# Patient Record
Sex: MALE | Race: WHITE | NOT HISPANIC OR LATINO | URBAN - METROPOLITAN AREA
[De-identification: names, ages, dates, MRNs, and addresses within clinical notes are randomized per-mention and may not be internally consistent; named-entity substitution may affect disease eponyms.]

---

## 2014-04-08 RX ORDER — AMLODIPINE BESYLATE 2.5 MG/1
1 TABLET ORAL
Qty: 0 | Refills: 0 | DISCHARGE
Start: 2014-04-08

## 2015-06-06 RX ORDER — CLOPIDOGREL BISULFATE 75 MG/1
1 TABLET, FILM COATED ORAL
Qty: 0 | Refills: 0 | COMMUNITY
Start: 2015-06-06

## 2015-06-06 RX ORDER — ASPIRIN/CALCIUM CARB/MAGNESIUM 324 MG
1 TABLET ORAL
Qty: 0 | Refills: 0 | DISCHARGE
Start: 2015-06-06

## 2017-01-20 ENCOUNTER — OUTPATIENT (OUTPATIENT)
Dept: OUTPATIENT SERVICES | Facility: HOSPITAL | Age: 52
LOS: 1 days | End: 2017-01-20
Payer: COMMERCIAL

## 2017-01-20 ENCOUNTER — APPOINTMENT (OUTPATIENT)
Dept: MRI IMAGING | Facility: IMAGING CENTER | Age: 52
End: 2017-01-20

## 2017-01-20 DIAGNOSIS — Z98.89 OTHER SPECIFIED POSTPROCEDURAL STATES: Chronic | ICD-10-CM

## 2017-01-20 DIAGNOSIS — Z00.8 ENCOUNTER FOR OTHER GENERAL EXAMINATION: ICD-10-CM

## 2017-01-20 DIAGNOSIS — Z87.898 PERSONAL HISTORY OF OTHER SPECIFIED CONDITIONS: Chronic | ICD-10-CM

## 2017-01-20 PROCEDURE — 72197 MRI PELVIS W/O & W/DYE: CPT

## 2017-01-20 PROCEDURE — A9585: CPT

## 2017-01-20 PROCEDURE — 74183 MRI ABD W/O CNTR FLWD CNTR: CPT

## 2017-01-24 ENCOUNTER — APPOINTMENT (OUTPATIENT)
Dept: RHEUMATOLOGY | Facility: CLINIC | Age: 52
End: 2017-01-24

## 2017-02-07 ENCOUNTER — APPOINTMENT (OUTPATIENT)
Dept: RHEUMATOLOGY | Facility: CLINIC | Age: 52
End: 2017-02-07

## 2017-02-08 ENCOUNTER — RX RENEWAL (OUTPATIENT)
Age: 52
End: 2017-02-08

## 2017-02-08 ENCOUNTER — TRANSCRIPTION ENCOUNTER (OUTPATIENT)
Age: 52
End: 2017-02-08

## 2017-02-08 ENCOUNTER — LABORATORY RESULT (OUTPATIENT)
Age: 52
End: 2017-02-08

## 2017-02-08 ENCOUNTER — APPOINTMENT (OUTPATIENT)
Dept: DERMATOLOGY | Facility: CLINIC | Age: 52
End: 2017-02-08

## 2017-02-08 VITALS — SYSTOLIC BLOOD PRESSURE: 120 MMHG | DIASTOLIC BLOOD PRESSURE: 70 MMHG

## 2017-02-08 DIAGNOSIS — A63.0 ANOGENITAL (VENEREAL) WARTS: ICD-10-CM

## 2017-02-10 ENCOUNTER — TRANSCRIPTION ENCOUNTER (OUTPATIENT)
Age: 52
End: 2017-02-10

## 2017-02-20 ENCOUNTER — INPATIENT (INPATIENT)
Facility: HOSPITAL | Age: 52
LOS: 3 days | Discharge: HOME | DRG: 386 | End: 2017-02-24
Attending: INTERNAL MEDICINE | Admitting: INTERNAL MEDICINE
Payer: COMMERCIAL

## 2017-02-20 VITALS
WEIGHT: 167.99 LBS | RESPIRATION RATE: 18 BRPM | DIASTOLIC BLOOD PRESSURE: 97 MMHG | HEART RATE: 80 BPM | TEMPERATURE: 99 F | OXYGEN SATURATION: 100 % | SYSTOLIC BLOOD PRESSURE: 145 MMHG | HEIGHT: 66 IN

## 2017-02-20 DIAGNOSIS — Z87.898 PERSONAL HISTORY OF OTHER SPECIFIED CONDITIONS: Chronic | ICD-10-CM

## 2017-02-20 DIAGNOSIS — Z98.89 OTHER SPECIFIED POSTPROCEDURAL STATES: Chronic | ICD-10-CM

## 2017-02-20 DIAGNOSIS — K56.69 OTHER INTESTINAL OBSTRUCTION: ICD-10-CM

## 2017-02-20 LAB
ALBUMIN SERPL ELPH-MCNC: 4.7 G/DL — SIGNIFICANT CHANGE UP (ref 3.3–5)
ALP SERPL-CCNC: 65 U/L — SIGNIFICANT CHANGE UP (ref 40–120)
ALT FLD-CCNC: 19 U/L RC — SIGNIFICANT CHANGE UP (ref 10–45)
ANION GAP SERPL CALC-SCNC: 19 MMOL/L — HIGH (ref 5–17)
APTT BLD: 35.2 SEC — SIGNIFICANT CHANGE UP (ref 27.5–37.4)
AST SERPL-CCNC: 15 U/L — SIGNIFICANT CHANGE UP (ref 10–40)
BASOPHILS # BLD AUTO: 0 K/UL — SIGNIFICANT CHANGE UP (ref 0–0.2)
BASOPHILS NFR BLD AUTO: 0.2 % — SIGNIFICANT CHANGE UP (ref 0–2)
BILIRUB SERPL-MCNC: 0.3 MG/DL — SIGNIFICANT CHANGE UP (ref 0.2–1.2)
BLD GP AB SCN SERPL QL: NEGATIVE — SIGNIFICANT CHANGE UP
BUN SERPL-MCNC: 12 MG/DL — SIGNIFICANT CHANGE UP (ref 7–23)
CALCIUM SERPL-MCNC: 10.2 MG/DL — SIGNIFICANT CHANGE UP (ref 8.4–10.5)
CHLORIDE SERPL-SCNC: 99 MMOL/L — SIGNIFICANT CHANGE UP (ref 96–108)
CO2 SERPL-SCNC: 24 MMOL/L — SIGNIFICANT CHANGE UP (ref 22–31)
CREAT SERPL-MCNC: 0.72 MG/DL — SIGNIFICANT CHANGE UP (ref 0.5–1.3)
EOSINOPHIL # BLD AUTO: 0 K/UL — SIGNIFICANT CHANGE UP (ref 0–0.5)
EOSINOPHIL NFR BLD AUTO: 0.4 % — SIGNIFICANT CHANGE UP (ref 0–6)
GAS PNL BLDV: SIGNIFICANT CHANGE UP
GLUCOSE SERPL-MCNC: 110 MG/DL — HIGH (ref 70–99)
HCT VFR BLD CALC: 39.1 % — SIGNIFICANT CHANGE UP (ref 39–50)
HGB BLD-MCNC: 13.4 G/DL — SIGNIFICANT CHANGE UP (ref 13–17)
INR BLD: 1.13 RATIO — SIGNIFICANT CHANGE UP (ref 0.88–1.16)
LIDOCAIN IGE QN: 31 U/L — SIGNIFICANT CHANGE UP (ref 7–60)
LYMPHOCYTES # BLD AUTO: 0.7 K/UL — LOW (ref 1–3.3)
LYMPHOCYTES # BLD AUTO: 6.8 % — LOW (ref 13–44)
MCHC RBC-ENTMCNC: 27.9 PG — SIGNIFICANT CHANGE UP (ref 27–34)
MCHC RBC-ENTMCNC: 34.3 GM/DL — SIGNIFICANT CHANGE UP (ref 32–36)
MCV RBC AUTO: 81.5 FL — SIGNIFICANT CHANGE UP (ref 80–100)
MONOCYTES # BLD AUTO: 0.4 K/UL — SIGNIFICANT CHANGE UP (ref 0–0.9)
MONOCYTES NFR BLD AUTO: 4.1 % — SIGNIFICANT CHANGE UP (ref 2–14)
NEUTROPHILS # BLD AUTO: 8.9 K/UL — HIGH (ref 1.8–7.4)
NEUTROPHILS NFR BLD AUTO: 88.5 % — HIGH (ref 43–77)
PLATELET # BLD AUTO: 174 K/UL — SIGNIFICANT CHANGE UP (ref 150–400)
POTASSIUM SERPL-MCNC: 3.7 MMOL/L — SIGNIFICANT CHANGE UP (ref 3.5–5.3)
POTASSIUM SERPL-SCNC: 3.7 MMOL/L — SIGNIFICANT CHANGE UP (ref 3.5–5.3)
PROT SERPL-MCNC: 7.6 G/DL — SIGNIFICANT CHANGE UP (ref 6–8.3)
PROTHROM AB SERPL-ACNC: 12.3 SEC — SIGNIFICANT CHANGE UP (ref 10–13.1)
RBC # BLD: 4.8 M/UL — SIGNIFICANT CHANGE UP (ref 4.2–5.8)
RBC # FLD: 12.5 % — SIGNIFICANT CHANGE UP (ref 10.3–14.5)
RH IG SCN BLD-IMP: POSITIVE — SIGNIFICANT CHANGE UP
SODIUM SERPL-SCNC: 142 MMOL/L — SIGNIFICANT CHANGE UP (ref 135–145)
WBC # BLD: 10 K/UL — SIGNIFICANT CHANGE UP (ref 3.8–10.5)
WBC # FLD AUTO: 10 K/UL — SIGNIFICANT CHANGE UP (ref 3.8–10.5)

## 2017-02-20 PROCEDURE — 74177 CT ABD & PELVIS W/CONTRAST: CPT | Mod: 26

## 2017-02-20 PROCEDURE — 99285 EMERGENCY DEPT VISIT HI MDM: CPT

## 2017-02-20 PROCEDURE — 99223 1ST HOSP IP/OBS HIGH 75: CPT

## 2017-02-20 RX ORDER — MORPHINE SULFATE 50 MG/1
4 CAPSULE, EXTENDED RELEASE ORAL ONCE
Qty: 0 | Refills: 0 | Status: DISCONTINUED | OUTPATIENT
Start: 2017-02-20 | End: 2017-02-20

## 2017-02-20 RX ORDER — METRONIDAZOLE 500 MG
500 TABLET ORAL ONCE
Qty: 0 | Refills: 0 | Status: COMPLETED | OUTPATIENT
Start: 2017-02-20 | End: 2017-02-20

## 2017-02-20 RX ORDER — CIPROFLOXACIN LACTATE 400MG/40ML
400 VIAL (ML) INTRAVENOUS ONCE
Qty: 0 | Refills: 0 | Status: COMPLETED | OUTPATIENT
Start: 2017-02-20 | End: 2017-02-20

## 2017-02-20 RX ORDER — ACETAMINOPHEN 500 MG
1000 TABLET ORAL ONCE
Qty: 0 | Refills: 0 | Status: COMPLETED | OUTPATIENT
Start: 2017-02-20 | End: 2017-02-20

## 2017-02-20 RX ORDER — ONDANSETRON 8 MG/1
4 TABLET, FILM COATED ORAL ONCE
Qty: 0 | Refills: 0 | Status: COMPLETED | OUTPATIENT
Start: 2017-02-20 | End: 2017-02-20

## 2017-02-20 RX ORDER — SODIUM CHLORIDE 9 MG/ML
1000 INJECTION, SOLUTION INTRAVENOUS
Qty: 0 | Refills: 0 | Status: DISCONTINUED | OUTPATIENT
Start: 2017-02-20 | End: 2017-02-23

## 2017-02-20 RX ORDER — CIPROFLOXACIN LACTATE 400MG/40ML
200 VIAL (ML) INTRAVENOUS EVERY 12 HOURS
Qty: 0 | Refills: 0 | Status: DISCONTINUED | OUTPATIENT
Start: 2017-02-20 | End: 2017-02-24

## 2017-02-20 RX ORDER — HYDROMORPHONE HYDROCHLORIDE 2 MG/ML
0.5 INJECTION INTRAMUSCULAR; INTRAVENOUS; SUBCUTANEOUS EVERY 4 HOURS
Qty: 0 | Refills: 0 | Status: DISCONTINUED | OUTPATIENT
Start: 2017-02-20 | End: 2017-02-22

## 2017-02-20 RX ORDER — METRONIDAZOLE 500 MG
500 TABLET ORAL EVERY 8 HOURS
Qty: 0 | Refills: 0 | Status: DISCONTINUED | OUTPATIENT
Start: 2017-02-20 | End: 2017-02-24

## 2017-02-20 RX ORDER — SODIUM CHLORIDE 9 MG/ML
1000 INJECTION INTRAMUSCULAR; INTRAVENOUS; SUBCUTANEOUS ONCE
Qty: 0 | Refills: 0 | Status: COMPLETED | OUTPATIENT
Start: 2017-02-20 | End: 2017-02-20

## 2017-02-20 RX ADMIN — MORPHINE SULFATE 4 MILLIGRAM(S): 50 CAPSULE, EXTENDED RELEASE ORAL at 21:30

## 2017-02-20 RX ADMIN — MORPHINE SULFATE 4 MILLIGRAM(S): 50 CAPSULE, EXTENDED RELEASE ORAL at 22:44

## 2017-02-20 RX ADMIN — SODIUM CHLORIDE 2000 MILLILITER(S): 9 INJECTION INTRAMUSCULAR; INTRAVENOUS; SUBCUTANEOUS at 19:25

## 2017-02-20 RX ADMIN — Medication 100 MILLIGRAM(S): at 22:30

## 2017-02-20 RX ADMIN — Medication 400 MILLIGRAM(S): at 18:45

## 2017-02-20 RX ADMIN — SODIUM CHLORIDE 1000 MILLILITER(S): 9 INJECTION INTRAMUSCULAR; INTRAVENOUS; SUBCUTANEOUS at 18:38

## 2017-02-20 RX ADMIN — ONDANSETRON 4 MILLIGRAM(S): 8 TABLET, FILM COATED ORAL at 18:37

## 2017-02-20 RX ADMIN — Medication 1000 MILLIGRAM(S): at 20:38

## 2017-02-20 RX ADMIN — SODIUM CHLORIDE 125 MILLILITER(S): 9 INJECTION, SOLUTION INTRAVENOUS at 22:31

## 2017-02-20 RX ADMIN — ONDANSETRON 4 MILLIGRAM(S): 8 TABLET, FILM COATED ORAL at 20:59

## 2017-02-20 RX ADMIN — MORPHINE SULFATE 4 MILLIGRAM(S): 50 CAPSULE, EXTENDED RELEASE ORAL at 23:11

## 2017-02-20 RX ADMIN — MORPHINE SULFATE 4 MILLIGRAM(S): 50 CAPSULE, EXTENDED RELEASE ORAL at 20:38

## 2017-02-20 RX ADMIN — MORPHINE SULFATE 4 MILLIGRAM(S): 50 CAPSULE, EXTENDED RELEASE ORAL at 18:37

## 2017-02-20 RX ADMIN — MORPHINE SULFATE 4 MILLIGRAM(S): 50 CAPSULE, EXTENDED RELEASE ORAL at 20:59

## 2017-02-20 RX ADMIN — Medication 200 MILLIGRAM(S): at 20:59

## 2017-02-20 NOTE — ED ADULT NURSE NOTE - PMH
Asthma    Bilateral knee pain    Bronchitis, chronic  last episode 6 months ago  CAD (coronary artery disease)    CAD (coronary artery disease)  RCA DE stent in place   2006  Crohn disease    DM (diabetes mellitus)  aic 5.8 diet controlled  GERD (gastroesophageal reflux disease)    GIB (gastrointestinal bleeding)    H/O: HTN (hypertension)    HLD (hyperlipidemia)    HTN (hypertension)    Hypercholesterolemia    Inguinal mass    Joint pain    Lower back pain    MI (myocardial infarction)    MARYAM (obstructive sleep apnea)    Pancreatitis  while on 6MP for Crohn's now off of it  Shoulder pain, bilateral    Sleep apnea

## 2017-02-20 NOTE — H&P ADULT. - PROBLEM SELECTOR PLAN 1
Seen on CT abd/pelvis. Likely complication of Crohn's disease. GI consult appreciated. Will hold steroids, start cipro and flagyl, IVF and pain control. Initially no vomiting today but abd pain continued to progress in ER refractory to multiple doses of IV morphine. Pt self induced vomiting to relieve pain. Pain better controlled with IV dilaudid and NGT placed.  -Cont. IV cipro and flagyl  -F/u Xray to confirm NGT placement, set to intermittent suction if confirmed.  -Will consult surgery to evaluate pt and give further recommendations, thank you Edil  -F/u GI

## 2017-02-20 NOTE — ED PROVIDER NOTE - CARE PLAN
Principal Discharge DX:	Partial small bowel obstruction  Secondary Diagnosis:	Crohn's disease of small intestine with intestinal obstruction

## 2017-02-20 NOTE — ED ADULT NURSE REASSESSMENT NOTE - NS ED NURSE REASSESS COMMENT FT1
Report received from Jose Miguel PARKER. Pt AAOx4, NAD, resting comfortably in bed with wife at bedside. Pt reports improvement in mid-abdominal pain to 5/10. Pt reports intermittent nausea, no vomiting in ED, no diarrhea in ED. Pt denies headache, dizziness, chest pain, SOB, urinary symptoms, fevers, chills at this time. Pt awaiting admit, safety maintained, pt aware he must remain NPO, safety maintained.

## 2017-02-20 NOTE — H&P ADULT. - PROBLEM SELECTOR PLAN 6
-can restart losartan 100mg AM, Toprol 25mg AM, amlodipine 10mg am via NGT after placement confirmed.

## 2017-02-20 NOTE — H&P ADULT. - ASSESSMENT
51M w/ hx of Crohn's disease s/p multiple regimens on entyvio, CAD s/p REBEKAH to RCA, mLAD/pLAD 2015, DM2, GERD, asthma, HTn, HLD, MARYAM, GERD p/w severe abd pain

## 2017-02-20 NOTE — ED PROVIDER NOTE - ATTENDING CONTRIBUTION TO CARE
Attending MD Garcia: I, Jovanni Garcia, personally have seen and examined this patient.  I have discussed all aspects of care with the resident physician. Resident note reviewed and agree on plan of care and except where noted.  See HPI, PE, and MDM for details.

## 2017-02-20 NOTE — H&P ADULT. - HISTORY OF PRESENT ILLNESS
51M w/ hx of Crohn's disease s/p multiple regimens on entyvio, CAD s/p REBEKAH to RCA, mLAD/pLAD 2015, DM2, GERD, asthma, HTn, HLD, MARYAM, GERD p/w severe abd pain. Pt has been having periumbilical, constant, crampy abdominal pain since this AM which is sometimes alleviated by certain positions. Had a small BM this AM and possibly this afternoon. Usually has diarrhea many times a day. Still having occasional flatus. Of note, had SBO in 11/2016 w/ NGT and steroids. Pt endorses nausea but denies any episodes of vomiting, sob, alexandra chest pain. Has subjective fever not greatly different from usual.     In ER: Seen by GI, given multiple rounds of morphine 4mg IV, cipro, flagyl, IVF, seen by pt's GI.

## 2017-02-20 NOTE — ED PROVIDER NOTE - OBJECTIVE STATEMENT
50yo p/w abdominal pain since this AM. Pain is periumbilical, constant, aggravated by palpation, alleviated by certain positions, cramping. Last BM this AM, small this afternoon. Pt. reports occasional flatus. Pt. had SBO in Nov. 2016, s/p NG tube and steriods. Pt. on Entivio s/p 2 loading doses, 3rd scheduled for 3/9. GI: Dr. Caicedo. Pt. reports weakness, nausea. Denies fevers, chest pain, shortness of breath, dysuria/hematuria. Pt. reports occasional urinary frequency.

## 2017-02-20 NOTE — ED PROVIDER NOTE - PSH
H/O colonoscopy    H/O lipoma  removed from back  H/O percutaneous transluminal coronary angioplasty  with DE RCA 2006  History of herniorrhaphy  left inguinal herniorrhaphy  S/P angioplasty with stent  RCA 9/2006  S/P cardiac catheterization  2010 - [facility]  S/P hernia surgery

## 2017-02-20 NOTE — H&P ADULT. - FAMILY HISTORY
<<-----Click on this checkbox to enter Family History Family history of premature CAD, Father CABG at age 54     Father  Still living? Unknown  Family history of Crohn's disease, Age at diagnosis: Age Unknown

## 2017-02-20 NOTE — H&P ADULT. - PSH
H/O colonoscopy    H/O lipoma  removed from back  H/O percutaneous transluminal coronary angioplasty  with DE RCA 2006  History of herniorrhaphy  left inguinal herniorrhaphy  S/P angioplasty with stent  RCA 9/2006  S/P cardiac catheterization  2010 - [facility]  S/P hernia surgery

## 2017-02-20 NOTE — ED PROVIDER NOTE - PHYSICAL EXAMINATION
ATTENDING MD Radha: GEN: mildly ill appearing, uncomfortable, nontoxic; HEENT: NCAT, anicteric sclerae, MM Dry, neck veins flat; CV: RRR; LUNGS: CTA; ABD: soft, diffusely tender w/out reboudn or guarding; : no CVAT; MSK: atraumatic extremities; NEURO: moving all extremities; PSYCH: approrpaite affect    RESIDENT EXAM BELOW:

## 2017-02-20 NOTE — ED ADULT NURSE REASSESSMENT NOTE - GENERAL PATIENT STATE
smiling/interactive/comfortable appearance/cooperative/improvement verbalized/family/SO at bedside/resting/sleeping

## 2017-02-20 NOTE — ED ADULT NURSE NOTE - OBJECTIVE STATEMENT
Pt is known case of crones' disease C/O  abdominal pain  X 1 day with nausea & feeling miserable . Pt denies diarrhea last BM today has low grade fever june CP SOB  Pt is nauseous & in severe pain

## 2017-02-20 NOTE — H&P ADULT. - PROBLEM SELECTOR PLAN 2
Pt with difficult to treat Crohn's disease. Does not have symptoms of flare currently. As per GI, will hold steroids. Next dose of Entyvia not until March. Repatha bi weekly taken this am before coming to hospital.  -See above

## 2017-02-21 DIAGNOSIS — K56.69 OTHER INTESTINAL OBSTRUCTION: ICD-10-CM

## 2017-02-21 DIAGNOSIS — K50.012 CROHN'S DISEASE OF SMALL INTESTINE WITH INTESTINAL OBSTRUCTION: ICD-10-CM

## 2017-02-21 DIAGNOSIS — J45.909 UNSPECIFIED ASTHMA, UNCOMPLICATED: ICD-10-CM

## 2017-02-21 DIAGNOSIS — K21.9 GASTRO-ESOPHAGEAL REFLUX DISEASE WITHOUT ESOPHAGITIS: ICD-10-CM

## 2017-02-21 DIAGNOSIS — Z29.9 ENCOUNTER FOR PROPHYLACTIC MEASURES, UNSPECIFIED: ICD-10-CM

## 2017-02-21 DIAGNOSIS — Z86.79 PERSONAL HISTORY OF OTHER DISEASES OF THE CIRCULATORY SYSTEM: ICD-10-CM

## 2017-02-21 DIAGNOSIS — I25.10 ATHEROSCLEROTIC HEART DISEASE OF NATIVE CORONARY ARTERY WITHOUT ANGINA PECTORIS: ICD-10-CM

## 2017-02-21 LAB
ALBUMIN SERPL ELPH-MCNC: 3.8 G/DL — SIGNIFICANT CHANGE UP (ref 3.3–5)
ALP SERPL-CCNC: 57 U/L — SIGNIFICANT CHANGE UP (ref 40–120)
ALT FLD-CCNC: 12 U/L — SIGNIFICANT CHANGE UP (ref 10–45)
ANION GAP SERPL CALC-SCNC: 12 MMOL/L — SIGNIFICANT CHANGE UP (ref 5–17)
AST SERPL-CCNC: 13 U/L — SIGNIFICANT CHANGE UP (ref 10–40)
BASOPHILS # BLD AUTO: 0 K/UL — SIGNIFICANT CHANGE UP (ref 0–0.2)
BASOPHILS NFR BLD AUTO: 0 % — SIGNIFICANT CHANGE UP (ref 0–2)
BILIRUB DIRECT SERPL-MCNC: 0 MG/DL — SIGNIFICANT CHANGE UP (ref 0–0.2)
BILIRUB INDIRECT FLD-MCNC: 0.2 MG/DL — SIGNIFICANT CHANGE UP (ref 0.2–1)
BILIRUB SERPL-MCNC: 0.2 MG/DL — SIGNIFICANT CHANGE UP (ref 0.2–1.2)
BUN SERPL-MCNC: 8 MG/DL — SIGNIFICANT CHANGE UP (ref 7–23)
CALCIUM SERPL-MCNC: 9.2 MG/DL — SIGNIFICANT CHANGE UP (ref 8.4–10.5)
CHLORIDE SERPL-SCNC: 103 MMOL/L — SIGNIFICANT CHANGE UP (ref 96–108)
CO2 SERPL-SCNC: 26 MMOL/L — SIGNIFICANT CHANGE UP (ref 22–31)
CREAT SERPL-MCNC: 0.76 MG/DL — SIGNIFICANT CHANGE UP (ref 0.5–1.3)
EOSINOPHIL # BLD AUTO: 0 K/UL — SIGNIFICANT CHANGE UP (ref 0–0.5)
EOSINOPHIL NFR BLD AUTO: 0 % — SIGNIFICANT CHANGE UP (ref 0–6)
GLUCOSE SERPL-MCNC: 145 MG/DL — HIGH (ref 70–99)
HBA1C BLD-MCNC: 5.1 % — SIGNIFICANT CHANGE UP (ref 4–5.6)
HCT VFR BLD CALC: 37.9 % — LOW (ref 39–50)
HGB BLD-MCNC: 12.8 G/DL — LOW (ref 13–17)
IMM GRANULOCYTES NFR BLD AUTO: 0.2 % — SIGNIFICANT CHANGE UP (ref 0–1.5)
LACTATE SERPL-SCNC: 0.8 MMOL/L — SIGNIFICANT CHANGE UP (ref 0.7–2)
LYMPHOCYTES # BLD AUTO: 0.68 K/UL — LOW (ref 1–3.3)
LYMPHOCYTES # BLD AUTO: 7.7 % — LOW (ref 13–44)
MAGNESIUM SERPL-MCNC: 1.9 MG/DL — SIGNIFICANT CHANGE UP (ref 1.6–2.6)
MCHC RBC-ENTMCNC: 27.6 PG — SIGNIFICANT CHANGE UP (ref 27–34)
MCHC RBC-ENTMCNC: 33.8 GM/DL — SIGNIFICANT CHANGE UP (ref 32–36)
MCV RBC AUTO: 81.7 FL — SIGNIFICANT CHANGE UP (ref 80–100)
MONOCYTES # BLD AUTO: 0.47 K/UL — SIGNIFICANT CHANGE UP (ref 0–0.9)
MONOCYTES NFR BLD AUTO: 5.3 % — SIGNIFICANT CHANGE UP (ref 2–14)
NEUTROPHILS # BLD AUTO: 7.7 K/UL — HIGH (ref 1.8–7.4)
NEUTROPHILS NFR BLD AUTO: 86.8 % — HIGH (ref 43–77)
PLATELET # BLD AUTO: 154 K/UL — SIGNIFICANT CHANGE UP (ref 150–400)
POTASSIUM SERPL-MCNC: 4.8 MMOL/L — SIGNIFICANT CHANGE UP (ref 3.5–5.3)
POTASSIUM SERPL-SCNC: 4.8 MMOL/L — SIGNIFICANT CHANGE UP (ref 3.5–5.3)
PROT SERPL-MCNC: 6.8 G/DL — SIGNIFICANT CHANGE UP (ref 6–8.3)
RBC # BLD: 4.64 M/UL — SIGNIFICANT CHANGE UP (ref 4.2–5.8)
RBC # FLD: 13.7 % — SIGNIFICANT CHANGE UP (ref 10.3–14.5)
SODIUM SERPL-SCNC: 141 MMOL/L — SIGNIFICANT CHANGE UP (ref 135–145)
WBC # BLD: 8.87 K/UL — SIGNIFICANT CHANGE UP (ref 3.8–10.5)
WBC # FLD AUTO: 8.87 K/UL — SIGNIFICANT CHANGE UP (ref 3.8–10.5)

## 2017-02-21 PROCEDURE — 71010: CPT | Mod: 26

## 2017-02-21 PROCEDURE — 99253 IP/OBS CNSLTJ NEW/EST LOW 45: CPT

## 2017-02-21 PROCEDURE — 99233 SBSQ HOSP IP/OBS HIGH 50: CPT

## 2017-02-21 RX ORDER — CLOPIDOGREL BISULFATE 75 MG/1
75 TABLET, FILM COATED ORAL DAILY
Qty: 0 | Refills: 0 | Status: DISCONTINUED | OUTPATIENT
Start: 2017-02-21 | End: 2017-02-24

## 2017-02-21 RX ORDER — PANTOPRAZOLE SODIUM 20 MG/1
40 TABLET, DELAYED RELEASE ORAL DAILY
Qty: 0 | Refills: 0 | Status: DISCONTINUED | OUTPATIENT
Start: 2017-02-21 | End: 2017-02-24

## 2017-02-21 RX ORDER — FLUTICASONE PROPIONATE AND SALMETEROL 50; 250 UG/1; UG/1
1 POWDER ORAL; RESPIRATORY (INHALATION)
Qty: 0 | Refills: 0 | Status: DISCONTINUED | OUTPATIENT
Start: 2017-02-21 | End: 2017-02-24

## 2017-02-21 RX ORDER — ACETAMINOPHEN 500 MG
1000 TABLET ORAL ONCE
Qty: 0 | Refills: 0 | Status: COMPLETED | OUTPATIENT
Start: 2017-02-21 | End: 2017-02-22

## 2017-02-21 RX ORDER — VEDOLIZUMAB 108 MG/.68ML
0 INJECTION, SOLUTION SUBCUTANEOUS
Qty: 0 | Refills: 0 | COMMUNITY

## 2017-02-21 RX ORDER — LORATADINE 10 MG/1
10 TABLET ORAL AT BEDTIME
Qty: 0 | Refills: 0 | Status: DISCONTINUED | OUTPATIENT
Start: 2017-02-21 | End: 2017-02-24

## 2017-02-21 RX ORDER — ONDANSETRON 8 MG/1
4 TABLET, FILM COATED ORAL EVERY 6 HOURS
Qty: 0 | Refills: 0 | Status: DISCONTINUED | OUTPATIENT
Start: 2017-02-21 | End: 2017-02-24

## 2017-02-21 RX ORDER — TIOTROPIUM BROMIDE 18 UG/1
1 CAPSULE ORAL; RESPIRATORY (INHALATION) AT BEDTIME
Qty: 0 | Refills: 0 | Status: DISCONTINUED | OUTPATIENT
Start: 2017-02-21 | End: 2017-02-24

## 2017-02-21 RX ORDER — SODIUM CHLORIDE 0.65 %
1 AEROSOL, SPRAY (ML) NASAL
Qty: 0 | Refills: 0 | Status: DISCONTINUED | OUTPATIENT
Start: 2017-02-21 | End: 2017-02-24

## 2017-02-21 RX ORDER — ACETAMINOPHEN 500 MG
1000 TABLET ORAL ONCE
Qty: 0 | Refills: 0 | Status: COMPLETED | OUTPATIENT
Start: 2017-02-21 | End: 2017-02-21

## 2017-02-21 RX ORDER — TETRACAINE/BENZOCAINE/BUTAMBEN 2%-14%-2%
1 OINTMENT (GRAM) TOPICAL
Qty: 0 | Refills: 0 | Status: DISCONTINUED | OUTPATIENT
Start: 2017-02-21 | End: 2017-02-24

## 2017-02-21 RX ORDER — HYDROMORPHONE HYDROCHLORIDE 2 MG/ML
1 INJECTION INTRAMUSCULAR; INTRAVENOUS; SUBCUTANEOUS EVERY 4 HOURS
Qty: 0 | Refills: 0 | Status: DISCONTINUED | OUTPATIENT
Start: 2017-02-21 | End: 2017-02-24

## 2017-02-21 RX ORDER — ASPIRIN/CALCIUM CARB/MAGNESIUM 324 MG
81 TABLET ORAL DAILY
Qty: 0 | Refills: 0 | Status: DISCONTINUED | OUTPATIENT
Start: 2017-02-21 | End: 2017-02-21

## 2017-02-21 RX ORDER — MONTELUKAST 4 MG/1
10 TABLET, CHEWABLE ORAL AT BEDTIME
Qty: 0 | Refills: 0 | Status: DISCONTINUED | OUTPATIENT
Start: 2017-02-21 | End: 2017-02-23

## 2017-02-21 RX ORDER — HEPARIN SODIUM 5000 [USP'U]/ML
5000 INJECTION INTRAVENOUS; SUBCUTANEOUS EVERY 12 HOURS
Qty: 0 | Refills: 0 | Status: DISCONTINUED | OUTPATIENT
Start: 2017-02-21 | End: 2017-02-24

## 2017-02-21 RX ORDER — ASPIRIN/CALCIUM CARB/MAGNESIUM 324 MG
81 TABLET ORAL DAILY
Qty: 0 | Refills: 0 | Status: DISCONTINUED | OUTPATIENT
Start: 2017-02-21 | End: 2017-02-23

## 2017-02-21 RX ADMIN — PANTOPRAZOLE SODIUM 40 MILLIGRAM(S): 20 TABLET, DELAYED RELEASE ORAL at 21:20

## 2017-02-21 RX ADMIN — HEPARIN SODIUM 5000 UNIT(S): 5000 INJECTION INTRAVENOUS; SUBCUTANEOUS at 05:20

## 2017-02-21 RX ADMIN — Medication 1000 MILLIGRAM(S): at 11:13

## 2017-02-21 RX ADMIN — HYDROMORPHONE HYDROCHLORIDE 0.5 MILLIGRAM(S): 2 INJECTION INTRAMUSCULAR; INTRAVENOUS; SUBCUTANEOUS at 01:17

## 2017-02-21 RX ADMIN — Medication 100 MILLIGRAM(S): at 17:19

## 2017-02-21 RX ADMIN — Medication 100 MILLIGRAM(S): at 21:21

## 2017-02-21 RX ADMIN — TIOTROPIUM BROMIDE 1 CAPSULE(S): 18 CAPSULE ORAL; RESPIRATORY (INHALATION) at 21:22

## 2017-02-21 RX ADMIN — FLUTICASONE PROPIONATE AND SALMETEROL 1 DOSE(S): 50; 250 POWDER ORAL; RESPIRATORY (INHALATION) at 17:20

## 2017-02-21 RX ADMIN — Medication 1000 MILLIGRAM(S): at 17:25

## 2017-02-21 RX ADMIN — FLUTICASONE PROPIONATE AND SALMETEROL 1 DOSE(S): 50; 250 POWDER ORAL; RESPIRATORY (INHALATION) at 05:20

## 2017-02-21 RX ADMIN — HYDROMORPHONE HYDROCHLORIDE 0.5 MILLIGRAM(S): 2 INJECTION INTRAMUSCULAR; INTRAVENOUS; SUBCUTANEOUS at 06:50

## 2017-02-21 RX ADMIN — Medication 81 MILLIGRAM(S): at 11:08

## 2017-02-21 RX ADMIN — MONTELUKAST 10 MILLIGRAM(S): 4 TABLET, CHEWABLE ORAL at 22:10

## 2017-02-21 RX ADMIN — Medication 400 MILLIGRAM(S): at 11:06

## 2017-02-21 RX ADMIN — HYDROMORPHONE HYDROCHLORIDE 0.5 MILLIGRAM(S): 2 INJECTION INTRAMUSCULAR; INTRAVENOUS; SUBCUTANEOUS at 06:20

## 2017-02-21 RX ADMIN — Medication 100 MILLIGRAM(S): at 05:20

## 2017-02-21 RX ADMIN — CLOPIDOGREL BISULFATE 75 MILLIGRAM(S): 75 TABLET, FILM COATED ORAL at 11:08

## 2017-02-21 RX ADMIN — Medication 100 MILLIGRAM(S): at 13:27

## 2017-02-21 RX ADMIN — Medication 1 SPRAY(S): at 21:21

## 2017-02-21 RX ADMIN — HEPARIN SODIUM 5000 UNIT(S): 5000 INJECTION INTRAVENOUS; SUBCUTANEOUS at 17:19

## 2017-02-21 RX ADMIN — HYDROMORPHONE HYDROCHLORIDE 0.5 MILLIGRAM(S): 2 INJECTION INTRAMUSCULAR; INTRAVENOUS; SUBCUTANEOUS at 00:47

## 2017-02-21 RX ADMIN — Medication 400 MILLIGRAM(S): at 17:19

## 2017-02-22 LAB
ANION GAP SERPL CALC-SCNC: 14 MMOL/L — SIGNIFICANT CHANGE UP (ref 5–17)
BUN SERPL-MCNC: 5 MG/DL — LOW (ref 7–23)
CALCIUM SERPL-MCNC: 8.7 MG/DL — SIGNIFICANT CHANGE UP (ref 8.4–10.5)
CHLORIDE SERPL-SCNC: 104 MMOL/L — SIGNIFICANT CHANGE UP (ref 96–108)
CO2 SERPL-SCNC: 24 MMOL/L — SIGNIFICANT CHANGE UP (ref 22–31)
CREAT SERPL-MCNC: 0.58 MG/DL — SIGNIFICANT CHANGE UP (ref 0.5–1.3)
GLUCOSE SERPL-MCNC: 116 MG/DL — HIGH (ref 70–99)
HCT VFR BLD CALC: 35.4 % — LOW (ref 39–50)
HGB BLD-MCNC: 11.8 G/DL — LOW (ref 13–17)
MCHC RBC-ENTMCNC: 27.3 PG — SIGNIFICANT CHANGE UP (ref 27–34)
MCHC RBC-ENTMCNC: 33.3 GM/DL — SIGNIFICANT CHANGE UP (ref 32–36)
MCV RBC AUTO: 81.9 FL — SIGNIFICANT CHANGE UP (ref 80–100)
PLATELET # BLD AUTO: 124 K/UL — LOW (ref 150–400)
POTASSIUM SERPL-MCNC: 3.8 MMOL/L — SIGNIFICANT CHANGE UP (ref 3.5–5.3)
POTASSIUM SERPL-SCNC: 3.8 MMOL/L — SIGNIFICANT CHANGE UP (ref 3.5–5.3)
RBC # BLD: 4.32 M/UL — SIGNIFICANT CHANGE UP (ref 4.2–5.8)
RBC # FLD: 13.8 % — SIGNIFICANT CHANGE UP (ref 10.3–14.5)
SODIUM SERPL-SCNC: 142 MMOL/L — SIGNIFICANT CHANGE UP (ref 135–145)
WBC # BLD: 4.74 K/UL — SIGNIFICANT CHANGE UP (ref 3.8–10.5)
WBC # FLD AUTO: 4.74 K/UL — SIGNIFICANT CHANGE UP (ref 3.8–10.5)

## 2017-02-22 PROCEDURE — 99233 SBSQ HOSP IP/OBS HIGH 50: CPT

## 2017-02-22 RX ORDER — ACETAMINOPHEN 500 MG
1000 TABLET ORAL ONCE
Qty: 0 | Refills: 0 | Status: COMPLETED | OUTPATIENT
Start: 2017-02-22 | End: 2017-02-22

## 2017-02-22 RX ADMIN — TIOTROPIUM BROMIDE 1 CAPSULE(S): 18 CAPSULE ORAL; RESPIRATORY (INHALATION) at 21:49

## 2017-02-22 RX ADMIN — Medication 100 MILLIGRAM(S): at 17:46

## 2017-02-22 RX ADMIN — Medication 81 MILLIGRAM(S): at 13:25

## 2017-02-22 RX ADMIN — FLUTICASONE PROPIONATE AND SALMETEROL 1 DOSE(S): 50; 250 POWDER ORAL; RESPIRATORY (INHALATION) at 17:47

## 2017-02-22 RX ADMIN — Medication 400 MILLIGRAM(S): at 15:44

## 2017-02-22 RX ADMIN — Medication 100 MILLIGRAM(S): at 13:25

## 2017-02-22 RX ADMIN — Medication 100 MILLIGRAM(S): at 05:09

## 2017-02-22 RX ADMIN — Medication 1 DROP(S): at 13:26

## 2017-02-22 RX ADMIN — HYDROMORPHONE HYDROCHLORIDE 0.5 MILLIGRAM(S): 2 INJECTION INTRAMUSCULAR; INTRAVENOUS; SUBCUTANEOUS at 13:02

## 2017-02-22 RX ADMIN — Medication 1000 MILLIGRAM(S): at 01:17

## 2017-02-22 RX ADMIN — CLOPIDOGREL BISULFATE 75 MILLIGRAM(S): 75 TABLET, FILM COATED ORAL at 13:25

## 2017-02-22 RX ADMIN — PANTOPRAZOLE SODIUM 40 MILLIGRAM(S): 20 TABLET, DELAYED RELEASE ORAL at 13:26

## 2017-02-22 RX ADMIN — Medication 100 MILLIGRAM(S): at 21:45

## 2017-02-22 RX ADMIN — Medication 1 DROP(S): at 17:46

## 2017-02-22 RX ADMIN — Medication 1000 MILLIGRAM(S): at 23:10

## 2017-02-22 RX ADMIN — HYDROMORPHONE HYDROCHLORIDE 0.5 MILLIGRAM(S): 2 INJECTION INTRAMUSCULAR; INTRAVENOUS; SUBCUTANEOUS at 06:48

## 2017-02-22 RX ADMIN — HYDROMORPHONE HYDROCHLORIDE 0.5 MILLIGRAM(S): 2 INJECTION INTRAMUSCULAR; INTRAVENOUS; SUBCUTANEOUS at 07:03

## 2017-02-22 RX ADMIN — FLUTICASONE PROPIONATE AND SALMETEROL 1 DOSE(S): 50; 250 POWDER ORAL; RESPIRATORY (INHALATION) at 05:07

## 2017-02-22 RX ADMIN — Medication 1000 MILLIGRAM(S): at 09:13

## 2017-02-22 RX ADMIN — Medication 1 SPRAY(S): at 05:10

## 2017-02-22 RX ADMIN — Medication 400 MILLIGRAM(S): at 22:32

## 2017-02-22 RX ADMIN — Medication 1000 MILLIGRAM(S): at 15:59

## 2017-02-22 RX ADMIN — Medication 400 MILLIGRAM(S): at 00:47

## 2017-02-22 RX ADMIN — Medication 400 MILLIGRAM(S): at 08:58

## 2017-02-22 RX ADMIN — HYDROMORPHONE HYDROCHLORIDE 0.5 MILLIGRAM(S): 2 INJECTION INTRAMUSCULAR; INTRAVENOUS; SUBCUTANEOUS at 12:47

## 2017-02-22 RX ADMIN — HEPARIN SODIUM 5000 UNIT(S): 5000 INJECTION INTRAVENOUS; SUBCUTANEOUS at 05:10

## 2017-02-22 RX ADMIN — MONTELUKAST 10 MILLIGRAM(S): 4 TABLET, CHEWABLE ORAL at 21:57

## 2017-02-22 RX ADMIN — HEPARIN SODIUM 5000 UNIT(S): 5000 INJECTION INTRAVENOUS; SUBCUTANEOUS at 17:46

## 2017-02-22 RX ADMIN — Medication 100 MILLIGRAM(S): at 06:04

## 2017-02-23 LAB
ANION GAP SERPL CALC-SCNC: 10 MMOL/L — SIGNIFICANT CHANGE UP (ref 5–17)
BASOPHILS # BLD AUTO: 0 K/UL — SIGNIFICANT CHANGE UP (ref 0–0.2)
BASOPHILS NFR BLD AUTO: 0 % — SIGNIFICANT CHANGE UP (ref 0–2)
BUN SERPL-MCNC: 4 MG/DL — LOW (ref 7–23)
CALCIUM SERPL-MCNC: 9.8 MG/DL — SIGNIFICANT CHANGE UP (ref 8.4–10.5)
CHLORIDE SERPL-SCNC: 105 MMOL/L — SIGNIFICANT CHANGE UP (ref 96–108)
CO2 SERPL-SCNC: 29 MMOL/L — SIGNIFICANT CHANGE UP (ref 22–31)
CREAT SERPL-MCNC: 0.84 MG/DL — SIGNIFICANT CHANGE UP (ref 0.5–1.3)
EOSINOPHIL # BLD AUTO: 0.07 K/UL — SIGNIFICANT CHANGE UP (ref 0–0.5)
EOSINOPHIL NFR BLD AUTO: 1 % — SIGNIFICANT CHANGE UP (ref 0–6)
GLUCOSE SERPL-MCNC: 111 MG/DL — HIGH (ref 70–99)
HCT VFR BLD CALC: 38.1 % — LOW (ref 39–50)
HGB BLD-MCNC: 12.7 G/DL — LOW (ref 13–17)
IMM GRANULOCYTES NFR BLD AUTO: 0.1 % — SIGNIFICANT CHANGE UP (ref 0–1.5)
LYMPHOCYTES # BLD AUTO: 0.77 K/UL — LOW (ref 1–3.3)
LYMPHOCYTES # BLD AUTO: 10.9 % — LOW (ref 13–44)
MCHC RBC-ENTMCNC: 27.1 PG — SIGNIFICANT CHANGE UP (ref 27–34)
MCHC RBC-ENTMCNC: 33.3 GM/DL — SIGNIFICANT CHANGE UP (ref 32–36)
MCV RBC AUTO: 81.4 FL — SIGNIFICANT CHANGE UP (ref 80–100)
MONOCYTES # BLD AUTO: 0.54 K/UL — SIGNIFICANT CHANGE UP (ref 0–0.9)
MONOCYTES NFR BLD AUTO: 7.6 % — SIGNIFICANT CHANGE UP (ref 2–14)
NEUTROPHILS # BLD AUTO: 5.68 K/UL — SIGNIFICANT CHANGE UP (ref 1.8–7.4)
NEUTROPHILS NFR BLD AUTO: 80.4 % — HIGH (ref 43–77)
PLATELET # BLD AUTO: 148 K/UL — LOW (ref 150–400)
POTASSIUM SERPL-MCNC: 4.9 MMOL/L — SIGNIFICANT CHANGE UP (ref 3.5–5.3)
POTASSIUM SERPL-SCNC: 4.9 MMOL/L — SIGNIFICANT CHANGE UP (ref 3.5–5.3)
RBC # BLD: 4.68 M/UL — SIGNIFICANT CHANGE UP (ref 4.2–5.8)
RBC # FLD: 13.5 % — SIGNIFICANT CHANGE UP (ref 10.3–14.5)
SODIUM SERPL-SCNC: 144 MMOL/L — SIGNIFICANT CHANGE UP (ref 135–145)
WBC # BLD: 7.07 K/UL — SIGNIFICANT CHANGE UP (ref 3.8–10.5)
WBC # FLD AUTO: 7.07 K/UL — SIGNIFICANT CHANGE UP (ref 3.8–10.5)

## 2017-02-23 PROCEDURE — 71010: CPT | Mod: 26

## 2017-02-23 PROCEDURE — 99233 SBSQ HOSP IP/OBS HIGH 50: CPT

## 2017-02-23 RX ORDER — MONTELUKAST 4 MG/1
10 TABLET, CHEWABLE ORAL AT BEDTIME
Qty: 0 | Refills: 0 | Status: DISCONTINUED | OUTPATIENT
Start: 2017-02-23 | End: 2017-02-24

## 2017-02-23 RX ORDER — ACETAMINOPHEN 500 MG
1000 TABLET ORAL ONCE
Qty: 0 | Refills: 0 | Status: COMPLETED | OUTPATIENT
Start: 2017-02-23 | End: 2017-02-23

## 2017-02-23 RX ORDER — HYDROMORPHONE HYDROCHLORIDE 2 MG/ML
0.5 INJECTION INTRAMUSCULAR; INTRAVENOUS; SUBCUTANEOUS ONCE
Qty: 0 | Refills: 0 | Status: DISCONTINUED | OUTPATIENT
Start: 2017-02-23 | End: 2017-02-23

## 2017-02-23 RX ORDER — ASPIRIN/CALCIUM CARB/MAGNESIUM 324 MG
81 TABLET ORAL DAILY
Qty: 0 | Refills: 0 | Status: DISCONTINUED | OUTPATIENT
Start: 2017-02-23 | End: 2017-02-24

## 2017-02-23 RX ADMIN — Medication 400 MILLIGRAM(S): at 05:53

## 2017-02-23 RX ADMIN — Medication 100 MILLIGRAM(S): at 22:00

## 2017-02-23 RX ADMIN — FLUTICASONE PROPIONATE AND SALMETEROL 1 DOSE(S): 50; 250 POWDER ORAL; RESPIRATORY (INHALATION) at 06:42

## 2017-02-23 RX ADMIN — Medication 1000 MILLIGRAM(S): at 06:28

## 2017-02-23 RX ADMIN — HYDROMORPHONE HYDROCHLORIDE 0.5 MILLIGRAM(S): 2 INJECTION INTRAMUSCULAR; INTRAVENOUS; SUBCUTANEOUS at 16:54

## 2017-02-23 RX ADMIN — CLOPIDOGREL BISULFATE 75 MILLIGRAM(S): 75 TABLET, FILM COATED ORAL at 11:33

## 2017-02-23 RX ADMIN — Medication 100 MILLIGRAM(S): at 13:07

## 2017-02-23 RX ADMIN — Medication 400 MILLIGRAM(S): at 13:07

## 2017-02-23 RX ADMIN — Medication 81 MILLIGRAM(S): at 11:33

## 2017-02-23 RX ADMIN — MONTELUKAST 10 MILLIGRAM(S): 4 TABLET, CHEWABLE ORAL at 22:01

## 2017-02-23 RX ADMIN — Medication 1 DROP(S): at 11:33

## 2017-02-23 RX ADMIN — HYDROMORPHONE HYDROCHLORIDE 0.5 MILLIGRAM(S): 2 INJECTION INTRAMUSCULAR; INTRAVENOUS; SUBCUTANEOUS at 16:52

## 2017-02-23 RX ADMIN — Medication 1000 MILLIGRAM(S): at 13:09

## 2017-02-23 RX ADMIN — Medication 400 MILLIGRAM(S): at 21:56

## 2017-02-23 RX ADMIN — HEPARIN SODIUM 5000 UNIT(S): 5000 INJECTION INTRAVENOUS; SUBCUTANEOUS at 17:08

## 2017-02-23 RX ADMIN — Medication 1 DROP(S): at 00:59

## 2017-02-23 RX ADMIN — Medication 100 MILLIGRAM(S): at 05:52

## 2017-02-23 RX ADMIN — FLUTICASONE PROPIONATE AND SALMETEROL 1 DOSE(S): 50; 250 POWDER ORAL; RESPIRATORY (INHALATION) at 17:08

## 2017-02-23 RX ADMIN — HEPARIN SODIUM 5000 UNIT(S): 5000 INJECTION INTRAVENOUS; SUBCUTANEOUS at 05:52

## 2017-02-23 RX ADMIN — PANTOPRAZOLE SODIUM 40 MILLIGRAM(S): 20 TABLET, DELAYED RELEASE ORAL at 11:33

## 2017-02-23 RX ADMIN — Medication 100 MILLIGRAM(S): at 06:41

## 2017-02-23 RX ADMIN — TIOTROPIUM BROMIDE 1 CAPSULE(S): 18 CAPSULE ORAL; RESPIRATORY (INHALATION) at 21:59

## 2017-02-23 RX ADMIN — Medication 1 DROP(S): at 05:53

## 2017-02-23 RX ADMIN — Medication 1000 MILLIGRAM(S): at 22:26

## 2017-02-23 RX ADMIN — Medication 100 MILLIGRAM(S): at 17:08

## 2017-02-23 RX ADMIN — Medication 1 DROP(S): at 17:07

## 2017-02-23 NOTE — DIETITIAN INITIAL EVALUATION ADULT. - ORAL INTAKE PTA
3 meals/day. Pt declines providing full diet recall, reports discomfort from NGT. Pt noted on Fish Oil, Vitamin B12, Co-Q10, Glucosamine, and Vitamin C supplements PTA./good

## 2017-02-23 NOTE — DIETITIAN INITIAL EVALUATION ADULT. - ENERGY NEEDS
Height: 66 inches, Weight: 168 pounds, BMI: 27.1 kg/m2 IBW: 142 pounds (+/-10%), %IBW: 118%. No edema, no pressure ulcers.

## 2017-02-23 NOTE — DIETITIAN INITIAL EVALUATION ADULT. - OTHER INFO
Pt seen for extended NPO (day #4). Noted with hx of Crohn's disease, admitted with SBO. NGT to suction (2/2339=808om thus far, 2/2221=0143es, 2/2138=017cd). Denies nausea. + Flatus. BM x1 2/23. Pt states NKFA. Denies hx of difficulty chewing/swallowing.

## 2017-02-23 NOTE — DIETITIAN INITIAL EVALUATION ADULT. - ADHERENCE
Pt reports following low residue and ketogenic diet PTA. Reports following Ketogenic for elevated triglycerides and HbA1c. Noted with 2/21 HbA1c of 5.1% Pt reports following low residue and ketogenic diet PTA. Reports following Ketogenic for elevated triglycerides and HbA1c. States hx of DM2, reports self-monitoring fingersticks 2-3x/day with results "usually normal". Noted with 2/21 HbA1c of 5.1%

## 2017-02-23 NOTE — DIETITIAN INITIAL EVALUATION ADULT. - NS AS NUTRI INTERV MEALS SNACK
Medical team to advance diet when medically feasible via tolerated route. Consider advancing to clear liquid, followed by low fiber diet as tolerated. If NPO exceeds 7 days, recommend team consider alternate means of nutrition. RD to remain available for further nutritional interventions as indicated/requested by medical team/pt. Medical team to advance diet when medically feasible via tolerated route. Consider advancing to clear liquid, followed by low fiber + consistent carbohydrate diet as tolerated. If NPO exceeds 7 days, recommend team consider alternate means of nutrition. RD to remain available for further nutritional interventions as indicated/requested by medical team/pt.

## 2017-02-24 ENCOUNTER — TRANSCRIPTION ENCOUNTER (OUTPATIENT)
Age: 52
End: 2017-02-24

## 2017-02-24 VITALS
TEMPERATURE: 99 F | OXYGEN SATURATION: 97 % | HEART RATE: 64 BPM | DIASTOLIC BLOOD PRESSURE: 78 MMHG | SYSTOLIC BLOOD PRESSURE: 125 MMHG | RESPIRATION RATE: 18 BRPM

## 2017-02-24 LAB
ALBUMIN SERPL ELPH-MCNC: 3.7 G/DL — SIGNIFICANT CHANGE UP (ref 3.3–5)
ALP SERPL-CCNC: 53 U/L — SIGNIFICANT CHANGE UP (ref 40–120)
ALT FLD-CCNC: 13 U/L — SIGNIFICANT CHANGE UP (ref 10–45)
ANION GAP SERPL CALC-SCNC: 13 MMOL/L — SIGNIFICANT CHANGE UP (ref 5–17)
AST SERPL-CCNC: 14 U/L — SIGNIFICANT CHANGE UP (ref 10–40)
BASOPHILS # BLD AUTO: 0 K/UL — SIGNIFICANT CHANGE UP (ref 0–0.2)
BASOPHILS NFR BLD AUTO: 0 % — SIGNIFICANT CHANGE UP (ref 0–2)
BILIRUB SERPL-MCNC: 0.3 MG/DL — SIGNIFICANT CHANGE UP (ref 0.2–1.2)
BUN SERPL-MCNC: 3 MG/DL — LOW (ref 7–23)
CALCIUM SERPL-MCNC: 9.3 MG/DL — SIGNIFICANT CHANGE UP (ref 8.4–10.5)
CHLORIDE SERPL-SCNC: 103 MMOL/L — SIGNIFICANT CHANGE UP (ref 96–108)
CO2 SERPL-SCNC: 26 MMOL/L — SIGNIFICANT CHANGE UP (ref 22–31)
CREAT SERPL-MCNC: 0.66 MG/DL — SIGNIFICANT CHANGE UP (ref 0.5–1.3)
EOSINOPHIL # BLD AUTO: 0.05 K/UL — SIGNIFICANT CHANGE UP (ref 0–0.5)
EOSINOPHIL NFR BLD AUTO: 0.9 % — SIGNIFICANT CHANGE UP (ref 0–6)
GLUCOSE SERPL-MCNC: 98 MG/DL — SIGNIFICANT CHANGE UP (ref 70–99)
HCT VFR BLD CALC: 33.8 % — LOW (ref 39–50)
HGB BLD-MCNC: 11.6 G/DL — LOW (ref 13–17)
IMM GRANULOCYTES NFR BLD AUTO: 0.2 % — SIGNIFICANT CHANGE UP (ref 0–1.5)
LYMPHOCYTES # BLD AUTO: 0.77 K/UL — LOW (ref 1–3.3)
LYMPHOCYTES # BLD AUTO: 13.2 % — SIGNIFICANT CHANGE UP (ref 13–44)
MAGNESIUM SERPL-MCNC: 1.9 MG/DL — SIGNIFICANT CHANGE UP (ref 1.6–2.6)
MCHC RBC-ENTMCNC: 27.9 PG — SIGNIFICANT CHANGE UP (ref 27–34)
MCHC RBC-ENTMCNC: 34.3 GM/DL — SIGNIFICANT CHANGE UP (ref 32–36)
MCV RBC AUTO: 81.3 FL — SIGNIFICANT CHANGE UP (ref 80–100)
MONOCYTES # BLD AUTO: 0.53 K/UL — SIGNIFICANT CHANGE UP (ref 0–0.9)
MONOCYTES NFR BLD AUTO: 9.1 % — SIGNIFICANT CHANGE UP (ref 2–14)
NEUTROPHILS # BLD AUTO: 4.49 K/UL — SIGNIFICANT CHANGE UP (ref 1.8–7.4)
NEUTROPHILS NFR BLD AUTO: 76.6 % — SIGNIFICANT CHANGE UP (ref 43–77)
PHOSPHATE SERPL-MCNC: 4.3 MG/DL — SIGNIFICANT CHANGE UP (ref 2.5–4.5)
PLATELET # BLD AUTO: 131 K/UL — LOW (ref 150–400)
POTASSIUM SERPL-MCNC: 3.7 MMOL/L — SIGNIFICANT CHANGE UP (ref 3.5–5.3)
POTASSIUM SERPL-SCNC: 3.7 MMOL/L — SIGNIFICANT CHANGE UP (ref 3.5–5.3)
PROT SERPL-MCNC: 6.5 G/DL — SIGNIFICANT CHANGE UP (ref 6–8.3)
RBC # BLD: 4.16 M/UL — LOW (ref 4.2–5.8)
RBC # FLD: 13.2 % — SIGNIFICANT CHANGE UP (ref 10.3–14.5)
SODIUM SERPL-SCNC: 142 MMOL/L — SIGNIFICANT CHANGE UP (ref 135–145)
WBC # BLD: 5.85 K/UL — SIGNIFICANT CHANGE UP (ref 3.8–10.5)
WBC # FLD AUTO: 5.85 K/UL — SIGNIFICANT CHANGE UP (ref 3.8–10.5)

## 2017-02-24 PROCEDURE — 82803 BLOOD GASES ANY COMBINATION: CPT

## 2017-02-24 PROCEDURE — 85014 HEMATOCRIT: CPT

## 2017-02-24 PROCEDURE — 83605 ASSAY OF LACTIC ACID: CPT

## 2017-02-24 PROCEDURE — 96376 TX/PRO/DX INJ SAME DRUG ADON: CPT

## 2017-02-24 PROCEDURE — 96374 THER/PROPH/DIAG INJ IV PUSH: CPT | Mod: XU

## 2017-02-24 PROCEDURE — 84132 ASSAY OF SERUM POTASSIUM: CPT

## 2017-02-24 PROCEDURE — 80053 COMPREHEN METABOLIC PANEL: CPT

## 2017-02-24 PROCEDURE — 83735 ASSAY OF MAGNESIUM: CPT

## 2017-02-24 PROCEDURE — 85027 COMPLETE CBC AUTOMATED: CPT

## 2017-02-24 PROCEDURE — 82330 ASSAY OF CALCIUM: CPT

## 2017-02-24 PROCEDURE — 82435 ASSAY OF BLOOD CHLORIDE: CPT

## 2017-02-24 PROCEDURE — 84100 ASSAY OF PHOSPHORUS: CPT

## 2017-02-24 PROCEDURE — 82947 ASSAY GLUCOSE BLOOD QUANT: CPT

## 2017-02-24 PROCEDURE — 84295 ASSAY OF SERUM SODIUM: CPT

## 2017-02-24 PROCEDURE — 99285 EMERGENCY DEPT VISIT HI MDM: CPT | Mod: 25

## 2017-02-24 PROCEDURE — 96375 TX/PRO/DX INJ NEW DRUG ADDON: CPT | Mod: XU

## 2017-02-24 PROCEDURE — 83690 ASSAY OF LIPASE: CPT

## 2017-02-24 PROCEDURE — 86901 BLOOD TYPING SEROLOGIC RH(D): CPT

## 2017-02-24 PROCEDURE — 86850 RBC ANTIBODY SCREEN: CPT

## 2017-02-24 PROCEDURE — 74177 CT ABD & PELVIS W/CONTRAST: CPT

## 2017-02-24 PROCEDURE — 80048 BASIC METABOLIC PNL TOTAL CA: CPT

## 2017-02-24 PROCEDURE — 94640 AIRWAY INHALATION TREATMENT: CPT

## 2017-02-24 PROCEDURE — 80076 HEPATIC FUNCTION PANEL: CPT

## 2017-02-24 PROCEDURE — 85610 PROTHROMBIN TIME: CPT

## 2017-02-24 PROCEDURE — 71045 X-RAY EXAM CHEST 1 VIEW: CPT

## 2017-02-24 PROCEDURE — 83036 HEMOGLOBIN GLYCOSYLATED A1C: CPT

## 2017-02-24 PROCEDURE — 86900 BLOOD TYPING SEROLOGIC ABO: CPT

## 2017-02-24 PROCEDURE — 85730 THROMBOPLASTIN TIME PARTIAL: CPT

## 2017-02-24 PROCEDURE — 99233 SBSQ HOSP IP/OBS HIGH 50: CPT

## 2017-02-24 RX ORDER — METRONIDAZOLE 500 MG
1 TABLET ORAL
Qty: 18 | Refills: 0
Start: 2017-02-24 | End: 2017-03-02

## 2017-02-24 RX ORDER — METRONIDAZOLE 500 MG
500 TABLET ORAL EVERY 8 HOURS
Qty: 0 | Refills: 0 | Status: DISCONTINUED | OUTPATIENT
Start: 2017-02-24 | End: 2017-02-24

## 2017-02-24 RX ORDER — VEDOLIZUMAB 108 MG/.68ML
300 INJECTION, SOLUTION SUBCUTANEOUS
Qty: 0 | Refills: 0 | COMMUNITY

## 2017-02-24 RX ORDER — LORATADINE 10 MG/1
1 TABLET ORAL
Qty: 0 | Refills: 0 | DISCHARGE
Start: 2017-02-24

## 2017-02-24 RX ORDER — CIPROFLOXACIN LACTATE 400MG/40ML
1 VIAL (ML) INTRAVENOUS
Qty: 12 | Refills: 0
Start: 2017-02-24 | End: 2017-03-02

## 2017-02-24 RX ORDER — CIPROFLOXACIN LACTATE 400MG/40ML
500 VIAL (ML) INTRAVENOUS EVERY 12 HOURS
Qty: 0 | Refills: 0 | Status: DISCONTINUED | OUTPATIENT
Start: 2017-02-24 | End: 2017-02-24

## 2017-02-24 RX ADMIN — FLUTICASONE PROPIONATE AND SALMETEROL 1 DOSE(S): 50; 250 POWDER ORAL; RESPIRATORY (INHALATION) at 17:16

## 2017-02-24 RX ADMIN — PANTOPRAZOLE SODIUM 40 MILLIGRAM(S): 20 TABLET, DELAYED RELEASE ORAL at 13:17

## 2017-02-24 RX ADMIN — FLUTICASONE PROPIONATE AND SALMETEROL 1 DOSE(S): 50; 250 POWDER ORAL; RESPIRATORY (INHALATION) at 05:19

## 2017-02-24 RX ADMIN — Medication 100 MILLIGRAM(S): at 05:18

## 2017-02-24 RX ADMIN — Medication 100 MILLIGRAM(S): at 06:26

## 2017-02-24 RX ADMIN — CLOPIDOGREL BISULFATE 75 MILLIGRAM(S): 75 TABLET, FILM COATED ORAL at 13:17

## 2017-02-24 RX ADMIN — HEPARIN SODIUM 5000 UNIT(S): 5000 INJECTION INTRAVENOUS; SUBCUTANEOUS at 05:19

## 2017-02-24 RX ADMIN — Medication 1 DROP(S): at 01:23

## 2017-02-24 RX ADMIN — Medication 81 MILLIGRAM(S): at 13:17

## 2017-02-24 RX ADMIN — Medication 500 MILLIGRAM(S): at 13:17

## 2017-02-24 RX ADMIN — Medication 1 DROP(S): at 05:18

## 2017-02-24 RX ADMIN — Medication 500 MILLIGRAM(S): at 17:16

## 2017-02-24 NOTE — DISCHARGE NOTE ADULT - CARE PROVIDERS DIRECT ADDRESSES
,DirectAddress_Unknown,[email or web address],DirectAddress_Unknown

## 2017-02-24 NOTE — DISCHARGE NOTE ADULT - HOSPITAL COURSE
by attending small bowel obstruction, crohns disease, sepsis, did well w NGT, bowel rest, pain managment. IVF. DC home after tolerating a solid diet.

## 2017-02-24 NOTE — DISCHARGE NOTE ADULT - MEDICATION SUMMARY - MEDICATIONS TO TAKE
I will START or STAY ON the medications listed below when I get home from the hospital:    ClearNail Pro+  -- Apply on skin to affected area once a day (in the evening)  -- patient received sample from MD  -- Indication: For toe nail fungus    metroNIDAZOLE 500 mg oral tablet  -- 1 tab(s) by mouth every 8 hours  -- Indication: For Crohn's disease of small intestine with intestinal obstruction    aspirin 81 mg oral delayed release tablet  -- 1 tab(s) by mouth once a day (in the morning)  -- Indication: For Coronary artery disease involving native coronary artery of native heart without angina pectoris    losartan 100 mg oral tablet  -- 1 tab(s) by mouth once a day (in the morning)  -- Indication: For H/O: HTN (hypertension)    levocetirizine 5 mg oral tablet  -- 1 tab(s) by mouth once a day (in the evening)  -- Indication: For rhinitis    loratadine 10 mg oral tablet  -- 1 tab(s) by mouth once a day (at bedtime), As needed, pt request  -- Indication: For rhinitis    clopidogrel 75 mg oral tablet  -- 1 tab(s) by mouth once a day (in the morning)  -- Indication: For Coronary artery disease involving native coronary artery of native heart without angina pectoris    Toprol-XL 25 mg oral tablet, extended release  -- 1 tab(s) by mouth once a day  -- Indication: For H/O: HTN (hypertension)    Spiriva 18 mcg inhalation capsule  -- 2 cap(s) inhaled once a day (in the evening)  -- Indication: For asthma    Advair Diskus 250 mcg-50 mcg inhalation powder  -- 1 puff(s) inhaled 2 times a day  -- Indication: For asthma    amLODIPine 10 mg oral tablet  -- 1 tab(s) by mouth once a day (in the morning)  -- Indication: For H/O: HTN (hypertension)    Jublia  -- Apply on skin to affected area   -- Indication: For toe nail fungus    tacrolimus 0.1% topical ointment  -- Apply on skin to affected area once a day (at bedtime)  -- Indication: For dermatitis    Singulair 10 mg oral tablet  -- 1 tab(s) by mouth once a day (in the evening)  -- Indication: For asthma    Co Q-10 100 mg oral capsule  -- 2 cap(s) by mouth once a day  -- Indication: For supplement    Fish Oil 1000 mg oral capsule  -- 2 cap(s) by mouth 2 times a day - in a five year study  -- Indication: For Coronary artery disease involving native coronary artery of native heart without angina pectoris    glucosamine  -- 1 tab(s) by mouth once a day (in the morning)  -- Indication: For supplement    ocular lubricant ophthalmic solution  -- 1 drop(s) to each affected eye 3 times a day, As needed, Dry Eyes  -- Indication: For dry eyes    NexIUM 40 mg oral delayed release capsule  -- 1 cap(s) by mouth 2 times a day  -- Indication: For dyspepsia    ciprofloxacin 500 mg oral tablet  -- 1 tab(s) by mouth every 12 hours  -- Indication: For Crohn's disease of small intestine with intestinal obstruction    Lipoflavonoid oral capsule  -- 1 cap(s) by mouth 2 times a day  -- Indication: For supplement    Vitamin B-12  -- 1 tab(s) by mouth once a day (in the morning)  -- Indication: For supplement    Vitamin C  -- 1 tab(s) by mouth once a day (in the morning)  -- Indication: For supplement

## 2017-02-24 NOTE — DISCHARGE NOTE ADULT - PATIENT PORTAL LINK FT
“You can access the Follow[employer] Patient Portal, offered by [facility], by registering with the following website: [email or web address]”

## 2017-02-24 NOTE — DISCHARGE NOTE ADULT - CARE PLAN
Principal Discharge DX:	Partial small bowel obstruction  Goal:	resolved  Instructions for follow-up, activity and diet:	Call gastroenterology today to schedule follow up within 72 hours of discharge  return to emergency for increased abdominal pain, intractable vomiting, fever, chest pain, difficulty breathing  Cipro and flagyl as ordered  Secondary Diagnosis:	Crohn's disease of small intestine with intestinal obstruction  Goal:	plan as above  Instructions for follow-up, activity and diet:	plan as above   follow up resumed use of ENTYVIO WITH GASTROENTEROLOGY  Secondary Diagnosis:	CAD (coronary artery disease)  Goal:	No chest pain  Instructions for follow-up, activity and diet:	Resum care with Dr Fernández  Continue aspirin and plavix  Secondary Diagnosis:	Essential hypertension  Goal:	/80  Instructions for follow-up, activity and diet:	continue current medications  Secondary Diagnosis:	Uncomplicated asthma, unspecified asthma severity  Goal:	no difficulty breathing  Instructions for follow-up, activity and diet:	continue current medications

## 2017-02-24 NOTE — DISCHARGE NOTE ADULT - SECONDARY DIAGNOSIS.
Crohn's disease of small intestine with intestinal obstruction CAD (coronary artery disease) Essential hypertension Uncomplicated asthma, unspecified asthma severity

## 2017-02-24 NOTE — DISCHARGE NOTE ADULT - PLAN OF CARE
resolved Call gastroenterology today to schedule follow up within 72 hours of discharge  return to emergency for increased abdominal pain, intractable vomiting, fever, chest pain, difficulty breathing  Cipro and flagyl as ordered plan as above plan as above   follow up resumed use of ENTYVIO WITH GASTROENTEROLOGY No chest pain Resum care with Dr Fernández  Continue aspirin and plavix /80 continue current medications no difficulty breathing

## 2017-02-24 NOTE — DISCHARGE NOTE ADULT - INSTRUCTIONS
low salt, low cholesterol, regula consistency diet low salt, low cholesterol, regular consistency diet

## 2017-03-09 ENCOUNTER — APPOINTMENT (OUTPATIENT)
Dept: RHEUMATOLOGY | Facility: CLINIC | Age: 52
End: 2017-03-09

## 2017-03-09 ENCOUNTER — APPOINTMENT (OUTPATIENT)
Dept: PULMONOLOGY | Facility: CLINIC | Age: 52
End: 2017-03-09

## 2017-03-09 VITALS
RESPIRATION RATE: 17 BRPM | DIASTOLIC BLOOD PRESSURE: 70 MMHG | OXYGEN SATURATION: 100 % | WEIGHT: 163 LBS | HEART RATE: 91 BPM | BODY MASS INDEX: 26.2 KG/M2 | SYSTOLIC BLOOD PRESSURE: 112 MMHG | HEIGHT: 66 IN

## 2017-03-12 ENCOUNTER — TRANSCRIPTION ENCOUNTER (OUTPATIENT)
Age: 52
End: 2017-03-12

## 2017-03-13 ENCOUNTER — TRANSCRIPTION ENCOUNTER (OUTPATIENT)
Age: 52
End: 2017-03-13

## 2017-04-01 ENCOUNTER — RX RENEWAL (OUTPATIENT)
Age: 52
End: 2017-04-01

## 2017-04-21 ENCOUNTER — TRANSCRIPTION ENCOUNTER (OUTPATIENT)
Age: 52
End: 2017-04-21

## 2017-04-21 ENCOUNTER — RX RENEWAL (OUTPATIENT)
Age: 52
End: 2017-04-21

## 2017-05-03 ENCOUNTER — APPOINTMENT (OUTPATIENT)
Dept: RHEUMATOLOGY | Facility: CLINIC | Age: 52
End: 2017-05-03

## 2017-05-05 ENCOUNTER — APPOINTMENT (OUTPATIENT)
Dept: RHEUMATOLOGY | Facility: CLINIC | Age: 52
End: 2017-05-05

## 2017-05-14 ENCOUNTER — FORM ENCOUNTER (OUTPATIENT)
Age: 52
End: 2017-05-14

## 2017-05-15 ENCOUNTER — OUTPATIENT (OUTPATIENT)
Dept: OUTPATIENT SERVICES | Facility: HOSPITAL | Age: 52
LOS: 1 days | End: 2017-05-15
Payer: COMMERCIAL

## 2017-05-15 ENCOUNTER — APPOINTMENT (OUTPATIENT)
Dept: SURGERY | Facility: CLINIC | Age: 52
End: 2017-05-15

## 2017-05-15 ENCOUNTER — APPOINTMENT (OUTPATIENT)
Dept: CT IMAGING | Facility: IMAGING CENTER | Age: 52
End: 2017-05-15

## 2017-05-15 VITALS
DIASTOLIC BLOOD PRESSURE: 74 MMHG | BODY MASS INDEX: 26.52 KG/M2 | RESPIRATION RATE: 18 BRPM | OXYGEN SATURATION: 99 % | SYSTOLIC BLOOD PRESSURE: 118 MMHG | HEIGHT: 66 IN | HEART RATE: 90 BPM | WEIGHT: 165 LBS | TEMPERATURE: 98.6 F

## 2017-05-15 DIAGNOSIS — R10.30 LOWER ABDOMINAL PAIN, UNSPECIFIED: ICD-10-CM

## 2017-05-15 DIAGNOSIS — Z98.89 OTHER SPECIFIED POSTPROCEDURAL STATES: Chronic | ICD-10-CM

## 2017-05-15 DIAGNOSIS — Z87.898 PERSONAL HISTORY OF OTHER SPECIFIED CONDITIONS: Chronic | ICD-10-CM

## 2017-05-15 PROCEDURE — 72192 CT PELVIS W/O DYE: CPT

## 2017-05-15 PROCEDURE — 72192 CT PELVIS W/O DYE: CPT | Mod: 26

## 2017-05-21 ENCOUNTER — RX RENEWAL (OUTPATIENT)
Age: 52
End: 2017-05-21

## 2017-05-25 ENCOUNTER — OUTPATIENT (OUTPATIENT)
Dept: OUTPATIENT SERVICES | Facility: HOSPITAL | Age: 52
LOS: 1 days | End: 2017-05-25
Payer: COMMERCIAL

## 2017-05-25 VITALS
WEIGHT: 173.06 LBS | DIASTOLIC BLOOD PRESSURE: 74 MMHG | RESPIRATION RATE: 16 BRPM | OXYGEN SATURATION: 98 % | HEIGHT: 65.5 IN | SYSTOLIC BLOOD PRESSURE: 113 MMHG | HEART RATE: 74 BPM | TEMPERATURE: 99 F

## 2017-05-25 DIAGNOSIS — Z98.89 OTHER SPECIFIED POSTPROCEDURAL STATES: Chronic | ICD-10-CM

## 2017-05-25 DIAGNOSIS — Z86.79 PERSONAL HISTORY OF OTHER DISEASES OF THE CIRCULATORY SYSTEM: ICD-10-CM

## 2017-05-25 DIAGNOSIS — G47.33 OBSTRUCTIVE SLEEP APNEA (ADULT) (PEDIATRIC): ICD-10-CM

## 2017-05-25 DIAGNOSIS — I25.10 ATHEROSCLEROTIC HEART DISEASE OF NATIVE CORONARY ARTERY WITHOUT ANGINA PECTORIS: ICD-10-CM

## 2017-05-25 DIAGNOSIS — Z01.818 ENCOUNTER FOR OTHER PREPROCEDURAL EXAMINATION: ICD-10-CM

## 2017-05-25 DIAGNOSIS — K40.90 UNILATERAL INGUINAL HERNIA, WITHOUT OBSTRUCTION OR GANGRENE, NOT SPECIFIED AS RECURRENT: ICD-10-CM

## 2017-05-25 DIAGNOSIS — K40.91 UNILATERAL INGUINAL HERNIA, WITHOUT OBSTRUCTION OR GANGRENE, RECURRENT: ICD-10-CM

## 2017-05-25 DIAGNOSIS — Z87.898 PERSONAL HISTORY OF OTHER SPECIFIED CONDITIONS: Chronic | ICD-10-CM

## 2017-05-25 PROCEDURE — 36415 COLL VENOUS BLD VENIPUNCTURE: CPT

## 2017-05-25 PROCEDURE — G0463: CPT

## 2017-05-25 RX ORDER — CELECOXIB 200 MG/1
200 CAPSULE ORAL ONCE
Qty: 0 | Refills: 0 | Status: COMPLETED | OUTPATIENT
Start: 2017-05-30 | End: 2017-05-30

## 2017-05-25 RX ORDER — LIDOCAINE HCL 20 MG/ML
0.2 VIAL (ML) INJECTION ONCE
Qty: 0 | Refills: 0 | Status: DISCONTINUED | OUTPATIENT
Start: 2017-05-30 | End: 2017-06-14

## 2017-05-25 RX ORDER — ACETAMINOPHEN 500 MG
975 TABLET ORAL ONCE
Qty: 0 | Refills: 0 | Status: COMPLETED | OUTPATIENT
Start: 2017-05-30 | End: 2017-05-30

## 2017-05-25 RX ORDER — SODIUM CHLORIDE 9 MG/ML
3 INJECTION INTRAMUSCULAR; INTRAVENOUS; SUBCUTANEOUS EVERY 8 HOURS
Qty: 0 | Refills: 0 | Status: DISCONTINUED | OUTPATIENT
Start: 2017-05-30 | End: 2017-06-14

## 2017-05-25 RX ORDER — CEFAZOLIN SODIUM 1 G
2000 VIAL (EA) INJECTION ONCE
Qty: 0 | Refills: 0 | Status: DISCONTINUED | OUTPATIENT
Start: 2017-05-30 | End: 2017-06-14

## 2017-05-25 NOTE — H&P PST ADULT - PMH
Asthma    Bilateral knee pain    Bronchitis, chronic  last episode 6 months ago  CAD (coronary artery disease)    CAD (coronary artery disease)  RCA DE stent in place   2006  Crohn disease    DM (diabetes mellitus)  aic 5.8 diet controlled  GERD (gastroesophageal reflux disease)    GIB (gastrointestinal bleeding)    H/O: HTN (hypertension)    HLD (hyperlipidemia)    HTN (hypertension)    Hypercholesterolemia    Inguinal mass    Joint pain    Lower back pain    MI (myocardial infarction)    MARYAM (obstructive sleep apnea)    Pancreatitis  while on 6MP for Crohn's now off of it  Shoulder pain, bilateral    Sleep apnea Asthma    Bilateral knee pain    Bronchitis, chronic    CAD (coronary artery disease)  2 stents placed  CAD (coronary artery disease)  RCA DE stent in place   2006  Crohn disease    DM (diabetes mellitus)  diet controlled  GERD (gastroesophageal reflux disease)    GIB (gastrointestinal bleeding)    H/O: HTN (hypertension)    HLD (hyperlipidemia)    Inguinal mass    Joint pain    Lower back pain    MI (myocardial infarction)    MARYAM (obstructive sleep apnea)    Pancreatitis  while on 6MP for Crohn's now off of it  Shoulder pain, bilateral    Unilateral recurrent inguinal hernia without obstruction or gangrene  Left

## 2017-05-25 NOTE — H&P PST ADULT - PSH
H/O colonoscopy    H/O percutaneous transluminal coronary angioplasty  with DE RCA 2006  History of herniorrhaphy  left inguinal herniorrhaphy H/O colonoscopy    H/O lipoma  removed from back  H/O percutaneous transluminal coronary angioplasty  with DE RCA 2006, 2 stents placed 2015  History of herniorrhaphy  left inguinal herniorrhaphy  S/P cardiac catheterization  2010 - [facility]  S/P hernia surgery

## 2017-05-25 NOTE — H&P PST ADULT - HISTORY OF PRESENT ILLNESS
50 y/o m with PMH sig CAD, HTN, HLD, sleep apnea(not using C-pap),asthma, Crohn's, and GERD presents with recurrent left inguinal hernia for groin hernia repair on 5/30/17. denies current chest pain, SOB.

## 2017-05-30 ENCOUNTER — OUTPATIENT (OUTPATIENT)
Dept: OUTPATIENT SERVICES | Facility: HOSPITAL | Age: 52
LOS: 1 days | End: 2017-05-30
Payer: COMMERCIAL

## 2017-05-30 ENCOUNTER — TRANSCRIPTION ENCOUNTER (OUTPATIENT)
Age: 52
End: 2017-05-30

## 2017-05-30 ENCOUNTER — APPOINTMENT (OUTPATIENT)
Dept: SURGERY | Facility: HOSPITAL | Age: 52
End: 2017-05-30

## 2017-05-30 VITALS
TEMPERATURE: 98 F | WEIGHT: 173.06 LBS | HEART RATE: 66 BPM | HEIGHT: 65.5 IN | DIASTOLIC BLOOD PRESSURE: 75 MMHG | RESPIRATION RATE: 18 BRPM | SYSTOLIC BLOOD PRESSURE: 118 MMHG | OXYGEN SATURATION: 100 %

## 2017-05-30 VITALS
SYSTOLIC BLOOD PRESSURE: 119 MMHG | DIASTOLIC BLOOD PRESSURE: 74 MMHG | OXYGEN SATURATION: 99 % | HEART RATE: 66 BPM | RESPIRATION RATE: 16 BRPM | TEMPERATURE: 37 F

## 2017-05-30 DIAGNOSIS — Z98.89 OTHER SPECIFIED POSTPROCEDURAL STATES: Chronic | ICD-10-CM

## 2017-05-30 DIAGNOSIS — Z87.898 PERSONAL HISTORY OF OTHER SPECIFIED CONDITIONS: Chronic | ICD-10-CM

## 2017-05-30 DIAGNOSIS — K40.90 UNILATERAL INGUINAL HERNIA, WITHOUT OBSTRUCTION OR GANGRENE, NOT SPECIFIED AS RECURRENT: ICD-10-CM

## 2017-05-30 PROCEDURE — C1781: CPT

## 2017-05-30 PROCEDURE — 49520 REREPAIR ING HERNIA REDUCE: CPT | Mod: LT

## 2017-05-30 RX ORDER — SODIUM CHLORIDE 9 MG/ML
1000 INJECTION, SOLUTION INTRAVENOUS
Qty: 0 | Refills: 0 | Status: DISCONTINUED | OUTPATIENT
Start: 2017-05-30 | End: 2017-06-14

## 2017-05-30 RX ORDER — OXYCODONE HYDROCHLORIDE 5 MG/1
1 TABLET ORAL
Qty: 0 | Refills: 0 | DISCHARGE
Start: 2017-05-30

## 2017-05-30 RX ORDER — CELECOXIB 200 MG/1
1 CAPSULE ORAL
Qty: 0 | Refills: 0 | DISCHARGE
Start: 2017-05-30

## 2017-05-30 RX ORDER — OXYCODONE HYDROCHLORIDE 5 MG/1
10 TABLET ORAL ONCE
Qty: 0 | Refills: 0 | Status: DISCONTINUED | OUTPATIENT
Start: 2017-05-30 | End: 2017-05-30

## 2017-05-30 RX ORDER — ONDANSETRON 8 MG/1
4 TABLET, FILM COATED ORAL ONCE
Qty: 0 | Refills: 0 | Status: DISCONTINUED | OUTPATIENT
Start: 2017-05-30 | End: 2017-06-14

## 2017-05-30 RX ORDER — CELECOXIB 200 MG/1
200 CAPSULE ORAL ONCE
Qty: 0 | Refills: 0 | Status: DISCONTINUED | OUTPATIENT
Start: 2017-05-30 | End: 2017-06-14

## 2017-05-30 RX ADMIN — CELECOXIB 200 MILLIGRAM(S): 200 CAPSULE ORAL at 11:35

## 2017-05-30 RX ADMIN — Medication 975 MILLIGRAM(S): at 11:34

## 2017-05-30 NOTE — ASU PATIENT PROFILE, ADULT - PSH
H/O colonoscopy    H/O lipoma  removed from back  H/O percutaneous transluminal coronary angioplasty  with DE RCA 2006, 2 stents placed 2015  History of herniorrhaphy  left inguinal herniorrhaphy  S/P cardiac catheterization  2010 - [facility]  S/P hernia surgery

## 2017-05-30 NOTE — ASU DISCHARGE PLAN (ADULT/PEDIATRIC). - NOTIFY
Fever greater than 101/Swelling that continues/Persistent Nausea and Vomiting/Bleeding that does not stop/Unable to Urinate/Inability to Tolerate Liquids or Foods

## 2017-05-30 NOTE — ASU DISCHARGE PLAN (ADULT/PEDIATRIC). - SPECIAL INSTRUCTIONS
We recommend that you stagger the pain medications to get best coverage of your post-operative pain. Please take the Advil at 6:00pm and the extra strength Tylenol at 9:00pm, continuing to alternate between them every three hours. Please take oxycodone as needed for pain. RESUME YOUR PLAVIX 5 DAYS AFTER SURGERY. We recommend that you stagger the pain medications to get best coverage of your post-operative pain. Please take the Advil at 6:00pm and the extra strength Tylenol at 9:00pm, continuing to alternate between them every three hours. Please take oxycodone as needed for pain.

## 2017-05-30 NOTE — ASU DISCHARGE PLAN (ADULT/PEDIATRIC). - MEDICATION SUMMARY - MEDICATIONS TO TAKE
I will START or STAY ON the medications listed below when I get home from the hospital:    ClearNail Pro+  -- Apply on skin to affected area once a day (in the evening)  -- patient received sample from MD  -- Indication: For home med    celecoxib 200 mg oral capsule  -- 1 cap(s) by mouth once  -- Indication: For home med    oxyCODONE 10 mg oral tablet  -- 1 tab(s) by mouth once, As needed, Severe Pain (7 - 10)  -- Indication: For pain    aspirin 81 mg oral delayed release tablet  -- 1 tab(s) by mouth once a day (in the morning)  -- Indication: For home med    losartan 100 mg oral tablet  -- 1 tab(s) by mouth once a day (in the morning)  -- Indication: For home med    losartan 100 mg oral tablet  -- 1 tab(s) by mouth once a day  -- Indication: For home med    Xyzal 5 mg oral tablet  -- 1 tab(s) by mouth once a day (in the evening)  -- Indication: For home med    Repatha  --  subcutaneous  every 2 weeks  -- Indication: For home med    Toprol-XL 25 mg oral tablet, extended release  -- 1 tab(s) by mouth once a day  -- Indication: For home med    Spiriva 18 mcg inhalation capsule  -- 2 cap(s) inhaled once a day (in the evening)  -- Indication: For home med    Advair Diskus 250 mcg-50 mcg inhalation powder  -- 1 puff(s) inhaled 2 times a day  -- Indication: For home med    Norvasc 5 mg oral tablet  -- 1 tab(s) by mouth once a day  -- Indication: For home med    tacrolimus 0.1% topical ointment  -- Apply on skin to affected area once a day (at bedtime)  -- Indication: For home med    Jublia  -- Apply on skin to affected area   -- Indication: For home med    Entyvio 300 mg intravenous injection  --  intravenous every 8 weeks  -- Indication: For home med    Singulair 10 mg oral tablet  -- 1 tab(s) by mouth once a day (in the evening)  -- Indication: For home med    Co Q-10 100 mg oral capsule  -- 2 cap(s) by mouth once a day  -- Indication: For home med    glucosamine  -- 1 tab(s) by mouth once a day (in the morning)  -- Indication: For home med    ocular lubricant ophthalmic solution  -- 1 drop(s) to each affected eye 3 times a day, As needed, Dry Eyes  -- Indication: For home med

## 2017-05-30 NOTE — ASU PATIENT PROFILE, ADULT - PMH
Asthma    Bilateral knee pain    Bronchitis, chronic    CAD (coronary artery disease)  2 stents placed  CAD (coronary artery disease)  RCA DE stent in place   2006  Crohn disease    DM (diabetes mellitus)  diet controlled  GERD (gastroesophageal reflux disease)    GIB (gastrointestinal bleeding)    H/O: HTN (hypertension)    HLD (hyperlipidemia)    Inguinal mass    Joint pain    Lower back pain    MI (myocardial infarction)    MARYAM (obstructive sleep apnea)    Pancreatitis  while on 6MP for Crohn's now off of it  Shoulder pain, bilateral    Unilateral recurrent inguinal hernia without obstruction or gangrene  Left

## 2017-05-30 NOTE — ASU DISCHARGE PLAN (ADULT/PEDIATRIC). - MEDICATION SUMMARY - MEDICATIONS TO STOP TAKING
I will STOP taking the medications listed below when I get home from the hospital:    clopidogrel 75 mg oral tablet  -- 1 tab(s) by mouth once a day (in the morning)

## 2017-06-12 ENCOUNTER — APPOINTMENT (OUTPATIENT)
Dept: SURGERY | Facility: CLINIC | Age: 52
End: 2017-06-12

## 2017-06-12 VITALS
BODY MASS INDEX: 26.52 KG/M2 | WEIGHT: 165 LBS | OXYGEN SATURATION: 99 % | HEART RATE: 80 BPM | SYSTOLIC BLOOD PRESSURE: 121 MMHG | DIASTOLIC BLOOD PRESSURE: 79 MMHG | HEIGHT: 66 IN

## 2017-06-26 ENCOUNTER — APPOINTMENT (OUTPATIENT)
Dept: RHEUMATOLOGY | Facility: CLINIC | Age: 52
End: 2017-06-26

## 2017-06-26 ENCOUNTER — APPOINTMENT (OUTPATIENT)
Dept: PULMONOLOGY | Facility: CLINIC | Age: 52
End: 2017-06-26

## 2017-06-26 VITALS
WEIGHT: 168 LBS | SYSTOLIC BLOOD PRESSURE: 120 MMHG | OXYGEN SATURATION: 99 % | BODY MASS INDEX: 27 KG/M2 | HEIGHT: 66 IN | DIASTOLIC BLOOD PRESSURE: 70 MMHG | HEART RATE: 79 BPM | RESPIRATION RATE: 17 BRPM

## 2017-07-14 ENCOUNTER — APPOINTMENT (OUTPATIENT)
Dept: SURGERY | Facility: CLINIC | Age: 52
End: 2017-07-14

## 2017-07-14 VITALS
SYSTOLIC BLOOD PRESSURE: 135 MMHG | OXYGEN SATURATION: 100 % | HEART RATE: 69 BPM | TEMPERATURE: 98.1 F | DIASTOLIC BLOOD PRESSURE: 75 MMHG | RESPIRATION RATE: 15 BRPM

## 2017-07-14 DIAGNOSIS — K46.9 UNSPECIFIED ABDOMINAL HERNIA W/OUT OBSTRUCTION OR GANGRENE: ICD-10-CM

## 2017-07-14 RX ORDER — OXYCODONE 5 MG/1
5 TABLET ORAL
Qty: 20 | Refills: 0 | Status: DISCONTINUED | COMMUNITY
Start: 2017-05-30 | End: 2017-07-14

## 2017-08-24 ENCOUNTER — APPOINTMENT (OUTPATIENT)
Dept: RHEUMATOLOGY | Facility: CLINIC | Age: 52
End: 2017-08-24
Payer: COMMERCIAL

## 2017-08-24 PROCEDURE — 96413 CHEMO IV INFUSION 1 HR: CPT

## 2017-09-15 ENCOUNTER — APPOINTMENT (OUTPATIENT)
Dept: SURGERY | Facility: CLINIC | Age: 52
End: 2017-09-15
Payer: COMMERCIAL

## 2017-09-15 PROCEDURE — 99211 OFF/OP EST MAY X REQ PHY/QHP: CPT

## 2017-09-24 ENCOUNTER — RX RENEWAL (OUTPATIENT)
Age: 52
End: 2017-09-24

## 2017-09-26 ENCOUNTER — TRANSCRIPTION ENCOUNTER (OUTPATIENT)
Age: 52
End: 2017-09-26

## 2017-10-05 ENCOUNTER — OUTPATIENT (OUTPATIENT)
Dept: OUTPATIENT SERVICES | Facility: HOSPITAL | Age: 52
LOS: 1 days | End: 2017-10-05
Payer: COMMERCIAL

## 2017-10-05 VITALS
DIASTOLIC BLOOD PRESSURE: 83 MMHG | SYSTOLIC BLOOD PRESSURE: 130 MMHG | HEIGHT: 66 IN | RESPIRATION RATE: 16 BRPM | OXYGEN SATURATION: 97 % | HEART RATE: 62 BPM | TEMPERATURE: 98 F | WEIGHT: 173.06 LBS

## 2017-10-05 DIAGNOSIS — Z98.89 OTHER SPECIFIED POSTPROCEDURAL STATES: Chronic | ICD-10-CM

## 2017-10-05 DIAGNOSIS — Z87.898 PERSONAL HISTORY OF OTHER SPECIFIED CONDITIONS: Chronic | ICD-10-CM

## 2017-10-05 DIAGNOSIS — R07.9 CHEST PAIN, UNSPECIFIED: ICD-10-CM

## 2017-10-05 LAB
ALBUMIN SERPL ELPH-MCNC: 4.6 G/DL — SIGNIFICANT CHANGE UP (ref 3.3–5)
ALP SERPL-CCNC: 54 U/L — SIGNIFICANT CHANGE UP (ref 40–120)
ALT FLD-CCNC: 12 U/L RC — SIGNIFICANT CHANGE UP (ref 10–45)
ANION GAP SERPL CALC-SCNC: 12 MMOL/L — SIGNIFICANT CHANGE UP (ref 5–17)
AST SERPL-CCNC: 14 U/L — SIGNIFICANT CHANGE UP (ref 10–40)
BILIRUB SERPL-MCNC: 0.3 MG/DL — SIGNIFICANT CHANGE UP (ref 0.2–1.2)
BUN SERPL-MCNC: 14 MG/DL — SIGNIFICANT CHANGE UP (ref 7–23)
CALCIUM SERPL-MCNC: 9.7 MG/DL — SIGNIFICANT CHANGE UP (ref 8.4–10.5)
CHLORIDE SERPL-SCNC: 105 MMOL/L — SIGNIFICANT CHANGE UP (ref 96–108)
CO2 SERPL-SCNC: 27 MMOL/L — SIGNIFICANT CHANGE UP (ref 22–31)
CREAT SERPL-MCNC: 0.75 MG/DL — SIGNIFICANT CHANGE UP (ref 0.5–1.3)
GLUCOSE SERPL-MCNC: 94 MG/DL — SIGNIFICANT CHANGE UP (ref 70–99)
HCT VFR BLD CALC: 38.1 % — LOW (ref 39–50)
HGB BLD-MCNC: 12.8 G/DL — LOW (ref 13–17)
MCHC RBC-ENTMCNC: 27.7 PG — SIGNIFICANT CHANGE UP (ref 27–34)
MCHC RBC-ENTMCNC: 33.6 GM/DL — SIGNIFICANT CHANGE UP (ref 32–36)
MCV RBC AUTO: 82.4 FL — SIGNIFICANT CHANGE UP (ref 80–100)
PLATELET # BLD AUTO: 145 K/UL — LOW (ref 150–400)
POTASSIUM SERPL-MCNC: 4.4 MMOL/L — SIGNIFICANT CHANGE UP (ref 3.5–5.3)
POTASSIUM SERPL-SCNC: 4.4 MMOL/L — SIGNIFICANT CHANGE UP (ref 3.5–5.3)
PROT SERPL-MCNC: 7.2 G/DL — SIGNIFICANT CHANGE UP (ref 6–8.3)
RBC # BLD: 4.63 M/UL — SIGNIFICANT CHANGE UP (ref 4.2–5.8)
RBC # FLD: 17.7 % — HIGH (ref 10.3–14.5)
SODIUM SERPL-SCNC: 144 MMOL/L — SIGNIFICANT CHANGE UP (ref 135–145)
WBC # BLD: 5.9 K/UL — SIGNIFICANT CHANGE UP (ref 3.8–10.5)
WBC # FLD AUTO: 5.9 K/UL — SIGNIFICANT CHANGE UP (ref 3.8–10.5)

## 2017-10-05 PROCEDURE — C1887: CPT

## 2017-10-05 PROCEDURE — 99152 MOD SED SAME PHYS/QHP 5/>YRS: CPT

## 2017-10-05 PROCEDURE — 93458 L HRT ARTERY/VENTRICLE ANGIO: CPT | Mod: 26,GC

## 2017-10-05 PROCEDURE — C1894: CPT

## 2017-10-05 PROCEDURE — C1769: CPT

## 2017-10-05 PROCEDURE — 93005 ELECTROCARDIOGRAM TRACING: CPT

## 2017-10-05 PROCEDURE — 93571 IV DOP VEL&/PRESS C FLO 1ST: CPT

## 2017-10-05 PROCEDURE — 93010 ELECTROCARDIOGRAM REPORT: CPT

## 2017-10-05 PROCEDURE — C1760: CPT

## 2017-10-05 PROCEDURE — 93458 L HRT ARTERY/VENTRICLE ANGIO: CPT

## 2017-10-05 PROCEDURE — 99153 MOD SED SAME PHYS/QHP EA: CPT

## 2017-10-05 PROCEDURE — 80053 COMPREHEN METABOLIC PANEL: CPT

## 2017-10-05 PROCEDURE — 85027 COMPLETE CBC AUTOMATED: CPT

## 2017-10-05 PROCEDURE — 99152 MOD SED SAME PHYS/QHP 5/>YRS: CPT | Mod: GC

## 2017-10-05 PROCEDURE — 93571 IV DOP VEL&/PRESS C FLO 1ST: CPT | Mod: 26,LD,GC

## 2017-10-05 RX ORDER — METOPROLOL TARTRATE 50 MG
1 TABLET ORAL
Qty: 0 | Refills: 0 | COMMUNITY

## 2017-10-05 NOTE — H&P CARDIOLOGY - PMH
Asthma    Bilateral knee pain    Bronchitis, chronic    CAD (coronary artery disease)  RCA DE stent in place   2006  CAD (coronary artery disease)  2 stents placed in LAD in 2015, RCA stent x 2 in 2006  Crohn disease    DM (diabetes mellitus)  diet controlled  GERD (gastroesophageal reflux disease)    GIB (gastrointestinal bleeding)    H/O: HTN (hypertension)    HLD (hyperlipidemia)    Inguinal mass    Joint pain    Lower back pain    MI (myocardial infarction)    MARYAM (obstructive sleep apnea)    Pancreatitis  while on 6MP for Crohn's now off of it  Shoulder pain, bilateral    Unilateral recurrent inguinal hernia without obstruction or gangrene  Left

## 2017-10-05 NOTE — H&P CARDIOLOGY - FAMILY HISTORY
Family history of premature CAD, Father CABG at age 54     Father  Still living? Unknown  Family history of Crohn's disease, Age at diagnosis: Age Unknown

## 2017-10-05 NOTE — H&P CARDIOLOGY - PSH
H/O colonoscopy    H/O lipoma  removed from back  H/O percutaneous transluminal coronary angioplasty  with DE RCA 2006, 2 stents placed 2015  History of herniorrhaphy  left inguinal herniorrhaphy  S/P cardiac catheterization  2010 - [facility] ( diagnostic cath)  S/P hernia surgery

## 2017-10-05 NOTE — H&P CARDIOLOGY - HISTORY OF PRESENT ILLNESS
50 y/o male with PMHx of  CAD s/p REBEKAH to RCA in 2006,  mLAD /pLAD 2015, DM2,(A1c: 4.9) GERD, asthma, Crohn's disease HTN, HLD, GERD presents for coronary angiogram after abnormal stress test. Patient states every few years he does stress test because of the h/o CAD and  stress test revealed abnormality ( result not available). Seen & evaluated by cardiologist, Dr. Fernández and recommends for cardiac cath

## 2017-10-09 ENCOUNTER — RX RENEWAL (OUTPATIENT)
Age: 52
End: 2017-10-09

## 2017-10-18 ENCOUNTER — APPOINTMENT (OUTPATIENT)
Dept: RHEUMATOLOGY | Facility: CLINIC | Age: 52
End: 2017-10-18
Payer: COMMERCIAL

## 2017-10-18 PROCEDURE — 96413 CHEMO IV INFUSION 1 HR: CPT

## 2017-10-20 ENCOUNTER — TRANSCRIPTION ENCOUNTER (OUTPATIENT)
Age: 52
End: 2017-10-20

## 2017-10-26 ENCOUNTER — APPOINTMENT (OUTPATIENT)
Dept: PULMONOLOGY | Facility: CLINIC | Age: 52
End: 2017-10-26
Payer: COMMERCIAL

## 2017-10-26 VITALS
BODY MASS INDEX: 28.45 KG/M2 | DIASTOLIC BLOOD PRESSURE: 80 MMHG | RESPIRATION RATE: 14 BRPM | OXYGEN SATURATION: 100 % | HEIGHT: 66 IN | WEIGHT: 177 LBS | HEART RATE: 69 BPM | SYSTOLIC BLOOD PRESSURE: 120 MMHG

## 2017-10-26 DIAGNOSIS — I25.10 ATHEROSCLEROTIC HEART DISEASE OF NATIVE CORONARY ARTERY W/OUT ANGINA PECTORIS: ICD-10-CM

## 2017-10-26 PROCEDURE — 95012 NITRIC OXIDE EXP GAS DETER: CPT

## 2017-10-26 PROCEDURE — 99214 OFFICE O/P EST MOD 30 MIN: CPT | Mod: 25

## 2017-10-26 PROCEDURE — 94010 BREATHING CAPACITY TEST: CPT

## 2017-10-26 NOTE — DISCHARGE NOTE ADULT - CARE PROVIDER_API CALL
Randell Fernández), Cardiovascular Disease; Internal Medicine; Nuclear Cardiology  [address]  [place], [place] [id number]  Phone: [phone]  Fax: [phone]    Darrin Caicedo), Gastroenterology; Internal Medicine  [address]  [place] [address]  Phone: [phone]  Fax: [phone]
refused

## 2017-11-03 ENCOUNTER — EMERGENCY (EMERGENCY)
Facility: HOSPITAL | Age: 52
LOS: 1 days | Discharge: HOME | End: 2017-11-03
Attending: EMERGENCY MEDICINE | Admitting: EMERGENCY MEDICINE
Payer: COMMERCIAL

## 2017-11-03 VITALS
HEART RATE: 78 BPM | TEMPERATURE: 98 F | RESPIRATION RATE: 16 BRPM | SYSTOLIC BLOOD PRESSURE: 165 MMHG | DIASTOLIC BLOOD PRESSURE: 92 MMHG | OXYGEN SATURATION: 100 %

## 2017-11-03 VITALS
HEART RATE: 60 BPM | DIASTOLIC BLOOD PRESSURE: 74 MMHG | RESPIRATION RATE: 18 BRPM | OXYGEN SATURATION: 98 % | SYSTOLIC BLOOD PRESSURE: 121 MMHG

## 2017-11-03 DIAGNOSIS — Z98.89 OTHER SPECIFIED POSTPROCEDURAL STATES: Chronic | ICD-10-CM

## 2017-11-03 DIAGNOSIS — Z87.898 PERSONAL HISTORY OF OTHER SPECIFIED CONDITIONS: Chronic | ICD-10-CM

## 2017-11-03 PROCEDURE — 70450 CT HEAD/BRAIN W/O DYE: CPT | Mod: 26

## 2017-11-03 PROCEDURE — 96374 THER/PROPH/DIAG INJ IV PUSH: CPT

## 2017-11-03 PROCEDURE — 99284 EMERGENCY DEPT VISIT MOD MDM: CPT | Mod: 25

## 2017-11-03 PROCEDURE — 70450 CT HEAD/BRAIN W/O DYE: CPT

## 2017-11-03 PROCEDURE — 96375 TX/PRO/DX INJ NEW DRUG ADDON: CPT

## 2017-11-03 RX ORDER — METOCLOPRAMIDE HCL 10 MG
10 TABLET ORAL ONCE
Qty: 0 | Refills: 0 | Status: COMPLETED | OUTPATIENT
Start: 2017-11-03 | End: 2017-11-03

## 2017-11-03 RX ORDER — ACETAMINOPHEN 500 MG
1000 TABLET ORAL ONCE
Qty: 0 | Refills: 0 | Status: COMPLETED | OUTPATIENT
Start: 2017-11-03 | End: 2017-11-03

## 2017-11-03 RX ORDER — SODIUM CHLORIDE 9 MG/ML
1000 INJECTION INTRAMUSCULAR; INTRAVENOUS; SUBCUTANEOUS ONCE
Qty: 0 | Refills: 0 | Status: COMPLETED | OUTPATIENT
Start: 2017-11-03 | End: 2017-11-03

## 2017-11-03 RX ADMIN — Medication 400 MILLIGRAM(S): at 06:59

## 2017-11-03 RX ADMIN — SODIUM CHLORIDE 1000 MILLILITER(S): 9 INJECTION INTRAMUSCULAR; INTRAVENOUS; SUBCUTANEOUS at 05:30

## 2017-11-03 RX ADMIN — Medication 10 MILLIGRAM(S): at 05:30

## 2017-11-03 NOTE — ED ADULT NURSE NOTE - PSH
H/O colonoscopy    H/O lipoma  removed from back  H/O percutaneous transluminal coronary angioplasty  with DE RCA 2006, 2 stents placed 2015  History of herniorrhaphy  left inguinal herniorrhaphy  S/P cardiac catheterization  2010 - [facility] ( diagnostic cath)  S/P hernia surgery

## 2017-11-03 NOTE — ED ADULT NURSE NOTE - CHPI ED SYMPTOMS NEG
no change in level of consciousness/no weakness/no blurred vision/no numbness/no loss of consciousness/no confusion/no nausea/no dizziness/no vomiting

## 2017-11-03 NOTE — ED PROVIDER NOTE - OBJECTIVE STATEMENT
52M h/o CAD, DM2, GERD, asthma, Crohn's disease, HTN, HLD, GERD presents after waking up two nights ago with a severe posterior left sided occipital headache, which was worse than any of his other headaches, which he gets often. He took tylenol PM which helped him sleep, but headache recurred. He admits to feeling worse laying down than sitting up and has some photophobia, but no nuchal rigidity, fevers, chills, N/V.

## 2017-11-03 NOTE — ED PROVIDER NOTE - PROGRESS NOTE DETAILS
MICHELE Oates MD: Pt with significant improvement in sx after IVF and medications. Advised to take tylenol prn pain and to f/u with PMD. Return precautions given to pt. Knows to f/u with PMD in 1-2 days and to return to the ED sooner for any worsening/concerning sx.

## 2017-11-03 NOTE — ED PROVIDER NOTE - MEDICAL DECISION MAKING DETAILS
52M p/w headache. Will get CT head to r/o bleed, though unlikely. YURY Murray, [facility] for pain control. Will reassess. 52M p/w headache. Will get CT head to r/o mass, though unlikely. Regrj, NS, [facility] for pain control. Will reassess. Unlikely SAH, as not sudden onset, no thunderclap, pt had pain over 72hrs. 52M p/w headache. Will get CT head to r/o mass, though unlikely. Reglan, NS, [facility] for pain control. Will reassess. Unlikely SAH, as not sudden onset, no thunderclap, pt had pain over 72hrs.    MICHELE Oates MD: 51 y/o male with PMH HTN, CAD s/p stents, DM2 p/w 2 days of HA. Pt has a h/o headaches in the past, but states that this HA has lasted the longest. Has not taken OTC medications. +Nausea, no photophobia, neck pain/stiffness, F/C. Headache came on gradually when awakening a few days ago, and slowly worsened, no thunderclap. Denies focal numbness/weakness, n/v, CP, SOB. visual changes, gait disturbance or dizziness. Denies trauma.  DDx: tension HA, migraine HA. ICH possible, however, lower likelihood given no thunderclap HA and no focal neuro deficits. Doubt meningitis as no meningeal signs or fevers.  Plan: IVF, reglan, ofirmev, re-assess. Will perform CTH per pt request

## 2017-11-03 NOTE — ED ADULT NURSE NOTE - OBJECTIVE STATEMENT
pt c/o "HA that started yesterday but has gotten worse tonight. the pain is worse whenever I lie down at night. I took tylenol pm before coming at approx. 0330 but that only reduced the pain a little. This is the worst HA of my life and it feels like its throbbing." pt has +photosensitivity. pt denies any n/v/d, lightheadedness or dizziness.

## 2017-11-03 NOTE — ED PROVIDER NOTE - PSH
H/O colonoscopy    H/O lipoma  removed from back  H/O percutaneous transluminal coronary angioplasty  with DE RCA 2006, 2 stents placed 2015  History of herniorrhaphy  left inguinal herniorrhaphy  S/P cardiac catheterization  2010 - [facility] ( diagnostic cath)  S/P hernia surgery

## 2017-11-03 NOTE — ED PROVIDER NOTE - PLAN OF CARE
Call your primary care doctor and neurologist today or tomorrow to schedule follow up appointment for within the next 3-5 days.  Continue taking your medications as prescribed.  Return to this Emergency Department if you experience worsening condition or for any other emergency.

## 2017-11-03 NOTE — ED PROVIDER NOTE - CARE PLAN
Principal Discharge DX:	Headache  Instructions for follow-up, activity and diet:	Call your primary care doctor and neurologist today or tomorrow to schedule follow up appointment for within the next 3-5 days.  Continue taking your medications as prescribed.  Return to this Emergency Department if you experience worsening condition or for any other emergency.

## 2017-11-04 ENCOUNTER — EMERGENCY (EMERGENCY)
Facility: HOSPITAL | Age: 52
LOS: 1 days | Discharge: HOME | End: 2017-11-04
Attending: EMERGENCY MEDICINE | Admitting: EMERGENCY MEDICINE
Payer: COMMERCIAL

## 2017-11-04 VITALS
DIASTOLIC BLOOD PRESSURE: 85 MMHG | SYSTOLIC BLOOD PRESSURE: 142 MMHG | TEMPERATURE: 98 F | RESPIRATION RATE: 18 BRPM | OXYGEN SATURATION: 98 % | HEART RATE: 59 BPM

## 2017-11-04 VITALS
TEMPERATURE: 99 F | HEART RATE: 71 BPM | OXYGEN SATURATION: 100 % | DIASTOLIC BLOOD PRESSURE: 97 MMHG | WEIGHT: 171.96 LBS | SYSTOLIC BLOOD PRESSURE: 156 MMHG | RESPIRATION RATE: 16 BRPM

## 2017-11-04 DIAGNOSIS — Z98.89 OTHER SPECIFIED POSTPROCEDURAL STATES: Chronic | ICD-10-CM

## 2017-11-04 DIAGNOSIS — Z87.898 PERSONAL HISTORY OF OTHER SPECIFIED CONDITIONS: Chronic | ICD-10-CM

## 2017-11-04 LAB
ALBUMIN SERPL ELPH-MCNC: 4.6 G/DL — SIGNIFICANT CHANGE UP (ref 3.3–5)
ALP SERPL-CCNC: 57 U/L — SIGNIFICANT CHANGE UP (ref 40–120)
ALT FLD-CCNC: 13 U/L RC — SIGNIFICANT CHANGE UP (ref 10–45)
ANION GAP SERPL CALC-SCNC: 14 MMOL/L — SIGNIFICANT CHANGE UP (ref 5–17)
AST SERPL-CCNC: 10 U/L — SIGNIFICANT CHANGE UP (ref 10–40)
BASE EXCESS BLDV CALC-SCNC: 0.4 MMOL/L — SIGNIFICANT CHANGE UP (ref -2–2)
BASOPHILS # BLD AUTO: 0 K/UL — SIGNIFICANT CHANGE UP (ref 0–0.2)
BASOPHILS NFR BLD AUTO: 0.2 % — SIGNIFICANT CHANGE UP (ref 0–2)
BILIRUB SERPL-MCNC: 0.2 MG/DL — SIGNIFICANT CHANGE UP (ref 0.2–1.2)
BUN SERPL-MCNC: 13 MG/DL — SIGNIFICANT CHANGE UP (ref 7–23)
CA-I SERPL-SCNC: 1.27 MMOL/L — SIGNIFICANT CHANGE UP (ref 1.12–1.3)
CALCIUM SERPL-MCNC: 9.9 MG/DL — SIGNIFICANT CHANGE UP (ref 8.4–10.5)
CHLORIDE BLDV-SCNC: 104 MMOL/L — SIGNIFICANT CHANGE UP (ref 96–108)
CHLORIDE SERPL-SCNC: 105 MMOL/L — SIGNIFICANT CHANGE UP (ref 96–108)
CO2 BLDV-SCNC: 28 MMOL/L — SIGNIFICANT CHANGE UP (ref 22–30)
CO2 SERPL-SCNC: 24 MMOL/L — SIGNIFICANT CHANGE UP (ref 22–31)
CREAT SERPL-MCNC: 0.72 MG/DL — SIGNIFICANT CHANGE UP (ref 0.5–1.3)
EOSINOPHIL # BLD AUTO: 0.1 K/UL — SIGNIFICANT CHANGE UP (ref 0–0.5)
EOSINOPHIL NFR BLD AUTO: 2.1 % — SIGNIFICANT CHANGE UP (ref 0–6)
GAS PNL BLDV: 139 MMOL/L — SIGNIFICANT CHANGE UP (ref 136–145)
GAS PNL BLDV: SIGNIFICANT CHANGE UP
GAS PNL BLDV: SIGNIFICANT CHANGE UP
GLUCOSE BLDV-MCNC: 105 MG/DL — HIGH (ref 70–99)
GLUCOSE SERPL-MCNC: 106 MG/DL — HIGH (ref 70–99)
HCO3 BLDV-SCNC: 26 MMOL/L — SIGNIFICANT CHANGE UP (ref 21–29)
HCT VFR BLD CALC: 38.7 % — LOW (ref 39–50)
HCT VFR BLDA CALC: 42 % — SIGNIFICANT CHANGE UP (ref 39–50)
HGB BLD CALC-MCNC: 13.6 G/DL — SIGNIFICANT CHANGE UP (ref 13–17)
HGB BLD-MCNC: 13.4 G/DL — SIGNIFICANT CHANGE UP (ref 13–17)
HOROWITZ INDEX BLDV+IHG-RTO: SIGNIFICANT CHANGE UP
LACTATE BLDV-MCNC: 1.6 MMOL/L — SIGNIFICANT CHANGE UP (ref 0.7–2)
LYMPHOCYTES # BLD AUTO: 1.4 K/UL — SIGNIFICANT CHANGE UP (ref 1–3.3)
LYMPHOCYTES # BLD AUTO: 23.2 % — SIGNIFICANT CHANGE UP (ref 13–44)
MCHC RBC-ENTMCNC: 28.8 PG — SIGNIFICANT CHANGE UP (ref 27–34)
MCHC RBC-ENTMCNC: 34.7 GM/DL — SIGNIFICANT CHANGE UP (ref 32–36)
MCV RBC AUTO: 82.9 FL — SIGNIFICANT CHANGE UP (ref 80–100)
MONOCYTES # BLD AUTO: 0.5 K/UL — SIGNIFICANT CHANGE UP (ref 0–0.9)
MONOCYTES NFR BLD AUTO: 7.9 % — SIGNIFICANT CHANGE UP (ref 2–14)
NEUTROPHILS # BLD AUTO: 3.9 K/UL — SIGNIFICANT CHANGE UP (ref 1.8–7.4)
NEUTROPHILS NFR BLD AUTO: 66.7 % — SIGNIFICANT CHANGE UP (ref 43–77)
OTHER CELLS CSF MANUAL: 9 ML/DL — LOW (ref 18–22)
PCO2 BLDV: 49 MMHG — SIGNIFICANT CHANGE UP (ref 35–50)
PH BLDV: 7.35 — SIGNIFICANT CHANGE UP (ref 7.35–7.45)
PLATELET # BLD AUTO: 146 K/UL — LOW (ref 150–400)
PO2 BLDV: 31 MMHG — SIGNIFICANT CHANGE UP (ref 25–45)
POTASSIUM BLDV-SCNC: 3.6 MMOL/L — SIGNIFICANT CHANGE UP (ref 3.5–5)
POTASSIUM SERPL-MCNC: 3.9 MMOL/L — SIGNIFICANT CHANGE UP (ref 3.5–5.3)
POTASSIUM SERPL-SCNC: 3.9 MMOL/L — SIGNIFICANT CHANGE UP (ref 3.5–5.3)
PROT SERPL-MCNC: 7.2 G/DL — SIGNIFICANT CHANGE UP (ref 6–8.3)
RBC # BLD: 4.66 M/UL — SIGNIFICANT CHANGE UP (ref 4.2–5.8)
RBC # FLD: 15.7 % — HIGH (ref 10.3–14.5)
SAO2 % BLDV: 50 % — LOW (ref 67–88)
SODIUM SERPL-SCNC: 143 MMOL/L — SIGNIFICANT CHANGE UP (ref 135–145)
WBC # BLD: 5.9 K/UL — SIGNIFICANT CHANGE UP (ref 3.8–10.5)
WBC # FLD AUTO: 5.9 K/UL — SIGNIFICANT CHANGE UP (ref 3.8–10.5)

## 2017-11-04 PROCEDURE — 70553 MRI BRAIN STEM W/O & W/DYE: CPT | Mod: 26

## 2017-11-04 PROCEDURE — 86618 LYME DISEASE ANTIBODY: CPT

## 2017-11-04 PROCEDURE — 85027 COMPLETE CBC AUTOMATED: CPT

## 2017-11-04 PROCEDURE — 82803 BLOOD GASES ANY COMBINATION: CPT

## 2017-11-04 PROCEDURE — 85014 HEMATOCRIT: CPT

## 2017-11-04 PROCEDURE — 99285 EMERGENCY DEPT VISIT HI MDM: CPT | Mod: 25

## 2017-11-04 PROCEDURE — 70552 MRI BRAIN STEM W/DYE: CPT

## 2017-11-04 PROCEDURE — 96375 TX/PRO/DX INJ NEW DRUG ADDON: CPT | Mod: XU

## 2017-11-04 PROCEDURE — 82947 ASSAY GLUCOSE BLOOD QUANT: CPT

## 2017-11-04 PROCEDURE — 70544 MR ANGIOGRAPHY HEAD W/O DYE: CPT

## 2017-11-04 PROCEDURE — 82435 ASSAY OF BLOOD CHLORIDE: CPT

## 2017-11-04 PROCEDURE — 99284 EMERGENCY DEPT VISIT MOD MDM: CPT | Mod: 25

## 2017-11-04 PROCEDURE — 82330 ASSAY OF CALCIUM: CPT

## 2017-11-04 PROCEDURE — 96376 TX/PRO/DX INJ SAME DRUG ADON: CPT | Mod: XU

## 2017-11-04 PROCEDURE — 84132 ASSAY OF SERUM POTASSIUM: CPT

## 2017-11-04 PROCEDURE — 84295 ASSAY OF SERUM SODIUM: CPT

## 2017-11-04 PROCEDURE — 70544 MR ANGIOGRAPHY HEAD W/O DYE: CPT | Mod: 26,59

## 2017-11-04 PROCEDURE — 96374 THER/PROPH/DIAG INJ IV PUSH: CPT | Mod: XU

## 2017-11-04 PROCEDURE — 80053 COMPREHEN METABOLIC PANEL: CPT

## 2017-11-04 PROCEDURE — 70551 MRI BRAIN STEM W/O DYE: CPT

## 2017-11-04 PROCEDURE — 83605 ASSAY OF LACTIC ACID: CPT

## 2017-11-04 RX ORDER — METOCLOPRAMIDE HCL 10 MG
10 TABLET ORAL ONCE
Qty: 0 | Refills: 0 | Status: COMPLETED | OUTPATIENT
Start: 2017-11-04 | End: 2017-11-04

## 2017-11-04 RX ORDER — TRAMADOL HYDROCHLORIDE 50 MG/1
1 TABLET ORAL
Qty: 14 | Refills: 0
Start: 2017-11-04 | End: 2017-11-11

## 2017-11-04 RX ORDER — DIAZEPAM 5 MG
1 TABLET ORAL
Qty: 14 | Refills: 0
Start: 2017-11-04 | End: 2017-11-11

## 2017-11-04 RX ORDER — SODIUM CHLORIDE 9 MG/ML
1000 INJECTION INTRAMUSCULAR; INTRAVENOUS; SUBCUTANEOUS ONCE
Qty: 0 | Refills: 0 | Status: COMPLETED | OUTPATIENT
Start: 2017-11-04 | End: 2017-11-04

## 2017-11-04 RX ORDER — HYDROMORPHONE HYDROCHLORIDE 2 MG/ML
1 INJECTION INTRAMUSCULAR; INTRAVENOUS; SUBCUTANEOUS ONCE
Qty: 0 | Refills: 0 | Status: DISCONTINUED | OUTPATIENT
Start: 2017-11-04 | End: 2017-11-04

## 2017-11-04 RX ORDER — DEXAMETHASONE 0.5 MG/5ML
10 ELIXIR ORAL ONCE
Qty: 0 | Refills: 0 | Status: COMPLETED | OUTPATIENT
Start: 2017-11-04 | End: 2017-11-04

## 2017-11-04 RX ORDER — ACETAMINOPHEN 500 MG
1000 TABLET ORAL ONCE
Qty: 0 | Refills: 0 | Status: COMPLETED | OUTPATIENT
Start: 2017-11-04 | End: 2017-11-04

## 2017-11-04 RX ORDER — DIAZEPAM 5 MG
5 TABLET ORAL ONCE
Qty: 0 | Refills: 0 | Status: DISCONTINUED | OUTPATIENT
Start: 2017-11-04 | End: 2017-11-04

## 2017-11-04 RX ORDER — DIPHENHYDRAMINE HCL 50 MG
25 CAPSULE ORAL ONCE
Qty: 0 | Refills: 0 | Status: COMPLETED | OUTPATIENT
Start: 2017-11-04 | End: 2017-11-04

## 2017-11-04 RX ORDER — METOCLOPRAMIDE HCL 10 MG
1 TABLET ORAL
Qty: 40 | Refills: 0 | OUTPATIENT
Start: 2017-11-04 | End: 2017-11-14

## 2017-11-04 RX ORDER — MAGNESIUM SULFATE 500 MG/ML
2 VIAL (ML) INJECTION ONCE
Qty: 0 | Refills: 0 | Status: COMPLETED | OUTPATIENT
Start: 2017-11-04 | End: 2017-11-04

## 2017-11-04 RX ORDER — VALPROIC ACID (AS SODIUM SALT) 250 MG/5ML
1000 SOLUTION, ORAL ORAL ONCE
Qty: 0 | Refills: 0 | Status: COMPLETED | OUTPATIENT
Start: 2017-11-04 | End: 2017-11-04

## 2017-11-04 RX ADMIN — Medication 1 TABLET(S): at 21:10

## 2017-11-04 RX ADMIN — Medication 25 MILLIGRAM(S): at 15:33

## 2017-11-04 RX ADMIN — SODIUM CHLORIDE 1000 MILLILITER(S): 9 INJECTION INTRAMUSCULAR; INTRAVENOUS; SUBCUTANEOUS at 19:08

## 2017-11-04 RX ADMIN — HYDROMORPHONE HYDROCHLORIDE 1 MILLIGRAM(S): 2 INJECTION INTRAMUSCULAR; INTRAVENOUS; SUBCUTANEOUS at 15:16

## 2017-11-04 RX ADMIN — Medication 1 TABLET(S): at 13:55

## 2017-11-04 RX ADMIN — Medication 10 MILLIGRAM(S): at 15:34

## 2017-11-04 RX ADMIN — Medication 10 MILLIGRAM(S): at 05:41

## 2017-11-04 RX ADMIN — SODIUM CHLORIDE 1000 MILLILITER(S): 9 INJECTION INTRAMUSCULAR; INTRAVENOUS; SUBCUTANEOUS at 05:05

## 2017-11-04 RX ADMIN — Medication 400 MILLIGRAM(S): at 10:43

## 2017-11-04 RX ADMIN — Medication 1000 MILLIGRAM(S): at 12:20

## 2017-11-04 RX ADMIN — Medication 50 GRAM(S): at 05:41

## 2017-11-04 RX ADMIN — Medication 1 TABLET(S): at 15:18

## 2017-11-04 RX ADMIN — Medication 5 MILLIGRAM(S): at 12:03

## 2017-11-04 RX ADMIN — Medication 25 MILLIGRAM(S): at 19:00

## 2017-11-04 RX ADMIN — Medication 10 MILLIGRAM(S): at 05:05

## 2017-11-04 RX ADMIN — HYDROMORPHONE HYDROCHLORIDE 1 MILLIGRAM(S): 2 INJECTION INTRAMUSCULAR; INTRAVENOUS; SUBCUTANEOUS at 18:29

## 2017-11-04 NOTE — ED PROVIDER NOTE - PLAN OF CARE
1) Please follow-up with Dr. Moreno within the next 3 days, his service can also be reached if still having issues.  Please call today or tomorrow for an appointment.  If you cannot follow-up with your doctor(s), please return to the ED for any urgent issues.  2) If you have any worsening of symptoms or any other concerns please return to the ED immediately.  3) Please continue taking your home medications as directed. Follow the regimen by your doctor: Tramadol 50mg as needed twice a day, Reglan 10mg as needed 4 times a day, Medrol Dosepak, Valium 5mg rescue dose (up to 3 times daily).  4) You may have been given a copy of your labs and/or imaging.  Please go over these with your primary care doctor. Your MRI of the brain was stable from 2009.

## 2017-11-04 NOTE — ED ADULT NURSE REASSESSMENT NOTE - NS ED NURSE REASSESS COMMENT FT1
Pt VSS. Pt states relief from IV tylenol. Pain level 5/10. Pt and wife waiting for transport to go to MRI. Safety and comfort measures maintained.

## 2017-11-04 NOTE — ED PROVIDER NOTE - PSH
H/O colonoscopy    H/O lipoma  removed from back  H/O percutaneous transluminal coronary angioplasty  with DE RCA 2006, 2 stents placed 2015  History of herniorrhaphy  left inguinal herniorrhaphy  S/P cardiac catheterization  2010 - [facility] ( diagnostic cath)  S/P hernia surgery

## 2017-11-04 NOTE — ED PROVIDER NOTE - PROGRESS NOTE DETAILS
Resident, Bethanyer: Spoke with wife and patient and both agree that they would rather hold off on LP until PMD is spoken to and further workup and medications are given. Dr. Brunson ([phone]) on call for Dr. Moreno, will trial depakote 1000mg and reassess for headache and home regimen.  - Carlito Selby MD Patient feeling improved, will discharge patient with regimen provided by Dr. Brunson:  Tramadol 50mg as needed twice a day, Reglan 10mg as needed 4 times a day, Medrol Dosepak, Valium 5mg rescue dose (up to 3 times daily).  - Carlito Selby MD Attending Note (Donna): signed out pending mri and pain relief.  patient's pain improved.  case coordinated with patient's neurologist.  jailene Resident, Carlo: Spoke with wife and patient and both agree that they would rather hold off on LP until PMD is spoken to and further workup and medications are given.  Dr. Cooney: Patient was evaluated by me, found in no acute distress, calm, speaking in complete sentences. Patient is afebrile and physical exam reveals no meningeal signs. No gross neurologic deficits appreciated. Discussion regarding lumbar puncture for evaluation of headaches was done with patient and wife, with patient deciding not to undergo lumbar puncture at this moment, and would rather speak with his neurologist prior to deciding to undergo lumbar puncture. Patient is requesting to undergo imaging studies for evaluation of headache etiology. Pain medication will be ordered at ED. I agree with resident assessment and plan. Dr. Kristian Cooney Resident, Carlo: Spoke with wife and patient and both agree that they would rather hold off on LP until PMD is spoken to and further workup and medications are given.  Dr. Cooney: Patient was evaluated by me, found in no acute distress, calm, speaking in complete sentences. Patient is afebrile and physical exam reveals no meningeal signs. No gross neurologic deficits appreciated. Discussion regarding lumbar puncture for evaluation of headaches was done with patient and wife, with patient deciding not to undergo lumbar puncture at this moment, and would rather speak with his neurologist prior to deciding to undergo lumbar puncture. Patient is requesting to undergo imaging studies for evaluation of headache etiology. Pain medication will be ordered at ED. I agree with resident assessment and plan. Dr. Kristian Cooney  1600: Darrin Peoples MD: patient's pain under better control, pending ct head and reassessment of analgesia effect, Dr. Thompson to  follow up on imaging and analgesia, neurology consult prn.  Will follow up on labs, analgesia, any clinical imaging, reassess and disposition as clinically indicated.  Details of patient and plan conveyed to receiving physician and conveyed back for understanding.  There were no questions at this time about the patients disposition and plan. Patient's care to be taken over by receiving physician at this time, all decisions regarding the progression of care will be made at their discretion.

## 2017-11-04 NOTE — ED PROVIDER NOTE - SHIFT CHANGE DETAILS
Darrin Peoples MD: patient's pain under better control, pending MRI results of head and reassessment of analgesia effect. CN 2-12 intact, normal coordination, normal finger to nose, normal heel to shin, negative Romberg, no pronator drift, no gait instability, 5/5 strength in upper and lower extremities, normal sensory throughout, alert and oriented to person, place, time, and situation. mild distress secondary to pain.  1600:Dr. Tohmpson to  follow up on imaging and analgesia, neurology consult prn.  Will follow up on labs, analgesia, any clinical imaging, reassess and disposition as clinically indicated.  Details of patient and plan conveyed to receiving physician and conveyed back for understanding.  There were no questions at this time about the patients disposition and plan. Patient's care to be taken over by receiving physician at this time, all decisions regarding the progression of care will be made at their discretion.

## 2017-11-04 NOTE — ED ADULT NURSE NOTE - OBJECTIVE STATEMENT
53 y/o M presents to the ED c/o HA.  PT was seen in the ED yesterday and was sent home with Tylenol 3 and to follow up with neuro.  Pt took Tylenol 3 at 12 AM with no relief.  Pt is concerned and is wondering about an emergent MRI.  Pt states the HA is bounding and he feels as if his head is going to explode.  Patient is A&Ox4. Face is symmetrical. PERRL 3mmB. Speech is clear. Patient is moving all extremities with 5/5 strength and walks with steady gait. No chest pain, shortness of breath, diaphoresis, palpitations, left arm pain, excessive fatigue, or nausea/vomiting. VSS 53 y/o M presents to the ED c/o HA.  PT was seen in the ED yesterday and was sent home with Tylenol 3 and to follow up with neuro.  Pt took Tylenol 3 at 12 AM with no relief.  Pt states he spoke with neuro and they suggested an MRI/MRA.  Pt states the HA is bounding and he feels as if his head is going to explode.  Pt endorses photophobia.  Patient is A&Ox4. Face is symmetrical. PERRL 3mmB. Speech is clear. Patient is moving all extremities with 5/5 strength and walks with steady gait. No chest pain, shortness of breath, diaphoresis, palpitations, left arm pain, excessive fatigue, or nausea/vomiting, f/c.  VSS

## 2017-11-04 NOTE — ED ADULT NURSE REASSESSMENT NOTE - NS ED NURSE REASSESS COMMENT FT1
VSS. Pt sleeping when I first came into the room. Wife at the bedside. Safety and comfort measures maintained. Will continue to monitor.

## 2017-11-04 NOTE — ED ADULT NURSE REASSESSMENT NOTE - NS ED NURSE REASSESS COMMENT FT1
Pt back from MRI. Wife at bedside. Pt appears in no acute distress. Safety and comfort measures maintained. Will continue to monitor.

## 2017-11-04 NOTE — ED ADULT NURSE NOTE - PSH
H/O colonoscopy    H/O lipoma  removed from back  H/O percutaneous transluminal coronary angioplasty  with DE RCA 2006, 2 stents placed 2015  History of herniorrhaphy  left inguinal herniorrhaphy  S/P cardiac catheterization  2010 - [facility] ( diagnostic cath)  S/P hernia surgery

## 2017-11-04 NOTE — ED ADULT NURSE REASSESSMENT NOTE - NS ED NURSE REASSESS COMMENT FT1
Pt back from MRI. VSS. Wife at bedside. Safety and comfort measures maintained. Will continue to monitor.

## 2017-11-04 NOTE — ED PROVIDER NOTE - MEDICAL DECISION MAKING DETAILS
52M p/w recurrent HA. Pain control, Reglan, Mg, NS, Decadron. Possible LP to r/o infectious cause. 52M p/w recurrent HA. Pain control, Reglan, Mg, NS, Decadron. Possible LP to r/o infectious cause.  Dr. Cooney: Male patient with complaints of recurrent headaches, being evaluated as outpatient by neurologist Dr. Moreno, who arrived at ED due to headaches. Patient found in no acute distress, calm, speaking in complete sentences. Patient is afebrile and physical exam reveals no meningeal signs. No gross neurologic deficits appreciated. Discussion regarding lumbar puncture for evaluation of headaches was done with patient and wife, with patient deciding not to undergo lumbar puncture at this moment, and would rather speak with his neurologist prior to deciding to undergo lumbar puncture. Patient is requesting to undergo imaging studies for evaluation of headache etiology. Pain medication will be ordered at ED.

## 2017-11-05 LAB
B BURGDOR C6 AB SER-ACNC: NEGATIVE — SIGNIFICANT CHANGE UP
B BURGDOR IGG+IGM SER-ACNC: 0.06 INDEX — SIGNIFICANT CHANGE UP (ref 0.01–0.89)

## 2017-11-08 ENCOUNTER — TRANSCRIPTION ENCOUNTER (OUTPATIENT)
Age: 52
End: 2017-11-08

## 2017-11-09 ENCOUNTER — MEDICATION RENEWAL (OUTPATIENT)
Age: 52
End: 2017-11-09

## 2017-11-17 DIAGNOSIS — G47.30 SLEEP APNEA, UNSPECIFIED: ICD-10-CM

## 2017-11-22 ENCOUNTER — MEDICATION RENEWAL (OUTPATIENT)
Age: 52
End: 2017-11-22

## 2017-12-01 ENCOUNTER — RESULT REVIEW (OUTPATIENT)
Age: 52
End: 2017-12-01

## 2017-12-11 ENCOUNTER — APPOINTMENT (OUTPATIENT)
Dept: SURGERY | Facility: CLINIC | Age: 52
End: 2017-12-11
Payer: COMMERCIAL

## 2017-12-11 VITALS
DIASTOLIC BLOOD PRESSURE: 82 MMHG | RESPIRATION RATE: 16 BRPM | OXYGEN SATURATION: 99 % | SYSTOLIC BLOOD PRESSURE: 131 MMHG | HEART RATE: 80 BPM | TEMPERATURE: 98.2 F

## 2017-12-11 PROCEDURE — 99213 OFFICE O/P EST LOW 20 MIN: CPT

## 2017-12-11 RX ORDER — EVOLOCUMAB 140 MG/ML
140 INJECTION, SOLUTION SUBCUTANEOUS
Refills: 0 | Status: ACTIVE | COMMUNITY

## 2017-12-11 RX ORDER — AMLODIPINE BESYLATE 5 MG/1
TABLET ORAL
Refills: 0 | Status: ACTIVE | COMMUNITY

## 2017-12-15 ENCOUNTER — APPOINTMENT (OUTPATIENT)
Dept: DERMATOLOGY | Facility: CLINIC | Age: 52
End: 2017-12-15
Payer: COMMERCIAL

## 2017-12-15 ENCOUNTER — LABORATORY RESULT (OUTPATIENT)
Age: 52
End: 2017-12-15

## 2017-12-15 VITALS — DIASTOLIC BLOOD PRESSURE: 70 MMHG | SYSTOLIC BLOOD PRESSURE: 130 MMHG

## 2017-12-15 DIAGNOSIS — L98.9 DISORDER OF THE SKIN AND SUBCUTANEOUS TISSUE, UNSPECIFIED: ICD-10-CM

## 2017-12-15 DIAGNOSIS — D22.9 MELANOCYTIC NEVI, UNSPECIFIED: ICD-10-CM

## 2017-12-15 PROCEDURE — 99213 OFFICE O/P EST LOW 20 MIN: CPT | Mod: 25

## 2017-12-15 PROCEDURE — 11100 BX SKIN SUBCUTANEOUS&/MUCOUS MEMBRANE 1 LESION: CPT

## 2017-12-19 ENCOUNTER — APPOINTMENT (OUTPATIENT)
Dept: SLEEP CENTER | Facility: CLINIC | Age: 52
End: 2017-12-19
Payer: COMMERCIAL

## 2017-12-19 ENCOUNTER — OUTPATIENT (OUTPATIENT)
Dept: OUTPATIENT SERVICES | Facility: HOSPITAL | Age: 52
LOS: 1 days | End: 2017-12-19
Payer: COMMERCIAL

## 2017-12-19 DIAGNOSIS — Z98.89 OTHER SPECIFIED POSTPROCEDURAL STATES: Chronic | ICD-10-CM

## 2017-12-19 DIAGNOSIS — Z87.898 PERSONAL HISTORY OF OTHER SPECIFIED CONDITIONS: Chronic | ICD-10-CM

## 2017-12-19 PROCEDURE — G0400: CPT | Mod: 26

## 2017-12-19 PROCEDURE — G0400: CPT

## 2017-12-20 ENCOUNTER — RESULT REVIEW (OUTPATIENT)
Age: 52
End: 2017-12-20

## 2017-12-26 ENCOUNTER — RESULT REVIEW (OUTPATIENT)
Age: 52
End: 2017-12-26

## 2017-12-27 DIAGNOSIS — G47.33 OBSTRUCTIVE SLEEP APNEA (ADULT) (PEDIATRIC): ICD-10-CM

## 2018-01-02 ENCOUNTER — CHART COPY (OUTPATIENT)
Age: 53
End: 2018-01-02

## 2018-01-15 ENCOUNTER — TRANSCRIPTION ENCOUNTER (OUTPATIENT)
Age: 53
End: 2018-01-15

## 2018-01-18 ENCOUNTER — APPOINTMENT (OUTPATIENT)
Dept: PULMONOLOGY | Facility: CLINIC | Age: 53
End: 2018-01-18
Payer: MEDICAID

## 2018-01-18 ENCOUNTER — EMERGENCY (EMERGENCY)
Facility: HOSPITAL | Age: 53
LOS: 1 days | Discharge: HOME | End: 2018-01-18
Attending: EMERGENCY MEDICINE
Payer: MEDICAID

## 2018-01-18 VITALS
SYSTOLIC BLOOD PRESSURE: 149 MMHG | HEART RATE: 69 BPM | DIASTOLIC BLOOD PRESSURE: 83 MMHG | RESPIRATION RATE: 17 BRPM | OXYGEN SATURATION: 100 % | TEMPERATURE: 99 F

## 2018-01-18 VITALS
OXYGEN SATURATION: 98 % | HEART RATE: 74 BPM | BODY MASS INDEX: 28.45 KG/M2 | WEIGHT: 177 LBS | HEIGHT: 66 IN | DIASTOLIC BLOOD PRESSURE: 80 MMHG | SYSTOLIC BLOOD PRESSURE: 126 MMHG

## 2018-01-18 DIAGNOSIS — Z87.898 PERSONAL HISTORY OF OTHER SPECIFIED CONDITIONS: Chronic | ICD-10-CM

## 2018-01-18 DIAGNOSIS — Z78.9 OTHER SPECIFIED HEALTH STATUS: ICD-10-CM

## 2018-01-18 DIAGNOSIS — Z98.89 OTHER SPECIFIED POSTPROCEDURAL STATES: Chronic | ICD-10-CM

## 2018-01-18 DIAGNOSIS — Z71.89 OTHER SPECIFIED COUNSELING: ICD-10-CM

## 2018-01-18 PROCEDURE — 99284 EMERGENCY DEPT VISIT MOD MDM: CPT

## 2018-01-18 PROCEDURE — 99214 OFFICE O/P EST MOD 30 MIN: CPT

## 2018-01-18 RX ORDER — DIPHENHYDRAMINE HCL 50 MG
50 CAPSULE ORAL ONCE
Qty: 0 | Refills: 0 | Status: COMPLETED | OUTPATIENT
Start: 2018-01-18 | End: 2018-01-18

## 2018-01-18 RX ORDER — METOCLOPRAMIDE HCL 10 MG
10 TABLET ORAL ONCE
Qty: 0 | Refills: 0 | Status: COMPLETED | OUTPATIENT
Start: 2018-01-18 | End: 2018-01-18

## 2018-01-18 RX ORDER — ACETAMINOPHEN 500 MG
1000 TABLET ORAL ONCE
Qty: 0 | Refills: 0 | Status: COMPLETED | OUTPATIENT
Start: 2018-01-18 | End: 2018-01-18

## 2018-01-18 RX ORDER — ONDANSETRON 8 MG/1
4 TABLET, FILM COATED ORAL ONCE
Qty: 0 | Refills: 0 | Status: COMPLETED | OUTPATIENT
Start: 2018-01-18 | End: 2018-01-18

## 2018-01-18 RX ORDER — SODIUM CHLORIDE 9 MG/ML
1000 INJECTION INTRAMUSCULAR; INTRAVENOUS; SUBCUTANEOUS ONCE
Qty: 0 | Refills: 0 | Status: COMPLETED | OUTPATIENT
Start: 2018-01-18 | End: 2018-01-18

## 2018-01-18 RX ADMIN — Medication 50 MILLIGRAM(S): at 22:42

## 2018-01-18 RX ADMIN — Medication 10 MILLIGRAM(S): at 22:42

## 2018-01-18 RX ADMIN — ONDANSETRON 4 MILLIGRAM(S): 8 TABLET, FILM COATED ORAL at 22:43

## 2018-01-18 RX ADMIN — SODIUM CHLORIDE 1000 MILLILITER(S): 9 INJECTION INTRAMUSCULAR; INTRAVENOUS; SUBCUTANEOUS at 22:42

## 2018-01-18 RX ADMIN — Medication 400 MILLIGRAM(S): at 22:42

## 2018-01-18 NOTE — ED PROVIDER NOTE - MEDICAL DECISION MAKING DETAILS
Geraldo: Patient with headache, history of migraines similar to prior headaches. took dilaudid po and valium at home. no vomiting, no blurry vision, no neuro deficits. will give migraine cocktail, reassess.

## 2018-01-18 NOTE — ED PROVIDER NOTE - OBJECTIVE STATEMENT
53yo male history of Crohns, CAD s/p stents, p/w headache. Pt. was here 11/3 and 4, f/u with neuro, MRI, CT scan was normal. Headache started at 230pm, gradual in onset, started at 3/10, now 10/10, aggravated by laying down, light, noise, stress, no alleviating factors, left head, radiates around head, throbbing, intermittent, took valium 5mg, then 2 hours later took 2mg dilaudid. Denies fevers, cp, sob, vomiting, changes in vision/hearing, numbness/tingling, falls, head injuries. Neuro: Chloe. Pt. on antivio for crohns.

## 2018-01-18 NOTE — ED ADULT NURSE NOTE - OBJECTIVE STATEMENT
53 y/o M, PMH Crohns, CAD s/p stents, hx cluster headaches presents to ED c/o headache. Pt was in ED on 11/3 and 11/4 for headaches, followed up with neuro, MRI, CT scan was normal. Pt reports pain started gradually at 1430, first at a 3/10, now 10/10, worse laying down and with light and noise, stress.  Pt described pain as throbbing, intermittent, took valium 5mg, then 2 hours later took 2mg dilaudid. Pt denies fevers, chest pain, SOB, vomiting, changes in vision/hearing, numbness/tingling, falls, head injuries. Wife at bedside, safety maintained.

## 2018-01-18 NOTE — ED PROVIDER NOTE - PROGRESS NOTE DETAILS
Geraldo: Patient reevaluated, sleeping. reports mild improvement in headache. states that dilaudid works for migraines in past. will give one dose of dilaudid and reassess. Patient was endorsed to me by Dr. Chow     at  1    to follow up results of  reassess       and dispo pending these results and re evaluation. Upon my re evaluation of the patient I found that patient resting comfortably feels much better -will d/c with follow up. Ricardo Don M.D., Attending Physician

## 2018-01-18 NOTE — ED PROVIDER NOTE - CARE PLAN
Principal Discharge DX:	Acute nonintractable headache, unspecified headache type Principal Discharge DX:	Acute nonintractable headache, unspecified headache type  Assessment and plan of treatment:	1. return for worsening symptoms or anything concerning to you  2. take all home meds as prescribed  3. follow up with your pmd call to make an appointment

## 2018-01-19 ENCOUNTER — RX RENEWAL (OUTPATIENT)
Age: 53
End: 2018-01-19

## 2018-01-19 VITALS
DIASTOLIC BLOOD PRESSURE: 85 MMHG | RESPIRATION RATE: 16 BRPM | OXYGEN SATURATION: 100 % | HEART RATE: 59 BPM | SYSTOLIC BLOOD PRESSURE: 127 MMHG | TEMPERATURE: 98 F

## 2018-01-19 PROCEDURE — 96375 TX/PRO/DX INJ NEW DRUG ADDON: CPT

## 2018-01-19 PROCEDURE — 96374 THER/PROPH/DIAG INJ IV PUSH: CPT

## 2018-01-19 PROCEDURE — 99284 EMERGENCY DEPT VISIT MOD MDM: CPT | Mod: 25

## 2018-01-19 RX ORDER — HYDROMORPHONE HYDROCHLORIDE 2 MG/ML
1 INJECTION INTRAMUSCULAR; INTRAVENOUS; SUBCUTANEOUS ONCE
Qty: 0 | Refills: 0 | Status: DISCONTINUED | OUTPATIENT
Start: 2018-01-19 | End: 2018-01-19

## 2018-01-19 RX ADMIN — HYDROMORPHONE HYDROCHLORIDE 1 MILLIGRAM(S): 2 INJECTION INTRAMUSCULAR; INTRAVENOUS; SUBCUTANEOUS at 01:15

## 2018-01-19 RX ADMIN — Medication 1000 MILLIGRAM(S): at 01:15

## 2018-01-19 NOTE — ED ADULT NURSE REASSESSMENT NOTE - NS ED NURSE REASSESS COMMENT FT1
Pt has been asleep in room with eyes closed for approx 2 hours. Pt arouses easily to voice, pt is AAOx4, NAD, resp nonlabored, resting comfortably in bed. Pt reports improvement in headache to 5/10. Pt denies dizziness, visual changes, chest pain, palpitations, SOB, abd pain, n/v/d, urinary symptoms, fevers, chills, weakness at this time. Pt awaiting reassessment and dispo. Lights darkened, comfort measures provided, pillow provided, call bell within reach. Safety maintained.

## 2018-01-22 ENCOUNTER — OUTPATIENT (OUTPATIENT)
Dept: OUTPATIENT SERVICES | Facility: HOSPITAL | Age: 53
LOS: 1 days | End: 2018-01-22
Payer: MEDICAID

## 2018-01-22 ENCOUNTER — APPOINTMENT (OUTPATIENT)
Dept: RADIOLOGY | Facility: IMAGING CENTER | Age: 53
End: 2018-01-22

## 2018-01-22 DIAGNOSIS — Z87.898 PERSONAL HISTORY OF OTHER SPECIFIED CONDITIONS: Chronic | ICD-10-CM

## 2018-01-22 DIAGNOSIS — Z98.89 OTHER SPECIFIED POSTPROCEDURAL STATES: Chronic | ICD-10-CM

## 2018-01-22 DIAGNOSIS — Z00.8 ENCOUNTER FOR OTHER GENERAL EXAMINATION: ICD-10-CM

## 2018-01-22 PROCEDURE — 73030 X-RAY EXAM OF SHOULDER: CPT | Mod: 26,RT

## 2018-01-22 PROCEDURE — 73030 X-RAY EXAM OF SHOULDER: CPT

## 2018-01-31 ENCOUNTER — APPOINTMENT (OUTPATIENT)
Dept: RHEUMATOLOGY | Facility: CLINIC | Age: 53
End: 2018-01-31
Payer: MEDICAID

## 2018-01-31 PROCEDURE — 96413 CHEMO IV INFUSION 1 HR: CPT

## 2018-02-20 ENCOUNTER — INPATIENT (INPATIENT)
Facility: HOSPITAL | Age: 53
LOS: 2 days | Discharge: HOME | DRG: 386 | End: 2018-02-23
Admitting: SURGERY
Payer: MEDICARE

## 2018-02-20 VITALS
RESPIRATION RATE: 16 BRPM | OXYGEN SATURATION: 99 % | HEART RATE: 78 BPM | SYSTOLIC BLOOD PRESSURE: 151 MMHG | WEIGHT: 175.05 LBS | TEMPERATURE: 100 F | DIASTOLIC BLOOD PRESSURE: 91 MMHG | HEIGHT: 66 IN

## 2018-02-20 DIAGNOSIS — Z98.89 OTHER SPECIFIED POSTPROCEDURAL STATES: Chronic | ICD-10-CM

## 2018-02-20 DIAGNOSIS — Z87.898 PERSONAL HISTORY OF OTHER SPECIFIED CONDITIONS: Chronic | ICD-10-CM

## 2018-02-20 LAB
ALBUMIN SERPL ELPH-MCNC: 4.6 G/DL — SIGNIFICANT CHANGE UP (ref 3.3–5)
ALP SERPL-CCNC: 54 U/L — SIGNIFICANT CHANGE UP (ref 40–120)
ALT FLD-CCNC: 16 U/L RC — SIGNIFICANT CHANGE UP (ref 10–45)
ANION GAP SERPL CALC-SCNC: 10 MMOL/L — SIGNIFICANT CHANGE UP (ref 5–17)
APTT BLD: 33.2 SEC — SIGNIFICANT CHANGE UP (ref 27.5–37.4)
AST SERPL-CCNC: 11 U/L — SIGNIFICANT CHANGE UP (ref 10–40)
BASOPHILS # BLD AUTO: 0.1 K/UL — SIGNIFICANT CHANGE UP (ref 0–0.2)
BASOPHILS NFR BLD AUTO: 1 % — SIGNIFICANT CHANGE UP (ref 0–2)
BILIRUB SERPL-MCNC: 0.5 MG/DL — SIGNIFICANT CHANGE UP (ref 0.2–1.2)
BUN SERPL-MCNC: 9 MG/DL — SIGNIFICANT CHANGE UP (ref 7–23)
CALCIUM SERPL-MCNC: 9.7 MG/DL — SIGNIFICANT CHANGE UP (ref 8.4–10.5)
CHLORIDE SERPL-SCNC: 102 MMOL/L — SIGNIFICANT CHANGE UP (ref 96–108)
CO2 SERPL-SCNC: 28 MMOL/L — SIGNIFICANT CHANGE UP (ref 22–31)
CREAT SERPL-MCNC: 0.87 MG/DL — SIGNIFICANT CHANGE UP (ref 0.5–1.3)
EOSINOPHIL # BLD AUTO: 0.1 K/UL — SIGNIFICANT CHANGE UP (ref 0–0.5)
EOSINOPHIL NFR BLD AUTO: 0.6 % — SIGNIFICANT CHANGE UP (ref 0–6)
GAS PNL BLDV: SIGNIFICANT CHANGE UP
GLUCOSE SERPL-MCNC: 112 MG/DL — HIGH (ref 70–99)
HCT VFR BLD CALC: 39.2 % — SIGNIFICANT CHANGE UP (ref 39–50)
HGB BLD-MCNC: 13.3 G/DL — SIGNIFICANT CHANGE UP (ref 13–17)
INR BLD: 1.11 RATIO — SIGNIFICANT CHANGE UP (ref 0.88–1.16)
LIDOCAIN IGE QN: 20 U/L — SIGNIFICANT CHANGE UP (ref 7–60)
LYMPHOCYTES # BLD AUTO: 1 K/UL — SIGNIFICANT CHANGE UP (ref 1–3.3)
LYMPHOCYTES # BLD AUTO: 11 % — LOW (ref 13–44)
MCHC RBC-ENTMCNC: 28.8 PG — SIGNIFICANT CHANGE UP (ref 27–34)
MCHC RBC-ENTMCNC: 34 GM/DL — SIGNIFICANT CHANGE UP (ref 32–36)
MCV RBC AUTO: 84.9 FL — SIGNIFICANT CHANGE UP (ref 80–100)
MONOCYTES # BLD AUTO: 0.5 K/UL — SIGNIFICANT CHANGE UP (ref 0–0.9)
MONOCYTES NFR BLD AUTO: 5.2 % — SIGNIFICANT CHANGE UP (ref 2–14)
NEUTROPHILS # BLD AUTO: 7.2 K/UL — SIGNIFICANT CHANGE UP (ref 1.8–7.4)
NEUTROPHILS NFR BLD AUTO: 82.1 % — HIGH (ref 43–77)
PLATELET # BLD AUTO: 189 K/UL — SIGNIFICANT CHANGE UP (ref 150–400)
POTASSIUM SERPL-MCNC: 4.1 MMOL/L — SIGNIFICANT CHANGE UP (ref 3.5–5.3)
POTASSIUM SERPL-SCNC: 4.1 MMOL/L — SIGNIFICANT CHANGE UP (ref 3.5–5.3)
PROT SERPL-MCNC: 7.6 G/DL — SIGNIFICANT CHANGE UP (ref 6–8.3)
PROTHROM AB SERPL-ACNC: 12.1 SEC — SIGNIFICANT CHANGE UP (ref 9.8–12.7)
RBC # BLD: 4.62 M/UL — SIGNIFICANT CHANGE UP (ref 4.2–5.8)
RBC # FLD: 12.7 % — SIGNIFICANT CHANGE UP (ref 10.3–14.5)
SODIUM SERPL-SCNC: 140 MMOL/L — SIGNIFICANT CHANGE UP (ref 135–145)
WBC # BLD: 8.7 K/UL — SIGNIFICANT CHANGE UP (ref 3.8–10.5)
WBC # FLD AUTO: 8.7 K/UL — SIGNIFICANT CHANGE UP (ref 3.8–10.5)

## 2018-02-20 PROCEDURE — 99285 EMERGENCY DEPT VISIT HI MDM: CPT

## 2018-02-20 PROCEDURE — 74177 CT ABD & PELVIS W/CONTRAST: CPT | Mod: 26

## 2018-02-20 RX ORDER — CIPROFLOXACIN LACTATE 400MG/40ML
400 VIAL (ML) INTRAVENOUS EVERY 12 HOURS
Qty: 0 | Refills: 0 | Status: DISCONTINUED | OUTPATIENT
Start: 2018-02-20 | End: 2018-02-23

## 2018-02-20 RX ORDER — MORPHINE SULFATE 50 MG/1
2 CAPSULE, EXTENDED RELEASE ORAL ONCE
Qty: 0 | Refills: 0 | Status: DISCONTINUED | OUTPATIENT
Start: 2018-02-20 | End: 2018-02-20

## 2018-02-20 RX ORDER — MORPHINE SULFATE 50 MG/1
4 CAPSULE, EXTENDED RELEASE ORAL ONCE
Qty: 0 | Refills: 0 | Status: DISCONTINUED | OUTPATIENT
Start: 2018-02-20 | End: 2018-02-20

## 2018-02-20 RX ORDER — SODIUM CHLORIDE 9 MG/ML
1000 INJECTION INTRAMUSCULAR; INTRAVENOUS; SUBCUTANEOUS ONCE
Qty: 0 | Refills: 0 | Status: COMPLETED | OUTPATIENT
Start: 2018-02-20 | End: 2018-02-20

## 2018-02-20 RX ORDER — ONDANSETRON 8 MG/1
4 TABLET, FILM COATED ORAL ONCE
Qty: 0 | Refills: 0 | Status: COMPLETED | OUTPATIENT
Start: 2018-02-20 | End: 2018-02-20

## 2018-02-20 RX ORDER — ACETAMINOPHEN 500 MG
1000 TABLET ORAL ONCE
Qty: 0 | Refills: 0 | Status: COMPLETED | OUTPATIENT
Start: 2018-02-20 | End: 2018-02-20

## 2018-02-20 RX ORDER — HYDROMORPHONE HYDROCHLORIDE 2 MG/ML
1 INJECTION INTRAMUSCULAR; INTRAVENOUS; SUBCUTANEOUS ONCE
Qty: 0 | Refills: 0 | Status: DISCONTINUED | OUTPATIENT
Start: 2018-02-20 | End: 2018-02-20

## 2018-02-20 RX ORDER — METRONIDAZOLE 500 MG
500 TABLET ORAL EVERY 8 HOURS
Qty: 0 | Refills: 0 | Status: DISCONTINUED | OUTPATIENT
Start: 2018-02-20 | End: 2018-02-23

## 2018-02-20 RX ADMIN — Medication 200 MILLIGRAM(S): at 22:24

## 2018-02-20 RX ADMIN — MORPHINE SULFATE 4 MILLIGRAM(S): 50 CAPSULE, EXTENDED RELEASE ORAL at 22:00

## 2018-02-20 RX ADMIN — HYDROMORPHONE HYDROCHLORIDE 1 MILLIGRAM(S): 2 INJECTION INTRAMUSCULAR; INTRAVENOUS; SUBCUTANEOUS at 22:22

## 2018-02-20 RX ADMIN — Medication 400 MILLIGRAM(S): at 17:22

## 2018-02-20 RX ADMIN — Medication 1000 MILLIGRAM(S): at 23:47

## 2018-02-20 RX ADMIN — SODIUM CHLORIDE 1000 MILLILITER(S): 9 INJECTION INTRAMUSCULAR; INTRAVENOUS; SUBCUTANEOUS at 17:22

## 2018-02-20 RX ADMIN — ONDANSETRON 4 MILLIGRAM(S): 8 TABLET, FILM COATED ORAL at 17:20

## 2018-02-20 RX ADMIN — MORPHINE SULFATE 4 MILLIGRAM(S): 50 CAPSULE, EXTENDED RELEASE ORAL at 20:21

## 2018-02-20 RX ADMIN — Medication 100 MILLIGRAM(S): at 21:35

## 2018-02-20 NOTE — ED PROVIDER NOTE - MEDICAL DECISION MAKING DETAILS
51 y/o M pmhx crohn's disease, pancreatitis 2/2 6MP, presenting with epigastric abdominal pain radiating to R and L upper quadrants and back 'feels like pancreatitis that occurred 3 years ago' associated with nausea, vomiting, diarrhea. PE remarkable for tenderness to abdomen. Will obtain labs, lipase, ct a/p, pending GI to see, pain control, fluids, reassess.

## 2018-02-20 NOTE — ED PROVIDER NOTE - PHYSICAL EXAMINATION
GEN: Well Appearing, Nontoxic, NAD  HEENT: Symm Facies, PERRL, EOMI, MMM, posterior pharynx clear  CV: No JVD/Bruits or stridor;  RRR w/o m/g/r  RESP: CTAB w/o w/r/r  ABD: Soft, nt. +tender to palpation epigastrium and R and L upper quadrants. +bs, no guarding/rebound.  No CVAT  EXT: No lower extremity edema or calf tenderness. WWP, palpable pulses. FROMx4  SKIN: No erythema, lesions or rash  Neuro: Grossly intact, AOX3 with normal speech, CN II-XII intact; Sensation intact, motor 5/5 throughout; gait normal

## 2018-02-20 NOTE — ED ADULT TRIAGE NOTE - CHIEF COMPLAINT QUOTE
mid abd pain with distention since last night with nausea and diarrhea  right shoulder pain   hx of crohn's and pancreatitis   inguinal hernia surgery 6 months ago

## 2018-02-20 NOTE — ED PROVIDER NOTE - NS ED ROS FT
Constitutional: No fever or chills  Eyes: No visual changes, eye pain or redness  HEENT: No throat pain, ear pain, nasal pain. No nose bleeding.  CV: No chest pain or lower extremity edema  Resp: No SOB no cough  GI: +abdominal pain, nausea, vomiting, diarrhea. No constipation.   : No dysuria, hematuria.   MSK: No musculoskeletal pain  Skin: No rash  Neuro: No headache. No numbness or tingling. No weakness.

## 2018-02-20 NOTE — ED ADULT NURSE NOTE - PSH
H/O colonoscopy    H/O lipoma  removed from back  H/O percutaneous transluminal coronary angioplasty  with DE RCA 2006, 2 stents placed 2015  History of herniorrhaphy  left inguinal herniorrhaphy  S/P cardiac catheterization  2010 - [facility] ( diagnostic cath)  S/P hernia surgery

## 2018-02-20 NOTE — ED PROVIDER NOTE - PROGRESS NOTE DETAILS
pre-lópez read per radiology states partial SBO secondary to ileitis secondary to crohn's disease. will call surgery for consult. -April Klein PA-C pre-lópez read per radiology states partial SBO secondary to ileitis secondary to crohn's disease. will call surgery for consult. call placed to Dr. Caicedo. -April Klein PA-C discussed with Dr. Caicedo. -April Klein PA-C Patient has remained stable throughout ED course.  CT AP obtained as per plan from endorsing MD; patient found to have partial SBO; case d/w patient's GI physician and surgery consult called; will plan for admission (surgery service vs medicine service). Kristopher Called surgery, spoke with consult resident, still to see patient.  Awaiting surgery recommendations regarding partial bowel obstruction. -Mandeep Paged surgery and spoke with consult resident; they are in OR and will be down to see patient as soon as possible. -Mandeep Spoke with surgery consult resident, to see patient.  Pt remains hemodynamically stable, has received cipro/flagyl.  Awaiting surgery consult/recommendations, then to be admitted. -Mandeep Surgery resident evaluating patient. -Mandeep

## 2018-02-20 NOTE — CONSULT NOTE ADULT - SUBJECTIVE AND OBJECTIVE BOX
SUBJECTIVE:  52yMale well-known to me for management of Crohn's ileitis is admitted today with abdominal pain, found to have an SBO on his CT. He has had two prior SBOs due to his Crohn's disease in 11/2016 and 2/2017. Both were treated successfully with bowel rest, NGT, and IV antibiotics. He has done well since then on Entyvio; his last dose, after a 6 week delay due to an insurance issue, was on January 31. However, last night he awakened with diffuse abdominal pain. It was mild at first, but it progressed through the day, prompting him to come to the ED. He has nausea and regurgitation but no vomiting. His last BM was at 12 noon. He last passed flatus this AM. He denies any recent dietary indiscretion prior to this episode.     ______________________________________________________________________  PMH/PSH:  PAST MEDICAL & SURGICAL HISTORY:  Unilateral recurrent inguinal hernia without obstruction or gangrene: Left  MI (myocardial infarction)  GIB (gastrointestinal bleeding)  MARYAM (obstructive sleep apnea)--uses APAP at home  Asthma  Joint pain  CAD (coronary artery disease): 2 stents placed in LAD in 2015, RCA stent x 2 in 2006  DM (diabetes mellitus): diet controlled  Pancreatitis: while on 6MP for Crohn&#x27;s now off of it  Crohn disease  HLD (hyperlipidemia)  Inguinal mass  Bilateral knee pain  Shoulder pain, bilateral  Lower back pain  Bronchitis, chronic  GERD (gastroesophageal reflux disease)  H/O: HTN (hypertension)  CAD (coronary artery disease): RCA DE stent in place   2006  S/P hernia surgery  H/O lipoma: removed from back  S/P cardiac catheterization: 2010 - [facility] ( diagnostic cath)  H/O colonoscopy  H/O percutaneous transluminal coronary angioplasty: with DE RCA 2006, 2 stents placed 2015  History of herniorrhaphy: left inguinal herniorrhaphy  History of 6MP-induced acute pancreatitis 4/2014  Cluster HAs requiring multiple hospital visits, now on treatment including prn Reglan  History of fatty liver (though not noted on today's CT; the patient has lost weight intentionally on a ketogenic diet)  Iron deficiency anemia  Psoriasis  ______________________________________________________________________  MEDS:  OUTPATIENT MEDS:   Reglan prn, Dilaudid prn, vitamin K2, Entyvio 300 mg IV every 8 weeks, Plavix, Nexium 40 mg BID, Repatha, Advair Diskus, Crestor, Spiriva, Cozaar, vitamin C, glucosamine, Xyzal, coQ10, ASA, Norvasc, Toprol XL, vitamin D3, Singulair,   MEDICATIONS  (STANDING):  ciprofloxacin   IVPB 400 milliGRAM(s) IV Intermittent every 12 hours  metroNIDAZOLE  IVPB 500 milliGRAM(s) IV Intermittent every 8 hours    MEDICATIONS  (PRN):    ______________________________________________________________________  ALL:   Allergies    No Known Allergies    Intolerances: 6MP (pancreatitis), Concerta (multiple AEs), niacin (hot flashes)      ______________________________________________________________________  SH: , 2 children,  (out on disability), no smoking or EtOH  ______________________________________________________________________  FH:  FAMILY HISTORY:  Family history of premature CAD: Father CABG at age 54  Family history of Crohn's disease (Mother)    ______________________________________________________________________  ROS:  REVIEW OF SYSTEMS      General: Fatigue	    Musculoskeletal:	 Joint pains including right shoulder and arm    Neurological: Headaches, impaired concentration, memory deficits    ______________________________________________________________________  PHYSICAL EXAM:  T(C): 37.6 (02-20-18 @ 14:09), Max: 37.6 (02-20-18 @ 14:09)  HR: 78 (02-20-18 @ 14:09)  BP: 151/91 (02-20-18 @ 14:09)  RR: 16 (02-20-18 @ 14:09)  SpO2: 99% (02-20-18 @ 14:09)  Wt(kg): --  PHYSICAL EXAM:      Constitutional: Comfortable-appearing on gurney in [facility], wife at his side    HEENT: Anicteric    Neck: Normal    Respiratory: Chest clear to A&P    Cardiovascular: RRR, no M/G/R    Gastrointestinal: Abdomen non-distended, (+) bowel sounds, soft, mildly tender especially in upper mid-abdomen, to a lesser degree in the lower abdomen, no guarding/rebound    Extremities: No C/C/E    Neurological: Grossly non-focal    ______________________________________________________________________  LABS:                        13.3   8.7   )-----------( 189      ( 20 Feb 2018 17:18 )             39.2     02-20    140  |  102  |  9   ----------------------------<  112<H>  4.1   |  28  |  0.87    Ca    9.7      20 Feb 2018 17:18    TPro  7.6  /  Alb  4.6  /  TBili  0.5  /  DBili  x   /  AST  11  /  ALT  16  /  AlkPhos  54  02-20    LIVER FUNCTIONS - ( 20 Feb 2018 17:18 )  Alb: 4.6 g/dL / Pro: 7.6 g/dL / ALK PHOS: 54 U/L / ALT: 16 U/L RC / AST: 11 U/L / GGT: x           PT/INR - ( 20 Feb 2018 17:18 )   PT: 12.1 sec;   INR: 1.11 ratio         PTT - ( 20 Feb 2018 17:18 )  PTT:33.2 sec  ______________________________________________________________________  IMAGING:  CT A/P (images reviewed in PACS):  PROCEDURE:   CT of the Abdomen and Pelvis was performed with intravenous contrast.   Arterial and Portal Venous phases were acquired.  Intravenous contrast: 90 ml Omnipaque 350. 10 ml discarded.  Oral contrast: Positive oral contrast was administered.  Sagittal and coronal reformats were performed.    COMPARISON: CT abdomen pelvis 2/20/2017. CT pelvis 5/15/2017.    FINDINGS:    LOWER CHEST: Unchanged 3 mm polygonal nodule in the right lower lobe is   likely an intrapulmonary lymph node. There is coronary artery   calcification.    LIVER: Within normal limits.  BILE DUCTS: Normal caliber.  GALLBLADDER: Within normal limits.  SPLEEN: Within normal limits.  PANCREAS: Within normal limits.  ADRENALS: Within normal limits.  KIDNEYS/URETERS: Within normal limits.    BLADDER: Within normal limits.  REPRODUCTIVE ORGANS: The prostate is mildly enlarged.    BOWEL: There are alternating segments of normal and thickened distal   ileal loops, similar in location to prior CT abdomen /  pelvis on 2/20/2017. Multiple dilated loops of small bowel proximal to   this segment are noted without focal transition point. The appendix is   normal.  PERITONEUM: No ascites.  VESSELS: Atherosclerotic disease.  RETROPERITONEUM: No lymphadenopathy.    ABDOMINAL WALL: Within normal limits.  BONES: Within normal limits.    IMPRESSION: Segmental ileitis resulting in a partial small bowel   obstruction, consistent with Crohn's disease, similar in location to   prior study on 2/20/2017.                RA VILLA M.D., RADIOLOGY RESIDENT  This document has been electronically signed.  KRISTI MOSQUEDA M.D., ATTENDING RADIOLOGIST  This document has been electronically signed. Feb 20 2018  7:58PM          ______________________________________________________________________  ASSESSMENT: Small bowel obstruction due to an exacerbation of multisegmental Crohn's disease of the distal/terminal ileum, despite 1 year of Entyvio. Similar presentations in 11/2016 and 2/2017 responded to conservative management. However, given the recurrent nature of the obstructions, the patient should be considered for elective surgical resection and/or strictureplasty after this episode resolves.     PLAN:  NPO, IV hydration and analgesia  IV Cipro and Flagyl  Should likely have an NGT (tried to avoid it the last time, but needed one placed it the middle of the night due to an acute increase in abdominal distention and pain)  Surgical consultation    Discussed with patient and wife        Darrin Caicedo M.D.  O) [phone]  (C) [phone]

## 2018-02-20 NOTE — ED PROVIDER NOTE - PSH
H/O colonoscopy    H/O lipoma  removed from back  H/O percutaneous transluminal coronary angioplasty  with DE RCA 2006, 2 stents placed 2015  History of herniorrhaphy  left inguinal herniorrhaphy  S/P cardiac catheterization  2010 - [facility] ( diagnostic cath)  S/P hernia surgery

## 2018-02-20 NOTE — ED ADULT NURSE NOTE - OBJECTIVE STATEMENT
Pt presents for eval od abd pain across his midline which started last night, accompanied by nausea, no vomiting.  Has had bms today.  Abd soft., oral mucosa moist.

## 2018-02-20 NOTE — ED PROVIDER NOTE - SHIFT CHANGE DETAILS
I have received sign out on this patient, briefly: 51 yo M with h/o crohn's disease, presenting with epigastric abd pain similar to prior episodes of pancreatitis.  TTP in epigastrium; otherwise benign abdominal exam per report.  Labs obtained, notable for normal lipase level.  CT AP pending; will continue to monitor.  -Mandeep

## 2018-02-20 NOTE — ED PROVIDER NOTE - ATTENDING CONTRIBUTION TO CARE
Attending Note (Donna): patient with chron's and h/o pancreatitis, complaining of abd pain.  epigastric tenderness.  concern for pancreatitis.

## 2018-02-20 NOTE — ED PROVIDER NOTE - OBJECTIVE STATEMENT
51 y/o M pmhx crohn's disease, pancreatitis, htn, hld, p/w abdominal pain. Pt states that his pain is above the belly-button near epigastrium, radiating to R and L upper quadrants. He states pain is constant and 'feels like it did when he had pancreatitis' but he is no longer on the medication that caused it for him in the past (6MP), now on Entyvio. He is experiencing nausea and diarrhea, but diarrhea is usual for him secondary to crohn's disease. He denies any fevers or chills. Denies any drinking, smoking. Reports he spoke to his GI doctor prior to coming in and told him to report to ED will see him here. States he has also been experiencing some vague URI symptoms. Took tylenol today with no relief from abdominal pain.   GI: Marifer 51 y/o M pmhx crohn's disease, pancreatitis, htn, hld, p/w abdominal pain. Pt states that his pain is above the belly-button near epigastrium, radiating to R and L upper quadrants. He states pain is constant and 'feels like it did when he had pancreatitis' but he is no longer on the medication that caused it for him in the past (6MP), now on Entyvio. States pain radiates to his back occasionally. He is experiencing nausea and diarrhea, but diarrhea is usual for him secondary to crohn's disease. He denies any fevers or chills. Denies any drinking, smoking. Reports he spoke to his GI doctor prior to coming in and told him to report to ED will see him here. States he has also been experiencing some vague URI symptoms. Took tylenol today with no relief from abdominal pain, last dose around 12 noon.   GI: Marifer

## 2018-02-21 DIAGNOSIS — K50.012 CROHN'S DISEASE OF SMALL INTESTINE WITH INTESTINAL OBSTRUCTION: ICD-10-CM

## 2018-02-21 DIAGNOSIS — K56.600 PARTIAL INTESTINAL OBSTRUCTION, UNSPECIFIED AS TO CAUSE: ICD-10-CM

## 2018-02-21 DIAGNOSIS — K21.9 GASTRO-ESOPHAGEAL REFLUX DISEASE WITHOUT ESOPHAGITIS: ICD-10-CM

## 2018-02-21 DIAGNOSIS — K56.690 OTHER PARTIAL INTESTINAL OBSTRUCTION: ICD-10-CM

## 2018-02-21 DIAGNOSIS — G47.33 OBSTRUCTIVE SLEEP APNEA (ADULT) (PEDIATRIC): ICD-10-CM

## 2018-02-21 DIAGNOSIS — J45.50 SEVERE PERSISTENT ASTHMA, UNCOMPLICATED: ICD-10-CM

## 2018-02-21 DIAGNOSIS — Z29.9 ENCOUNTER FOR PROPHYLACTIC MEASURES, UNSPECIFIED: ICD-10-CM

## 2018-02-21 LAB
ANION GAP SERPL CALC-SCNC: 11 MMOL/L — SIGNIFICANT CHANGE UP (ref 5–17)
BUN SERPL-MCNC: 7 MG/DL — SIGNIFICANT CHANGE UP (ref 7–23)
CALCIUM SERPL-MCNC: 8.7 MG/DL — SIGNIFICANT CHANGE UP (ref 8.4–10.5)
CHLORIDE SERPL-SCNC: 106 MMOL/L — SIGNIFICANT CHANGE UP (ref 96–108)
CO2 SERPL-SCNC: 25 MMOL/L — SIGNIFICANT CHANGE UP (ref 22–31)
CREAT SERPL-MCNC: 0.87 MG/DL — SIGNIFICANT CHANGE UP (ref 0.5–1.3)
GLUCOSE SERPL-MCNC: 95 MG/DL — SIGNIFICANT CHANGE UP (ref 70–99)
HCT VFR BLD CALC: 34.2 % — LOW (ref 39–50)
HGB BLD-MCNC: 11.7 G/DL — LOW (ref 13–17)
MCHC RBC-ENTMCNC: 29.2 PG — SIGNIFICANT CHANGE UP (ref 27–34)
MCHC RBC-ENTMCNC: 34.2 GM/DL — SIGNIFICANT CHANGE UP (ref 32–36)
MCV RBC AUTO: 85.3 FL — SIGNIFICANT CHANGE UP (ref 80–100)
PLATELET # BLD AUTO: 138 K/UL — LOW (ref 150–400)
POTASSIUM SERPL-MCNC: 3.8 MMOL/L — SIGNIFICANT CHANGE UP (ref 3.5–5.3)
POTASSIUM SERPL-SCNC: 3.8 MMOL/L — SIGNIFICANT CHANGE UP (ref 3.5–5.3)
RBC # BLD: 4.01 M/UL — LOW (ref 4.2–5.8)
RBC # FLD: 12.6 % — SIGNIFICANT CHANGE UP (ref 10.3–14.5)
SODIUM SERPL-SCNC: 142 MMOL/L — SIGNIFICANT CHANGE UP (ref 135–145)
WBC # BLD: 6.9 K/UL — SIGNIFICANT CHANGE UP (ref 3.8–10.5)
WBC # FLD AUTO: 6.9 K/UL — SIGNIFICANT CHANGE UP (ref 3.8–10.5)

## 2018-02-21 PROCEDURE — 99255 IP/OBS CONSLTJ NEW/EST HI 80: CPT | Mod: GC

## 2018-02-21 RX ORDER — ENOXAPARIN SODIUM 100 MG/ML
40 INJECTION SUBCUTANEOUS DAILY
Qty: 0 | Refills: 0 | Status: DISCONTINUED | OUTPATIENT
Start: 2018-02-21 | End: 2018-02-23

## 2018-02-21 RX ORDER — BUDESONIDE AND FORMOTEROL FUMARATE DIHYDRATE 160; 4.5 UG/1; UG/1
2 AEROSOL RESPIRATORY (INHALATION)
Qty: 0 | Refills: 0 | Status: DISCONTINUED | OUTPATIENT
Start: 2018-02-21 | End: 2018-02-23

## 2018-02-21 RX ORDER — AMLODIPINE BESYLATE 2.5 MG/1
5 TABLET ORAL DAILY
Qty: 0 | Refills: 0 | Status: DISCONTINUED | OUTPATIENT
Start: 2018-02-21 | End: 2018-02-23

## 2018-02-21 RX ORDER — CLOPIDOGREL BISULFATE 75 MG/1
75 TABLET, FILM COATED ORAL DAILY
Qty: 0 | Refills: 0 | Status: DISCONTINUED | OUTPATIENT
Start: 2018-02-21 | End: 2018-02-23

## 2018-02-21 RX ORDER — PANTOPRAZOLE SODIUM 20 MG/1
40 TABLET, DELAYED RELEASE ORAL
Qty: 0 | Refills: 0 | Status: DISCONTINUED | OUTPATIENT
Start: 2018-02-21 | End: 2018-02-23

## 2018-02-21 RX ORDER — METOPROLOL TARTRATE 50 MG
12.5 TABLET ORAL DAILY
Qty: 0 | Refills: 0 | Status: DISCONTINUED | OUTPATIENT
Start: 2018-02-21 | End: 2018-02-23

## 2018-02-21 RX ORDER — SODIUM CHLORIDE 9 MG/ML
1000 INJECTION, SOLUTION INTRAVENOUS
Qty: 0 | Refills: 0 | Status: DISCONTINUED | OUTPATIENT
Start: 2018-02-21 | End: 2018-02-22

## 2018-02-21 RX ORDER — MONTELUKAST 4 MG/1
10 TABLET, CHEWABLE ORAL AT BEDTIME
Qty: 0 | Refills: 0 | Status: DISCONTINUED | OUTPATIENT
Start: 2018-02-21 | End: 2018-02-23

## 2018-02-21 RX ORDER — HYDROMORPHONE HYDROCHLORIDE 2 MG/ML
0.5 INJECTION INTRAMUSCULAR; INTRAVENOUS; SUBCUTANEOUS ONCE
Qty: 0 | Refills: 0 | Status: DISCONTINUED | OUTPATIENT
Start: 2018-02-21 | End: 2018-02-21

## 2018-02-21 RX ORDER — LOSARTAN POTASSIUM 100 MG/1
100 TABLET, FILM COATED ORAL DAILY
Qty: 0 | Refills: 0 | Status: DISCONTINUED | OUTPATIENT
Start: 2018-02-21 | End: 2018-02-23

## 2018-02-21 RX ORDER — ASPIRIN/CALCIUM CARB/MAGNESIUM 324 MG
81 TABLET ORAL DAILY
Qty: 0 | Refills: 0 | Status: DISCONTINUED | OUTPATIENT
Start: 2018-02-21 | End: 2018-02-23

## 2018-02-21 RX ORDER — TIOTROPIUM BROMIDE 18 UG/1
1 CAPSULE ORAL; RESPIRATORY (INHALATION) AT BEDTIME
Qty: 0 | Refills: 0 | Status: DISCONTINUED | OUTPATIENT
Start: 2018-02-21 | End: 2018-02-23

## 2018-02-21 RX ORDER — ACETAMINOPHEN 500 MG
1000 TABLET ORAL ONCE
Qty: 0 | Refills: 0 | Status: COMPLETED | OUTPATIENT
Start: 2018-02-21 | End: 2018-02-21

## 2018-02-21 RX ORDER — POTASSIUM CHLORIDE 20 MEQ
10 PACKET (EA) ORAL
Qty: 0 | Refills: 0 | Status: COMPLETED | OUTPATIENT
Start: 2018-02-21 | End: 2018-02-21

## 2018-02-21 RX ADMIN — HYDROMORPHONE HYDROCHLORIDE 0.5 MILLIGRAM(S): 2 INJECTION INTRAMUSCULAR; INTRAVENOUS; SUBCUTANEOUS at 23:19

## 2018-02-21 RX ADMIN — AMLODIPINE BESYLATE 5 MILLIGRAM(S): 2.5 TABLET ORAL at 06:00

## 2018-02-21 RX ADMIN — PANTOPRAZOLE SODIUM 40 MILLIGRAM(S): 20 TABLET, DELAYED RELEASE ORAL at 06:00

## 2018-02-21 RX ADMIN — Medication 81 MILLIGRAM(S): at 12:11

## 2018-02-21 RX ADMIN — Medication 400 MILLIGRAM(S): at 04:23

## 2018-02-21 RX ADMIN — ENOXAPARIN SODIUM 40 MILLIGRAM(S): 100 INJECTION SUBCUTANEOUS at 12:11

## 2018-02-21 RX ADMIN — Medication 200 MILLIGRAM(S): at 17:27

## 2018-02-21 RX ADMIN — Medication 100 MILLIGRAM(S): at 04:23

## 2018-02-21 RX ADMIN — TIOTROPIUM BROMIDE 1 CAPSULE(S): 18 CAPSULE ORAL; RESPIRATORY (INHALATION) at 22:52

## 2018-02-21 RX ADMIN — CLOPIDOGREL BISULFATE 75 MILLIGRAM(S): 75 TABLET, FILM COATED ORAL at 12:11

## 2018-02-21 RX ADMIN — MONTELUKAST 10 MILLIGRAM(S): 4 TABLET, CHEWABLE ORAL at 22:00

## 2018-02-21 RX ADMIN — Medication 12.5 MILLIGRAM(S): at 06:00

## 2018-02-21 RX ADMIN — Medication 1000 MILLIGRAM(S): at 04:58

## 2018-02-21 RX ADMIN — HYDROMORPHONE HYDROCHLORIDE 1 MILLIGRAM(S): 2 INJECTION INTRAMUSCULAR; INTRAVENOUS; SUBCUTANEOUS at 00:27

## 2018-02-21 RX ADMIN — BUDESONIDE AND FORMOTEROL FUMARATE DIHYDRATE 2 PUFF(S): 160; 4.5 AEROSOL RESPIRATORY (INHALATION) at 06:01

## 2018-02-21 RX ADMIN — LOSARTAN POTASSIUM 100 MILLIGRAM(S): 100 TABLET, FILM COATED ORAL at 06:00

## 2018-02-21 RX ADMIN — Medication 100 MILLIEQUIVALENT(S): at 22:00

## 2018-02-21 RX ADMIN — Medication 100 MILLIGRAM(S): at 22:01

## 2018-02-21 RX ADMIN — Medication 100 MILLIGRAM(S): at 14:54

## 2018-02-21 RX ADMIN — HYDROMORPHONE HYDROCHLORIDE 0.5 MILLIGRAM(S): 2 INJECTION INTRAMUSCULAR; INTRAVENOUS; SUBCUTANEOUS at 22:49

## 2018-02-21 RX ADMIN — Medication 100 MILLIEQUIVALENT(S): at 20:01

## 2018-02-21 RX ADMIN — BUDESONIDE AND FORMOTEROL FUMARATE DIHYDRATE 2 PUFF(S): 160; 4.5 AEROSOL RESPIRATORY (INHALATION) at 20:00

## 2018-02-21 RX ADMIN — Medication 200 MILLIGRAM(S): at 12:11

## 2018-02-21 NOTE — H&P ADULT - ASSESSMENT
52 M with Crohn's presents with abdominal pain. Found to mild abdominal tenderness with a WBC of 9. CT shows alternating segments of normal and thickened distal ileal loops, with dilated loops of small bowel proximal to this segment, suggestive of a partial SBO. Will admit to [facility] ([facility]). Patient would like to avoid having a NGT placed. Patient is not currently nauseous. Instructed to patient that if he developed nausea or vomiting, or worsening abdominal pain, patient would need NGT. Patient agreed.  -Will reassess patient if he's having abdominal pain  -NPO  -IVF  -VTE prophylaxis  -Continue antibiotics  -D/w fellow  SALVATORE Albarado  [phone]

## 2018-02-21 NOTE — H&P ADULT - NSHPPHYSICALEXAM_GEN_ALL_CORE
Tmax: 37.6 (02-20-18 @ 14:09)  HR: 76  BP: 123/68  RR: 16  SpO2: 95%    Gen: NAD  Lungs: Non-labored breathing  Heart: S1, S2  Abdomen: Soft, ND, mild periumbilical tenderness  Extremities: No deformities

## 2018-02-21 NOTE — H&P ADULT - PSH
H/O colonoscopy    H/O lipoma  removed from back  H/O percutaneous transluminal coronary angioplasty  with DE RCA 2006, 2 stents placed 2015  History of herniorrhaphy  left inguinal herniorrhaphy  S/P cardiac catheterization  2010 - [facility] ( diagnostic cath)  S/P hernia surgery

## 2018-02-21 NOTE — PROGRESS NOTE ADULT - SUBJECTIVE AND OBJECTIVE BOX
GENERAL SURGERY DAILY PROGRESS NOTE:     Subjective: Patient seen and examined. Patient stable with no overnight events. Patient denies CP/ SOB/ Palpatations. Patient denies N/V. Reports No flatus and No BM.     MEDICATIONS  (STANDING):  amLODIPine   Tablet 5 milliGRAM(s) Oral daily  aspirin enteric coated 81 milliGRAM(s) Oral daily  buDESOnide 160 MICROgram(s)/formoterol 4.5 MICROgram(s) Inhaler 2 Puff(s) Inhalation two times a day  ciprofloxacin   IVPB 400 milliGRAM(s) IV Intermittent every 12 hours  clopidogrel Tablet 75 milliGRAM(s) Oral daily  enoxaparin Injectable 40 milliGRAM(s) SubCutaneous daily  lactated ringers. 1000 milliLiter(s) (125 mL/Hr) IV Continuous <Continuous>  losartan 100 milliGRAM(s) Oral daily  metoprolol succinate ER 12.5 milliGRAM(s) Oral daily  metroNIDAZOLE  IVPB 500 milliGRAM(s) IV Intermittent every 8 hours  montelukast 10 milliGRAM(s) Oral at bedtime  pantoprazole    Tablet 40 milliGRAM(s) Oral before breakfast  tiotropium 18 MICROgram(s) Capsule 1 Capsule(s) Inhalation at bedtime    Vital Signs Last 24 Hrs  T(C): 36.9 (21 Feb 2018 05:14), Max: 37.6 (20 Feb 2018 14:09)  T(F): 98.5 (21 Feb 2018 05:14), Max: 99.6 (20 Feb 2018 14:09)  HR: 70 (21 Feb 2018 05:14) (70 - 78)  BP: 116/73 (21 Feb 2018 05:14) (116/73 - 151/91)  BP(mean): --  RR: 18 (21 Feb 2018 05:14) (16 - 18)  SpO2: 97% (21 Feb 2018 05:14) (95% - 99%)    I&O's Detail  20 Feb 2018 07:01  -  21 Feb 2018 07:00  --------------------------------------------------------  IN:    IV PiggyBack: 300 mL    lactated ringers.: 625 mL    Sodium Chloride 0.9% IV Bolus: 1000 mL  Total IN: 1925 mL    OUT:    Voided: 500 mL  Total OUT: 500 mL  Total NET: 1425 mL  Daily Height in cm: 167.64 (20 Feb 2018 14:09)    Daily     LABS:                        11.7   6.9   )-----------( 138      ( 21 Feb 2018 06:49 )             34.2     02-20    140  |  102  |  9   ----------------------------<  112<H>  4.1   |  28  |  0.87    Ca    9.7      20 Feb 2018 17:18    TPro  7.6  /  Alb  4.6  /  TBili  0.5  /  DBili  x   /  AST  11  /  ALT  16  /  AlkPhos  54  02-20  PT/INR - ( 20 Feb 2018 17:18 )   PT: 12.1 sec;   INR: 1.11 ratio   PTT - ( 20 Feb 2018 17:18 )  PTT:33.2 sec    Physical Exam:  Gen: NAD, AAOx3  Abd: soft, mild improved tenderness, mild distention

## 2018-02-21 NOTE — H&P ADULT - NSHPLABSRESULTS_GEN_ALL_CORE
02-20-18    WBC: 8.7  Hgb: 13.3  Hct: 39.2  Plt: 189    Na: 140  K: 4.1  Cl: 102  HCO3: 28  BUN: 9  Cr: 0.87  Glu: 112    Ca: 9.7    Protein: 7.6  Albumin: 4.6  Total bilirubin: 0.5  Alk phos: 54  AST: 11  ALT: 16  Lipase: 20    INR: 1.11  PTT: 33.2    CT abdomen/pelvis: There are alternating segments of normal and thickened distal ileal loops. Multiple dilated loops of small bowel proximal to this segment are noted without focal transition point. The appendix is normal.

## 2018-02-21 NOTE — CONSULT NOTE ADULT - SUBJECTIVE AND OBJECTIVE BOX
[facility] DIVISION OF PULMONARY, CRITICAL CARE AND SLEEP MEDICINE - PULMONARY CONSULTATION NOTE  Evaluation on behalf of Dr. Leonel Duckworth    NAME: PRITI KEMP  MEDICAL RECORD NUMBER: [id number]    CHIEF COMPLAINT: Patient is a 52y old  Male who presents with a chief complaint of Abdominal pain (21 Feb 2018 01:01)    HISTORY OF PRESENT ILLNESS: 52M Crohns on Vedolizumab with prior history of SBO requiring NGT decompression now presenting with abdominal pain, bloating, and discomfort. In the ED he was found to have an SBO and was admitted to the surgical service. Discussion was had regarding NGT decompression but given that the pain was moderate and his nausea was improving the patient wanted to wait and observe his symptoms without NGT decompression. He is currently laying flat in bed and is comfortable but has not yet passed gas. His pain was epigastric and radiating to the flanks and back. He was tender to palpation. He has chronic diarrhea due to his Crohns. He has a prior history of pancreatitis that was attributed to being on 6MP in the past, which he is no longer on.    He also has a history of mild sleep apnea and was recently started on an APAP machine. He has a history of severe persistent asthma but this is currently controlled on Advair, Spiriva, and Singulair. He does not have any respiratory symptoms at present. He denies coughing, sputum production, hemoptysis or chest pain.       ====================BACKGROUND INFORMATION============================    FAMILY HISTORY: FAMILY HISTORY:  Family history of premature CAD: Father CABG at age 54  Family history of Crohn's disease      PAST MEDICAL AND SURGICAL HISTORY: PAST MEDICAL & SURGICAL HISTORY:  Unilateral recurrent inguinal hernia without obstruction or gangrene: Left  MI (myocardial infarction)  GIB (gastrointestinal bleeding)  MARYAM (obstructive sleep apnea)  Asthma  Joint pain  CAD (coronary artery disease): 2 stents placed in LAD in 2015, RCA stent x 2 in 2006  DM (diabetes mellitus): diet controlled  Pancreatitis: while on 6MP for Crohn&#x27;s now off of it  Crohn disease  HLD (hyperlipidemia)  Inguinal mass  Bilateral knee pain  Shoulder pain, bilateral  Lower back pain  Bronchitis, chronic  GERD (gastroesophageal reflux disease)  H/O: HTN (hypertension)  S/P hernia surgery  H/O lipoma: removed from back  S/P cardiac catheterization: 2010 - [facility] ( diagnostic cath)  H/O colonoscopy  H/O percutaneous transluminal coronary angioplasty: with DE RCA 2006, 2 stents placed 2015  History of herniorrhaphy: left inguinal herniorrhaphy      ALLERGIES:Allergies: No Known Allergies    HOME MEDICATIONS: reviewed    OUTPATIENT PULMONARY DOCTOR: Leonel Duckworth MD    SOCIAL HISTORY:  TOBACCO USE: nonsmoker  ALCOHOL: social   DRUGS: denied  MARITAL STATUS:   EMPLOYMENT: full time    EXPOSURES: none  RECENT TRAVELS: none  CODE STATUS: full code    ====================REVIEW OF SYSTEMS=====================================  CONSTITUTIONAL: Currently feeling ok  CARDIOVASCULAR: Denies chest pain or palpitations  PULMONARY: No SOB, cough, sputum production, or hemoptysis  GASTROINTESTINAL: Nausea improved, abdominal pain decreased, no flatus  [x] ALL OTHER REVIEW OF SYSTEMS ARE NEGATIVE   [] UNABLE TO OBTAIN REVIEW OF SYSTEMS DUE TO ______________      ====================PHYSICAL EXAM=========================================    VITALS: ICU Vital Signs Last 24 Hrs  T(C): 36.8 (21 Feb 2018 09:44), Max: 37.6 (20 Feb 2018 14:09)  T(F): 98.2 (21 Feb 2018 09:44), Max: 99.6 (20 Feb 2018 14:09)  HR: 65 (21 Feb 2018 09:44) (65 - 78)  BP: 117/74 (21 Feb 2018 09:44) (116/73 - 151/91)  BP(mean): --  ABP: --  ABP(mean): --  RR: 18 (21 Feb 2018 09:44) (16 - 18)  SpO2: 96% (21 Feb 2018 09:44) (95% - 99%)      INTAKE and OUTPUT: I&O's Summary    20 Feb 2018 07:01  -  21 Feb 2018 07:00  --------------------------------------------------------  IN: 1925 mL / OUT: 500 mL / NET: 1425 mL    WEIGHT: Daily Height in cm: 167.64 (20 Feb 2018 14:09)    Daily     GLUCOSE: CAPILLARY BLOOD GLUCOSE    VENTILATOR SETTINGS: N/A    GENERAL: AAOx3, NAD, comfortable  HEENT: normocephalic, atraumatic, MMM, nares clear, trachea midline  NECK: supple, no JVD  LYMPH NODES: no palpable supraclavicular or cervical LAD  CARDIOVASCULAR: RRR, S1S2  PULMONARY: CTA bilaterally, no wheeze or rhonchi  ABDOMEN: soft, mild distension, mild epigastric tenderness, no guarding, no rebound  SKIN: warm and dry  EXTREMITIES: no clubbing or cyanosis  NEUROLOGIC: moves all extremities, nonfocal exam  PSYCHIATRIC: calm and in good spirits    ====================MEDICATIONS===========================================  MEDICATIONS  (STANDING):  amLODIPine   Tablet 5 milliGRAM(s) Oral daily  aspirin enteric coated 81 milliGRAM(s) Oral daily  buDESOnide 160 MICROgram(s)/formoterol 4.5 MICROgram(s) Inhaler 2 Puff(s) Inhalation two times a day  ciprofloxacin   IVPB 400 milliGRAM(s) IV Intermittent every 12 hours  clopidogrel Tablet 75 milliGRAM(s) Oral daily  enoxaparin Injectable 40 milliGRAM(s) SubCutaneous daily  lactated ringers. 1000 milliLiter(s) (125 mL/Hr) IV Continuous <Continuous>  losartan 100 milliGRAM(s) Oral daily  metoprolol succinate ER 12.5 milliGRAM(s) Oral daily  metroNIDAZOLE  IVPB 500 milliGRAM(s) IV Intermittent every 8 hours  montelukast 10 milliGRAM(s) Oral at bedtime  pantoprazole    Tablet 40 milliGRAM(s) Oral before breakfast  potassium chloride  10 mEq/100 mL IVPB 10 milliEquivalent(s) IV Intermittent every 1 hour  tiotropium 18 MICROgram(s) Capsule 1 Capsule(s) Inhalation at bedtime    ====================LABORATORY RESULTS====================================  CBC Full  -  ( 21 Feb 2018 06:49 )  WBC Count : 6.9 K/uL  Hemoglobin : 11.7 g/dL  Hematocrit : 34.2 %  Platelet Count - Automated : 138 K/uL  Mean Cell Volume : 85.3 fl  Mean Cell Hemoglobin : 29.2 pg  Mean Cell Hemoglobin Concentration : 34.2 gm/dL  Auto Neutrophil # : x  Auto Lymphocyte # : x  Auto Monocyte # : x  Auto Eosinophil # : x  Auto Basophil # : x  Auto Neutrophil % : x  Auto Lymphocyte % : x  Auto Monocyte % : x  Auto Eosinophil % : x  Auto Basophil % : x                                    02-21    142  |  106  |  7   ----------------------------<  95  3.8   |  25  |  0.87    Ca    8.7      21 Feb 2018 06:49    TPro  7.6  /  Alb  4.6  /  TBili  0.5  /  DBili  x   /  AST  11  /  ALT  16  /  AlkPhos  54  02-20        PT/INR - ( 20 Feb 2018 17:18 )   PT: 12.1 sec;   INR: 1.11 ratio         PTT - ( 20 Feb 2018 17:18 )  PTT:33.2 sec    Creatinine Trend: 0.87<--, 0.87<--      ====================RADIOLOGY and ECHOCARDIOGRAPHY=======================    CXR:     CT CHEST:

## 2018-02-21 NOTE — CONSULT NOTE ADULT - PROBLEM SELECTOR RECOMMENDATION 9
-continue management as per surgery  -patient has deferred NGT decompression for now  -still has not had flatus yet

## 2018-02-21 NOTE — CONSULT NOTE ADULT - PROBLEM SELECTOR RECOMMENDATION 3
-would have patients family bring in his APAP machine so that he can use it at night for long term management of his MARYAM

## 2018-02-21 NOTE — CONSULT NOTE ADULT - PROBLEM SELECTOR RECOMMENDATION 4
-continue Symbicort in hospital - therapeutic interchange for Advair  -continue Spiriva and Singulair  -maintain O2 sats > 90%  -would have patient use incentive spirometer to prevent atelectasis

## 2018-02-21 NOTE — H&P ADULT - HISTORY OF PRESENT ILLNESS
52 M presents with abdominal pain. Patient has Crohn's disease and is currently on vedolizumab (Entyvio). Patient has developed pancreatitis from previous medication. Patient has previous Crohn's flare resulting in partial SBO. Those episodes resolved with NGT placement and antibiotics. Last episode was in 2016. His current symptoms are the same as previous episodes of partial SBO. 1 day ago, patient developed abdominal bloating and discomfort. Had some nausea but no vomiting. Has not passed flatus or had a bowel movement all day.    Of note, patient underwent a repair of recurrent L inguinal hernia in 5/2017 with Dr. Vasquez.

## 2018-02-21 NOTE — H&P ADULT - PMH
Asthma    Bilateral knee pain    Bronchitis, chronic    CAD (coronary artery disease)  2 stents placed in LAD in 2015, RCA stent x 2 in 2006  Crohn disease    DM (diabetes mellitus)  diet controlled  GERD (gastroesophageal reflux disease)    GIB (gastrointestinal bleeding)    H/O: HTN (hypertension)    HLD (hyperlipidemia)    Inguinal mass    Joint pain    Lower back pain    MI (myocardial infarction)    MARYAM (obstructive sleep apnea)    Pancreatitis  while on 6MP for Crohn's now off of it  Shoulder pain, bilateral    Unilateral recurrent inguinal hernia without obstruction or gangrene  Left

## 2018-02-21 NOTE — PROGRESS NOTE ADULT - SUBJECTIVE AND OBJECTIVE BOX
GI attendin:20 a.m.    Much improved without pain this am nor focal discomfort but no flatus as yet    P.E.5:14 a.m.  VS: 116/73  98.5  70 97% room air    NO focal findings on abd exam soft non tender but no flatus as yet but still eager for early discharge..pt disabused of this idea until tolerating diet and passing flatus  without pain.    On Cipro  and metronidazole without ill effect    11.7 Hb  6.9 WBC  138 plts    Na 142 K 3.8 Cl 108  HCO3 25 BUN 7 Cr 0.81  Glucose  95    Imp: Improved  SBO on conservative therapy    Continue present program    Jermaine Shearer MD

## 2018-02-21 NOTE — CHART NOTE - NSCHARTNOTEFT_GEN_A_CORE
Pt reports pain. Pt reports passing a small amount of flatus but denies BM. Denies N/V. Exam: very soft, ND, TTP in epigastrum. Ok to give 1x dilaudid 0.5mg IV push for break-through pain. Pt reports not much relief with prior doses of ofirmev and morphine.

## 2018-02-21 NOTE — CONSULT NOTE ADULT - SUBJECTIVE AND OBJECTIVE BOX
Patient is a 52y old  Male who presents with a chief complaint of Abdominal pain (21 Feb 2018 01:01)    52 year old man pt of Dr. Fernández with history of Crohn's ileitis, sleep apnea, CAD s/p stenting to LAD 2015, and RCA 2006, diabetes not on meds, drug induced pancreatitis, presenting with nausea and epigastric abdominal pain.  CT showed segmental ileitis and partial small bowel obstruction.  He denies all cardiopulmonary symptoms.    PAST MEDICAL & SURGICAL HISTORY:  Unilateral recurrent inguinal hernia without obstruction or gangrene: Left  MI (myocardial infarction)  GIB (gastrointestinal bleeding)  MARYAM (obstructive sleep apnea)  Asthma  Joint pain  CAD (coronary artery disease): 2 stents placed in LAD in 2015, RCA stent x 2 in 2006  DM (diabetes mellitus): diet controlled  Pancreatitis: while on 6MP for Crohn&#x27;s now off of it  Crohn disease  HLD (hyperlipidemia)  Inguinal mass  Bilateral knee pain  Shoulder pain, bilateral  Lower back pain  Bronchitis, chronic  GERD (gastroesophageal reflux disease)  H/O: HTN (hypertension)  S/P hernia surgery  H/O lipoma: removed from back  S/P cardiac catheterization: 2010 - [facility] ( diagnostic cath)  H/O colonoscopy  H/O percutaneous transluminal coronary angioplasty: with DE RCA 2006, 2 stents placed 2015  History of herniorrhaphy: left inguinal herniorrhaphy    Allergies    No Known Allergies    MEDICATIONS  (STANDING):  amLODIPine   Tablet 5 milliGRAM(s) Oral daily  aspirin enteric coated 81 milliGRAM(s) Oral daily  buDESOnide 160 MICROgram(s)/formoterol 4.5 MICROgram(s) Inhaler 2 Puff(s) Inhalation two times a day  ciprofloxacin   IVPB 400 milliGRAM(s) IV Intermittent every 12 hours  clopidogrel Tablet 75 milliGRAM(s) Oral daily  enoxaparin Injectable 40 milliGRAM(s) SubCutaneous daily  lactated ringers. 1000 milliLiter(s) (125 mL/Hr) IV Continuous <Continuous>  losartan 100 milliGRAM(s) Oral daily  metoprolol succinate ER 12.5 milliGRAM(s) Oral daily  metroNIDAZOLE  IVPB 500 milliGRAM(s) IV Intermittent every 8 hours  montelukast 10 milliGRAM(s) Oral at bedtime  pantoprazole    Tablet 40 milliGRAM(s) Oral before breakfast  potassium chloride  10 mEq/100 mL IVPB 10 milliEquivalent(s) IV Intermittent every 1 hour  tiotropium 18 MICROgram(s) Capsule 1 Capsule(s) Inhalation at bedtime    MEDICATIONS  (PRN):    FAMILY HISTORY:  Family history of premature CAD: Father CABG at age 54  Family history of Crohn's disease      ROS:  Positive:    General: Denies weight loss, fevers, rash, decreased hearing  Cardiac: Denies chest pain, SOB, MORENO, orthopnea, PND, claudication, edema, snoring, daytime somnolence, palpitations, syncope  Resp: Denies SOB, MORENO, cough, sputum, wheezing, hemoptysis  GI: See HPI  : Denies dysuria, nocturia, hematuria  Neuro: Denies tinnitus, headache, visual changes, weakness, dizziness or vertigo  Musculoskeletal: Denies neck pain back pain joint pain.  Skin: Denies rash, itching, dryness.  Endocrine: Denies polydipsia, polyuria  Psychiatric: Denies depression, anxiety  All other review of systems is negative unless indicated above.    PHYSICAL EXAM:  Vital Signs Last 24 Hrs  T(C): 36.8 (21 Feb 2018 09:44), Max: 37.6 (20 Feb 2018 14:09)  T(F): 98.2 (21 Feb 2018 09:44), Max: 99.6 (20 Feb 2018 14:09)  HR: 65 (21 Feb 2018 09:44) (65 - 78)  BP: 117/74 (21 Feb 2018 09:44) (116/73 - 151/91)  BP(mean): --  RR: 18 (21 Feb 2018 09:44) (16 - 18)  SpO2: 96% (21 Feb 2018 09:44) (95% - 99%)  I&O's Summary    20 Feb 2018 07:01  -  21 Feb 2018 07:00  --------------------------------------------------------  IN: 1925 mL / OUT: 500 mL / NET: 1425 mL    21 Feb 2018 07:01  -  21 Feb 2018 10:48  --------------------------------------------------------  IN: 0 mL / OUT: 0 mL / NET: 0 mL        General Appearance: 	 Alert, cooperative, no distress  HEENT: normocephalic, atraumatic, PERRLA, EOMI, conjunctiva normal, sclera anicteric,   Neck: no JVD,  carotid 2+  bilaterally without bruits, thyroid normal to inspection and palpation, no adenopathy, trachea midline  Lungs:  clear to auscultation and percussion bilaterally  Cor:  pmi 5th ICS MCL, regular rate and rhythm, S1 normal intensity, S2 normal intensity, no gallops, mumrus or rubs  Abdomen:	 soft, non-tender; bowel sounds normal; no masses,  no organomegaly  Extremities: without cyanosis, clubbing or edema  Vasc: 2-+ PT and DP pulses; no varicosities  Neurologic: A&O x 3 (time, place, person). Symmetric strength; limited exam  Musculoskeletal: no kyphosis, scoliosis; normal gait, normal tone  Skin: no rashes; limited exam    EKG:  Telemetry:    LABS:                        11.7   6.9   )-----------( 138      ( 21 Feb 2018 06:49 )             34.2     02-21    142  |  106  |  7   ----------------------------<  95  3.8   |  25  |  0.87    Ca    8.7      21 Feb 2018 06:49    TPro  7.6  /  Alb  4.6  /  TBili  0.5  /  DBili  x   /  AST  11  /  ALT  16  /  AlkPhos  54  02-20    CAPILLARY BLOOD GLUCOSE        PT/INR - ( 20 Feb 2018 17:18 )   PT: 12.1 sec;   INR: 1.11 ratio         PTT - ( 20 Feb 2018 17:18 )  PTT:33.2 sec      Impression/Plan:  Recurrent partial SBO, in setting of Crohn's disease  Known CAD, stable    Plan:  NPO, IVF, IV antibiotics as per GI and surgery  Pain control  Surgical evaluation given recurrent nature  Continue same cardiac meds as able    Jonathan Sanders MD  [facility] Patient is a 52y old  Male who presents with a chief complaint of Abdominal pain (21 Feb 2018 01:01)    52 year old man pt of Dr. Fernández with history of Crohn's ileitis, sleep apnea, CAD s/p stenting to LAD 2015, and RCA 2006, diabetes not on meds, drug induced pancreatitis, presenting with nausea and epigastric abdominal pain.  CT showed segmental ileitis and partial small bowel obstruction.  He denies all cardiopulmonary symptoms.  He denies fevers, vomiting.  Last BM and flatus yesterday.    PAST MEDICAL & SURGICAL HISTORY:  Unilateral recurrent inguinal hernia without obstruction or gangrene: Left  MI (myocardial infarction)  GIB (gastrointestinal bleeding)  MARYAM (obstructive sleep apnea)  Asthma  Joint pain  CAD (coronary artery disease): 2 stents placed in LAD in 2015, RCA stent x 2 in 2006  DM (diabetes mellitus): diet controlled  Pancreatitis: while on 6MP for Crohn&#x27;s now off of it  Crohn disease  HLD (hyperlipidemia)  Inguinal mass  Bilateral knee pain  Shoulder pain, bilateral  Lower back pain  Bronchitis, chronic  GERD (gastroesophageal reflux disease)  H/O: HTN (hypertension)  S/P hernia surgery  H/O lipoma: removed from back  S/P cardiac catheterization: 2010 - [facility] ( diagnostic cath)  H/O colonoscopy  H/O percutaneous transluminal coronary angioplasty: with DE RCA 2006, 2 stents placed 2015  History of herniorrhaphy: left inguinal herniorrhaphy    Allergies    No Known Allergies    MEDICATIONS  (STANDING):  amLODIPine   Tablet 5 milliGRAM(s) Oral daily  aspirin enteric coated 81 milliGRAM(s) Oral daily  buDESOnide 160 MICROgram(s)/formoterol 4.5 MICROgram(s) Inhaler 2 Puff(s) Inhalation two times a day  ciprofloxacin   IVPB 400 milliGRAM(s) IV Intermittent every 12 hours  clopidogrel Tablet 75 milliGRAM(s) Oral daily  enoxaparin Injectable 40 milliGRAM(s) SubCutaneous daily  lactated ringers. 1000 milliLiter(s) (125 mL/Hr) IV Continuous <Continuous>  losartan 100 milliGRAM(s) Oral daily  metoprolol succinate ER 12.5 milliGRAM(s) Oral daily  metroNIDAZOLE  IVPB 500 milliGRAM(s) IV Intermittent every 8 hours  montelukast 10 milliGRAM(s) Oral at bedtime  pantoprazole    Tablet 40 milliGRAM(s) Oral before breakfast  potassium chloride  10 mEq/100 mL IVPB 10 milliEquivalent(s) IV Intermittent every 1 hour  tiotropium 18 MICROgram(s) Capsule 1 Capsule(s) Inhalation at bedtime    MEDICATIONS  (PRN):    FAMILY HISTORY:  Family history of premature CAD: Father CABG at age 54  Family history of Crohn's disease      ROS:  Positive:    General: Denies weight loss, fevers, rash, decreased hearing  Cardiac: Denies chest pain, SOB, MORENO, orthopnea, PND, claudication, edema, snoring, daytime somnolence, palpitations, syncope  Resp: Denies SOB, MORENO, cough, sputum, wheezing, hemoptysis  GI: See HPI  : Denies dysuria, nocturia, hematuria  Neuro: Denies tinnitus, headache, visual changes, weakness, dizziness or vertigo  Musculoskeletal: Denies neck pain back pain joint pain.  Skin: Denies rash, itching, dryness.  Endocrine: Denies polydipsia, polyuria  Psychiatric: Denies depression, anxiety  All other review of systems is negative unless indicated above.    PHYSICAL EXAM:  Vital Signs Last 24 Hrs  T(C): 36.8 (21 Feb 2018 09:44), Max: 37.6 (20 Feb 2018 14:09)  T(F): 98.2 (21 Feb 2018 09:44), Max: 99.6 (20 Feb 2018 14:09)  HR: 65 (21 Feb 2018 09:44) (65 - 78)  BP: 117/74 (21 Feb 2018 09:44) (116/73 - 151/91)  BP(mean): --  RR: 18 (21 Feb 2018 09:44) (16 - 18)  SpO2: 96% (21 Feb 2018 09:44) (95% - 99%)  I&O's Summary    20 Feb 2018 07:01  -  21 Feb 2018 07:00  --------------------------------------------------------  IN: 1925 mL / OUT: 500 mL / NET: 1425 mL    21 Feb 2018 07:01  -  21 Feb 2018 10:48  --------------------------------------------------------  IN: 0 mL / OUT: 0 mL / NET: 0 mL      General Appearance: 	 Alert, cooperative, no distress  HEENT: normocephalic, atraumatic, PERRLA, EOMI, conjunctiva normal, sclera anicteric,   Neck: no JVD  Lungs:  clear to auscultation and percussion bilaterally  Cor:  pmi 5th ICS MCL, regular rate and rhythm, S1 normal intensity, S2 normal intensity  Abdomen:	 soft, non-tender; bowel sounds present but decreased  Extremities: without cyanosis, clubbing or edema      EKG:  Telemetry:    LABS:                        11.7   6.9   )-----------( 138      ( 21 Feb 2018 06:49 )             34.2     02-21    142  |  106  |  7   ----------------------------<  95  3.8   |  25  |  0.87    Ca    8.7      21 Feb 2018 06:49    TPro  7.6  /  Alb  4.6  /  TBili  0.5  /  DBili  x   /  AST  11  /  ALT  16  /  AlkPhos  54  02-20    CAPILLARY BLOOD GLUCOSE        PT/INR - ( 20 Feb 2018 17:18 )   PT: 12.1 sec;   INR: 1.11 ratio         PTT - ( 20 Feb 2018 17:18 )  PTT:33.2 sec      Impression/Plan:  Recurrent partial SBO, in setting of Crohn's disease  Known CAD, stable    Plan:  NPO, IVF, IV antibiotics as per GI and surgery  Pain control  Surgical evaluation given recurrent nature  Continue same cardiac meds as able    Jonathan Sanders MD  [facility]

## 2018-02-21 NOTE — PROGRESS NOTE ADULT - ASSESSMENT
Assessment:    52y Male who presents with Partial intestinal obstruction    Plan:  -continue with ASA, hold Plavix   -Continue NPO, until noted GI function   -Await Gi Fxn     Radha Richardson   #[id number] 02-21-18 @ 07:04

## 2018-02-21 NOTE — H&P ADULT - ATTENDING COMMENTS
I have seen and evaluated patient and agree with resident assessment and plan.  I have reviewed chart and CT scan films and discussed with patient GI Dr. Caicedo.  Patient with strictures and appears to be failing medical therapy.  Will treat conservatively this admit and consider elective laparoscopic surgery with strictureplasties and resection if needed.  Low risk of emergency surgery discussed with patient. All questions answered.  Patient feeling better this am but no GI function at this time.  Will continue with NPO and IV

## 2018-02-22 DIAGNOSIS — I25.10 ATHEROSCLEROTIC HEART DISEASE OF NATIVE CORONARY ARTERY WITHOUT ANGINA PECTORIS: ICD-10-CM

## 2018-02-22 DIAGNOSIS — R09.82 POSTNASAL DRIP: ICD-10-CM

## 2018-02-22 LAB
ANION GAP SERPL CALC-SCNC: 12 MMOL/L — SIGNIFICANT CHANGE UP (ref 5–17)
BUN SERPL-MCNC: 6 MG/DL — LOW (ref 7–23)
CALCIUM SERPL-MCNC: 8.8 MG/DL — SIGNIFICANT CHANGE UP (ref 8.4–10.5)
CHLORIDE SERPL-SCNC: 102 MMOL/L — SIGNIFICANT CHANGE UP (ref 96–108)
CO2 SERPL-SCNC: 27 MMOL/L — SIGNIFICANT CHANGE UP (ref 22–31)
CREAT SERPL-MCNC: 0.7 MG/DL — SIGNIFICANT CHANGE UP (ref 0.5–1.3)
GLUCOSE SERPL-MCNC: 109 MG/DL — HIGH (ref 70–99)
HCT VFR BLD CALC: 34.4 % — LOW (ref 39–50)
HGB BLD-MCNC: 11.3 G/DL — LOW (ref 13–17)
MAGNESIUM SERPL-MCNC: 1.9 MG/DL — SIGNIFICANT CHANGE UP (ref 1.6–2.6)
MCHC RBC-ENTMCNC: 28.3 PG — SIGNIFICANT CHANGE UP (ref 27–34)
MCHC RBC-ENTMCNC: 32.8 GM/DL — SIGNIFICANT CHANGE UP (ref 32–36)
MCV RBC AUTO: 86.2 FL — SIGNIFICANT CHANGE UP (ref 80–100)
PHOSPHATE SERPL-MCNC: 3.4 MG/DL — SIGNIFICANT CHANGE UP (ref 2.5–4.5)
PLATELET # BLD AUTO: 132 K/UL — LOW (ref 150–400)
POTASSIUM SERPL-MCNC: 3.5 MMOL/L — SIGNIFICANT CHANGE UP (ref 3.5–5.3)
POTASSIUM SERPL-SCNC: 3.5 MMOL/L — SIGNIFICANT CHANGE UP (ref 3.5–5.3)
RBC # BLD: 3.99 M/UL — LOW (ref 4.2–5.8)
RBC # FLD: 13.4 % — SIGNIFICANT CHANGE UP (ref 10.3–14.5)
SODIUM SERPL-SCNC: 141 MMOL/L — SIGNIFICANT CHANGE UP (ref 135–145)
WBC # BLD: 4.3 K/UL — SIGNIFICANT CHANGE UP (ref 3.8–10.5)
WBC # FLD AUTO: 4.3 K/UL — SIGNIFICANT CHANGE UP (ref 3.8–10.5)

## 2018-02-22 PROCEDURE — 99232 SBSQ HOSP IP/OBS MODERATE 35: CPT

## 2018-02-22 PROCEDURE — 74019 RADEX ABDOMEN 2 VIEWS: CPT | Mod: 26

## 2018-02-22 RX ORDER — POTASSIUM CHLORIDE 20 MEQ
10 PACKET (EA) ORAL
Qty: 0 | Refills: 0 | Status: COMPLETED | OUTPATIENT
Start: 2018-02-22 | End: 2018-02-22

## 2018-02-22 RX ORDER — HYDROMORPHONE HYDROCHLORIDE 2 MG/ML
0.5 INJECTION INTRAMUSCULAR; INTRAVENOUS; SUBCUTANEOUS ONCE
Qty: 0 | Refills: 0 | Status: DISCONTINUED | OUTPATIENT
Start: 2018-02-22 | End: 2018-02-22

## 2018-02-22 RX ORDER — FLUTICASONE PROPIONATE 50 MCG
1 SPRAY, SUSPENSION NASAL
Qty: 0 | Refills: 0 | Status: DISCONTINUED | OUTPATIENT
Start: 2018-02-22 | End: 2018-02-23

## 2018-02-22 RX ORDER — ACETAMINOPHEN 500 MG
1000 TABLET ORAL ONCE
Qty: 0 | Refills: 0 | Status: COMPLETED | OUTPATIENT
Start: 2018-02-22 | End: 2018-02-22

## 2018-02-22 RX ORDER — MAGNESIUM SULFATE 500 MG/ML
2 VIAL (ML) INJECTION ONCE
Qty: 0 | Refills: 0 | Status: COMPLETED | OUTPATIENT
Start: 2018-02-22 | End: 2018-02-22

## 2018-02-22 RX ORDER — DEXTROSE MONOHYDRATE, SODIUM CHLORIDE, AND POTASSIUM CHLORIDE 50; .745; 4.5 G/1000ML; G/1000ML; G/1000ML
1000 INJECTION, SOLUTION INTRAVENOUS
Qty: 0 | Refills: 0 | Status: DISCONTINUED | OUTPATIENT
Start: 2018-02-22 | End: 2018-02-23

## 2018-02-22 RX ORDER — LORATADINE 10 MG/1
10 TABLET ORAL DAILY
Qty: 0 | Refills: 0 | Status: DISCONTINUED | OUTPATIENT
Start: 2018-02-22 | End: 2018-02-23

## 2018-02-22 RX ADMIN — Medication 100 MILLIGRAM(S): at 13:46

## 2018-02-22 RX ADMIN — LOSARTAN POTASSIUM 100 MILLIGRAM(S): 100 TABLET, FILM COATED ORAL at 06:21

## 2018-02-22 RX ADMIN — Medication 200 MILLIGRAM(S): at 18:43

## 2018-02-22 RX ADMIN — ENOXAPARIN SODIUM 40 MILLIGRAM(S): 100 INJECTION SUBCUTANEOUS at 12:55

## 2018-02-22 RX ADMIN — HYDROMORPHONE HYDROCHLORIDE 0.5 MILLIGRAM(S): 2 INJECTION INTRAMUSCULAR; INTRAVENOUS; SUBCUTANEOUS at 14:02

## 2018-02-22 RX ADMIN — BUDESONIDE AND FORMOTEROL FUMARATE DIHYDRATE 2 PUFF(S): 160; 4.5 AEROSOL RESPIRATORY (INHALATION) at 17:10

## 2018-02-22 RX ADMIN — LORATADINE 10 MILLIGRAM(S): 10 TABLET ORAL at 13:02

## 2018-02-22 RX ADMIN — Medication 100 MILLIGRAM(S): at 21:29

## 2018-02-22 RX ADMIN — Medication 81 MILLIGRAM(S): at 12:54

## 2018-02-22 RX ADMIN — DEXTROSE MONOHYDRATE, SODIUM CHLORIDE, AND POTASSIUM CHLORIDE 50 MILLILITER(S): 50; .745; 4.5 INJECTION, SOLUTION INTRAVENOUS at 08:00

## 2018-02-22 RX ADMIN — Medication 12.5 MILLIGRAM(S): at 06:22

## 2018-02-22 RX ADMIN — MONTELUKAST 10 MILLIGRAM(S): 4 TABLET, CHEWABLE ORAL at 21:28

## 2018-02-22 RX ADMIN — BUDESONIDE AND FORMOTEROL FUMARATE DIHYDRATE 2 PUFF(S): 160; 4.5 AEROSOL RESPIRATORY (INHALATION) at 06:22

## 2018-02-22 RX ADMIN — PANTOPRAZOLE SODIUM 40 MILLIGRAM(S): 20 TABLET, DELAYED RELEASE ORAL at 06:21

## 2018-02-22 RX ADMIN — Medication 100 MILLIGRAM(S): at 06:20

## 2018-02-22 RX ADMIN — SODIUM CHLORIDE 125 MILLILITER(S): 9 INJECTION, SOLUTION INTRAVENOUS at 06:24

## 2018-02-22 RX ADMIN — AMLODIPINE BESYLATE 5 MILLIGRAM(S): 2.5 TABLET ORAL at 06:21

## 2018-02-22 RX ADMIN — Medication 400 MILLIGRAM(S): at 17:08

## 2018-02-22 RX ADMIN — Medication 200 MILLIGRAM(S): at 05:20

## 2018-02-22 RX ADMIN — Medication 1 SPRAY(S): at 13:00

## 2018-02-22 RX ADMIN — Medication 100 MILLIEQUIVALENT(S): at 22:25

## 2018-02-22 RX ADMIN — TIOTROPIUM BROMIDE 1 CAPSULE(S): 18 CAPSULE ORAL; RESPIRATORY (INHALATION) at 21:28

## 2018-02-22 RX ADMIN — Medication 50 GRAM(S): at 12:53

## 2018-02-22 RX ADMIN — CLOPIDOGREL BISULFATE 75 MILLIGRAM(S): 75 TABLET, FILM COATED ORAL at 12:55

## 2018-02-22 RX ADMIN — Medication 100 MILLIEQUIVALENT(S): at 16:41

## 2018-02-22 NOTE — PROGRESS NOTE ADULT - ASSESSMENT
52 year old male with history of CAD and IBD admitted with IBD exacerbation and SBO. Improving.  CV stable.

## 2018-02-22 NOTE — PROGRESS NOTE ADULT - ATTENDING COMMENTS
Dr. Duckworth to follow as above--pulm stable  GI situation still in flux  Continue all pulm rx as above    Ambulate    Leonel Duckworth MD-Pulmonary   [phone]

## 2018-02-22 NOTE — PROGRESS NOTE ADULT - SUBJECTIVE AND OBJECTIVE BOX
CHIEF COMPLAINT:    Interval Events:    REVIEW OF SYSTEMS:  Constitutional: No fevers or chills. No weight loss. No fatigue or generalized malaise.  Eyes: No itching or discharge from the eyes  ENT: No ear pain. No ear discharge. No nasal congestion. No post nasal drip. No epistaxis. No throat pain. No sore throat. No difficulty swallowing.   CV: No chest pain. No palpitations. No lightheadedness or dizziness.   Resp: No dyspnea at rest. No dyspnea on exertion. No orthopnea. No wheezing. No cough. No stridor. No sputum production. No chest pain with respiration.  GI: No nausea. No vomiting. No diarrhea.  MSK: No joint pain or pain in any extremities  Integumentary: No skin lesions. No pedal edema.  Neurological: No gross motor weakness. No sensory changes.  [ ] All other systems negative  [ ] Unable to assess ROS because ________    OBJECTIVE:  ICU Vital Signs Last 24 Hrs  T(C): 36.8 (22 Feb 2018 04:45), Max: 37.1 (22 Feb 2018 00:42)  T(F): 98.3 (22 Feb 2018 04:45), Max: 98.7 (22 Feb 2018 00:42)  HR: 66 (22 Feb 2018 04:45) (62 - 76)  BP: 124/71 (22 Feb 2018 04:45) (115/73 - 135/78)  BP(mean): --  ABP: --  ABP(mean): --  RR: 18 (22 Feb 2018 04:45) (18 - 18)  SpO2: 97% (22 Feb 2018 04:45) (96% - 100%)        02-20 @ 07:01 - 02-21 @ 07:00  --------------------------------------------------------  IN: 1925 mL / OUT: 500 mL / NET: 1425 mL    02-21 @ 07:01  - 02-22 @ 05:34  --------------------------------------------------------  IN: 1750 mL / OUT: 2300 mL / NET: -550 mL      CAPILLARY BLOOD GLUCOSE          PHYSICAL EXAM:  General: Awake, alert, oriented X 3.   HEENT: Atraumatic, normocephalic.                 Mallampatti Grade                 No nasal congestion.                No tonsillar or pharyngeal exudates.  Lymph Nodes: No palpable lymphadenopathy  Neck: No JVD. No carotid bruit.   Respiratory: Normal chest expansion                         Normal percussion                         Normal and equal air entry                         No wheeze, rhonchi or rales.  Cardiovascular: S1 S2 normal. No murmurs, rubs or gallops.   Abdomen: Soft, non-tender, non-distended. No organomegaly.  Extremities: Warm to touch. Peripheral pulse palpable. No pedal edema.   Skin: No rashes or skin lesions  Neurological: Motor and sensory examination equal and normal in all four extremities.  Psychiatry: Appropriate mood and affect.    HOSPITAL MEDICATIONS:  MEDICATIONS  (STANDING):  amLODIPine   Tablet 5 milliGRAM(s) Oral daily  aspirin enteric coated 81 milliGRAM(s) Oral daily  buDESOnide 160 MICROgram(s)/formoterol 4.5 MICROgram(s) Inhaler 2 Puff(s) Inhalation two times a day  ciprofloxacin   IVPB 400 milliGRAM(s) IV Intermittent every 12 hours  clopidogrel Tablet 75 milliGRAM(s) Oral daily  enoxaparin Injectable 40 milliGRAM(s) SubCutaneous daily  lactated ringers. 1000 milliLiter(s) (125 mL/Hr) IV Continuous <Continuous>  losartan 100 milliGRAM(s) Oral daily  metoprolol succinate ER 12.5 milliGRAM(s) Oral daily  metroNIDAZOLE  IVPB 500 milliGRAM(s) IV Intermittent every 8 hours  montelukast 10 milliGRAM(s) Oral at bedtime  pantoprazole    Tablet 40 milliGRAM(s) Oral before breakfast  tiotropium 18 MICROgram(s) Capsule 1 Capsule(s) Inhalation at bedtime    MEDICATIONS  (PRN):      LABS:                        11.7   6.9   )-----------( 138      ( 21 Feb 2018 06:49 )             34.2     02-21    142  |  106  |  7   ----------------------------<  95  3.8   |  25  |  0.87    Ca    8.7      21 Feb 2018 06:49    TPro  7.6  /  Alb  4.6  /  TBili  0.5  /  DBili  x   /  AST  11  /  ALT  16  /  AlkPhos  54  02-20    PT/INR - ( 20 Feb 2018 17:18 )   PT: 12.1 sec;   INR: 1.11 ratio         PTT - ( 20 Feb 2018 17:18 )  PTT:33.2 sec      Venous Blood Gas:  02-20 @ 17:18  7.40/44/33/27/59  VBG Lactate: 1.1      MICROBIOLOGY:     RADIOLOGY:  [ ] Reviewed and interpreted by me    Point of Care Ultrasound Findings:    PFT:    EKG: CHIEF COMPLAINT: some gas--no bm and there is some pain; no sob or cough    Interval Events: bloods pending--low K (supplemented)    REVIEW OF SYSTEMS:  Constitutional: No fevers or chills. No weight loss. No fatigue or generalized malaise.  Eyes: No itching or discharge from the eyes  ENT: No ear pain. No ear discharge. No nasal congestion. No post nasal drip. No epistaxis. No throat pain. No sore throat. No difficulty swallowing.   CV: No chest pain. No palpitations. No lightheadedness or dizziness.   Resp: No dyspnea at rest. No dyspnea on exertion. No orthopnea. No wheezing. No cough. No stridor. No sputum production. No chest pain with respiration.  GI: No nausea. No vomiting. No diarrhea.  MSK: No joint pain or pain in any extremities  Integumentary: No skin lesions. No pedal edema.  Neurological: No gross motor weakness. No sensory changes.  [+ ] All other systems negative  [ ] Unable to assess ROS because ________    OBJECTIVE:  ICU Vital Signs Last 24 Hrs  T(C): 36.8 (22 Feb 2018 04:45), Max: 37.1 (22 Feb 2018 00:42)  T(F): 98.3 (22 Feb 2018 04:45), Max: 98.7 (22 Feb 2018 00:42)  HR: 66 (22 Feb 2018 04:45) (62 - 76)  BP: 124/71 (22 Feb 2018 04:45) (115/73 - 135/78)  BP(mean): --  ABP: --  ABP(mean): --  RR: 18 (22 Feb 2018 04:45) (18 - 18)  SpO2: 97% (22 Feb 2018 04:45) (96% - 100%)        02-20 @ 07:01  -  02-21 @ 07:00  --------------------------------------------------------  IN: 1925 mL / OUT: 500 mL / NET: 1425 mL    02-21 @ 07:01  -  02-22 @ 05:34  --------------------------------------------------------  IN: 1750 mL / OUT: 2300 mL / NET: -550 mL      CAPILLARY BLOOD GLUCOSE          PHYSICAL EXAM: NAD in bed  General: Awake, alert, oriented X 3.   HEENT: Atraumatic, normocephalic.                 Mallampatti Grade 2                No nasal congestion.                No tonsillar or pharyngeal exudates.  Lymph Nodes: No palpable lymphadenopathy  Neck: No JVD. No carotid bruit.   Respiratory: Normal chest expansion                         Normal percussion                         Normal and equal air entry                         No wheeze, rhonchi or rales.  Cardiovascular: S1 S2 normal. No murmurs, rubs or gallops.   Abdomen: Soft, non-tender, non-distended. No organomegaly.--No Bowel sounds  Extremities: Warm to touch. Peripheral pulse palpable. No pedal edema.   Skin: No rashes or skin lesions  Neurological: Motor and sensory examination equal and normal in all four extremities.  Psychiatry: Appropriate mood and affect.    HOSPITAL MEDICATIONS:  MEDICATIONS  (STANDING):  amLODIPine   Tablet 5 milliGRAM(s) Oral daily  aspirin enteric coated 81 milliGRAM(s) Oral daily  buDESOnide 160 MICROgram(s)/formoterol 4.5 MICROgram(s) Inhaler 2 Puff(s) Inhalation two times a day  ciprofloxacin   IVPB 400 milliGRAM(s) IV Intermittent every 12 hours  clopidogrel Tablet 75 milliGRAM(s) Oral daily  enoxaparin Injectable 40 milliGRAM(s) SubCutaneous daily  lactated ringers. 1000 milliLiter(s) (125 mL/Hr) IV Continuous <Continuous>  losartan 100 milliGRAM(s) Oral daily  metoprolol succinate ER 12.5 milliGRAM(s) Oral daily  metroNIDAZOLE  IVPB 500 milliGRAM(s) IV Intermittent every 8 hours  montelukast 10 milliGRAM(s) Oral at bedtime  pantoprazole    Tablet 40 milliGRAM(s) Oral before breakfast  tiotropium 18 MICROgram(s) Capsule 1 Capsule(s) Inhalation at bedtime    MEDICATIONS  (PRN):      LABS:                        11.7   6.9   )-----------( 138      ( 21 Feb 2018 06:49 )             34.2     02-21    142  |  106  |  7   ----------------------------<  95  3.8   |  25  |  0.87    Ca    8.7      21 Feb 2018 06:49    TPro  7.6  /  Alb  4.6  /  TBili  0.5  /  DBili  x   /  AST  11  /  ALT  16  /  AlkPhos  54  02-20    PT/INR - ( 20 Feb 2018 17:18 )   PT: 12.1 sec;   INR: 1.11 ratio         PTT - ( 20 Feb 2018 17:18 )  PTT:33.2 sec      Venous Blood Gas:  02-20 @ 17:18  7.40/44/33/27/59  VBG Lactate: 1.1      MICROBIOLOGY:     RADIOLOGY:  [ ] Reviewed and interpreted by me    Point of Care Ultrasound Findings:    PFT:    EKG:

## 2018-02-22 NOTE — PROGRESS NOTE ADULT - ATTENDING COMMENTS
I have seen and evaluated patient and discussed with team. Patient passed flatus but still has cramps. Abdomen soft and nontender. Will start with clear liquids and see if tolerated. I have discussed case with Dr. Caicedo and surgery should be considered in near future because of failure of medical management.

## 2018-02-22 NOTE — PROGRESS NOTE ADULT - SUBJECTIVE AND OBJECTIVE BOX
SUBJECTIVE:  Abdominal pain somewhat better than on admission, but not yet back to his usual baseline low level pain.  Passed flatus but no BMs yet.  _____________________________________________________  OBJECTIVE:    T(C): 36.8 (02-22-18 @ 04:45), Max: 37.1 (02-22-18 @ 00:42)  HR: 66 (02-22-18 @ 04:45)  BP: 124/71 (02-22-18 @ 04:45)  RR: 18 (02-22-18 @ 04:45)  SpO2: 97% (02-22-18 @ 04:45)  Wt(kg): --    General = Comfortable-appearing, no acute distress  Abdomen = Non-distended, normal active bowel sounds, very soft, slightly tender ceci in supraumbilical region, no palpable mass or organomegaly, no bruit  _____________________________________________________  LABS:                        11.7   6.9   )-----------( 138      ( 21 Feb 2018 06:49 )             34.2     02-21    142  |  106  |  7   ----------------------------<  95  3.8   |  25  |  0.87    Ca    8.7      21 Feb 2018 06:49    TPro  7.6  /  Alb  4.6  /  TBili  0.5  /  DBili  x   /  AST  11  /  ALT  16  /  AlkPhos  54  02-20    LIVER FUNCTIONS - ( 20 Feb 2018 17:18 )  Alb: 4.6 g/dL / Pro: 7.6 g/dL / ALK PHOS: 54 U/L / ALT: 16 U/L RC / AST: 11 U/L / GGT: x             _____________________________________________________  ACTIVE MEDS:  MEDICATIONS  (STANDING):  amLODIPine   Tablet 5 milliGRAM(s) Oral daily  aspirin enteric coated 81 milliGRAM(s) Oral daily  buDESOnide 160 MICROgram(s)/formoterol 4.5 MICROgram(s) Inhaler 2 Puff(s) Inhalation two times a day  ciprofloxacin   IVPB 400 milliGRAM(s) IV Intermittent every 12 hours  clopidogrel Tablet 75 milliGRAM(s) Oral daily  dextrose 5% + sodium chloride 0.45% with potassium chloride 20 mEq/L 1000 milliLiter(s) (50 mL/Hr) IV Continuous <Continuous>  enoxaparin Injectable 40 milliGRAM(s) SubCutaneous daily  fluticasone propionate 50 MICROgram(s)/spray Nasal Spray 1 Spray(s) Both Nostrils two times a day  loratadine 10 milliGRAM(s) Oral daily  losartan 100 milliGRAM(s) Oral daily  metoprolol succinate ER 12.5 milliGRAM(s) Oral daily  metroNIDAZOLE  IVPB 500 milliGRAM(s) IV Intermittent every 8 hours  montelukast 10 milliGRAM(s) Oral at bedtime  pantoprazole    Tablet 40 milliGRAM(s) Oral before breakfast  tiotropium 18 MICROgram(s) Capsule 1 Capsule(s) Inhalation at bedtime    MEDICATIONS  (PRN):    _____________________________________________________  ASSESSMENT:  52yMale with pSBO due to multi-strictured distal Crohn's ileitis, clinically improving.    PLAN:  Continue IVF, Cipro/Flagyl  Advance diet as per CRS    Darrin Caicedo M.D.  (O) [phone]  (C) [phone]

## 2018-02-22 NOTE — PROGRESS NOTE ADULT - ASSESSMENT
52M Crohns with a history of SBO and pancreatitis, MARYAM on APAP, controlled severe persistent asthma presenting with abdominal pain and bloating and found to have recurrent SBO 52M Crohns with a history of SBO and pancreatitis, MARYAM on APAP, controlled severe persistent asthma presenting with abdominal pain and bloating and found to have recurrent SBO    NO significant changes o/n--"pass gas"

## 2018-02-22 NOTE — PROGRESS NOTE ADULT - SUBJECTIVE AND OBJECTIVE BOX
Patient is a 52y old  Male who presents with a chief complaint of Abdominal pain (2018 01:01)    He feels improved.  He denies c/o chest pain, SOB or palpitations. No BM but reduced abdominal pain.     Allergies    No Known Allergies    Intolerances      MEDICATIONS  (STANDING):  amLODIPine   Tablet 5 milliGRAM(s) Oral daily  aspirin enteric coated 81 milliGRAM(s) Oral daily  buDESOnide 160 MICROgram(s)/formoterol 4.5 MICROgram(s) Inhaler 2 Puff(s) Inhalation two times a day  ciprofloxacin   IVPB 400 milliGRAM(s) IV Intermittent every 12 hours  clopidogrel Tablet 75 milliGRAM(s) Oral daily  dextrose 5% + sodium chloride 0.45% with potassium chloride 20 mEq/L 1000 milliLiter(s) (50 mL/Hr) IV Continuous <Continuous>  enoxaparin Injectable 40 milliGRAM(s) SubCutaneous daily  fluticasone propionate 50 MICROgram(s)/spray Nasal Spray 1 Spray(s) Both Nostrils two times a day  loratadine 10 milliGRAM(s) Oral daily  losartan 100 milliGRAM(s) Oral daily  metoprolol succinate ER 12.5 milliGRAM(s) Oral daily  metroNIDAZOLE  IVPB 500 milliGRAM(s) IV Intermittent every 8 hours  montelukast 10 milliGRAM(s) Oral at bedtime  pantoprazole    Tablet 40 milliGRAM(s) Oral before breakfast  tiotropium 18 MICROgram(s) Capsule 1 Capsule(s) Inhalation at bedtime    MEDICATIONS  (PRN):      PHYSICAL EXAM:  Vital Signs Last 24 Hrs  T(C): 36.8 (2018 04:45), Max: 37.1 (2018 00:42)  T(F): 98.3 (2018 04:45), Max: 98.7 (2018 00:42)  HR: 66 (2018 04:45) (62 - 76)  BP: 124/71 (2018 04:45) (115/73 - 135/78)  BP(mean): --  RR: 18 (2018 04:45) (18 - 18)  SpO2: 97% (2018 04:45) (96% - 100%)  Daily     Daily Weight in k.1 (2018 09:44)  I&O's Summary    2018 07:01  -  2018 07:00  --------------------------------------------------------  IN: 1750 mL / OUT: 2300 mL / NET: -550 mL        General Appearance: 	 Alert, cooperative, no distress  HEENT: normocephalic, atraumatic,   Neck: no JVD,  carotid 2+  bilaterally without bruits  Lungs:  clear to auscultation and percussion bilaterally  Cor:  pmi 5th ICS MCL, regular rate and rhythm, S1 normal intensity, S2 normal intensity, no gallops, murmurs or rubs  Abdomen: soft, non-tender; bowel sounds normal; no masses,  no organomegaly  Extremities: without cyanosis, clubbing or edema  Vasc: 2-+ PT and DP pulses; no varicosities  Neurologic: A&O x 3 (time, place, person). Symmetric strength; limited exam  Skin: no rashes; limited exam      Labs:  CBC Full  -  ( 2018 06:49 )  WBC Count : 6.9 K/uL  Hemoglobin : 11.7 g/dL  Hematocrit : 34.2 %  Platelet Count - Automated : 138 K/uL  Mean Cell Volume : 85.3 fl  Mean Cell Hemoglobin : 29.2 pg  Mean Cell Hemoglobin Concentration : 34.2 gm/dL  Auto Neutrophil # : x  Auto Lymphocyte # : x  Auto Monocyte # : x  Auto Eosinophil # : x  Auto Basophil # : x  Auto Neutrophil % : x  Auto Lymphocyte % : x  Auto Monocyte % : x  Auto Eosinophil % : x  Auto Basophil % : x        142  |  106  |  7   ----------------------------<  95  3.8   |  25  |  0.87    Ca    8.7      2018 06:49    TPro  7.6  /  Alb  4.6  /  TBili  0.5  /  DBili  x   /  AST  11  /  ALT  16  /  AlkPhos  54  02-20        PT/INR - ( 2018 17:18 )   PT: 12.1 sec;   INR: 1.11 ratio         PTT - ( 2018 17:18 )  PTT:33.2 sec                      Randell Fernández MD [facility]  [phone]

## 2018-02-22 NOTE — PROGRESS NOTE ADULT - ASSESSMENT
52y Male who presents with Partial intestinal obstruction.    - plan to follow 52y Male who presents with Partial intestinal obstruction, now passing flatus but still no BM    - Adv to clear liquid diet  - Monitor for bowel function  - Obtain Abdominal X-ray flat and upright given worsening epigastric pain  - c/w IV Abx    Green [phone]

## 2018-02-22 NOTE — PROGRESS NOTE ADULT - SUBJECTIVE AND OBJECTIVE BOX
GENERAL SURGERY DAILY PROGRESS NOTE:     Subjective:  NAOE. Pt had some epigastric abd pain overnight with stable abd exam. Pt reports passing some flatus overnight, however denies BM.         Objective:    PE:          Vital Signs Last 24 Hrs  T(C): 37.1 (2018 00:42), Max: 37.1 (2018 00:42)  T(F): 98.7 (2018 00:42), Max: 98.7 (2018 00:42)  HR: 69 (2018 00:42) (62 - 76)  BP: 135/78 (2018 00:42) (115/73 - 135/78)  BP(mean): --  RR: 18 (2018 00:42) (18 - 18)  SpO2: 97% (2018 00:42) (96% - 100%)    I&O's Detail    2018 07:01  -  2018 07:00  --------------------------------------------------------  IN:    IV PiggyBack: 300 mL    lactated ringers.: 625 mL    Sodium Chloride 0.9% IV Bolus: 1000 mL  Total IN: 1925 mL    OUT:    Voided: 500 mL  Total OUT: 500 mL    Total NET: 1425 mL      2018 07:01  -  2018 01:05  --------------------------------------------------------  IN:    IV PiggyBack: 500 mL    lactated ringers.: 1250 mL  Total IN: 1750 mL    OUT:    Voided: 2000 mL  Total OUT: 2000 mL    Total NET: -250 mL          Daily     Daily Weight in k.1 (2018 09:44)    MEDICATIONS  (STANDING):  amLODIPine   Tablet 5 milliGRAM(s) Oral daily  aspirin enteric coated 81 milliGRAM(s) Oral daily  buDESOnide 160 MICROgram(s)/formoterol 4.5 MICROgram(s) Inhaler 2 Puff(s) Inhalation two times a day  ciprofloxacin   IVPB 400 milliGRAM(s) IV Intermittent every 12 hours  clopidogrel Tablet 75 milliGRAM(s) Oral daily  enoxaparin Injectable 40 milliGRAM(s) SubCutaneous daily  lactated ringers. 1000 milliLiter(s) (125 mL/Hr) IV Continuous <Continuous>  losartan 100 milliGRAM(s) Oral daily  metoprolol succinate ER 12.5 milliGRAM(s) Oral daily  metroNIDAZOLE  IVPB 500 milliGRAM(s) IV Intermittent every 8 hours  montelukast 10 milliGRAM(s) Oral at bedtime  pantoprazole    Tablet 40 milliGRAM(s) Oral before breakfast  tiotropium 18 MICROgram(s) Capsule 1 Capsule(s) Inhalation at bedtime    MEDICATIONS  (PRN):      LABS:                        11.7   6.9   )-----------( 138      ( 2018 06:49 )             34.2     02-21    142  |  106  |  7   ----------------------------<  95  3.8   |  25  |  0.87    Ca    8.7      2018 06:49    TPro  7.6  /  Alb  4.6  /  TBili  0.5  /  DBili  x   /  AST  11  /  ALT  16  /  AlkPhos  54  02-20    PT/INR - ( 2018 17:18 )   PT: 12.1 sec;   INR: 1.11 ratio         PTT - ( 2018 17:18 )  PTT:33.2 sec      RADIOLOGY & ADDITIONAL STUDIES: GENERAL SURGERY DAILY PROGRESS NOTE:     Subjective:  NAOE. Pt had some epigastric abd pain overnight with stable abd exam. Pt reports passing some flatus overnight, however denies BM. This morning continues to endorse worsening epgastric pain. Still passing flatus, no BM. Voiding and ambulating without issue. Denied n/v, fever, chills, CP or SOB.        Objective:    PE:  Gen: lying bed, NAD  Resp: Breathing comfortably on RA  GI: soft, distended, TTP of epigastrium otherwise nontender. No rebound or guarding.         Vital Signs Last 24 Hrs  T(C): 37.1 (2018 00:42), Max: 37.1 (2018 00:42)  T(F): 98.7 (2018 00:42), Max: 98.7 (2018 00:42)  HR: 69 (2018 00:42) (62 - 76)  BP: 135/78 (2018 00:42) (115/73 - 135/78)  BP(mean): --  RR: 18 (2018 00:42) (18 - 18)  SpO2: 97% (2018 00:42) (96% - 100%)    I&O's Detail    2018 07:01  -  2018 07:00  --------------------------------------------------------  IN:    IV PiggyBack: 300 mL    lactated ringers.: 625 mL    Sodium Chloride 0.9% IV Bolus: 1000 mL  Total IN: 1925 mL    OUT:    Voided: 500 mL  Total OUT: 500 mL    Total NET: 1425 mL      2018 07:01  -  2018 01:05  --------------------------------------------------------  IN:    IV PiggyBack: 500 mL    lactated ringers.: 1250 mL  Total IN: 1750 mL    OUT:    Voided: 2000 mL  Total OUT: 2000 mL    Total NET: -250 mL          Daily     Daily Weight in k.1 (2018 09:44)    MEDICATIONS  (STANDING):  amLODIPine   Tablet 5 milliGRAM(s) Oral daily  aspirin enteric coated 81 milliGRAM(s) Oral daily  buDESOnide 160 MICROgram(s)/formoterol 4.5 MICROgram(s) Inhaler 2 Puff(s) Inhalation two times a day  ciprofloxacin   IVPB 400 milliGRAM(s) IV Intermittent every 12 hours  clopidogrel Tablet 75 milliGRAM(s) Oral daily  enoxaparin Injectable 40 milliGRAM(s) SubCutaneous daily  lactated ringers. 1000 milliLiter(s) (125 mL/Hr) IV Continuous <Continuous>  losartan 100 milliGRAM(s) Oral daily  metoprolol succinate ER 12.5 milliGRAM(s) Oral daily  metroNIDAZOLE  IVPB 500 milliGRAM(s) IV Intermittent every 8 hours  montelukast 10 milliGRAM(s) Oral at bedtime  pantoprazole    Tablet 40 milliGRAM(s) Oral before breakfast  tiotropium 18 MICROgram(s) Capsule 1 Capsule(s) Inhalation at bedtime    MEDICATIONS  (PRN):      LABS:                        11.7   6.9   )-----------( 138      ( 2018 06:49 )             34.2     02-21    142  |  106  |  7   ----------------------------<  95  3.8   |  25  |  0.87    Ca    8.7      2018 06:49    TPro  7.6  /  Alb  4.6  /  TBili  0.5  /  DBili  x   /  AST  11  /  ALT  16  /  AlkPhos  54  02-20    PT/INR - ( 2018 17:18 )   PT: 12.1 sec;   INR: 1.11 ratio         PTT - ( 2018 17:18 )  PTT:33.2 sec      RADIOLOGY & ADDITIONAL STUDIES:

## 2018-02-23 ENCOUNTER — TRANSCRIPTION ENCOUNTER (OUTPATIENT)
Age: 53
End: 2018-02-23

## 2018-02-23 VITALS
DIASTOLIC BLOOD PRESSURE: 79 MMHG | HEART RATE: 78 BPM | RESPIRATION RATE: 18 BRPM | OXYGEN SATURATION: 96 % | SYSTOLIC BLOOD PRESSURE: 121 MMHG | TEMPERATURE: 98 F

## 2018-02-23 LAB
ANION GAP SERPL CALC-SCNC: 15 MMOL/L — SIGNIFICANT CHANGE UP (ref 5–17)
BUN SERPL-MCNC: 6 MG/DL — LOW (ref 7–23)
CALCIUM SERPL-MCNC: 9.5 MG/DL — SIGNIFICANT CHANGE UP (ref 8.4–10.5)
CHLORIDE SERPL-SCNC: 105 MMOL/L — SIGNIFICANT CHANGE UP (ref 96–108)
CO2 SERPL-SCNC: 23 MMOL/L — SIGNIFICANT CHANGE UP (ref 22–31)
CREAT SERPL-MCNC: 0.89 MG/DL — SIGNIFICANT CHANGE UP (ref 0.5–1.3)
GLUCOSE SERPL-MCNC: 90 MG/DL — SIGNIFICANT CHANGE UP (ref 70–99)
HBA1C BLD-MCNC: 4.9 % — SIGNIFICANT CHANGE UP (ref 4–5.6)
HCT VFR BLD CALC: 32.7 % — LOW (ref 39–50)
HGB BLD-MCNC: 11.2 G/DL — LOW (ref 13–17)
MAGNESIUM SERPL-MCNC: 2.2 MG/DL — SIGNIFICANT CHANGE UP (ref 1.6–2.6)
MCHC RBC-ENTMCNC: 28 PG — SIGNIFICANT CHANGE UP (ref 27–34)
MCHC RBC-ENTMCNC: 34.3 GM/DL — SIGNIFICANT CHANGE UP (ref 32–36)
MCV RBC AUTO: 81.8 FL — SIGNIFICANT CHANGE UP (ref 80–100)
PHOSPHATE SERPL-MCNC: 3.7 MG/DL — SIGNIFICANT CHANGE UP (ref 2.5–4.5)
PLATELET # BLD AUTO: 143 K/UL — LOW (ref 150–400)
POTASSIUM SERPL-MCNC: 4.2 MMOL/L — SIGNIFICANT CHANGE UP (ref 3.5–5.3)
POTASSIUM SERPL-SCNC: 4.2 MMOL/L — SIGNIFICANT CHANGE UP (ref 3.5–5.3)
RBC # BLD: 4 M/UL — LOW (ref 4.2–5.8)
RBC # FLD: 13.5 % — SIGNIFICANT CHANGE UP (ref 10.3–14.5)
SODIUM SERPL-SCNC: 143 MMOL/L — SIGNIFICANT CHANGE UP (ref 135–145)
WBC # BLD: 4.16 K/UL — SIGNIFICANT CHANGE UP (ref 3.8–10.5)
WBC # FLD AUTO: 4.16 K/UL — SIGNIFICANT CHANGE UP (ref 3.8–10.5)

## 2018-02-23 PROCEDURE — 96375 TX/PRO/DX INJ NEW DRUG ADDON: CPT

## 2018-02-23 PROCEDURE — 99285 EMERGENCY DEPT VISIT HI MDM: CPT | Mod: 25

## 2018-02-23 PROCEDURE — 74177 CT ABD & PELVIS W/CONTRAST: CPT

## 2018-02-23 PROCEDURE — 85730 THROMBOPLASTIN TIME PARTIAL: CPT

## 2018-02-23 PROCEDURE — 84100 ASSAY OF PHOSPHORUS: CPT

## 2018-02-23 PROCEDURE — 94640 AIRWAY INHALATION TREATMENT: CPT

## 2018-02-23 PROCEDURE — 82803 BLOOD GASES ANY COMBINATION: CPT

## 2018-02-23 PROCEDURE — 83735 ASSAY OF MAGNESIUM: CPT

## 2018-02-23 PROCEDURE — 99232 SBSQ HOSP IP/OBS MODERATE 35: CPT

## 2018-02-23 PROCEDURE — 96374 THER/PROPH/DIAG INJ IV PUSH: CPT | Mod: XU

## 2018-02-23 PROCEDURE — 84132 ASSAY OF SERUM POTASSIUM: CPT

## 2018-02-23 PROCEDURE — 85027 COMPLETE CBC AUTOMATED: CPT

## 2018-02-23 PROCEDURE — 82947 ASSAY GLUCOSE BLOOD QUANT: CPT

## 2018-02-23 PROCEDURE — 83036 HEMOGLOBIN GLYCOSYLATED A1C: CPT

## 2018-02-23 PROCEDURE — 83605 ASSAY OF LACTIC ACID: CPT

## 2018-02-23 PROCEDURE — 83690 ASSAY OF LIPASE: CPT

## 2018-02-23 PROCEDURE — 74019 RADEX ABDOMEN 2 VIEWS: CPT

## 2018-02-23 PROCEDURE — 80053 COMPREHEN METABOLIC PANEL: CPT

## 2018-02-23 PROCEDURE — 85610 PROTHROMBIN TIME: CPT

## 2018-02-23 PROCEDURE — 82330 ASSAY OF CALCIUM: CPT

## 2018-02-23 PROCEDURE — 84295 ASSAY OF SERUM SODIUM: CPT

## 2018-02-23 PROCEDURE — 85014 HEMATOCRIT: CPT

## 2018-02-23 PROCEDURE — 80048 BASIC METABOLIC PNL TOTAL CA: CPT

## 2018-02-23 PROCEDURE — 82435 ASSAY OF BLOOD CHLORIDE: CPT

## 2018-02-23 RX ORDER — MOXIFLOXACIN HYDROCHLORIDE TABLETS, 400 MG 400 MG/1
1 TABLET, FILM COATED ORAL
Qty: 14 | Refills: 0
Start: 2018-02-23 | End: 2018-03-01

## 2018-02-23 RX ORDER — METRONIDAZOLE 500 MG
1 TABLET ORAL
Qty: 14 | Refills: 0
Start: 2018-02-23 | End: 2018-03-01

## 2018-02-23 RX ADMIN — PANTOPRAZOLE SODIUM 40 MILLIGRAM(S): 20 TABLET, DELAYED RELEASE ORAL at 07:50

## 2018-02-23 RX ADMIN — BUDESONIDE AND FORMOTEROL FUMARATE DIHYDRATE 2 PUFF(S): 160; 4.5 AEROSOL RESPIRATORY (INHALATION) at 05:37

## 2018-02-23 RX ADMIN — Medication 100 MILLIEQUIVALENT(S): at 00:08

## 2018-02-23 RX ADMIN — Medication 100 MILLIGRAM(S): at 05:47

## 2018-02-23 RX ADMIN — LORATADINE 10 MILLIGRAM(S): 10 TABLET ORAL at 13:28

## 2018-02-23 RX ADMIN — Medication 81 MILLIGRAM(S): at 13:28

## 2018-02-23 RX ADMIN — ENOXAPARIN SODIUM 40 MILLIGRAM(S): 100 INJECTION SUBCUTANEOUS at 13:28

## 2018-02-23 RX ADMIN — LOSARTAN POTASSIUM 100 MILLIGRAM(S): 100 TABLET, FILM COATED ORAL at 05:38

## 2018-02-23 RX ADMIN — CLOPIDOGREL BISULFATE 75 MILLIGRAM(S): 75 TABLET, FILM COATED ORAL at 13:28

## 2018-02-23 RX ADMIN — Medication 200 MILLIGRAM(S): at 05:37

## 2018-02-23 RX ADMIN — Medication 1 SPRAY(S): at 05:38

## 2018-02-23 RX ADMIN — Medication 100 MILLIGRAM(S): at 13:28

## 2018-02-23 RX ADMIN — AMLODIPINE BESYLATE 5 MILLIGRAM(S): 2.5 TABLET ORAL at 05:38

## 2018-02-23 NOTE — DISCHARGE NOTE ADULT - PLAN OF CARE
recovery from hospitalization Activity: No restrictions  Diet: Low fiber  NOTIFY YOUR SURGEON IF: You have any bleeding that does not stop,  any fever (over 100.4 F) or chills, persistent nausea/vomiting, persistent diarrhea, or if your pain is not controlled with pain medications.  Follow-up with your primary care physician within 1 week of discharge regarding your recent hospitalization . Follow-up with Dr. Michel regarding your hospitalization and possible operative intervention for your Crohn's Disease. Follow-up with Dr. Caicedo, your gastroenterologist regarding your recent hospitalization and managing your Crohn's disease.

## 2018-02-23 NOTE — DISCHARGE NOTE ADULT - CARE PROVIDERS DIRECT ADDRESSES
[email or web address]

## 2018-02-23 NOTE — DISCHARGE NOTE ADULT - CARE PLAN
Principal Discharge DX:	Partial bowel obstruction  Goal:	recovery from hospitalization  Assessment and plan of treatment:	Activity: No restrictions  Diet: Low fiber  NOTIFY YOUR SURGEON IF: You have any bleeding that does not stop,  any fever (over 100.4 F) or chills, persistent nausea/vomiting, persistent diarrhea, or if your pain is not controlled with pain medications.  Follow-up with your primary care physician within 1 week of discharge regarding your recent hospitalization . Follow-up with Dr. Michel regarding your hospitalization and possible operative intervention for your Crohn's Disease.  Secondary Diagnosis:	Crohn's disease of small intestine with intestinal obstruction  Assessment and plan of treatment:	Follow-up with Dr. Caicedo, your gastroenterologist regarding your recent hospitalization and managing your Crohn's disease.

## 2018-02-23 NOTE — DISCHARGE NOTE ADULT - HOSPITAL COURSE
52 M presents with abdominal pain. Patient has Crohn's disease and is currently on vedolizumab (Entyvio). Patient has developed pancreatitis from previous medication. Patient has previous Crohn's flare resulting in partial SBO. Those episodes resolved with NGT placement and antibiotics. Last episode was in 2016. His current symptoms are the same as previous episodes of partial SBO. 1 day ago, patient developed abdominal bloating and discomfort. Had some nausea but no vomiting. Has not passed flatus or had a bowel movement all day.    Patient was admitted to the surgery service for medical management of the patient's SBO. IV Abx were initiated. Serial abdominal exams were performed and pain was controlled with IV pain medication. Diet was advanced gradually as patient endorsed resolution of abdominal pain and started passing flatus. Today,  the patient is hemodynamically stable, is tolerating PO diet, is voiding urine and passing stool,  ambulating, and comfortable with adequate pain control. The patient was instructed to follow up with Dr. Michel within 1-2 weeks after discharge from the hospital. The patient felt comfortable with discharge. The patient was discharged to home with 1 week course of Cipro and Flagyl BID. The patient had no other issues.

## 2018-02-23 NOTE — PROGRESS NOTE ADULT - SUBJECTIVE AND OBJECTIVE BOX
GENERAL SURGERY DAILY PROGRESS NOTE:     Subjective:  Patient seen this AM. JENNIFER.       Objective:    PE:  Gen: lying bed, NAD  Resp:  GI:       Vital Signs Last 24 Hrs  T(C): 36.9 (23 Feb 2018 00:50), Max: 37 (22 Feb 2018 21:32)  T(F): 98.4 (23 Feb 2018 00:50), Max: 98.6 (22 Feb 2018 21:32)  HR: 60 (23 Feb 2018 00:50) (60 - 70)  BP: 128/81 (23 Feb 2018 00:50) (109/68 - 128/81)  BP(mean): --  RR: 18 (23 Feb 2018 00:50) (18 - 18)  SpO2: 98% (23 Feb 2018 00:50) (97% - 99%)    I&O's Detail    21 Feb 2018 07:01  -  22 Feb 2018 07:00  --------------------------------------------------------  IN:    IV PiggyBack: 900 mL    lactated ringers.: 1850 mL  Total IN: 2750 mL    OUT:    Voided: 2300 mL  Total OUT: 2300 mL    Total NET: 450 mL      22 Feb 2018 07:01  -  23 Feb 2018 02:18  --------------------------------------------------------  IN:    IV PiggyBack: 400 mL    Oral Fluid: 1600 mL  Total IN: 2000 mL    OUT:    Voided: 3900 mL  Total OUT: 3900 mL    Total NET: -1900 mL          Daily     Daily     MEDICATIONS  (STANDING):  amLODIPine   Tablet 5 milliGRAM(s) Oral daily  aspirin enteric coated 81 milliGRAM(s) Oral daily  buDESOnide 160 MICROgram(s)/formoterol 4.5 MICROgram(s) Inhaler 2 Puff(s) Inhalation two times a day  ciprofloxacin   IVPB 400 milliGRAM(s) IV Intermittent every 12 hours  clopidogrel Tablet 75 milliGRAM(s) Oral daily  dextrose 5% + sodium chloride 0.45% with potassium chloride 20 mEq/L 1000 milliLiter(s) (50 mL/Hr) IV Continuous <Continuous>  enoxaparin Injectable 40 milliGRAM(s) SubCutaneous daily  fluticasone propionate 50 MICROgram(s)/spray Nasal Spray 1 Spray(s) Both Nostrils two times a day  loratadine 10 milliGRAM(s) Oral daily  losartan 100 milliGRAM(s) Oral daily  metoprolol succinate ER 12.5 milliGRAM(s) Oral daily  metroNIDAZOLE  IVPB 500 milliGRAM(s) IV Intermittent every 8 hours  montelukast 10 milliGRAM(s) Oral at bedtime  pantoprazole    Tablet 40 milliGRAM(s) Oral before breakfast  tiotropium 18 MICROgram(s) Capsule 1 Capsule(s) Inhalation at bedtime    MEDICATIONS  (PRN):      LABS:                        11.3   4.30  )-----------( 132      ( 22 Feb 2018 09:00 )             34.4     02-22    141  |  102  |  6<L>  ----------------------------<  109<H>  3.5   |  27  |  0.70    Ca    8.8      22 Feb 2018 09:38  Phos  3.4     02-22  Mg     1.9     02-22            RADIOLOGY & ADDITIONAL STUDIES: GENERAL SURGERY DAILY PROGRESS NOTE:     Subjective:  Patient seen this AM. No events overnight. Continues to pass flatus and bowel movements. Abdominal pain stable, not worsened or improved after taking clears yesterday.       Objective:    PE:  Gen: lying bed, NAD  Resp: clear bilaterally  GI: Soft, mild distension, minimal tenderness periumbilically, no rebound      Vital Signs Last 24 Hrs  T(C): 36.9 (23 Feb 2018 00:50), Max: 37 (22 Feb 2018 21:32)  T(F): 98.4 (23 Feb 2018 00:50), Max: 98.6 (22 Feb 2018 21:32)  HR: 60 (23 Feb 2018 00:50) (60 - 70)  BP: 128/81 (23 Feb 2018 00:50) (109/68 - 128/81)  BP(mean): --  RR: 18 (23 Feb 2018 00:50) (18 - 18)  SpO2: 98% (23 Feb 2018 00:50) (97% - 99%)    I&O's Detail    21 Feb 2018 07:01  -  22 Feb 2018 07:00  --------------------------------------------------------  IN:    IV PiggyBack: 900 mL    lactated ringers.: 1850 mL  Total IN: 2750 mL    OUT:    Voided: 2300 mL  Total OUT: 2300 mL    Total NET: 450 mL      22 Feb 2018 07:01  -  23 Feb 2018 02:18  --------------------------------------------------------  IN:    IV PiggyBack: 400 mL    Oral Fluid: 1600 mL  Total IN: 2000 mL    OUT:    Voided: 3900 mL  Total OUT: 3900 mL    Total NET: -1900 mL    MEDICATIONS  (STANDING):  amLODIPine   Tablet 5 milliGRAM(s) Oral daily  aspirin enteric coated 81 milliGRAM(s) Oral daily  buDESOnide 160 MICROgram(s)/formoterol 4.5 MICROgram(s) Inhaler 2 Puff(s) Inhalation two times a day  ciprofloxacin   IVPB 400 milliGRAM(s) IV Intermittent every 12 hours  clopidogrel Tablet 75 milliGRAM(s) Oral daily  dextrose 5% + sodium chloride 0.45% with potassium chloride 20 mEq/L 1000 milliLiter(s) (50 mL/Hr) IV Continuous <Continuous>  enoxaparin Injectable 40 milliGRAM(s) SubCutaneous daily  fluticasone propionate 50 MICROgram(s)/spray Nasal Spray 1 Spray(s) Both Nostrils two times a day  loratadine 10 milliGRAM(s) Oral daily  losartan 100 milliGRAM(s) Oral daily  metoprolol succinate ER 12.5 milliGRAM(s) Oral daily  metroNIDAZOLE  IVPB 500 milliGRAM(s) IV Intermittent every 8 hours  montelukast 10 milliGRAM(s) Oral at bedtime  pantoprazole    Tablet 40 milliGRAM(s) Oral before breakfast  tiotropium 18 MICROgram(s) Capsule 1 Capsule(s) Inhalation at bedtime    MEDICATIONS  (PRN):      LABS:                        11.3   4.30  )-----------( 132      ( 22 Feb 2018 09:00 )             34.4     02-22    141  |  102  |  6<L>  ----------------------------<  109<H>  3.5   |  27  |  0.70    Ca    8.8      22 Feb 2018 09:38  Phos  3.4     02-22  Mg     1.9     02-22            RADIOLOGY & ADDITIONAL STUDIES:    < from: Xray Abdomen 2 Views (02.22.18 @ 09:02) >  IMPRESSION:  Nonobstructive bowel gas pattern.    < end of copied text > GENERAL SURGERY DAILY PROGRESS NOTE:     Subjective:  Patient seen this AM. No events overnight. Continues to pass flatus but no bowel movements. Abdominal pain slightly improved today. Tolerated clears well yesterday without nausea/vomiting, or increased pain.       Objective:    PE:  Gen: lying bed, NAD  Resp: clear bilaterally  GI: Soft, mild distension, minimal tenderness periumbilically, no rebound      Vital Signs Last 24 Hrs  T(C): 36.9 (23 Feb 2018 00:50), Max: 37 (22 Feb 2018 21:32)  T(F): 98.4 (23 Feb 2018 00:50), Max: 98.6 (22 Feb 2018 21:32)  HR: 60 (23 Feb 2018 00:50) (60 - 70)  BP: 128/81 (23 Feb 2018 00:50) (109/68 - 128/81)  BP(mean): --  RR: 18 (23 Feb 2018 00:50) (18 - 18)  SpO2: 98% (23 Feb 2018 00:50) (97% - 99%)    I&O's Detail    21 Feb 2018 07:01  -  22 Feb 2018 07:00  --------------------------------------------------------  IN:    IV PiggyBack: 900 mL    lactated ringers.: 1850 mL  Total IN: 2750 mL    OUT:    Voided: 2300 mL  Total OUT: 2300 mL    Total NET: 450 mL      22 Feb 2018 07:01  -  23 Feb 2018 02:18  --------------------------------------------------------  IN:    IV PiggyBack: 400 mL    Oral Fluid: 1600 mL  Total IN: 2000 mL    OUT:    Voided: 3900 mL  Total OUT: 3900 mL    Total NET: -1900 mL    MEDICATIONS  (STANDING):  amLODIPine   Tablet 5 milliGRAM(s) Oral daily  aspirin enteric coated 81 milliGRAM(s) Oral daily  buDESOnide 160 MICROgram(s)/formoterol 4.5 MICROgram(s) Inhaler 2 Puff(s) Inhalation two times a day  ciprofloxacin   IVPB 400 milliGRAM(s) IV Intermittent every 12 hours  clopidogrel Tablet 75 milliGRAM(s) Oral daily  dextrose 5% + sodium chloride 0.45% with potassium chloride 20 mEq/L 1000 milliLiter(s) (50 mL/Hr) IV Continuous <Continuous>  enoxaparin Injectable 40 milliGRAM(s) SubCutaneous daily  fluticasone propionate 50 MICROgram(s)/spray Nasal Spray 1 Spray(s) Both Nostrils two times a day  loratadine 10 milliGRAM(s) Oral daily  losartan 100 milliGRAM(s) Oral daily  metoprolol succinate ER 12.5 milliGRAM(s) Oral daily  metroNIDAZOLE  IVPB 500 milliGRAM(s) IV Intermittent every 8 hours  montelukast 10 milliGRAM(s) Oral at bedtime  pantoprazole    Tablet 40 milliGRAM(s) Oral before breakfast  tiotropium 18 MICROgram(s) Capsule 1 Capsule(s) Inhalation at bedtime    MEDICATIONS  (PRN):      LABS:                        11.3   4.30  )-----------( 132      ( 22 Feb 2018 09:00 )             34.4     02-22    141  |  102  |  6<L>  ----------------------------<  109<H>  3.5   |  27  |  0.70    Ca    8.8      22 Feb 2018 09:38  Phos  3.4     02-22  Mg     1.9     02-22            RADIOLOGY & ADDITIONAL STUDIES:    < from: Xray Abdomen 2 Views (02.22.18 @ 09:02) >  IMPRESSION:  Nonobstructive bowel gas pattern.    < end of copied text >

## 2018-02-23 NOTE — PROGRESS NOTE ADULT - PROBLEM SELECTOR PLAN 5
GI prophylaxis:  PPI pre breakfast  aspiration precautions-all meals are to OOB as able
GI prophylaxis:  PPI pre breakfast  aspiration precautions-all meals are to OOB as able

## 2018-02-23 NOTE — DISCHARGE NOTE ADULT - MEDICATION SUMMARY - MEDICATIONS TO TAKE
I will START or STAY ON the medications listed below when I get home from the hospital:    Flagyl 500 mg oral tablet  -- 1 tab(s) by mouth 2 times a day MDD:2 tabs  -- Do not drink alcoholic beverages when taking this medication.  Finish all this medication unless otherwise directed by prescriber.  May discolor urine or feces.    -- Indication: For Crohn's disease of small intestine with intestinal obstruction    aspirin 81 mg oral delayed release tablet  -- 1 tab(s) by mouth once a day (in the morning)  -- Indication: For home meds    losartan 100 mg oral tablet  -- 1 tab(s) by mouth once a day (in the morning)  -- Indication: For home meds    Reglan 10 mg oral tablet  -- 1 tab(s) by mouth 4 times a day (before meals and at bedtime), As Needed -for headache   -- It is very important that you take or use this exactly as directed.  Do not skip doses or discontinue unless directed by your doctor.  May cause drowsiness.  Alcohol may intensify this effect.  Use care when operating dangerous machinery.  Take medication on an empty stomach 1 hour before or 2 to 3 hours after a meal unless otherwise directed by your doctor.    -- Indication: For home meds    levocetirizine 5 mg oral tablet  -- 1 tab(s) by mouth once a day (in the evening)  -- Indication: For home meds    Xyzal 5 mg oral tablet  -- 1 tab(s) by mouth once a day (in the evening)  -- Indication: For home meds    Repatha  --  subcutaneous  every 2 weeks  -- Indication: For home meds    Plavix 75 mg oral tablet  -- 1 tab(s) by mouth once a day  -- Indication: For home meds    Toprol-XL 25 mg oral tablet, extended release  -- 0.5 tab(s) by mouth once a day  -- Indication: For home meds    Spiriva 18 mcg inhalation capsule  -- 2 cap(s) inhaled once a day (in the evening)  -- Indication: For home meds    Advair Diskus 250 mcg-50 mcg inhalation powder  -- 1 puff(s) inhaled 2 times a day  -- Indication: For home meds    Norvasc 5 mg oral tablet  -- 1 tab(s) by mouth once a day  -- Indication: For home meds    Entyvio 300 mg intravenous injection  --  intravenous every 8 weeks  -- Indication: For home meds    Singulair 10 mg oral tablet  -- 1 tab(s) by mouth once a day (in the evening)  -- Indication: For home meds    Co Q-10 100 mg oral capsule  -- 2 cap(s) by mouth once a day  -- Indication: For home meds    ocular lubricant ophthalmic solution  -- 1 drop(s) to each affected eye 3 times a day, As needed, Dry Eyes  -- Indication: For home meds    NexIUM 40 mg oral delayed release capsule  -- 1 cap(s) by mouth once a day  -- Indication: For home meds    Cipro 500 mg oral tablet  -- 1 tab(s) by mouth 2 times a day MDD:2 tabs  -- Avoid prolonged or excessive exposure to direct and/or artificial sunlight while taking this medication.  Check with your doctor before becoming pregnant.  Do not take dairy products, antacids, or iron preparations within one hour of this medication.  Finish all this medication unless otherwise directed by prescriber.  Medication should be taken with plenty of water.    -- Indication: For Crohn's disease of small intestine with intestinal obstruction

## 2018-02-23 NOTE — PROGRESS NOTE ADULT - PROBLEM SELECTOR PLAN 6
DVT prophlaxis:  subcutaneous lovenox or heparin as per primary team  sequential teds stockings  early ambulation
DVT prophlaxis:  subcutaneous lovenox or heparin as per primary team  sequential teds stockings  early ambulation

## 2018-02-23 NOTE — PROGRESS NOTE ADULT - PROBLEM SELECTOR PLAN 1
CV stable. Continue current CV meds. Would need to continue aspirin if possible when undergoing elective surgery.
CV stable. Continue out patient CV meds.
as per Marifer et al--iv hydration
as per Marifer et al--iv hydration

## 2018-02-23 NOTE — PROGRESS NOTE ADULT - PROVIDER SPECIALTY LIST ADULT
Cardiology
Gastroenterology
Pulmonology
Pulmonology
Surgery
Cardiology

## 2018-02-23 NOTE — PROGRESS NOTE ADULT - ASSESSMENT
52y Male who presents with Partial intestinal obstruction, now passing flatus but still no BM    - Plan to follow 52y Male who presents with Partial intestinal obstruction 2/2 Crohn's flare, resolving.     Preliminary plan:  - Possible advancement of diet to low fiber diet  - Likely continue cipro and flagyl today  - No operative intervention likely today.   - Will discuss plan with attending and update accordingly.     Marita Hess   [id number] 52y Male who presents with Partial intestinal obstruction 2/2 Crohn's flare, resolving now tolerating diet and passing flatus    Preliminary plan:  - advancement of diet to low fiber diet  - Likely continue cipro and flagyl , change to PO  - No operative intervention likely today.   - Likely D/C this afternoon if continues to tolerate diet and have good pain control     Marita Hess   [id number] 52y Male who presents with Partial intestinal obstruction 2/2 Crohn's flare, resolving now tolerating diet and passing flatus     plan:  - advancement of diet to low fiber diet  - Likely continue cipro and flagyl , change to PO  - No operative intervention likely today.   - Likely D/C this afternoon if continues to tolerate diet and have good pain control     [place]  [id number]

## 2018-02-23 NOTE — PROGRESS NOTE ADULT - ASSESSMENT
52M Crohns with a history of SBO and pancreatitis, MARYAM on APAP, controlled severe persistent asthma presenting with abdominal pain and bloating and found to have recurrent SBO    NO significant changes o/n--"pass gas"

## 2018-02-23 NOTE — PROGRESS NOTE ADULT - SUBJECTIVE AND OBJECTIVE BOX
SUBJECTIVE:  Feels better. Only minimal abdominal pain. Flatus but no BM. Tolerated clears yesterday.  _____________________________________________________  OBJECTIVE:    T(C): 36.8 (02-23-18 @ 05:08), Max: 37 (02-22-18 @ 21:32)  HR: 59 (02-23-18 @ 05:08)  BP: 135/79 (02-23-18 @ 05:08)  RR: 18 (02-23-18 @ 05:08)  SpO2: 98% (02-23-18 @ 05:08)  Wt(kg): --    General = Comfortable-appearing, no acute distress  Abdomen = Non-distended, normal active bowel sounds, soft, minimal tenderness in supraumbilical>subumbilical regions, no palpable mass or organomegaly, no bruit  _____________________________________________________  LABS:                        11.3   4.30  )-----------( 132      ( 22 Feb 2018 09:00 )             34.4     02-22    141  |  102  |  6<L>  ----------------------------<  109<H>  3.5   |  27  |  0.70    Ca    8.8      22 Feb 2018 09:38  Phos  3.4     02-22  Mg     1.9     02-22    AXR images reviewed: Non-obstructive pattern, contrast in ascending and transverse colon      _____________________________________________________  ACTIVE MEDS:  MEDICATIONS  (STANDING):  amLODIPine   Tablet 5 milliGRAM(s) Oral daily  aspirin enteric coated 81 milliGRAM(s) Oral daily  buDESOnide 160 MICROgram(s)/formoterol 4.5 MICROgram(s) Inhaler 2 Puff(s) Inhalation two times a day  ciprofloxacin   IVPB 400 milliGRAM(s) IV Intermittent every 12 hours  clopidogrel Tablet 75 milliGRAM(s) Oral daily  enoxaparin Injectable 40 milliGRAM(s) SubCutaneous daily  fluticasone propionate 50 MICROgram(s)/spray Nasal Spray 1 Spray(s) Both Nostrils two times a day  loratadine 10 milliGRAM(s) Oral daily  losartan 100 milliGRAM(s) Oral daily  metoprolol succinate ER 12.5 milliGRAM(s) Oral daily  metroNIDAZOLE  IVPB 500 milliGRAM(s) IV Intermittent every 8 hours  montelukast 10 milliGRAM(s) Oral at bedtime  pantoprazole    Tablet 40 milliGRAM(s) Oral before breakfast  tiotropium 18 MICROgram(s) Capsule 1 Capsule(s) Inhalation at bedtime    MEDICATIONS  (PRN):    _____________________________________________________  ASSESSMENT:  52yMale with resolving partial SBO due to Crohn's ileitis.    PLAN:  Agree with advancing to LR diet, and, if tolerated, consideration of discharge home this afternoon  Continue Cipro/Flagyl for 1 week as outpatient, continue Entyvio infusions for now  RTO for GI follow-up, and follow-up with CRS for consideration of elective surgery    Darrin Caicedo M.D.  (O) [phone]  (C) [phone]

## 2018-02-23 NOTE — PROGRESS NOTE ADULT - PROBLEM SELECTOR PLAN 4
symbicort 160  bid  spiriva 1 puff q day  singulair 10 qhs  pulmonary toilet-incentive spirometry, Chest Pt-acapella/chest vest-up to 5 times per day.    maintain oxygen levels above 90%-supplemental oxygen via nasal canula-humidified    All nebulized therapy is to be administered via hand held nebulizer-(patient must gargle and spit with water after use)
symbicort 160  bid  spiriva 1 puff q day  singulair 10 qhs  pulmonary toilet-incentive spirometry, Chest Pt-acapella/chest vest-up to 5 times per day.    maintain oxygen levels above 90%-supplemental oxygen via nasal canula-humidified    All nebulized therapy is to be administered via hand held nebulizer-(patient must gargle and spit with water after use)

## 2018-02-23 NOTE — PROGRESS NOTE ADULT - PROBLEM SELECTOR PROBLEM 1
CAD (coronary artery disease)
CAD (coronary artery disease)
Other partial intestinal obstruction
Other partial intestinal obstruction

## 2018-02-23 NOTE — PROGRESS NOTE ADULT - PROBLEM SELECTOR PROBLEM 2
Crohn's disease of small intestine with intestinal obstruction
Other partial intestinal obstruction

## 2018-02-23 NOTE — DISCHARGE NOTE ADULT - ADDITIONAL INSTRUCTIONS
Follow-up with your primary care physician within 1 week of discharge regarding your recent hospitalization . Follow-up with Dr. Michel regarding your hospitalization and possible operative intervention for your Crohn's Disease. Follow-up with Dr. Caicedo, your gastroenterologist,  regarding your recent hospitalization and managing your Crohn's disease.

## 2018-02-23 NOTE — DISCHARGE NOTE ADULT - PATIENT PORTAL LINK FT
You can access the [facility] Patient Portal, offered by [facility], by registering with the following website: [email or web address]

## 2018-02-23 NOTE — PROGRESS NOTE ADULT - SUBJECTIVE AND OBJECTIVE BOX
Patient is a 52y old  Male who presents with a chief complaint of Abdominal pain (21 Feb 2018 01:01)    Feels improved.  He denies c/o chest pain, SOB or palpitations. No abdominal pain.     Allergies    No Known Allergies    Intolerances      MEDICATIONS  (STANDING):  amLODIPine   Tablet 5 milliGRAM(s) Oral daily  aspirin enteric coated 81 milliGRAM(s) Oral daily  buDESOnide 160 MICROgram(s)/formoterol 4.5 MICROgram(s) Inhaler 2 Puff(s) Inhalation two times a day  ciprofloxacin   IVPB 400 milliGRAM(s) IV Intermittent every 12 hours  clopidogrel Tablet 75 milliGRAM(s) Oral daily  enoxaparin Injectable 40 milliGRAM(s) SubCutaneous daily  fluticasone propionate 50 MICROgram(s)/spray Nasal Spray 1 Spray(s) Both Nostrils two times a day  loratadine 10 milliGRAM(s) Oral daily  losartan 100 milliGRAM(s) Oral daily  metoprolol succinate ER 12.5 milliGRAM(s) Oral daily  metroNIDAZOLE  IVPB 500 milliGRAM(s) IV Intermittent every 8 hours  montelukast 10 milliGRAM(s) Oral at bedtime  pantoprazole    Tablet 40 milliGRAM(s) Oral before breakfast  tiotropium 18 MICROgram(s) Capsule 1 Capsule(s) Inhalation at bedtime    MEDICATIONS  (PRN):      PHYSICAL EXAM:  Vital Signs Last 24 Hrs  T(C): 36.8 (23 Feb 2018 05:08), Max: 37 (22 Feb 2018 21:32)  T(F): 98.3 (23 Feb 2018 05:08), Max: 98.6 (22 Feb 2018 21:32)  HR: 59 (23 Feb 2018 05:08) (59 - 70)  BP: 135/79 (23 Feb 2018 05:08) (109/68 - 135/79)  BP(mean): --  RR: 18 (23 Feb 2018 05:08) (18 - 18)  SpO2: 98% (23 Feb 2018 05:08) (97% - 99%)  Daily     Daily   I&O's Summary    22 Feb 2018 07:01  -  23 Feb 2018 07:00  --------------------------------------------------------  IN: 2000 mL / OUT: 3900 mL / NET: -1900 mL    General Appearance: 	 Alert, cooperative, no distress  HEENT: normocephalic, atraumatic,   Neck: no JVD,  carotid 2+  bilaterally without bruits  Lungs:  clear to auscultation and percussion bilaterally  Cor:  pmi 5th ICS MCL, regular rate and rhythm, S1 normal intensity, S2 normal intensity, no gallops, murmurs or rubs  Abdomen: soft, non-tender; bowel sounds normal; no masses,  no organomegaly  Extremities: without cyanosis, clubbing or edema  Vasc: 2-+ PT and DP pulses; no varicosities  Neurologic: A&O x 3 (time, place, person). Symmetric strength; limited exam  Skin: no rashes; limited exam      Labs:  CBC Full  -  ( 22 Feb 2018 09:00 )  WBC Count : 4.30 K/uL  Hemoglobin : 11.3 g/dL  Hematocrit : 34.4 %  Platelet Count - Automated : 132 K/uL  Mean Cell Volume : 86.2 fl  Mean Cell Hemoglobin : 28.3 pg  Mean Cell Hemoglobin Concentration : 32.8 gm/dL  Auto Neutrophil # : x  Auto Lymphocyte # : x  Auto Monocyte # : x  Auto Eosinophil # : x  Auto Basophil # : x  Auto Neutrophil % : x  Auto Lymphocyte % : x  Auto Monocyte % : x  Auto Eosinophil % : x  Auto Basophil % : x    02-22    141  |  102  |  6<L>  ----------------------------<  109<H>  3.5   |  27  |  0.70    Ca    8.8      22 Feb 2018 09:38  Phos  3.4     02-22  Mg     1.9     02-22                      Randell Fernández MD [facility]  [phone]

## 2018-02-23 NOTE — PROGRESS NOTE ADULT - ATTENDING COMMENTS
I have seen and evaluated patient and discussed with team and GI.  Agree with assessment and plan.  Home with 7 days cipro and flagyl.  Follow up my office next week

## 2018-02-23 NOTE — DISCHARGE NOTE ADULT - CARE PROVIDER_API CALL
Lalit Michel), ColonRectal Surgery; Surgery  [address]  [address]  [place] [address]  Phone: [phone]  Fax: [phone]    Darrin Caicedo), Gastroenterology; Internal Medicine  [address]  [address]  [place] [address]  Phone: [phone]  Fax: [phone]

## 2018-02-23 NOTE — PROGRESS NOTE ADULT - ATTENDING COMMENTS
as above--pulm stable  GI situation still in flux  Continue all pulm rx as above    Ambulate    Leonel Duckworth MD-Pulmonary   [phone] as above--pulm stable; contemplating surgery during this visit-his major concern is plavix. (cards to weigh in)  GI situation still in flux  Continue all pulm rx as above    Ambulate    Leonel Duckworth MD-Pulmonary   [phone]

## 2018-02-23 NOTE — PROGRESS NOTE ADULT - SUBJECTIVE AND OBJECTIVE BOX
CHIEF COMPLAINT:    Interval Events:    REVIEW OF SYSTEMS:  Constitutional: No fevers or chills. No weight loss. No fatigue or generalized malaise.  Eyes: No itching or discharge from the eyes  ENT: No ear pain. No ear discharge. No nasal congestion. No post nasal drip. No epistaxis. No throat pain. No sore throat. No difficulty swallowing.   CV: No chest pain. No palpitations. No lightheadedness or dizziness.   Resp: No dyspnea at rest. No dyspnea on exertion. No orthopnea. No wheezing. No cough. No stridor. No sputum production. No chest pain with respiration.  GI: No nausea. No vomiting. No diarrhea.  MSK: No joint pain or pain in any extremities  Integumentary: No skin lesions. No pedal edema.  Neurological: No gross motor weakness. No sensory changes.  [ ] All other systems negative  [ ] Unable to assess ROS because ________    OBJECTIVE:  ICU Vital Signs Last 24 Hrs  T(C): 36.9 (23 Feb 2018 00:50), Max: 37 (22 Feb 2018 21:32)  T(F): 98.4 (23 Feb 2018 00:50), Max: 98.6 (22 Feb 2018 21:32)  HR: 60 (23 Feb 2018 00:50) (60 - 70)  BP: 128/81 (23 Feb 2018 00:50) (109/68 - 128/81)  BP(mean): --  ABP: --  ABP(mean): --  RR: 18 (23 Feb 2018 00:50) (18 - 18)  SpO2: 98% (23 Feb 2018 00:50) (97% - 99%)        02-21 @ 07:01 - 02-22 @ 07:00  --------------------------------------------------------  IN: 2750 mL / OUT: 2300 mL / NET: 450 mL    02-22 @ 07:01 - 02-23 @ 04:49  --------------------------------------------------------  IN: 2000 mL / OUT: 3900 mL / NET: -1900 mL      CAPILLARY BLOOD GLUCOSE          PHYSICAL EXAM:  General: Awake, alert, oriented X 3.   HEENT: Atraumatic, normocephalic.                 Mallampatti Grade                 No nasal congestion.                No tonsillar or pharyngeal exudates.  Lymph Nodes: No palpable lymphadenopathy  Neck: No JVD. No carotid bruit.   Respiratory: Normal chest expansion                         Normal percussion                         Normal and equal air entry                         No wheeze, rhonchi or rales.  Cardiovascular: S1 S2 normal. No murmurs, rubs or gallops.   Abdomen: Soft, non-tender, non-distended. No organomegaly.  Extremities: Warm to touch. Peripheral pulse palpable. No pedal edema.   Skin: No rashes or skin lesions  Neurological: Motor and sensory examination equal and normal in all four extremities.  Psychiatry: Appropriate mood and affect.    HOSPITAL MEDICATIONS:  MEDICATIONS  (STANDING):  amLODIPine   Tablet 5 milliGRAM(s) Oral daily  aspirin enteric coated 81 milliGRAM(s) Oral daily  buDESOnide 160 MICROgram(s)/formoterol 4.5 MICROgram(s) Inhaler 2 Puff(s) Inhalation two times a day  ciprofloxacin   IVPB 400 milliGRAM(s) IV Intermittent every 12 hours  clopidogrel Tablet 75 milliGRAM(s) Oral daily  enoxaparin Injectable 40 milliGRAM(s) SubCutaneous daily  fluticasone propionate 50 MICROgram(s)/spray Nasal Spray 1 Spray(s) Both Nostrils two times a day  loratadine 10 milliGRAM(s) Oral daily  losartan 100 milliGRAM(s) Oral daily  metoprolol succinate ER 12.5 milliGRAM(s) Oral daily  metroNIDAZOLE  IVPB 500 milliGRAM(s) IV Intermittent every 8 hours  montelukast 10 milliGRAM(s) Oral at bedtime  pantoprazole    Tablet 40 milliGRAM(s) Oral before breakfast  tiotropium 18 MICROgram(s) Capsule 1 Capsule(s) Inhalation at bedtime    MEDICATIONS  (PRN):      LABS:                        11.3   4.30  )-----------( 132      ( 22 Feb 2018 09:00 )             34.4     02-22    141  |  102  |  6<L>  ----------------------------<  109<H>  3.5   |  27  |  0.70    Ca    8.8      22 Feb 2018 09:38  Phos  3.4     02-22  Mg     1.9     02-22                MICROBIOLOGY:     RADIOLOGY:  [ ] Reviewed and interpreted by me    Point of Care Ultrasound Findings:    PFT:    EKG: CHIEF COMPLAINT: passed gas but no bm--ambulating, walking--no sob    Interval Events: ambulating, no ngt    REVIEW OF SYSTEMS:  Constitutional: No fevers or chills. No weight loss. No fatigue or generalized malaise.  Eyes: No itching or discharge from the eyes  ENT: No ear pain. No ear discharge. No nasal congestion. No post nasal drip. No epistaxis. No throat pain. No sore throat. No difficulty swallowing.   CV: No chest pain. No palpitations. No lightheadedness or dizziness.   Resp: No dyspnea at rest. No dyspnea on exertion. No orthopnea. No wheezing. No cough. No stridor. No sputum production. No chest pain with respiration.  GI: No nausea. No vomiting. No diarrhea.  MSK: No joint pain or pain in any extremities  Integumentary: No skin lesions. No pedal edema.  Neurological: No gross motor weakness. No sensory changes.  [+ ] All other systems negative  [ ] Unable to assess ROS because ________    OBJECTIVE:  ICU Vital Signs Last 24 Hrs  T(C): 36.9 (23 Feb 2018 00:50), Max: 37 (22 Feb 2018 21:32)  T(F): 98.4 (23 Feb 2018 00:50), Max: 98.6 (22 Feb 2018 21:32)  HR: 60 (23 Feb 2018 00:50) (60 - 70)  BP: 128/81 (23 Feb 2018 00:50) (109/68 - 128/81)  BP(mean): --  ABP: --  ABP(mean): --  RR: 18 (23 Feb 2018 00:50) (18 - 18)  SpO2: 98% (23 Feb 2018 00:50) (97% - 99%)        02-21 @ 07:01  -  02-22 @ 07:00  --------------------------------------------------------  IN: 2750 mL / OUT: 2300 mL / NET: 450 mL    02-22 @ 07:01  -  02-23 @ 04:49  --------------------------------------------------------  IN: 2000 mL / OUT: 3900 mL / NET: -1900 mL      CAPILLARY BLOOD GLUCOSE          PHYSICAL EXAM: NAD in bed  General: Awake, alert, oriented X 3.   HEENT: Atraumatic, normocephalic.                 Mallampatti Grade 3                No nasal congestion.                No tonsillar or pharyngeal exudates.  Lymph Nodes: No palpable lymphadenopathy  Neck: No JVD. No carotid bruit.   Respiratory: Normal chest expansion                         Normal percussion                         Normal and equal air entry                         No wheeze, rhonchi or rales.  Cardiovascular: S1 S2 normal. No murmurs, rubs or gallops.   Abdomen: Soft, non-tender, non-distended. No organomegaly. + BS  Extremities: Warm to touch. Peripheral pulse palpable. No pedal edema.   Skin: No rashes or skin lesions  Neurological: Motor and sensory examination equal and normal in all four extremities.  Psychiatry: Appropriate mood and affect.    HOSPITAL MEDICATIONS:  MEDICATIONS  (STANDING):  amLODIPine   Tablet 5 milliGRAM(s) Oral daily  aspirin enteric coated 81 milliGRAM(s) Oral daily  buDESOnide 160 MICROgram(s)/formoterol 4.5 MICROgram(s) Inhaler 2 Puff(s) Inhalation two times a day  ciprofloxacin   IVPB 400 milliGRAM(s) IV Intermittent every 12 hours  clopidogrel Tablet 75 milliGRAM(s) Oral daily  enoxaparin Injectable 40 milliGRAM(s) SubCutaneous daily  fluticasone propionate 50 MICROgram(s)/spray Nasal Spray 1 Spray(s) Both Nostrils two times a day  loratadine 10 milliGRAM(s) Oral daily  losartan 100 milliGRAM(s) Oral daily  metoprolol succinate ER 12.5 milliGRAM(s) Oral daily  metroNIDAZOLE  IVPB 500 milliGRAM(s) IV Intermittent every 8 hours  montelukast 10 milliGRAM(s) Oral at bedtime  pantoprazole    Tablet 40 milliGRAM(s) Oral before breakfast  tiotropium 18 MICROgram(s) Capsule 1 Capsule(s) Inhalation at bedtime    MEDICATIONS  (PRN):      LABS:                        11.3   4.30  )-----------( 132      ( 22 Feb 2018 09:00 )             34.4     02-22    141  |  102  |  6<L>  ----------------------------<  109<H>  3.5   |  27  |  0.70    Ca    8.8      22 Feb 2018 09:38  Phos  3.4     02-22  Mg     1.9     02-22                MICROBIOLOGY:     RADIOLOGY:  [ ] Reviewed and interpreted by me    Point of Care Ultrasound Findings:    PFT:    EKG:

## 2018-02-27 ENCOUNTER — LABORATORY RESULT (OUTPATIENT)
Age: 53
End: 2018-02-27

## 2018-02-27 ENCOUNTER — APPOINTMENT (OUTPATIENT)
Dept: PULMONOLOGY | Facility: CLINIC | Age: 53
End: 2018-02-27
Payer: MEDICAID

## 2018-02-27 VITALS
HEART RATE: 76 BPM | HEIGHT: 66 IN | BODY MASS INDEX: 28.93 KG/M2 | WEIGHT: 180 LBS | RESPIRATION RATE: 15 BRPM | DIASTOLIC BLOOD PRESSURE: 70 MMHG | OXYGEN SATURATION: 98 % | SYSTOLIC BLOOD PRESSURE: 110 MMHG

## 2018-02-27 PROCEDURE — 99215 OFFICE O/P EST HI 40 MIN: CPT | Mod: 25

## 2018-02-27 PROCEDURE — 94010 BREATHING CAPACITY TEST: CPT

## 2018-02-28 ENCOUNTER — APPOINTMENT (OUTPATIENT)
Dept: SURGERY | Facility: CLINIC | Age: 53
End: 2018-02-28
Payer: MEDICAID

## 2018-02-28 VITALS
TEMPERATURE: 98.9 F | DIASTOLIC BLOOD PRESSURE: 74 MMHG | SYSTOLIC BLOOD PRESSURE: 115 MMHG | BODY MASS INDEX: 28.45 KG/M2 | HEIGHT: 66 IN | HEART RATE: 69 BPM | RESPIRATION RATE: 15 BRPM | WEIGHT: 177 LBS | OXYGEN SATURATION: 99 %

## 2018-02-28 DIAGNOSIS — I25.2 OLD MYOCARDIAL INFARCTION: ICD-10-CM

## 2018-02-28 DIAGNOSIS — K56.609 UNSPECIFIED INTESTINAL OBSTRUCTION, UNSPECIFIED AS TO PARTIAL VERSUS COMPLETE OBSTRUCTION: ICD-10-CM

## 2018-02-28 DIAGNOSIS — R10.32 LEFT LOWER QUADRANT PAIN: ICD-10-CM

## 2018-02-28 LAB
CCP AB SER IA-ACNC: <8 UNITS
ENA JO1 AB SER IA-ACNC: <0.2 AL
ENA RNP AB SER IA-ACNC: <0.2 AL
ENA RNP AB SER IA-ACNC: <0.2 AL
ENA SCL70 IGG SER IA-ACNC: <0.2 AL
ENA SM AB SER IA-ACNC: <0.2 AL
ENA SS-A AB SER IA-ACNC: <0.2 AL
ENA SS-B AB SER IA-ACNC: <0.2 AL
ERYTHROCYTE [SEDIMENTATION RATE] IN BLOOD BY WESTERGREN METHOD: 6 MM/HR
MPO AB + PR3 PNL SER: NORMAL
RF+CCP IGG SER-IMP: NEGATIVE
RHEUMATOID FACT SER QL: <7 IU/ML

## 2018-02-28 PROCEDURE — 99215 OFFICE O/P EST HI 40 MIN: CPT

## 2018-02-28 RX ORDER — TIOTROPIUM BROMIDE 18 UG/1
18 CAPSULE ORAL; RESPIRATORY (INHALATION)
Refills: 0 | Status: ACTIVE | COMMUNITY

## 2018-02-28 RX ORDER — CEPHALEXIN 500 MG/1
500 TABLET ORAL
Qty: 10 | Refills: 0 | Status: DISCONTINUED | COMMUNITY
Start: 2017-12-15 | End: 2018-02-28

## 2018-02-28 RX ORDER — ESOMEPRAZOLE MAGNESIUM 40 MG/1
40 CAPSULE, DELAYED RELEASE ORAL
Qty: 90 | Refills: 1 | Status: DISCONTINUED | COMMUNITY
Start: 2018-01-18 | End: 2018-02-28

## 2018-03-01 LAB
ANA SER IF-ACNC: NEGATIVE
ANCA AB SER-IMP: NEGATIVE
C-ANCA SER-ACNC: NEGATIVE
P-ANCA TITR SER IF: NEGATIVE

## 2018-03-02 LAB — HP PNL SER: NORMAL

## 2018-03-08 ENCOUNTER — MOBILE ON CALL (OUTPATIENT)
Age: 53
End: 2018-03-08

## 2018-03-08 ENCOUNTER — OUTPATIENT (OUTPATIENT)
Dept: OUTPATIENT SERVICES | Facility: HOSPITAL | Age: 53
LOS: 1 days | End: 2018-03-08
Payer: MEDICAID

## 2018-03-08 VITALS
OXYGEN SATURATION: 98 % | RESPIRATION RATE: 16 BRPM | TEMPERATURE: 98 F | DIASTOLIC BLOOD PRESSURE: 80 MMHG | HEIGHT: 66 IN | SYSTOLIC BLOOD PRESSURE: 138 MMHG | WEIGHT: 184.09 LBS | HEART RATE: 81 BPM

## 2018-03-08 DIAGNOSIS — Z98.89 OTHER SPECIFIED POSTPROCEDURAL STATES: Chronic | ICD-10-CM

## 2018-03-08 DIAGNOSIS — Z86.79 PERSONAL HISTORY OF OTHER DISEASES OF THE CIRCULATORY SYSTEM: ICD-10-CM

## 2018-03-08 DIAGNOSIS — K50.90 CROHN'S DISEASE, UNSPECIFIED, WITHOUT COMPLICATIONS: ICD-10-CM

## 2018-03-08 DIAGNOSIS — Z01.818 ENCOUNTER FOR OTHER PREPROCEDURAL EXAMINATION: ICD-10-CM

## 2018-03-08 DIAGNOSIS — G47.33 OBSTRUCTIVE SLEEP APNEA (ADULT) (PEDIATRIC): ICD-10-CM

## 2018-03-08 DIAGNOSIS — K21.9 GASTRO-ESOPHAGEAL REFLUX DISEASE WITHOUT ESOPHAGITIS: ICD-10-CM

## 2018-03-08 DIAGNOSIS — I25.10 ATHEROSCLEROTIC HEART DISEASE OF NATIVE CORONARY ARTERY WITHOUT ANGINA PECTORIS: ICD-10-CM

## 2018-03-08 DIAGNOSIS — Z87.898 PERSONAL HISTORY OF OTHER SPECIFIED CONDITIONS: Chronic | ICD-10-CM

## 2018-03-08 DIAGNOSIS — J45.909 UNSPECIFIED ASTHMA, UNCOMPLICATED: ICD-10-CM

## 2018-03-08 LAB
BLD GP AB SCN SERPL QL: NEGATIVE — SIGNIFICANT CHANGE UP
RH IG SCN BLD-IMP: POSITIVE — SIGNIFICANT CHANGE UP

## 2018-03-08 PROCEDURE — 86850 RBC ANTIBODY SCREEN: CPT

## 2018-03-08 PROCEDURE — 86900 BLOOD TYPING SEROLOGIC ABO: CPT

## 2018-03-08 PROCEDURE — 86901 BLOOD TYPING SEROLOGIC RH(D): CPT

## 2018-03-08 PROCEDURE — G0463: CPT

## 2018-03-08 RX ORDER — CEFOTETAN DISODIUM 1 G
2 VIAL (EA) INJECTION ONCE
Qty: 0 | Refills: 0 | Status: DISCONTINUED | OUTPATIENT
Start: 2018-03-12 | End: 2018-03-12

## 2018-03-08 RX ORDER — GABAPENTIN 400 MG/1
600 CAPSULE ORAL ONCE
Qty: 0 | Refills: 0 | Status: COMPLETED | OUTPATIENT
Start: 2018-03-12 | End: 2018-03-12

## 2018-03-08 RX ORDER — LIDOCAINE HCL 20 MG/ML
0.2 VIAL (ML) INJECTION ONCE
Qty: 0 | Refills: 0 | Status: DISCONTINUED | OUTPATIENT
Start: 2018-03-12 | End: 2018-03-12

## 2018-03-08 RX ORDER — SODIUM CHLORIDE 9 MG/ML
3 INJECTION INTRAMUSCULAR; INTRAVENOUS; SUBCUTANEOUS EVERY 8 HOURS
Qty: 0 | Refills: 0 | Status: DISCONTINUED | OUTPATIENT
Start: 2018-03-12 | End: 2018-03-12

## 2018-03-08 NOTE — H&P PST ADULT - PROBLEM SELECTOR PLAN 1
ERP  Laparoscopic strictureplasty, poss open, poss resection  FS DOS, HGA1c done 2/22/18-in [place]  antibacterial soap and instructions given

## 2018-03-08 NOTE — H&P PST ADULT - PMH
Asthma    Bilateral knee pain    Bronchitis, chronic    CAD (coronary artery disease)  2 stents placed in LAD in 2015, RCA stent x 2 in 2006  Crohn disease    DM (diabetes mellitus)  diet controlled  GERD (gastroesophageal reflux disease)    GIB (gastrointestinal bleeding)    H/O: HTN (hypertension)    HLD (hyperlipidemia)    Inguinal mass    Joint pain    Lower back pain    MI (myocardial infarction)    MARYAM (obstructive sleep apnea)    Pancreatitis  while on 6MP for Crohn's now off of it  Shoulder pain, bilateral    Unilateral recurrent inguinal hernia without obstruction or gangrene  Left Asthma  controlled-never intubated or hospitalized  Bilateral knee pain    Bowel obstruction    CAD (coronary artery disease)  2 stents placed in LAD in 2015, RCA stent x 2 in 2006  Cluster headaches    Crohn disease    GERD (gastroesophageal reflux disease)    GIB (gastrointestinal bleeding)    H/O: HTN (hypertension)    HLD (hyperlipidemia)    Inguinal mass    Joint pain    Lower back pain    MI (myocardial infarction)    MARYAM (obstructive sleep apnea)    Pancreatitis  while on 6MP for Crohn's now off of it  Shoulder pain, bilateral    Unilateral recurrent inguinal hernia without obstruction or gangrene  Left

## 2018-03-08 NOTE — H&P PST ADULT - PSH
H/O colonoscopy    H/O lipoma  removed from back  H/O percutaneous transluminal coronary angioplasty  with DE RCA 2006, 2 stents placed 2015  History of herniorrhaphy  left inguinal herniorrhaphy  S/P cardiac catheterization  2010 - [facility] ( diagnostic cath)  S/P hernia surgery H/O colonoscopy    H/O lipoma  removed from back  H/O percutaneous transluminal coronary angioplasty  with DE RCA 2006, 2 stents placed 2015  History of herniorrhaphy  left inguinal herniorrhaphy  S/P cardiac catheterization  2017-resulted in [place]-no stents placed  S/P hernia surgery

## 2018-03-08 NOTE — H&P PST ADULT - HISTORY OF PRESENT ILLNESS
52 M presents with abdominal pain. Patient has Crohn's disease and is currently on vedolizumab (Entyvio). Patient has developed pancreatitis from previous medication. Patient has previous Crohn's flare resulting in partial SBO. Those episodes resolved with NGT placement and antibiotics. Last episode was in 2016. His current symptoms are the same as previous episodes of partial SBO. 1 day ago, patient developed abdominal bloating and discomfort. Had some nausea but no vomiting. Has not passed flatus or had a bowel movement all day.    Of note, patient underwent a repair of recurrent L inguinal hernia in 5/2017 with Dr. Vasquez. 52 M presents with  Crohn's disease and is currently on Entyvio and Repatha  Patient has previous Crohn's flare resulting in partial SBO. Those episodes resolved with NGT placement and antibiotics. Last episode was in 2/2018. He is currently asymptomatic except for loose stools continuing. Denies blood in stool, fevers, nausea or vomiting. Patient presents pre ERP, laparoscopic strictureplasty, possible open, poss resection  on 3/12/18. 52 M presents with  Crohn's disease and is currently on Entyvio and Repatha  Patient has previous Crohn's flare resulting in partial SBO. Those episodes resolved with NGT placement and antibiotics. Last episode was in 2/2018. He is currently asymptomatic except for loose stools continuing. Denies blood in stool, fevers, nausea or vomiting. Patient presents pre ERP, laparoscopic strictureplasty, possible open, poss resection  on 3/12/18.  Repeated CTs with strictures, failure maximal medical therapy

## 2018-03-12 ENCOUNTER — RESULT REVIEW (OUTPATIENT)
Age: 53
End: 2018-03-12

## 2018-03-12 ENCOUNTER — INPATIENT (INPATIENT)
Facility: HOSPITAL | Age: 53
LOS: 3 days | Discharge: HOME | DRG: 330 | End: 2018-03-16
Payer: MEDICARE

## 2018-03-12 ENCOUNTER — APPOINTMENT (OUTPATIENT)
Dept: SURGERY | Facility: HOSPITAL | Age: 53
End: 2018-03-12
Payer: MEDICAID

## 2018-03-12 ENCOUNTER — TRANSCRIPTION ENCOUNTER (OUTPATIENT)
Age: 53
End: 2018-03-12

## 2018-03-12 VITALS
WEIGHT: 184.09 LBS | HEIGHT: 66 IN | SYSTOLIC BLOOD PRESSURE: 121 MMHG | TEMPERATURE: 98 F | OXYGEN SATURATION: 100 % | HEART RATE: 75 BPM | RESPIRATION RATE: 18 BRPM | DIASTOLIC BLOOD PRESSURE: 78 MMHG

## 2018-03-12 DIAGNOSIS — Z98.89 OTHER SPECIFIED POSTPROCEDURAL STATES: Chronic | ICD-10-CM

## 2018-03-12 DIAGNOSIS — Z87.898 PERSONAL HISTORY OF OTHER SPECIFIED CONDITIONS: Chronic | ICD-10-CM

## 2018-03-12 DIAGNOSIS — K50.90 CROHN'S DISEASE, UNSPECIFIED, WITHOUT COMPLICATIONS: ICD-10-CM

## 2018-03-12 LAB — GLUCOSE BLDC GLUCOMTR-MCNC: 92 MG/DL — SIGNIFICANT CHANGE UP (ref 70–99)

## 2018-03-12 PROCEDURE — 88304 TISSUE EXAM BY PATHOLOGIST: CPT | Mod: 26

## 2018-03-12 PROCEDURE — 44615 INTESTINAL STRICTUROPLASTY: CPT | Mod: 52,59

## 2018-03-12 PROCEDURE — 44955 APPENDECTOMY ADD-ON: CPT

## 2018-03-12 PROCEDURE — 88302 TISSUE EXAM BY PATHOLOGIST: CPT | Mod: 26

## 2018-03-12 PROCEDURE — 49585: CPT | Mod: 52

## 2018-03-12 PROCEDURE — 44202 LAP ENTERECTOMY: CPT

## 2018-03-12 PROCEDURE — 88307 TISSUE EXAM BY PATHOLOGIST: CPT | Mod: 26

## 2018-03-12 RX ORDER — ACETAMINOPHEN 500 MG
1000 TABLET ORAL ONCE
Qty: 0 | Refills: 0 | Status: COMPLETED | OUTPATIENT
Start: 2018-03-13 | End: 2018-03-13

## 2018-03-12 RX ORDER — KETOROLAC TROMETHAMINE 30 MG/ML
30 SYRINGE (ML) INJECTION EVERY 6 HOURS
Qty: 0 | Refills: 0 | Status: DISCONTINUED | OUTPATIENT
Start: 2018-03-12 | End: 2018-03-13

## 2018-03-12 RX ORDER — NALBUPHINE HYDROCHLORIDE 10 MG/ML
2.5 INJECTION, SOLUTION INTRAMUSCULAR; INTRAVENOUS; SUBCUTANEOUS EVERY 6 HOURS
Qty: 0 | Refills: 0 | Status: DISCONTINUED | OUTPATIENT
Start: 2018-03-12 | End: 2018-03-12

## 2018-03-12 RX ORDER — SODIUM CHLORIDE 9 MG/ML
1000 INJECTION, SOLUTION INTRAVENOUS
Qty: 0 | Refills: 0 | Status: DISCONTINUED | OUTPATIENT
Start: 2018-03-12 | End: 2018-03-13

## 2018-03-12 RX ORDER — HEPARIN SODIUM 5000 [USP'U]/ML
5000 INJECTION INTRAVENOUS; SUBCUTANEOUS ONCE
Qty: 0 | Refills: 0 | Status: COMPLETED | OUTPATIENT
Start: 2018-03-12 | End: 2018-03-12

## 2018-03-12 RX ORDER — ACETAMINOPHEN 500 MG
1000 TABLET ORAL ONCE
Qty: 0 | Refills: 0 | Status: COMPLETED | OUTPATIENT
Start: 2018-03-12 | End: 2018-03-12

## 2018-03-12 RX ORDER — METOPROLOL TARTRATE 50 MG
12.5 TABLET ORAL DAILY
Qty: 0 | Refills: 0 | Status: DISCONTINUED | OUTPATIENT
Start: 2018-03-12 | End: 2018-03-16

## 2018-03-12 RX ORDER — MORPHINE SULFATE 50 MG/1
0.2 CAPSULE, EXTENDED RELEASE ORAL ONCE
Qty: 0 | Refills: 0 | Status: DISCONTINUED | OUTPATIENT
Start: 2018-03-12 | End: 2018-03-13

## 2018-03-12 RX ORDER — MORPHINE SULFATE 50 MG/1
2 CAPSULE, EXTENDED RELEASE ORAL ONCE
Qty: 0 | Refills: 0 | Status: DISCONTINUED | OUTPATIENT
Start: 2018-03-12 | End: 2018-03-12

## 2018-03-12 RX ORDER — HEPARIN SODIUM 5000 [USP'U]/ML
5000 INJECTION INTRAVENOUS; SUBCUTANEOUS EVERY 8 HOURS
Qty: 0 | Refills: 0 | Status: DISCONTINUED | OUTPATIENT
Start: 2018-03-12 | End: 2018-03-16

## 2018-03-12 RX ORDER — ASPIRIN/CALCIUM CARB/MAGNESIUM 324 MG
81 TABLET ORAL DAILY
Qty: 0 | Refills: 0 | Status: DISCONTINUED | OUTPATIENT
Start: 2018-03-12 | End: 2018-03-16

## 2018-03-12 RX ORDER — KETOROLAC TROMETHAMINE 30 MG/ML
30 SYRINGE (ML) INJECTION ONCE
Qty: 0 | Refills: 0 | Status: DISCONTINUED | OUTPATIENT
Start: 2018-03-12 | End: 2018-03-12

## 2018-03-12 RX ORDER — MONTELUKAST 4 MG/1
10 TABLET, CHEWABLE ORAL AT BEDTIME
Qty: 0 | Refills: 0 | Status: DISCONTINUED | OUTPATIENT
Start: 2018-03-12 | End: 2018-03-16

## 2018-03-12 RX ORDER — OXYCODONE HYDROCHLORIDE 5 MG/1
5 TABLET ORAL EVERY 6 HOURS
Qty: 0 | Refills: 0 | Status: DISCONTINUED | OUTPATIENT
Start: 2018-03-12 | End: 2018-03-16

## 2018-03-12 RX ORDER — BUDESONIDE AND FORMOTEROL FUMARATE DIHYDRATE 160; 4.5 UG/1; UG/1
2 AEROSOL RESPIRATORY (INHALATION)
Qty: 0 | Refills: 0 | Status: DISCONTINUED | OUTPATIENT
Start: 2018-03-12 | End: 2018-03-16

## 2018-03-12 RX ORDER — METOPROLOL TARTRATE 50 MG
5 TABLET ORAL EVERY 6 HOURS
Qty: 0 | Refills: 0 | Status: DISCONTINUED | OUTPATIENT
Start: 2018-03-12 | End: 2018-03-12

## 2018-03-12 RX ORDER — CELECOXIB 200 MG/1
400 CAPSULE ORAL ONCE
Qty: 0 | Refills: 0 | Status: COMPLETED | OUTPATIENT
Start: 2018-03-12 | End: 2018-03-12

## 2018-03-12 RX ORDER — LORATADINE 10 MG/1
10 TABLET ORAL AT BEDTIME
Qty: 0 | Refills: 0 | Status: DISCONTINUED | OUTPATIENT
Start: 2018-03-12 | End: 2018-03-16

## 2018-03-12 RX ORDER — NALOXONE HYDROCHLORIDE 4 MG/.1ML
0.1 SPRAY NASAL
Qty: 0 | Refills: 0 | Status: DISCONTINUED | OUTPATIENT
Start: 2018-03-12 | End: 2018-03-13

## 2018-03-12 RX ADMIN — OXYCODONE HYDROCHLORIDE 5 MILLIGRAM(S): 5 TABLET ORAL at 21:57

## 2018-03-12 RX ADMIN — OXYCODONE HYDROCHLORIDE 5 MILLIGRAM(S): 5 TABLET ORAL at 13:02

## 2018-03-12 RX ADMIN — OXYCODONE HYDROCHLORIDE 5 MILLIGRAM(S): 5 TABLET ORAL at 12:37

## 2018-03-12 RX ADMIN — HEPARIN SODIUM 5000 UNIT(S): 5000 INJECTION INTRAVENOUS; SUBCUTANEOUS at 21:29

## 2018-03-12 RX ADMIN — MORPHINE SULFATE 2 MILLIGRAM(S): 50 CAPSULE, EXTENDED RELEASE ORAL at 11:52

## 2018-03-12 RX ADMIN — Medication 30 MILLIGRAM(S): at 18:50

## 2018-03-12 RX ADMIN — GABAPENTIN 600 MILLIGRAM(S): 400 CAPSULE ORAL at 06:39

## 2018-03-12 RX ADMIN — MORPHINE SULFATE 2 MILLIGRAM(S): 50 CAPSULE, EXTENDED RELEASE ORAL at 12:05

## 2018-03-12 RX ADMIN — Medication 1000 MILLIGRAM(S): at 20:15

## 2018-03-12 RX ADMIN — Medication 400 MILLIGRAM(S): at 13:01

## 2018-03-12 RX ADMIN — MONTELUKAST 10 MILLIGRAM(S): 4 TABLET, CHEWABLE ORAL at 21:29

## 2018-03-12 RX ADMIN — HEPARIN SODIUM 5000 UNIT(S): 5000 INJECTION INTRAVENOUS; SUBCUTANEOUS at 06:39

## 2018-03-12 RX ADMIN — BUDESONIDE AND FORMOTEROL FUMARATE DIHYDRATE 2 PUFF(S): 160; 4.5 AEROSOL RESPIRATORY (INHALATION) at 20:15

## 2018-03-12 RX ADMIN — Medication 1000 MILLIGRAM(S): at 13:15

## 2018-03-12 RX ADMIN — OXYCODONE HYDROCHLORIDE 5 MILLIGRAM(S): 5 TABLET ORAL at 22:30

## 2018-03-12 RX ADMIN — HEPARIN SODIUM 5000 UNIT(S): 5000 INJECTION INTRAVENOUS; SUBCUTANEOUS at 13:01

## 2018-03-12 RX ADMIN — Medication 400 MILLIGRAM(S): at 19:43

## 2018-03-12 RX ADMIN — Medication 30 MILLIGRAM(S): at 18:17

## 2018-03-12 RX ADMIN — LORATADINE 10 MILLIGRAM(S): 10 TABLET ORAL at 21:29

## 2018-03-12 RX ADMIN — CELECOXIB 400 MILLIGRAM(S): 200 CAPSULE ORAL at 06:39

## 2018-03-12 RX ADMIN — Medication 81 MILLIGRAM(S): at 17:33

## 2018-03-12 RX ADMIN — Medication 5 MILLIGRAM(S): at 17:35

## 2018-03-12 NOTE — BRIEF OPERATIVE NOTE - OPERATION/FINDINGS
ERP Diagnostic laparoscopy, stricturoplasty x 2, small bowel resection (15cm segment containing 4 strictures)

## 2018-03-12 NOTE — BRIEF OPERATIVE NOTE - PROCEDURE
<<-----Click on this checkbox to enter Procedure Stricturoplasty, small intestine, laparoscopic  03/12/2018    Active  GIOVANNY

## 2018-03-12 NOTE — PATIENT PROFILE ADULT. - PSH
H/O colonoscopy    H/O lipoma  removed from back  H/O percutaneous transluminal coronary angioplasty  with DE RCA 2006, 2 stents placed 2015  History of herniorrhaphy  left inguinal herniorrhaphy  S/P cardiac catheterization  2017-resulted in [place]-no stents placed  S/P hernia surgery

## 2018-03-12 NOTE — PATIENT PROFILE ADULT. - PMH
Asthma  controlled-never intubated or hospitalized  Bilateral knee pain    Bowel obstruction    CAD (coronary artery disease)  2 stents placed in LAD in 2015, RCA stent x 2 in 2006  Cluster headaches    Crohn disease    GERD (gastroesophageal reflux disease)    GIB (gastrointestinal bleeding)    H/O: HTN (hypertension)    HLD (hyperlipidemia)    Inguinal mass    Joint pain    Lower back pain    MI (myocardial infarction)    MARYAM (obstructive sleep apnea)    Pancreatitis  while on 6MP for Crohn's now off of it  Shoulder pain, bilateral    Unilateral recurrent inguinal hernia without obstruction or gangrene  Left

## 2018-03-12 NOTE — PROGRESS NOTE ADULT - ASSESSMENT
51yo M POD 0 s/p ERP lap assisted stricturoplasty x 2 and SBR (15cm) x 1    -CLD  -IVF  -OOB/IS  -DVT ppx  -Pain control with tylenol, toradol, oxycodone  -Hold plavix for now  - AM labs  -Restart Crohn's meds upon discharge  -KENNY [place] in AM  -24H cont. pulse ox for spinal

## 2018-03-12 NOTE — PROGRESS NOTE ADULT - SUBJECTIVE AND OBJECTIVE BOX
Post-Op Note    Procedure: ERP lap assisted stricturoplasty x 2 and SBR (15cm) x 1    SUBJECTIVE: Patient comfortable in bed on 2M. Family bedside. Patient admits to mild dizziness and incisional pain. Denies CP, SOB, HA, palpitations, and N/V.     Vital Signs Last 24 Hrs  T(C): 36.9 (12 Mar 2018 14:14), Max: 36.9 (12 Mar 2018 14:14)  T(F): 98.4 (12 Mar 2018 14:14), Max: 98.4 (12 Mar 2018 14:14)  HR: 88 (12 Mar 2018 14:14) (70 - 93)  BP: 118/75 (12 Mar 2018 14:14) (92/57 - 121/78)  BP(mean): 87 (12 Mar 2018 13:15) (70 - 87)  RR: 18 (12 Mar 2018 14:14) (17 - 18)  SpO2: 94% (12 Mar 2018 14:14) (94% - 100%)    I&O's Detail    12 Mar 2018 07:01  -  12 Mar 2018 15:10  --------------------------------------------------------  IN:    lactated ringers.: 80 mL    Oral Fluid: 120 mL  Total IN: 200 mL    OUT:    Indwelling Catheter - Urethral: 250 mL  Total OUT: 250 mL    Total NET: -50 mL          Physical Exam:  General Appearance: Appears well, NAD  Abdomen: Soft, ND, appropriate incisional tenderness, dressings clean and dry and intact  Extremities: Grossly symmetric, SCD's in place           Mariza Donohue PA-C  p#[phone]

## 2018-03-13 LAB
ANION GAP SERPL CALC-SCNC: 13 MMOL/L — SIGNIFICANT CHANGE UP (ref 5–17)
BASOPHILS # BLD AUTO: 0 K/UL — SIGNIFICANT CHANGE UP (ref 0–0.2)
BASOPHILS NFR BLD AUTO: 0 % — SIGNIFICANT CHANGE UP (ref 0–2)
BUN SERPL-MCNC: 9 MG/DL — SIGNIFICANT CHANGE UP (ref 7–23)
CALCIUM SERPL-MCNC: 9.2 MG/DL — SIGNIFICANT CHANGE UP (ref 8.4–10.5)
CHLORIDE SERPL-SCNC: 103 MMOL/L — SIGNIFICANT CHANGE UP (ref 96–108)
CO2 SERPL-SCNC: 25 MMOL/L — SIGNIFICANT CHANGE UP (ref 22–31)
CREAT SERPL-MCNC: 0.78 MG/DL — SIGNIFICANT CHANGE UP (ref 0.5–1.3)
EOSINOPHIL # BLD AUTO: 0 K/UL — SIGNIFICANT CHANGE UP (ref 0–0.5)
EOSINOPHIL NFR BLD AUTO: 0 % — SIGNIFICANT CHANGE UP (ref 0–6)
GLUCOSE SERPL-MCNC: 120 MG/DL — HIGH (ref 70–99)
HCT VFR BLD CALC: 34 % — LOW (ref 39–50)
HGB BLD-MCNC: 10.8 G/DL — LOW (ref 13–17)
IMM GRANULOCYTES NFR BLD AUTO: 0.3 % — SIGNIFICANT CHANGE UP (ref 0–1.5)
LYMPHOCYTES # BLD AUTO: 0.92 K/UL — LOW (ref 1–3.3)
LYMPHOCYTES # BLD AUTO: 12 % — LOW (ref 13–44)
MCHC RBC-ENTMCNC: 27.3 PG — SIGNIFICANT CHANGE UP (ref 27–34)
MCHC RBC-ENTMCNC: 31.8 GM/DL — LOW (ref 32–36)
MCV RBC AUTO: 85.9 FL — SIGNIFICANT CHANGE UP (ref 80–100)
MONOCYTES # BLD AUTO: 0.62 K/UL — SIGNIFICANT CHANGE UP (ref 0–0.9)
MONOCYTES NFR BLD AUTO: 8.1 % — SIGNIFICANT CHANGE UP (ref 2–14)
NEUTROPHILS # BLD AUTO: 6.08 K/UL — SIGNIFICANT CHANGE UP (ref 1.8–7.4)
NEUTROPHILS NFR BLD AUTO: 79.6 % — HIGH (ref 43–77)
PLATELET # BLD AUTO: 149 K/UL — LOW (ref 150–400)
POTASSIUM SERPL-MCNC: 4.3 MMOL/L — SIGNIFICANT CHANGE UP (ref 3.5–5.3)
POTASSIUM SERPL-SCNC: 4.3 MMOL/L — SIGNIFICANT CHANGE UP (ref 3.5–5.3)
RBC # BLD: 3.96 M/UL — LOW (ref 4.2–5.8)
RBC # FLD: 13.8 % — SIGNIFICANT CHANGE UP (ref 10.3–14.5)
SODIUM SERPL-SCNC: 141 MMOL/L — SIGNIFICANT CHANGE UP (ref 135–145)
WBC # BLD: 7.64 K/UL — SIGNIFICANT CHANGE UP (ref 3.8–10.5)
WBC # FLD AUTO: 7.64 K/UL — SIGNIFICANT CHANGE UP (ref 3.8–10.5)

## 2018-03-13 PROCEDURE — 99232 SBSQ HOSP IP/OBS MODERATE 35: CPT

## 2018-03-13 RX ORDER — ACETAMINOPHEN 500 MG
650 TABLET ORAL EVERY 6 HOURS
Qty: 0 | Refills: 0 | Status: DISCONTINUED | OUTPATIENT
Start: 2018-03-13 | End: 2018-03-13

## 2018-03-13 RX ORDER — KETOROLAC TROMETHAMINE 30 MG/ML
30 SYRINGE (ML) INJECTION EVERY 6 HOURS
Qty: 0 | Refills: 0 | Status: DISCONTINUED | OUTPATIENT
Start: 2018-03-13 | End: 2018-03-14

## 2018-03-13 RX ORDER — MAGNESIUM OXIDE 400 MG ORAL TABLET 241.3 MG
1000 TABLET ORAL
Qty: 0 | Refills: 0 | Status: DISCONTINUED | OUTPATIENT
Start: 2018-03-13 | End: 2018-03-14

## 2018-03-13 RX ORDER — ACETAMINOPHEN 500 MG
1000 TABLET ORAL EVERY 6 HOURS
Qty: 0 | Refills: 0 | Status: DISCONTINUED | OUTPATIENT
Start: 2018-03-13 | End: 2018-03-16

## 2018-03-13 RX ADMIN — OXYCODONE HYDROCHLORIDE 5 MILLIGRAM(S): 5 TABLET ORAL at 08:18

## 2018-03-13 RX ADMIN — Medication 30 MILLIGRAM(S): at 01:00

## 2018-03-13 RX ADMIN — Medication 1000 MILLIGRAM(S): at 10:39

## 2018-03-13 RX ADMIN — LORATADINE 10 MILLIGRAM(S): 10 TABLET ORAL at 21:09

## 2018-03-13 RX ADMIN — MONTELUKAST 10 MILLIGRAM(S): 4 TABLET, CHEWABLE ORAL at 21:10

## 2018-03-13 RX ADMIN — Medication 400 MILLIGRAM(S): at 10:09

## 2018-03-13 RX ADMIN — Medication 30 MILLIGRAM(S): at 22:00

## 2018-03-13 RX ADMIN — Medication 81 MILLIGRAM(S): at 12:01

## 2018-03-13 RX ADMIN — Medication 400 MILLIGRAM(S): at 02:57

## 2018-03-13 RX ADMIN — HEPARIN SODIUM 5000 UNIT(S): 5000 INJECTION INTRAVENOUS; SUBCUTANEOUS at 21:09

## 2018-03-13 RX ADMIN — Medication 30 MILLIGRAM(S): at 21:10

## 2018-03-13 RX ADMIN — Medication 12.5 MILLIGRAM(S): at 05:50

## 2018-03-13 RX ADMIN — Medication 30 MILLIGRAM(S): at 06:19

## 2018-03-13 RX ADMIN — Medication 30 MILLIGRAM(S): at 05:45

## 2018-03-13 RX ADMIN — Medication 650 MILLIGRAM(S): at 18:27

## 2018-03-13 RX ADMIN — OXYCODONE HYDROCHLORIDE 5 MILLIGRAM(S): 5 TABLET ORAL at 22:12

## 2018-03-13 RX ADMIN — Medication 650 MILLIGRAM(S): at 17:57

## 2018-03-13 RX ADMIN — Medication 30 MILLIGRAM(S): at 12:02

## 2018-03-13 RX ADMIN — Medication 30 MILLIGRAM(S): at 12:32

## 2018-03-13 RX ADMIN — Medication 1000 MILLIGRAM(S): at 03:30

## 2018-03-13 RX ADMIN — OXYCODONE HYDROCHLORIDE 5 MILLIGRAM(S): 5 TABLET ORAL at 22:45

## 2018-03-13 RX ADMIN — OXYCODONE HYDROCHLORIDE 5 MILLIGRAM(S): 5 TABLET ORAL at 07:48

## 2018-03-13 RX ADMIN — HEPARIN SODIUM 5000 UNIT(S): 5000 INJECTION INTRAVENOUS; SUBCUTANEOUS at 15:01

## 2018-03-13 RX ADMIN — BUDESONIDE AND FORMOTEROL FUMARATE DIHYDRATE 2 PUFF(S): 160; 4.5 AEROSOL RESPIRATORY (INHALATION) at 21:10

## 2018-03-13 RX ADMIN — Medication 1 DROP(S): at 05:45

## 2018-03-13 RX ADMIN — OXYCODONE HYDROCHLORIDE 5 MILLIGRAM(S): 5 TABLET ORAL at 15:01

## 2018-03-13 RX ADMIN — MAGNESIUM OXIDE 400 MG ORAL TABLET 1000 MILLIGRAM(S): 241.3 TABLET ORAL at 17:38

## 2018-03-13 RX ADMIN — Medication 30 MILLIGRAM(S): at 00:28

## 2018-03-13 RX ADMIN — HEPARIN SODIUM 5000 UNIT(S): 5000 INJECTION INTRAVENOUS; SUBCUTANEOUS at 05:45

## 2018-03-13 RX ADMIN — BUDESONIDE AND FORMOTEROL FUMARATE DIHYDRATE 2 PUFF(S): 160; 4.5 AEROSOL RESPIRATORY (INHALATION) at 07:49

## 2018-03-13 RX ADMIN — OXYCODONE HYDROCHLORIDE 5 MILLIGRAM(S): 5 TABLET ORAL at 15:31

## 2018-03-13 NOTE — PROGRESS NOTE ADULT - ATTENDING COMMENTS
I have seen and evaluated patient and agree with resident assessment and plan.  Some distension and tympany, no nausea, sips until resolution, then advance as tolerated per ERP

## 2018-03-13 NOTE — PROGRESS NOTE ADULT - SUBJECTIVE AND OBJECTIVE BOX
CHIEF COMPLAINT: good spirits--no sob--bloated at tis point--no cough or wheeze    Interval Events:s/p uneventful surgery    REVIEW OF SYSTEMS:  Constitutional: No fevers or chills. No weight loss. No fatigue or generalized malaise.  Eyes: No itching or discharge from the eyes  ENT: No ear pain. No ear discharge. No nasal congestion. No post nasal drip. No epistaxis. No throat pain. No sore throat. No difficulty swallowing.   CV: No chest pain. No palpitations. No lightheadedness or dizziness.   Resp: No dyspnea at rest. No dyspnea on exertion. No orthopnea. No wheezing. No cough. No stridor. No sputum production. No chest pain with respiration.  GI: No nausea. No vomiting. No diarrhea.  + abdom. discomfort  MSK: No joint pain or pain in any extremities  Integumentary: No skin lesions. No pedal edema.  Neurological: No gross motor weakness. No sensory changes.  [ ] All other systems negative  [ ] Unable to assess ROS because ________    OBJECTIVE:  ICU Vital Signs Last 24 Hrs  T(C): 36.3 (13 Mar 2018 05:40), Max: 37.4 (12 Mar 2018 17:36)  T(F): 97.3 (13 Mar 2018 05:40), Max: 99.3 (12 Mar 2018 17:36)  HR: 69 (13 Mar 2018 05:40) (69 - 100)  BP: 122/74 (13 Mar 2018 05:40) (92/57 - 122/74)  BP(mean): 87 (12 Mar 2018 13:15) (70 - 87)  ABP: --  ABP(mean): --  RR: 18 (13 Mar 2018 05:40) (17 - 18)  SpO2: 97% (13 Mar 2018 05:40) (94% - 97%)        03-12 @ 07:01  -  03-13 @ 07:00  --------------------------------------------------------  IN: 810 mL / OUT: 2350 mL / NET: -1540 mL      CAPILLARY BLOOD GLUCOSE      POCT Blood Glucose.: 92 mg/dL (12 Mar 2018 06:22)      PHYSICAL EXAM: NAD in bed  General: Awake, alert, oriented X 3.   HEENT: Atraumatic, normocephalic.                 Mallampatti Grade 2                No nasal congestion.                No tonsillar or pharyngeal exudates.  Lymph Nodes: No palpable lymphadenopathy  Neck: No JVD. No carotid bruit.   Respiratory: Normal chest expansion                         Normal percussion                         Normal and equal air entry                         No wheeze, rhonchi or rales.  Cardiovascular: S1 S2 normal. No murmurs, rubs or gallops.   Abdomen: Soft, ++-tender, non-distended. No organomegaly.  Extremities: Warm to touch. Peripheral pulse palpable. No pedal edema.   Skin: No rashes or skin lesions  Neurological: Motor and sensory examination equal and normal in all four extremities.  Psychiatry: Appropriate mood and affect.    HOSPITAL MEDICATIONS:  MEDICATIONS  (STANDING):  acetaminophen  IVPB. 1000 milliGRAM(s) IV Intermittent once  aspirin enteric coated 81 milliGRAM(s) Oral daily  buDESOnide 160 MICROgram(s)/formoterol 4.5 MICROgram(s) Inhaler 2 Puff(s) Inhalation two times a day  heparin  Injectable 5000 Unit(s) SubCutaneous every 8 hours  ketorolac   Injectable 30 milliGRAM(s) IV Push every 6 hours  lactated ringers. 1000 milliLiter(s) (40 mL/Hr) IV Continuous <Continuous>  loratadine 10 milliGRAM(s) Oral at bedtime  metoprolol succinate ER 12.5 milliGRAM(s) Oral daily  montelukast 10 milliGRAM(s) Oral at bedtime  morphine PF Spinal 0.2 milliGRAM(s) IntraThecal. once    MEDICATIONS  (PRN):  artificial  tears Solution 1 Drop(s) Both EYES four times a day PRN Dry Eyes  naloxone Injectable 0.1 milliGRAM(s) IV Push every 3 minutes PRN For ANY of the following changes in patient status:  A. RR LESS THAN 10 breaths per minute, B. Oxygen saturation LESS THAN 90%, C. Sedation score of 6  oxyCODONE    IR 5 milliGRAM(s) Oral every 6 hours PRN Moderate Pain      LABS:    03-13    141  |  103  |  9   ----------------------------<  120<H>  4.3   |  25  |  0.78    Ca    9.2      13 Mar 2018 07:15                MICROBIOLOGY:     RADIOLOGY:  [ ] Reviewed and interpreted by me    Point of Care Ultrasound Findings:    PFT:    EKG:

## 2018-03-13 NOTE — PROGRESS NOTE ADULT - ASSESSMENT
52 year old male with history of CAD, HLD and IBD S/P surgery for IBD complications/strictures. Cardiac status is stable. Resume aspirin and beta blocker. Increase activity as tolerates. DVT prophylaxis.

## 2018-03-13 NOTE — PROGRESS NOTE ADULT - SUBJECTIVE AND OBJECTIVE BOX
Patient is a 52y old  Male who presents with a chief complaint of Crohn's disease (12 Mar 2018 06:30)    He underwent surgery yesterday. He denies c/o chest pain, SOB or palpitations. No flatus yet.     Allergies    No Known Allergies    Intolerances      MEDICATIONS  (STANDING):  acetaminophen  IVPB. 1000 milliGRAM(s) IV Intermittent once  aspirin enteric coated 81 milliGRAM(s) Oral daily  buDESOnide 160 MICROgram(s)/formoterol 4.5 MICROgram(s) Inhaler 2 Puff(s) Inhalation two times a day  heparin  Injectable 5000 Unit(s) SubCutaneous every 8 hours  ketorolac   Injectable 30 milliGRAM(s) IV Push every 6 hours  lactated ringers. 1000 milliLiter(s) (40 mL/Hr) IV Continuous <Continuous>  loratadine 10 milliGRAM(s) Oral at bedtime  metoprolol succinate ER 12.5 milliGRAM(s) Oral daily  montelukast 10 milliGRAM(s) Oral at bedtime  morphine PF Spinal 0.2 milliGRAM(s) IntraThecal. once    MEDICATIONS  (PRN):  artificial  tears Solution 1 Drop(s) Both EYES four times a day PRN Dry Eyes  naloxone Injectable 0.1 milliGRAM(s) IV Push every 3 minutes PRN For ANY of the following changes in patient status:  A. RR LESS THAN 10 breaths per minute, B. Oxygen saturation LESS THAN 90%, C. Sedation score of 6  oxyCODONE    IR 5 milliGRAM(s) Oral every 6 hours PRN Moderate Pain      PHYSICAL EXAM:  Vital Signs Last 24 Hrs  T(C): 36.3 (13 Mar 2018 05:40), Max: 37.4 (12 Mar 2018 17:36)  T(F): 97.3 (13 Mar 2018 05:40), Max: 99.3 (12 Mar 2018 17:36)  HR: 69 (13 Mar 2018 05:40) (69 - 100)  BP: 122/74 (13 Mar 2018 05:40) (92/57 - 122/74)  BP(mean): 87 (12 Mar 2018 13:15) (70 - 87)  RR: 18 (13 Mar 2018 05:40) (17 - 18)  SpO2: 97% (13 Mar 2018 05:40) (94% - 97%)  Daily     Daily   I&O's Summary    12 Mar 2018 07:01  -  13 Mar 2018 06:54  --------------------------------------------------------  IN: 810 mL / OUT: 2350 mL / NET: -1540 mL        General Appearance: 	 Alert, cooperative, no distress; supplemental oxygen  HEENT: normocephalic, atraumatic  Neck: no JVD,  carotid 2+  bilaterally without bruits  Lungs:  clear to auscultation and percussion bilaterally  Cor:  pmi 5th ICS MCL, regular rate and rhythm, S1 normal intensity, S2 normal intensity, no gallops, murmurs or rubs  Abdomen: distended and BS decreased  Extremities: without cyanosis, clubbing or edema  Vasc: 2-+ PT and DP pulses; no varicosities  Neurologic: A&O x 3 (time, place, person). Symmetric strength; limited exam  Skin: no rashes; limited exam      Labs; pending              Randell Fernández MD [facility]  [phone]

## 2018-03-13 NOTE — PROGRESS NOTE ADULT - ASSESSMENT
Mr. Mota is a 52 year old patient POD 1 s/p ERP lap assisted stricturoplasty x 2 and SBR (15cm) x 1. he is hemodynamically stable. Labs are unremarkable.     - DVT ppx: SQH, c/w ASA  - Diet: on CLD tolerating well  - Hold plavix for now  - Pain control with tylenol, toradol, oxycodone  - Monitor GI function  - Replete electrolyte as necessary  - Activity: OOb as tolerated  - Restart Crohn's meds upon discharge  - CARLOS Cabrera  - 24H cont. pulse ox for spinal Mr. Mota is a 52 year old patient POD 1 s/p ERP lap assisted stricturoplasty x 2 and SBR (15cm) x 1. he is hemodynamically stable. Labs are unremarkable.     - DVT ppx: SQH, c/w ASA  - Diet: cont clears  - Hold plavix for now  - Pain control with tylenol, toradol, oxycodone  - Monitor GI function  - Replete electrolyte as necessary  - Activity: OOb as tolerated  - Restart Crohn's meds upon discharge  - CARLOS Cabrera

## 2018-03-13 NOTE — PROGRESS NOTE ADULT - SUBJECTIVE AND OBJECTIVE BOX
[facility] PROGRESS NOTE    SUBJECTIVE:   Patient was seen and examined this morning. There was no acute event overnight. He is resting comfortably in bed. Pain is well controlled. He does not report pain, fever, n/v, chest pain, or shortness of breath.     Vital Signs Last 24 Hrs  T(C): 36.8 (13 Mar 2018 01:26), Max: 37.4 (12 Mar 2018 17:36)  T(F): 98.3 (13 Mar 2018 01:26), Max: 99.3 (12 Mar 2018 17:36)  HR: 82 (13 Mar 2018 01:26) (70 - 100)  BP: 113/71 (13 Mar 2018 01:26) (92/57 - 121/78)  BP(mean): 87 (12 Mar 2018 13:15) (70 - 87)  RR: 18 (13 Mar 2018 01:26) (17 - 18)  SpO2: 97% (13 Mar 2018 01:26) (94% - 100%)    Physical Exam:  General Appearance: Appears well, NAD  Abdomen: Soft, ND, appropriate incisional tenderness, dressings clean and dry and intact  Extremities: Grossly symmetric, SCD's in place       I&O's Summary    12 Mar 2018 07:01  -  13 Mar 2018 04:51  --------------------------------------------------------  IN: 400 mL / OUT: 2200 mL / NET: -1800 mL      I&O's Detail    12 Mar 2018 07:01  -  13 Mar 2018 04:51  --------------------------------------------------------  IN:    IV PiggyBack: 200 mL    lactated ringers.: 80 mL    Oral Fluid: 120 mL  Total IN: 400 mL    OUT:    Indwelling Catheter - Urethral: 2200 mL  Total OUT: 2200 mL    Total NET: -1800 mL          MEDICATIONS  (STANDING):  acetaminophen  IVPB. 1000 milliGRAM(s) IV Intermittent once  aspirin enteric coated 81 milliGRAM(s) Oral daily  buDESOnide 160 MICROgram(s)/formoterol 4.5 MICROgram(s) Inhaler 2 Puff(s) Inhalation two times a day  heparin  Injectable 5000 Unit(s) SubCutaneous every 8 hours  ketorolac   Injectable 30 milliGRAM(s) IV Push every 6 hours  lactated ringers. 1000 milliLiter(s) (40 mL/Hr) IV Continuous <Continuous>  loratadine 10 milliGRAM(s) Oral at bedtime  metoprolol succinate ER 12.5 milliGRAM(s) Oral daily  montelukast 10 milliGRAM(s) Oral at bedtime  morphine PF Spinal 0.2 milliGRAM(s) IntraThecal. once    MEDICATIONS  (PRN):  artificial  tears Solution 1 Drop(s) Both EYES four times a day PRN Dry Eyes  naloxone Injectable 0.1 milliGRAM(s) IV Push every 3 minutes PRN For ANY of the following changes in patient status:  A. RR LESS THAN 10 breaths per minute, B. Oxygen saturation LESS THAN 90%, C. Sedation score of 6  oxyCODONE    IR 5 milliGRAM(s) Oral every 6 hours PRN Moderate Pain      LABS:                RADIOLOGY & ADDITIONAL STUDIES:    [facility] #[id number] [facility] PROGRESS NOTE    SUBJECTIVE:   Patient was seen and examined this morning. There was no acute event overnight. He is resting comfortably in bed. Pain is well controlled. He does not report pain, fever, n/v, chest pain, or shortness of breath.     Vital Signs Last 24 Hrs  T(C): 36.8 (13 Mar 2018 01:26), Max: 37.4 (12 Mar 2018 17:36)  T(F): 98.3 (13 Mar 2018 01:26), Max: 99.3 (12 Mar 2018 17:36)  HR: 82 (13 Mar 2018 01:26) (70 - 100)  BP: 113/71 (13 Mar 2018 01:26) (92/57 - 121/78)  BP(mean): 87 (12 Mar 2018 13:15) (70 - 87)  RR: 18 (13 Mar 2018 01:26) (17 - 18)  SpO2: 97% (13 Mar 2018 01:26) (94% - 100%)    Physical Exam:  General Appearance: Appears well, NAD  Abdomen: Soft, ND, appropriate incisional tenderness, dressings clean and dry and intact, some distension and tympany  Extremities: Grossly symmetric, SCD's in place       I&O's Summary    12 Mar 2018 07:01  -  13 Mar 2018 04:51  --------------------------------------------------------  IN: 400 mL / OUT: 2200 mL / NET: -1800 mL      I&O's Detail    12 Mar 2018 07:01  -  13 Mar 2018 04:51  --------------------------------------------------------  IN:    IV PiggyBack: 200 mL    lactated ringers.: 80 mL    Oral Fluid: 120 mL  Total IN: 400 mL    OUT:    Indwelling Catheter - Urethral: 2200 mL  Total OUT: 2200 mL    Total NET: -1800 mL          MEDICATIONS  (STANDING):  acetaminophen  IVPB. 1000 milliGRAM(s) IV Intermittent once  aspirin enteric coated 81 milliGRAM(s) Oral daily  buDESOnide 160 MICROgram(s)/formoterol 4.5 MICROgram(s) Inhaler 2 Puff(s) Inhalation two times a day  heparin  Injectable 5000 Unit(s) SubCutaneous every 8 hours  ketorolac   Injectable 30 milliGRAM(s) IV Push every 6 hours  lactated ringers. 1000 milliLiter(s) (40 mL/Hr) IV Continuous <Continuous>  loratadine 10 milliGRAM(s) Oral at bedtime  metoprolol succinate ER 12.5 milliGRAM(s) Oral daily  montelukast 10 milliGRAM(s) Oral at bedtime  morphine PF Spinal 0.2 milliGRAM(s) IntraThecal. once    MEDICATIONS  (PRN):  artificial  tears Solution 1 Drop(s) Both EYES four times a day PRN Dry Eyes  naloxone Injectable 0.1 milliGRAM(s) IV Push every 3 minutes PRN For ANY of the following changes in patient status:  A. RR LESS THAN 10 breaths per minute, B. Oxygen saturation LESS THAN 90%, C. Sedation score of 6  oxyCODONE    IR 5 milliGRAM(s) Oral every 6 hours PRN Moderate Pain      LABS:                RADIOLOGY & ADDITIONAL STUDIES:    [facility] #[id number] [facility] PROGRESS NOTE    SUBJECTIVE:   Patient was seen and examined this morning. There was no acute event overnight. He is resting comfortably in bed. Pain is well controlled. He does not report pain, fever, n/v, chest pain, or shortness of breath.     Vital Signs Last 24 Hrs  T(C): 36.8 (13 Mar 2018 01:26), Max: 37.4 (12 Mar 2018 17:36)  T(F): 98.3 (13 Mar 2018 01:26), Max: 99.3 (12 Mar 2018 17:36)  HR: 82 (13 Mar 2018 01:26) (70 - 100)  BP: 113/71 (13 Mar 2018 01:26) (92/57 - 121/78)  BP(mean): 87 (12 Mar 2018 13:15) (70 - 87)  RR: 18 (13 Mar 2018 01:26) (17 - 18)  SpO2: 97% (13 Mar 2018 01:26) (94% - 100%)    Physical Exam:  General Appearance: Appears well, NAD  Abdomen: Softly distended, appropriate incisional tenderness, dressings clean and dry and intact.   Extremities: Grossly symmetric, SCD's in place       I&O's Summary    12 Mar 2018 07:01  -  13 Mar 2018 04:51  --------------------------------------------------------  IN: 400 mL / OUT: 2200 mL / NET: -1800 mL      I&O's Detail    12 Mar 2018 07:01  -  13 Mar 2018 04:51  --------------------------------------------------------  IN:    IV PiggyBack: 200 mL    lactated ringers.: 80 mL    Oral Fluid: 120 mL  Total IN: 400 mL    OUT:    Indwelling Catheter - Urethral: 2200 mL  Total OUT: 2200 mL    Total NET: -1800 mL          MEDICATIONS  (STANDING):  acetaminophen  IVPB. 1000 milliGRAM(s) IV Intermittent once  aspirin enteric coated 81 milliGRAM(s) Oral daily  buDESOnide 160 MICROgram(s)/formoterol 4.5 MICROgram(s) Inhaler 2 Puff(s) Inhalation two times a day  heparin  Injectable 5000 Unit(s) SubCutaneous every 8 hours  ketorolac   Injectable 30 milliGRAM(s) IV Push every 6 hours  lactated ringers. 1000 milliLiter(s) (40 mL/Hr) IV Continuous <Continuous>  loratadine 10 milliGRAM(s) Oral at bedtime  metoprolol succinate ER 12.5 milliGRAM(s) Oral daily  montelukast 10 milliGRAM(s) Oral at bedtime  morphine PF Spinal 0.2 milliGRAM(s) IntraThecal. once    MEDICATIONS  (PRN):  artificial  tears Solution 1 Drop(s) Both EYES four times a day PRN Dry Eyes  naloxone Injectable 0.1 milliGRAM(s) IV Push every 3 minutes PRN For ANY of the following changes in patient status:  A. RR LESS THAN 10 breaths per minute, B. Oxygen saturation LESS THAN 90%, C. Sedation score of 6  oxyCODONE    IR 5 milliGRAM(s) Oral every 6 hours PRN Moderate Pain      LABS:                RADIOLOGY & ADDITIONAL STUDIES:    [facility] #[id number]

## 2018-03-13 NOTE — PROGRESS NOTE ADULT - ATTENDING COMMENTS
as above--pulm stable--s/p bowel surgery  Continue all pulm rx as above--will add accapella to incentive spirometry    Ambulate    Leonel Duckworth MD-Pulmonary   [phone]

## 2018-03-13 NOTE — PROGRESS NOTE ADULT - ASSESSMENT
52M Crohns with a history of SBO and pancreatitis, MARYAM on APAP, controlled severe persistent asthma presenting for bowel surgery -s/p recurrent SBO    tolerated procedure well--no post op pulm issues.

## 2018-03-13 NOTE — PROGRESS NOTE ADULT - SUBJECTIVE AND OBJECTIVE BOX
Day 1 of Anesthesia Pain Management Service    SUBJECTIVE:  Pain Scale Score:          [X] Refer to charted pain scores    THERAPY: Received PF spinal morphine as above    OBJECTIVE:    Sedation:        	[X] Alert	[ ] Drowsy	[ ] Arousable      [ ] Asleep       [ ] Unresponsive    Side Effects:	[X] None	[ ] Nausea	[ ] Vomiting         [ ] Pruritus  		[ ] Weakness            [ ] Numbness	          [ ] Other:    ASSESSMENT/ PLAN  [X] Patient transitioned to prn analgesics  [X] Pain management per primary service, pain service to sign off   [X]Documentation and Verification of current medications

## 2018-03-13 NOTE — PROGRESS NOTE ADULT - SUBJECTIVE AND OBJECTIVE BOX
Day 1 of Anesthesia Pain Management Service    SUBJECTIVE: A little distended  Pain Scale Score:          [X] Refer to charted pain scores    THERAPY:    s/p 200mcg PF morphine on 3\12\18      MEDICATIONS  (STANDING):  acetaminophen  IVPB. 1000 milliGRAM(s) IV Intermittent once  aspirin enteric coated 81 milliGRAM(s) Oral daily  buDESOnide 160 MICROgram(s)/formoterol 4.5 MICROgram(s) Inhaler 2 Puff(s) Inhalation two times a day  heparin  Injectable 5000 Unit(s) SubCutaneous every 8 hours  ketorolac   Injectable 30 milliGRAM(s) IV Push every 6 hours  lactated ringers. 1000 milliLiter(s) (40 mL/Hr) IV Continuous <Continuous>  loratadine 10 milliGRAM(s) Oral at bedtime  metoprolol succinate ER 12.5 milliGRAM(s) Oral daily  montelukast 10 milliGRAM(s) Oral at bedtime  morphine PF Spinal 0.2 milliGRAM(s) IntraThecal. once    MEDICATIONS  (PRN):  artificial  tears Solution 1 Drop(s) Both EYES four times a day PRN Dry Eyes  naloxone Injectable 0.1 milliGRAM(s) IV Push every 3 minutes PRN For ANY of the following changes in patient status:  A. RR LESS THAN 10 breaths per minute, B. Oxygen saturation LESS THAN 90%, C. Sedation score of 6  oxyCODONE    IR 5 milliGRAM(s) Oral every 6 hours PRN Moderate Pain      OBJECTIVE:    Sedation:        	[X] Alert	[ ] Drowsy	[ ] Arousable      [ ] Asleep       [ ] Unresponsive    Side Effects:	[X] None	[ ] Nausea	[ ] Vomiting         [ ] Pruritus  		[ ] Weakness            [ ] Numbness	          [ ] Other:    Vital Signs Last 24 Hrs  T(C): 36.3 (13 Mar 2018 05:40), Max: 37.4 (12 Mar 2018 17:36)  T(F): 97.3 (13 Mar 2018 05:40), Max: 99.3 (12 Mar 2018 17:36)  HR: 69 (13 Mar 2018 05:40) (69 - 100)  BP: 122/74 (13 Mar 2018 05:40) (92/57 - 122/74)  BP(mean): 87 (12 Mar 2018 13:15) (70 - 87)  RR: 18 (13 Mar 2018 05:40) (17 - 18)  SpO2: 97% (13 Mar 2018 05:40) (94% - 97%)    ASSESSMENT/ PLAN  [X] Patient transitioned to prn analgesics  [X] Pain management per primary service, pain service to sign off   [X]Documentation and Verification of current medications

## 2018-03-14 ENCOUNTER — TRANSCRIPTION ENCOUNTER (OUTPATIENT)
Age: 53
End: 2018-03-14

## 2018-03-14 LAB
ANION GAP SERPL CALC-SCNC: 11 MMOL/L — SIGNIFICANT CHANGE UP (ref 5–17)
BUN SERPL-MCNC: 17 MG/DL — SIGNIFICANT CHANGE UP (ref 7–23)
CALCIUM SERPL-MCNC: 9.1 MG/DL — SIGNIFICANT CHANGE UP (ref 8.4–10.5)
CHLORIDE SERPL-SCNC: 104 MMOL/L — SIGNIFICANT CHANGE UP (ref 96–108)
CO2 SERPL-SCNC: 28 MMOL/L — SIGNIFICANT CHANGE UP (ref 22–31)
CREAT SERPL-MCNC: 0.79 MG/DL — SIGNIFICANT CHANGE UP (ref 0.5–1.3)
GLUCOSE SERPL-MCNC: 94 MG/DL — SIGNIFICANT CHANGE UP (ref 70–99)
HCT VFR BLD CALC: 31 % — LOW (ref 39–50)
HCT VFR BLD CALC: 33.5 % — LOW (ref 39–50)
HGB BLD-MCNC: 10.4 G/DL — LOW (ref 13–17)
HGB BLD-MCNC: 11.2 G/DL — LOW (ref 13–17)
MAGNESIUM SERPL-MCNC: 2.1 MG/DL — SIGNIFICANT CHANGE UP (ref 1.6–2.6)
MCHC RBC-ENTMCNC: 26.7 PG — LOW (ref 27–34)
MCHC RBC-ENTMCNC: 30.4 PG — SIGNIFICANT CHANGE UP (ref 27–34)
MCHC RBC-ENTMCNC: 31 GM/DL — LOW (ref 32–36)
MCHC RBC-ENTMCNC: 36.1 GM/DL — HIGH (ref 32–36)
MCV RBC AUTO: 84.3 FL — SIGNIFICANT CHANGE UP (ref 80–100)
MCV RBC AUTO: 86.1 FL — SIGNIFICANT CHANGE UP (ref 80–100)
NRBC # BLD: 0 /100 WBCS — SIGNIFICANT CHANGE UP (ref 0–0)
PHOSPHATE SERPL-MCNC: 3.6 MG/DL — SIGNIFICANT CHANGE UP (ref 2.5–4.5)
PLATELET # BLD AUTO: 148 K/UL — LOW (ref 150–400)
PLATELET # BLD AUTO: 155 K/UL — SIGNIFICANT CHANGE UP (ref 150–400)
POTASSIUM SERPL-MCNC: 4 MMOL/L — SIGNIFICANT CHANGE UP (ref 3.5–5.3)
POTASSIUM SERPL-SCNC: 4 MMOL/L — SIGNIFICANT CHANGE UP (ref 3.5–5.3)
RBC # BLD: 3.68 M/UL — LOW (ref 4.2–5.8)
RBC # BLD: 3.89 M/UL — LOW (ref 4.2–5.8)
RBC # FLD: 12.3 % — SIGNIFICANT CHANGE UP (ref 10.3–14.5)
RBC # FLD: 13.8 % — SIGNIFICANT CHANGE UP (ref 10.3–14.5)
SODIUM SERPL-SCNC: 143 MMOL/L — SIGNIFICANT CHANGE UP (ref 135–145)
WBC # BLD: 5.8 K/UL — SIGNIFICANT CHANGE UP (ref 3.8–10.5)
WBC # BLD: 6.22 K/UL — SIGNIFICANT CHANGE UP (ref 3.8–10.5)
WBC # FLD AUTO: 5.8 K/UL — SIGNIFICANT CHANGE UP (ref 3.8–10.5)
WBC # FLD AUTO: 6.22 K/UL — SIGNIFICANT CHANGE UP (ref 3.8–10.5)

## 2018-03-14 PROCEDURE — 99232 SBSQ HOSP IP/OBS MODERATE 35: CPT

## 2018-03-14 RX ORDER — ACETAMINOPHEN 500 MG
1000 TABLET ORAL ONCE
Qty: 0 | Refills: 0 | Status: COMPLETED | OUTPATIENT
Start: 2018-03-14 | End: 2018-03-14

## 2018-03-14 RX ORDER — METOCLOPRAMIDE HCL 10 MG
10 TABLET ORAL ONCE
Qty: 0 | Refills: 0 | Status: DISCONTINUED | OUTPATIENT
Start: 2018-03-14 | End: 2018-03-15

## 2018-03-14 RX ORDER — SIMETHICONE 80 MG/1
80 TABLET, CHEWABLE ORAL
Qty: 0 | Refills: 0 | Status: DISCONTINUED | OUTPATIENT
Start: 2018-03-14 | End: 2018-03-16

## 2018-03-14 RX ORDER — ONDANSETRON 8 MG/1
4 TABLET, FILM COATED ORAL ONCE
Qty: 0 | Refills: 0 | Status: DISCONTINUED | OUTPATIENT
Start: 2018-03-14 | End: 2018-03-15

## 2018-03-14 RX ORDER — HYDROMORPHONE HYDROCHLORIDE 2 MG/ML
2 INJECTION INTRAMUSCULAR; INTRAVENOUS; SUBCUTANEOUS ONCE
Qty: 0 | Refills: 0 | Status: DISCONTINUED | OUTPATIENT
Start: 2018-03-14 | End: 2018-03-15

## 2018-03-14 RX ORDER — IBUPROFEN 200 MG
400 TABLET ORAL EVERY 6 HOURS
Qty: 0 | Refills: 0 | Status: DISCONTINUED | OUTPATIENT
Start: 2018-03-14 | End: 2018-03-16

## 2018-03-14 RX ADMIN — HEPARIN SODIUM 5000 UNIT(S): 5000 INJECTION INTRAVENOUS; SUBCUTANEOUS at 05:30

## 2018-03-14 RX ADMIN — Medication 12.5 MILLIGRAM(S): at 05:30

## 2018-03-14 RX ADMIN — MONTELUKAST 10 MILLIGRAM(S): 4 TABLET, CHEWABLE ORAL at 21:27

## 2018-03-14 RX ADMIN — Medication 400 MILLIGRAM(S): at 00:23

## 2018-03-14 RX ADMIN — HEPARIN SODIUM 5000 UNIT(S): 5000 INJECTION INTRAVENOUS; SUBCUTANEOUS at 21:27

## 2018-03-14 RX ADMIN — Medication 1000 MILLIGRAM(S): at 05:31

## 2018-03-14 RX ADMIN — SIMETHICONE 80 MILLIGRAM(S): 80 TABLET, CHEWABLE ORAL at 20:22

## 2018-03-14 RX ADMIN — Medication 400 MILLIGRAM(S): at 14:48

## 2018-03-14 RX ADMIN — BUDESONIDE AND FORMOTEROL FUMARATE DIHYDRATE 2 PUFF(S): 160; 4.5 AEROSOL RESPIRATORY (INHALATION) at 08:40

## 2018-03-14 RX ADMIN — Medication 1000 MILLIGRAM(S): at 12:15

## 2018-03-14 RX ADMIN — Medication 400 MILLIGRAM(S): at 09:00

## 2018-03-14 RX ADMIN — Medication 400 MILLIGRAM(S): at 15:00

## 2018-03-14 RX ADMIN — OXYCODONE HYDROCHLORIDE 5 MILLIGRAM(S): 5 TABLET ORAL at 23:10

## 2018-03-14 RX ADMIN — Medication 30 MILLIGRAM(S): at 02:42

## 2018-03-14 RX ADMIN — OXYCODONE HYDROCHLORIDE 5 MILLIGRAM(S): 5 TABLET ORAL at 17:00

## 2018-03-14 RX ADMIN — Medication 1000 MILLIGRAM(S): at 06:00

## 2018-03-14 RX ADMIN — Medication 400 MILLIGRAM(S): at 21:27

## 2018-03-14 RX ADMIN — HEPARIN SODIUM 5000 UNIT(S): 5000 INJECTION INTRAVENOUS; SUBCUTANEOUS at 14:50

## 2018-03-14 RX ADMIN — BUDESONIDE AND FORMOTEROL FUMARATE DIHYDRATE 2 PUFF(S): 160; 4.5 AEROSOL RESPIRATORY (INHALATION) at 21:27

## 2018-03-14 RX ADMIN — OXYCODONE HYDROCHLORIDE 5 MILLIGRAM(S): 5 TABLET ORAL at 10:13

## 2018-03-14 RX ADMIN — OXYCODONE HYDROCHLORIDE 5 MILLIGRAM(S): 5 TABLET ORAL at 16:22

## 2018-03-14 RX ADMIN — Medication 1000 MILLIGRAM(S): at 18:39

## 2018-03-14 RX ADMIN — Medication 400 MILLIGRAM(S): at 08:40

## 2018-03-14 RX ADMIN — OXYCODONE HYDROCHLORIDE 5 MILLIGRAM(S): 5 TABLET ORAL at 10:50

## 2018-03-14 RX ADMIN — Medication 30 MILLIGRAM(S): at 02:04

## 2018-03-14 RX ADMIN — MAGNESIUM OXIDE 400 MG ORAL TABLET 1000 MILLIGRAM(S): 241.3 TABLET ORAL at 08:40

## 2018-03-14 RX ADMIN — SIMETHICONE 80 MILLIGRAM(S): 80 TABLET, CHEWABLE ORAL at 12:19

## 2018-03-14 RX ADMIN — OXYCODONE HYDROCHLORIDE 5 MILLIGRAM(S): 5 TABLET ORAL at 22:27

## 2018-03-14 RX ADMIN — Medication 81 MILLIGRAM(S): at 12:15

## 2018-03-14 RX ADMIN — Medication 400 MILLIGRAM(S): at 22:05

## 2018-03-14 RX ADMIN — LORATADINE 10 MILLIGRAM(S): 10 TABLET ORAL at 21:27

## 2018-03-14 RX ADMIN — Medication 1000 MILLIGRAM(S): at 01:00

## 2018-03-14 NOTE — DISCHARGE NOTE ADULT - MEDICATION SUMMARY - MEDICATIONS TO TAKE
I will START or STAY ON the medications listed below when I get home from the hospital:    aspirin 81 mg oral delayed release tablet  -- 1 tab(s) by mouth once a day (in the morning)  -- Indication: For CAD (coronary artery disease)    ibuprofen 400 mg oral tablet  -- 1 tab(s) by mouth every 6 hours  -- Indication: For Pain    acetaminophen 500 mg oral tablet  -- 2 tab(s) by mouth every 6 hours  -- Indication: For Pain    oxyCODONE 5 mg oral tablet  -- 1-2  tab(s) by mouth every 4-6 hours, As Needed MDD:8  -- Caution federal law prohibits the transfer of this drug to any person other  than the person for whom it was prescribed.  It is very important that you take or use this exactly as directed.  Do not skip doses or discontinue unless directed by your doctor.  May cause drowsiness.  Alcohol may intensify this effect.  Use care when operating dangerous machinery.  This prescription cannot be refilled.  Using more of this medication than prescribed may cause serious breathing problems.    -- Indication: For Mod Pain    losartan 100 mg oral tablet  -- 1 tab(s) by mouth once a day (in the morning)  -- Indication: For HTN    Xyzal 5 mg oral tablet  -- 1 tab(s) by mouth once a day (in the evening)  -- Indication: For Allergies    Repatha  --  subcutaneous  every 2 weeks  -- Indication: For HLD    metoprolol succinate 25 mg oral tablet, extended release  -- 0.5 tab(s) by mouth once a day  -- Indication: For HTN    Spiriva 18 mcg inhalation capsule  -- 2 cap(s) inhaled once a day (in the evening)  -- Indication: For Asthma    Advair Diskus 250 mcg-50 mcg inhalation powder  -- 1 puff(s) inhaled 2 times a day  -- Indication: For Asthma    Norvasc 10 mg oral tablet  -- 1 tab(s) by mouth once a day  -- Indication: For HTN    Singulair 10 mg oral tablet  -- 1 tab(s) by mouth once a day (in the evening)  -- Indication: For Asthma    Co Q-10 100 mg oral capsule  -- 2 cap(s) by mouth once a day  -- Indication: For supplement    ocular lubricant ophthalmic solution  -- 1 drop(s) to each affected eye 3 times a day, As needed, Dry Eyes  -- Indication: For eye drops    Vitamin D2  -- 5000 unit(s) by mouth once a day  -- Indication: For supplement    Vitamin C 1000 mg oral tablet  -- 1 tab(s) by mouth once a day  -- Indication: For supplement

## 2018-03-14 NOTE — PROGRESS NOTE ADULT - SUBJECTIVE AND OBJECTIVE BOX
CHIEF COMPLAINT:    Interval Events:    REVIEW OF SYSTEMS:  Constitutional: No fevers or chills. No weight loss. No fatigue or generalized malaise.  Eyes: No itching or discharge from the eyes  ENT: No ear pain. No ear discharge. No nasal congestion. No post nasal drip. No epistaxis. No throat pain. No sore throat. No difficulty swallowing.   CV: No chest pain. No palpitations. No lightheadedness or dizziness.   Resp: No dyspnea at rest. No dyspnea on exertion. No orthopnea. No wheezing. No cough. No stridor. No sputum production. No chest pain with respiration.  GI: No nausea. No vomiting. No diarrhea.  MSK: No joint pain or pain in any extremities  Integumentary: No skin lesions. No pedal edema.  Neurological: No gross motor weakness. No sensory changes.  [ ] All other systems negative  [ ] Unable to assess ROS because ________    OBJECTIVE:  ICU Vital Signs Last 24 Hrs  T(C): 36.9 (14 Mar 2018 01:30), Max: 37.1 (13 Mar 2018 09:15)  T(F): 98.4 (14 Mar 2018 01:30), Max: 98.8 (13 Mar 2018 22:00)  HR: 78 (14 Mar 2018 01:30) (69 - 78)  BP: 120/73 (14 Mar 2018 01:30) (120/73 - 139/84)  BP(mean): --  ABP: --  ABP(mean): --  RR: 18 (14 Mar 2018 01:30) (18 - 18)  SpO2: 94% (14 Mar 2018 01:30) (94% - 97%)        03-12 @ 07:01  -  03-13 @ 07:00  --------------------------------------------------------  IN: 810 mL / OUT: 2350 mL / NET: -1540 mL    03-13 @ 07:01  -  03-14 @ 05:13  --------------------------------------------------------  IN: 1220 mL / OUT: 1100 mL / NET: 120 mL      CAPILLARY BLOOD GLUCOSE      POCT Blood Glucose.: 92 mg/dL (12 Mar 2018 06:22)      PHYSICAL EXAM:  General: Awake, alert, oriented X 3.   HEENT: Atraumatic, normocephalic.                 Mallampatti Grade                 No nasal congestion.                No tonsillar or pharyngeal exudates.  Lymph Nodes: No palpable lymphadenopathy  Neck: No JVD. No carotid bruit.   Respiratory: Normal chest expansion                         Normal percussion                         Normal and equal air entry                         No wheeze, rhonchi or rales.  Cardiovascular: S1 S2 normal. No murmurs, rubs or gallops.   Abdomen: Soft, non-tender, non-distended. No organomegaly.  Extremities: Warm to touch. Peripheral pulse palpable. No pedal edema.   Skin: No rashes or skin lesions  Neurological: Motor and sensory examination equal and normal in all four extremities.  Psychiatry: Appropriate mood and affect.    HOSPITAL MEDICATIONS:  MEDICATIONS  (STANDING):  acetaminophen   Tablet. 1000 milliGRAM(s) Oral every 6 hours  aspirin enteric coated 81 milliGRAM(s) Oral daily  buDESOnide 160 MICROgram(s)/formoterol 4.5 MICROgram(s) Inhaler 2 Puff(s) Inhalation two times a day  heparin  Injectable 5000 Unit(s) SubCutaneous every 8 hours  ketorolac   Injectable 30 milliGRAM(s) IV Push every 6 hours  loratadine 10 milliGRAM(s) Oral at bedtime  magnesium oxide 1000 milliGRAM(s) Oral two times a day with meals  metoprolol succinate ER 12.5 milliGRAM(s) Oral daily  montelukast 10 milliGRAM(s) Oral at bedtime    MEDICATIONS  (PRN):  artificial  tears Solution 1 Drop(s) Both EYES four times a day PRN Dry Eyes  oxyCODONE    IR 5 milliGRAM(s) Oral every 6 hours PRN Moderate Pain      LABS:                        10.8   7.64  )-----------( 149      ( 13 Mar 2018 09:23 )             34.0     03-13    141  |  103  |  9   ----------------------------<  120<H>  4.3   |  25  |  0.78    Ca    9.2      13 Mar 2018 07:15                MICROBIOLOGY:     RADIOLOGY:  [ ] Reviewed and interpreted by me    Point of Care Ultrasound Findings:    PFT:    EKG: CHIEF COMPLAINT: slightly better--still gassy- some pain and some accidents-watery BM--no sob or significant cough    Interval Events: ambulating    REVIEW OF SYSTEMS:  Constitutional: No fevers or chills. No weight loss. No fatigue or generalized malaise.  Eyes: No itching or discharge from the eyes  ENT: No ear pain. No ear discharge. No nasal congestion. No post nasal drip. No epistaxis. No throat pain. No sore throat. No difficulty swallowing.   CV: No chest pain. No palpitations. No lightheadedness or dizziness.   Resp: No dyspnea at rest. No dyspnea on exertion. No orthopnea. No wheezing. No cough. No stridor. No sputum production. No chest pain with respiration.  GI: No nausea. No vomiting. ++ diarrhea.  MSK: No joint pain or pain in any extremities  Integumentary: No skin lesions. No pedal edema.  Neurological: No gross motor weakness. No sensory changes.  [+ ] All other systems negative  [ ] Unable to assess ROS because ________    OBJECTIVE:  ICU Vital Signs Last 24 Hrs  T(C): 36.9 (14 Mar 2018 01:30), Max: 37.1 (13 Mar 2018 09:15)  T(F): 98.4 (14 Mar 2018 01:30), Max: 98.8 (13 Mar 2018 22:00)  HR: 78 (14 Mar 2018 01:30) (69 - 78)  BP: 120/73 (14 Mar 2018 01:30) (120/73 - 139/84)  BP(mean): --  ABP: --  ABP(mean): --  RR: 18 (14 Mar 2018 01:30) (18 - 18)  SpO2: 94% (14 Mar 2018 01:30) (94% - 97%)        03-12 @ 07:01  -  03-13 @ 07:00  --------------------------------------------------------  IN: 810 mL / OUT: 2350 mL / NET: -1540 mL    03-13 @ 07:01  -  03-14 @ 05:13  --------------------------------------------------------  IN: 1220 mL / OUT: 1100 mL / NET: 120 mL      CAPILLARY BLOOD GLUCOSE      POCT Blood Glucose.: 92 mg/dL (12 Mar 2018 06:22)      PHYSICAL EXAM: NAD in bed  General: Awake, alert, oriented X 3.   HEENT: Atraumatic, normocephalic.                 Mallampatti Grade 2                No nasal congestion.                No tonsillar or pharyngeal exudates.  Lymph Nodes: No palpable lymphadenopathy  Neck: No JVD. No carotid bruit.   Respiratory: Normal chest expansion                         Normal percussion                         Normal and equal air entry                         No wheeze, rhonchi or rales.  Cardiovascular: S1 S2 normal. No murmurs, rubs or gallops.   Abdomen: Soft, +-tender, non-distended. No organomegaly.  Extremities: Warm to touch. Peripheral pulse palpable. No pedal edema.   Skin: No rashes or skin lesions  Neurological: Motor and sensory examination equal and normal in all four extremities.  Psychiatry: Appropriate mood and affect.    HOSPITAL MEDICATIONS:  MEDICATIONS  (STANDING):  acetaminophen   Tablet. 1000 milliGRAM(s) Oral every 6 hours  aspirin enteric coated 81 milliGRAM(s) Oral daily  buDESOnide 160 MICROgram(s)/formoterol 4.5 MICROgram(s) Inhaler 2 Puff(s) Inhalation two times a day  heparin  Injectable 5000 Unit(s) SubCutaneous every 8 hours  ketorolac   Injectable 30 milliGRAM(s) IV Push every 6 hours  loratadine 10 milliGRAM(s) Oral at bedtime  magnesium oxide 1000 milliGRAM(s) Oral two times a day with meals  metoprolol succinate ER 12.5 milliGRAM(s) Oral daily  montelukast 10 milliGRAM(s) Oral at bedtime    MEDICATIONS  (PRN):  artificial  tears Solution 1 Drop(s) Both EYES four times a day PRN Dry Eyes  oxyCODONE    IR 5 milliGRAM(s) Oral every 6 hours PRN Moderate Pain      LABS:                        10.8   7.64  )-----------( 149      ( 13 Mar 2018 09:23 )             34.0     03-13    141  |  103  |  9   ----------------------------<  120<H>  4.3   |  25  |  0.78    Ca    9.2      13 Mar 2018 07:15                MICROBIOLOGY:     RADIOLOGY:  [ ] Reviewed and interpreted by me    Point of Care Ultrasound Findings:    PFT:    EKG:

## 2018-03-14 NOTE — DIETITIAN INITIAL EVALUATION ADULT. - ADHERENCE
Pt reports he limits his intake of high fiber foods due to Crohn's disease. Pt notes he should not be consuming Serbian salad (tomatoes and cucumbers) due to fiber exacerbating symptoms.

## 2018-03-14 NOTE — DISCHARGE NOTE ADULT - PLAN OF CARE
s/p ERP lap stricturoplasty and SBR, recovery from surgery, Pain control WOUND CARE: Staples will be removed at follow up office visit.    BATHING: Please do not submerge wound underwater. You may shower and/or sponge bathe.  ACTIVITY: No heavy lifting anything more than 10-15lbs or straining. Otherwise, you may return to your usual level of physical activity. If you are taking narcotic pain medication (such as Percocet), do NOT drive a car, operate machinery or make important decisions.  DIET: Low fiber diet  NOTIFY YOUR SURGEON IF: You have any bleeding that does not stop, any pus draining from your wound, any fever (over 100.4 F) or chills, persistent nausea/vomiting with inability to tolerate food or liquids, persistent diarrhea, or if your pain is not controlled on your discharge pain medications.  FOLLOW-UP:  1. Please call to make a follow-up appointment within one week of discharge with Dr. Michel (See below for office information to call for an appointment) Please continue with home medication. Please follow up with Dr. Fernández, cardiologist, in one week regarding your hospitalization. Please continue with home medication. Please follow up with Dr. Duckworth, your pulmonologist, in one week regarding your hospitalization. As per Dr Fernández you do not need to resume your Plavix. Please continue Aspirin.  Please follow up with Dr. Fernández, in one week regarding your hospitalization.

## 2018-03-14 NOTE — DIETITIAN INITIAL EVALUATION ADULT. - OTHER INFO
Pt seen for nutrition assessment on 2MON. Note pt is S/P strictureplasty on 3/12. Pt reports good appetite with % po intake of meals. Pt ordered for 3 Ensure Enlive per day, not consuming at this time as pt states he is content after meals. Pt c/o abdominal pain- team aware. Pt denies chewing/swallowing difficulty, N+V, constipation. Pt reports diarrhea in house, last episode this morning contained blood- RN aware (3/14). Pt endorses ~11 BMs daily (diarrhea) at home due to Crohn's disease of which pt states is normal for him.

## 2018-03-14 NOTE — PROGRESS NOTE ADULT - SUBJECTIVE AND OBJECTIVE BOX
Patient is a 52y old  Male who presents with a chief complaint of Crohn's disease (12 Mar 2018 06:30)    He c/o abdominal pain overnight needing pain medication.  He denies c/o chest pain, SOB or palpitations. Had loose BM and gas.     Allergies    No Known Allergies    Intolerances      MEDICATIONS  (STANDING):  acetaminophen   Tablet. 1000 milliGRAM(s) Oral every 6 hours  aspirin enteric coated 81 milliGRAM(s) Oral daily  buDESOnide 160 MICROgram(s)/formoterol 4.5 MICROgram(s) Inhaler 2 Puff(s) Inhalation two times a day  heparin  Injectable 5000 Unit(s) SubCutaneous every 8 hours  ketorolac   Injectable 30 milliGRAM(s) IV Push every 6 hours  loratadine 10 milliGRAM(s) Oral at bedtime  magnesium oxide 1000 milliGRAM(s) Oral two times a day with meals  metoprolol succinate ER 12.5 milliGRAM(s) Oral daily  montelukast 10 milliGRAM(s) Oral at bedtime    MEDICATIONS  (PRN):  artificial  tears Solution 1 Drop(s) Both EYES four times a day PRN Dry Eyes  oxyCODONE    IR 5 milliGRAM(s) Oral every 6 hours PRN Moderate Pain      PHYSICAL EXAM:  Vital Signs Last 24 Hrs  T(C): 36.9 (14 Mar 2018 05:27), Max: 37.1 (13 Mar 2018 09:15)  T(F): 98.4 (14 Mar 2018 05:27), Max: 98.8 (13 Mar 2018 22:00)  HR: 71 (14 Mar 2018 05:27) (70 - 78)  BP: 126/81 (14 Mar 2018 05:27) (120/73 - 139/84)  BP(mean): --  RR: 18 (14 Mar 2018 05:27) (18 - 18)  SpO2: 95% (14 Mar 2018 05:27) (94% - 96%)  Daily     Daily   I&O's Summary    12 Mar 2018 07:01  -  13 Mar 2018 07:00  --------------------------------------------------------  IN: 810 mL / OUT: 2350 mL / NET: -1540 mL    13 Mar 2018 07:01  -  14 Mar 2018 06:54  --------------------------------------------------------  IN: 1220 mL / OUT: 1100 mL / NET: 120 mL        General Appearance: 	 Alert, cooperative,   HEENT: normocephalic, atraumatic  Neck: no JVD,  carotid 2+  bilaterally without bruits  Lungs:  clear to auscultation and percussion bilaterally  Cor:  pmi 5th ICS MCL, regular rate and rhythm, S1 normal intensity, S2 normal intensity, no gallops, murmurs or rubs  Abdomen: distended and BS present  Extremities: without cyanosis, clubbing or edema  Vasc: 2-+ PT and DP pulses; no varicosities  Neurologic: A&O x 3 (time, place, person). Symmetric strength; limited exam  Skin: no rashes; limited exam    Labs:  CBC Full  -  ( 13 Mar 2018 09:23 )  WBC Count : 7.64 K/uL  Hemoglobin : 10.8 g/dL  Hematocrit : 34.0 %  Platelet Count - Automated : 149 K/uL  Mean Cell Volume : 85.9 fl  Mean Cell Hemoglobin : 27.3 pg  Mean Cell Hemoglobin Concentration : 31.8 gm/dL  Auto Neutrophil # : 6.08 K/uL  Auto Lymphocyte # : 0.92 K/uL  Auto Monocyte # : 0.62 K/uL  Auto Eosinophil # : 0.00 K/uL  Auto Basophil # : 0.00 K/uL  Auto Neutrophil % : 79.6 %  Auto Lymphocyte % : 12.0 %  Auto Monocyte % : 8.1 %  Auto Eosinophil % : 0.0 %  Auto Basophil % : 0.0 %    03-13    141  |  103  |  9   ----------------------------<  120<H>  4.3   |  25  |  0.78    Ca    9.2      13 Mar 2018 07:15                Radiology/Imaging:                Randell Fernández MD [facility]  [phone]

## 2018-03-14 NOTE — DISCHARGE NOTE ADULT - CARE PLAN
Principal Discharge DX:	Crohn disease  Goal:	s/p ERP lap stricturoplasty and SBR, recovery from surgery, Pain control  Assessment and plan of treatment:	WOUND CARE: Staples will be removed at follow up office visit.    BATHING: Please do not submerge wound underwater. You may shower and/or sponge bathe.  ACTIVITY: No heavy lifting anything more than 10-15lbs or straining. Otherwise, you may return to your usual level of physical activity. If you are taking narcotic pain medication (such as Percocet), do NOT drive a car, operate machinery or make important decisions.  DIET: Low fiber diet  NOTIFY YOUR SURGEON IF: You have any bleeding that does not stop, any pus draining from your wound, any fever (over 100.4 F) or chills, persistent nausea/vomiting with inability to tolerate food or liquids, persistent diarrhea, or if your pain is not controlled on your discharge pain medications.  FOLLOW-UP:  1. Please call to make a follow-up appointment within one week of discharge with Dr. Michel (See below for office information to call for an appointment)  Secondary Diagnosis:	CAD (coronary artery disease)  Assessment and plan of treatment:	Please continue with home medication. Please follow up with Dr. Fernández, cardiologist, in one week regarding your hospitalization.  Secondary Diagnosis:	Asthma  Assessment and plan of treatment:	Please continue with home medication. Please follow up with Dr. Duckworth, your pulmonologist, in one week regarding your hospitalization. Principal Discharge DX:	Crohn disease  Goal:	s/p ERP lap stricturoplasty and SBR, recovery from surgery, Pain control  Assessment and plan of treatment:	WOUND CARE: Staples will be removed at follow up office visit.    BATHING: Please do not submerge wound underwater. You may shower and/or sponge bathe.  ACTIVITY: No heavy lifting anything more than 10-15lbs or straining. Otherwise, you may return to your usual level of physical activity. If you are taking narcotic pain medication (such as Percocet), do NOT drive a car, operate machinery or make important decisions.  DIET: Low fiber diet  NOTIFY YOUR SURGEON IF: You have any bleeding that does not stop, any pus draining from your wound, any fever (over 100.4 F) or chills, persistent nausea/vomiting with inability to tolerate food or liquids, persistent diarrhea, or if your pain is not controlled on your discharge pain medications.  FOLLOW-UP:  1. Please call to make a follow-up appointment within one week of discharge with Dr. Michel (See below for office information to call for an appointment)  Secondary Diagnosis:	CAD (coronary artery disease)  Assessment and plan of treatment:	As per Dr Fernández you do not need to resume your Plavix. Please continue Aspirin.  Please follow up with Dr. Fernández, in one week regarding your hospitalization.  Secondary Diagnosis:	Asthma  Assessment and plan of treatment:	Please continue with home medication. Please follow up with Dr. Duckworth, your pulmonologist, in one week regarding your hospitalization.

## 2018-03-14 NOTE — DISCHARGE NOTE ADULT - CARE PROVIDER_API CALL
Lalit Michel), ColonRectal Surgery; Surgery  [address]  [address]  [place] [address]  Phone: [phone]  Fax: [phone]    Leonel Duckworth), Internal Medicine; Pulmonary Disease  [address]  [address]  [place] [address]  Phone: [phone]  Fax: [phone]    Randell Fernández), Cardiovascular Disease; Internal Medicine; Nuclear Cardiology  [address]  [address]  [place], [place] [id number]  Phone: [phone]  Fax: [phone]

## 2018-03-14 NOTE — DISCHARGE NOTE ADULT - NS AS ACTIVITY OBS
Walking-Outdoors allowed/Driving allowed/Walking-Indoors allowed/Showering allowed/No Heavy lifting/straining/Do not make important decisions/Stairs allowed

## 2018-03-14 NOTE — PROGRESS NOTE ADULT - ASSESSMENT
52M Crohns with a history of SBO and pancreatitis, MARYAM on APAP, controlled severe persistent asthma presenting for bowel surgery -s/p recurrent SBO    tolerated procedure well--no post op pulm issues. 52M Crohns with a history of SBO and pancreatitis, MARYAM on APAP, controlled severe persistent asthma presenting for bowel surgery -s/p recurrent SBO    tolerated procedure well--no post op pulm issues.  GI situation moderating

## 2018-03-14 NOTE — DIETITIAN INITIAL EVALUATION ADULT. - NS AS NUTRI INTERV ED CONTENT
Provided low-fiber nutrition therapy including importance of avoiding fiber rich foods such as fresh fruits/vegetables, whole grains, and added fiber in processed foods. Discussed importance of small frequent meals, chewing foods well and adequate hydration. Pt verbalized understanding and accepeted written handout. RD remains available as needed and per follow-up protocol. Aury Sweet MS, RDN, CDN Pager # [phone]

## 2018-03-14 NOTE — DISCHARGE NOTE ADULT - ADDITIONAL INSTRUCTIONS
1. Please call to make a follow-up appointment within one week of discharge with Dr. Michel ([phone])  2. Please follow up with Dr. Duckworth  your pulmonologist in one week regarding your hospitalization. ([phone])  3. Please follow up with Dr. Fernández, cardiologist, in one week regarding your hospitalization. ([phone])  4. Please follow up with your primary care physician in one week regarding your hospitalization.

## 2018-03-14 NOTE — DISCHARGE NOTE ADULT - HOSPITAL COURSE
Mr. Mota is a 52-year-old patient with PMHx of  Crohn's disease, currently on Entyvio and Repatha, who who presented to [facility] on 3/12 for scheduled  ERP, laparoscopic strictureplasty. per patient, he had previous Crohn's flare resulting in partial SBO. Those episodes resolved with NGT placement and antibiotics. Last episode was in 2/2018. At presentation, he was asymptomatic except for loose stools continuing. He did not report blood in stool, fevers, nausea or vomiting.     On HD1, he had diagnostic laparoscopy, stricturoplasty x 2, and small bowel resection. he tolerated the operation well. he recieved CLD after the surgery and tolerated it well.  Post operative hospital course was unremarkable. His diet was advanced and he received pain medication according to ERP protocol. On POD1, he ambulated and passed flatus and BM. He received LRD. Over night, he felt distention and gas pain which was resolved with IV tylenol. He was also ordered simethicone On POD2, his pain resolved significantly. He ambulates well. he has flatus and BM and tolerates his diet. He is hemodynamically stable and can be discharged home safely. he is instructed to schedule a follow up appointment with Dr. Michel (general surgeon), Dr. Fernández (cardiologist), Dr. Duckworth (pulmonologist), and   in 7-10 days after his discharge. Mr. Mota is a 52-year-old patient with PMHx of  Crohn's disease, currently on Entyvio and Repatha, who who presented to [facility] on 3/12 for scheduled  ERP, laparoscopic strictureplasty. per patient, he had previous Crohn's flare resulting in partial SBO. Those episodes resolved with NGT placement and antibiotics. Last episode was in 2/2018. At presentation, he was asymptomatic except for loose stools continuing. He did not report blood in stool, fevers, nausea or vomiting.     On HD1, he had diagnostic laparoscopy, stricturoplasty x 2, and small bowel resection. he tolerated the operation well. he recieved CLD after the surgery and tolerated it well.  Post operative hospital course was unremarkable. His diet was advanced and he received pain medication according to ERP protocol. On POD1, he ambulated and passed flatus and BM. He received LRD. Over night, he felt distention and gas pain which was resolved with IV tylenol. He was also ordered simethicone On POD2, his pain resolved significantly. He ambulates well. he has flatus and BM and tolerates his diet. He also had bloody BMS with a drop in HCt that was monitored.   He is hemodynamically stable and can be discharged home safely.  He is instructed to schedule a follow up appointment with Dr. Michel (general surgeon), Dr. Fernández (cardiologist), Dr. Duckworth (pulmonologist), and   in 7-10 days after his discharge.

## 2018-03-14 NOTE — PROGRESS NOTE ADULT - ATTENDING COMMENTS
as above--pulm stable--s/p bowel surgery  Continue all pulm rx as above--will add accapella to incentive spirometry    Ambulate    Leonel Duckworth MD-Pulmonary   [phone] as above--pulm stable--s/p bowel surgery  Continue all pulm rx as above-- accapella to incentive spirometry    Ambulating well, GI situation moderating    Leonel Duckworth MD-Pulmonary   [phone]

## 2018-03-14 NOTE — DISCHARGE NOTE ADULT - CARE PROVIDERS DIRECT ADDRESSES
[email or web address]

## 2018-03-14 NOTE — PROGRESS NOTE ADULT - ASSESSMENT
Mr. Mota is a 52 year old patient POD 2 s/p ERP lap assisted stricturoplasty x 2 and SBR (15cm) x 1. he is hemodynamically stable. Labs are unremarkable.     - DVT ppx: SQH, c/w ASA  - Diet: on LRD, tolerating well  - Hold plavix for now  - Pain control with tylenol, toradol, oxycodone (ERP protocol)  - Monitor GI function  - Replete electrolyte as necessary  - Activity: OOb as tolerated  - Restart Crohn's meds upon discharge  - patient is requesting to see a dietician before discharge  - Dispo: home this morning Mr. Mota is a 52 year old patient POD 2 s/p ERP lap assisted stricturoplasty x 2 and SBR (15cm) x 1. he is hemodynamically stable. Labs are unremarkable.     - DVT ppx: SQH, c/w ASA  - will start simethicone PRN for gas pain   - Diet: on LRD, tolerating well  - Hold plavix for now  - Pain control with tylenol, Motrin, oxycodone (ERP protocol)  - Monitor GI function  - Replete electrolyte as necessary  - Activity: OOb as tolerated  - Restart Crohn's meds upon discharge  - patient is requesting to see a dietician before discharge  - Dispo: home this morning, Penrose will be removed before discharge

## 2018-03-14 NOTE — DIETITIAN INITIAL EVALUATION ADULT. - ENERGY NEEDS
ht = 66 inches, wt = 184 pounds, BMI = 29.6kg/m2, IBW = 142 pounds, 130%IBW    Other Pertinent Information: Per chart, this is a 51 Y/O male with Crohn's disease S/P strictureplasty on 3/12/18.

## 2018-03-14 NOTE — DIETITIAN INITIAL EVALUATION ADULT. - ORAL INTAKE PTA
Pt reports good appetite PTA with baseline consumption of 1 meal per day. Pt reports he has been following the ketogenic diet x 1.5 years in order to lower his cholesterol and reduce his HBA1c. Pt reports HbA1c was 5.9% prior to starting ketogenic diet. Most recent HBA1c = 4.9% (2/23/18). Pt reports his total cholesterol has dropped as well. Diet Recall: Breakfast: bullet proof coffee with MCT oil and cinnamon; Meal can consist of eggs, brie cheese, Lebanese salad, fish, steak. NKFA. Pt reports taking CoQ10, vitamin C, vitamin D, and vitamin K2 supplements at home.

## 2018-03-14 NOTE — PROGRESS NOTE ADULT - SUBJECTIVE AND OBJECTIVE BOX
[facility] PROGRESS NOTE    SUBJECTIVE:  Patient was seen and examined this morning. There was no acute event overnight. He is resting comfortably in bed. Pain is well controlled. He does not report pain, fever, n/v, chest pain, or shortness of breath. He is tolerating LRD. patient has flatus and bowel movement.     Vital Signs Last 24 Hrs  T(C): 36.9 (14 Mar 2018 01:30), Max: 37.1 (13 Mar 2018 09:15)  T(F): 98.4 (14 Mar 2018 01:30), Max: 98.8 (13 Mar 2018 22:00)  HR: 78 (14 Mar 2018 01:30) (69 - 78)  BP: 120/73 (14 Mar 2018 01:30) (120/73 - 139/84)  BP(mean): --  RR: 18 (14 Mar 2018 01:30) (18 - 18)  SpO2: 94% (14 Mar 2018 01:30) (94% - 97%)    Physical Exam:  General Appearance: Appears well, NAD  Abdomen: Softly mildly distended, appropriate incisional tenderness, dressings clean and dry and intact.   Extremities: Grossly symmetric, SCD's in place     I&O's Summary    12 Mar 2018 07:01  -  13 Mar 2018 07:00  --------------------------------------------------------  IN: 810 mL / OUT: 2350 mL / NET: -1540 mL    13 Mar 2018 07:01  -  14 Mar 2018 05:07  --------------------------------------------------------  IN: 1220 mL / OUT: 1100 mL / NET: 120 mL      I&O's Detail    12 Mar 2018 07:01  -  13 Mar 2018 07:00  --------------------------------------------------------  IN:    IV PiggyBack: 200 mL    lactated ringers.: 490 mL    Oral Fluid: 120 mL  Total IN: 810 mL    OUT:    Indwelling Catheter - Urethral: 2350 mL  Total OUT: 2350 mL    Total NET: -1540 mL      13 Mar 2018 07:01  -  14 Mar 2018 05:07  --------------------------------------------------------  IN:    IV PiggyBack: 100 mL    lactated ringers.: 400 mL    Oral Fluid: 720 mL  Total IN: 1220 mL    OUT:    Voided: 1100 mL  Total OUT: 1100 mL    Total NET: 120 mL          MEDICATIONS  (STANDING):  acetaminophen   Tablet. 1000 milliGRAM(s) Oral every 6 hours  aspirin enteric coated 81 milliGRAM(s) Oral daily  buDESOnide 160 MICROgram(s)/formoterol 4.5 MICROgram(s) Inhaler 2 Puff(s) Inhalation two times a day  heparin  Injectable 5000 Unit(s) SubCutaneous every 8 hours  ketorolac   Injectable 30 milliGRAM(s) IV Push every 6 hours  loratadine 10 milliGRAM(s) Oral at bedtime  magnesium oxide 1000 milliGRAM(s) Oral two times a day with meals  metoprolol succinate ER 12.5 milliGRAM(s) Oral daily  montelukast 10 milliGRAM(s) Oral at bedtime    MEDICATIONS  (PRN):  artificial  tears Solution 1 Drop(s) Both EYES four times a day PRN Dry Eyes  oxyCODONE    IR 5 milliGRAM(s) Oral every 6 hours PRN Moderate Pain      LABS:                        10.8   7.64  )-----------( 149      ( 13 Mar 2018 09:23 )             34.0     03-13    141  |  103  |  9   ----------------------------<  120<H>  4.3   |  25  |  0.78    Ca    9.2      13 Mar 2018 07:15            RADIOLOGY & ADDITIONAL STUDIES:    [facility] #[id number] [facility] PROGRESS NOTE    SUBJECTIVE:  Patient was seen and examined this morning. There was no acute event overnight. He is resting comfortably in bed. Pain is well controlled. He does not report pain, fever, n/v, chest pain, or shortness of breath. He is tolerating LRD. patient has flatus and watery bowel movement.     Vital Signs Last 24 Hrs  T(C): 36.9 (14 Mar 2018 01:30), Max: 37.1 (13 Mar 2018 09:15)  T(F): 98.4 (14 Mar 2018 01:30), Max: 98.8 (13 Mar 2018 22:00)  HR: 78 (14 Mar 2018 01:30) (69 - 78)  BP: 120/73 (14 Mar 2018 01:30) (120/73 - 139/84)  BP(mean): --  RR: 18 (14 Mar 2018 01:30) (18 - 18)  SpO2: 94% (14 Mar 2018 01:30) (94% - 97%)    Physical Exam:  General Appearance: Appears well, NAD  Abdomen: Softly mildly distended, appropriate incisional tenderness, dressings clean and dry and intact.   Extremities: Grossly symmetric, SCD's in place     I&O's Summary    12 Mar 2018 07:01  -  13 Mar 2018 07:00  --------------------------------------------------------  IN: 810 mL / OUT: 2350 mL / NET: -1540 mL    13 Mar 2018 07:01  -  14 Mar 2018 05:07  --------------------------------------------------------  IN: 1220 mL / OUT: 1100 mL / NET: 120 mL      I&O's Detail    12 Mar 2018 07:01  -  13 Mar 2018 07:00  --------------------------------------------------------  IN:    IV PiggyBack: 200 mL    lactated ringers.: 490 mL    Oral Fluid: 120 mL  Total IN: 810 mL    OUT:    Indwelling Catheter - Urethral: 2350 mL  Total OUT: 2350 mL    Total NET: -1540 mL      13 Mar 2018 07:01  -  14 Mar 2018 05:07  --------------------------------------------------------  IN:    IV PiggyBack: 100 mL    lactated ringers.: 400 mL    Oral Fluid: 720 mL  Total IN: 1220 mL    OUT:    Voided: 1100 mL  Total OUT: 1100 mL    Total NET: 120 mL          MEDICATIONS  (STANDING):  acetaminophen   Tablet. 1000 milliGRAM(s) Oral every 6 hours  aspirin enteric coated 81 milliGRAM(s) Oral daily  buDESOnide 160 MICROgram(s)/formoterol 4.5 MICROgram(s) Inhaler 2 Puff(s) Inhalation two times a day  heparin  Injectable 5000 Unit(s) SubCutaneous every 8 hours  ketorolac   Injectable 30 milliGRAM(s) IV Push every 6 hours  loratadine 10 milliGRAM(s) Oral at bedtime  magnesium oxide 1000 milliGRAM(s) Oral two times a day with meals  metoprolol succinate ER 12.5 milliGRAM(s) Oral daily  montelukast 10 milliGRAM(s) Oral at bedtime    MEDICATIONS  (PRN):  artificial  tears Solution 1 Drop(s) Both EYES four times a day PRN Dry Eyes  oxyCODONE    IR 5 milliGRAM(s) Oral every 6 hours PRN Moderate Pain      LABS:                        10.8   7.64  )-----------( 149      ( 13 Mar 2018 09:23 )             34.0     03-13    141  |  103  |  9   ----------------------------<  120<H>  4.3   |  25  |  0.78    Ca    9.2      13 Mar 2018 07:15            RADIOLOGY & ADDITIONAL STUDIES:    [facility] #[id number]

## 2018-03-14 NOTE — DISCHARGE NOTE ADULT - PATIENT PORTAL LINK FT
You can access the [facility] Patient Portal, offered by [facility], by registering with the following website: [email or web address]

## 2018-03-14 NOTE — PROGRESS NOTE ADULT - ASSESSMENT
52 year old male with history of CAD, HLD and IBD S/P surgery for IBD complications/strictures. Cardiac status is stable. Conitnue aspirin and beta blocker. Increase activity as tolerates. DVT prophylaxis.

## 2018-03-14 NOTE — PROGRESS NOTE ADULT - ATTENDING COMMENTS
Had crampy pain better with bms and flatus.  Abdomen much less distended.  Likely discharge after lunch today.

## 2018-03-14 NOTE — DISCHARGE NOTE ADULT - INSTRUCTIONS
WOUND CARE: Staples will be removed at follow up office visit.    BATHING: Please do not submerge wound underwater. You may shower and/or sponge bathe.  ACTIVITY: No heavy lifting anything more than 10-15lbs or straining. Otherwise, you may return to your usual level of physical activity. If you are taking narcotic pain medication (such as Percocet), do NOT drive a car, operate machinery or make important decisions.  DIET: Low fiber diet  NOTIFY YOUR SURGEON IF: You have any bleeding that does not stop, any pus draining from your wound, any fever (over 100.4 F) or chills, persistent nausea/vomiting with inability to tolerate food or liquids, persistent diarrhea, or if your pain is not controlled on your discharge pain medications.  FOLLOW-UP:  1. Please call to make a follow-up appointment within one week of discharge with Dr. Michel (See below for office information to call for an appointment)   2. Please follow up with Dr. galeana  your pulmonologist in one week regarding your hospitalization.   3. Please follow up with Dr. Fernández, cardiologist, in one week regarding your hospitalization.   4. Please follow up with your primary care physician in one week regarding your hospitalization. WOUND CARE: Staples will be removed at follow up office visit.    BATHING: Please do not submerge wound underwater. You may shower and/or sponge bathe.  ACTIVITY: No heavy lifting anything more than 10-15lbs or straining. Otherwise, you may return to your usual level of physical activity. If you are taking narcotic pain medication (such as Percocet), do NOT drive a car, operate machinery or make important decisions.  DIET: Low fiber diet  NOTIFY YOUR SURGEON IF: You have any bleeding that does not stop, any pus draining from your wound, any fever (over 100.4 F) or chills, persistent nausea/vomiting with inability to tolerate food or liquids, persistent diarrhea, or if your pain is not controlled on your discharge pain medications.  FOLLOW-UP:  1. Please call to make a follow-up appointment within one week of discharge with Dr. Michel (See below for office information to call for an appointment)   2. Please follow up with Dr. Duckworth  your pulmonologist in one week regarding your hospitalization.   3. Please follow up with Dr. Fernández, cardiologist, in one week regarding your hospitalization. (As per Dr Fernández you do not need to resume your Plavix. Please continue Aspirin)  4. Please follow up with your primary care physician in one week regarding your hospitalization.

## 2018-03-15 LAB
BLD GP AB SCN SERPL QL: NEGATIVE — SIGNIFICANT CHANGE UP
HCT VFR BLD CALC: 26.5 % — LOW (ref 39–50)
HCT VFR BLD CALC: 27.3 % — LOW (ref 39–50)
HGB BLD-MCNC: 10 G/DL — LOW (ref 13–17)
HGB BLD-MCNC: 9.6 G/DL — LOW (ref 13–17)
MCHC RBC-ENTMCNC: 30.5 PG — SIGNIFICANT CHANGE UP (ref 27–34)
MCHC RBC-ENTMCNC: 30.5 PG — SIGNIFICANT CHANGE UP (ref 27–34)
MCHC RBC-ENTMCNC: 36.2 GM/DL — HIGH (ref 32–36)
MCHC RBC-ENTMCNC: 36.6 GM/DL — HIGH (ref 32–36)
MCV RBC AUTO: 83.2 FL — SIGNIFICANT CHANGE UP (ref 80–100)
MCV RBC AUTO: 84.3 FL — SIGNIFICANT CHANGE UP (ref 80–100)
PLATELET # BLD AUTO: 139 K/UL — LOW (ref 150–400)
PLATELET # BLD AUTO: 146 K/UL — LOW (ref 150–400)
RBC # BLD: 3.14 M/UL — LOW (ref 4.2–5.8)
RBC # BLD: 3.28 M/UL — LOW (ref 4.2–5.8)
RBC # FLD: 12.4 % — SIGNIFICANT CHANGE UP (ref 10.3–14.5)
RBC # FLD: 12.7 % — SIGNIFICANT CHANGE UP (ref 10.3–14.5)
RH IG SCN BLD-IMP: POSITIVE — SIGNIFICANT CHANGE UP
WBC # BLD: 6 K/UL — SIGNIFICANT CHANGE UP (ref 3.8–10.5)
WBC # BLD: 6.9 K/UL — SIGNIFICANT CHANGE UP (ref 3.8–10.5)
WBC # FLD AUTO: 6 K/UL — SIGNIFICANT CHANGE UP (ref 3.8–10.5)
WBC # FLD AUTO: 6.9 K/UL — SIGNIFICANT CHANGE UP (ref 3.8–10.5)

## 2018-03-15 PROCEDURE — 99232 SBSQ HOSP IP/OBS MODERATE 35: CPT

## 2018-03-15 RX ORDER — HYDROMORPHONE HYDROCHLORIDE 2 MG/ML
0 INJECTION INTRAMUSCULAR; INTRAVENOUS; SUBCUTANEOUS
Qty: 0 | Refills: 0 | COMMUNITY

## 2018-03-15 RX ORDER — TIOTROPIUM BROMIDE 18 UG/1
1 CAPSULE ORAL; RESPIRATORY (INHALATION) DAILY
Qty: 0 | Refills: 0 | Status: DISCONTINUED | OUTPATIENT
Start: 2018-03-15 | End: 2018-03-16

## 2018-03-15 RX ORDER — IBUPROFEN 200 MG
1 TABLET ORAL
Qty: 0 | Refills: 0 | DISCHARGE
Start: 2018-03-15

## 2018-03-15 RX ORDER — ACETAMINOPHEN 500 MG
2 TABLET ORAL
Qty: 0 | Refills: 0 | DISCHARGE
Start: 2018-03-15

## 2018-03-15 RX ORDER — OXYCODONE HYDROCHLORIDE 5 MG/1
1 TABLET ORAL
Qty: 42 | Refills: 0
Start: 2018-03-15 | End: 2018-03-21

## 2018-03-15 RX ORDER — VEDOLIZUMAB 108 MG/.68ML
0 INJECTION, SOLUTION SUBCUTANEOUS
Qty: 0 | Refills: 0 | COMMUNITY

## 2018-03-15 RX ORDER — PANTOPRAZOLE SODIUM 20 MG/1
40 TABLET, DELAYED RELEASE ORAL DAILY
Qty: 0 | Refills: 0 | Status: DISCONTINUED | OUTPATIENT
Start: 2018-03-15 | End: 2018-03-15

## 2018-03-15 RX ORDER — CLOPIDOGREL BISULFATE 75 MG/1
1 TABLET, FILM COATED ORAL
Qty: 0 | Refills: 0 | COMMUNITY

## 2018-03-15 RX ADMIN — Medication 1000 MILLIGRAM(S): at 06:03

## 2018-03-15 RX ADMIN — HEPARIN SODIUM 5000 UNIT(S): 5000 INJECTION INTRAVENOUS; SUBCUTANEOUS at 05:07

## 2018-03-15 RX ADMIN — Medication 1000 MILLIGRAM(S): at 12:11

## 2018-03-15 RX ADMIN — Medication 400 MILLIGRAM(S): at 14:30

## 2018-03-15 RX ADMIN — Medication 81 MILLIGRAM(S): at 11:41

## 2018-03-15 RX ADMIN — Medication 1000 MILLIGRAM(S): at 17:46

## 2018-03-15 RX ADMIN — Medication 400 MILLIGRAM(S): at 21:11

## 2018-03-15 RX ADMIN — Medication 1000 MILLIGRAM(S): at 17:16

## 2018-03-15 RX ADMIN — Medication 400 MILLIGRAM(S): at 03:10

## 2018-03-15 RX ADMIN — Medication 400 MILLIGRAM(S): at 22:00

## 2018-03-15 RX ADMIN — Medication 1000 MILLIGRAM(S): at 23:46

## 2018-03-15 RX ADMIN — BUDESONIDE AND FORMOTEROL FUMARATE DIHYDRATE 2 PUFF(S): 160; 4.5 AEROSOL RESPIRATORY (INHALATION) at 21:11

## 2018-03-15 RX ADMIN — HEPARIN SODIUM 5000 UNIT(S): 5000 INJECTION INTRAVENOUS; SUBCUTANEOUS at 21:11

## 2018-03-15 RX ADMIN — Medication 400 MILLIGRAM(S): at 08:11

## 2018-03-15 RX ADMIN — Medication 1000 MILLIGRAM(S): at 00:42

## 2018-03-15 RX ADMIN — LORATADINE 10 MILLIGRAM(S): 10 TABLET ORAL at 21:11

## 2018-03-15 RX ADMIN — OXYCODONE HYDROCHLORIDE 5 MILLIGRAM(S): 5 TABLET ORAL at 10:26

## 2018-03-15 RX ADMIN — Medication 1000 MILLIGRAM(S): at 00:09

## 2018-03-15 RX ADMIN — TIOTROPIUM BROMIDE 1 CAPSULE(S): 18 CAPSULE ORAL; RESPIRATORY (INHALATION) at 21:11

## 2018-03-15 RX ADMIN — Medication 12.5 MILLIGRAM(S): at 05:07

## 2018-03-15 RX ADMIN — OXYCODONE HYDROCHLORIDE 5 MILLIGRAM(S): 5 TABLET ORAL at 04:26

## 2018-03-15 RX ADMIN — Medication 400 MILLIGRAM(S): at 14:01

## 2018-03-15 RX ADMIN — OXYCODONE HYDROCHLORIDE 5 MILLIGRAM(S): 5 TABLET ORAL at 10:56

## 2018-03-15 RX ADMIN — Medication 400 MILLIGRAM(S): at 02:34

## 2018-03-15 RX ADMIN — Medication 1000 MILLIGRAM(S): at 11:41

## 2018-03-15 RX ADMIN — MONTELUKAST 10 MILLIGRAM(S): 4 TABLET, CHEWABLE ORAL at 21:11

## 2018-03-15 RX ADMIN — Medication 400 MILLIGRAM(S): at 08:41

## 2018-03-15 RX ADMIN — OXYCODONE HYDROCHLORIDE 5 MILLIGRAM(S): 5 TABLET ORAL at 05:00

## 2018-03-15 RX ADMIN — HEPARIN SODIUM 5000 UNIT(S): 5000 INJECTION INTRAVENOUS; SUBCUTANEOUS at 14:01

## 2018-03-15 RX ADMIN — Medication 1000 MILLIGRAM(S): at 05:31

## 2018-03-15 RX ADMIN — BUDESONIDE AND FORMOTEROL FUMARATE DIHYDRATE 2 PUFF(S): 160; 4.5 AEROSOL RESPIRATORY (INHALATION) at 08:11

## 2018-03-15 NOTE — PROGRESS NOTE ADULT - ASSESSMENT
Mr. Mota is a 52 year old patient POD 3 s/p ERP lap assisted stricturoplasty x 2 and SBR (15cm) x 1. he is hemodynamically stable. Labs are unremarkable.     - DVT ppx: SQH, c/w ASA  - will start simethicone PRN for gas pain   - Diet: on LRD, tolerating well  - Appreciate pulm rec's  - Hold plavix for now  - Pain control with tylenol, Motrin, oxycodone (ERP protocol)  - Monitor GI function  - Replete electrolyte as necessary  - Activity: OOB as tolerated  - Restart Crohn's meds upon discharge  - patient is requesting to see a dietician before discharge  - Dispo: home this morning, Penrose will be removed before discharge    Maury Ramesh, PGY-1  [facility] [phone] Mr. Mota is a 52 year old patient POD 3 s/p ERP lap assisted stricturoplasty x 2 and SBR (15cm) x 1. he is hemodynamically stable. Labs are unremarkable.     - DVT ppx: SQH, c/w ASA  - will start simethicone PRN for gas pain   - Diet: on LRD, tolerating well  - Appreciate pulm rec's  - Hold plavix for now  - Pain control with tylenol, Motrin, oxycodone (ERP protocol)  - Monitor GI function  - Replete electrolyte as necessary  - Activity: OOB as tolerated  - Restart Crohn's meds upon discharge as per GI  - patient is requesting to see a dietician before discharge  - Dispo: home this morning, Penrose will be removed before discharge    Maury Ramesh, PGY-1  [place] [facility] [phone]

## 2018-03-15 NOTE — PROGRESS NOTE ADULT - ATTENDING COMMENTS
as above--pulm stable--s/p bowel surgery  Continue all pulm rx as above-- accapella to incentive spirometry    Ambulating well, GI situation moderating    Leonel Duckworth MD-Pulmonary   [phone] as above--pulm stable--s/p bowel surgery  Continue all pulm rx as above-- accapella to incentive spirometry    Ambulating well, GI situation moderating; DC planning over next 24-48 hours    Leonel Duckworth MD-Pulmonary   [phone]

## 2018-03-15 NOTE — PROGRESS NOTE ADULT - SUBJECTIVE AND OBJECTIVE BOX
Patient is a 52y old  Male who presents with a chief complaint of Crohn's disease (14 Mar 2018 10:28)    He denies c/o chest pain, SOB or palptiations. Less abdominal pain.     Allergies    No Known Allergies    Intolerances      MEDICATIONS  (STANDING):  acetaminophen   Tablet. 1000 milliGRAM(s) Oral every 6 hours  aspirin enteric coated 81 milliGRAM(s) Oral daily  buDESOnide 160 MICROgram(s)/formoterol 4.5 MICROgram(s) Inhaler 2 Puff(s) Inhalation two times a day  heparin  Injectable 5000 Unit(s) SubCutaneous every 8 hours  ibuprofen  Tablet 400 milliGRAM(s) Oral every 6 hours  loratadine 10 milliGRAM(s) Oral at bedtime  metoprolol succinate ER 12.5 milliGRAM(s) Oral daily  montelukast 10 milliGRAM(s) Oral at bedtime  pantoprazole  Injectable 40 milliGRAM(s) IV Push daily  tiotropium 18 MICROgram(s) Capsule 1 Capsule(s) Inhalation daily    MEDICATIONS  (PRN):  artificial  tears Solution 1 Drop(s) Both EYES four times a day PRN Dry Eyes  HYDROmorphone  Injectable 2 milliGRAM(s) IV Push once PRN For CLUSTER HEADACHE ONLY. Not for abdominal pain  metoclopramide Injectable 10 milliGRAM(s) IV Push once PRN for cluster headache  ondansetron Injectable 4 milliGRAM(s) IV Push once PRN For cluster headache  oxyCODONE    IR 5 milliGRAM(s) Oral every 6 hours PRN Moderate Pain  simethicone 80 milliGRAM(s) Chew four times a day PRN Gas      PHYSICAL EXAM:  Vital Signs Last 24 Hrs  T(C): 36.4 (15 Mar 2018 05:09), Max: 37 (14 Mar 2018 19:05)  T(F): 97.6 (15 Mar 2018 05:09), Max: 98.6 (14 Mar 2018 19:05)  HR: 65 (15 Mar 2018 05:09) (65 - 82)  BP: 133/85 (15 Mar 2018 05:09) (123/72 - 144/86)  BP(mean): --  RR: 18 (15 Mar 2018 05:09) (18 - 18)  SpO2: 96% (15 Mar 2018 05:09) (95% - 98%)  Daily     Daily Weight in k.4 (14 Mar 2018 11:32)  I&O's Summary    13 Mar 2018 07:01  -  14 Mar 2018 07:00  --------------------------------------------------------  IN: 1220 mL / OUT: 1100 mL / NET: 120 mL    14 Mar 2018 07:01  -  15 Mar 2018 06:41  --------------------------------------------------------  IN: 580 mL / OUT: 800 mL / NET: -220 mL    HEENT: normocephalic, atraumatic  Neck: no JVD,  carotid 2+  bilaterally without bruits  Lungs:  clear to auscultation and percussion bilaterally  Cor:  pmi 5th ICS MCL, regular rate and rhythm, S1 normal intensity, S2 normal intensity, no gallops, murmurs or rubs  Abdomen: soft BS present  Extremities: without cyanosis, clubbing or edema  Vasc: 2-+ PT and DP pulses; no varicosities  Neurologic: A&O x 3 (time, place, person). Symmetric strength; limited exam  Skin: no rashes; limited exam    Labs:  CBC Full  -  ( 14 Mar 2018 14:50 )  WBC Count : 5.8 K/uL  Hemoglobin : 11.2 g/dL  Hematocrit : 31.0 %  Platelet Count - Automated : 155 K/uL  Mean Cell Volume : 84.3 fl  Mean Cell Hemoglobin : 30.4 pg  Mean Cell Hemoglobin Concentration : 36.1 gm/dL  Auto Neutrophil # : x  Auto Lymphocyte # : x  Auto Monocyte # : x  Auto Eosinophil # : x  Auto Basophil # : x  Auto Neutrophil % : x  Auto Lymphocyte % : x  Auto Monocyte % : x  Auto Eosinophil % : x  Auto Basophil % : x        143  |  104  |  17  ----------------------------<  94  4.0   |  28  |  0.79    Ca    9.1      14 Mar 2018 06:55  Phos  3.6       Mg     2.1                                     Randell Fernández MD [facility]  [phone]

## 2018-03-15 NOTE — PROGRESS NOTE ADULT - ASSESSMENT
52 year old male with history of CAD, HLD and IBD S/P surgery for IBD complications/strictures. Cardiac status is stable. Resumed aspirin and beta blocker. Increase activity as tolerates. DVT prophylaxis.

## 2018-03-15 NOTE — CHART NOTE - NSCHARTNOTEFT_GEN_A_CORE
Nutrition Follow Up    Pt seen for verbal nutrition consult for additional questions/education. Pt seen by RD yesterday. Medical chart reviewed, events noted. Reviewed low-fiber nutrition therapy. Addressed pt's questions regarding low fiber diet, reviewed meal options, suggested consuming small frequent meals. Discussed foods to avoid.  Discussed chewing foods well and adequate hydration. Pt also requested information regarding further diet advancement to regular diet- discussed slow/steady reintroduction of fiber pending MD recommendation at that time and keeping food journal. Pt verbalized understanding, compliance is expected.   RD to remain available as requested. Aarti Mobley RD pager #[phone]

## 2018-03-15 NOTE — PROGRESS NOTE ADULT - SUBJECTIVE AND OBJECTIVE BOX
Surgery Progress Note    S: Patient seen and examined. No acute events overnight. Pain well controlled with current regimen. Denies nausea/vomiting. Endorses passing gas and bowel movements.     O:  Vital Signs Last 24 Hrs  T(C): 36.4 (15 Mar 2018 05:09), Max: 37 (14 Mar 2018 19:05)  T(F): 97.6 (15 Mar 2018 05:09), Max: 98.6 (14 Mar 2018 19:05)  HR: 65 (15 Mar 2018 05:09) (65 - 82)  BP: 133/85 (15 Mar 2018 05:09) (123/72 - 144/86)  BP(mean): --  RR: 18 (15 Mar 2018 05:09) (18 - 18)  SpO2: 96% (15 Mar 2018 05:09) (95% - 98%)    I&O's Detail    13 Mar 2018 07:01  -  14 Mar 2018 07:00  --------------------------------------------------------  IN:    IV PiggyBack: 100 mL    lactated ringers.: 400 mL    Oral Fluid: 720 mL  Total IN: 1220 mL    OUT:    Voided: 1100 mL  Total OUT: 1100 mL    Total NET: 120 mL      14 Mar 2018 07:01  -  15 Mar 2018 05:24  --------------------------------------------------------  IN:    Oral Fluid: 580 mL  Total IN: 580 mL    OUT:    Voided: 800 mL  Total OUT: 800 mL    Total NET: -220 mL          MEDICATIONS  (STANDING):  acetaminophen   Tablet. 1000 milliGRAM(s) Oral every 6 hours  aspirin enteric coated 81 milliGRAM(s) Oral daily  buDESOnide 160 MICROgram(s)/formoterol 4.5 MICROgram(s) Inhaler 2 Puff(s) Inhalation two times a day  heparin  Injectable 5000 Unit(s) SubCutaneous every 8 hours  ibuprofen  Tablet 400 milliGRAM(s) Oral every 6 hours  loratadine 10 milliGRAM(s) Oral at bedtime  metoprolol succinate ER 12.5 milliGRAM(s) Oral daily  montelukast 10 milliGRAM(s) Oral at bedtime  pantoprazole  Injectable 40 milliGRAM(s) IV Push daily  tiotropium 18 MICROgram(s) Capsule 1 Capsule(s) Inhalation daily    MEDICATIONS  (PRN):  artificial  tears Solution 1 Drop(s) Both EYES four times a day PRN Dry Eyes  HYDROmorphone  Injectable 2 milliGRAM(s) IV Push once PRN For CLUSTER HEADACHE ONLY. Not for abdominal pain  metoclopramide Injectable 10 milliGRAM(s) IV Push once PRN for cluster headache  ondansetron Injectable 4 milliGRAM(s) IV Push once PRN For cluster headache  oxyCODONE    IR 5 milliGRAM(s) Oral every 6 hours PRN Moderate Pain  simethicone 80 milliGRAM(s) Chew four times a day PRN Gas                            11.2   5.8   )-----------( 155      ( 14 Mar 2018 14:50 )             31.0       03-14    143  |  104  |  17  ----------------------------<  94  4.0   |  28  |  0.79    Ca    9.1      14 Mar 2018 06:55  Phos  3.6     03-14  Mg     2.1     03-14        Physical Exam:  Gen: Laying in bed, NAD, alert and oriented.   Resp: Unlabored breathing  Abd: Softly mildly distended, appropriate incisional tenderness, dressings clean and dry and intact.   Ext: WWP  Skin: No rashes Surgery Progress Note    S: Patient seen and examined. No acute events overnight. Pain well controlled with current regimen. Denies nausea/vomiting. Endorses passing gas and bowel movements.  Last BM around 2am and old dark blood    O:  Vital Signs Last 24 Hrs  T(C): 36.4 (15 Mar 2018 05:09), Max: 37 (14 Mar 2018 19:05)  T(F): 97.6 (15 Mar 2018 05:09), Max: 98.6 (14 Mar 2018 19:05)  HR: 65 (15 Mar 2018 05:09) (65 - 82)  BP: 133/85 (15 Mar 2018 05:09) (123/72 - 144/86)  BP(mean): --  RR: 18 (15 Mar 2018 05:09) (18 - 18)  SpO2: 96% (15 Mar 2018 05:09) (95% - 98%)    I&O's Detail    13 Mar 2018 07:01  -  14 Mar 2018 07:00  --------------------------------------------------------  IN:    IV PiggyBack: 100 mL    lactated ringers.: 400 mL    Oral Fluid: 720 mL  Total IN: 1220 mL    OUT:    Voided: 1100 mL  Total OUT: 1100 mL    Total NET: 120 mL      14 Mar 2018 07:01  -  15 Mar 2018 05:24  --------------------------------------------------------  IN:    Oral Fluid: 580 mL  Total IN: 580 mL    OUT:    Voided: 800 mL  Total OUT: 800 mL    Total NET: -220 mL          MEDICATIONS  (STANDING):  acetaminophen   Tablet. 1000 milliGRAM(s) Oral every 6 hours  aspirin enteric coated 81 milliGRAM(s) Oral daily  buDESOnide 160 MICROgram(s)/formoterol 4.5 MICROgram(s) Inhaler 2 Puff(s) Inhalation two times a day  heparin  Injectable 5000 Unit(s) SubCutaneous every 8 hours  ibuprofen  Tablet 400 milliGRAM(s) Oral every 6 hours  loratadine 10 milliGRAM(s) Oral at bedtime  metoprolol succinate ER 12.5 milliGRAM(s) Oral daily  montelukast 10 milliGRAM(s) Oral at bedtime  pantoprazole  Injectable 40 milliGRAM(s) IV Push daily  tiotropium 18 MICROgram(s) Capsule 1 Capsule(s) Inhalation daily    MEDICATIONS  (PRN):  artificial  tears Solution 1 Drop(s) Both EYES four times a day PRN Dry Eyes  HYDROmorphone  Injectable 2 milliGRAM(s) IV Push once PRN For CLUSTER HEADACHE ONLY. Not for abdominal pain  metoclopramide Injectable 10 milliGRAM(s) IV Push once PRN for cluster headache  ondansetron Injectable 4 milliGRAM(s) IV Push once PRN For cluster headache  oxyCODONE    IR 5 milliGRAM(s) Oral every 6 hours PRN Moderate Pain  simethicone 80 milliGRAM(s) Chew four times a day PRN Gas                            11.2   5.8   )-----------( 155      ( 14 Mar 2018 14:50 )             31.0       03-14    143  |  104  |  17  ----------------------------<  94  4.0   |  28  |  0.79    Ca    9.1      14 Mar 2018 06:55  Phos  3.6     03-14  Mg     2.1     03-14        Physical Exam:  Gen: Laying in bed, NAD, alert and oriented.   Resp: Unlabored breathing  Abd: Softly mildly distended, appropriate incisional tenderness, dressings clean and dry and intact.   Ext: WWP  Skin: No rashes

## 2018-03-15 NOTE — PROGRESS NOTE ADULT - ASSESSMENT
52M Crohns with a history of SBO and pancreatitis, MARYAM on APAP, controlled severe persistent asthma presenting for bowel surgery -s/p recurrent SBO    tolerated procedure well--no post op pulm issues.  GI situation moderating 52M Crohns with a history of SBO and pancreatitis, MARYAM on APAP, controlled severe persistent asthma presenting for bowel surgery -s/p recurrent SBO    tolerated procedure well--no post op pulm issues.  GI situation moderating--slightly improved

## 2018-03-15 NOTE — PROGRESS NOTE ADULT - SUBJECTIVE AND OBJECTIVE BOX
CHIEF COMPLAINT:    Interval Events:    REVIEW OF SYSTEMS:  Constitutional: No fevers or chills. No weight loss. No fatigue or generalized malaise.  Eyes: No itching or discharge from the eyes  ENT: No ear pain. No ear discharge. No nasal congestion. No post nasal drip. No epistaxis. No throat pain. No sore throat. No difficulty swallowing.   CV: No chest pain. No palpitations. No lightheadedness or dizziness.   Resp: No dyspnea at rest. No dyspnea on exertion. No orthopnea. No wheezing. No cough. No stridor. No sputum production. No chest pain with respiration.  GI: No nausea. No vomiting. No diarrhea.  MSK: No joint pain or pain in any extremities  Integumentary: No skin lesions. No pedal edema.  Neurological: No gross motor weakness. No sensory changes.  [ ] All other systems negative  [ ] Unable to assess ROS because ________    OBJECTIVE:  ICU Vital Signs Last 24 Hrs  T(C): 36.4 (15 Mar 2018 05:09), Max: 37 (14 Mar 2018 19:05)  T(F): 97.6 (15 Mar 2018 05:09), Max: 98.6 (14 Mar 2018 19:05)  HR: 65 (15 Mar 2018 05:09) (65 - 82)  BP: 133/85 (15 Mar 2018 05:09) (123/72 - 144/86)  BP(mean): --  ABP: --  ABP(mean): --  RR: 18 (15 Mar 2018 05:09) (18 - 18)  SpO2: 96% (15 Mar 2018 05:09) (95% - 98%)        03-13 @ 07:01 - 03-14 @ 07:00  --------------------------------------------------------  IN: 1220 mL / OUT: 1100 mL / NET: 120 mL    03-14 @ 07:01 - 03-15 @ 05:10  --------------------------------------------------------  IN: 580 mL / OUT: 800 mL / NET: -220 mL      CAPILLARY BLOOD GLUCOSE          PHYSICAL EXAM:  General: Awake, alert, oriented X 3.   HEENT: Atraumatic, normocephalic.                 Mallampatti Grade                 No nasal congestion.                No tonsillar or pharyngeal exudates.  Lymph Nodes: No palpable lymphadenopathy  Neck: No JVD. No carotid bruit.   Respiratory: Normal chest expansion                         Normal percussion                         Normal and equal air entry                         No wheeze, rhonchi or rales.  Cardiovascular: S1 S2 normal. No murmurs, rubs or gallops.   Abdomen: Soft, non-tender, non-distended. No organomegaly.  Extremities: Warm to touch. Peripheral pulse palpable. No pedal edema.   Skin: No rashes or skin lesions  Neurological: Motor and sensory examination equal and normal in all four extremities.  Psychiatry: Appropriate mood and affect.    HOSPITAL MEDICATIONS:  MEDICATIONS  (STANDING):  acetaminophen   Tablet. 1000 milliGRAM(s) Oral every 6 hours  aspirin enteric coated 81 milliGRAM(s) Oral daily  buDESOnide 160 MICROgram(s)/formoterol 4.5 MICROgram(s) Inhaler 2 Puff(s) Inhalation two times a day  heparin  Injectable 5000 Unit(s) SubCutaneous every 8 hours  ibuprofen  Tablet 400 milliGRAM(s) Oral every 6 hours  loratadine 10 milliGRAM(s) Oral at bedtime  metoprolol succinate ER 12.5 milliGRAM(s) Oral daily  montelukast 10 milliGRAM(s) Oral at bedtime    MEDICATIONS  (PRN):  artificial  tears Solution 1 Drop(s) Both EYES four times a day PRN Dry Eyes  HYDROmorphone  Injectable 2 milliGRAM(s) IV Push once PRN For CLUSTER HEADACHE ONLY. Not for abdominal pain  metoclopramide Injectable 10 milliGRAM(s) IV Push once PRN for cluster headache  ondansetron Injectable 4 milliGRAM(s) IV Push once PRN For cluster headache  oxyCODONE    IR 5 milliGRAM(s) Oral every 6 hours PRN Moderate Pain  simethicone 80 milliGRAM(s) Chew four times a day PRN Gas      LABS:                        11.2   5.8   )-----------( 155      ( 14 Mar 2018 14:50 )             31.0     03-14    143  |  104  |  17  ----------------------------<  94  4.0   |  28  |  0.79    Ca    9.1      14 Mar 2018 06:55  Phos  3.6     03-14  Mg     2.1     03-14                MICROBIOLOGY:     RADIOLOGY:  [ ] Reviewed and interpreted by me    Point of Care Ultrasound Findings:    PFT:    EKG: CHIEF COMPLAINT: good sleep in between pain killers-watery dark stools-no gas, no sob     Interval Events: ambulating    REVIEW OF SYSTEMS:  Constitutional: No fevers or chills. No weight loss. No fatigue or generalized malaise.  Eyes: No itching or discharge from the eyes  ENT: No ear pain. No ear discharge. No nasal congestion. No post nasal drip. No epistaxis. No throat pain. No sore throat. No difficulty swallowing.   CV: No chest pain. No palpitations. No lightheadedness or dizziness.   Resp: No dyspnea at rest. No dyspnea on exertion. No orthopnea. No wheezing. No cough. No stridor. No sputum production. No chest pain with respiration.  GI: No nausea. No vomiting. ++ diarrhea.  MSK: No joint pain or pain in any extremities  Integumentary: No skin lesions. No pedal edema.  Neurological: No gross motor weakness. No sensory changes.  [ +] All other systems negative  [ ] Unable to assess ROS because ________    OBJECTIVE:  ICU Vital Signs Last 24 Hrs  T(C): 36.4 (15 Mar 2018 05:09), Max: 37 (14 Mar 2018 19:05)  T(F): 97.6 (15 Mar 2018 05:09), Max: 98.6 (14 Mar 2018 19:05)  HR: 65 (15 Mar 2018 05:09) (65 - 82)  BP: 133/85 (15 Mar 2018 05:09) (123/72 - 144/86)  BP(mean): --  ABP: --  ABP(mean): --  RR: 18 (15 Mar 2018 05:09) (18 - 18)  SpO2: 96% (15 Mar 2018 05:09) (95% - 98%)        03-13 @ 07:01  -  03-14 @ 07:00  --------------------------------------------------------  IN: 1220 mL / OUT: 1100 mL / NET: 120 mL    03-14 @ 07:01  -  03-15 @ 05:10  --------------------------------------------------------  IN: 580 mL / OUT: 800 mL / NET: -220 mL      CAPILLARY BLOOD GLUCOSE          PHYSICAL EXAM: NAD  General: Awake, alert, oriented X 3.   HEENT: Atraumatic, normocephalic.                 Mallampatti Grade 3                No nasal congestion.                No tonsillar or pharyngeal exudates.  Lymph Nodes: No palpable lymphadenopathy  Neck: No JVD. No carotid bruit.   Respiratory: Normal chest expansion                         Normal percussion                         Normal and equal air entry                         No wheeze, rhonchi or rales.  Cardiovascular: S1 S2 normal. No murmurs, rubs or gallops.   Abdomen: Soft, ++-tender, non-distended. No organomegaly.  Extremities: Warm to touch. Peripheral pulse palpable. No pedal edema.   Skin: No rashes or skin lesions  Neurological: Motor and sensory examination equal and normal in all four extremities.  Psychiatry: Appropriate mood and affect.    HOSPITAL MEDICATIONS:  MEDICATIONS  (STANDING):  acetaminophen   Tablet. 1000 milliGRAM(s) Oral every 6 hours  aspirin enteric coated 81 milliGRAM(s) Oral daily  buDESOnide 160 MICROgram(s)/formoterol 4.5 MICROgram(s) Inhaler 2 Puff(s) Inhalation two times a day  heparin  Injectable 5000 Unit(s) SubCutaneous every 8 hours  ibuprofen  Tablet 400 milliGRAM(s) Oral every 6 hours  loratadine 10 milliGRAM(s) Oral at bedtime  metoprolol succinate ER 12.5 milliGRAM(s) Oral daily  montelukast 10 milliGRAM(s) Oral at bedtime    MEDICATIONS  (PRN):  artificial  tears Solution 1 Drop(s) Both EYES four times a day PRN Dry Eyes  HYDROmorphone  Injectable 2 milliGRAM(s) IV Push once PRN For CLUSTER HEADACHE ONLY. Not for abdominal pain  metoclopramide Injectable 10 milliGRAM(s) IV Push once PRN for cluster headache  ondansetron Injectable 4 milliGRAM(s) IV Push once PRN For cluster headache  oxyCODONE    IR 5 milliGRAM(s) Oral every 6 hours PRN Moderate Pain  simethicone 80 milliGRAM(s) Chew four times a day PRN Gas      LABS:                        11.2   5.8   )-----------( 155      ( 14 Mar 2018 14:50 )             31.0     03-14    143  |  104  |  17  ----------------------------<  94  4.0   |  28  |  0.79    Ca    9.1      14 Mar 2018 06:55  Phos  3.6     03-14  Mg     2.1     03-14                MICROBIOLOGY:     RADIOLOGY:  [ ] Reviewed and interpreted by me    Point of Care Ultrasound Findings:    PFT:    EKG:

## 2018-03-16 VITALS
TEMPERATURE: 98 F | OXYGEN SATURATION: 97 % | HEART RATE: 82 BPM | DIASTOLIC BLOOD PRESSURE: 85 MMHG | SYSTOLIC BLOOD PRESSURE: 146 MMHG | RESPIRATION RATE: 18 BRPM

## 2018-03-16 PROCEDURE — 80048 BASIC METABOLIC PNL TOTAL CA: CPT

## 2018-03-16 PROCEDURE — 94640 AIRWAY INHALATION TREATMENT: CPT

## 2018-03-16 PROCEDURE — 88302 TISSUE EXAM BY PATHOLOGIST: CPT

## 2018-03-16 PROCEDURE — C1765: CPT

## 2018-03-16 PROCEDURE — 86901 BLOOD TYPING SEROLOGIC RH(D): CPT

## 2018-03-16 PROCEDURE — 88304 TISSUE EXAM BY PATHOLOGIST: CPT

## 2018-03-16 PROCEDURE — 88307 TISSUE EXAM BY PATHOLOGIST: CPT

## 2018-03-16 PROCEDURE — 85027 COMPLETE CBC AUTOMATED: CPT

## 2018-03-16 PROCEDURE — 84100 ASSAY OF PHOSPHORUS: CPT

## 2018-03-16 PROCEDURE — 86900 BLOOD TYPING SEROLOGIC ABO: CPT

## 2018-03-16 PROCEDURE — 99232 SBSQ HOSP IP/OBS MODERATE 35: CPT

## 2018-03-16 PROCEDURE — 82962 GLUCOSE BLOOD TEST: CPT

## 2018-03-16 PROCEDURE — 86850 RBC ANTIBODY SCREEN: CPT

## 2018-03-16 PROCEDURE — 83735 ASSAY OF MAGNESIUM: CPT

## 2018-03-16 RX ORDER — CIPROFLOXACIN HYDROCHLORIDE 250 MG/1
250 TABLET, FILM COATED ORAL
Qty: 3 | Refills: 0 | Status: DISCONTINUED | COMMUNITY
Start: 2018-03-05 | End: 2018-03-16

## 2018-03-16 RX ORDER — METRONIDAZOLE 250 MG/1
250 TABLET ORAL
Qty: 3 | Refills: 0 | Status: DISCONTINUED | COMMUNITY
Start: 2018-03-05 | End: 2018-03-16

## 2018-03-16 RX ORDER — FLUTICASONE PROPIONATE AND SALMETEROL 50; 250 UG/1; UG/1
250-50 POWDER RESPIRATORY (INHALATION)
Qty: 180 | Refills: 1 | Status: DISCONTINUED | COMMUNITY
Start: 2017-02-08 | End: 2018-03-16

## 2018-03-16 RX ADMIN — Medication 400 MILLIGRAM(S): at 09:07

## 2018-03-16 RX ADMIN — Medication 12.5 MILLIGRAM(S): at 04:43

## 2018-03-16 RX ADMIN — BUDESONIDE AND FORMOTEROL FUMARATE DIHYDRATE 2 PUFF(S): 160; 4.5 AEROSOL RESPIRATORY (INHALATION) at 07:36

## 2018-03-16 RX ADMIN — Medication 400 MILLIGRAM(S): at 05:26

## 2018-03-16 RX ADMIN — Medication 1000 MILLIGRAM(S): at 01:07

## 2018-03-16 RX ADMIN — Medication 1000 MILLIGRAM(S): at 05:26

## 2018-03-16 RX ADMIN — Medication 400 MILLIGRAM(S): at 04:44

## 2018-03-16 RX ADMIN — HEPARIN SODIUM 5000 UNIT(S): 5000 INJECTION INTRAVENOUS; SUBCUTANEOUS at 04:43

## 2018-03-16 RX ADMIN — Medication 1000 MILLIGRAM(S): at 04:44

## 2018-03-16 RX ADMIN — OXYCODONE HYDROCHLORIDE 5 MILLIGRAM(S): 5 TABLET ORAL at 07:35

## 2018-03-16 RX ADMIN — OXYCODONE HYDROCHLORIDE 5 MILLIGRAM(S): 5 TABLET ORAL at 08:05

## 2018-03-16 NOTE — PROGRESS NOTE ADULT - PROBLEM SELECTOR PLAN 1
Resume aspirin and beta blocker. CV stable.
Continue aspirin and beta blocker. CV stable.
Continue aspirin and beta blocker. CV stable.
cpap out pt

## 2018-03-16 NOTE — PROGRESS NOTE ADULT - ASSESSMENT
52M Crohns with a history of SBO and pancreatitis, MARYAM on APAP, controlled severe persistent asthma presenting for bowel surgery -s/p recurrent SBO    tolerated procedure well--no post op pulm issues.  GI situation moderating--slightly improved 52M Crohns with a history of SBO and pancreatitis, MARYAM on APAP, controlled severe persistent asthma presenting for bowel surgery -s/p recurrent SBO    tolerated procedure well--no post op pulm issues.  GI situation moderating--continues to improve

## 2018-03-16 NOTE — PROGRESS NOTE ADULT - PROVIDER SPECIALTY LIST ADULT
Anesthesia
Anesthesia
Cardiology
Cardiology
Pulmonology
Surgery
Cardiology

## 2018-03-16 NOTE — PROGRESS NOTE ADULT - ATTENDING COMMENTS
as above--pulm stable--s/p bowel surgery  Continue all pulm rx as above-- accapella to incentive spirometry    Ambulating well, GI situation moderating; DC planning over next 24-48 hours    Leonel Duckworth MD-Pulmonary   [phone] as above--pulm stable--s/p bowel surgery  Continue all pulm rx as above-- accapella to incentive spirometry    Ambulating well, GI situation moderating; DC planning-likely today    Leonel Duckworth MD-Pulmonary   [phone]

## 2018-03-16 NOTE — PROGRESS NOTE ADULT - PROBLEM SELECTOR PROBLEM 1
CAD (coronary artery disease)
MARYAM (obstructive sleep apnea)

## 2018-03-16 NOTE — PROGRESS NOTE ADULT - SUBJECTIVE AND OBJECTIVE BOX
CHIEF COMPLAINT:    Interval Events:    REVIEW OF SYSTEMS:  Constitutional: No fevers or chills. No weight loss. No fatigue or generalized malaise.  Eyes: No itching or discharge from the eyes  ENT: No ear pain. No ear discharge. No nasal congestion. No post nasal drip. No epistaxis. No throat pain. No sore throat. No difficulty swallowing.   CV: No chest pain. No palpitations. No lightheadedness or dizziness.   Resp: No dyspnea at rest. No dyspnea on exertion. No orthopnea. No wheezing. No cough. No stridor. No sputum production. No chest pain with respiration.  GI: No nausea. No vomiting. No diarrhea.  MSK: No joint pain or pain in any extremities  Integumentary: No skin lesions. No pedal edema.  Neurological: No gross motor weakness. No sensory changes.  [ ] All other systems negative  [ ] Unable to assess ROS because ________    OBJECTIVE:  ICU Vital Signs Last 24 Hrs  T(C): 36.7 (16 Mar 2018 04:42), Max: 37.1 (15 Mar 2018 18:24)  T(F): 98 (16 Mar 2018 04:42), Max: 98.8 (15 Mar 2018 18:24)  HR: 82 (16 Mar 2018 04:42) (70 - 82)  BP: 146/85 (16 Mar 2018 04:42) (137/89 - 148/89)  BP(mean): --  ABP: --  ABP(mean): --  RR: 18 (16 Mar 2018 04:42) (18 - 18)  SpO2: 97% (16 Mar 2018 04:42) (96% - 99%)        03-14 @ 07:01 - 03-15 @ 07:00  --------------------------------------------------------  IN: 580 mL / OUT: 800 mL / NET: -220 mL    03-15 @ 07:01 - 03-16 @ 05:18  --------------------------------------------------------  IN: 960 mL / OUT: 0 mL / NET: 960 mL      CAPILLARY BLOOD GLUCOSE          PHYSICAL EXAM:  General: Awake, alert, oriented X 3.   HEENT: Atraumatic, normocephalic.                 Mallampatti Grade                 No nasal congestion.                No tonsillar or pharyngeal exudates.  Lymph Nodes: No palpable lymphadenopathy  Neck: No JVD. No carotid bruit.   Respiratory: Normal chest expansion                         Normal percussion                         Normal and equal air entry                         No wheeze, rhonchi or rales.  Cardiovascular: S1 S2 normal. No murmurs, rubs or gallops.   Abdomen: Soft, non-tender, non-distended. No organomegaly.  Extremities: Warm to touch. Peripheral pulse palpable. No pedal edema.   Skin: No rashes or skin lesions  Neurological: Motor and sensory examination equal and normal in all four extremities.  Psychiatry: Appropriate mood and affect.    HOSPITAL MEDICATIONS:  MEDICATIONS  (STANDING):  acetaminophen   Tablet. 1000 milliGRAM(s) Oral every 6 hours  aspirin enteric coated 81 milliGRAM(s) Oral daily  buDESOnide 160 MICROgram(s)/formoterol 4.5 MICROgram(s) Inhaler 2 Puff(s) Inhalation two times a day  heparin  Injectable 5000 Unit(s) SubCutaneous every 8 hours  ibuprofen  Tablet 400 milliGRAM(s) Oral every 6 hours  loratadine 10 milliGRAM(s) Oral at bedtime  metoprolol succinate ER 12.5 milliGRAM(s) Oral daily  montelukast 10 milliGRAM(s) Oral at bedtime  tiotropium 18 MICROgram(s) Capsule 1 Capsule(s) Inhalation daily    MEDICATIONS  (PRN):  artificial  tears Solution 1 Drop(s) Both EYES four times a day PRN Dry Eyes  oxyCODONE    IR 5 milliGRAM(s) Oral every 6 hours PRN Moderate Pain  simethicone 80 milliGRAM(s) Chew four times a day PRN Gas      LABS:                        10.0   6.9   )-----------( 146      ( 15 Mar 2018 12:55 )             27.3     03-14    143  |  104  |  17  ----------------------------<  94  4.0   |  28  |  0.79    Ca    9.1      14 Mar 2018 06:55  Phos  3.6     03-14  Mg     2.1     03-14                MICROBIOLOGY:     RADIOLOGY:  [ ] Reviewed and interpreted by me    Point of Care Ultrasound Findings:    PFT:    EKG: CHIEF COMPLAINT: still crampy, interupted sleep-no BM over the day but last was watery, no sob    Interval Events: ambulating well    REVIEW OF SYSTEMS:  Constitutional: No fevers or chills. No weight loss. No fatigue or generalized malaise.  Eyes: No itching or discharge from the eyes  ENT: No ear pain. No ear discharge. No nasal congestion. No post nasal drip. No epistaxis. No throat pain. No sore throat. No difficulty swallowing.   CV: No chest pain. No palpitations. No lightheadedness or dizziness.   Resp: No dyspnea at rest. No dyspnea on exertion. No orthopnea. No wheezing. No cough. No stridor. No sputum production. No chest pain with respiration.  GI: No nausea. No vomiting. ++ diarrhea.  MSK: No joint pain or pain in any extremities  Integumentary: No skin lesions. No pedal edema.  Neurological: No gross motor weakness. No sensory changes.  [+ ] All other systems negative  [ ] Unable to assess ROS because ________    OBJECTIVE:  ICU Vital Signs Last 24 Hrs  T(C): 36.7 (16 Mar 2018 04:42), Max: 37.1 (15 Mar 2018 18:24)  T(F): 98 (16 Mar 2018 04:42), Max: 98.8 (15 Mar 2018 18:24)  HR: 82 (16 Mar 2018 04:42) (70 - 82)  BP: 146/85 (16 Mar 2018 04:42) (137/89 - 148/89)  BP(mean): --  ABP: --  ABP(mean): --  RR: 18 (16 Mar 2018 04:42) (18 - 18)  SpO2: 97% (16 Mar 2018 04:42) (96% - 99%)        03-14 @ 07:01  -  03-15 @ 07:00  --------------------------------------------------------  IN: 580 mL / OUT: 800 mL / NET: -220 mL    03-15 @ 07:01  -  03-16 @ 05:18  --------------------------------------------------------  IN: 960 mL / OUT: 0 mL / NET: 960 mL      CAPILLARY BLOOD GLUCOSE          PHYSICAL EXAM: NAD in bed  General: Awake, alert, oriented X 3.   HEENT: Atraumatic, normocephalic.                 Mallampatti Grade 2                No nasal congestion.                No tonsillar or pharyngeal exudates.  Lymph Nodes: No palpable lymphadenopathy  Neck: No JVD. No carotid bruit.   Respiratory: Normal chest expansion                         Normal percussion                         Normal and equal air entry                         No wheeze, rhonchi or rales.  Cardiovascular: S1 S2 normal. No murmurs, rubs or gallops.   Abdomen: Soft, ++-tender, non-distended. No organomegaly.  Extremities: Warm to touch. Peripheral pulse palpable. No pedal edema.   Skin: No rashes or skin lesions  Neurological: Motor and sensory examination equal and normal in all four extremities.  Psychiatry: Appropriate mood and affect.    HOSPITAL MEDICATIONS:  MEDICATIONS  (STANDING):  acetaminophen   Tablet. 1000 milliGRAM(s) Oral every 6 hours  aspirin enteric coated 81 milliGRAM(s) Oral daily  buDESOnide 160 MICROgram(s)/formoterol 4.5 MICROgram(s) Inhaler 2 Puff(s) Inhalation two times a day  heparin  Injectable 5000 Unit(s) SubCutaneous every 8 hours  ibuprofen  Tablet 400 milliGRAM(s) Oral every 6 hours  loratadine 10 milliGRAM(s) Oral at bedtime  metoprolol succinate ER 12.5 milliGRAM(s) Oral daily  montelukast 10 milliGRAM(s) Oral at bedtime  tiotropium 18 MICROgram(s) Capsule 1 Capsule(s) Inhalation daily    MEDICATIONS  (PRN):  artificial  tears Solution 1 Drop(s) Both EYES four times a day PRN Dry Eyes  oxyCODONE    IR 5 milliGRAM(s) Oral every 6 hours PRN Moderate Pain  simethicone 80 milliGRAM(s) Chew four times a day PRN Gas      LABS:                        10.0   6.9   )-----------( 146      ( 15 Mar 2018 12:55 )             27.3     03-14    143  |  104  |  17  ----------------------------<  94  4.0   |  28  |  0.79    Ca    9.1      14 Mar 2018 06:55  Phos  3.6     03-14  Mg     2.1     03-14                MICROBIOLOGY:     RADIOLOGY:  [ ] Reviewed and interpreted by me    Point of Care Ultrasound Findings:    PFT:    EKG:

## 2018-03-16 NOTE — PROGRESS NOTE ADULT - ASSESSMENT
Mr. Mota is a 52 year old patient POD 4 s/p ERP lap assisted stricturoplasty x 2 and SBR (15cm) x 1. He is hemodynamically stable. Labs are unremarkable.     - DVT ppx: SQH, c/w ASA  - on simethicone PRN for gas pain   - Diet: on LRD, tolerating well  - Appreciate pulm rec's  - Hold plavix for now  - Pain control with tylenol, Motrin, oxycodone (ERP protocol)  - Monitor GI function  - Replete electrolyte as necessary  - Activity: OOB as tolerated  - Cardiology recommendation appreciated. started BB and c/w ASA  - Restart Crohn's meds upon discharge as per GI  - patient is requesting to see a dietician before discharge  - Dispo: home this morning

## 2018-03-16 NOTE — PROGRESS NOTE ADULT - PROBLEM SELECTOR PLAN 2
S/P surgery yesterday. Diet per CRS. Increase ambulation. DVT prophylaxis.
Diet per CRS. Increase activity. DVT prophylaxis. D/C planning.
Improving. D/C planning per CRS. DVT prophylaxis.
symbicort 160  bid  spiriva 1 puff q day  singulair 10 qhs  pulmonary toilet-incentive spirometry, Chest Pt-acapella/chest vest-up to 5 times per day.    maintain oxygen levels above 90%-supplemental oxygen via nasal canula-humidified    All nebulized therapy is to be administered via hand held nebulizer-(patient must gargle and spit with water after use)

## 2018-03-16 NOTE — PROGRESS NOTE ADULT - SUBJECTIVE AND OBJECTIVE BOX
[facility] PROGRESS NOTE    SUBJECTIVE:   Patient was seen and examined this morning. There was no acute event overnight. He is resting comfortably in bed. He had gas pain and bloody BMs yesterday. He does not report pain, fever, n/v, chest pain, or shortness of breath. patient has flatus and bowel movement.     Vital Signs Last 24 Hrs  T(C): 36.7 (16 Mar 2018 04:42), Max: 37.1 (15 Mar 2018 18:24)  T(F): 98 (16 Mar 2018 04:42), Max: 98.8 (15 Mar 2018 18:24)  HR: 82 (16 Mar 2018 04:42) (65 - 82)  BP: 146/85 (16 Mar 2018 04:42) (133/85 - 148/89)  BP(mean): --  RR: 18 (16 Mar 2018 04:42) (18 - 18)  SpO2: 97% (16 Mar 2018 04:42) (96% - 99%)    Physical Exam  General: awake, alert,    Pulm: respirations unlabored, no increased WOB  Abdomen: soft, mildly distended, NT, no rebound, no guarding  Extremities: Grossly symmetric    I&O's Summary    14 Mar 2018 07:01  -  15 Mar 2018 07:00  --------------------------------------------------------  IN: 580 mL / OUT: 800 mL / NET: -220 mL    15 Mar 2018 07:01  -  16 Mar 2018 05:01  --------------------------------------------------------  IN: 960 mL / OUT: 0 mL / NET: 960 mL      I&O's Detail    14 Mar 2018 07:01  -  15 Mar 2018 07:00  --------------------------------------------------------  IN:    Oral Fluid: 580 mL  Total IN: 580 mL    OUT:    Voided: 800 mL  Total OUT: 800 mL    Total NET: -220 mL      15 Mar 2018 07:01  -  16 Mar 2018 05:01  --------------------------------------------------------  IN:    Oral Fluid: 960 mL  Total IN: 960 mL    OUT:  Total OUT: 0 mL    Total NET: 960 mL          MEDICATIONS  (STANDING):  acetaminophen   Tablet. 1000 milliGRAM(s) Oral every 6 hours  aspirin enteric coated 81 milliGRAM(s) Oral daily  buDESOnide 160 MICROgram(s)/formoterol 4.5 MICROgram(s) Inhaler 2 Puff(s) Inhalation two times a day  heparin  Injectable 5000 Unit(s) SubCutaneous every 8 hours  ibuprofen  Tablet 400 milliGRAM(s) Oral every 6 hours  loratadine 10 milliGRAM(s) Oral at bedtime  metoprolol succinate ER 12.5 milliGRAM(s) Oral daily  montelukast 10 milliGRAM(s) Oral at bedtime  tiotropium 18 MICROgram(s) Capsule 1 Capsule(s) Inhalation daily    MEDICATIONS  (PRN):  artificial  tears Solution 1 Drop(s) Both EYES four times a day PRN Dry Eyes  oxyCODONE    IR 5 milliGRAM(s) Oral every 6 hours PRN Moderate Pain  simethicone 80 milliGRAM(s) Chew four times a day PRN Gas      LABS:                        10.0   6.9   )-----------( 146      ( 15 Mar 2018 12:55 )             27.3     03-14    143  |  104  |  17  ----------------------------<  94  4.0   |  28  |  0.79    Ca    9.1      14 Mar 2018 06:55  Phos  3.6     03-14  Mg     2.1     03-14            RADIOLOGY & ADDITIONAL STUDIES:    [facility] #[id number]

## 2018-03-16 NOTE — PROGRESS NOTE ADULT - PROBLEM SELECTOR PROBLEM 2
Crohn disease
Severe persistent asthma without complication

## 2018-03-19 ENCOUNTER — MEDICATION RENEWAL (OUTPATIENT)
Age: 53
End: 2018-03-19

## 2018-03-19 LAB — SURGICAL PATHOLOGY STUDY: SIGNIFICANT CHANGE UP

## 2018-03-19 RX ORDER — ALBUTEROL SULFATE 90 UG/1
108 (90 BASE) AEROSOL, METERED RESPIRATORY (INHALATION) EVERY 6 HOURS
Qty: 3 | Refills: 1 | Status: ACTIVE | COMMUNITY
Start: 2017-09-24 | End: 1900-01-01

## 2018-03-27 ENCOUNTER — APPOINTMENT (OUTPATIENT)
Dept: RHEUMATOLOGY | Facility: CLINIC | Age: 53
End: 2018-03-27

## 2018-03-29 ENCOUNTER — APPOINTMENT (OUTPATIENT)
Dept: SURGERY | Facility: CLINIC | Age: 53
End: 2018-03-29
Payer: MEDICAID

## 2018-03-29 VITALS
RESPIRATION RATE: 15 BRPM | OXYGEN SATURATION: 98 % | TEMPERATURE: 98.8 F | SYSTOLIC BLOOD PRESSURE: 120 MMHG | HEART RATE: 79 BPM | DIASTOLIC BLOOD PRESSURE: 71 MMHG

## 2018-03-29 PROCEDURE — 99024 POSTOP FOLLOW-UP VISIT: CPT

## 2018-04-05 ENCOUNTER — APPOINTMENT (OUTPATIENT)
Dept: RHEUMATOLOGY | Facility: CLINIC | Age: 53
End: 2018-04-05
Payer: MEDICAID

## 2018-04-05 PROCEDURE — 96413 CHEMO IV INFUSION 1 HR: CPT

## 2018-04-09 ENCOUNTER — MEDICATION RENEWAL (OUTPATIENT)
Age: 53
End: 2018-04-09

## 2018-04-19 ENCOUNTER — APPOINTMENT (OUTPATIENT)
Dept: PULMONOLOGY | Facility: CLINIC | Age: 53
End: 2018-04-19
Payer: MEDICAID

## 2018-04-19 VITALS
HEIGHT: 66 IN | DIASTOLIC BLOOD PRESSURE: 70 MMHG | BODY MASS INDEX: 29.09 KG/M2 | SYSTOLIC BLOOD PRESSURE: 130 MMHG | OXYGEN SATURATION: 99 % | HEART RATE: 76 BPM | WEIGHT: 181 LBS | RESPIRATION RATE: 17 BRPM

## 2018-04-19 PROCEDURE — 99214 OFFICE O/P EST MOD 30 MIN: CPT | Mod: 25

## 2018-04-19 PROCEDURE — 94010 BREATHING CAPACITY TEST: CPT

## 2018-04-19 PROCEDURE — 94640 AIRWAY INHALATION TREATMENT: CPT | Mod: 59

## 2018-05-09 ENCOUNTER — APPOINTMENT (OUTPATIENT)
Dept: SURGERY | Facility: CLINIC | Age: 53
End: 2018-05-09
Payer: MEDICAID

## 2018-05-09 VITALS
DIASTOLIC BLOOD PRESSURE: 92 MMHG | SYSTOLIC BLOOD PRESSURE: 133 MMHG | RESPIRATION RATE: 15 BRPM | TEMPERATURE: 98.5 F | OXYGEN SATURATION: 98 % | HEART RATE: 92 BPM

## 2018-05-09 PROCEDURE — 99024 POSTOP FOLLOW-UP VISIT: CPT

## 2018-05-09 RX ORDER — PANTOPRAZOLE 40 MG/1
40 TABLET, DELAYED RELEASE ORAL
Qty: 60 | Refills: 0 | Status: DISCONTINUED | COMMUNITY
Start: 2018-03-07

## 2018-05-09 RX ORDER — METRONIDAZOLE 500 MG/1
500 TABLET ORAL
Qty: 14 | Refills: 0 | Status: DISCONTINUED | COMMUNITY
Start: 2018-02-23

## 2018-05-09 RX ORDER — NITROGLYCERIN 20 MG/G
2 OINTMENT TOPICAL
Qty: 30 | Refills: 3 | Status: ACTIVE | COMMUNITY
Start: 2018-05-09 | End: 1900-01-01

## 2018-05-09 RX ORDER — METOCLOPRAMIDE 10 MG/1
10 TABLET ORAL
Qty: 90 | Refills: 0 | Status: DISCONTINUED | COMMUNITY
Start: 2018-01-19

## 2018-05-09 RX ORDER — METOPROLOL SUCCINATE 25 MG/1
25 TABLET, EXTENDED RELEASE ORAL
Qty: 30 | Refills: 0 | Status: DISCONTINUED | COMMUNITY
Start: 2017-06-03

## 2018-05-23 ENCOUNTER — APPOINTMENT (OUTPATIENT)
Dept: RHEUMATOLOGY | Facility: CLINIC | Age: 53
End: 2018-05-23

## 2018-05-30 ENCOUNTER — APPOINTMENT (OUTPATIENT)
Dept: MRI IMAGING | Facility: IMAGING CENTER | Age: 53
End: 2018-05-30
Payer: MEDICAID

## 2018-05-30 ENCOUNTER — OUTPATIENT (OUTPATIENT)
Dept: OUTPATIENT SERVICES | Facility: HOSPITAL | Age: 53
LOS: 1 days | End: 2018-05-30
Payer: MEDICAID

## 2018-05-30 DIAGNOSIS — Z87.898 PERSONAL HISTORY OF OTHER SPECIFIED CONDITIONS: Chronic | ICD-10-CM

## 2018-05-30 DIAGNOSIS — Z00.8 ENCOUNTER FOR OTHER GENERAL EXAMINATION: ICD-10-CM

## 2018-05-30 DIAGNOSIS — Z98.89 OTHER SPECIFIED POSTPROCEDURAL STATES: Chronic | ICD-10-CM

## 2018-05-30 PROCEDURE — 72141 MRI NECK SPINE W/O DYE: CPT | Mod: 26

## 2018-05-30 PROCEDURE — 72141 MRI NECK SPINE W/O DYE: CPT

## 2018-06-01 ENCOUNTER — APPOINTMENT (OUTPATIENT)
Dept: RHEUMATOLOGY | Facility: CLINIC | Age: 53
End: 2018-06-01

## 2018-06-07 ENCOUNTER — APPOINTMENT (OUTPATIENT)
Dept: RADIOLOGY | Facility: IMAGING CENTER | Age: 53
End: 2018-06-07
Payer: MEDICAID

## 2018-06-07 ENCOUNTER — OUTPATIENT (OUTPATIENT)
Dept: OUTPATIENT SERVICES | Facility: HOSPITAL | Age: 53
LOS: 1 days | End: 2018-06-07
Payer: MEDICAID

## 2018-06-07 DIAGNOSIS — Z00.8 ENCOUNTER FOR OTHER GENERAL EXAMINATION: ICD-10-CM

## 2018-06-07 DIAGNOSIS — Z98.89 OTHER SPECIFIED POSTPROCEDURAL STATES: Chronic | ICD-10-CM

## 2018-06-07 DIAGNOSIS — Z87.898 PERSONAL HISTORY OF OTHER SPECIFIED CONDITIONS: Chronic | ICD-10-CM

## 2018-06-07 PROCEDURE — 73630 X-RAY EXAM OF FOOT: CPT

## 2018-06-07 PROCEDURE — 73630 X-RAY EXAM OF FOOT: CPT | Mod: 26,RT

## 2018-06-12 ENCOUNTER — APPOINTMENT (OUTPATIENT)
Dept: ORTHOPEDIC SURGERY | Facility: CLINIC | Age: 53
End: 2018-06-12
Payer: MEDICAID

## 2018-06-12 DIAGNOSIS — Z78.9 OTHER SPECIFIED HEALTH STATUS: ICD-10-CM

## 2018-06-12 DIAGNOSIS — Z86.69 PERSONAL HISTORY OF OTHER DISEASES OF THE NERVOUS SYSTEM AND SENSE ORGANS: ICD-10-CM

## 2018-06-12 DIAGNOSIS — Z87.09 PERSONAL HISTORY OF OTHER DISEASES OF THE RESPIRATORY SYSTEM: ICD-10-CM

## 2018-06-12 DIAGNOSIS — M84.30XA STRESS FRACTURE, UNSPECIFIED SITE, INITIAL ENCOUNTER FOR FRACTURE: ICD-10-CM

## 2018-06-12 DIAGNOSIS — Z87.39 PERSONAL HISTORY OF OTHER DISEASES OF THE MUSCULOSKELETAL SYSTEM AND CONNECTIVE TISSUE: ICD-10-CM

## 2018-06-12 DIAGNOSIS — Z86.2 PERSONAL HISTORY OF DISEASES OF THE BLOOD AND BLOOD-FORMING ORGANS AND CERTAIN DISORDERS INVOLVING THE IMMUNE MECHANISM: ICD-10-CM

## 2018-06-12 PROCEDURE — 99203 OFFICE O/P NEW LOW 30 MIN: CPT

## 2018-06-12 PROCEDURE — 73630 X-RAY EXAM OF FOOT: CPT | Mod: RT

## 2018-06-19 ENCOUNTER — FORM ENCOUNTER (OUTPATIENT)
Age: 53
End: 2018-06-19

## 2018-06-20 ENCOUNTER — OUTPATIENT (OUTPATIENT)
Dept: OUTPATIENT SERVICES | Facility: HOSPITAL | Age: 53
LOS: 1 days | End: 2018-06-20
Payer: MEDICAID

## 2018-06-20 ENCOUNTER — APPOINTMENT (OUTPATIENT)
Dept: MRI IMAGING | Facility: CLINIC | Age: 53
End: 2018-06-20
Payer: MEDICAID

## 2018-06-20 DIAGNOSIS — Z98.89 OTHER SPECIFIED POSTPROCEDURAL STATES: Chronic | ICD-10-CM

## 2018-06-20 DIAGNOSIS — Z87.898 PERSONAL HISTORY OF OTHER SPECIFIED CONDITIONS: Chronic | ICD-10-CM

## 2018-06-20 DIAGNOSIS — Z00.8 ENCOUNTER FOR OTHER GENERAL EXAMINATION: ICD-10-CM

## 2018-06-20 PROCEDURE — 73718 MRI LOWER EXTREMITY W/O DYE: CPT

## 2018-06-20 PROCEDURE — 73718 MRI LOWER EXTREMITY W/O DYE: CPT | Mod: 26,RT

## 2018-06-27 ENCOUNTER — APPOINTMENT (OUTPATIENT)
Dept: SURGERY | Facility: CLINIC | Age: 53
End: 2018-06-27

## 2018-07-02 ENCOUNTER — APPOINTMENT (OUTPATIENT)
Dept: ORTHOPEDIC SURGERY | Facility: CLINIC | Age: 53
End: 2018-07-02
Payer: MEDICAID

## 2018-07-02 DIAGNOSIS — M79.671 PAIN IN RIGHT FOOT: ICD-10-CM

## 2018-07-02 DIAGNOSIS — M77.50 OTHER ENTHESOPATHY OF UNSPCFD FOOT AND ANKLE: ICD-10-CM

## 2018-07-02 DIAGNOSIS — M21.6X1 OTHER ACQUIRED DEFORMITIES OF RIGHT FOOT: ICD-10-CM

## 2018-07-02 PROCEDURE — 99213 OFFICE O/P EST LOW 20 MIN: CPT

## 2018-07-03 PROBLEM — M21.6X1 GASTROCNEMIUS EQUINUS OF RIGHT LOWER EXTREMITY: Status: ACTIVE | Noted: 2018-06-12

## 2018-07-03 PROBLEM — M79.671 RIGHT FOOT PAIN: Status: ACTIVE | Noted: 2018-06-12

## 2018-07-03 PROBLEM — M77.50 EXTENSOR TENDINITIS OF FOOT: Status: ACTIVE | Noted: 2018-07-02

## 2018-07-12 ENCOUNTER — APPOINTMENT (OUTPATIENT)
Dept: SURGERY | Facility: CLINIC | Age: 53
End: 2018-07-12
Payer: MEDICAID

## 2018-07-12 VITALS
DIASTOLIC BLOOD PRESSURE: 83 MMHG | HEART RATE: 80 BPM | SYSTOLIC BLOOD PRESSURE: 130 MMHG | RESPIRATION RATE: 15 BRPM | OXYGEN SATURATION: 98 % | TEMPERATURE: 98.5 F

## 2018-07-12 PROCEDURE — 46600 DIAGNOSTIC ANOSCOPY SPX: CPT

## 2018-07-12 PROCEDURE — 99213 OFFICE O/P EST LOW 20 MIN: CPT | Mod: 25

## 2018-07-12 RX ORDER — VEDOLIZUMAB 300 MG/5ML
300 INJECTION, POWDER, LYOPHILIZED, FOR SOLUTION INTRAVENOUS
Refills: 0 | Status: DISCONTINUED | COMMUNITY
End: 2018-07-12

## 2018-07-12 RX ORDER — PETROLATUM,WHITE 100 %
JELLY (GRAM) MISCELLANEOUS
Qty: 60 | Refills: 0 | Status: DISCONTINUED | COMMUNITY
Start: 2018-05-09

## 2018-07-12 RX ORDER — AZITHROMYCIN 250 MG/1
250 TABLET, FILM COATED ORAL
Qty: 6 | Refills: 0 | Status: DISCONTINUED | COMMUNITY
Start: 2018-06-19

## 2018-07-12 RX ORDER — METHYLPREDNISOLONE 4 MG/1
4 TABLET ORAL
Qty: 21 | Refills: 0 | Status: DISCONTINUED | COMMUNITY
Start: 2018-05-15

## 2018-07-12 RX ORDER — BENZONATATE 200 MG/1
200 CAPSULE ORAL
Qty: 60 | Refills: 0 | Status: DISCONTINUED | COMMUNITY
Start: 2018-06-19

## 2018-07-13 ENCOUNTER — TRANSCRIPTION ENCOUNTER (OUTPATIENT)
Age: 53
End: 2018-07-13

## 2018-07-17 ENCOUNTER — APPOINTMENT (OUTPATIENT)
Dept: SURGERY | Facility: CLINIC | Age: 53
End: 2018-07-17
Payer: MEDICAID

## 2018-07-17 PROBLEM — G44.009 CLUSTER HEADACHE SYNDROME, UNSPECIFIED, NOT INTRACTABLE: Chronic | Status: ACTIVE | Noted: 2018-03-08

## 2018-07-17 PROBLEM — K56.609 UNSPECIFIED INTESTINAL OBSTRUCTION, UNSPECIFIED AS TO PARTIAL VERSUS COMPLETE OBSTRUCTION: Chronic | Status: ACTIVE | Noted: 2018-03-08

## 2018-07-17 PROBLEM — K40.91 UNILATERAL INGUINAL HERNIA, WITHOUT OBSTRUCTION OR GANGRENE, RECURRENT: Chronic | Status: ACTIVE | Noted: 2017-05-25

## 2018-07-17 PROCEDURE — 46505 CHEMODENERVATION ANAL MUSC: CPT

## 2018-07-17 PROCEDURE — 45990 SURG DX EXAM ANORECTAL: CPT | Mod: 59

## 2018-07-19 ENCOUNTER — NON-APPOINTMENT (OUTPATIENT)
Age: 53
End: 2018-07-19

## 2018-07-19 ENCOUNTER — APPOINTMENT (OUTPATIENT)
Dept: PULMONOLOGY | Facility: CLINIC | Age: 53
End: 2018-07-19
Payer: MEDICAID

## 2018-07-19 VITALS
DIASTOLIC BLOOD PRESSURE: 86 MMHG | WEIGHT: 186 LBS | SYSTOLIC BLOOD PRESSURE: 120 MMHG | BODY MASS INDEX: 29.89 KG/M2 | HEIGHT: 66 IN | RESPIRATION RATE: 17 BRPM | HEART RATE: 88 BPM | OXYGEN SATURATION: 98 %

## 2018-07-19 PROCEDURE — 99214 OFFICE O/P EST MOD 30 MIN: CPT | Mod: 25

## 2018-07-19 PROCEDURE — 94010 BREATHING CAPACITY TEST: CPT

## 2018-08-01 ENCOUNTER — EMERGENCY (EMERGENCY)
Facility: HOSPITAL | Age: 53
LOS: 1 days | Discharge: HOME | End: 2018-08-01
Attending: EMERGENCY MEDICINE
Payer: MEDICAID

## 2018-08-01 VITALS
OXYGEN SATURATION: 100 % | SYSTOLIC BLOOD PRESSURE: 148 MMHG | HEART RATE: 78 BPM | WEIGHT: 190.04 LBS | DIASTOLIC BLOOD PRESSURE: 91 MMHG | RESPIRATION RATE: 20 BRPM | TEMPERATURE: 98 F

## 2018-08-01 VITALS
RESPIRATION RATE: 20 BRPM | OXYGEN SATURATION: 98 % | DIASTOLIC BLOOD PRESSURE: 81 MMHG | HEART RATE: 83 BPM | SYSTOLIC BLOOD PRESSURE: 114 MMHG

## 2018-08-01 DIAGNOSIS — Z98.89 OTHER SPECIFIED POSTPROCEDURAL STATES: Chronic | ICD-10-CM

## 2018-08-01 DIAGNOSIS — Z87.898 PERSONAL HISTORY OF OTHER SPECIFIED CONDITIONS: Chronic | ICD-10-CM

## 2018-08-01 PROCEDURE — 99284 EMERGENCY DEPT VISIT MOD MDM: CPT | Mod: 25

## 2018-08-01 PROCEDURE — 96375 TX/PRO/DX INJ NEW DRUG ADDON: CPT

## 2018-08-01 PROCEDURE — 96374 THER/PROPH/DIAG INJ IV PUSH: CPT

## 2018-08-01 RX ORDER — DIPHENHYDRAMINE HCL 50 MG
50 CAPSULE ORAL ONCE
Qty: 0 | Refills: 0 | Status: COMPLETED | OUTPATIENT
Start: 2018-08-01 | End: 2018-08-01

## 2018-08-01 RX ORDER — ACETAMINOPHEN 500 MG
1000 TABLET ORAL ONCE
Qty: 0 | Refills: 0 | Status: COMPLETED | OUTPATIENT
Start: 2018-08-01 | End: 2018-08-01

## 2018-08-01 RX ORDER — SODIUM CHLORIDE 9 MG/ML
1000 INJECTION INTRAMUSCULAR; INTRAVENOUS; SUBCUTANEOUS ONCE
Qty: 0 | Refills: 0 | Status: COMPLETED | OUTPATIENT
Start: 2018-08-01 | End: 2018-08-01

## 2018-08-01 RX ORDER — METOCLOPRAMIDE HCL 10 MG
10 TABLET ORAL ONCE
Qty: 0 | Refills: 0 | Status: COMPLETED | OUTPATIENT
Start: 2018-08-01 | End: 2018-08-01

## 2018-08-01 RX ORDER — KETOROLAC TROMETHAMINE 30 MG/ML
15 SYRINGE (ML) INJECTION ONCE
Qty: 0 | Refills: 0 | Status: DISCONTINUED | OUTPATIENT
Start: 2018-08-01 | End: 2018-08-01

## 2018-08-01 RX ADMIN — Medication 400 MILLIGRAM(S): at 03:17

## 2018-08-01 RX ADMIN — Medication 50 MILLIGRAM(S): at 05:13

## 2018-08-01 RX ADMIN — Medication 1000 MILLIGRAM(S): at 05:17

## 2018-08-01 RX ADMIN — Medication 10 MILLIGRAM(S): at 03:17

## 2018-08-01 RX ADMIN — Medication 15 MILLIGRAM(S): at 05:13

## 2018-08-01 RX ADMIN — SODIUM CHLORIDE 1000 MILLILITER(S): 9 INJECTION INTRAMUSCULAR; INTRAVENOUS; SUBCUTANEOUS at 03:17

## 2018-08-01 NOTE — ED PROVIDER NOTE - MEDICAL DECISION MAKING DETAILS
53 m with h/o cluster headache, p/w headache that feels similar to prior cluster headache. O2 trial. Reglan, tylenol IV fluid and reassess

## 2018-08-01 NOTE — ED PROVIDER NOTE - PSH
H/O colonoscopy    H/O lipoma  removed from back  H/O percutaneous transluminal coronary angioplasty  with DE RCA 2006, 2 stents placed 2015  History of herniorrhaphy  left inguinal herniorrhaphy  S/P cardiac catheterization  2017-resulted in [place]-no stents placed  S/P hernia surgery

## 2018-08-01 NOTE — ED PROVIDER NOTE - ATTENDING CONTRIBUTION TO CARE
slow onset frontal x 1 day w no neck symptoms no fever.  neuro intact, clear speech.  no ct / lp indicated. Symptomatic treatment. advised to see neuro

## 2018-08-01 NOTE — ED ADULT NURSE NOTE - NSIMPLEMENTINTERV_GEN_ALL_ED
Implemented All Universal Safety Interventions:  [place] to call system. Call bell, personal items and telephone within reach. Instruct patient to call for assistance. Room bathroom lighting operational. Non-slip footwear when patient is off stretcher. Physically safe environment: no spills, clutter or unnecessary equipment. Stretcher in lowest position, wheels locked, appropriate side rails in place.

## 2018-08-01 NOTE — ED PROVIDER NOTE - OBJECTIVE STATEMENT
53 M h/o CAD, cluster headaches, crohns disease, p/w L sided headache. Started earlier today, sharp, feels like prior cluster headache. No nausea/vomiting. Took tylenol and oxycodone at home with little relief. No vision change, numbness or weakness. Had similar headache 5-6 months ago. Followed by neuro for headaches.

## 2018-08-01 NOTE — ED ADULT NURSE NOTE - PSH
H/O colonoscopy    H/O lipoma  removed from back  H/O percutaneous transluminal coronary angioplasty  with DE RCA 2006, 2 stents placed 2015  History of herniorrhaphy  left inguinal herniorrhaphy  S/P cardiac catheterization  2017-resulted in [place]-no stents placed  S/P hernia surgery

## 2018-08-01 NOTE — ED ADULT NURSE NOTE - CHPI ED NUR SYMPTOMS NEG
no change in level of consciousness/no numbness/no vomiting/no loss of consciousness/no fever/no blurred vision/no confusion/no nausea/no dizziness

## 2018-08-01 NOTE — ED ADULT NURSE NOTE - OBJECTIVE STATEMENT
54 y/o male PMH cluster headaches presents to ED c/o headache rated 9/10 since yesterday evening. Pt reports 1 week of mild headache that worsened yesterday. He is s/p colon resection x few weeks ago with botox injections to rectal fissures last week. Says symptoms are similar to past cluster headaches. Took oxycontin, reglan and tylenol at home with no relied. +photophbia. Denies dizziness, chest pain, SOB, visual changes. Gross motor and neuro intact. 20G IV placed in R forearm. Safety and comfort provided.

## 2018-08-30 ENCOUNTER — APPOINTMENT (OUTPATIENT)
Dept: SURGERY | Facility: CLINIC | Age: 53
End: 2018-08-30
Payer: MEDICARE

## 2018-09-06 ENCOUNTER — INPATIENT (INPATIENT)
Facility: HOSPITAL | Age: 53
LOS: 5 days | Discharge: HOME | DRG: 386 | End: 2018-09-12
Payer: MEDICARE

## 2018-09-06 VITALS
DIASTOLIC BLOOD PRESSURE: 110 MMHG | WEIGHT: 190.04 LBS | HEART RATE: 92 BPM | TEMPERATURE: 99 F | SYSTOLIC BLOOD PRESSURE: 158 MMHG | RESPIRATION RATE: 18 BRPM | HEIGHT: 66 IN | OXYGEN SATURATION: 100 %

## 2018-09-06 DIAGNOSIS — Z98.89 OTHER SPECIFIED POSTPROCEDURAL STATES: Chronic | ICD-10-CM

## 2018-09-06 DIAGNOSIS — R10.9 UNSPECIFIED ABDOMINAL PAIN: ICD-10-CM

## 2018-09-06 DIAGNOSIS — Z87.898 PERSONAL HISTORY OF OTHER SPECIFIED CONDITIONS: Chronic | ICD-10-CM

## 2018-09-06 LAB
ALBUMIN SERPL ELPH-MCNC: 4.7 G/DL — SIGNIFICANT CHANGE UP (ref 3.3–5)
ALP SERPL-CCNC: 72 U/L — SIGNIFICANT CHANGE UP (ref 40–120)
ALT FLD-CCNC: 35 U/L — SIGNIFICANT CHANGE UP (ref 10–45)
ANION GAP SERPL CALC-SCNC: 12 MMOL/L — SIGNIFICANT CHANGE UP (ref 5–17)
ANION GAP SERPL CALC-SCNC: 17 MMOL/L — SIGNIFICANT CHANGE UP (ref 5–17)
APPEARANCE UR: CLEAR — SIGNIFICANT CHANGE UP
AST SERPL-CCNC: 22 U/L — SIGNIFICANT CHANGE UP (ref 10–40)
BACTERIA # UR AUTO: NEGATIVE — SIGNIFICANT CHANGE UP
BASE EXCESS BLDV CALC-SCNC: 1.8 MMOL/L — SIGNIFICANT CHANGE UP (ref -2–2)
BASOPHILS # BLD AUTO: 0 K/UL — SIGNIFICANT CHANGE UP (ref 0–0.2)
BASOPHILS NFR BLD AUTO: 0.1 % — SIGNIFICANT CHANGE UP (ref 0–2)
BILIRUB SERPL-MCNC: 0.3 MG/DL — SIGNIFICANT CHANGE UP (ref 0.2–1.2)
BILIRUB UR-MCNC: NEGATIVE — SIGNIFICANT CHANGE UP
BUN SERPL-MCNC: 10 MG/DL — SIGNIFICANT CHANGE UP (ref 7–23)
BUN SERPL-MCNC: 14 MG/DL — SIGNIFICANT CHANGE UP (ref 7–23)
CA-I SERPL-SCNC: 1.19 MMOL/L — SIGNIFICANT CHANGE UP (ref 1.12–1.3)
CALCIUM SERPL-MCNC: 9.6 MG/DL — SIGNIFICANT CHANGE UP (ref 8.4–10.5)
CALCIUM SERPL-MCNC: 9.9 MG/DL — SIGNIFICANT CHANGE UP (ref 8.4–10.5)
CHLORIDE BLDV-SCNC: 107 MMOL/L — SIGNIFICANT CHANGE UP (ref 96–108)
CHLORIDE SERPL-SCNC: 100 MMOL/L — SIGNIFICANT CHANGE UP (ref 96–108)
CHLORIDE SERPL-SCNC: 103 MMOL/L — SIGNIFICANT CHANGE UP (ref 96–108)
CO2 BLDV-SCNC: 28 MMOL/L — SIGNIFICANT CHANGE UP (ref 22–30)
CO2 SERPL-SCNC: 23 MMOL/L — SIGNIFICANT CHANGE UP (ref 22–31)
CO2 SERPL-SCNC: 25 MMOL/L — SIGNIFICANT CHANGE UP (ref 22–31)
COLOR SPEC: COLORLESS — SIGNIFICANT CHANGE UP
CREAT SERPL-MCNC: 0.83 MG/DL — SIGNIFICANT CHANGE UP (ref 0.5–1.3)
CREAT SERPL-MCNC: 0.84 MG/DL — SIGNIFICANT CHANGE UP (ref 0.5–1.3)
DIFF PNL FLD: NEGATIVE — SIGNIFICANT CHANGE UP
EOSINOPHIL # BLD AUTO: 0 K/UL — SIGNIFICANT CHANGE UP (ref 0–0.5)
EOSINOPHIL NFR BLD AUTO: 0.5 % — SIGNIFICANT CHANGE UP (ref 0–6)
EPI CELLS # UR: 0 /HPF — SIGNIFICANT CHANGE UP
GAS PNL BLDV: 137 MMOL/L — SIGNIFICANT CHANGE UP (ref 136–145)
GAS PNL BLDV: SIGNIFICANT CHANGE UP
GLUCOSE BLDV-MCNC: 136 MG/DL — HIGH (ref 70–99)
GLUCOSE SERPL-MCNC: 127 MG/DL — HIGH (ref 70–99)
GLUCOSE SERPL-MCNC: 154 MG/DL — HIGH (ref 70–99)
GLUCOSE UR QL: NEGATIVE — SIGNIFICANT CHANGE UP
HCO3 BLDV-SCNC: 27 MMOL/L — SIGNIFICANT CHANGE UP (ref 21–29)
HCT VFR BLD CALC: 38.4 % — LOW (ref 39–50)
HCT VFR BLD CALC: 41.2 % — SIGNIFICANT CHANGE UP (ref 39–50)
HCT VFR BLDA CALC: 41 % — SIGNIFICANT CHANGE UP (ref 39–50)
HGB BLD CALC-MCNC: 13.3 G/DL — SIGNIFICANT CHANGE UP (ref 13–17)
HGB BLD-MCNC: 12.7 G/DL — LOW (ref 13–17)
HGB BLD-MCNC: 14.2 G/DL — SIGNIFICANT CHANGE UP (ref 13–17)
HOROWITZ INDEX BLDV+IHG-RTO: SIGNIFICANT CHANGE UP
HYALINE CASTS # UR AUTO: 0 /LPF — SIGNIFICANT CHANGE UP
KETONES UR-MCNC: NEGATIVE — SIGNIFICANT CHANGE UP
LACTATE BLDV-MCNC: 2.7 MMOL/L — HIGH (ref 0.7–2)
LEUKOCYTE ESTERASE UR-ACNC: NEGATIVE — SIGNIFICANT CHANGE UP
LIDOCAIN IGE QN: 49 U/L — SIGNIFICANT CHANGE UP (ref 7–60)
LYMPHOCYTES # BLD AUTO: 1 K/UL — SIGNIFICANT CHANGE UP (ref 1–3.3)
LYMPHOCYTES # BLD AUTO: 10.9 % — LOW (ref 13–44)
MAGNESIUM SERPL-MCNC: 1.7 MG/DL — SIGNIFICANT CHANGE UP (ref 1.6–2.6)
MCHC RBC-ENTMCNC: 27 PG — SIGNIFICANT CHANGE UP (ref 27–34)
MCHC RBC-ENTMCNC: 27.6 PG — SIGNIFICANT CHANGE UP (ref 27–34)
MCHC RBC-ENTMCNC: 33.1 GM/DL — SIGNIFICANT CHANGE UP (ref 32–36)
MCHC RBC-ENTMCNC: 34.4 GM/DL — SIGNIFICANT CHANGE UP (ref 32–36)
MCV RBC AUTO: 80.3 FL — SIGNIFICANT CHANGE UP (ref 80–100)
MCV RBC AUTO: 81.5 FL — SIGNIFICANT CHANGE UP (ref 80–100)
MONOCYTES # BLD AUTO: 0.5 K/UL — SIGNIFICANT CHANGE UP (ref 0–0.9)
MONOCYTES NFR BLD AUTO: 5.6 % — SIGNIFICANT CHANGE UP (ref 2–14)
NEUTROPHILS # BLD AUTO: 7.5 K/UL — HIGH (ref 1.8–7.4)
NEUTROPHILS NFR BLD AUTO: 83 % — HIGH (ref 43–77)
NITRITE UR-MCNC: NEGATIVE — SIGNIFICANT CHANGE UP
PCO2 BLDV: 46 MMHG — SIGNIFICANT CHANGE UP (ref 35–50)
PH BLDV: 7.38 — SIGNIFICANT CHANGE UP (ref 7.35–7.45)
PH UR: 6.5 — SIGNIFICANT CHANGE UP (ref 5–8)
PHOSPHATE SERPL-MCNC: 2.5 MG/DL — SIGNIFICANT CHANGE UP (ref 2.5–4.5)
PLATELET # BLD AUTO: 169 K/UL — SIGNIFICANT CHANGE UP (ref 150–400)
PLATELET # BLD AUTO: 174 K/UL — SIGNIFICANT CHANGE UP (ref 150–400)
PO2 BLDV: 35 MMHG — SIGNIFICANT CHANGE UP (ref 25–45)
POTASSIUM BLDV-SCNC: 4.3 MMOL/L — SIGNIFICANT CHANGE UP (ref 3.5–5.3)
POTASSIUM SERPL-MCNC: 4 MMOL/L — SIGNIFICANT CHANGE UP (ref 3.5–5.3)
POTASSIUM SERPL-MCNC: 4.3 MMOL/L — SIGNIFICANT CHANGE UP (ref 3.5–5.3)
POTASSIUM SERPL-SCNC: 4 MMOL/L — SIGNIFICANT CHANGE UP (ref 3.5–5.3)
POTASSIUM SERPL-SCNC: 4.3 MMOL/L — SIGNIFICANT CHANGE UP (ref 3.5–5.3)
PROT SERPL-MCNC: 7.7 G/DL — SIGNIFICANT CHANGE UP (ref 6–8.3)
PROT UR-MCNC: NEGATIVE — SIGNIFICANT CHANGE UP
RBC # BLD: 4.71 M/UL — SIGNIFICANT CHANGE UP (ref 4.2–5.8)
RBC # BLD: 5.13 M/UL — SIGNIFICANT CHANGE UP (ref 4.2–5.8)
RBC # FLD: 14.1 % — SIGNIFICANT CHANGE UP (ref 10.3–14.5)
RBC # FLD: 14.6 % — HIGH (ref 10.3–14.5)
RBC CASTS # UR COMP ASSIST: 1 /HPF — SIGNIFICANT CHANGE UP (ref 0–2)
SAO2 % BLDV: 62 % — LOW (ref 67–88)
SODIUM SERPL-SCNC: 137 MMOL/L — SIGNIFICANT CHANGE UP (ref 135–145)
SODIUM SERPL-SCNC: 143 MMOL/L — SIGNIFICANT CHANGE UP (ref 135–145)
SP GR SPEC: 1.01 — SIGNIFICANT CHANGE UP (ref 1.01–1.02)
UROBILINOGEN FLD QL: NEGATIVE — SIGNIFICANT CHANGE UP
WBC # BLD: 9.1 K/UL — SIGNIFICANT CHANGE UP (ref 3.8–10.5)
WBC # BLD: 9.21 K/UL — SIGNIFICANT CHANGE UP (ref 3.8–10.5)
WBC # FLD AUTO: 9.1 K/UL — SIGNIFICANT CHANGE UP (ref 3.8–10.5)
WBC # FLD AUTO: 9.21 K/UL — SIGNIFICANT CHANGE UP (ref 3.8–10.5)
WBC UR QL: 0 /HPF — SIGNIFICANT CHANGE UP (ref 0–5)

## 2018-09-06 PROCEDURE — 71045 X-RAY EXAM CHEST 1 VIEW: CPT | Mod: 26

## 2018-09-06 PROCEDURE — 74177 CT ABD & PELVIS W/CONTRAST: CPT | Mod: 26

## 2018-09-06 PROCEDURE — 43752 NASAL/OROGASTRIC W/TUBE PLMT: CPT

## 2018-09-06 PROCEDURE — 99285 EMERGENCY DEPT VISIT HI MDM: CPT | Mod: 25

## 2018-09-06 PROCEDURE — 99223 1ST HOSP IP/OBS HIGH 75: CPT

## 2018-09-06 RX ORDER — ONDANSETRON 8 MG/1
4 TABLET, FILM COATED ORAL ONCE
Qty: 0 | Refills: 0 | Status: COMPLETED | OUTPATIENT
Start: 2018-09-06 | End: 2018-09-06

## 2018-09-06 RX ORDER — TIOTROPIUM BROMIDE 18 UG/1
1 CAPSULE ORAL; RESPIRATORY (INHALATION) DAILY
Qty: 0 | Refills: 0 | Status: DISCONTINUED | OUTPATIENT
Start: 2018-09-06 | End: 2018-09-12

## 2018-09-06 RX ORDER — CIPROFLOXACIN LACTATE 400MG/40ML
VIAL (ML) INTRAVENOUS
Qty: 0 | Refills: 0 | Status: DISCONTINUED | OUTPATIENT
Start: 2018-09-06 | End: 2018-09-12

## 2018-09-06 RX ORDER — SODIUM CHLORIDE 9 MG/ML
1000 INJECTION INTRAMUSCULAR; INTRAVENOUS; SUBCUTANEOUS ONCE
Qty: 0 | Refills: 0 | Status: COMPLETED | OUTPATIENT
Start: 2018-09-06 | End: 2018-09-06

## 2018-09-06 RX ORDER — BUDESONIDE AND FORMOTEROL FUMARATE DIHYDRATE 160; 4.5 UG/1; UG/1
2 AEROSOL RESPIRATORY (INHALATION)
Qty: 0 | Refills: 0 | Status: DISCONTINUED | OUTPATIENT
Start: 2018-09-06 | End: 2018-09-12

## 2018-09-06 RX ORDER — SODIUM CHLORIDE 9 MG/ML
1000 INJECTION, SOLUTION INTRAVENOUS ONCE
Qty: 0 | Refills: 0 | Status: COMPLETED | OUTPATIENT
Start: 2018-09-06 | End: 2018-09-06

## 2018-09-06 RX ORDER — CIPROFLOXACIN LACTATE 400MG/40ML
400 VIAL (ML) INTRAVENOUS EVERY 12 HOURS
Qty: 0 | Refills: 0 | Status: DISCONTINUED | OUTPATIENT
Start: 2018-09-06 | End: 2018-09-12

## 2018-09-06 RX ORDER — MAGNESIUM SULFATE 500 MG/ML
2 VIAL (ML) INJECTION ONCE
Qty: 0 | Refills: 0 | Status: COMPLETED | OUTPATIENT
Start: 2018-09-06 | End: 2018-09-06

## 2018-09-06 RX ORDER — METRONIDAZOLE 500 MG
TABLET ORAL
Qty: 0 | Refills: 0 | Status: DISCONTINUED | OUTPATIENT
Start: 2018-09-06 | End: 2018-09-12

## 2018-09-06 RX ORDER — SODIUM CHLORIDE 9 MG/ML
1000 INJECTION, SOLUTION INTRAVENOUS
Qty: 0 | Refills: 0 | Status: DISCONTINUED | OUTPATIENT
Start: 2018-09-06 | End: 2018-09-08

## 2018-09-06 RX ORDER — CIPROFLOXACIN LACTATE 400MG/40ML
400 VIAL (ML) INTRAVENOUS ONCE
Qty: 0 | Refills: 0 | Status: COMPLETED | OUTPATIENT
Start: 2018-09-06 | End: 2018-09-06

## 2018-09-06 RX ORDER — METOPROLOL TARTRATE 50 MG
5 TABLET ORAL EVERY 6 HOURS
Qty: 0 | Refills: 0 | Status: DISCONTINUED | OUTPATIENT
Start: 2018-09-06 | End: 2018-09-12

## 2018-09-06 RX ORDER — TIOTROPIUM BROMIDE 18 UG/1
2 CAPSULE ORAL; RESPIRATORY (INHALATION)
Qty: 0 | Refills: 0 | COMMUNITY

## 2018-09-06 RX ORDER — PANTOPRAZOLE SODIUM 20 MG/1
40 TABLET, DELAYED RELEASE ORAL DAILY
Qty: 0 | Refills: 0 | Status: DISCONTINUED | OUTPATIENT
Start: 2018-09-06 | End: 2018-09-12

## 2018-09-06 RX ORDER — HYDROMORPHONE HYDROCHLORIDE 2 MG/ML
0.5 INJECTION INTRAMUSCULAR; INTRAVENOUS; SUBCUTANEOUS ONCE
Qty: 0 | Refills: 0 | Status: DISCONTINUED | OUTPATIENT
Start: 2018-09-06 | End: 2018-09-06

## 2018-09-06 RX ORDER — METOPROLOL TARTRATE 50 MG
5 TABLET ORAL EVERY 6 HOURS
Qty: 0 | Refills: 0 | Status: DISCONTINUED | OUTPATIENT
Start: 2018-09-06 | End: 2018-09-06

## 2018-09-06 RX ORDER — BENZOCAINE AND MENTHOL 5; 1 G/100ML; G/100ML
1 LIQUID ORAL EVERY 4 HOURS
Qty: 0 | Refills: 0 | Status: DISCONTINUED | OUTPATIENT
Start: 2018-09-06 | End: 2018-09-12

## 2018-09-06 RX ORDER — HEPARIN SODIUM 5000 [USP'U]/ML
5000 INJECTION INTRAVENOUS; SUBCUTANEOUS EVERY 8 HOURS
Qty: 0 | Refills: 0 | Status: DISCONTINUED | OUTPATIENT
Start: 2018-09-06 | End: 2018-09-12

## 2018-09-06 RX ORDER — METRONIDAZOLE 500 MG
500 TABLET ORAL ONCE
Qty: 0 | Refills: 0 | Status: COMPLETED | OUTPATIENT
Start: 2018-09-06 | End: 2018-09-06

## 2018-09-06 RX ORDER — ACETAMINOPHEN 500 MG
1000 TABLET ORAL ONCE
Qty: 0 | Refills: 0 | Status: COMPLETED | OUTPATIENT
Start: 2018-09-06 | End: 2018-09-06

## 2018-09-06 RX ORDER — IPRATROPIUM/ALBUTEROL SULFATE 18-103MCG
3 AEROSOL WITH ADAPTER (GRAM) INHALATION EVERY 6 HOURS
Qty: 0 | Refills: 0 | Status: DISCONTINUED | OUTPATIENT
Start: 2018-09-06 | End: 2018-09-12

## 2018-09-06 RX ORDER — HYDROMORPHONE HYDROCHLORIDE 2 MG/ML
1 INJECTION INTRAMUSCULAR; INTRAVENOUS; SUBCUTANEOUS ONCE
Qty: 0 | Refills: 0 | Status: DISCONTINUED | OUTPATIENT
Start: 2018-09-06 | End: 2018-09-06

## 2018-09-06 RX ORDER — METRONIDAZOLE 500 MG
500 TABLET ORAL EVERY 8 HOURS
Qty: 0 | Refills: 0 | Status: DISCONTINUED | OUTPATIENT
Start: 2018-09-06 | End: 2018-09-12

## 2018-09-06 RX ADMIN — HYDROMORPHONE HYDROCHLORIDE 0.5 MILLIGRAM(S): 2 INJECTION INTRAMUSCULAR; INTRAVENOUS; SUBCUTANEOUS at 19:16

## 2018-09-06 RX ADMIN — Medication 400 MILLIGRAM(S): at 20:40

## 2018-09-06 RX ADMIN — PANTOPRAZOLE SODIUM 40 MILLIGRAM(S): 20 TABLET, DELAYED RELEASE ORAL at 11:48

## 2018-09-06 RX ADMIN — SODIUM CHLORIDE 1000 MILLILITER(S): 9 INJECTION INTRAMUSCULAR; INTRAVENOUS; SUBCUTANEOUS at 07:28

## 2018-09-06 RX ADMIN — Medication 400 MILLIGRAM(S): at 07:46

## 2018-09-06 RX ADMIN — ONDANSETRON 4 MILLIGRAM(S): 8 TABLET, FILM COATED ORAL at 06:47

## 2018-09-06 RX ADMIN — Medication 1000 MILLIGRAM(S): at 08:16

## 2018-09-06 RX ADMIN — Medication 100 MILLIGRAM(S): at 17:27

## 2018-09-06 RX ADMIN — Medication 400 MILLIGRAM(S): at 09:30

## 2018-09-06 RX ADMIN — HYDROMORPHONE HYDROCHLORIDE 1 MILLIGRAM(S): 2 INJECTION INTRAMUSCULAR; INTRAVENOUS; SUBCUTANEOUS at 06:14

## 2018-09-06 RX ADMIN — HEPARIN SODIUM 5000 UNIT(S): 5000 INJECTION INTRAVENOUS; SUBCUTANEOUS at 17:31

## 2018-09-06 RX ADMIN — Medication 110 MILLIGRAM(S): at 14:30

## 2018-09-06 RX ADMIN — Medication 50 GRAM(S): at 11:48

## 2018-09-06 RX ADMIN — BUDESONIDE AND FORMOTEROL FUMARATE DIHYDRATE 2 PUFF(S): 160; 4.5 AEROSOL RESPIRATORY (INHALATION) at 22:43

## 2018-09-06 RX ADMIN — Medication 3 MILLILITER(S): at 17:31

## 2018-09-06 RX ADMIN — SODIUM CHLORIDE 1000 MILLILITER(S): 9 INJECTION, SOLUTION INTRAVENOUS at 07:28

## 2018-09-06 RX ADMIN — SODIUM CHLORIDE 2000 MILLILITER(S): 9 INJECTION INTRAMUSCULAR; INTRAVENOUS; SUBCUTANEOUS at 06:15

## 2018-09-06 RX ADMIN — Medication 200 MILLIGRAM(S): at 18:27

## 2018-09-06 RX ADMIN — ONDANSETRON 4 MILLIGRAM(S): 8 TABLET, FILM COATED ORAL at 20:40

## 2018-09-06 RX ADMIN — Medication 200 MILLIGRAM(S): at 08:10

## 2018-09-06 RX ADMIN — HYDROMORPHONE HYDROCHLORIDE 0.5 MILLIGRAM(S): 2 INJECTION INTRAMUSCULAR; INTRAVENOUS; SUBCUTANEOUS at 19:46

## 2018-09-06 RX ADMIN — Medication 1000 MILLIGRAM(S): at 08:08

## 2018-09-06 RX ADMIN — SODIUM CHLORIDE 125 MILLILITER(S): 9 INJECTION, SOLUTION INTRAVENOUS at 09:25

## 2018-09-06 RX ADMIN — Medication 400 MILLIGRAM(S): at 13:17

## 2018-09-06 RX ADMIN — HYDROMORPHONE HYDROCHLORIDE 1 MILLIGRAM(S): 2 INJECTION INTRAMUSCULAR; INTRAVENOUS; SUBCUTANEOUS at 07:08

## 2018-09-06 RX ADMIN — Medication 100 MILLIGRAM(S): at 09:52

## 2018-09-06 RX ADMIN — SODIUM CHLORIDE 1000 MILLILITER(S): 9 INJECTION, SOLUTION INTRAVENOUS at 09:25

## 2018-09-06 RX ADMIN — BENZOCAINE AND MENTHOL 1 LOZENGE: 5; 1 LIQUID ORAL at 18:26

## 2018-09-06 RX ADMIN — Medication 1000 MILLIGRAM(S): at 21:10

## 2018-09-06 NOTE — H&P ADULT - PSH
H/O colonoscopy    H/O lipoma  removed from back  H/O percutaneous transluminal coronary angioplasty  with DE RCA 2006, 2 stents placed 2015  History of herniorrhaphy  left inguinal herniorrhaphy  S/P cardiac catheterization  2017-resulted in [place]-no stents placed  S/P hernia surgery

## 2018-09-06 NOTE — H&P ADULT - ATTENDING COMMENTS
I've seen and evaluated patient, I have reviewed CT scan and chart. I've discussed case with gastroenterology. I agree with resident assessment and plan.

## 2018-09-06 NOTE — ED PROVIDER NOTE - PHYSICAL EXAMINATION
Attending Isela Duffy: Gen: NAD, heent: atrauamtic, eomi, perrla, mmm, op pink, uvula midline, neck; nttp, no nuchal rigidity, chest: nttp, no crepitus, cv: rrr, no murmurs, lungs: ctab, abd: soft, diffusely tender to palpation, no peritoneal signs, +BS, no guarding, ext: wwp, neg homans, skin: no rash, neuro: awake and alert, following commands, speech clear, sensation and strength intact, no focal deficits : no testicular ttp

## 2018-09-06 NOTE — ED PROVIDER NOTE - MEDICAL DECISION MAKING DETAILS
52 yo M c PMH of CAD, Crohns (on humera, c/b anal fissures, s/p sticturoplasty, bowel resection, appendectomy, hernia repair), pancreatitis, p/w a few hour hx of non-radiating sharp epigastric and BUQ abdominal pain with associated nausea. On exam, VS grossly wnl, pt in moderate distress, BUQ and epigastric abdominal ttp. labs and ct a/p pending, ivf and dilaudid for tx, will reassess 54 yo M c PMH of CAD, Crohns (on humera, c/b anal fissures, s/p sticturoplasty, bowel resection, appendectomy, hernia repair), pancreatitis, p/w a few hour hx of non-radiating sharp epigastric and BUQ abdominal pain with associated nausea. On exam, VS grossly wnl, pt in moderate distress, BUQ and epigastric abdominal ttp. labs and ct a/p pending, ivf and dilaudid for tx, will reassess  Attending Isela Duffy: 54 y/o male with h/o chrons disease, cad with previous abdominal surgeries presenting with abdominal pain. pt is diffusely tender to palpation. concern for possible chrons complication, abscess, vs colitis, h/o sbo in the past. will obtain labs, ct abd/pel and re-eval

## 2018-09-06 NOTE — ED ADULT NURSE REASSESSMENT NOTE - NS ED NURSE REASSESS COMMENT FT1
pt received from verito reid.  franklin contacted inpatient team regarding magnesium order and pain meds.  pt c/o pain, awaiting orders.  mag infusing.

## 2018-09-06 NOTE — H&P ADULT - ASSESSMENT
54yo M with PMH of CAD, MI s/p PCI, COPD, asthma, MARYAM, Crohn's s/p stricturoplasty x 2 and SBR 3/2018 now a/w SBO due to Crohn's exacerbation with stricture.     -admit to [facility]: Dr. Michel  -NPO  -IVF  -cont Cipro/flagyl  -NGT to \[facility] [place] [facility]\""  -serial abd exams  -monitor GI fxn  -cont COPD/asthma home meds  -BP control: cont lopressor IV while NPO  -f/u GI: Dr. Caicedo notified and aware   -OOB and ambulate  -DVT PPX: cont SCD's and SQH    *[phone]

## 2018-09-06 NOTE — ED ADULT NURSE NOTE - NSIMPLEMENTINTERV_GEN_ALL_ED
Implemented All Fall with Harm Risk Interventions:  [place] to call system. Call bell, personal items and telephone within reach. Instruct patient to call for assistance. Room bathroom lighting operational. Non-slip footwear when patient is off stretcher. Physically safe environment: no spills, clutter or unnecessary equipment. Stretcher in lowest position, wheels locked, appropriate side rails in place. Provide visual cue, wrist band, yellow gown, etc. Monitor gait and stability. Monitor for mental status changes and reorient to person, place, and time. Review medications for side effects contributing to fall risk. Reinforce activity limits and safety measures with patient and family. Provide visual clues: red socks.

## 2018-09-06 NOTE — ED ADULT NURSE REASSESSMENT NOTE - NS ED NURSE REASSESS COMMENT FT1
Pt had NGT placed in right nares by LYNETTE nolen. Minimal amount of emesis noted. Pt reported that he feels so much better. Awaiting bed assignment.

## 2018-09-06 NOTE — ED PROVIDER NOTE - CROS ED CONS ALL NEG
SSED/CM consult received and appreciated. EMR reviewed history significant for DM, CAD, COPD, pneumonia, and urinary incontinence. Patient presents to the ED w/ dizziness. Case discussed w/ . Met w/patient and family at bedside (daughter/POA Rosie Balbuena and [employer], DNL) - introduced to role of CM. Patient states would like family to remain at bedside. Montse Givens verbalizes living alone. CM noted in MR patient drives. Daughter clarified patient was a  until [id number]. Montse Givens states last drive his vehicle this week.  requested to talk privately w/ CM while patient receiving PT evaluation. Met w/ daughter expressed concerns about patient needing rehab to [facility] out BS and BP. \" Ms. Lalitha Kwon states being in contact w/ PCP and recommendation. Brandon Pierre verbalizes patient has dementia and does not bath, eat, or take medications regularly. Montse Givens has daughter and 2 sons - Leandro Pierre lives in the house (works full time) and Jones live in neighbor. Brandon Pierre is 35 minutes from home - states schedule does not permit for children to provide additional care. Stuart Oliver provides M-F aide ([phone]). Daughter states patient also suffers form depression - spouse passed 3 years ago. This writer acknowledged patient would need to qualify for SNF care. CM asked about time she felt patient has needed alternative LOC - states 6 months. No community coordination initiated. Lorelei states patient has medicaid  however does not have CM to assist w/ process. CM request she make contact w/ medicaid worker for additional assistance. Informed Brandon Pierre CM would revisit . Case discussed w/ MD and PT. CM  Met back w/ patient to ask about plan when leaves hospital. Montse Givens verbalized plan to return home. This writer asked who assist him w/ decision making - acknowledged Brandon Pierre. Medical record has Brandon Pierre as emergency contact however POA document not scanned in system.  Patient verbalizes he's not sure about document. Met back w/ daughter - provided SNF list and informed CM could send referral however patient would need skilled need for authorization. Kraig Medina verbalizes patient is clearer and unable to make a decision about SNF preference. Daughter will take list home. Kraig Medina verbalized \" he will return home but if his BS get to [phone] I will need to bring him back. \"     [facility] from [facility] in the past.    [address] [place] [address]  Is insurance provider. Creston Hammans is PCP. Care Management Interventions  PCP Verified by CM: Yes  Last Visit to PCP: 07/13/17  Mode of Transport at Discharge:  Other (see comment) (car)  Hospital Transport Time of Discharge: [address] (CM Consult): Discharge Planning  MyChart Signup: No  Discharge Durable Medical Equipment: No  Physical Therapy Consult: Yes  Occupational Therapy Consult: No  Speech Therapy Consult: No  Current Support Network: Own Home  Confirm Follow Up Transport:  (TBD)  Plan discussed with Pt/Family/Caregiver: Yes  Discharge Location  Discharge Placement:  (TBD) negative...

## 2018-09-06 NOTE — ED PROVIDER NOTE - FAMILY HISTORY
Family history of premature CAD, Father CABG at age 54     Mother  Still living? Unknown  Family history of Crohn's disease, Age at diagnosis: Age Unknown

## 2018-09-06 NOTE — CONSULT NOTE ADULT - SUBJECTIVE AND OBJECTIVE BOX
Patient seen. Full note to follow. Agree with present management for SBO (NGT, IVF, IV Cipro and Flagyl, hold off steroids unless fails initial management).  Dr. Darrin Caicedo  [phone]                  SUBJECTIVE:  53yMale    ______________________________________________________________________  PMH/PSH:  PAST MEDICAL & SURGICAL HISTORY:  Bowel obstruction  Cluster headaches  Unilateral recurrent inguinal hernia without obstruction or gangrene: Left  MI (myocardial infarction)  GIB (gastrointestinal bleeding)  MARYAM (obstructive sleep apnea)  Asthma: controlled-never intubated or hospitalized  Joint pain  CAD (coronary artery disease): 2 stents placed in LAD in 2015, RCA stent x 2 in 2006  Pancreatitis: while on 6MP for Crohn&#x27;s now off of it  Crohn disease  HLD (hyperlipidemia)  Inguinal mass  Bilateral knee pain  Shoulder pain, bilateral  Lower back pain  GERD (gastroesophageal reflux disease)  H/O: HTN (hypertension)  S/P hernia surgery  H/O lipoma: removed from back  S/P cardiac catheterization: 2017-resulted in [place]-no stents placed  H/O colonoscopy  H/O percutaneous transluminal coronary angioplasty: with DE RCA 2006, 2 stents placed 2015  History of herniorrhaphy: left inguinal herniorrhaphy    ______________________________________________________________________  MEDS:  MEDICATIONS  (STANDING):  buDESOnide 160 MICROgram(s)/formoterol 4.5 MICROgram(s) Inhaler 2 Puff(s) Inhalation two times a day  ciprofloxacin   IVPB 400 milliGRAM(s) IV Intermittent every 12 hours  ciprofloxacin   IVPB      heparin  Injectable 5000 Unit(s) SubCutaneous every 8 hours  lactated ringers. 1000 milliLiter(s) (125 mL/Hr) IV Continuous <Continuous>  metoprolol tartrate IVPB 5 milliGRAM(s) IV Intermittent every 6 hours  metroNIDAZOLE  IVPB 500 milliGRAM(s) IV Intermittent every 8 hours  metroNIDAZOLE  IVPB      metroNIDAZOLE  IVPB 500 milliGRAM(s) IV Intermittent once  tiotropium 18 MICROgram(s) Capsule 1 Capsule(s) Inhalation daily    MEDICATIONS  (PRN):  artificial  tears Solution 1 Drop(s) Both EYES three times a day PRN Dry Eyes    ______________________________________________________________________  ALL:   Allergies    No Known Allergies    Intolerances      ______________________________________________________________________  SH:  ______________________________________________________________________  FH:  FAMILY HISTORY:  Family history of premature CAD: Father CABG at age 54  Family history of Crohn's disease    ______________________________________________________________________  ROS:  REVIEW OF SYSTEMS      General:	    Skin/Breast:  	  Ophthalmologic:  	  ENMT:	    Respiratory and Thorax:  	  Cardiovascular:	    Gastrointestinal:	    Genitourinary:	    Musculoskeletal:	    Neurological:	    Psychiatric:	    Hematology/Lymphatics:	    Endocrine:	    Allergic/Immunologic:	  ______________________________________________________________________  PHYSICAL EXAM:  T(C): 37.2 (09-06-18 @ 07:31), Max: 37.2 (09-06-18 @ 07:31)  HR: 80 (09-06-18 @ 07:31)  BP: 137/83 (09-06-18 @ 07:31)  RR: 18 (09-06-18 @ 07:31)  SpO2: 100% (09-06-18 @ 07:31)  Wt(kg): --  PHYSICAL EXAM:      Constitutional:    HEENT:    Neck:    Respiratory:    Cardiovascular:    Gastrointestinal:    Rectal:    Extremities:    Neurological:    ______________________________________________________________________  LABS:                        14.2   9.1   )-----------( 174      ( 06 Sep 2018 05:36 )             41.2     09-06    143  |  103  |  14  ----------------------------<  127<H>  4.3   |  23  |  0.84    Ca    9.9      06 Sep 2018 05:36    TPro  7.7  /  Alb  4.7  /  TBili  0.3  /  DBili  x   /  AST  22  /  ALT  35  /  AlkPhos  72  09-06    LIVER FUNCTIONS - ( 06 Sep 2018 05:36 )  Alb: 4.7 g/dL / Pro: 7.7 g/dL / ALK PHOS: 72 U/L / ALT: 35 U/L / AST: 22 U/L / GGT: x             ______________________________________________________________________  IMAGING:    ______________________________________________________________________  ASSESSMENT:    PLAN:          Darrin Caicedo M.D.  (O) [phone]  (C) [phone] Patient seen. Full note to follow. Agree with present management for SBO (NGT, IVF, IV Cipro and Flagyl, hold off steroids unless fails initial management).  Dr. Darrin Caicedo  [phone]      SUBJECTIVE:  53yMale with Crohn's ileitis, admitted with an SBO. After having had three SBOs between 11/2016 and 2/2018, despite receiving Entyvio for over a year. Therefore, he underwent laparoscopic surgery in March (resection of a short segment of ileum containing four strictures and two strictureplasties; two other areas of non-obstructive scarring were not resected; 500 cm of healthy small bowel were noted). He had been doing reasonably well, albeit with chronic low level right-sided abdominal pain and numerous loose stools per day, on Humira since May for post-operative prophylaxis until yesterday evening. After having a burger for dinner, he felt a little bloated in the middle of the night, then passed small amounts of diarrhea three times. By 3:30 am he became distended so he drove himself to the the ED where a CT revealed an SBO.     ______________________________________________________________________  PMH/PSH:  PAST MEDICAL & SURGICAL HISTORY:  Bowel obstructions  Cluster headaches  Unilateral recurrent inguinal hernia without obstruction or gangrene: Left  MI (myocardial infarction)  GIB (gastrointestinal bleeding)  MARYAM (obstructive sleep apnea)  Asthma: controlled-never intubated or hospitalized  Joint pain  CAD (coronary artery disease): 2 stents placed in LAD in 2015, RCA stent x 2 in 2006  Pancreatitis: while on 6MP for Crohn&#x27;s now off of it  Crohn disease  HLD (hyperlipidemia)  Inguinal mass  Bilateral knee pain  Shoulder pain, bilateral  Lower back pain  GERD (gastroesophageal reflux disease)  H/O: HTN (hypertension)  S/P hernia surgery  H/O lipoma: removed from back  S/P cardiac catheterization: 2017-resulted in [place]-no stents placed  H/O colonoscopy  H/O percutaneous transluminal coronary angioplasty: with DE RCA 2006, 2 stents placed 2015  History of herniorrhaphy: left inguinal herniorrhaphy  Anal fissure, treated with Botox  ______________________________________________________________________  MEDS:  MEDICATIONS  (STANDING):  buDESOnide 160 MICROgram(s)/formoterol 4.5 MICROgram(s) Inhaler 2 Puff(s) Inhalation two times a day  ciprofloxacin   IVPB 400 milliGRAM(s) IV Intermittent every 12 hours  ciprofloxacin   IVPB      heparin  Injectable 5000 Unit(s) SubCutaneous every 8 hours  lactated ringers. 1000 milliLiter(s) (125 mL/Hr) IV Continuous <Continuous>  metoprolol tartrate IVPB 5 milliGRAM(s) IV Intermittent every 6 hours  metroNIDAZOLE  IVPB 500 milliGRAM(s) IV Intermittent every 8 hours  metroNIDAZOLE  IVPB      metroNIDAZOLE  IVPB 500 milliGRAM(s) IV Intermittent once  tiotropium 18 MICROgram(s) Capsule 1 Capsule(s) Inhalation daily    MEDICATIONS  (PRN):  artificial  tears Solution 1 Drop(s) Both EYES three times a day PRN Dry Eyes    ______________________________________________________________________  ALL:   Allergies    No Known Allergies    Intolerances      ______________________________________________________________________  SH: , 2 children,  (on disability), no smoking or alcohol  ______________________________________________________________________  FH:  FAMILY HISTORY:  Family history of premature CAD: Father CABG at age 54  Family history of Crohn's disease    ______________________________________________________________________  ROS:  REVIEW OF SYSTEMS      General: Fatigue.	    Musculoskeletal:	 Diffuse joint pains    Neurological: Decreased concentration and short term memory	  	  ______________________________________________________________________  PHYSICAL EXAM:  T(C): 37.2 (09-06-18 @ 07:31), Max: 37.2 (09-06-18 @ 07:31)  HR: 80 (09-06-18 @ 07:31)  BP: 137/83 (09-06-18 @ 07:31)  RR: 18 (09-06-18 @ 07:31)  SpO2: 100% (09-06-18 @ 07:31)  Wt(kg): --  PHYSICAL EXAM:      Constitutional: Non-toxic appearing with NGT in place    HEENT: Anicteric    Neck: Supple, no BOSSMAN    Respiratory: Clear to A&P    Cardiovascular: RRR, no m/g/r    Gastrointestinal: Protuberant, hypoactive, tender across upper abdomen, no HSM or mass    Extremities: No c/c/e    Neurological: Grossly nonfocal    ______________________________________________________________________  LABS:                        14.2   9.1   )-----------( 174      ( 06 Sep 2018 05:36 )             41.2     09-06    143  |  103  |  14  ----------------------------<  127<H>  4.3   |  23  |  0.84    Ca    9.9      06 Sep 2018 05:36    TPro  7.7  /  Alb  4.7  /  TBili  0.3  /  DBili  x   /  AST  22  /  ALT  35  /  AlkPhos  72  09-06    LIVER FUNCTIONS - ( 06 Sep 2018 05:36 )  Alb: 4.7 g/dL / Pro: 7.7 g/dL / ALK PHOS: 72 U/L / ALT: 35 U/L / AST: 22 U/L / GGT: x             ______________________________________________________________________  IMAGING:  EXAM:  CT ABDOMEN AND PELVIS OC IC                            PROCEDURE DATE:  09/06/2018            INTERPRETATION:  CLINICAL INFORMATION: History of Crohn's disease with   prior bowel resection and recent stricturoplasty, presenting with   bilateral upper quadrant tenderness to palpation.    PROCEDURE:   CT of the Abdomen and Pelvis was performed with intravenous contrast.   Intravenous contrast: 90 ml Omnipaque 350. 10 ml discarded.  Oral contrast: positive contrast was administered.  Sagittal and coronal reformats were performed.    COMPARISON: CT abdomen pelvis 2/20/2018.    FINDINGS:    LOWER CHEST: Coronary artery calcifications.    LIVER: Within normal limits.  BILE DUCTS: Normal caliber.  GALLBLADDER: Within normal limits.  SPLEEN:Within normal limits.  PANCREAS: Within normal limits.  ADRENALS: Within normal limits.  KIDNEYS/URETERS: Within normal limits.    BLADDER: Within normal limits.  REPRODUCTIVE ORGANS: The prostate is within normal limits.    BOWEL: There are several dilated loops of distal jejunum/proximal ileum   with transition point in the right lower quadrant at the site of a small   bowel-small bowel anastomosis. The small bowel just distal to this point   is decompressed and demonstrates a short segment (about 12 cm) of wall   thickening and mucosal hyperemia, consistent with active inflammatory   Crohn's disease resulting in stricture. There is mild trace interloop   mesenteric edema. Status post appendectomy.  PERITONEUM: No ascites. Scattered nonenlarged right lower quadrant   mesenteric lymph nodes are likely reactive.  VESSELS: The aorta is not dilated. There is mild vascular calcification..  RETROPERITONEUM: No lymphadenopathy.    ABDOMINAL WALL: Within normal limits.  BONES: Within normal limits.    IMPRESSION: Redemonstrated is a chronic partial bowel obstruction related   to short segment of active inflammatory Crohn's disease with stricture   distal to the right lower quadrant small bowel-small bowel anastomosis.   Mild mesenteric edema. No free air or focal collection. No additional   sites of active inflammation identified. Appearance is similar compared   to the previous CT of February 20, 2018.    RA VILLA M.D., RADIOLOGY RESIDENT  This document has been electronically signed.  DARIAN LINO M.D, ATTENDING RADIOLOGIST  This document has been electronically signed. Sep  6 2018  6:58AM  ______________________________________________________________________  ASSESSMENT: SBO with radiographic evidence of ileal Crohn's just short of 6 months after his resection and strictureplasties. Recurrent disease (or progression of the disease not resected during surgery) after such a short interval is concerning. If the Humira trough level is found to be in the therapeutic range, then a change to an alternative biologic agent, e.g. Stelara, might be indicated.    PLAN:  Continue NPO, IVF, NGT  Continue IV Cipro/Flagyl  If the SBO persists beyond 48 hours, consider adding IV steroids (he required this for at least one, but not all, of his prior SBOs)  Will check adalimumab level/antibody just prior to his next Humira dose (scheduled for 9/12)  Should be on PPI (takes esomeprazole 40 mg BID as outpatient)  DVT prophylaxis        Darrin Caicedo M.D.  (O) [phone]  (C) [phone]

## 2018-09-06 NOTE — CONSULT NOTE ADULT - SUBJECTIVE AND OBJECTIVE BOX
CHIEF COMPLAINT:    HPI:    PAST MEDICAL & SURGICAL HISTORY:  Bowel obstruction  Cluster headaches  Unilateral recurrent inguinal hernia without obstruction or gangrene: Left  MI (myocardial infarction)  GIB (gastrointestinal bleeding)  MARYAM (obstructive sleep apnea)  Asthma: controlled-never intubated or hospitalized  Joint pain  CAD (coronary artery disease): 2 stents placed in LAD in 2015, RCA stent x 2 in 2006  Pancreatitis: while on 6MP for Crohn&#x27;s now off of it  Crohn disease  HLD (hyperlipidemia)  Inguinal mass  Bilateral knee pain  Shoulder pain, bilateral  Lower back pain  GERD (gastroesophageal reflux disease)  H/O: HTN (hypertension)  S/P hernia surgery  H/O lipoma: removed from back  S/P cardiac catheterization: 2017-resulted in [place]-no stents placed  H/O colonoscopy  H/O percutaneous transluminal coronary angioplasty: with DE RCA 2006, 2 stents placed 2015  History of herniorrhaphy: left inguinal herniorrhaphy      FAMILY HISTORY:  Family history of premature CAD (Father): Father CABG at age 54  Family history of Crohn's disease (Father)      SOCIAL HISTORY:  Smoking: [ ] Never Smoked [ ] Former Smoker (__ packs x ___ years) [ ] Current Smoker  (__ packs x ___ years)  Substance Use: [ ] Never Used [ ] Used ____  EtOH Use:  Marital Status: [ ] Single [ ]  [ ]  [ ]   Sexual History:   Occupation:  Recent Travel:  Country of Birth:  Advance Directives:    Allergies    No Known Allergies    Intolerances        HOME MEDICATIONS:    REVIEW OF SYSTEMS:  Constitutional: [ ] negative [ ] fevers [ ] chills [ ] weight loss [ ] weight gain  HEENT: [ ] negative [ ] dry eyes [ ] eye irritation [ ] postnasal drip [ ] nasal congestion  CV: [ ] negative  [ ] chest pain [ ] orthopnea [ ] palpitations [ ] murmur  Resp: [ ] negative [ ] cough [ ] shortness of breath [ ] dyspnea [ ] wheezing [ ] sputum [ ] hemoptysis  GI: [ ] negative [ ] nausea [ ] vomiting [ ] diarrhea [ ] constipation [ ] abd pain [ ] dysphagia   : [ ] negative [ ] dysuria [ ] nocturia [ ] hematuria [ ] increased urinary frequency  Musculoskeletal: [ ] negative [ ] back pain [ ] myalgias [ ] arthralgias [ ] fracture  Skin: [ ] negative [ ] rash [ ] itch  Neurological: [ ] negative [ ] headache [ ] dizziness [ ] syncope [ ] weakness [ ] numbness  Psychiatric: [ ] negative [ ] anxiety [ ] depression  Endocrine: [ ] negative [ ] diabetes [ ] thyroid problem  Hematologic/Lymphatic: [ ] negative [ ] anemia [ ] bleeding problem  Allergic/Immunologic: [ ] negative [ ] itchy eyes [ ] nasal discharge [ ] hives [ ] angioedema  [ ] All other systems negative  [ ] Unable to assess ROS because ________    OBJECTIVE:  ICU Vital Signs Last 24 Hrs  T(C): 37.2 (06 Sep 2018 10:28), Max: 37.2 (06 Sep 2018 07:31)  T(F): 98.9 (06 Sep 2018 10:28), Max: 99 (06 Sep 2018 07:31)  HR: 88 (06 Sep 2018 10:28) (80 - 92)  BP: 133/87 (06 Sep 2018 10:28) (133/87 - 158/110)  BP(mean): --  ABP: --  ABP(mean): --  RR: 18 (06 Sep 2018 10:28) (18 - 19)  SpO2: 96% (06 Sep 2018 10:28) (96% - 100%)        CAPILLARY BLOOD GLUCOSE          PHYSICAL EXAM:  General:   HEENT:   Lymph Nodes:  Neck:   Respiratory:   Cardiovascular:   Abdomen:   Extremities:   Skin:   Neurological:  Psychiatry:    HOSPITAL MEDICATIONS:  heparin  Injectable 5000 Unit(s) SubCutaneous every 8 hours    ciprofloxacin   IVPB 400 milliGRAM(s) IV Intermittent every 12 hours  ciprofloxacin   IVPB      metroNIDAZOLE  IVPB 500 milliGRAM(s) IV Intermittent every 8 hours  metroNIDAZOLE  IVPB        metoprolol tartrate IVPB 5 milliGRAM(s) IV Intermittent every 6 hours      buDESOnide 160 MICROgram(s)/formoterol 4.5 MICROgram(s) Inhaler 2 Puff(s) Inhalation two times a day  tiotropium 18 MICROgram(s) Capsule 1 Capsule(s) Inhalation daily      pantoprazole  Injectable 40 milliGRAM(s) IV Push daily        lactated ringers. 1000 milliLiter(s) IV Continuous <Continuous>      artificial  tears Solution 1 Drop(s) Both EYES three times a day PRN        LABS:                        14.2   9.1   )-----------( 174      ( 06 Sep 2018 05:36 )             41.2     Hgb Trend: 14.2<--  09-06    137  |  100  |  10  ----------------------------<  154<H>  4.0   |  25  |  0.83    Ca    9.6      06 Sep 2018 10:17  Phos  2.5     09-06  Mg     1.7     09-06    TPro  7.7  /  Alb  4.7  /  TBili  0.3  /  DBili  x   /  AST  22  /  ALT  35  /  AlkPhos  72  09-06    Creatinine Trend: 0.83<--, 0.84<--        Venous Blood Gas:  09-06 @ 09:32  7.38/46/35/27/62  VBG Lactate: 2.7  Venous Blood Gas:  09-06 @ 05:36  7.38/48/24/28/37  VBG Lactate: 3.3      MICROBIOLOGY:     RADIOLOGY:  [ ] Reviewed and interpreted by me    PULMONARY FUNCTION TESTS:    EKG: CHIEF COMPLAINT: Abdominal pain    HPI: 53 M with a PMH of HTN, HLD, CAD s/p MI and PCI, asthma, MARYAM on nocturnal CPAP, GERD, Crohn's disease s/p stricturoplasty x 2 and small bowel resection in 3/2018, now p/w acute onset of abdominal pain. Pt reports that he was in his usual state of health until yesterday when he noted intermittent generalized abdominal discomfort with each meal. He also noted 1 bout iof a small amount of non bloody diarrhea yesterday but denies any vomiting. He reports being woken up by acute, 6-7/10, sharp midabdominal pain in the middle of the night. The pain was non radiating and accompanied by nausea.      PAST MEDICAL & SURGICAL HISTORY:  Bowel obstruction  Cluster headaches  Unilateral recurrent inguinal hernia without obstruction or gangrene: Left  MI (myocardial infarction)  GIB (gastrointestinal bleeding)  MARYAM (obstructive sleep apnea)  Asthma: controlled-never intubated or hospitalized  Joint pain  CAD (coronary artery disease): 2 stents placed in LAD in 2015, RCA stent x 2 in 2006  Pancreatitis: while on 6MP for Crohn&#x27;s now off of it  Crohn disease  HLD (hyperlipidemia)  Inguinal mass  Bilateral knee pain  Shoulder pain, bilateral  Lower back pain  GERD (gastroesophageal reflux disease)  H/O: HTN (hypertension)  S/P hernia surgery  H/O lipoma: removed from back  S/P cardiac catheterization: 2017-resulted in [place]-no stents placed  H/O colonoscopy  H/O percutaneous transluminal coronary angioplasty: with DE RCA 2006, 2 stents placed 2015  History of herniorrhaphy: left inguinal herniorrhaphy      FAMILY HISTORY:  Family history of premature CAD (Father): Father CABG at age 54  Family history of Crohn's disease (Father)      SOCIAL HISTORY:  Smoking: [ ] Never Smoked [ ] Former Smoker (__ packs x ___ years) [ ] Current Smoker  (__ packs x ___ years)  Substance Use: [ ] Never Used [ ] Used ____  EtOH Use:  Marital Status: [ ] Single [ ]  [ ]  [ ]   Sexual History:   Occupation:  Recent Travel:  Country of Birth:  Advance Directives:    Allergies    No Known Allergies    Intolerances        HOME MEDICATIONS:    REVIEW OF SYSTEMS:  Constitutional: [ ] negative [ ] fevers [ ] chills [ ] weight loss [ ] weight gain  HEENT: [ ] negative [ ] dry eyes [ ] eye irritation [ ] postnasal drip [ ] nasal congestion  CV: [ ] negative  [ ] chest pain [ ] orthopnea [ ] palpitations [ ] murmur  Resp: [ ] negative [ ] cough [ ] shortness of breath [ ] dyspnea [ ] wheezing [ ] sputum [ ] hemoptysis  GI: [ ] negative [ ] nausea [ ] vomiting [ ] diarrhea [ ] constipation [ ] abd pain [ ] dysphagia   : [ ] negative [ ] dysuria [ ] nocturia [ ] hematuria [ ] increased urinary frequency  Musculoskeletal: [ ] negative [ ] back pain [ ] myalgias [ ] arthralgias [ ] fracture  Skin: [ ] negative [ ] rash [ ] itch  Neurological: [ ] negative [ ] headache [ ] dizziness [ ] syncope [ ] weakness [ ] numbness  Psychiatric: [ ] negative [ ] anxiety [ ] depression  Endocrine: [ ] negative [ ] diabetes [ ] thyroid problem  Hematologic/Lymphatic: [ ] negative [ ] anemia [ ] bleeding problem  Allergic/Immunologic: [ ] negative [ ] itchy eyes [ ] nasal discharge [ ] hives [ ] angioedema  [ ] All other systems negative  [ ] Unable to assess ROS because ________    OBJECTIVE:  ICU Vital Signs Last 24 Hrs  T(C): 37.2 (06 Sep 2018 10:28), Max: 37.2 (06 Sep 2018 07:31)  T(F): 98.9 (06 Sep 2018 10:28), Max: 99 (06 Sep 2018 07:31)  HR: 88 (06 Sep 2018 10:28) (80 - 92)  BP: 133/87 (06 Sep 2018 10:28) (133/87 - 158/110)  BP(mean): --  ABP: --  ABP(mean): --  RR: 18 (06 Sep 2018 10:28) (18 - 19)  SpO2: 96% (06 Sep 2018 10:28) (96% - 100%)        CAPILLARY BLOOD GLUCOSE          PHYSICAL EXAM:  General:   HEENT:   Lymph Nodes:  Neck:   Respiratory:   Cardiovascular:   Abdomen:   Extremities:   Skin:   Neurological:  Psychiatry:    HOSPITAL MEDICATIONS:  heparin  Injectable 5000 Unit(s) SubCutaneous every 8 hours    ciprofloxacin   IVPB 400 milliGRAM(s) IV Intermittent every 12 hours  ciprofloxacin   IVPB      metroNIDAZOLE  IVPB 500 milliGRAM(s) IV Intermittent every 8 hours  metroNIDAZOLE  IVPB        metoprolol tartrate IVPB 5 milliGRAM(s) IV Intermittent every 6 hours      buDESOnide 160 MICROgram(s)/formoterol 4.5 MICROgram(s) Inhaler 2 Puff(s) Inhalation two times a day  tiotropium 18 MICROgram(s) Capsule 1 Capsule(s) Inhalation daily      pantoprazole  Injectable 40 milliGRAM(s) IV Push daily        lactated ringers. 1000 milliLiter(s) IV Continuous <Continuous>      artificial  tears Solution 1 Drop(s) Both EYES three times a day PRN        LABS:                        14.2   9.1   )-----------( 174      ( 06 Sep 2018 05:36 )             41.2     Hgb Trend: 14.2<--  09-06    137  |  100  |  10  ----------------------------<  154<H>  4.0   |  25  |  0.83    Ca    9.6      06 Sep 2018 10:17  Phos  2.5     09-06  Mg     1.7     09-06    TPro  7.7  /  Alb  4.7  /  TBili  0.3  /  DBili  x   /  AST  22  /  ALT  35  /  AlkPhos  72  09-06    Creatinine Trend: 0.83<--, 0.84<--        Venous Blood Gas:  09-06 @ 09:32  7.38/46/35/27/62  VBG Lactate: 2.7  Venous Blood Gas:  09-06 @ 05:36  7.38/48/24/28/37  VBG Lactate: 3.3      MICROBIOLOGY:     RADIOLOGY:  [ ] Reviewed and interpreted by me    PULMONARY FUNCTION TESTS:    EKG: CHIEF COMPLAINT: Abdominal pain    HPI: 53 M with a PMH of HTN, HLD, CAD s/p MI and PCI, asthma, MARYAM on nocturnal CPAP, GERD, Crohn's disease s/p stricturoplasty x 2 and small bowel resection in 3/2018, now p/w acute onset of abdominal pain. Pt reports that he was in his usual state of health until yesterday when he noted intermittent generalized abdominal discomfort with each meal. He also noted 1 bout iof a small amount of non bloody diarrhea yesterday but denies any vomiting. He reports being woken up by acute, 6-7/10, sharp midabdominal pain in the middle of the night. The pain was non radiating and accompanied by nausea. He has not been able to pass gas since yesterday evening. He denies fever, chills, headache, vomiting, SOB, wheezing, dysuria or hematuria. CT scan in the Ed showed a partial chronic bowel obstruction with a transition point in the RLQ. He received NGT decompression Cipro/ Flagyl IV and IV hydration.     PAST MEDICAL & SURGICAL HISTORY:  Bowel obstruction  Cluster headaches  Unilateral recurrent inguinal hernia without obstruction or gangrene: Left  MI (myocardial infarction)  GIB (gastrointestinal bleeding)  MARYAM (obstructive sleep apnea)  Asthma: controlled-never intubated or hospitalized  Joint pain  CAD (coronary artery disease): 2 stents placed in LAD in 2015, RCA stent x 2 in 2006  Pancreatitis: while on 6MP for Crohn&#x27;s now off of it  Crohn disease  HLD (hyperlipidemia)  Inguinal mass  Bilateral knee pain  Shoulder pain, bilateral  Lower back pain  GERD (gastroesophageal reflux disease)  H/O: HTN (hypertension)  S/P hernia surgery  H/O lipoma: removed from back  S/P cardiac catheterization: 2017-resulted in [place]-no stents placed  H/O colonoscopy  H/O percutaneous transluminal coronary angioplasty: with DE RCA 2006, 2 stents placed 2015  History of herniorrhaphy: left inguinal herniorrhaphy      FAMILY HISTORY:  Family history of premature CAD (Father): Father CABG at age 54  Family history of Crohn's disease (Father)      SOCIAL HISTORY:  Smoking: [ x] Never Smoked [ ] Former Smoker (__ packs x ___ years) [ ] Current Smoker  (__ packs x ___ years)  Substance Use: [x ] Never Used [ ] Used ____  EtOH Use: Social  Marital Status: [ ] Single [x ]  [ ]  [ ]   Sexual History: NC  Recent Travel: None  Advance Directives: Full code     Allergies    No Known Allergies    Intolerances        HOME MEDICATIONS:  Reviewed, pulm meds: Symbicort BID, Spiriva daily, Singular daily     REVIEW OF SYSTEMS:  Constitutional: [ ] negative [ -] fevers [ -] chills [ ] weight loss [ ] weight gain  HEENT: [ ] negative [- ] dry eyes [ -] eye irritation [ ] postnasal drip [ ] nasal congestion  CV: [ ] negative  [- ] chest pain [- ] orthopnea [- ] palpitations [- ] murmur  Resp: [ ] negative [ ] cough [ ] shortness of breath [- ] dyspnea [ -] wheezing [ -] sputum [- ] hemoptysis  GI: [ ] negative [+ ] nausea [- ] vomiting [+ ] diarrhea [ ] constipation [+ ] abd pain [ -] dysphagia   : [ ] negative [ -] dysuria [ -] nocturia [ ] hematuria [ ] increased urinary frequency  Musculoskeletal: [ -] negative [ ] back pain [ ] myalgias [ ] arthralgias [ ] fracture  Skin: [ ] negative [ -] rash [ ] itch  Neurological: [ ] negative [ -] headache [ -] dizziness [ -] syncope [ ] weakness [ ] numbness  Psychiatric: [ ] negative [ -] anxiety [ ] depression  Endocrine: [- ] negative [ ] diabetes [ ] thyroid problem  Hematologic/Lymphatic: [ ] negative [ ] anemia [- ] bleeding problem  Allergic/Immunologic: [ ] negative [ ] itchy eyes [- ] nasal discharge [ ] hives [ ] angioedema  [x ] All other systems negative  [ ] Unable to assess ROS because ________    OBJECTIVE:  ICU Vital Signs Last 24 Hrs  T(C): 37.2 (06 Sep 2018 10:28), Max: 37.2 (06 Sep 2018 07:31)  T(F): 98.9 (06 Sep 2018 10:28), Max: 99 (06 Sep 2018 07:31)  HR: 88 (06 Sep 2018 10:28) (80 - 92)  BP: 133/87 (06 Sep 2018 10:28) (133/87 - 158/110)  BP(mean): --  ABP: --  ABP(mean): --  RR: 18 (06 Sep 2018 10:28) (18 - 19)  SpO2: 96% (06 Sep 2018 10:28) (96% - 100%)        CAPILLARY BLOOD GLUCOSE          PHYSICAL EXAM:  General: NAD  HEENT: PERRLA, +NGT  Lymph Nodes: No palpable cervical LNs  Neck: Supple  Respiratory: Decreased BS b/l bases, no wheezing or crackles   Cardiovascular: RRR, S1+S2+0  Abdomen: Distended, soft, mild generalized tenderness, BS absent  Extremities: No edema, pulses + b/l LEs  Skin: No rash   Neurological: AAOx3, grossly non focal   Psychiatry: Normal mood     HOSPITAL MEDICATIONS:  heparin  Injectable 5000 Unit(s) SubCutaneous every 8 hours    ciprofloxacin   IVPB 400 milliGRAM(s) IV Intermittent every 12 hours  ciprofloxacin   IVPB      metroNIDAZOLE  IVPB 500 milliGRAM(s) IV Intermittent every 8 hours  metroNIDAZOLE  IVPB        metoprolol tartrate IVPB 5 milliGRAM(s) IV Intermittent every 6 hours      buDESOnide 160 MICROgram(s)/formoterol 4.5 MICROgram(s) Inhaler 2 Puff(s) Inhalation two times a day  tiotropium 18 MICROgram(s) Capsule 1 Capsule(s) Inhalation daily      pantoprazole  Injectable 40 milliGRAM(s) IV Push daily        lactated ringers. 1000 milliLiter(s) IV Continuous <Continuous>      artificial  tears Solution 1 Drop(s) Both EYES three times a day PRN        LABS:                        14.2   9.1   )-----------( 174      ( 06 Sep 2018 05:36 )             41.2     Hgb Trend: 14.2<--  09-06    137  |  100  |  10  ----------------------------<  154<H>  4.0   |  25  |  0.83    Ca    9.6      06 Sep 2018 10:17  Phos  2.5     09-06  Mg     1.7     09-06    TPro  7.7  /  Alb  4.7  /  TBili  0.3  /  DBili  x   /  AST  22  /  ALT  35  /  AlkPhos  72  09-06    Creatinine Trend: 0.83<--, 0.84<--        Venous Blood Gas:  09-06 @ 09:32  7.38/46/35/27/62  VBG Lactate: 2.7  Venous Blood Gas:  09-06 @ 05:36  7.38/48/24/28/37  VBG Lactate: 3.3      MICROBIOLOGY:   None    RADIOLOGY:  < from: Xray Chest 1 View- PORTABLE-Urgent (09.06.18 @ 10:07) >  Enteric tube in place with its tip in the distal stomach.    Poor inspiratory result. The lungs are clear. There are no pleural   effusions or pneumothorax.    Thecardiomediastinal silhouette is within normal limits.    The visualized osseous structures are within normal limits.    < from: CT Abdomen and Pelvis w/ Oral Cont and w/ IV Cont (09.06.18 @ 06:31) >  LOWER CHEST: Coronary artery calcifications.    LIVER: Within normal limits.  BILE DUCTS: Normal caliber.  GALLBLADDER: Within normal limits.  SPLEEN:Within normal limits.  PANCREAS: Within normal limits.  ADRENALS: Within normal limits.  KIDNEYS/URETERS: Within normal limits.    BLADDER: Within normal limits.  REPRODUCTIVE ORGANS: The prostate is within normal limits.    BOWEL: There are several dilated loops of distal jejunum/proximal ileum   with transition point in the right lower quadrant at the site of a small   bowel-small bowel anastomosis. The small bowel just distal to this point   is decompressed and demonstrates a short segment (about 12 cm) of wall   thickening and mucosal hyperemia, consistent with active inflammatory   Crohn's disease resulting in stricture. There is mild trace interloop   mesenteric edema. Status post appendectomy.  PERITONEUM: No ascites. Scattered nonenlarged right lower quadrant   mesenteric lymph nodes are likely reactive.  VESSELS: The aorta is not dilated. There is mild vascular calcification..  RETROPERITONEUM: No lymphadenopathy.    ABDOMINAL WALL: Within normal limits.  BONES: Within normal limits.      [ ] Reviewed and interpreted by me    PULMONARY FUNCTION TESTS:    EKG:

## 2018-09-06 NOTE — ED ADULT NURSE NOTE - OBJECTIVE STATEMENT
53 y.o M w. PMH of chrons disease, CAD, s/p stents, pancreatitis came to the ER w. c/o abdominal pain x 2 days, states he has chronic diarrhea, pain in RUQ radiating to the left, states he feels like its his "pancreatitis pain and not chron's pain, states last BM was today, small non-bloody. Pt denies any CP, SOB, fever, h/a, dizziness, weakness. Pt is axox4, lungs CTA, +bs abdomen soft non-distended, +central pulses, ambulating w. steady gait, safety and comfort maintained, no acute distress noted at this time.

## 2018-09-06 NOTE — ED PROVIDER NOTE - ABDOMINAL TENDER
epigastric/right upper quadrant/left upper quadrant/+guarding, no rebound, no McBurney point ttp, +McBurney point ttp

## 2018-09-06 NOTE — H&P ADULT - NSHPLABSRESULTS_GEN_ALL_CORE
Basic Metabolic Panel (09.06.18 @ 10:17)    Sodium, Serum: 137 mmol/L    Potassium, Serum: 4.0 mmol/L    Chloride, Serum: 100 mmol/L    Carbon Dioxide, Serum: 25 mmol/L    Anion Gap, Serum: 12 mmol/L    Blood Urea Nitrogen, Serum: 10 mg/dL    Creatinine, Serum: 0.83 mg/dL    Glucose, Serum: 154 mg/dL    Calcium, Total Serum: 9.6 mg/dL    Complete Blood Count + Automated Diff (09.06.18 @ 05:36)    WBC Count: 9.1 K/uL    RBC Count: 5.13 M/uL    Hemoglobin: 14.2 g/dL    Hematocrit: 41.2 %    Mean Cell Volume: 80.3 fl    Mean Cell Hemoglobin: 27.6 pg    Mean Cell Hemoglobin Conc: 34.4 gm/dL    Red Cell Distrib Width: 14.1 %    Platelet Count - Automated: 174 K/uL    Auto Neutrophil #: 7.5 K/uL    Auto Lymphocyte #: 1.0 K/uL    Auto Monocyte #: 0.5 K/uL    Auto Eosinophil #: 0.0 K/uL    Auto Basophil #: 0.0 K/uL    Auto Neutrophil %: 83.0: Differential percentages must be correlated with absolute numbers for  clinical significance. %    Auto Lymphocyte %: 10.9 %    Auto Monocyte %: 5.6 %    Auto Eosinophil %: 0.5 %    Auto Basophil %: 0.1 %    Blood Gas Venous - Lactate: 2.7 mmoL/L (09.06.18 @ 09:32)      < from: CT Abdomen and Pelvis w/ Oral Cont and w/ IV Cont (09.06.18 @ 06:31) >    IMPRESSION: Redemonstrated is a chronic partial bowel obstruction related   to short segment of active inflammatory Crohn's disease with stricture   distal to the right lower quadrant small bowel-small bowel anastomosis.   Mild mesenteric edema. No free air or focal collection. No additional   sites of active inflammation identified. Appearance is similar compared   to the previous CT of February 20, 2018.      < end of copied text >

## 2018-09-06 NOTE — CONSULT NOTE ADULT - ASSESSMENT
53 M with HTN, HLD, CAD s/p MI and PCI, asthma, MARYAM on nocturnal CPAP, GERD, Crohn's disease s/p stricturoplasty x 2 and small bowel resection in 3/2018, now p/w acute onset of abdominal pain, found to have a SBO with a transition point in the RLQ, current being conservatively managed with NGT decompression and bowel rest.     1. Asthma:  - Currently stable pulmonary status, saturating well on RA.   - No evidence of an asthma exacerbation  - Suggest Duonebs Q6H prn  - Can continue Symbicort BID and Spiriva daily  - Ok to hold Singulair as pt is NPO  - Keep O2 sats above 90 %  - Incentive spirometry once able to tolerate     2. MARYAM:  - Pt is on nocturnal APAP with a min pressure of 4 and max pressure of 9 at home   - Ok to hold off on nocturnal CPAP in the setting of an acute bowel obstruction   - Once acute issues have resolved and there is no longer a contraindication to NIV, we can resume nocturnal CPAP at 9 cm H20    3. Small bowel obstruction/ Crohn's disease/ SB stricture   - Currently NPO and receiving NGT decompression per surgical team   - On IV Cipro/ flagyl   - Cont IV hydration   - Serial abdominal exams   - GI evalv    4. Gen:  - DVT Px: Hep SQ  - Dr. Duckworth will follow pt during hospitalization 53 M with HTN, HLD, CAD s/p MI and PCI, asthma, MARYAM on nocturnal CPAP, GERD, Crohn's disease s/p stricturoplasty x 2 and small bowel resection in 3/2018, now p/w acute onset of abdominal pain, found to have a SBO with a transition point in the RLQ, current being conservatively managed with NGT decompression and bowel rest.     1. Asthma:  - Currently stable pulmonary status, saturating well on RA.   - No evidence of an asthma exacerbation  - Suggest Duonebs Q6H prn  - Can continue Symbicort BID and Spiriva daily  - Ok to hold Singulair as pt is NPO  - Keep O2 sats above 90 %  - Incentive spirometry once able to tolerate     2. MARYAM:  - Pt is on nocturnal APAP with a min pressure of 4 and max pressure of 9 at home   - Ok to hold off on nocturnal CPAP in the setting of an acute bowel obstruction   - Once acute issues have resolved and there is no longer a contraindication to NIV, we can resume nocturnal CPAP at 9 cm H20    3. Small bowel obstruction/ Crohn's disease with exacerbation/ SB stricture   - Currently NPO and receiving NGT decompression per surgical team   - On IV Cipro/ flagyl   - Cont IV hydration   - Serial abdominal exams   - GI evalv    4. Gen:  - DVT Px: Hep SQ  - Dr. Duckworth will follow pt during hospitalization

## 2018-09-06 NOTE — ED PROVIDER NOTE - OBJECTIVE STATEMENT
52 yo M c PMH of CAD (s/p MI s/p 4 stents, recent cath showed continued blockage, wasn't stented), Crohns (on humera, c/b anal fissures, s/p sticturoplasty, bowel resection, appendectomy, hernia repair), pancreatitis (induced by 6-MP) p/w a few hour hx of non-radiating sharp epigastric and BUQ abdominal pain 6/10 in severity, improved when laying on his L side, associated with nausea. last BM today, small amount of non-bloody diarrhea. pt did not take pain meds at home, states this feels like prior pancreatitis episodes, does not feel like typical Crohns flare.    ROS positive: abdominal pain, nausea  ROS negative: fever, congestion, coryza, pharyngitis, CP, SOB, vomiting, hematachezia, dysuria, hematuria, leg edema 52 yo M c PMH of CAD (s/p MI s/p 4 stents, recent cath showed continued blockage, wasn't stented), Crohns (on humera, c/b anal fissures, s/p sticturoplasty, bowel resection, appendectomy, hernia repair), pancreatitis (induced by 6-MP) p/w a few hour hx of non-radiating sharp epigastric and BUQ abdominal pain 6/10 in severity, improved when laying on his L side, associated with nausea. last BM today, small amount of non-bloody diarrhea. pt did not take pain meds at home, states this feels like prior pancreatitis episodes, does not feel like typical Crohns flare.    ROS positive: abdominal pain, nausea  ROS negative: fever, congestion, coryza, pharyngitis, CP, SOB, vomiting, hematachezia, dysuria, hematuria, leg edema    PMD: Randell Fernández  GI: Darrin Caicedo

## 2018-09-06 NOTE — H&P ADULT - FAMILY HISTORY
Father  Still living? Unknown  Family history of Crohn's disease, Age at diagnosis: Age Unknown  Family history of premature CAD, Age at diagnosis: Age Unknown

## 2018-09-06 NOTE — ED PROVIDER NOTE - PSH
H/O colonoscopy    H/O lipoma  removed from back  H/O percutaneous transluminal coronary angioplasty  with DE RCA 2006, 2 stents placed 2015  History of herniorrhaphy  left inguinal herniorrhaphy  S/P cardiac catheterization  2017-resulted in [place]-no stents placed  S/P hernia surgery

## 2018-09-06 NOTE — ED ADULT NURSE REASSESSMENT NOTE - NS ED NURSE REASSESS COMMENT FT1
metoprolol infusion received from pharmacy damaged (leaking around reconstitution attachment), replacement requested

## 2018-09-06 NOTE — ED PROVIDER NOTE - PROGRESS NOTE DETAILS
Attending Isela Duffy: ct shows chronic SBO. pt with pain and ct showing mesenteric edema. surgery consulted

## 2018-09-06 NOTE — H&P ADULT - NSHPPHYSICALEXAM_GEN_ALL_CORE
GEN: NAD, A+Ox3  HEENT: EOMI, PERRLA  RESP: non labored  CV: RRR  ABD: distended, diffusely TTP, no rebound, guarding or peritoneal signs noted.  EXT: b/l calf soft, NT. No edema b/l LE  SKIN: warm, dry  NEURO: no focal deficits

## 2018-09-06 NOTE — ED ADULT NURSE REASSESSMENT NOTE - NS ED NURSE REASSESS COMMENT FT1
Received male alert and oriented x3 complaining of pain to the epigastric region of the abd. Pt reports pain level of 5/10 on pain scale and sharp in quality. Pt given Ofirmev as ordered; vitals signs stable. Awaiting disposition.

## 2018-09-07 DIAGNOSIS — K21.9 GASTRO-ESOPHAGEAL REFLUX DISEASE WITHOUT ESOPHAGITIS: ICD-10-CM

## 2018-09-07 DIAGNOSIS — Z29.9 ENCOUNTER FOR PROPHYLACTIC MEASURES, UNSPECIFIED: ICD-10-CM

## 2018-09-07 DIAGNOSIS — J45.909 UNSPECIFIED ASTHMA, UNCOMPLICATED: ICD-10-CM

## 2018-09-07 DIAGNOSIS — I25.10 ATHEROSCLEROTIC HEART DISEASE OF NATIVE CORONARY ARTERY WITHOUT ANGINA PECTORIS: ICD-10-CM

## 2018-09-07 DIAGNOSIS — G47.33 OBSTRUCTIVE SLEEP APNEA (ADULT) (PEDIATRIC): ICD-10-CM

## 2018-09-07 DIAGNOSIS — J30.9 ALLERGIC RHINITIS, UNSPECIFIED: ICD-10-CM

## 2018-09-07 DIAGNOSIS — R10.9 UNSPECIFIED ABDOMINAL PAIN: ICD-10-CM

## 2018-09-07 LAB
ANION GAP SERPL CALC-SCNC: 13 MMOL/L — SIGNIFICANT CHANGE UP (ref 5–17)
BUN SERPL-MCNC: 10 MG/DL — SIGNIFICANT CHANGE UP (ref 7–23)
CALCIUM SERPL-MCNC: 8.7 MG/DL — SIGNIFICANT CHANGE UP (ref 8.4–10.5)
CHLORIDE SERPL-SCNC: 101 MMOL/L — SIGNIFICANT CHANGE UP (ref 96–108)
CO2 SERPL-SCNC: 26 MMOL/L — SIGNIFICANT CHANGE UP (ref 22–31)
CREAT SERPL-MCNC: 0.86 MG/DL — SIGNIFICANT CHANGE UP (ref 0.5–1.3)
GLUCOSE SERPL-MCNC: 136 MG/DL — HIGH (ref 70–99)
HCT VFR BLD CALC: 36 % — LOW (ref 39–50)
HGB BLD-MCNC: 12.1 G/DL — LOW (ref 13–17)
MAGNESIUM SERPL-MCNC: 2 MG/DL — SIGNIFICANT CHANGE UP (ref 1.6–2.6)
MCHC RBC-ENTMCNC: 27.6 PG — SIGNIFICANT CHANGE UP (ref 27–34)
MCHC RBC-ENTMCNC: 33.6 GM/DL — SIGNIFICANT CHANGE UP (ref 32–36)
MCV RBC AUTO: 82.2 FL — SIGNIFICANT CHANGE UP (ref 80–100)
PHOSPHATE SERPL-MCNC: 3.2 MG/DL — SIGNIFICANT CHANGE UP (ref 2.5–4.5)
PLATELET # BLD AUTO: 137 K/UL — LOW (ref 150–400)
POTASSIUM SERPL-MCNC: 3.8 MMOL/L — SIGNIFICANT CHANGE UP (ref 3.5–5.3)
POTASSIUM SERPL-SCNC: 3.8 MMOL/L — SIGNIFICANT CHANGE UP (ref 3.5–5.3)
RBC # BLD: 4.38 M/UL — SIGNIFICANT CHANGE UP (ref 4.2–5.8)
RBC # FLD: 14.5 % — SIGNIFICANT CHANGE UP (ref 10.3–14.5)
SODIUM SERPL-SCNC: 140 MMOL/L — SIGNIFICANT CHANGE UP (ref 135–145)
WBC # BLD: 7.35 K/UL — SIGNIFICANT CHANGE UP (ref 3.8–10.5)
WBC # FLD AUTO: 7.35 K/UL — SIGNIFICANT CHANGE UP (ref 3.8–10.5)

## 2018-09-07 PROCEDURE — 99232 SBSQ HOSP IP/OBS MODERATE 35: CPT

## 2018-09-07 RX ORDER — ACETAMINOPHEN 500 MG
1000 TABLET ORAL ONCE
Qty: 0 | Refills: 0 | Status: COMPLETED | OUTPATIENT
Start: 2018-09-07 | End: 2018-09-07

## 2018-09-07 RX ORDER — HYDROMORPHONE HYDROCHLORIDE 2 MG/ML
0.5 INJECTION INTRAMUSCULAR; INTRAVENOUS; SUBCUTANEOUS ONCE
Qty: 0 | Refills: 0 | Status: DISCONTINUED | OUTPATIENT
Start: 2018-09-07 | End: 2018-09-07

## 2018-09-07 RX ORDER — ACETAMINOPHEN 500 MG
1000 TABLET ORAL ONCE
Qty: 0 | Refills: 0 | Status: COMPLETED | OUTPATIENT
Start: 2018-09-08 | End: 2018-09-08

## 2018-09-07 RX ORDER — ONDANSETRON 8 MG/1
4 TABLET, FILM COATED ORAL ONCE
Qty: 0 | Refills: 0 | Status: COMPLETED | OUTPATIENT
Start: 2018-09-07 | End: 2018-09-07

## 2018-09-07 RX ORDER — INFLUENZA VIRUS VACCINE 15; 15; 15; 15 UG/.5ML; UG/.5ML; UG/.5ML; UG/.5ML
0.5 SUSPENSION INTRAMUSCULAR ONCE
Qty: 0 | Refills: 0 | Status: DISCONTINUED | OUTPATIENT
Start: 2018-09-07 | End: 2018-09-12

## 2018-09-07 RX ORDER — MAGNESIUM SULFATE 500 MG/ML
2 VIAL (ML) INJECTION ONCE
Qty: 0 | Refills: 0 | Status: COMPLETED | OUTPATIENT
Start: 2018-09-07 | End: 2018-09-07

## 2018-09-07 RX ADMIN — Medication 5 MILLIGRAM(S): at 00:52

## 2018-09-07 RX ADMIN — Medication 50 GRAM(S): at 11:07

## 2018-09-07 RX ADMIN — HYDROMORPHONE HYDROCHLORIDE 0.5 MILLIGRAM(S): 2 INJECTION INTRAMUSCULAR; INTRAVENOUS; SUBCUTANEOUS at 06:50

## 2018-09-07 RX ADMIN — Medication 100 MILLIGRAM(S): at 00:51

## 2018-09-07 RX ADMIN — Medication 200 MILLIGRAM(S): at 05:37

## 2018-09-07 RX ADMIN — TIOTROPIUM BROMIDE 1 CAPSULE(S): 18 CAPSULE ORAL; RESPIRATORY (INHALATION) at 17:38

## 2018-09-07 RX ADMIN — HEPARIN SODIUM 5000 UNIT(S): 5000 INJECTION INTRAVENOUS; SUBCUTANEOUS at 17:38

## 2018-09-07 RX ADMIN — Medication 3 MILLILITER(S): at 00:51

## 2018-09-07 RX ADMIN — Medication 20 MILLIGRAM(S): at 15:09

## 2018-09-07 RX ADMIN — Medication 5 MILLIGRAM(S): at 05:37

## 2018-09-07 RX ADMIN — Medication 200 MILLIGRAM(S): at 20:09

## 2018-09-07 RX ADMIN — BUDESONIDE AND FORMOTEROL FUMARATE DIHYDRATE 2 PUFF(S): 160; 4.5 AEROSOL RESPIRATORY (INHALATION) at 18:52

## 2018-09-07 RX ADMIN — Medication 400 MILLIGRAM(S): at 14:22

## 2018-09-07 RX ADMIN — Medication 1000 MILLIGRAM(S): at 20:40

## 2018-09-07 RX ADMIN — HYDROMORPHONE HYDROCHLORIDE 0.5 MILLIGRAM(S): 2 INJECTION INTRAMUSCULAR; INTRAVENOUS; SUBCUTANEOUS at 09:39

## 2018-09-07 RX ADMIN — PANTOPRAZOLE SODIUM 40 MILLIGRAM(S): 20 TABLET, DELAYED RELEASE ORAL at 14:19

## 2018-09-07 RX ADMIN — Medication 3 MILLILITER(S): at 05:37

## 2018-09-07 RX ADMIN — BUDESONIDE AND FORMOTEROL FUMARATE DIHYDRATE 2 PUFF(S): 160; 4.5 AEROSOL RESPIRATORY (INHALATION) at 14:17

## 2018-09-07 RX ADMIN — Medication 3 MILLILITER(S): at 17:39

## 2018-09-07 RX ADMIN — Medication 5 MILLIGRAM(S): at 17:38

## 2018-09-07 RX ADMIN — ONDANSETRON 4 MILLIGRAM(S): 8 TABLET, FILM COATED ORAL at 02:41

## 2018-09-07 RX ADMIN — Medication 20 MILLIGRAM(S): at 22:22

## 2018-09-07 RX ADMIN — HEPARIN SODIUM 5000 UNIT(S): 5000 INJECTION INTRAVENOUS; SUBCUTANEOUS at 08:55

## 2018-09-07 RX ADMIN — HYDROMORPHONE HYDROCHLORIDE 0.5 MILLIGRAM(S): 2 INJECTION INTRAMUSCULAR; INTRAVENOUS; SUBCUTANEOUS at 00:51

## 2018-09-07 RX ADMIN — Medication 62.5 MILLIMOLE(S): at 14:41

## 2018-09-07 RX ADMIN — Medication 5 MILLIGRAM(S): at 14:19

## 2018-09-07 RX ADMIN — Medication 3 MILLILITER(S): at 14:18

## 2018-09-07 RX ADMIN — Medication 100 MILLIGRAM(S): at 08:54

## 2018-09-07 RX ADMIN — SODIUM CHLORIDE 125 MILLILITER(S): 9 INJECTION, SOLUTION INTRAVENOUS at 09:09

## 2018-09-07 RX ADMIN — Medication 1000 MILLIGRAM(S): at 14:52

## 2018-09-07 RX ADMIN — HYDROMORPHONE HYDROCHLORIDE 0.5 MILLIGRAM(S): 2 INJECTION INTRAMUSCULAR; INTRAVENOUS; SUBCUTANEOUS at 01:20

## 2018-09-07 RX ADMIN — Medication 400 MILLIGRAM(S): at 20:10

## 2018-09-07 RX ADMIN — HYDROMORPHONE HYDROCHLORIDE 0.5 MILLIGRAM(S): 2 INJECTION INTRAMUSCULAR; INTRAVENOUS; SUBCUTANEOUS at 06:16

## 2018-09-07 RX ADMIN — Medication 100 MILLIGRAM(S): at 17:37

## 2018-09-07 RX ADMIN — HYDROMORPHONE HYDROCHLORIDE 0.5 MILLIGRAM(S): 2 INJECTION INTRAMUSCULAR; INTRAVENOUS; SUBCUTANEOUS at 09:09

## 2018-09-07 RX ADMIN — HEPARIN SODIUM 5000 UNIT(S): 5000 INJECTION INTRAVENOUS; SUBCUTANEOUS at 00:51

## 2018-09-07 NOTE — PROGRESS NOTE ADULT - ATTENDING COMMENTS
I have seen and evaluated patient and discussed with GI and team.  Continue IV and NGT.  Meds as per GI

## 2018-09-07 NOTE — PROGRESS NOTE ADULT - ASSESSMENT
52yo M with PMH of CAD, MI s/p PCI, COPD, asthma, MARYAM, Crohn's s/p stricturoplasty x 2 and SBR 3/2018 now a/w SBO due to Crohn's exacerbation with stricture.     -NPO  -IVF  -cont Cipro/flagyl  -NGT to LCS  -serial abd exams  -monitor GI fxn  -cont COPD/asthma home meds  -BP control: cont lopressor IV while NPO  -f/u GI: Dr. Caicedo notified and aware   -OOB and ambulate  -DVT PPX: cont SCD's and SQH      Green [id number] 54yo M with PMH of CAD, MI s/p PCI, COPD, asthma, MARYAM, Crohn's s/p stricturoplasty x 2 and SBR 3/2018 now a/w SBO due to acute Crohn's exacerbation with inflammatory narrowing.     -NPO  -IVF  -cont Cipro/flagyl  -NGT to LCS  -serial abd exams  -monitor GI fxn  -cont COPD/asthma home meds  -BP control: cont lopressor IV while NPO  -f/u GI: Dr. Caicedo notified and aware   -OOB and ambulate  -DVT PPX: cont SCD's and SQH      Green [id number]

## 2018-09-07 NOTE — PROGRESS NOTE ADULT - SUBJECTIVE AND OBJECTIVE BOX
SUBJECTIVE:  Patient was ambulating down the hallways.  NGT bothering him.  Still has not moved his bowels.  Still distended and uncomfortable.  _____________________________________________________  OBJECTIVE:    T(C): 36.7 (09-07-18 @ 05:08), Max: 38.1 (09-06-18 @ 20:15)  HR: 81 (09-07-18 @ 05:08)  BP: 150/91 (09-07-18 @ 05:08)  RR: 18 (09-07-18 @ 05:08)  SpO2: 94% (09-07-18 @ 05:08)  Wt(kg): --    General = Looks uncomfortable, speaking minimally because of the NGT.  Abdomen = Appears distended.  Examination limited because the patient was ambulating and in the clemente.  _____________________________________________________  LABS:                        12.7   9.21  )-----------( 169      ( 06 Sep 2018 14:01 )             38.4     09-06    137  |  100  |  10  ----------------------------<  154<H>  4.0   |  25  |  0.83    Ca    9.6      06 Sep 2018 10:17  Phos  2.5     09-06  Mg     1.7     09-06    TPro  7.7  /  Alb  4.7  /  TBili  0.3  /  DBili  x   /  AST  22  /  ALT  35  /  AlkPhos  72  09-06    LIVER FUNCTIONS - ( 06 Sep 2018 05:36 )  Alb: 4.7 g/dL / Pro: 7.7 g/dL / ALK PHOS: 72 U/L / ALT: 35 U/L / AST: 22 U/L / GGT: x             _____________________________________________________  ACTIVE MEDS:  MEDICATIONS  (STANDING):  ALBUTerol/ipratropium for Nebulization 3 milliLiter(s) Nebulizer every 6 hours  buDESOnide 160 MICROgram(s)/formoterol 4.5 MICROgram(s) Inhaler 2 Puff(s) Inhalation two times a day  ciprofloxacin   IVPB 400 milliGRAM(s) IV Intermittent every 12 hours  ciprofloxacin   IVPB      heparin  Injectable 5000 Unit(s) SubCutaneous every 8 hours  lactated ringers. 1000 milliLiter(s) (125 mL/Hr) IV Continuous <Continuous>  metoprolol tartrate Injectable 5 milliGRAM(s) IV Push every 6 hours  metroNIDAZOLE  IVPB 500 milliGRAM(s) IV Intermittent every 8 hours  metroNIDAZOLE  IVPB      pantoprazole  Injectable 40 milliGRAM(s) IV Push daily  tiotropium 18 MICROgram(s) Capsule 1 Capsule(s) Inhalation daily    MEDICATIONS  (PRN):  artificial  tears Solution 1 Drop(s) Both EYES three times a day PRN Dry Eyes  benzocaine 15 mG/menthol 3.6 mG Lozenge 1 Lozenge Oral every 4 hours PRN Sore Throat    _____________________________________________________  ASSESSMENT:  53yMale with Crohn's ileitis, admitted with SBO and active inflamed bowel on imaging.      PLAN:  - As discussed with Dr. Caicedo and with the patient, recommend adding solumedrol 20 mg IV q 8 hours today --I will write this order.  - Continue IV cipro and flagyl.  - NGT management.    Rama Mao M.D.  (o) [phone]

## 2018-09-07 NOTE — PROGRESS NOTE ADULT - ATTENDING COMMENTS
as above--  Asthma /allergies--stable at present--continue BD regimen as above  OSAS-rx as out pt  CAD-Pradeep et al  GI as per Marifer/Marilu--correcting electrolytes    Leonel Duckworth MD-Pulmonary   [phone]

## 2018-09-07 NOTE — PATIENT PROFILE ADULT. - PSH
H/O colonoscopy    H/O lipoma  removed from back  H/O percutaneous transluminal coronary angioplasty  with DE RCA 2006, 2 stents placed 2015  History of herniorrhaphy  left inguinal herniorrhaphy  S/P cardiac catheterization  2017-resulted in [place]-no stents placed  S/P hernia surgery

## 2018-09-07 NOTE — CONSULT NOTE ADULT - ASSESSMENT
53y Male with history of CAD and IBD admitted wtih abdominal pain and vomiting. CT c/w partial SBO. CV stable.    Rec:  IV lopressor  Would continue aspirin use with history of CAD and PCI.  Rx per surgery.

## 2018-09-07 NOTE — PROGRESS NOTE ADULT - SUBJECTIVE AND OBJECTIVE BOX
SURGERY DAILY PROGRESS NOTE:       SUBJECTIVE/ROS: Patient examined at bedside. No acute events overnight. Patient has hx of cluster headaches, seems like last night might have had an episode vs NGT irritation headache. Improved with Dilaudid. Serial abd exam unchanged: soft, distended, tympanic, diffusely mildly tender, no peritonitic signs. NGT w/ multiple adjustments and flushes w/ minimal output.          MEDICATIONS  (STANDING):  ALBUTerol/ipratropium for Nebulization 3 milliLiter(s) Nebulizer every 6 hours  buDESOnide 160 MICROgram(s)/formoterol 4.5 MICROgram(s) Inhaler 2 Puff(s) Inhalation two times a day  ciprofloxacin   IVPB 400 milliGRAM(s) IV Intermittent every 12 hours  ciprofloxacin   IVPB      heparin  Injectable 5000 Unit(s) SubCutaneous every 8 hours  lactated ringers. 1000 milliLiter(s) (125 mL/Hr) IV Continuous <Continuous>  metoprolol tartrate Injectable 5 milliGRAM(s) IV Push every 6 hours  metroNIDAZOLE  IVPB 500 milliGRAM(s) IV Intermittent every 8 hours  metroNIDAZOLE  IVPB      pantoprazole  Injectable 40 milliGRAM(s) IV Push daily  tiotropium 18 MICROgram(s) Capsule 1 Capsule(s) Inhalation daily    MEDICATIONS  (PRN):  artificial  tears Solution 1 Drop(s) Both EYES three times a day PRN Dry Eyes  benzocaine 15 mG/menthol 3.6 mG Lozenge 1 Lozenge Oral every 4 hours PRN Sore Throat      OBJECTIVE:    Vital Signs Last 24 Hrs  T(C): 37.2 (07 Sep 2018 00:29), Max: 38.1 (06 Sep 2018 20:15)  T(F): 98.9 (07 Sep 2018 00:29), Max: 100.5 (06 Sep 2018 20:15)  HR: 85 (07 Sep 2018 00:29) (80 - 104)  BP: 146/88 (07 Sep 2018 00:29) (133/87 - 158/110)  BP(mean): --  RR: 18 (07 Sep 2018 00:29) (16 - 19)  SpO2: 94% (07 Sep 2018 00:29) (94% - 100%)        I&O's Detail    06 Sep 2018 07:01  -  07 Sep 2018 02:16  --------------------------------------------------------  IN:  Total IN: 0 mL    OUT:    Voided: 625 mL  Total OUT: 625 mL    Total NET: -625 mL          Daily Height in cm: 167.64 (06 Sep 2018 04:32)    Daily     LABS:                        12.7   9.21  )-----------( 169      ( 06 Sep 2018 14:01 )             38.4         137  |  100  |  10  ----------------------------<  154<H>  4.0   |  25  |  0.83    Ca    9.6      06 Sep 2018 10:17  Phos  2.5       Mg     1.7         TPro  7.7  /  Alb  4.7  /  TBili  0.3  /  DBili  x   /  AST  22  /  ALT  35  /  AlkPhos  72        Urinalysis Basic - ( 06 Sep 2018 18:40 )    Color: Colorless / Appearance: Clear / S.012 / pH: x  Gluc: x / Ketone: Negative  / Bili: Negative / Urobili: Negative   Blood: x / Protein: Negative / Nitrite: Negative   Leuk Esterase: Negative / RBC: 1 /HPF / WBC 0 /HPF   Sq Epi: x / Non Sq Epi: 0 /HPF / Bacteria: Negative                PHYSICAL EXAM:  GEN: NAD  Pulm: unlabored breathing on RA  CV: radial pulse 2+, cap refil <2sec  Abd: soft, distended, mildly diffusely tender, no peritonitic signs

## 2018-09-07 NOTE — PROGRESS NOTE ADULT - SUBJECTIVE AND OBJECTIVE BOX
CHIEF COMPLAINT: f/up for asthma, osas, allergy--    Interval Events:    REVIEW OF SYSTEMS:  Constitutional: No fevers or chills. No weight loss. No fatigue or generalized malaise.  Eyes: No itching or discharge from the eyes  ENT: No ear pain. No ear discharge. No nasal congestion. No post nasal drip. No epistaxis. No throat pain. No sore throat. No difficulty swallowing.   CV: No chest pain. No palpitations. No lightheadedness or dizziness.   Resp: No dyspnea at rest. No dyspnea on exertion. No orthopnea. No wheezing. No cough. No stridor. No sputum production. No chest pain with respiration.  GI: No nausea. No vomiting. No diarrhea.  MSK: No joint pain or pain in any extremities  Integumentary: No skin lesions. No pedal edema.  Neurological: No gross motor weakness. No sensory changes.  [ ] All other systems negative  [ ] Unable to assess ROS because ________    OBJECTIVE:  ICU Vital Signs Last 24 Hrs  T(C): 36.7 (07 Sep 2018 05:08), Max: 38.1 (06 Sep 2018 20:15)  T(F): 98.1 (07 Sep 2018 05:08), Max: 100.5 (06 Sep 2018 20:15)  HR: 81 (07 Sep 2018 05:08) (80 - 104)  BP: 150/91 (07 Sep 2018 05:08) (133/87 - 150/91)  BP(mean): --  ABP: --  ABP(mean): --  RR: 18 (07 Sep 2018 05:08) (16 - 19)  SpO2: 94% (07 Sep 2018 05:08) (94% - 100%)        09- @ 07:01  -   @ 06:15  --------------------------------------------------------  IN: 1625 mL / OUT: 1625 mL / NET: 0 mL      CAPILLARY BLOOD GLUCOSE          PHYSICAL EXAM:  General: Awake, alert, oriented X 3.   HEENT: Atraumatic, normocephalic.                 Mallampatti Grade                 No nasal congestion.                No tonsillar or pharyngeal exudates.  Lymph Nodes: No palpable lymphadenopathy  Neck: No JVD. No carotid bruit.   Respiratory: Normal chest expansion                         Normal percussion                         Normal and equal air entry                         No wheeze, rhonchi or rales.  Cardiovascular: S1 S2 normal. No murmurs, rubs or gallops.   Abdomen: Soft, non-tender, non-distended. No organomegaly.  Extremities: Warm to touch. Peripheral pulse palpable. No pedal edema.   Skin: No rashes or skin lesions  Neurological: Motor and sensory examination equal and normal in all four extremities.  Psychiatry: Appropriate mood and affect.    HOSPITAL MEDICATIONS:  MEDICATIONS  (STANDING):  ALBUTerol/ipratropium for Nebulization 3 milliLiter(s) Nebulizer every 6 hours  buDESOnide 160 MICROgram(s)/formoterol 4.5 MICROgram(s) Inhaler 2 Puff(s) Inhalation two times a day  ciprofloxacin   IVPB 400 milliGRAM(s) IV Intermittent every 12 hours  ciprofloxacin   IVPB      heparin  Injectable 5000 Unit(s) SubCutaneous every 8 hours  HYDROmorphone  Injectable 0.5 milliGRAM(s) IV Push once  lactated ringers. 1000 milliLiter(s) (125 mL/Hr) IV Continuous <Continuous>  metoprolol tartrate Injectable 5 milliGRAM(s) IV Push every 6 hours  metroNIDAZOLE  IVPB 500 milliGRAM(s) IV Intermittent every 8 hours  metroNIDAZOLE  IVPB      pantoprazole  Injectable 40 milliGRAM(s) IV Push daily  tiotropium 18 MICROgram(s) Capsule 1 Capsule(s) Inhalation daily    MEDICATIONS  (PRN):  artificial  tears Solution 1 Drop(s) Both EYES three times a day PRN Dry Eyes  benzocaine 15 mG/menthol 3.6 mG Lozenge 1 Lozenge Oral every 4 hours PRN Sore Throat      LABS:                        12.7   9.21  )-----------( 169      ( 06 Sep 2018 14:01 )             38.4     09-06    137  |  100  |  10  ----------------------------<  154<H>  4.0   |  25  |  0.83    Ca    9.6      06 Sep 2018 10:17  Phos  2.5     09-06  Mg     1.7     09-06    TPro  7.7  /  Alb  4.7  /  TBili  0.3  /  DBili  x   /  AST  22  /  ALT  35  /  AlkPhos  72        Urinalysis Basic - ( 06 Sep 2018 18:40 )    Color: Colorless / Appearance: Clear / S.012 / pH: x  Gluc: x / Ketone: Negative  / Bili: Negative / Urobili: Negative   Blood: x / Protein: Negative / Nitrite: Negative   Leuk Esterase: Negative / RBC: 1 /HPF / WBC 0 /HPF   Sq Epi: x / Non Sq Epi: 0 /HPF / Bacteria: Negative        Venous Blood Gas:   @ 09:32  7.38/46/35/27/62  VBG Lactate: 2.7  Venous Blood Gas:   @ 05:36  7.38/48/24/28/37  VBG Lactate: 3.3      MICROBIOLOGY:     RADIOLOGY:  [ ] Reviewed and interpreted by me    Point of Care Ultrasound Findings:    PFT:    EKG: CHIEF COMPLAINT: f/up for asthma, osas, allergy--pain upon talking, sinus HA and pain swallowing, gi distension,poor sleep, dry eyes    Interval Events: NG tube    REVIEW OF SYSTEMS:  Constitutional: No fevers or chills. No weight loss. + fatigue or generalized malaise.  Eyes: No itching or discharge from the eyes  ENT: No ear pain. No ear discharge. No nasal congestion. No post nasal drip. No epistaxis. + throat pain. No sore throat. N+difficulty swallowing.   CV: No chest pain. No palpitations. No lightheadedness or dizziness.   Resp: No dyspnea at rest. No dyspnea on exertion. No orthopnea. No wheezing. No cough. No stridor. No sputum production. No chest pain with respiration.  GI: + nausea. No vomiting. No diarrhea.  MSK: No joint pain or pain in any extremities  Integumentary: No skin lesions. No pedal edema.  Neurological: No gross motor weakness. No sensory changes.  [ +] All other systems negative  [ ] Unable to assess ROS because ________    OBJECTIVE:  ICU Vital Signs Last 24 Hrs  T(C): 36.7 (07 Sep 2018 05:08), Max: 38.1 (06 Sep 2018 20:15)  T(F): 98.1 (07 Sep 2018 05:08), Max: 100.5 (06 Sep 2018 20:15)  HR: 81 (07 Sep 2018 05:08) (80 - 104)  BP: 150/91 (07 Sep 2018 05:08) (133/87 - 150/91)  BP(mean): --  ABP: --  ABP(mean): --  RR: 18 (07 Sep 2018 05:08) (16 - 19)  SpO2: 94% (07 Sep 2018 05:08) (94% - 100%)         @ 07:01  -  -07 @ 06:15  --------------------------------------------------------  IN: 1625 mL / OUT: 1625 mL / NET: 0 mL      CAPILLARY BLOOD GLUCOSE          PHYSICAL EXAM: NAD NGT in place  General: Awake, alert, oriented X 3.   HEENT: Atraumatic, normocephalic.                 Mallampatti Grade                 No nasal congestion.                No tonsillar or pharyngeal exudates.  Lymph Nodes: No palpable lymphadenopathy  Neck: No JVD. No carotid bruit.   Respiratory: Normal chest expansion                         Normal percussion                         Normal and equal air entry                         No wheeze, rhonchi or rales.  Cardiovascular: S1 S2 normal. No murmurs, rubs or gallops.   Abdomen: Soft, +-tender, +-distended. No organomegaly. reduce BS  Extremities: Warm to touch. Peripheral pulse palpable. No pedal edema.   Skin: No rashes or skin lesions  Neurological: Motor and sensory examination equal and normal in all four extremities.  Psychiatry: Appropriate mood and affect.    HOSPITAL MEDICATIONS:  MEDICATIONS  (STANDING):  ALBUTerol/ipratropium for Nebulization 3 milliLiter(s) Nebulizer every 6 hours  buDESOnide 160 MICROgram(s)/formoterol 4.5 MICROgram(s) Inhaler 2 Puff(s) Inhalation two times a day  ciprofloxacin   IVPB 400 milliGRAM(s) IV Intermittent every 12 hours  ciprofloxacin   IVPB      heparin  Injectable 5000 Unit(s) SubCutaneous every 8 hours  HYDROmorphone  Injectable 0.5 milliGRAM(s) IV Push once  lactated ringers. 1000 milliLiter(s) (125 mL/Hr) IV Continuous <Continuous>  metoprolol tartrate Injectable 5 milliGRAM(s) IV Push every 6 hours  metroNIDAZOLE  IVPB 500 milliGRAM(s) IV Intermittent every 8 hours  metroNIDAZOLE  IVPB      pantoprazole  Injectable 40 milliGRAM(s) IV Push daily  tiotropium 18 MICROgram(s) Capsule 1 Capsule(s) Inhalation daily    MEDICATIONS  (PRN):  artificial  tears Solution 1 Drop(s) Both EYES three times a day PRN Dry Eyes  benzocaine 15 mG/menthol 3.6 mG Lozenge 1 Lozenge Oral every 4 hours PRN Sore Throat      LABS:                        12.7   9.21  )-----------( 169      ( 06 Sep 2018 14:01 )             38.4     09-06    137  |  100  |  10  ----------------------------<  154<H>  4.0   |  25  |  0.83    Ca    9.6      06 Sep 2018 10:17  Phos  2.5     09-06  Mg     1.7         TPro  7.7  /  Alb  4.7  /  TBili  0.3  /  DBili  x   /  AST  22  /  ALT  35  /  AlkPhos  72        Urinalysis Basic - ( 06 Sep 2018 18:40 )    Color: Colorless / Appearance: Clear / S.012 / pH: x  Gluc: x / Ketone: Negative  / Bili: Negative / Urobili: Negative   Blood: x / Protein: Negative / Nitrite: Negative   Leuk Esterase: Negative / RBC: 1 /HPF / WBC 0 /HPF   Sq Epi: x / Non Sq Epi: 0 /HPF / Bacteria: Negative        Venous Blood Gas:   @ 09:32  7.38/46/35/27/62  VBG Lactate: 2.7  Venous Blood Gas:   @ 05:36  7.38/48/24/28/37  VBG Lactate: 3.3      MICROBIOLOGY:     RADIOLOGY:  [ ] Reviewed and interpreted by me    Point of Care Ultrasound Findings:    PFT:    EKG:

## 2018-09-07 NOTE — CONSULT NOTE ADULT - SUBJECTIVE AND OBJECTIVE BOX
Patient is a 53y old  Male who presents with a chief complaint of Abd Pain x 1 day (07 Sep 2018 06:15)      HPI:  Patient is a 53y Male with history of CAD and IBD admitted wtih abdominal pain and vomiting. Denies CP or SOB>     PAST MEDICAL & SURGICAL HISTORY:  Bowel obstruction  Cluster headaches  Unilateral recurrent inguinal hernia without obstruction or gangrene: Left  MI (myocardial infarction)  GIB (gastrointestinal bleeding)  MARYAM (obstructive sleep apnea)  Asthma: controlled-never intubated or hospitalized  Joint pain  CAD (coronary artery disease): 2 stents placed in LAD in 2015, RCA stent x 2 in 2006  Pancreatitis: while on 6MP for Crohn&#x27;s now off of it  Crohn disease  HLD (hyperlipidemia)  Inguinal mass  Bilateral knee pain  Shoulder pain, bilateral  Lower back pain  GERD (gastroesophageal reflux disease)  H/O: HTN (hypertension)  S/P hernia surgery  H/O lipoma: removed from back  S/P cardiac catheterization: 2017-resulted in [place]-no stents placed  H/O colonoscopy  H/O percutaneous transluminal coronary angioplasty: with DE RCA 2006, 2 stents placed 2015  History of herniorrhaphy: left inguinal herniorrhaphy    Allergies    No Known Allergies    Intolerances      MEDICATIONS  (STANDING):  ALBUTerol/ipratropium for Nebulization 3 milliLiter(s) Nebulizer every 6 hours  buDESOnide 160 MICROgram(s)/formoterol 4.5 MICROgram(s) Inhaler 2 Puff(s) Inhalation two times a day  ciprofloxacin   IVPB 400 milliGRAM(s) IV Intermittent every 12 hours  ciprofloxacin   IVPB      heparin  Injectable 5000 Unit(s) SubCutaneous every 8 hours  lactated ringers. 1000 milliLiter(s) (125 mL/Hr) IV Continuous <Continuous>  metoprolol tartrate Injectable 5 milliGRAM(s) IV Push every 6 hours  metroNIDAZOLE  IVPB 500 milliGRAM(s) IV Intermittent every 8 hours  metroNIDAZOLE  IVPB      pantoprazole  Injectable 40 milliGRAM(s) IV Push daily  tiotropium 18 MICROgram(s) Capsule 1 Capsule(s) Inhalation daily    MEDICATIONS  (PRN):  artificial  tears Solution 1 Drop(s) Both EYES three times a day PRN Dry Eyes  benzocaine 15 mG/menthol 3.6 mG Lozenge 1 Lozenge Oral every 4 hours PRN Sore Throat    FAMILY HISTORY:  Family history of premature CAD (Father): Father CABG at age 54  Family history of Crohn's disease (Father)      ROS:  General: Denies weight loss, fevers, rash, decreased hearing  Cardiac: Denies chest pain, SOB, MORENO, orthopnea, PND, claudication, edema, snoring, daytime somnolence, palpitations, syncope  Resp: Denies SOB, MORENO, cough, sputum, wheezing, hemoptysis  GI: Denies change in bowel habits, diarrhea, weight loss, melena, tarry stools,  jaundice,  dysphagia  : Denies dysuria, nocturia, hematuria  Neuro: Denies tinnitus, headache, visual changes, weakness, dizziness or vertigo  Musculoskeletal: Denies neck pain back pain joint pain.  Skin: Denies rash, itching, dryness.  Endocrine: Denies polydipsia, polyuria  Psychiatric: Denies depression, anxiety  All other review of systems is negative unless indicated above.    PHYSICAL EXAM:  Vital Signs Last 24 Hrs  T(C): 36.7 (07 Sep 2018 05:08), Max: 38.1 (06 Sep 2018 20:15)  T(F): 98.1 (07 Sep 2018 05:08), Max: 100.5 (06 Sep 2018 20:15)  HR: 81 (07 Sep 2018 05:08) (80 - 104)  BP: 150/91 (07 Sep 2018 05:08) (133/87 - 150/91)  BP(mean): --  RR: 18 (07 Sep 2018 05:08) (16 - 18)  SpO2: 94% (07 Sep 2018 05:08) (94% - 100%)  I&O's Summary    06 Sep 2018 07:01  -  07 Sep 2018 06:57  --------------------------------------------------------  IN: 1625 mL / OUT: 1625 mL / NET: 0 mL        General Appearance: 	 Alert, cooperative, NGT  HEENT: normocephalic, atraumatic,,   Neck: no JVD,  carotid 2+  bilaterally without bruits  Lungs:  clear to auscultation and percussion bilaterally  Cor:  pmi 5th ICS MCL, regular rate and rhythm, S1 normal intensity, S2 normal intensity, no gallops, murmurs or rubs  Abdomen: BS decreased; guarding, no masses,  no organomegaly  Extremities: without cyanosis, clubbing or edema  Vasc: 2-+ PT and DP pulses; no varicosities  Neurologic: A&O x 3 (time, place, person). Symmetric strength; limited exam  Musculoskeletal: no kyphosis, scoliosis; normal gait, normal tone  Skin: no rashes; limited exam        LABS:                        12.7   9.21  )-----------( 169      ( 06 Sep 2018 14:01 )             38.4     09-06    137  |  100  |  10  ----------------------------<  154<H>  4.0   |  25  |  0.83    Ca    9.6      06 Sep 2018 10:17  Phos  2.5     09-06  Mg     1.7     09-06    TPro  7.7  /  Alb  4.7  /  TBili  0.3  /  DBili  x   /  AST  22  /  ALT  35  /  AlkPhos  72  09-06      Ketone - Urine: Negative (09-06 @ 18:40)    CAPILLARY BLOOD GLUCOSE            Radiology/Imaging:      Randell Fernández MD [facility]  [phone]

## 2018-09-07 NOTE — PROVIDER CONTACT NOTE (OTHER) - ACTION/TREATMENT ORDERED:
Md made aware. IV tylenol ordered. Will reassess temp and HR after interventions. Will continue to monitor.

## 2018-09-08 LAB
ANION GAP SERPL CALC-SCNC: 14 MMOL/L — SIGNIFICANT CHANGE UP (ref 5–17)
BUN SERPL-MCNC: 10 MG/DL — SIGNIFICANT CHANGE UP (ref 7–23)
CALCIUM SERPL-MCNC: 10 MG/DL — SIGNIFICANT CHANGE UP (ref 8.4–10.5)
CHLORIDE SERPL-SCNC: 103 MMOL/L — SIGNIFICANT CHANGE UP (ref 96–108)
CO2 SERPL-SCNC: 27 MMOL/L — SIGNIFICANT CHANGE UP (ref 22–31)
CREAT SERPL-MCNC: 0.85 MG/DL — SIGNIFICANT CHANGE UP (ref 0.5–1.3)
GLUCOSE SERPL-MCNC: 131 MG/DL — HIGH (ref 70–99)
HCT VFR BLD CALC: 38.3 % — LOW (ref 39–50)
HGB BLD-MCNC: 13.1 G/DL — SIGNIFICANT CHANGE UP (ref 13–17)
MAGNESIUM SERPL-MCNC: 2.3 MG/DL — SIGNIFICANT CHANGE UP (ref 1.6–2.6)
MCHC RBC-ENTMCNC: 28.1 PG — SIGNIFICANT CHANGE UP (ref 27–34)
MCHC RBC-ENTMCNC: 34.2 GM/DL — SIGNIFICANT CHANGE UP (ref 32–36)
MCV RBC AUTO: 82 FL — SIGNIFICANT CHANGE UP (ref 80–100)
PHOSPHATE SERPL-MCNC: 3.9 MG/DL — SIGNIFICANT CHANGE UP (ref 2.5–4.5)
PLATELET # BLD AUTO: 153 K/UL — SIGNIFICANT CHANGE UP (ref 150–400)
POTASSIUM SERPL-MCNC: 4.6 MMOL/L — SIGNIFICANT CHANGE UP (ref 3.5–5.3)
POTASSIUM SERPL-SCNC: 4.6 MMOL/L — SIGNIFICANT CHANGE UP (ref 3.5–5.3)
RBC # BLD: 4.67 M/UL — SIGNIFICANT CHANGE UP (ref 4.2–5.8)
RBC # FLD: 14.3 % — SIGNIFICANT CHANGE UP (ref 10.3–14.5)
SODIUM SERPL-SCNC: 144 MMOL/L — SIGNIFICANT CHANGE UP (ref 135–145)
WBC # BLD: 6.9 K/UL — SIGNIFICANT CHANGE UP (ref 3.8–10.5)
WBC # FLD AUTO: 6.9 K/UL — SIGNIFICANT CHANGE UP (ref 3.8–10.5)

## 2018-09-08 PROCEDURE — 99233 SBSQ HOSP IP/OBS HIGH 50: CPT

## 2018-09-08 RX ORDER — ASPIRIN/CALCIUM CARB/MAGNESIUM 324 MG
81 TABLET ORAL DAILY
Qty: 0 | Refills: 0 | Status: DISCONTINUED | OUTPATIENT
Start: 2018-09-08 | End: 2018-09-12

## 2018-09-08 RX ORDER — HYDROMORPHONE HYDROCHLORIDE 2 MG/ML
0.5 INJECTION INTRAMUSCULAR; INTRAVENOUS; SUBCUTANEOUS ONCE
Qty: 0 | Refills: 0 | Status: DISCONTINUED | OUTPATIENT
Start: 2018-09-08 | End: 2018-09-08

## 2018-09-08 RX ORDER — ACETAMINOPHEN 500 MG
1000 TABLET ORAL ONCE
Qty: 0 | Refills: 0 | Status: COMPLETED | OUTPATIENT
Start: 2018-09-08 | End: 2018-09-09

## 2018-09-08 RX ORDER — DEXTROSE MONOHYDRATE, SODIUM CHLORIDE, AND POTASSIUM CHLORIDE 50; .745; 4.5 G/1000ML; G/1000ML; G/1000ML
1000 INJECTION, SOLUTION INTRAVENOUS
Qty: 0 | Refills: 0 | Status: DISCONTINUED | OUTPATIENT
Start: 2018-09-08 | End: 2018-09-11

## 2018-09-08 RX ORDER — DIPHENHYDRAMINE HCL 50 MG
25 CAPSULE ORAL ONCE
Qty: 0 | Refills: 0 | Status: COMPLETED | OUTPATIENT
Start: 2018-09-08 | End: 2018-09-08

## 2018-09-08 RX ADMIN — BUDESONIDE AND FORMOTEROL FUMARATE DIHYDRATE 2 PUFF(S): 160; 4.5 AEROSOL RESPIRATORY (INHALATION) at 18:20

## 2018-09-08 RX ADMIN — Medication 100 MILLIGRAM(S): at 18:20

## 2018-09-08 RX ADMIN — HEPARIN SODIUM 5000 UNIT(S): 5000 INJECTION INTRAVENOUS; SUBCUTANEOUS at 09:20

## 2018-09-08 RX ADMIN — Medication 200 MILLIGRAM(S): at 05:11

## 2018-09-08 RX ADMIN — Medication 100 MILLIGRAM(S): at 09:05

## 2018-09-08 RX ADMIN — Medication 400 MILLIGRAM(S): at 01:28

## 2018-09-08 RX ADMIN — HEPARIN SODIUM 5000 UNIT(S): 5000 INJECTION INTRAVENOUS; SUBCUTANEOUS at 01:28

## 2018-09-08 RX ADMIN — Medication 3 MILLILITER(S): at 01:27

## 2018-09-08 RX ADMIN — Medication 20 MILLIGRAM(S): at 15:49

## 2018-09-08 RX ADMIN — Medication 3 MILLILITER(S): at 18:21

## 2018-09-08 RX ADMIN — Medication 20 MILLIGRAM(S): at 20:54

## 2018-09-08 RX ADMIN — Medication 1000 MILLIGRAM(S): at 01:58

## 2018-09-08 RX ADMIN — Medication 5 MILLIGRAM(S): at 05:12

## 2018-09-08 RX ADMIN — Medication 81 MILLIGRAM(S): at 15:48

## 2018-09-08 RX ADMIN — Medication 200 MILLIGRAM(S): at 18:52

## 2018-09-08 RX ADMIN — PANTOPRAZOLE SODIUM 40 MILLIGRAM(S): 20 TABLET, DELAYED RELEASE ORAL at 12:15

## 2018-09-08 RX ADMIN — Medication 25 MILLIGRAM(S): at 20:53

## 2018-09-08 RX ADMIN — SODIUM CHLORIDE 125 MILLILITER(S): 9 INJECTION, SOLUTION INTRAVENOUS at 09:06

## 2018-09-08 RX ADMIN — HEPARIN SODIUM 5000 UNIT(S): 5000 INJECTION INTRAVENOUS; SUBCUTANEOUS at 18:23

## 2018-09-08 RX ADMIN — Medication 5 MILLIGRAM(S): at 01:28

## 2018-09-08 RX ADMIN — BUDESONIDE AND FORMOTEROL FUMARATE DIHYDRATE 2 PUFF(S): 160; 4.5 AEROSOL RESPIRATORY (INHALATION) at 05:11

## 2018-09-08 RX ADMIN — Medication 3 MILLILITER(S): at 12:16

## 2018-09-08 RX ADMIN — Medication 5 MILLIGRAM(S): at 12:15

## 2018-09-08 RX ADMIN — Medication 5 MILLIGRAM(S): at 18:24

## 2018-09-08 RX ADMIN — SODIUM CHLORIDE 125 MILLILITER(S): 9 INJECTION, SOLUTION INTRAVENOUS at 05:12

## 2018-09-08 RX ADMIN — TIOTROPIUM BROMIDE 1 CAPSULE(S): 18 CAPSULE ORAL; RESPIRATORY (INHALATION) at 12:15

## 2018-09-08 RX ADMIN — Medication 100 MILLIGRAM(S): at 01:32

## 2018-09-08 RX ADMIN — Medication 3 MILLILITER(S): at 05:11

## 2018-09-08 RX ADMIN — Medication 20 MILLIGRAM(S): at 05:12

## 2018-09-08 NOTE — PROGRESS NOTE ADULT - ASSESSMENT
54yo M with PMH of CAD, MI s/p PCI, COPD, asthma, MARYAM, Crohn's s/p stricturoplasty x 2 and SBR 3/2018 now a/w SBO due to acute Crohn's exacerbation with inflammatory narrowing.     -NPO/MIVF  -cont Cipro/flagyl  -NGT to LCS  -serial abd exams  -monitor GI fxn  -cont COPD/asthma home meds  -BP control: cont lopressor IV while NPO  -f/u GI: Dr. Caicedo notified and aware   -OOB and ambulate  -DVT PPX: cont SCD's and SQH      Green [id number]

## 2018-09-08 NOTE — PROGRESS NOTE ADULT - ASSESSMENT
53y Male with history of CAD and IBD admitted with abdominal pain and vomiting. CT c/w partial SBO. CV stable.    Rec:  IV lopressor  Resume aspirin use with history of CAD and PCI.  Rx per surgery and GI.

## 2018-09-08 NOTE — PROGRESS NOTE ADULT - SUBJECTIVE AND OBJECTIVE BOX
CHIEF COMPLAINT:    Interval Events:  Remains on RA  Taking deep breaths is hard because of distended abdomen     REVIEW OF SYSTEMS:  Constitutional: [ x] negative [ ] fevers [ ] chills [ ] weight loss [ ] weight gain  HEENT: [x ] negative [ ] dry eyes [ ] eye irritation [ ] postnasal drip [ ] nasal congestion  CV: [x ] negative  [ ] chest pain [ ] orthopnea [ ] palpitations [ ] murmur  Resp: [ ] negative [ ] cough [x ] shortness of breath [ ] dyspnea [ ] wheezing [ ] sputum [ ] hemoptysis  GI: [ ] negative [x ] nausea [ ] vomiting [ ] diarrhea [ ] constipation [ x] abd pain [ ] dysphagia   : [ x] negative [ ] dysuria [ ] nocturia [ ] hematuria [ ] increased urinary frequency  Musculoskeletal: [x ] negative [ ] back pain [ ] myalgias [ ] arthralgias [ ] fracture  Skin: [ ] negative [ ] rash [ ] itch  Neurological: [ ] negative [ ] headache [ ] dizziness [ ] syncope [ ] weakness [ ] numbness  Psychiatric: [ ] negative [ ] anxiety [ ] depression  Endocrine: [ ] negative [ ] diabetes [ ] thyroid problem  Hematologic/Lymphatic: [ ] negative [ ] anemia [ ] bleeding problem  Allergic/Immunologic: [ ] negative [ ] itchy eyes [ ] nasal discharge [ ] hives [ ] angioedema  [x ] All other systems negative  [ ] Unable to assess ROS because ________    OBJECTIVE:  ICU Vital Signs Last 24 Hrs  T(C): 37.2 (08 Sep 2018 09:27), Max: 37.5 (07 Sep 2018 13:10)  T(F): 99 (08 Sep 2018 09:27), Max: 99.5 (07 Sep 2018 13:10)  HR: 72 (08 Sep 2018 09:27) (72 - 82)  BP: 133/81 (08 Sep 2018 09:27) (130/78 - 146/91)  BP(mean): --  ABP: --  ABP(mean): --  RR: 18 (08 Sep 2018 09:27) (18 - 18)  SpO2: 96% (08 Sep 2018 09:27) (94% - 98%)         @ 07:01  -   @ 07:00  --------------------------------------------------------  IN: 3775 mL / OUT: 2750 mL / NET: 1025 mL     @ 07:01   @ 10:59  --------------------------------------------------------  IN: 0 mL / OUT: 0 mL / NET: 0 mL      CAPILLARY BLOOD GLUCOSE          PHYSICAL EXAM:  General: A and O  HEENT:   Lymph Nodes: no LAD  Neck:  no LAD  Respiratory: CTAB, diminished in bases   Cardiovascular: RRR  Abdomen: soft, distended  Extremities: edema b  Skin: normal   Neurological:  Psychiatry: normal affect    [facility] EDICATIONS:  Standing Meds:  ALBUTerol/ipratropium for Nebulization 3 milliLiter(s) Nebulizer every 6 hours  aspirin  chewable 81 milliGRAM(s) Oral daily  buDESOnide 160 MICROgram(s)/formoterol 4.5 MICROgram(s) Inhaler 2 Puff(s) Inhalation two times a day  ciprofloxacin   IVPB 400 milliGRAM(s) IV Intermittent every 12 hours  ciprofloxacin   IVPB      dextrose 5% + sodium chloride 0.45% with potassium chloride 20 mEq/L 1000 milliLiter(s) IV Continuous <Continuous>  heparin  Injectable 5000 Unit(s) SubCutaneous every 8 hours  HYDROmorphone  Injectable 0.5 milliGRAM(s) IV Push once  influenza   Vaccine 0.5 milliLiter(s) IntraMuscular once  methylPREDNISolone sodium succinate Injectable 20 milliGRAM(s) IV Push every 8 hours  metoprolol tartrate Injectable 5 milliGRAM(s) IV Push every 6 hours  metroNIDAZOLE  IVPB 500 milliGRAM(s) IV Intermittent every 8 hours  metroNIDAZOLE  IVPB      pantoprazole  Injectable 40 milliGRAM(s) IV Push daily  tiotropium 18 MICROgram(s) Capsule 1 Capsule(s) Inhalation daily      PRN Meds:  artificial  tears Solution 1 Drop(s) Both EYES three times a day PRN  benzocaine 15 mG/menthol 3.6 mG Lozenge 1 Lozenge Oral every 4 hours PRN      LABS:                        13.1   6.90  )-----------( 153      ( 08 Sep 2018 10:00 )             38.3     Hgb Trend: 13.1<--, 12.1<--, 12.7<--, 14.2<--  09-08    144  |  103  |  10  ----------------------------<  131<H>  4.6   |  27  |  0.85    Ca    10.0      08 Sep 2018 08:06  Phos  3.9     09-08  Mg     2.3     09      Creatinine Trend: 0.85<--, 0.86<--, 0.83<--, 0.84<--    Urinalysis Basic - ( 06 Sep 2018 18:40 )    Color: Colorless / Appearance: Clear / S.012 / pH: x  Gluc: x / Ketone: Negative  / Bili: Negative / Urobili: Negative   Blood: x / Protein: Negative / Nitrite: Negative   Leuk Esterase: Negative / RBC: 1 /HPF / WBC 0 /HPF   Sq Epi: x / Non Sq Epi: 0 /HPF / Bacteria: Negative            MICROBIOLOGY:     RADIOLOGY:  [ ] Reviewed and interpreted by me    PULMONARY FUNCTION TESTS:    EKG:

## 2018-09-08 NOTE — PROGRESS NOTE ADULT - SUBJECTIVE AND OBJECTIVE BOX
Patient is a 53y old  Male who presents with a chief complaint of Abd Pain x 1 day (08 Sep 2018 08:28)    He denies c/o chest pain, SOB or palpitations. NGT in place. No flatus.     Allergies    No Known Allergies    Intolerances      MEDICATIONS  (STANDING):  ALBUTerol/ipratropium for Nebulization 3 milliLiter(s) Nebulizer every 6 hours  buDESOnide 160 MICROgram(s)/formoterol 4.5 MICROgram(s) Inhaler 2 Puff(s) Inhalation two times a day  ciprofloxacin   IVPB 400 milliGRAM(s) IV Intermittent every 12 hours  ciprofloxacin   IVPB      heparin  Injectable 5000 Unit(s) SubCutaneous every 8 hours  HYDROmorphone  Injectable 0.5 milliGRAM(s) IV Push once  influenza   Vaccine 0.5 milliLiter(s) IntraMuscular once  lactated ringers. 1000 milliLiter(s) (125 mL/Hr) IV Continuous <Continuous>  methylPREDNISolone sodium succinate Injectable 20 milliGRAM(s) IV Push every 8 hours  metoprolol tartrate Injectable 5 milliGRAM(s) IV Push every 6 hours  metroNIDAZOLE  IVPB 500 milliGRAM(s) IV Intermittent every 8 hours  metroNIDAZOLE  IVPB      pantoprazole  Injectable 40 milliGRAM(s) IV Push daily  tiotropium 18 MICROgram(s) Capsule 1 Capsule(s) Inhalation daily    MEDICATIONS  (PRN):  artificial  tears Solution 1 Drop(s) Both EYES three times a day PRN Dry Eyes  benzocaine 15 mG/menthol 3.6 mG Lozenge 1 Lozenge Oral every 4 hours PRN Sore Throat      PHYSICAL EXAM:  Vital Signs Last 24 Hrs  T(C): 37.1 (08 Sep 2018 05:21), Max: 37.5 (07 Sep 2018 13:10)  T(F): 98.8 (08 Sep 2018 05:21), Max: 99.5 (07 Sep 2018 13:10)  HR: 78 (08 Sep 2018 05:21) (77 - 82)  BP: 146/91 (08 Sep 2018 05:21) (130/78 - 146/91)  BP(mean): --  RR: 18 (08 Sep 2018 05:21) (18 - 18)  SpO2: 98% (08 Sep 2018 05:21) (94% - 98%)  Daily     Daily   I&O's Summary    07 Sep 2018 07:01  -  08 Sep 2018 07:00  --------------------------------------------------------  IN: 3775 mL / OUT: 2750 mL / NET: 1025 mL    General Appearance: 	 Alert, cooperative, NGT  HEENT: normocephalic, atraumatic,,   Neck: no JVD,  carotid 2+  bilaterally without bruits  Lungs:  clear to auscultation and percussion bilaterally  Cor:  pmi 5th ICS MCL, regular rate and rhythm, S1 normal intensity, S2 normal intensity, no gallops, murmurs or rubs  Abdomen: BS present, no masses,  no organomegaly  Extremities: without cyanosis, clubbing or edema  Vasc: 2-+ PT and DP pulses; no varicosities  Neurologic: A&O x 3 (time, place, person). Symmetric strength; limited exam  Musculoskeletal: no kyphosis, scoliosis; normal gait, normal tone  Skin: no rashes; limited exam      Labs:  CBC Full  -  ( 07 Sep 2018 09:05 )  WBC Count : 7.35 K/uL  Hemoglobin : 12.1 g/dL  Hematocrit : 36.0 %  Platelet Count - Automated : 137 K/uL  Mean Cell Volume : 82.2 fl  Mean Cell Hemoglobin : 27.6 pg  Mean Cell Hemoglobin Concentration : 33.6 gm/dL  Auto Neutrophil # : x  Auto Lymphocyte # : x  Auto Monocyte # : x  Auto Eosinophil # : x  Auto Basophil # : x  Auto Neutrophil % : x  Auto Lymphocyte % : x  Auto Monocyte % : x  Auto Eosinophil % : x  Auto Basophil % : x        144  |  103  |  10  ----------------------------<  131<H>  4.6   |  27  |  0.85    Ca    10.0      08 Sep 2018 08:06  Phos  3.9     -  Mg     2.3     -08            Urinalysis Basic - ( 06 Sep 2018 18:40 )    Color: Colorless / Appearance: Clear / S.012 / pH: x  Gluc: x / Ketone: Negative  / Bili: Negative / Urobili: Negative   Blood: x / Protein: Negative / Nitrite: Negative   Leuk Esterase: Negative / RBC: 1 /HPF / WBC 0 /HPF   Sq Epi: x / Non Sq Epi: 0 /HPF / Bacteria: Negative        Radiology/Imaging:                Randell Fernández MD [facility]  [phone]

## 2018-09-08 NOTE — PROGRESS NOTE ADULT - SUBJECTIVE AND OBJECTIVE BOX
SURGERY DAILY PROGRESS NOTE:       SUBJECTIVE/ROS: Patient examined at bedside. No acute events overnight.  -  cluster HA controlled on IV tylenol alone  -  passing flatus now      MEDICATIONS  (STANDING):  ALBUTerol/ipratropium for Nebulization 3 milliLiter(s) Nebulizer every 6 hours  buDESOnide 160 MICROgram(s)/formoterol 4.5 MICROgram(s) Inhaler 2 Puff(s) Inhalation two times a day  ciprofloxacin   IVPB 400 milliGRAM(s) IV Intermittent every 12 hours  ciprofloxacin   IVPB      heparin  Injectable 5000 Unit(s) SubCutaneous every 8 hours  lactated ringers. 1000 milliLiter(s) (125 mL/Hr) IV Continuous <Continuous>  metoprolol tartrate Injectable 5 milliGRAM(s) IV Push every 6 hours  metroNIDAZOLE  IVPB 500 milliGRAM(s) IV Intermittent every 8 hours  metroNIDAZOLE  IVPB      pantoprazole  Injectable 40 milliGRAM(s) IV Push daily  tiotropium 18 MICROgram(s) Capsule 1 Capsule(s) Inhalation daily    MEDICATIONS  (PRN):  artificial  tears Solution 1 Drop(s) Both EYES three times a day PRN Dry Eyes  benzocaine 15 mG/menthol 3.6 mG Lozenge 1 Lozenge Oral every 4 hours PRN Sore Throat      OBJECTIVE:    Vital Signs Last 24 Hrs  T(C): 37.2 (07 Sep 2018 00:29), Max: 38.1 (06 Sep 2018 20:15)  T(F): 98.9 (07 Sep 2018 00:29), Max: 100.5 (06 Sep 2018 20:15)  HR: 85 (07 Sep 2018 00:29) (80 - 104)  BP: 146/88 (07 Sep 2018 00:29) (133/87 - 158/110)  BP(mean): --  RR: 18 (07 Sep 2018 00:29) (16 - 19)  SpO2: 94% (07 Sep 2018 00:29) (94% - 100%)        I&O's Detail    06 Sep 2018 07:01  -  07 Sep 2018 02:16  --------------------------------------------------------  IN:  Total IN: 0 mL    OUT:    Voided: 625 mL  Total OUT: 625 mL    Total NET: -625 mL          Daily Height in cm: 167.64 (06 Sep 2018 04:32)    Daily     LABS:                        12.7   9.21  )-----------( 169      ( 06 Sep 2018 14:01 )             38.4         137  |  100  |  10  ----------------------------<  154<H>  4.0   |  25  |  0.83    Ca    9.6      06 Sep 2018 10:17  Phos  2.5       Mg     1.7         TPro  7.7  /  Alb  4.7  /  TBili  0.3  /  DBili  x   /  AST  22  /  ALT  35  /  AlkPhos  72        Urinalysis Basic - ( 06 Sep 2018 18:40 )    Color: Colorless / Appearance: Clear / S.012 / pH: x  Gluc: x / Ketone: Negative  / Bili: Negative / Urobili: Negative   Blood: x / Protein: Negative / Nitrite: Negative   Leuk Esterase: Negative / RBC: 1 /HPF / WBC 0 /HPF   Sq Epi: x / Non Sq Epi: 0 /HPF / Bacteria: Negative      PHYSICAL EXAM:  GEN: NAD  Pulm: unlabored breathing on RA  CV: radial pulse 2+, cap refil <2sec  Abd: soft, distended, mildly diffusely tender, no peritonitic signs

## 2018-09-08 NOTE — PROGRESS NOTE ADULT - ASSESSMENT
53 M with HTN, HLD, CAD s/p MI and PCI, asthma, MARYAM on nocturnal CPAP, GERD, Crohn's disease s/p stricturoplasty x 2 and small bowel resection in 3/2018, now p/w acute onset of abdominal pain, found to have a SBO with a transition point in the RLQ, current being conservatively managed with NGT decompression and bowel rest.     1. Asthma:  - Currently stable pulmonary status, saturating well on RA.   - No evidence of an asthma exacerbation  - Suggest Duonebs Q6H prn  - continue Symbicort BID and Spiriva daily  - Ok to hold Singulair as pt is NPO  - Keep O2 sats above 90 %  - Incentive spirometry once able to tolerate     2. MARYAM:  - Pt is on nocturnal APAP with a min pressure of 4 and max pressure of 9 at home   - Ok to hold off on nocturnal CPAP in the setting of an acute bowel obstruction   - Once acute issues have resolved and there is no longer a contraindication to NIV, we can resume nocturnal CPAP at 9 cm H20    3. Small bowel obstruction/ Crohn's disease with exacerbation/ SB stricture   - Currently NPO and receiving NGT decompression per surgical team   - On IV Cipro/ flagyl   - Cont IV hydration   - Serial abdominal exams   - GI evaln    4. Gen:  - DVT Px: Hep SQ

## 2018-09-08 NOTE — PROGRESS NOTE ADULT - ATTENDING COMMENTS
I have seen and examined the patient.  I agree with the surgical resident's note.  I have read Dr. Manzanares's note.  I agree with the treatment plan at the present time.  Lionel Shearer contact information [phone] I have seen and examined the patient.  I agree with the surgical resident's note.  I have read Dr. Mcqueen's note.  I agree with the treatment plan at the present time.  Lionel Shearer contact information [phone]

## 2018-09-08 NOTE — PROGRESS NOTE ADULT - SUBJECTIVE AND OBJECTIVE BOX
SUBJECTIVE:  Passed small amount of flatus twice.  Reports headache (mild and severe - alternatively relieved by Tylenol or Dilaudid).    _____________________________________________________  OBJECTIVE:    T(C): 37.1 (09-08-18 @ 05:21), Max: 37.5 (09-07-18 @ 08:57)  HR: 78 (09-08-18 @ 05:21)  BP: 146/91 (09-08-18 @ 05:21)  RR: 18 (09-08-18 @ 05:21)  SpO2: 98% (09-08-18 @ 05:21)  Wt(kg): --    09-07 @ 07:01  -  09-08 @ 07:00  --------------------------------------------------------  IN:    lactated ringers.: 2625 mL    Solution: 350 mL    Solution: 400 mL    Solution: 400 mL  Total IN: 3775 mL    OUT:    Nasoenteral Tube: 1100 mL    Voided: 1650 mL  Total OUT: 2750 mL    Total NET: 1025 mL        PHYSICAL EXAM:       Constitutional: uncomfortable due to NGT    Gastrointestinal: soft mildly dist scant BS no r/g    ____________________________________________________  LABS:                        12.1   7.35  )-----------( 137      ( 07 Sep 2018 09:05 )             36.0     09-07    140  |  101  |  10  ----------------------------<  136<H>  3.8   |  26  |  0.86    Ca    8.7      07 Sep 2018 07:25  Phos  3.2     09-07  Mg     2.0     09-07        _____________________________________________________  ACTIVE MEDS:  MEDICATIONS  (STANDING):  ALBUTerol/ipratropium for Nebulization 3 milliLiter(s) Nebulizer every 6 hours  buDESOnide 160 MICROgram(s)/formoterol 4.5 MICROgram(s) Inhaler 2 Puff(s) Inhalation two times a day  ciprofloxacin   IVPB 400 milliGRAM(s) IV Intermittent every 12 hours  ciprofloxacin   IVPB      heparin  Injectable 5000 Unit(s) SubCutaneous every 8 hours  HYDROmorphone  Injectable 0.5 milliGRAM(s) IV Push once  influenza   Vaccine 0.5 milliLiter(s) IntraMuscular once  lactated ringers. 1000 milliLiter(s) (125 mL/Hr) IV Continuous <Continuous>  methylPREDNISolone sodium succinate Injectable 20 milliGRAM(s) IV Push every 8 hours  metoprolol tartrate Injectable 5 milliGRAM(s) IV Push every 6 hours  metroNIDAZOLE  IVPB 500 milliGRAM(s) IV Intermittent every 8 hours  metroNIDAZOLE  IVPB      pantoprazole  Injectable 40 milliGRAM(s) IV Push daily  tiotropium 18 MICROgram(s) Capsule 1 Capsule(s) Inhalation daily    MEDICATIONS  (PRN):  artificial  tears Solution 1 Drop(s) Both EYES three times a day PRN Dry Eyes  benzocaine 15 mG/menthol 3.6 mG Lozenge 1 Lozenge Oral every 4 hours PRN Sore Throat    _____________________________________________________  ASSESSMENT:  53yMale with rapidly recurrent post-op Crohn's ileitis despite being on Humira therapy, now with SBO    PLAN:  1.  Continue SoluMedrol 20 mg IV q8h and Cipro/Flagyl  2.  Still with high output from NGT, continue to monitor - if improved GI function, consider clamping trial  3.  OOB, DVT ppx  4.  Next Humira due Wednesday - will order trough level/Ab to be drawn Tuesday    Altaf Mcqueen M.D.  [facility]  (O) [phone]

## 2018-09-09 LAB
ANION GAP SERPL CALC-SCNC: 13 MMOL/L — SIGNIFICANT CHANGE UP (ref 5–17)
BUN SERPL-MCNC: 11 MG/DL — SIGNIFICANT CHANGE UP (ref 7–23)
CALCIUM SERPL-MCNC: 9.6 MG/DL — SIGNIFICANT CHANGE UP (ref 8.4–10.5)
CHLORIDE SERPL-SCNC: 102 MMOL/L — SIGNIFICANT CHANGE UP (ref 96–108)
CO2 SERPL-SCNC: 25 MMOL/L — SIGNIFICANT CHANGE UP (ref 22–31)
CREAT SERPL-MCNC: 0.85 MG/DL — SIGNIFICANT CHANGE UP (ref 0.5–1.3)
GLUCOSE SERPL-MCNC: 128 MG/DL — HIGH (ref 70–99)
HCT VFR BLD CALC: 37.5 % — LOW (ref 39–50)
HGB BLD-MCNC: 12.6 G/DL — LOW (ref 13–17)
MAGNESIUM SERPL-MCNC: 2.2 MG/DL — SIGNIFICANT CHANGE UP (ref 1.6–2.6)
MCHC RBC-ENTMCNC: 27.8 PG — SIGNIFICANT CHANGE UP (ref 27–34)
MCHC RBC-ENTMCNC: 33.6 GM/DL — SIGNIFICANT CHANGE UP (ref 32–36)
MCV RBC AUTO: 82.6 FL — SIGNIFICANT CHANGE UP (ref 80–100)
PHOSPHATE SERPL-MCNC: 4 MG/DL — SIGNIFICANT CHANGE UP (ref 2.5–4.5)
PLATELET # BLD AUTO: 173 K/UL — SIGNIFICANT CHANGE UP (ref 150–400)
POTASSIUM SERPL-MCNC: 4.7 MMOL/L — SIGNIFICANT CHANGE UP (ref 3.5–5.3)
POTASSIUM SERPL-SCNC: 4.7 MMOL/L — SIGNIFICANT CHANGE UP (ref 3.5–5.3)
RBC # BLD: 4.54 M/UL — SIGNIFICANT CHANGE UP (ref 4.2–5.8)
RBC # FLD: 13.6 % — SIGNIFICANT CHANGE UP (ref 10.3–14.5)
SODIUM SERPL-SCNC: 140 MMOL/L — SIGNIFICANT CHANGE UP (ref 135–145)
WBC # BLD: 7.17 K/UL — SIGNIFICANT CHANGE UP (ref 3.8–10.5)
WBC # FLD AUTO: 7.17 K/UL — SIGNIFICANT CHANGE UP (ref 3.8–10.5)

## 2018-09-09 RX ORDER — DIPHENHYDRAMINE HCL 50 MG
12.5 CAPSULE ORAL ONCE
Qty: 0 | Refills: 0 | Status: COMPLETED | OUTPATIENT
Start: 2018-09-09 | End: 2018-09-09

## 2018-09-09 RX ORDER — ACETAMINOPHEN 500 MG
1000 TABLET ORAL ONCE
Qty: 0 | Refills: 0 | Status: COMPLETED | OUTPATIENT
Start: 2018-09-09 | End: 2018-09-11

## 2018-09-09 RX ADMIN — Medication 81 MILLIGRAM(S): at 11:26

## 2018-09-09 RX ADMIN — Medication 5 MILLIGRAM(S): at 11:26

## 2018-09-09 RX ADMIN — HEPARIN SODIUM 5000 UNIT(S): 5000 INJECTION INTRAVENOUS; SUBCUTANEOUS at 08:22

## 2018-09-09 RX ADMIN — Medication 100 MILLIGRAM(S): at 00:15

## 2018-09-09 RX ADMIN — Medication 1000 MILLIGRAM(S): at 15:00

## 2018-09-09 RX ADMIN — DEXTROSE MONOHYDRATE, SODIUM CHLORIDE, AND POTASSIUM CHLORIDE 125 MILLILITER(S): 50; .745; 4.5 INJECTION, SOLUTION INTRAVENOUS at 08:27

## 2018-09-09 RX ADMIN — Medication 100 MILLIGRAM(S): at 16:10

## 2018-09-09 RX ADMIN — Medication 20 MILLIGRAM(S): at 13:31

## 2018-09-09 RX ADMIN — Medication 200 MILLIGRAM(S): at 05:29

## 2018-09-09 RX ADMIN — Medication 200 MILLIGRAM(S): at 17:22

## 2018-09-09 RX ADMIN — Medication 12.5 MILLIGRAM(S): at 19:50

## 2018-09-09 RX ADMIN — Medication 100 MILLIGRAM(S): at 08:22

## 2018-09-09 RX ADMIN — Medication 5 MILLIGRAM(S): at 00:15

## 2018-09-09 RX ADMIN — Medication 5 MILLIGRAM(S): at 17:22

## 2018-09-09 RX ADMIN — BUDESONIDE AND FORMOTEROL FUMARATE DIHYDRATE 2 PUFF(S): 160; 4.5 AEROSOL RESPIRATORY (INHALATION) at 05:30

## 2018-09-09 RX ADMIN — BENZOCAINE AND MENTHOL 1 LOZENGE: 5; 1 LIQUID ORAL at 10:53

## 2018-09-09 RX ADMIN — TIOTROPIUM BROMIDE 1 CAPSULE(S): 18 CAPSULE ORAL; RESPIRATORY (INHALATION) at 11:46

## 2018-09-09 RX ADMIN — Medication 400 MILLIGRAM(S): at 14:33

## 2018-09-09 RX ADMIN — Medication 20 MILLIGRAM(S): at 05:30

## 2018-09-09 RX ADMIN — PANTOPRAZOLE SODIUM 40 MILLIGRAM(S): 20 TABLET, DELAYED RELEASE ORAL at 11:25

## 2018-09-09 RX ADMIN — Medication 3 MILLILITER(S): at 17:22

## 2018-09-09 RX ADMIN — HEPARIN SODIUM 5000 UNIT(S): 5000 INJECTION INTRAVENOUS; SUBCUTANEOUS at 00:18

## 2018-09-09 RX ADMIN — Medication 20 MILLIGRAM(S): at 21:50

## 2018-09-09 RX ADMIN — Medication 3 MILLILITER(S): at 11:26

## 2018-09-09 RX ADMIN — BUDESONIDE AND FORMOTEROL FUMARATE DIHYDRATE 2 PUFF(S): 160; 4.5 AEROSOL RESPIRATORY (INHALATION) at 17:22

## 2018-09-09 RX ADMIN — HEPARIN SODIUM 5000 UNIT(S): 5000 INJECTION INTRAVENOUS; SUBCUTANEOUS at 16:12

## 2018-09-09 NOTE — PROGRESS NOTE ADULT - ASSESSMENT
54yo M with PMH of CAD, MI s/p PCI, COPD, asthma, MARYAM, Crohn's s/p stricturoplasty x 2 and SBR 3/2018 now a/w SBO due to acute Crohn's exacerbation with inflammatory narrowing.     -NPO/MIVF  -cont IV Cipro/flagyl  -solumedrol 20 q8h per GI recs  -NGT to LCWS  -serial abd exams  -cont COPD/asthma home meds per PULM  -OOB and ambulate    [facility]   [id number]

## 2018-09-09 NOTE — PROGRESS NOTE ADULT - ASSESSMENT
53y Male with history of CAD and IBD admitted with abdominal pain and vomiting. CT c/w partial SBO. CV stable. Awaiting resolution of SBO.     Rec:  IV lopressor until taking po  Aspirin with history of CAD and PCI.  NGT and Rx per surgery and GI.

## 2018-09-09 NOTE — PROGRESS NOTE ADULT - SUBJECTIVE AND OBJECTIVE BOX
SURGERY DAILY PROGRESS NOTE:       SUBJECTIVE/ROS: Patient examined at bedside. No acute events overnight.  -  continued HA overnight  -  passing flatus now      OBJECTIVE:    Vital Signs Last 24 Hrs  T(C): 36.9 (09 Sep 2018 05:27), Max: 37.5 (08 Sep 2018 21:09)  T(F): 98.4 (09 Sep 2018 05:27), Max: 99.5 (08 Sep 2018 21:09)  HR: 57 (09 Sep 2018 05:27) (57 - 73)  BP: 137/88 (09 Sep 2018 05:27) (131/79 - 145/86)  BP(mean): --  RR: 18 (09 Sep 2018 05:27) (18 - 18)  SpO2: 98% (09 Sep 2018 05:27) (94% - 98%)    EXAM:  GEN: NAD  Pulm: unlabored breathing on RA  CV: radial pulse 2+, cap refil <2sec  Abd: soft, distended, mildly diffusely tender, no peritonitic signs      I&O's Detail    07 Sep 2018 07:01  -  08 Sep 2018 07:00  --------------------------------------------------------  IN:    lactated ringers.: 2625 mL    Solution: 400 mL    Solution: 400 mL    Solution: 350 mL  Total IN: 3775 mL    OUT:    Nasoenteral Tube: 1100 mL    Voided: 1650 mL  Total OUT: 2750 mL    Total NET: 1025 mL      08 Sep 2018 07:01  -  09 Sep 2018 06:38  --------------------------------------------------------  IN:    dextrose 5% + sodium chloride 0.45% with potassium chloride 20 mEq/L: 1500 mL    Solution: 200 mL    Solution: 200 mL  Total IN: 1900 mL    OUT:    Nasoenteral Tube: 750 mL    Voided: 2100 mL  Total OUT: 2850 mL    Total NET: -950 mL          LABS:                        13.1   6.90  )-----------( 153      ( 08 Sep 2018 10:00 )             38.3     09-08    144  |  103  |  10  ----------------------------<  131<H>  4.6   |  27  |  0.85    Ca    10.0      08 Sep 2018 08:06  Phos  3.9     09-08  Mg     2.3     09-08

## 2018-09-09 NOTE — PROGRESS NOTE ADULT - SUBJECTIVE AND OBJECTIVE BOX
SUBJECTIVE:  Minimal flatus.  Abd pain much better.    _____________________________________________________  OBJECTIVE:    T(C): 36.9 (09-09-18 @ 05:27), Max: 37.5 (09-08-18 @ 21:09)  HR: 57 (09-09-18 @ 05:27)  BP: 137/88 (09-09-18 @ 05:27)  RR: 18 (09-09-18 @ 05:27)  SpO2: 98% (09-09-18 @ 05:27)  Wt(kg): --    09-08 @ 07:01  -  09-09 @ 07:00  --------------------------------------------------------  IN:    dextrose 5% + sodium chloride 0.45% with potassium chloride 20 mEq/L: 1500 mL    Solution: 200 mL    Solution: 200 mL  Total IN: 1900 mL    OUT:    Nasoenteral Tube: 750 mL    Voided: 2100 mL  Total OUT: 2850 mL    Total NET: -950 mL        PHYSICAL EXAM:      Constitutional: NAD    ENMT: NGT right nare    Gastrointestinal: soft ND +BS mild RLQ tenderness    _____________________________________________________  LABS:                        13.1   6.90  )-----------( 153      ( 08 Sep 2018 10:00 )             38.3     09-09    140  |  102  |  11  ----------------------------<  128<H>  4.7   |  25  |  0.85    Ca    9.6      09 Sep 2018 06:56  Phos  4.0     09-09  Mg     2.2     09-09        _____________________________________________________  ACTIVE MEDS:  MEDICATIONS  (STANDING):  acetaminophen  IVPB .. 1000 milliGRAM(s) IV Intermittent once  ALBUTerol/ipratropium for Nebulization 3 milliLiter(s) Nebulizer every 6 hours  aspirin  chewable 81 milliGRAM(s) Oral daily  buDESOnide 160 MICROgram(s)/formoterol 4.5 MICROgram(s) Inhaler 2 Puff(s) Inhalation two times a day  ciprofloxacin   IVPB 400 milliGRAM(s) IV Intermittent every 12 hours  ciprofloxacin   IVPB      dextrose 5% + sodium chloride 0.45% with potassium chloride 20 mEq/L 1000 milliLiter(s) (125 mL/Hr) IV Continuous <Continuous>  heparin  Injectable 5000 Unit(s) SubCutaneous every 8 hours  influenza   Vaccine 0.5 milliLiter(s) IntraMuscular once  methylPREDNISolone sodium succinate Injectable 20 milliGRAM(s) IV Push every 8 hours  metoprolol tartrate Injectable 5 milliGRAM(s) IV Push every 6 hours  metroNIDAZOLE  IVPB 500 milliGRAM(s) IV Intermittent every 8 hours  metroNIDAZOLE  IVPB      pantoprazole  Injectable 40 milliGRAM(s) IV Push daily  tiotropium 18 MICROgram(s) Capsule 1 Capsule(s) Inhalation daily    MEDICATIONS  (PRN):  artificial tears (preservative free) Ophthalmic Solution 1 Drop(s) Both EYES three times a day PRN Dry Eyes  benzocaine 15 mG/menthol 3.6 mG Lozenge 1 Lozenge Oral every 4 hours PRN Sore Throat    _____________________________________________________  ASSESSMENT:  53yMale with SBO due to active inflammatory Crohn's at/proximal to ileo-colic anastomosis, clinically improved a bit with decreased NGT output    PLAN:  1.  Agree with surgical plan for NGT clamping trial as endorsed by patient  2.  Continue IV SoluMedrol 20 mg q8h and Cipro/Flagyl IV  3.  If successfully able to remove NGT and start clear liquid diet would consider changing to oral steroids/antibiotics around Tuesday  4.  Ordered Humira trough level for Tuesday AM  5.  Will follow    Altfa Mcqueen M.D.  [facility]  (O) [phone]

## 2018-09-09 NOTE — PROGRESS NOTE ADULT - ATTENDING COMMENTS
I saw and examined the patient, and reviewed  the history and data with the patient and staff  Agree with note which was also reviewed and edited where appropriate.  D/W patient, RN, residents and Fellow    Await full return of BF.  Plan as outlined.

## 2018-09-09 NOTE — PROGRESS NOTE ADULT - SUBJECTIVE AND OBJECTIVE BOX
Patient is a 53y old  Male who presents with a chief complaint of Abd Pain x 1 day (09 Sep 2018 06:37)    He denies c/o chest pain, SOB or palptiations. No flatus.     Allergies    No Known Allergies    Intolerances      MEDICATIONS  (STANDING):  acetaminophen  IVPB .. 1000 milliGRAM(s) IV Intermittent once  ALBUTerol/ipratropium for Nebulization 3 milliLiter(s) Nebulizer every 6 hours  aspirin  chewable 81 milliGRAM(s) Oral daily  buDESOnide 160 MICROgram(s)/formoterol 4.5 MICROgram(s) Inhaler 2 Puff(s) Inhalation two times a day  ciprofloxacin   IVPB 400 milliGRAM(s) IV Intermittent every 12 hours  ciprofloxacin   IVPB      dextrose 5% + sodium chloride 0.45% with potassium chloride 20 mEq/L 1000 milliLiter(s) (125 mL/Hr) IV Continuous <Continuous>  heparin  Injectable 5000 Unit(s) SubCutaneous every 8 hours  influenza   Vaccine 0.5 milliLiter(s) IntraMuscular once  methylPREDNISolone sodium succinate Injectable 20 milliGRAM(s) IV Push every 8 hours  metoprolol tartrate Injectable 5 milliGRAM(s) IV Push every 6 hours  metroNIDAZOLE  IVPB 500 milliGRAM(s) IV Intermittent every 8 hours  metroNIDAZOLE  IVPB      pantoprazole  Injectable 40 milliGRAM(s) IV Push daily  tiotropium 18 MICROgram(s) Capsule 1 Capsule(s) Inhalation daily    MEDICATIONS  (PRN):  artificial tears (preservative free) Ophthalmic Solution 1 Drop(s) Both EYES three times a day PRN Dry Eyes  benzocaine 15 mG/menthol 3.6 mG Lozenge 1 Lozenge Oral every 4 hours PRN Sore Throat      PHYSICAL EXAM:  Vital Signs Last 24 Hrs  T(C): 36.9 (09 Sep 2018 05:27), Max: 37.5 (08 Sep 2018 21:09)  T(F): 98.4 (09 Sep 2018 05:27), Max: 99.5 (08 Sep 2018 21:09)  HR: 57 (09 Sep 2018 05:27) (57 - 73)  BP: 137/88 (09 Sep 2018 05:27) (131/79 - 145/86)  BP(mean): --  RR: 18 (09 Sep 2018 05:27) (18 - 18)  SpO2: 98% (09 Sep 2018 05:27) (94% - 98%)  Daily     Daily   I&O's Summary    08 Sep 2018 07:01  -  09 Sep 2018 07:00  --------------------------------------------------------  IN: 1900 mL / OUT: 2850 mL / NET: -950 mL  General Appearance: 	 Alert, cooperative, NGT  HEENT: normocephalic, atraumatic,,   Neck: no JVD,  carotid 2+  bilaterally without bruits  Lungs:  clear to auscultation and percussion bilaterally  Cor:  pmi 5th ICS MCL, regular rate and rhythm, S1 normal intensity, S2 normal intensity, no gallops, murmurs or rubs  Abdomen: BS present, no masses,  no organomegaly  Extremities: without cyanosis, clubbing or edema      Labs:  CBC Full  -  ( 08 Sep 2018 10:00 )  WBC Count : 6.90 K/uL  Hemoglobin : 13.1 g/dL  Hematocrit : 38.3 %  Platelet Count - Automated : 153 K/uL  Mean Cell Volume : 82.0 fl  Mean Cell Hemoglobin : 28.1 pg  Mean Cell Hemoglobin Concentration : 34.2 gm/dL  Auto Neutrophil # : x  Auto Lymphocyte # : x  Auto Monocyte # : x  Auto Eosinophil # : x  Auto Basophil # : x  Auto Neutrophil % : x  Auto Lymphocyte % : x  Auto Monocyte % : x  Auto Eosinophil % : x  Auto Basophil % : x    09-08    144  |  103  |  10  ----------------------------<  131<H>  4.6   |  27  |  0.85    Ca    10.0      08 Sep 2018 08:06  Phos  3.9     09-08  Mg     2.3     09-08                Radiology/Imaging:                Randell Fernández MD [facility]  [phone]

## 2018-09-10 LAB
ANION GAP SERPL CALC-SCNC: 15 MMOL/L — SIGNIFICANT CHANGE UP (ref 5–17)
BUN SERPL-MCNC: 13 MG/DL — SIGNIFICANT CHANGE UP (ref 7–23)
CALCIUM SERPL-MCNC: 9.5 MG/DL — SIGNIFICANT CHANGE UP (ref 8.4–10.5)
CHLORIDE SERPL-SCNC: 103 MMOL/L — SIGNIFICANT CHANGE UP (ref 96–108)
CO2 SERPL-SCNC: 24 MMOL/L — SIGNIFICANT CHANGE UP (ref 22–31)
CREAT SERPL-MCNC: 0.87 MG/DL — SIGNIFICANT CHANGE UP (ref 0.5–1.3)
GLUCOSE SERPL-MCNC: 97 MG/DL — SIGNIFICANT CHANGE UP (ref 70–99)
HCT VFR BLD CALC: 41.2 % — SIGNIFICANT CHANGE UP (ref 39–50)
HGB BLD-MCNC: 13.7 G/DL — SIGNIFICANT CHANGE UP (ref 13–17)
MAGNESIUM SERPL-MCNC: 2.2 MG/DL — SIGNIFICANT CHANGE UP (ref 1.6–2.6)
MCHC RBC-ENTMCNC: 27.5 PG — SIGNIFICANT CHANGE UP (ref 27–34)
MCHC RBC-ENTMCNC: 33.3 GM/DL — SIGNIFICANT CHANGE UP (ref 32–36)
MCV RBC AUTO: 82.6 FL — SIGNIFICANT CHANGE UP (ref 80–100)
PHOSPHATE SERPL-MCNC: 4.2 MG/DL — SIGNIFICANT CHANGE UP (ref 2.5–4.5)
PLATELET # BLD AUTO: 199 K/UL — SIGNIFICANT CHANGE UP (ref 150–400)
POTASSIUM SERPL-MCNC: 4.2 MMOL/L — SIGNIFICANT CHANGE UP (ref 3.5–5.3)
POTASSIUM SERPL-SCNC: 4.2 MMOL/L — SIGNIFICANT CHANGE UP (ref 3.5–5.3)
RBC # BLD: 4.99 M/UL — SIGNIFICANT CHANGE UP (ref 4.2–5.8)
RBC # FLD: 14.2 % — SIGNIFICANT CHANGE UP (ref 10.3–14.5)
SODIUM SERPL-SCNC: 142 MMOL/L — SIGNIFICANT CHANGE UP (ref 135–145)
WBC # BLD: 7.77 K/UL — SIGNIFICANT CHANGE UP (ref 3.8–10.5)
WBC # FLD AUTO: 7.77 K/UL — SIGNIFICANT CHANGE UP (ref 3.8–10.5)

## 2018-09-10 PROCEDURE — 99233 SBSQ HOSP IP/OBS HIGH 50: CPT | Mod: GC

## 2018-09-10 PROCEDURE — 71045 X-RAY EXAM CHEST 1 VIEW: CPT | Mod: 26

## 2018-09-10 RX ORDER — HYDROMORPHONE HYDROCHLORIDE 2 MG/ML
0.5 INJECTION INTRAMUSCULAR; INTRAVENOUS; SUBCUTANEOUS ONCE
Qty: 0 | Refills: 0 | Status: DISCONTINUED | OUTPATIENT
Start: 2018-09-10 | End: 2018-09-10

## 2018-09-10 RX ADMIN — Medication 20 MILLIGRAM(S): at 06:04

## 2018-09-10 RX ADMIN — HEPARIN SODIUM 5000 UNIT(S): 5000 INJECTION INTRAVENOUS; SUBCUTANEOUS at 00:22

## 2018-09-10 RX ADMIN — Medication 200 MILLIGRAM(S): at 17:12

## 2018-09-10 RX ADMIN — Medication 3 MILLILITER(S): at 11:58

## 2018-09-10 RX ADMIN — Medication 5 MILLIGRAM(S): at 06:04

## 2018-09-10 RX ADMIN — Medication 5 MILLIGRAM(S): at 11:58

## 2018-09-10 RX ADMIN — Medication 100 MILLIGRAM(S): at 00:23

## 2018-09-10 RX ADMIN — Medication 200 MILLIGRAM(S): at 06:04

## 2018-09-10 RX ADMIN — Medication 20 MILLIGRAM(S): at 13:36

## 2018-09-10 RX ADMIN — Medication 100 MILLIGRAM(S): at 16:07

## 2018-09-10 RX ADMIN — HYDROMORPHONE HYDROCHLORIDE 0.5 MILLIGRAM(S): 2 INJECTION INTRAMUSCULAR; INTRAVENOUS; SUBCUTANEOUS at 18:00

## 2018-09-10 RX ADMIN — Medication 5 MILLIGRAM(S): at 23:53

## 2018-09-10 RX ADMIN — Medication 1 DROP(S): at 00:23

## 2018-09-10 RX ADMIN — HEPARIN SODIUM 5000 UNIT(S): 5000 INJECTION INTRAVENOUS; SUBCUTANEOUS at 16:07

## 2018-09-10 RX ADMIN — BUDESONIDE AND FORMOTEROL FUMARATE DIHYDRATE 2 PUFF(S): 160; 4.5 AEROSOL RESPIRATORY (INHALATION) at 17:12

## 2018-09-10 RX ADMIN — Medication 81 MILLIGRAM(S): at 11:58

## 2018-09-10 RX ADMIN — TIOTROPIUM BROMIDE 1 CAPSULE(S): 18 CAPSULE ORAL; RESPIRATORY (INHALATION) at 11:58

## 2018-09-10 RX ADMIN — Medication 3 MILLILITER(S): at 17:12

## 2018-09-10 RX ADMIN — Medication 5 MILLIGRAM(S): at 17:33

## 2018-09-10 RX ADMIN — HEPARIN SODIUM 5000 UNIT(S): 5000 INJECTION INTRAVENOUS; SUBCUTANEOUS at 08:39

## 2018-09-10 RX ADMIN — HYDROMORPHONE HYDROCHLORIDE 0.5 MILLIGRAM(S): 2 INJECTION INTRAMUSCULAR; INTRAVENOUS; SUBCUTANEOUS at 17:48

## 2018-09-10 RX ADMIN — PANTOPRAZOLE SODIUM 40 MILLIGRAM(S): 20 TABLET, DELAYED RELEASE ORAL at 11:58

## 2018-09-10 RX ADMIN — BUDESONIDE AND FORMOTEROL FUMARATE DIHYDRATE 2 PUFF(S): 160; 4.5 AEROSOL RESPIRATORY (INHALATION) at 06:03

## 2018-09-10 RX ADMIN — Medication 100 MILLIGRAM(S): at 08:39

## 2018-09-10 NOTE — PROGRESS NOTE ADULT - ASSESSMENT
53 M with HTN, HLD, CAD s/p MI and PCI, asthma, MARYAM on nocturnal CPAP, GERD, Crohn's disease s/p stricturoplasty x 2 and small bowel resection in 3/2018, now p/w acute onset of abdominal pain, found to have a SBO with a transition point in the RLQ, current being conservatively managed with NGT decompression and bowel rest.     1. Asthma:  - Currently stable pulmonary status, saturating well on RA.   - No evidence of an asthma exacerbation  - Suggest Duonebs Q6H prn  - continue Symbicort BID and Spiriva daily  - Ok to hold Singulair as pt is NPO  - Keep O2 sats above 90 %  - Incentive spirometry once able to tolerate     2. MARYAM:  - Failed oral appliance  - Pt is on nocturnal APAP with a min pressure of 4 and max pressure of 9 at home   - Ok to hold off on nocturnal CPAP in the setting of an acute bowel obstruction   - Once acute issues have resolved and there is no longer a contraindication to NIV, we can resume nocturnal CPAP at 9 cm H20    3. Small bowel obstruction/ Crohn's disease with exacerbation/ SB stricture   - Currently NPO and receiving NGT decompression per surgical team   - On IV Cipro/ flagyl   - Cont IV hydration   - Serial abdominal exams   - GI eval    4. Gen:  - DVT Px: Hep SQ

## 2018-09-10 NOTE — PROGRESS NOTE ADULT - ATTENDING COMMENTS
I have seen and evaluated patient and agree with resident assessment and plan.  BM and flatus.  If NGT output low, DC and start clears

## 2018-09-10 NOTE — PROGRESS NOTE ADULT - SUBJECTIVE AND OBJECTIVE BOX
SURGERY DAILY PROGRESS NOTE:       SUBJECTIVE/ROS: Patient examined at bedside. No acute events overnight. NGT w/ minimal output. No N/V. -BM/+Flatus         MEDICATIONS  (STANDING):  acetaminophen  IVPB .. 1000 milliGRAM(s) IV Intermittent once  ALBUTerol/ipratropium for Nebulization 3 milliLiter(s) Nebulizer every 6 hours  aspirin  chewable 81 milliGRAM(s) Oral daily  buDESOnide 160 MICROgram(s)/formoterol 4.5 MICROgram(s) Inhaler 2 Puff(s) Inhalation two times a day  ciprofloxacin   IVPB 400 milliGRAM(s) IV Intermittent every 12 hours  ciprofloxacin   IVPB      dextrose 5% + sodium chloride 0.45% with potassium chloride 20 mEq/L 1000 milliLiter(s) (125 mL/Hr) IV Continuous <Continuous>  heparin  Injectable 5000 Unit(s) SubCutaneous every 8 hours  influenza   Vaccine 0.5 milliLiter(s) IntraMuscular once  methylPREDNISolone sodium succinate Injectable 20 milliGRAM(s) IV Push every 8 hours  metoprolol tartrate Injectable 5 milliGRAM(s) IV Push every 6 hours  metroNIDAZOLE  IVPB 500 milliGRAM(s) IV Intermittent every 8 hours  metroNIDAZOLE  IVPB      pantoprazole  Injectable 40 milliGRAM(s) IV Push daily  tiotropium 18 MICROgram(s) Capsule 1 Capsule(s) Inhalation daily    MEDICATIONS  (PRN):  artificial tears (preservative free) Ophthalmic Solution 1 Drop(s) Both EYES three times a day PRN Dry Eyes  benzocaine 15 mG/menthol 3.6 mG Lozenge 1 Lozenge Oral every 4 hours PRN Sore Throat      OBJECTIVE:    Vital Signs Last 24 Hrs  T(C): 36.9 (10 Sep 2018 00:18), Max: 37.1 (09 Sep 2018 09:39)  T(F): 98.5 (10 Sep 2018 00:18), Max: 98.8 (09 Sep 2018 09:39)  HR: 58 (10 Sep 2018 00:33) (57 - 71)  BP: 148/89 (10 Sep 2018 00:18) (125/79 - 148/89)  BP(mean): --  RR: 18 (10 Sep 2018 00:18) (18 - 18)  SpO2: 95% (10 Sep 2018 00:18) (95% - 98%)        I&O's Detail    08 Sep 2018 07:01  -  09 Sep 2018 07:00  --------------------------------------------------------  IN:    dextrose 5% + sodium chloride 0.45% with potassium chloride 20 mEq/L: 1500 mL    Solution: 200 mL    Solution: 200 mL  Total IN: 1900 mL    OUT:    Nasoenteral Tube: 750 mL    Voided: 2100 mL  Total OUT: 2850 mL    Total NET: -950 mL      09 Sep 2018 07:01  -  10 Sep 2018 05:16  --------------------------------------------------------  IN:    dextrose 5% + sodium chloride 0.45% with potassium chloride 20 mEq/L: 1500 mL    Solution: 100 mL    Solution: 200 mL    Solution: 200 mL  Total IN: 2000 mL    OUT:    Nasoenteral Tube: 775 mL    Voided: 2425 mL  Total OUT: 3200 mL    Total NET: -1200 mL          Daily     Daily     LABS:                        12.6   7.17  )-----------( 173      ( 09 Sep 2018 08:41 )             37.5     09-09    140  |  102  |  11  ----------------------------<  128<H>  4.7   |  25  |  0.85    Ca    9.6      09 Sep 2018 06:56  Phos  4.0     09-09  Mg     2.2     09-09                    PHYSICAL EXAM:  GEN: NAD  Pulm: unlabored breathing on RA  CV: radial pulse 2+, cap refil <2sec  Abd: soft, nontender, nondistended

## 2018-09-10 NOTE — PROGRESS NOTE ADULT - ASSESSMENT
53y Male with history of CAD/stent  and IBD admitted with abdominal pain and vomiting. CT c/w partial SBO due to acute Crohn's exacerbation      1. CAD s/p PCI   cv stable, no chest pain or sob   IV lopressor until taking po  continue with Aspirin     2. SBO due to acute Crohn's exacerbation  NPO, IV abx  NGT and Rx per surgery and GI.    dvt ppx

## 2018-09-10 NOTE — PROGRESS NOTE ADULT - SUBJECTIVE AND OBJECTIVE BOX
CARDIOLOGY FOLLOW UP - Dr. Alvarado    CC no chest pain or sob       PHYSICAL EXAM:  T(C): 36.8 (09-10-18 @ 14:45), Max: 37.1 (09-10-18 @ 05:37)  HR: 60 (09-10-18 @ 14:45) (58 - 81)  BP: 133/83 (09-10-18 @ 14:45) (122/70 - 148/89)  RR: 18 (09-10-18 @ 14:45) (18 - 18)  SpO2: 96% (09-10-18 @ 14:45) (94% - 98%)  Wt(kg): --  I&O's Summary    09 Sep 2018 07:01  -  10 Sep 2018 07:00  --------------------------------------------------------  IN: 3450 mL / OUT: 3300 mL / NET: 150 mL    10 Sep 2018 07:01  -  10 Sep 2018 15:29  --------------------------------------------------------  IN: 100 mL / OUT: 1000 mL / NET: -900 mL        Appearance: Normal	  Cardiovascular: Normal S1 S2,RRR, No JVD, No murmurs  Respiratory: Lungs clear to auscultation	  Gastrointestinal:  Soft, Non-tender, + BS	+ NG tube   Extremities: Normal range of motion, No clubbing, cyanosis or edema        MEDICATIONS  (STANDING):  acetaminophen  IVPB .. 1000 milliGRAM(s) IV Intermittent once  ALBUTerol/ipratropium for Nebulization 3 milliLiter(s) Nebulizer every 6 hours  aspirin  chewable 81 milliGRAM(s) Oral daily  buDESOnide 160 MICROgram(s)/formoterol 4.5 MICROgram(s) Inhaler 2 Puff(s) Inhalation two times a day  ciprofloxacin   IVPB 400 milliGRAM(s) IV Intermittent every 12 hours  ciprofloxacin   IVPB      dextrose 5% + sodium chloride 0.45% with potassium chloride 20 mEq/L 1000 milliLiter(s) (125 mL/Hr) IV Continuous <Continuous>  heparin  Injectable 5000 Unit(s) SubCutaneous every 8 hours  influenza   Vaccine 0.5 milliLiter(s) IntraMuscular once  methylPREDNISolone sodium succinate Injectable 20 milliGRAM(s) IV Push every 8 hours  metoprolol tartrate Injectable 5 milliGRAM(s) IV Push every 6 hours  metroNIDAZOLE  IVPB 500 milliGRAM(s) IV Intermittent every 8 hours  metroNIDAZOLE  IVPB      pantoprazole  Injectable 40 milliGRAM(s) IV Push daily  tiotropium 18 MICROgram(s) Capsule 1 Capsule(s) Inhalation daily      TELEMETRY: 	    ECG:  	  RADIOLOGY:   DIAGNOSTIC TESTING:  [ ] Echocardiogram:  [ ]  Catheterization:  [ ] Stress Test:    OTHER: 	    LABS:	 	                                13.7   7.77  )-----------( 199      ( 10 Sep 2018 12:27 )             41.2     09-10    142  |  103  |  13  ----------------------------<  97  4.2   |  24  |  0.87    Ca    9.5      10 Sep 2018 07:26  Phos  4.2     09-10  Mg     2.2     09-10 CARDIOLOGY FOLLOW UP - Dr. Alvarado (for dr. crisostomo)    CC no chest pain or sob       PHYSICAL EXAM:  T(C): 36.8 (09-10-18 @ 14:45), Max: 37.1 (09-10-18 @ 05:37)  HR: 60 (09-10-18 @ 14:45) (58 - 81)  BP: 133/83 (09-10-18 @ 14:45) (122/70 - 148/89)  RR: 18 (09-10-18 @ 14:45) (18 - 18)  SpO2: 96% (09-10-18 @ 14:45) (94% - 98%)  Wt(kg): --  I&O's Summary    09 Sep 2018 07:01  -  10 Sep 2018 07:00  --------------------------------------------------------  IN: 3450 mL / OUT: 3300 mL / NET: 150 mL    10 Sep 2018 07:01  -  10 Sep 2018 15:29  --------------------------------------------------------  IN: 100 mL / OUT: 1000 mL / NET: -900 mL        Appearance: Normal	  Cardiovascular: Normal S1 S2,RRR, No JVD, No murmurs  Respiratory: Lungs clear to auscultation	  Gastrointestinal:  Soft, Non-tender, + BS	+ NG tube   Extremities: Normal range of motion, No clubbing, cyanosis or edema        MEDICATIONS  (STANDING):  acetaminophen  IVPB .. 1000 milliGRAM(s) IV Intermittent once  ALBUTerol/ipratropium for Nebulization 3 milliLiter(s) Nebulizer every 6 hours  aspirin  chewable 81 milliGRAM(s) Oral daily  buDESOnide 160 MICROgram(s)/formoterol 4.5 MICROgram(s) Inhaler 2 Puff(s) Inhalation two times a day  ciprofloxacin   IVPB 400 milliGRAM(s) IV Intermittent every 12 hours  ciprofloxacin   IVPB      dextrose 5% + sodium chloride 0.45% with potassium chloride 20 mEq/L 1000 milliLiter(s) (125 mL/Hr) IV Continuous <Continuous>  heparin  Injectable 5000 Unit(s) SubCutaneous every 8 hours  influenza   Vaccine 0.5 milliLiter(s) IntraMuscular once  methylPREDNISolone sodium succinate Injectable 20 milliGRAM(s) IV Push every 8 hours  metoprolol tartrate Injectable 5 milliGRAM(s) IV Push every 6 hours  metroNIDAZOLE  IVPB 500 milliGRAM(s) IV Intermittent every 8 hours  metroNIDAZOLE  IVPB      pantoprazole  Injectable 40 milliGRAM(s) IV Push daily  tiotropium 18 MICROgram(s) Capsule 1 Capsule(s) Inhalation daily      TELEMETRY: 	    ECG:  	  RADIOLOGY:   DIAGNOSTIC TESTING:  [ ] Echocardiogram:  [ ]  Catheterization:  [ ] Stress Test:    OTHER: 	    LABS:	 	                                13.7   7.77  )-----------( 199      ( 10 Sep 2018 12:27 )             41.2     09-10    142  |  103  |  13  ----------------------------<  97  4.2   |  24  |  0.87    Ca    9.5      10 Sep 2018 07:26  Phos  4.2     09-10  Mg     2.2     09-10

## 2018-09-10 NOTE — PROGRESS NOTE ADULT - SUBJECTIVE AND OBJECTIVE BOX
SUBJECTIVE:    _____________________________________________________  OBJECTIVE:    T(C): 37 (09-10-18 @ 09:38), Max: 37.1 (09-10-18 @ 05:37)  HR: 72 (09-10-18 @ 12:05)  BP: 130/72 (09-10-18 @ 12:05)  RR: 18 (09-10-18 @ 11:50)  SpO2: 97% (09-10-18 @ 11:50)  Wt(kg): --    09-09 @ 07:01  -  09-10 @ 07:00  --------------------------------------------------------  IN:    dextrose 5% + sodium chloride 0.45% with potassium chloride 20 mEq/L: 2750 mL    Solution: 200 mL    Solution: 100 mL    Solution: 400 mL  Total IN: 3450 mL    OUT:    Nasoenteral Tube: 875 mL    Voided: 2425 mL  Total OUT: 3300 mL    Total NET: 150 mL      09-10 @ 07:01  -  09-10 @ 13:32  --------------------------------------------------------  IN:    Solution: 100 mL  Total IN: 100 mL    OUT:    Nasoenteral Tube: 100 mL    Voided: 350 mL  Total OUT: 450 mL    Total NET: -350 mL        PHYSICAL EXAM:      Constitutional:    Eyes:    ENMT:    Neck:    Breasts:    Back:    Respiratory:    Cardiovascular:    Gastrointestinal:    Genitourinary:    Rectal:    Extremities:    Vascular:    Neurological:    Skin:    Lymph Nodes:    Musculoskeletal:    Psychiatric:      _____________________________________________________  LABS:                        12.6   7.17  )-----------( 173      ( 09 Sep 2018 08:41 )             37.5     09-10    142  |  103  |  13  ----------------------------<  97  4.2   |  24  |  0.87    Ca    9.5      10 Sep 2018 07:26  Phos  4.2     09-10  Mg     2.2     09-10        _____________________________________________________  ACTIVE MEDS:  MEDICATIONS  (STANDING):  acetaminophen  IVPB .. 1000 milliGRAM(s) IV Intermittent once  ALBUTerol/ipratropium for Nebulization 3 milliLiter(s) Nebulizer every 6 hours  aspirin  chewable 81 milliGRAM(s) Oral daily  buDESOnide 160 MICROgram(s)/formoterol 4.5 MICROgram(s) Inhaler 2 Puff(s) Inhalation two times a day  ciprofloxacin   IVPB 400 milliGRAM(s) IV Intermittent every 12 hours  ciprofloxacin   IVPB      dextrose 5% + sodium chloride 0.45% with potassium chloride 20 mEq/L 1000 milliLiter(s) (125 mL/Hr) IV Continuous <Continuous>  heparin  Injectable 5000 Unit(s) SubCutaneous every 8 hours  influenza   Vaccine 0.5 milliLiter(s) IntraMuscular once  methylPREDNISolone sodium succinate Injectable 20 milliGRAM(s) IV Push every 8 hours  metoprolol tartrate Injectable 5 milliGRAM(s) IV Push every 6 hours  metroNIDAZOLE  IVPB 500 milliGRAM(s) IV Intermittent every 8 hours  metroNIDAZOLE  IVPB      pantoprazole  Injectable 40 milliGRAM(s) IV Push daily  tiotropium 18 MICROgram(s) Capsule 1 Capsule(s) Inhalation daily    MEDICATIONS  (PRN):  artificial tears (preservative free) Ophthalmic Solution 1 Drop(s) Both EYES three times a day PRN Dry Eyes  benzocaine 15 mG/menthol 3.6 mG Lozenge 1 Lozenge Oral every 4 hours PRN Sore Throat    _____________________________________________________  ASSESSMENT:  53yMale    PLAN:      [facility]  O) [phone] SUBJECTIVE:  NGT removed, had two small bowel movements.  No nausea or vomiting.  Abdominal pain controlled.  _____________________________________________________  OBJECTIVE:    T(C): 37 (09-10-18 @ 09:38), Max: 37.1 (09-10-18 @ 05:37)  HR: 72 (09-10-18 @ 12:05)  BP: 130/72 (09-10-18 @ 12:05)  RR: 18 (09-10-18 @ 11:50)  SpO2: 97% (09-10-18 @ 11:50)  Wt(kg): --    09-09 @ 07:01  -  09-10 @ 07:00  --------------------------------------------------------  IN:    dextrose 5% + sodium chloride 0.45% with potassium chloride 20 mEq/L: 2750 mL    Solution: 200 mL    Solution: 100 mL    Solution: 400 mL  Total IN: 3450 mL    OUT:    Nasoenteral Tube: 875 mL    Voided: 2425 mL  Total OUT: 3300 mL    Total NET: 150 mL      09-10 @ 07:01  -  09-10 @ 13:32  --------------------------------------------------------  IN:    Solution: 100 mL  Total IN: 100 mL    OUT:    Nasoenteral Tube: 100 mL    Voided: 350 mL  Total OUT: 450 mL    Total NET: -350 mL        PHYSICAL EXAM:      Constitutional: NAD    Gastrointestinal: soft NTND +BS no r/g/hsm  _____________________________________________________  LABS:                        12.6   7.17  )-----------( 173      ( 09 Sep 2018 08:41 )             37.5     09-10    142  |  103  |  13  ----------------------------<  97  4.2   |  24  |  0.87    Ca    9.5      10 Sep 2018 07:26  Phos  4.2     09-10  Mg     2.2     09-10        _____________________________________________________  ACTIVE MEDS:  MEDICATIONS  (STANDING):  acetaminophen  IVPB .. 1000 milliGRAM(s) IV Intermittent once  ALBUTerol/ipratropium for Nebulization 3 milliLiter(s) Nebulizer every 6 hours  aspirin  chewable 81 milliGRAM(s) Oral daily  buDESOnide 160 MICROgram(s)/formoterol 4.5 MICROgram(s) Inhaler 2 Puff(s) Inhalation two times a day  ciprofloxacin   IVPB 400 milliGRAM(s) IV Intermittent every 12 hours  ciprofloxacin   IVPB      dextrose 5% + sodium chloride 0.45% with potassium chloride 20 mEq/L 1000 milliLiter(s) (125 mL/Hr) IV Continuous <Continuous>  heparin  Injectable 5000 Unit(s) SubCutaneous every 8 hours  influenza   Vaccine 0.5 milliLiter(s) IntraMuscular once  methylPREDNISolone sodium succinate Injectable 20 milliGRAM(s) IV Push every 8 hours  metoprolol tartrate Injectable 5 milliGRAM(s) IV Push every 6 hours  metroNIDAZOLE  IVPB 500 milliGRAM(s) IV Intermittent every 8 hours  metroNIDAZOLE  IVPB      pantoprazole  Injectable 40 milliGRAM(s) IV Push daily  tiotropium 18 MICROgram(s) Capsule 1 Capsule(s) Inhalation daily    MEDICATIONS  (PRN):  artificial tears (preservative free) Ophthalmic Solution 1 Drop(s) Both EYES three times a day PRN Dry Eyes  benzocaine 15 mG/menthol 3.6 mG Lozenge 1 Lozenge Oral every 4 hours PRN Sore Throat    _____________________________________________________  ASSESSMENT:  53yMale with SBO due to active Crohn's at ileocolic anastomosis, now clinically improved    PLAN:  1.  Will reduce SoluMedrol to 20 mg in AM/10 mg in afternoon/20 mg in the evening  2.  Continue IV Cipro/Flagyl  3.  Will change to oral steroids once ready for discharge    Altaf Mcqueen M.D.  [facility]  (O) [phone]

## 2018-09-10 NOTE — PROGRESS NOTE ADULT - ASSESSMENT
52yo M with PMH of CAD, MI s/p PCI, COPD, asthma, MARYAM, Crohn's s/p stricturoplasty x 2 and SBR 3/2018 now a/w SBO due to acute Crohn's exacerbation with inflammatory narrowing.     -NPO/MIVF  -cont IV Cipro/flagyl  -solumedrol 20 q8h per GI recs  - Clamp NGT--> d/c if low output   -serial abd exams  -cont COPD/asthma home meds per PULM  -OOB and ambulate    [facility]   [id number]

## 2018-09-10 NOTE — PROGRESS NOTE ADULT - SUBJECTIVE AND OBJECTIVE BOX
[facility] DIVISION OF PULMONARY, CRITICAL CARE and SLEEP MEDICINE  PULMONARY PROGRESS NOTE  FOR ANY QUESTIONS PLEASE CALL [phone] MONDAY - FRIDAY 8a-5p or [phone] on NIGHTS/WEEKENDS/HOLIDAYS    PATIENT INFORMATION:  NAME: PRITI KEMP:  MRN: [id number]    CHIEF COMPLAINT: Patient is a 53y old  Male who presents with a chief complaint of Abd Pain x 1 day (10 Sep 2018 05:15)      [x] INITIAL CONSULT, H&P, FAMILY HISTORY and PAST MEDICAL AND SURGICAL HISTORY REVIEWED    OVERNIGHT EVENTS or CHANGES TO HPI: Walking around the unit. Cough with deep breath  Abdominal pain improved - states he had a small BM after the Surgery team rounded this AM  NGT still in place     ========================REVIEW OF SYSTEMS========================  CONSTITUTIONAL: Feeling cris   CARDIOVASCULAR: Denies chest pain or palpitations   PULMONARY: Cough dry, no hemoptysis, no MORENO  [x] REMAINING REVIEW OF SYSTEMS NEGATIVE  [] UNABLE TO OBTAIN REVIEW OF SYSTEMS DUE TO _______________    ========================MEDICATIONS=============================  MEDICATIONS  (STANDING):  acetaminophen  IVPB .. 1000 milliGRAM(s) IV Intermittent once  ALBUTerol/ipratropium for Nebulization 3 milliLiter(s) Nebulizer every 6 hours  aspirin  chewable 81 milliGRAM(s) Oral daily  buDESOnide 160 MICROgram(s)/formoterol 4.5 MICROgram(s) Inhaler 2 Puff(s) Inhalation two times a day  ciprofloxacin   IVPB 400 milliGRAM(s) IV Intermittent every 12 hours  ciprofloxacin   IVPB      dextrose 5% + sodium chloride 0.45% with potassium chloride 20 mEq/L 1000 milliLiter(s) (125 mL/Hr) IV Continuous <Continuous>  heparin  Injectable 5000 Unit(s) SubCutaneous every 8 hours  influenza   Vaccine 0.5 milliLiter(s) IntraMuscular once  methylPREDNISolone sodium succinate Injectable 20 milliGRAM(s) IV Push every 8 hours  metoprolol tartrate Injectable 5 milliGRAM(s) IV Push every 6 hours  metroNIDAZOLE  IVPB 500 milliGRAM(s) IV Intermittent every 8 hours  metroNIDAZOLE  IVPB      pantoprazole  Injectable 40 milliGRAM(s) IV Push daily  tiotropium 18 MICROgram(s) Capsule 1 Capsule(s) Inhalation daily      MEDICATIONS  (PRN):  artificial tears (preservative free) Ophthalmic Solution 1 Drop(s) Both EYES three times a day PRN Dry Eyes  benzocaine 15 mG/menthol 3.6 mG Lozenge 1 Lozenge Oral every 4 hours PRN Sore Throat      ========================PHYSICAL EXAM============================    VITALS: ICU Vital Signs Last 24 Hrs  T(C): 37 (10 Sep 2018 09:38), Max: 37.1 (09 Sep 2018 11:22)  T(F): 98.6 (10 Sep 2018 09:38), Max: 98.8 (09 Sep 2018 11:22)  HR: 75 (10 Sep 2018 09:38) (58 - 81)  BP: 137/81 (10 Sep 2018 09:38) (125/79 - 148/89)  RR: 18 (10 Sep 2018 09:38) (18 - 18)  SpO2: 98% (10 Sep 2018 09:38) (94% - 98%)      INTAKE and OUTPUT: I&O's Summary    09 Sep 2018 07:01  -  10 Sep 2018 07:00  --------------------------------------------------------  IN: 3450 mL / OUT: 3300 mL / NET: 150 mL    10 Sep 2018 07:01  -  10 Sep 2018 11:04  --------------------------------------------------------  IN: 100 mL / OUT: 450 mL / NET: -350 mL    VENTILATOR SETTINGS: N/A    GENERAL: AAOx3, comfortable and walking   EYES: anicteric, EOMI  EAR/NOSE/MOUTH/THROAT: NCAT, MMM, nares clear, trachea midline  NECK: supple, no JVD   LYMPH NODES: no palpable supraclavicular LAD   CARDIOVASCULAR: RRR, S1S2  RESPIRATORY: CTA bilaterally, no wheeze, cough with deep inspiration  ABDOMEN: soft, mild tenderness, hypoactive BS  EXTREMITIES: no clubbing or cyanosis   SKIN: warm and dry    MUSCULOSKELETAL: strength intact   NEUROLOGIC: nonfocal exam  PSYCHIATRIC: calm     ========================LABORATORY RESULTS AND IMAGING=============                        12.6   7.17  )-----------( 173      ( 09 Sep 2018 08:41 )             37.5                                                    09-10    142  |  103  |  13  ----------------------------<  97  4.2   |  24  |  0.87    Ca    9.5      10 Sep 2018 07:26  Phos  4.2     09-10  Mg     2.2     09-10    Creatinine Trend: 0.87<--, 0.85<--, 0.85<--, 0.86<--, 0.83<--, 0.84<--    CT CHEST:     [] RADIOLOGY REVIEWED AND INTERPRETED BY ME      THANK YOU FOR ALLOWING US TO PARTICIPATE IN THE CARE OF THIS PATIENT

## 2018-09-11 LAB
ANION GAP SERPL CALC-SCNC: 11 MMOL/L — SIGNIFICANT CHANGE UP (ref 5–17)
ANION GAP SERPL CALC-SCNC: 15 MMOL/L — SIGNIFICANT CHANGE UP (ref 5–17)
APTT BLD: 32.1 SEC — SIGNIFICANT CHANGE UP (ref 27.5–37.4)
BLD GP AB SCN SERPL QL: NEGATIVE — SIGNIFICANT CHANGE UP
BUN SERPL-MCNC: 15 MG/DL — SIGNIFICANT CHANGE UP (ref 7–23)
BUN SERPL-MCNC: 15 MG/DL — SIGNIFICANT CHANGE UP (ref 7–23)
CALCIUM SERPL-MCNC: 9.1 MG/DL — SIGNIFICANT CHANGE UP (ref 8.4–10.5)
CALCIUM SERPL-MCNC: 9.2 MG/DL — SIGNIFICANT CHANGE UP (ref 8.4–10.5)
CHLORIDE SERPL-SCNC: 103 MMOL/L — SIGNIFICANT CHANGE UP (ref 96–108)
CHLORIDE SERPL-SCNC: 105 MMOL/L — SIGNIFICANT CHANGE UP (ref 96–108)
CO2 SERPL-SCNC: 23 MMOL/L — SIGNIFICANT CHANGE UP (ref 22–31)
CO2 SERPL-SCNC: 24 MMOL/L — SIGNIFICANT CHANGE UP (ref 22–31)
CREAT SERPL-MCNC: 0.93 MG/DL — SIGNIFICANT CHANGE UP (ref 0.5–1.3)
CREAT SERPL-MCNC: 1.04 MG/DL — SIGNIFICANT CHANGE UP (ref 0.5–1.3)
GLUCOSE SERPL-MCNC: 112 MG/DL — HIGH (ref 70–99)
GLUCOSE SERPL-MCNC: 97 MG/DL — SIGNIFICANT CHANGE UP (ref 70–99)
HCT VFR BLD CALC: 37.7 % — LOW (ref 39–50)
HCT VFR BLD CALC: 39 % — SIGNIFICANT CHANGE UP (ref 39–50)
HGB BLD-MCNC: 12.2 G/DL — LOW (ref 13–17)
HGB BLD-MCNC: 13.2 G/DL — SIGNIFICANT CHANGE UP (ref 13–17)
INR BLD: 1.25 RATIO — HIGH (ref 0.88–1.16)
MAGNESIUM SERPL-MCNC: 2.2 MG/DL — SIGNIFICANT CHANGE UP (ref 1.6–2.6)
MAGNESIUM SERPL-MCNC: 2.2 MG/DL — SIGNIFICANT CHANGE UP (ref 1.6–2.6)
MCHC RBC-ENTMCNC: 26.8 PG — LOW (ref 27–34)
MCHC RBC-ENTMCNC: 27.5 PG — SIGNIFICANT CHANGE UP (ref 27–34)
MCHC RBC-ENTMCNC: 32.4 GM/DL — SIGNIFICANT CHANGE UP (ref 32–36)
MCHC RBC-ENTMCNC: 33.9 GM/DL — SIGNIFICANT CHANGE UP (ref 32–36)
MCV RBC AUTO: 81.2 FL — SIGNIFICANT CHANGE UP (ref 80–100)
MCV RBC AUTO: 82.9 FL — SIGNIFICANT CHANGE UP (ref 80–100)
PHOSPHATE SERPL-MCNC: 4.1 MG/DL — SIGNIFICANT CHANGE UP (ref 2.5–4.5)
PHOSPHATE SERPL-MCNC: 4.5 MG/DL — SIGNIFICANT CHANGE UP (ref 2.5–4.5)
PLATELET # BLD AUTO: 174 K/UL — SIGNIFICANT CHANGE UP (ref 150–400)
PLATELET # BLD AUTO: 199 K/UL — SIGNIFICANT CHANGE UP (ref 150–400)
POTASSIUM SERPL-MCNC: 3.5 MMOL/L — SIGNIFICANT CHANGE UP (ref 3.5–5.3)
POTASSIUM SERPL-MCNC: 4.4 MMOL/L — SIGNIFICANT CHANGE UP (ref 3.5–5.3)
POTASSIUM SERPL-SCNC: 3.5 MMOL/L — SIGNIFICANT CHANGE UP (ref 3.5–5.3)
POTASSIUM SERPL-SCNC: 4.4 MMOL/L — SIGNIFICANT CHANGE UP (ref 3.5–5.3)
PROTHROM AB SERPL-ACNC: 13.6 SEC — HIGH (ref 9.8–12.7)
RBC # BLD: 4.55 M/UL — SIGNIFICANT CHANGE UP (ref 4.2–5.8)
RBC # BLD: 4.8 M/UL — SIGNIFICANT CHANGE UP (ref 4.2–5.8)
RBC # FLD: 14 % — SIGNIFICANT CHANGE UP (ref 10.3–14.5)
RBC # FLD: 14.2 % — SIGNIFICANT CHANGE UP (ref 10.3–14.5)
RH IG SCN BLD-IMP: POSITIVE — SIGNIFICANT CHANGE UP
SODIUM SERPL-SCNC: 139 MMOL/L — SIGNIFICANT CHANGE UP (ref 135–145)
SODIUM SERPL-SCNC: 142 MMOL/L — SIGNIFICANT CHANGE UP (ref 135–145)
TROPONIN T, HIGH SENSITIVITY RESULT: <6 NG/L — SIGNIFICANT CHANGE UP (ref 0–51)
WBC # BLD: 6.54 K/UL — SIGNIFICANT CHANGE UP (ref 3.8–10.5)
WBC # BLD: 8 K/UL — SIGNIFICANT CHANGE UP (ref 3.8–10.5)
WBC # FLD AUTO: 6.54 K/UL — SIGNIFICANT CHANGE UP (ref 3.8–10.5)
WBC # FLD AUTO: 8 K/UL — SIGNIFICANT CHANGE UP (ref 3.8–10.5)

## 2018-09-11 PROCEDURE — 99232 SBSQ HOSP IP/OBS MODERATE 35: CPT

## 2018-09-11 PROCEDURE — 71045 X-RAY EXAM CHEST 1 VIEW: CPT | Mod: 26

## 2018-09-11 PROCEDURE — 74018 RADEX ABDOMEN 1 VIEW: CPT | Mod: 26

## 2018-09-11 PROCEDURE — 93010 ELECTROCARDIOGRAM REPORT: CPT

## 2018-09-11 RX ORDER — POTASSIUM CHLORIDE 20 MEQ
40 PACKET (EA) ORAL ONCE
Qty: 0 | Refills: 0 | Status: DISCONTINUED | OUTPATIENT
Start: 2018-09-11 | End: 2018-09-11

## 2018-09-11 RX ORDER — POTASSIUM CHLORIDE 20 MEQ
40 PACKET (EA) ORAL ONCE
Qty: 0 | Refills: 0 | Status: COMPLETED | OUTPATIENT
Start: 2018-09-11 | End: 2018-09-11

## 2018-09-11 RX ORDER — DEXTROSE MONOHYDRATE, SODIUM CHLORIDE, AND POTASSIUM CHLORIDE 50; .745; 4.5 G/1000ML; G/1000ML; G/1000ML
1000 INJECTION, SOLUTION INTRAVENOUS
Qty: 0 | Refills: 0 | Status: DISCONTINUED | OUTPATIENT
Start: 2018-09-11 | End: 2018-09-12

## 2018-09-11 RX ADMIN — DEXTROSE MONOHYDRATE, SODIUM CHLORIDE, AND POTASSIUM CHLORIDE 125 MILLILITER(S): 50; .745; 4.5 INJECTION, SOLUTION INTRAVENOUS at 00:15

## 2018-09-11 RX ADMIN — Medication 20 MILLIGRAM(S): at 21:24

## 2018-09-11 RX ADMIN — Medication 10 MILLIGRAM(S): at 14:01

## 2018-09-11 RX ADMIN — HEPARIN SODIUM 5000 UNIT(S): 5000 INJECTION INTRAVENOUS; SUBCUTANEOUS at 16:36

## 2018-09-11 RX ADMIN — BUDESONIDE AND FORMOTEROL FUMARATE DIHYDRATE 2 PUFF(S): 160; 4.5 AEROSOL RESPIRATORY (INHALATION) at 05:44

## 2018-09-11 RX ADMIN — HEPARIN SODIUM 5000 UNIT(S): 5000 INJECTION INTRAVENOUS; SUBCUTANEOUS at 10:02

## 2018-09-11 RX ADMIN — Medication 100 MILLIGRAM(S): at 17:20

## 2018-09-11 RX ADMIN — Medication 100 MILLIGRAM(S): at 00:02

## 2018-09-11 RX ADMIN — TIOTROPIUM BROMIDE 1 CAPSULE(S): 18 CAPSULE ORAL; RESPIRATORY (INHALATION) at 12:36

## 2018-09-11 RX ADMIN — Medication 5 MILLIGRAM(S): at 12:36

## 2018-09-11 RX ADMIN — Medication 5 MILLIGRAM(S): at 17:21

## 2018-09-11 RX ADMIN — PANTOPRAZOLE SODIUM 40 MILLIGRAM(S): 20 TABLET, DELAYED RELEASE ORAL at 12:36

## 2018-09-11 RX ADMIN — HEPARIN SODIUM 5000 UNIT(S): 5000 INJECTION INTRAVENOUS; SUBCUTANEOUS at 00:07

## 2018-09-11 RX ADMIN — Medication 81 MILLIGRAM(S): at 12:36

## 2018-09-11 RX ADMIN — Medication 200 MILLIGRAM(S): at 05:47

## 2018-09-11 RX ADMIN — Medication 1000 MILLIGRAM(S): at 13:54

## 2018-09-11 RX ADMIN — Medication 40 MILLIEQUIVALENT(S): at 14:01

## 2018-09-11 RX ADMIN — Medication 400 MILLIGRAM(S): at 12:43

## 2018-09-11 RX ADMIN — Medication 100 MILLIGRAM(S): at 10:05

## 2018-09-11 RX ADMIN — Medication 5 MILLIGRAM(S): at 05:47

## 2018-09-11 RX ADMIN — Medication 200 MILLIGRAM(S): at 17:22

## 2018-09-11 RX ADMIN — DEXTROSE MONOHYDRATE, SODIUM CHLORIDE, AND POTASSIUM CHLORIDE 50 MILLILITER(S): 50; .745; 4.5 INJECTION, SOLUTION INTRAVENOUS at 12:42

## 2018-09-11 RX ADMIN — DEXTROSE MONOHYDRATE, SODIUM CHLORIDE, AND POTASSIUM CHLORIDE 75 MILLILITER(S): 50; .745; 4.5 INJECTION, SOLUTION INTRAVENOUS at 16:36

## 2018-09-11 RX ADMIN — Medication 20 MILLIGRAM(S): at 05:46

## 2018-09-11 NOTE — PROGRESS NOTE ADULT - SUBJECTIVE AND OBJECTIVE BOX
SUBJECTIVE:  Patient sleeping.  _____________________________________________________  OBJECTIVE:    T(C): 36.9 (09-11-18 @ 05:23), Max: 37.2 (09-10-18 @ 22:44)  HR: 56 (09-11-18 @ 05:23)  BP: 137/89 (09-11-18 @ 05:23)  RR: 18 (09-11-18 @ 05:23)  SpO2: 98% (09-11-18 @ 05:23)  Wt(kg): --    NGT out.  _____________________________________________________  LABS:                        13.7   7.77  )-----------( 199      ( 10 Sep 2018 12:27 )             41.2     09-10    142  |  103  |  13  ----------------------------<  97  4.2   |  24  |  0.87    Ca    9.5      10 Sep 2018 07:26  Phos  4.2     09-10  Mg     2.2     09-10  _____________________________________________________  ACTIVE MEDS:  MEDICATIONS  (STANDING):  acetaminophen  IVPB .. 1000 milliGRAM(s) IV Intermittent once  ALBUTerol/ipratropium for Nebulization 3 milliLiter(s) Nebulizer every 6 hours  aspirin  chewable 81 milliGRAM(s) Oral daily  buDESOnide 160 MICROgram(s)/formoterol 4.5 MICROgram(s) Inhaler 2 Puff(s) Inhalation two times a day  ciprofloxacin   IVPB 400 milliGRAM(s) IV Intermittent every 12 hours  ciprofloxacin   IVPB      dextrose 5% + sodium chloride 0.45% with potassium chloride 20 mEq/L 1000 milliLiter(s) (75 mL/Hr) IV Continuous <Continuous>  heparin  Injectable 5000 Unit(s) SubCutaneous every 8 hours  influenza   Vaccine 0.5 milliLiter(s) IntraMuscular once  methylPREDNISolone sodium succinate Injectable 20 milliGRAM(s) IV Push <User Schedule>  methylPREDNISolone sodium succinate Injectable 10 milliGRAM(s) IV Push <User Schedule>  metoprolol tartrate Injectable 5 milliGRAM(s) IV Push every 6 hours  metroNIDAZOLE  IVPB 500 milliGRAM(s) IV Intermittent every 8 hours  metroNIDAZOLE  IVPB      pantoprazole  Injectable 40 milliGRAM(s) IV Push daily  tiotropium 18 MICROgram(s) Capsule 1 Capsule(s) Inhalation daily    MEDICATIONS  (PRN):  artificial tears (preservative free) Ophthalmic Solution 1 Drop(s) Both EYES three times a day PRN Dry Eyes  benzocaine 15 mG/menthol 3.6 mG Lozenge 1 Lozenge Oral every 4 hours PRN Sore Throat    _____________________________________________________  ASSESSMENT:  53yMale with Crohn's disease, admitted with SBO at site of active inflammation.    PLAN:  - Continue Solumedrol at the current dose.  - I ordered adalimumab antibody and concentration.    Rama Mao M.D.  (o) [phone]

## 2018-09-11 NOTE — PROGRESS NOTE ADULT - SUBJECTIVE AND OBJECTIVE BOX
Patient is a 53y old  Male who presents with a chief complaint of Abd Pain x 1 day (11 Sep 2018 06:45)    He feels improved. NGT out.  He denies c/o chest pain, SOB or palpitations.    Allergies    No Known Allergies    Intolerances      MEDICATIONS  (STANDING):  acetaminophen  IVPB .. 1000 milliGRAM(s) IV Intermittent once  ALBUTerol/ipratropium for Nebulization 3 milliLiter(s) Nebulizer every 6 hours  aspirin  chewable 81 milliGRAM(s) Oral daily  buDESOnide 160 MICROgram(s)/formoterol 4.5 MICROgram(s) Inhaler 2 Puff(s) Inhalation two times a day  ciprofloxacin   IVPB 400 milliGRAM(s) IV Intermittent every 12 hours  ciprofloxacin   IVPB      dextrose 5% + sodium chloride 0.45% with potassium chloride 20 mEq/L 1000 milliLiter(s) (75 mL/Hr) IV Continuous <Continuous>  heparin  Injectable 5000 Unit(s) SubCutaneous every 8 hours  influenza   Vaccine 0.5 milliLiter(s) IntraMuscular once  methylPREDNISolone sodium succinate Injectable 20 milliGRAM(s) IV Push <User Schedule>  methylPREDNISolone sodium succinate Injectable 10 milliGRAM(s) IV Push <User Schedule>  metoprolol tartrate Injectable 5 milliGRAM(s) IV Push every 6 hours  metroNIDAZOLE  IVPB 500 milliGRAM(s) IV Intermittent every 8 hours  metroNIDAZOLE  IVPB      pantoprazole  Injectable 40 milliGRAM(s) IV Push daily  tiotropium 18 MICROgram(s) Capsule 1 Capsule(s) Inhalation daily    MEDICATIONS  (PRN):  artificial tears (preservative free) Ophthalmic Solution 1 Drop(s) Both EYES three times a day PRN Dry Eyes  benzocaine 15 mG/menthol 3.6 mG Lozenge 1 Lozenge Oral every 4 hours PRN Sore Throat      PHYSICAL EXAM:  Vital Signs Last 24 Hrs  T(C): 36.9 (11 Sep 2018 05:23), Max: 37.2 (10 Sep 2018 22:44)  T(F): 98.5 (11 Sep 2018 05:23), Max: 98.9 (10 Sep 2018 22:44)  HR: 56 (11 Sep 2018 05:23) (56 - 96)  BP: 137/89 (11 Sep 2018 05:23) (122/70 - 151/93)  BP(mean): --  RR: 18 (11 Sep 2018 05:23) (18 - 18)  SpO2: 98% (11 Sep 2018 05:23) (95% - 98%)  Daily     Daily   I&O's Summary    10 Sep 2018 07:01  -  11 Sep 2018 07:00  --------------------------------------------------------  IN: 2000 mL / OUT: 1810 mL / NET: 190 mL      General Appearance: 	 Alert, cooperative, NAD  HEENT: normocephalic, atraumatic,,   Neck: no JVD,  carotid 2+  bilaterally without bruits  Lungs:  clear to auscultation and percussion bilaterally  Cor:  pmi 5th ICS MCL, regular rate and rhythm, S1 normal intensity, S2 normal intensity, no gallops, murmurs or rubs  Abdomen: BS normal, soft, NT, no masses,  no organomegaly  Extremities: without cyanosis, clubbing or edema      Labs:  CBC Full  -  ( 10 Sep 2018 12:27 )  WBC Count : 7.77 K/uL  Hemoglobin : 13.7 g/dL  Hematocrit : 41.2 %  Platelet Count - Automated : 199 K/uL  Mean Cell Volume : 82.6 fl  Mean Cell Hemoglobin : 27.5 pg  Mean Cell Hemoglobin Concentration : 33.3 gm/dL  Auto Neutrophil # : x  Auto Lymphocyte # : x  Auto Monocyte # : x  Auto Eosinophil # : x  Auto Basophil # : x  Auto Neutrophil % : x  Auto Lymphocyte % : x  Auto Monocyte % : x  Auto Eosinophil % : x  Auto Basophil % : x    09-10    142  |  103  |  13  ----------------------------<  97  4.2   |  24  |  0.87    Ca    9.5      10 Sep 2018 07:26  Phos  4.2     09-10  Mg     2.2     09-10                Radiology/Imaging:                Randell Fernández MD Located within [facility]  [phone]

## 2018-09-11 NOTE — CHART NOTE - NSCHARTNOTEFT_GEN_A_CORE
Paged by RN due to patient feeling "weak." Patient was seen and examined. Patient was stable. Patient reports a discomfort between his shoulders with little tingling down his bilateral arms.     Vital Signs Last 24 Hrs  T(C): 37 (11 Sep 2018 15:33), Max: 37.2 (10 Sep 2018 22:44)  T(F): 98.6 (11 Sep 2018 15:33), Max: 98.9 (10 Sep 2018 22:44)  HR: 50 (11 Sep 2018 15:33) (50 - 96)  BP: 159/93 (11 Sep 2018 15:33) (127/73 - 159/93)  BP(mean): --  RR: 18 (11 Sep 2018 15:33) (18 - 18)  SpO2: 98% (11 Sep 2018 15:33) (95% - 98%)    Physical Exam:   Gen: NAD, AAOx3  Abd: soft, Non-tender, mild distention   no rebound, no guarding    EKG performed: Sinus Bradycardia   CBC, BMP, Mg, Phos, type and screen, troponin ordered and drawn STAT  Portable abdominal xray and CXR.  Will continue with IV F, and change fluids.     Called and discussed with Dr. [phone] Low suspicion for Cardiac issues. Agree with above plan. Will continue to monitor and watch patient, will discuss with Dr. Michel. Paged by RN due to patient feeling "weak." Patient was seen and examined. Patient was stable. Patient reports a discomfort between his shoulders with little tingling down his bilateral arms. Patient denies Chest pain/numbness unilaterally. Patient denies SOB/Palpations.     Vital Signs Last 24 Hrs  T(C): 37 (11 Sep 2018 15:33), Max: 37.2 (10 Sep 2018 22:44)  T(F): 98.6 (11 Sep 2018 15:33), Max: 98.9 (10 Sep 2018 22:44)  HR: 50 (11 Sep 2018 15:33) (50 - 96)  BP: 159/93 (11 Sep 2018 15:33) (127/73 - 159/93)  BP(mean): --  RR: 18 (11 Sep 2018 15:33) (18 - 18)  SpO2: 98% (11 Sep 2018 15:33) (95% - 98%)    Physical Exam:   Gen: NAD, AAOx3  Neuro exam: cranial nerves grossly intact   Smile, frown, cheek puffing symmetric. No abnormalities noted.   Strength equal bilaterally. 5/5, active and passive range of motion intact.     Abd: soft, Non-tender, mild distention   No rebound, no guarding    EKG performed: Sinus Bradycardia   CBC, BMP, Mg, Phos, type and screen, troponin ordered and drawn STAT  Portable abdominal xray and CXR.  Will continue with IV F, and change fluids.     Called and discussed with Dr. [phone] Low suspicion for Cardiac issues. Agree with above plan. Will continue to monitor and watch patient, discussed with Dr. Michel. Called neurology will follow up consult as per recommendations. Unlikely that it is acute.

## 2018-09-11 NOTE — PROGRESS NOTE ADULT - ASSESSMENT
53y Male with history of CAD and IBD admitted with abdominal pain and vomiting. CT c/w partial SBO. Resolving SBO. CV stable.     Rec:  IV lopressor until taking po  Aspirin with history of CAD and PCI.  Diet and D/C planning per surgery and GI.

## 2018-09-11 NOTE — PROGRESS NOTE ADULT - ATTENDING COMMENTS
I saw and evaluated patient at 7:45 this am and patient looked well with planned diet advancement as tolerated.  Recent events noted.  Work up in progress

## 2018-09-11 NOTE — PROGRESS NOTE ADULT - SUBJECTIVE AND OBJECTIVE BOX
CHIEF COMPLAINT: f/up asthma, osas, allergy--NG tube out--ambulating--no sob or cough    Interval Events: ambulating-small BM    REVIEW OF SYSTEMS:  Constitutional: No fevers or chills. No weight loss. + fatigue or generalized malaise.  Eyes: No itching or discharge from the eyes  ENT: No ear pain. No ear discharge. No nasal congestion. No post nasal drip. No epistaxis. No throat pain. No sore throat. No difficulty swallowing.   CV: No chest pain. No palpitations. No lightheadedness or dizziness.   Resp: No dyspnea at rest. No dyspnea on exertion. No orthopnea. No wheezing. No cough. No stridor. No sputum production. No chest pain with respiration.  GI: No nausea. No vomiting. No diarrhea.  MSK: No joint pain or pain in any extremities  Integumentary: No skin lesions. No pedal edema.  Neurological: No gross motor weakness. No sensory changes.  [+ ] All other systems negative  [ ] Unable to assess ROS because ________    OBJECTIVE:  ICU Vital Signs Last 24 Hrs  T(C): 36.9 (11 Sep 2018 05:23), Max: 37.2 (10 Sep 2018 22:44)  T(F): 98.5 (11 Sep 2018 05:23), Max: 98.9 (10 Sep 2018 22:44)  HR: 56 (11 Sep 2018 05:23) (56 - 96)  BP: 137/89 (11 Sep 2018 05:23) (122/70 - 151/93)  BP(mean): --  ABP: --  ABP(mean): --  RR: 18 (11 Sep 2018 05:23) (18 - 18)  SpO2: 98% (11 Sep 2018 05:23) (94% - 98%)        09-09 @ 07:01  -  09-10 @ 07:00  --------------------------------------------------------  IN: 3450 mL / OUT: 3300 mL / NET: 150 mL    09-10 @ 07:01  -  09-11 @ 05:25  --------------------------------------------------------  IN: 2000 mL / OUT: 1810 mL / NET: 190 mL      CAPILLARY BLOOD GLUCOSE          PHYSICAL EXAM: NAD in bed  General: Awake, alert, oriented X 3.   HEENT: Atraumatic, normocephalic.                 Mallampatti Grade 2                No nasal congestion.                No tonsillar or pharyngeal exudates.  Lymph Nodes: No palpable lymphadenopathy  Neck: No JVD. No carotid bruit.   Respiratory: Normal chest expansion                         Normal percussion                         Normal and equal air entry                         No wheeze, rhonchi or rales.  Cardiovascular: S1 S2 normal. No murmurs, rubs or gallops.   Abdomen: Soft, non-tender, non-distended. No organomegaly.  Extremities: Warm to touch. Peripheral pulse palpable. No pedal edema.   Skin: No rashes or skin lesions  Neurological: Motor and sensory examination equal and normal in all four extremities.  Psychiatry: Appropriate mood and affect.    HOSPITAL MEDICATIONS:  MEDICATIONS  (STANDING):  acetaminophen  IVPB .. 1000 milliGRAM(s) IV Intermittent once  ALBUTerol/ipratropium for Nebulization 3 milliLiter(s) Nebulizer every 6 hours  aspirin  chewable 81 milliGRAM(s) Oral daily  buDESOnide 160 MICROgram(s)/formoterol 4.5 MICROgram(s) Inhaler 2 Puff(s) Inhalation two times a day  ciprofloxacin   IVPB 400 milliGRAM(s) IV Intermittent every 12 hours  ciprofloxacin   IVPB      dextrose 5% + sodium chloride 0.45% with potassium chloride 20 mEq/L 1000 milliLiter(s) (75 mL/Hr) IV Continuous <Continuous>  heparin  Injectable 5000 Unit(s) SubCutaneous every 8 hours  influenza   Vaccine 0.5 milliLiter(s) IntraMuscular once  methylPREDNISolone sodium succinate Injectable 20 milliGRAM(s) IV Push <User Schedule>  methylPREDNISolone sodium succinate Injectable 10 milliGRAM(s) IV Push <User Schedule>  metoprolol tartrate Injectable 5 milliGRAM(s) IV Push every 6 hours  metroNIDAZOLE  IVPB 500 milliGRAM(s) IV Intermittent every 8 hours  metroNIDAZOLE  IVPB      pantoprazole  Injectable 40 milliGRAM(s) IV Push daily  tiotropium 18 MICROgram(s) Capsule 1 Capsule(s) Inhalation daily    MEDICATIONS  (PRN):  artificial tears (preservative free) Ophthalmic Solution 1 Drop(s) Both EYES three times a day PRN Dry Eyes  benzocaine 15 mG/menthol 3.6 mG Lozenge 1 Lozenge Oral every 4 hours PRN Sore Throat      LABS:                        13.7   7.77  )-----------( 199      ( 10 Sep 2018 12:27 )             41.2     09-10    142  |  103  |  13  ----------------------------<  97  4.2   |  24  |  0.87    Ca    9.5      10 Sep 2018 07:26  Phos  4.2     09-10  Mg     2.2     09-10                MICROBIOLOGY:     RADIOLOGY:  [ ] Reviewed and interpreted by me    Point of Care Ultrasound Findings:    PFT:    EKG:

## 2018-09-11 NOTE — PROGRESS NOTE ADULT - ASSESSMENT
54yo M with PMH of CAD, MI s/p PCI, COPD, asthma, MARYAM, Crohn's s/p stricturoplasty x 2 and SBR 3/2018 now a/w SBO due to acute Crohn's exacerbation with inflammatory narrowing.     - NPO w/ sips  -cont IV Cipro/flagyl  -solumedrol 20 q8h per GI recs  -serial abd exams  -cont COPD/asthma home meds per PULM  -OOB and ambulate    [facility]   [id number]

## 2018-09-11 NOTE — PROGRESS NOTE ADULT - ASSESSMENT
53 M with HTN, HLD, CAD s/p MI and PCI, asthma, MARYAM on nocturnal CPAP, GERD, Crohn's disease s/p stricturoplasty x 2 and small bowel resection in 3/2018, now p/w acute onset of abdominal pain, found to have a SBO with a transition point in the RLQ, current being conservatively managed with NGT decompression and bowel rest.     1. Asthma:  - Currently stable pulmonary status, saturating well on RA.   - No evidence of an asthma exacerbation  - Suggest Duonebs Q6H prn  - continue Symbicort BID and Spiriva daily  - re-start Singulair as pt is to come off NPO  - Keep O2 sats above 90 %  - Incentive spirometry once able to tolerate     2. MARYAM:  - Pt is on nocturnal APAP with a min pressure of 4 and max pressure of 9 at home   - Ok to hold off on nocturnal CPAP in the setting of an acute bowel obstruction   - Once acute issues have resolved and there is no longer a contraindication to NIV, we can resume nocturnal CPAP at 9 cm H20    3. Small bowel obstruction/ Crohn's disease with exacerbation/ SB stricture   - transition from NPO as  per surgical team   - On IV Cipro/ flagyl   - Cont IV hydration   - Serial abdominal exams   - GI evaln    4. Gen:  - DVT Px: Hep SQ

## 2018-09-11 NOTE — PROGRESS NOTE ADULT - SUBJECTIVE AND OBJECTIVE BOX
SURGERY DAILY PROGRESS NOTE:       SUBJECTIVE/ROS: Patient examined at bedside. No acute events overnight. Tolerates sips w/p N/V. -BM/+Flatus. NGT was d/c'ed last night.          MEDICATIONS  (STANDING):  acetaminophen  IVPB .. 1000 milliGRAM(s) IV Intermittent once  ALBUTerol/ipratropium for Nebulization 3 milliLiter(s) Nebulizer every 6 hours  aspirin  chewable 81 milliGRAM(s) Oral daily  buDESOnide 160 MICROgram(s)/formoterol 4.5 MICROgram(s) Inhaler 2 Puff(s) Inhalation two times a day  ciprofloxacin   IVPB 400 milliGRAM(s) IV Intermittent every 12 hours  ciprofloxacin   IVPB      dextrose 5% + sodium chloride 0.45% with potassium chloride 20 mEq/L 1000 milliLiter(s) (75 mL/Hr) IV Continuous <Continuous>  heparin  Injectable 5000 Unit(s) SubCutaneous every 8 hours  influenza   Vaccine 0.5 milliLiter(s) IntraMuscular once  methylPREDNISolone sodium succinate Injectable 20 milliGRAM(s) IV Push two times a day  methylPREDNISolone sodium succinate Injectable 10 milliGRAM(s) IV Push daily  metoprolol tartrate Injectable 5 milliGRAM(s) IV Push every 6 hours  metroNIDAZOLE  IVPB 500 milliGRAM(s) IV Intermittent every 8 hours  metroNIDAZOLE  IVPB      pantoprazole  Injectable 40 milliGRAM(s) IV Push daily  tiotropium 18 MICROgram(s) Capsule 1 Capsule(s) Inhalation daily    MEDICATIONS  (PRN):  artificial tears (preservative free) Ophthalmic Solution 1 Drop(s) Both EYES three times a day PRN Dry Eyes  benzocaine 15 mG/menthol 3.6 mG Lozenge 1 Lozenge Oral every 4 hours PRN Sore Throat      OBJECTIVE:    Vital Signs Last 24 Hrs  T(C): 36.9 (10 Sep 2018 23:49), Max: 37.2 (10 Sep 2018 22:44)  T(F): 98.4 (10 Sep 2018 23:49), Max: 98.9 (10 Sep 2018 22:44)  HR: 64 (10 Sep 2018 23:49) (60 - 96)  BP: 135/83 (10 Sep 2018 23:49) (122/70 - 151/93)  BP(mean): --  RR: 18 (10 Sep 2018 23:49) (18 - 18)  SpO2: 98% (10 Sep 2018 23:49) (94% - 98%)        I&O's Detail    09 Sep 2018 07:01  -  10 Sep 2018 07:00  --------------------------------------------------------  IN:    dextrose 5% + sodium chloride 0.45% with potassium chloride 20 mEq/L: 2750 mL    Solution: 200 mL    Solution: 100 mL    Solution: 400 mL  Total IN: 3450 mL    OUT:    Nasoenteral Tube: 875 mL    Voided: 2425 mL  Total OUT: 3300 mL    Total NET: 150 mL      10 Sep 2018 07:01  -  11 Sep 2018 00:34  --------------------------------------------------------  IN:    dextrose 5% + sodium chloride 0.45% with potassium chloride 20 mEq/L: 1500 mL    Solution: 200 mL    Solution: 300 mL  Total IN: 2000 mL    OUT:    Nasoenteral Tube: 110 mL    Voided: 1700 mL  Total OUT: 1810 mL    Total NET: 190 mL          Daily     Daily     LABS:                        13.7   7.77  )-----------( 199      ( 10 Sep 2018 12:27 )             41.2     09-10    142  |  103  |  13  ----------------------------<  97  4.2   |  24  |  0.87    Ca    9.5      10 Sep 2018 07:26  Phos  4.2     09-10  Mg     2.2     09-10                    PHYSICAL EXAM:  GEN: NAD  Pulm: unlabored breathing on RA  CV: radial pulse 2+, cap refil <2sec  Abd: soft, nontender, nondistended

## 2018-09-11 NOTE — PROGRESS NOTE ADULT - ATTENDING COMMENTS
as above--  Asthma /allergies--stable at present--continue BD regimen as above-can restart singulair  OSAS-rx as out pt  CAD-Pradeep et al  GI as per Marifer/Marilu--? advance diet as NGT removed  ambulate    Leonel Duckworth MD-Pulmonary   [phone]

## 2018-09-12 ENCOUNTER — TRANSCRIPTION ENCOUNTER (OUTPATIENT)
Age: 53
End: 2018-09-12

## 2018-09-12 VITALS
WEIGHT: 186.07 LBS | OXYGEN SATURATION: 98 % | TEMPERATURE: 99 F | SYSTOLIC BLOOD PRESSURE: 128 MMHG | HEART RATE: 69 BPM | DIASTOLIC BLOOD PRESSURE: 78 MMHG | RESPIRATION RATE: 18 BRPM

## 2018-09-12 LAB
ANION GAP SERPL CALC-SCNC: 12 MMOL/L — SIGNIFICANT CHANGE UP (ref 5–17)
BUN SERPL-MCNC: 15 MG/DL — SIGNIFICANT CHANGE UP (ref 7–23)
CALCIUM SERPL-MCNC: 10 MG/DL — SIGNIFICANT CHANGE UP (ref 8.4–10.5)
CHLORIDE SERPL-SCNC: 101 MMOL/L — SIGNIFICANT CHANGE UP (ref 96–108)
CO2 SERPL-SCNC: 25 MMOL/L — SIGNIFICANT CHANGE UP (ref 22–31)
CREAT SERPL-MCNC: 0.98 MG/DL — SIGNIFICANT CHANGE UP (ref 0.5–1.3)
GLUCOSE SERPL-MCNC: 98 MG/DL — SIGNIFICANT CHANGE UP (ref 70–99)
HCT VFR BLD CALC: 41 % — SIGNIFICANT CHANGE UP (ref 39–50)
HGB BLD-MCNC: 13.8 G/DL — SIGNIFICANT CHANGE UP (ref 13–17)
MAGNESIUM SERPL-MCNC: 2.2 MG/DL — SIGNIFICANT CHANGE UP (ref 1.6–2.6)
MCHC RBC-ENTMCNC: 28 PG — SIGNIFICANT CHANGE UP (ref 27–34)
MCHC RBC-ENTMCNC: 33.7 GM/DL — SIGNIFICANT CHANGE UP (ref 32–36)
MCV RBC AUTO: 83.2 FL — SIGNIFICANT CHANGE UP (ref 80–100)
PHOSPHATE SERPL-MCNC: 4.1 MG/DL — SIGNIFICANT CHANGE UP (ref 2.5–4.5)
PLATELET # BLD AUTO: 210 K/UL — SIGNIFICANT CHANGE UP (ref 150–400)
POTASSIUM SERPL-MCNC: 4 MMOL/L — SIGNIFICANT CHANGE UP (ref 3.5–5.3)
POTASSIUM SERPL-SCNC: 4 MMOL/L — SIGNIFICANT CHANGE UP (ref 3.5–5.3)
RBC # BLD: 4.93 M/UL — SIGNIFICANT CHANGE UP (ref 4.2–5.8)
RBC # FLD: 14 % — SIGNIFICANT CHANGE UP (ref 10.3–14.5)
SODIUM SERPL-SCNC: 138 MMOL/L — SIGNIFICANT CHANGE UP (ref 135–145)
WBC # BLD: 10.89 K/UL — HIGH (ref 3.8–10.5)
WBC # FLD AUTO: 10.89 K/UL — HIGH (ref 3.8–10.5)

## 2018-09-12 PROCEDURE — 99232 SBSQ HOSP IP/OBS MODERATE 35: CPT

## 2018-09-12 RX ORDER — METOPROLOL TARTRATE 50 MG
12.5 TABLET ORAL DAILY
Qty: 0 | Refills: 0 | Status: DISCONTINUED | OUTPATIENT
Start: 2018-09-12 | End: 2018-09-12

## 2018-09-12 RX ORDER — PANTOPRAZOLE SODIUM 20 MG/1
40 TABLET, DELAYED RELEASE ORAL
Qty: 0 | Refills: 0 | Status: DISCONTINUED | OUTPATIENT
Start: 2018-09-12 | End: 2018-09-12

## 2018-09-12 RX ORDER — CIPROFLOXACIN LACTATE 400MG/40ML
1 VIAL (ML) INTRAVENOUS
Qty: 14 | Refills: 0
Start: 2018-09-12 | End: 2018-09-18

## 2018-09-12 RX ORDER — CIPROFLOXACIN LACTATE 400MG/40ML
500 VIAL (ML) INTRAVENOUS EVERY 12 HOURS
Qty: 0 | Refills: 0 | Status: DISCONTINUED | OUTPATIENT
Start: 2018-09-12 | End: 2018-09-12

## 2018-09-12 RX ORDER — LOSARTAN POTASSIUM 100 MG/1
1 TABLET, FILM COATED ORAL
Qty: 0 | Refills: 0 | COMMUNITY

## 2018-09-12 RX ORDER — BUDESONIDE AND FORMOTEROL FUMARATE DIHYDRATE 160; 4.5 UG/1; UG/1
2 AEROSOL RESPIRATORY (INHALATION)
Qty: 0 | Refills: 0 | DISCHARGE
Start: 2018-09-12

## 2018-09-12 RX ORDER — IPRATROPIUM/ALBUTEROL SULFATE 18-103MCG
3 AEROSOL WITH ADAPTER (GRAM) INHALATION
Qty: 0 | Refills: 0 | DISCHARGE
Start: 2018-09-12

## 2018-09-12 RX ORDER — METRONIDAZOLE 500 MG
500 TABLET ORAL EVERY 8 HOURS
Qty: 0 | Refills: 0 | Status: DISCONTINUED | OUTPATIENT
Start: 2018-09-12 | End: 2018-09-12

## 2018-09-12 RX ORDER — METRONIDAZOLE 500 MG
1 TABLET ORAL
Qty: 21 | Refills: 0
Start: 2018-09-12 | End: 2018-09-18

## 2018-09-12 RX ADMIN — Medication 100 MILLIGRAM(S): at 10:17

## 2018-09-12 RX ADMIN — BUDESONIDE AND FORMOTEROL FUMARATE DIHYDRATE 2 PUFF(S): 160; 4.5 AEROSOL RESPIRATORY (INHALATION) at 06:09

## 2018-09-12 RX ADMIN — HEPARIN SODIUM 5000 UNIT(S): 5000 INJECTION INTRAVENOUS; SUBCUTANEOUS at 00:32

## 2018-09-12 RX ADMIN — Medication 5 MILLIGRAM(S): at 12:26

## 2018-09-12 RX ADMIN — HEPARIN SODIUM 5000 UNIT(S): 5000 INJECTION INTRAVENOUS; SUBCUTANEOUS at 10:17

## 2018-09-12 RX ADMIN — DEXTROSE MONOHYDRATE, SODIUM CHLORIDE, AND POTASSIUM CHLORIDE 75 MILLILITER(S): 50; .745; 4.5 INJECTION, SOLUTION INTRAVENOUS at 10:18

## 2018-09-12 RX ADMIN — Medication 20 MILLIGRAM(S): at 06:08

## 2018-09-12 RX ADMIN — TIOTROPIUM BROMIDE 1 CAPSULE(S): 18 CAPSULE ORAL; RESPIRATORY (INHALATION) at 12:26

## 2018-09-12 RX ADMIN — Medication 100 MILLIGRAM(S): at 00:32

## 2018-09-12 RX ADMIN — Medication 200 MILLIGRAM(S): at 06:08

## 2018-09-12 RX ADMIN — Medication 81 MILLIGRAM(S): at 12:26

## 2018-09-12 RX ADMIN — Medication 500 MILLIGRAM(S): at 15:11

## 2018-09-12 RX ADMIN — Medication 5 MILLIGRAM(S): at 06:08

## 2018-09-12 RX ADMIN — Medication 20 MILLIGRAM(S): at 17:16

## 2018-09-12 NOTE — PROGRESS NOTE ADULT - PROVIDER SPECIALTY LIST ADULT
Cardiology
Gastroenterology
Pulmonology
Surgery
Gastroenterology
Surgery

## 2018-09-12 NOTE — PROGRESS NOTE ADULT - SUBJECTIVE AND OBJECTIVE BOX
CHIEF COMPLAINT: f/up asthma, allergy, osas--doing ok-exhausted; mild sob    Interval Events: ambulating well; food introduced    REVIEW OF SYSTEMS:  Constitutional: No fevers or chills. No weight loss. + fatigue or generalized malaise.  Eyes: No itching or discharge from the eyes  ENT: No ear pain. No ear discharge. No nasal congestion. No post nasal drip. No epistaxis. No throat pain. No sore throat. No difficulty swallowing.   CV: No chest pain. No palpitations. No lightheadedness or dizziness.   Resp: No dyspnea at rest. + dyspnea on exertion. No orthopnea. No wheezing. No cough. No stridor. No sputum production. No chest pain with respiration.  GI: No nausea. No vomiting. No diarrhea.  MSK: No joint pain or pain in any extremities  Integumentary: No skin lesions. No pedal edema.  Neurological: No gross motor weakness. No sensory changes.  [+ ] All other systems negative  [ ] Unable to assess ROS because ________    OBJECTIVE:  ICU Vital Signs Last 24 Hrs  T(C): 36.9 (12 Sep 2018 00:30), Max: 37 (11 Sep 2018 15:33)  T(F): 98.5 (12 Sep 2018 00:30), Max: 98.6 (11 Sep 2018 15:33)  HR: 55 (12 Sep 2018 00:30) (50 - 74)  BP: 132/77 (12 Sep 2018 00:30) (117/75 - 159/93)  BP(mean): --  ABP: --  ABP(mean): --  RR: 18 (12 Sep 2018 00:30) (18 - 18)  SpO2: 97% (12 Sep 2018 00:30) (96% - 98%)        09-10 @ 07:01  -  09-11 @ 07:00  --------------------------------------------------------  IN: 3300 mL / OUT: 1810 mL / NET: 1490 mL    09-11 @ 07:01 - 09-12 @ 05:03  --------------------------------------------------------  IN: 2460 mL / OUT: 2000 mL / NET: 460 mL      CAPILLARY BLOOD GLUCOSE          PHYSICAL EXAM: NAD in bed  General: Awake, alert, oriented X 3.   HEENT: Atraumatic, normocephalic.                 Mallampatti Grade 2                No nasal congestion.                No tonsillar or pharyngeal exudates.  Lymph Nodes: No palpable lymphadenopathy  Neck: No JVD. No carotid bruit.   Respiratory: Normal chest expansion                         Normal percussion                         Normal and equal air entry                         No wheeze, rhonchi or rales.  Cardiovascular: S1 S2 normal. No murmurs, rubs or gallops.   Abdomen: Soft, non-tender, non-distended. No organomegaly. NABS  Extremities: Warm to touch. Peripheral pulse palpable. No pedal edema.   Skin: No rashes or skin lesions  Neurological: Motor and sensory examination equal and normal in all four extremities.  Psychiatry: Appropriate mood and affect.    HOSPITAL MEDICATIONS:  MEDICATIONS  (STANDING):  ALBUTerol/ipratropium for Nebulization 3 milliLiter(s) Nebulizer every 6 hours  aspirin  chewable 81 milliGRAM(s) Oral daily  buDESOnide 160 MICROgram(s)/formoterol 4.5 MICROgram(s) Inhaler 2 Puff(s) Inhalation two times a day  ciprofloxacin   IVPB 400 milliGRAM(s) IV Intermittent every 12 hours  ciprofloxacin   IVPB      dextrose 5% + sodium chloride 0.45% with potassium chloride 20 mEq/L 1000 milliLiter(s) (75 mL/Hr) IV Continuous <Continuous>  heparin  Injectable 5000 Unit(s) SubCutaneous every 8 hours  influenza   Vaccine 0.5 milliLiter(s) IntraMuscular once  methylPREDNISolone sodium succinate Injectable 20 milliGRAM(s) IV Push <User Schedule>  methylPREDNISolone sodium succinate Injectable 10 milliGRAM(s) IV Push <User Schedule>  metoprolol tartrate Injectable 5 milliGRAM(s) IV Push every 6 hours  metroNIDAZOLE  IVPB 500 milliGRAM(s) IV Intermittent every 8 hours  metroNIDAZOLE  IVPB      pantoprazole  Injectable 40 milliGRAM(s) IV Push daily  tiotropium 18 MICROgram(s) Capsule 1 Capsule(s) Inhalation daily    MEDICATIONS  (PRN):  artificial tears (preservative free) Ophthalmic Solution 1 Drop(s) Both EYES three times a day PRN Dry Eyes  benzocaine 15 mG/menthol 3.6 mG Lozenge 1 Lozenge Oral every 4 hours PRN Sore Throat      LABS:                        13.2   8.0   )-----------( 199      ( 11 Sep 2018 16:08 )             39.0     09-11    139  |  105  |  15  ----------------------------<  112<H>  4.4   |  23  |  1.04    Ca    9.2      11 Sep 2018 16:08  Phos  4.1     09-11  Mg     2.2     09-11      PT/INR - ( 11 Sep 2018 16:08 )   PT: 13.6 sec;   INR: 1.25 ratio         PTT - ( 11 Sep 2018 16:08 )  PTT:32.1 sec          MICROBIOLOGY:     RADIOLOGY:  [ ] Reviewed and interpreted by me    Point of Care Ultrasound Findings:    PFT:    EKG:

## 2018-09-12 NOTE — DISCHARGE NOTE ADULT - CARE PLAN
Principal Discharge DX:	Generalized abdominal pain Principal Discharge DX:	Generalized abdominal pain  Goal:	flare on IV antibiotics  Assessment and plan of treatment:	-DVT PPX  -Pain control  -IV to PO antibiotic transition   -PO steroid transition   -Regular diet  -All consults appreciated patient understands for follow up recommendations and requests.

## 2018-09-12 NOTE — DISCHARGE NOTE ADULT - PLAN OF CARE
flare on IV antibiotics -DVT PPX  -Pain control  -IV to PO antibiotic transition   -PO steroid transition   -Regular diet  -All consults appreciated patient understands for follow up recommendations and requests.

## 2018-09-12 NOTE — PROGRESS NOTE ADULT - SUBJECTIVE AND OBJECTIVE BOX
Patient is a 53y old  Male who presents with a chief complaint of Abd Pain x 1 day (12 Sep 2018 05:45)     He denies c/o chest pain, SOB or palpitations. Brief episode of weakness and not feeling right yesterday. No further symptoms. Had BM and passing gas. No calf pain or pleuritic CP.     Allergies    No Known Allergies    Intolerances      MEDICATIONS  (STANDING):  ALBUTerol/ipratropium for Nebulization 3 milliLiter(s) Nebulizer every 6 hours  aspirin  chewable 81 milliGRAM(s) Oral daily  buDESOnide 160 MICROgram(s)/formoterol 4.5 MICROgram(s) Inhaler 2 Puff(s) Inhalation two times a day  ciprofloxacin   IVPB 400 milliGRAM(s) IV Intermittent every 12 hours  ciprofloxacin   IVPB      dextrose 5% + sodium chloride 0.45% with potassium chloride 20 mEq/L 1000 milliLiter(s) (75 mL/Hr) IV Continuous <Continuous>  heparin  Injectable 5000 Unit(s) SubCutaneous every 8 hours  influenza   Vaccine 0.5 milliLiter(s) IntraMuscular once  methylPREDNISolone sodium succinate Injectable 20 milliGRAM(s) IV Push <User Schedule>  methylPREDNISolone sodium succinate Injectable 10 milliGRAM(s) IV Push <User Schedule>  metoprolol tartrate Injectable 5 milliGRAM(s) IV Push every 6 hours  metroNIDAZOLE  IVPB 500 milliGRAM(s) IV Intermittent every 8 hours  metroNIDAZOLE  IVPB      pantoprazole  Injectable 40 milliGRAM(s) IV Push daily  tiotropium 18 MICROgram(s) Capsule 1 Capsule(s) Inhalation daily    MEDICATIONS  (PRN):  artificial tears (preservative free) Ophthalmic Solution 1 Drop(s) Both EYES three times a day PRN Dry Eyes  benzocaine 15 mG/menthol 3.6 mG Lozenge 1 Lozenge Oral every 4 hours PRN Sore Throat      PHYSICAL EXAM:  Vital Signs Last 24 Hrs  T(C): 36.7 (12 Sep 2018 05:48), Max: 37 (11 Sep 2018 15:33)  T(F): 98 (12 Sep 2018 05:48), Max: 98.6 (11 Sep 2018 15:33)  HR: 61 (12 Sep 2018 05:48) (50 - 74)  BP: 137/87 (12 Sep 2018 05:48) (117/75 - 159/93)  BP(mean): --  RR: 18 (12 Sep 2018 05:48) (18 - 18)  SpO2: 97% (12 Sep 2018 05:48) (96% - 98%)  Daily     Daily   I&O's Summary    11 Sep 2018 07:01  -  12 Sep 2018 07:00  --------------------------------------------------------  IN: 3660 mL / OUT: 2550 mL / NET: 1110 mL    General Appearance: 	 Alert, cooperative, NAD  HEENT: normocephalic, atraumatic,,   Neck: no JVD,  carotid 2+  bilaterally without bruits  Lungs:  clear to auscultation and percussion bilaterally  Cor:  pmi 5th ICS MCL, regular rate and rhythm, S1 normal intensity, S2 normal intensity, no gallops, murmurs or rubs  Abdomen: BS normal, soft, NT, no masses,  no organomegaly  Extremities: without cyanosis, clubbing or edema      Labs:  CBC Full  -  ( 11 Sep 2018 16:08 )  WBC Count : 8.0 K/uL  Hemoglobin : 13.2 g/dL  Hematocrit : 39.0 %  Platelet Count - Automated : 199 K/uL  Mean Cell Volume : 81.2 fl  Mean Cell Hemoglobin : 27.5 pg  Mean Cell Hemoglobin Concentration : 33.9 gm/dL  Auto Neutrophil # : x  Auto Lymphocyte # : x  Auto Monocyte # : x  Auto Eosinophil # : x  Auto Basophil # : x  Auto Neutrophil % : x  Auto Lymphocyte % : x  Auto Monocyte % : x  Auto Eosinophil % : x  Auto Basophil % : x    09-11    139  |  105  |  15  ----------------------------<  112<H>  4.4   |  23  |  1.04    Ca    9.2      11 Sep 2018 16:08  Phos  4.1     09-11  Mg     2.2     09-11          PT/INR - ( 11 Sep 2018 16:08 )   PT: 13.6 sec;   INR: 1.25 ratio         PTT - ( 11 Sep 2018 16:08 )  PTT:32.1 sec      Radiology/Imaging:                Randell Fernández MD [facility]  [phone]

## 2018-09-12 NOTE — PROGRESS NOTE ADULT - ATTENDING COMMENTS
I have seen patient and discussed with team.  Agree with resident assessment and plan.  If cleared by GI will DC home with GI follow up

## 2018-09-12 NOTE — DISCHARGE NOTE ADULT - ADDITIONAL INSTRUCTIONS
WOUND CARE:   BATHING: Please do not submerge wound underwater. You may shower and/or sponge bathe.  ACTIVITY: No heavy lifting or straining. Otherwise, you may return to your usual level of physical activity. If you are taking narcotic pain medication (such as Percocet), do NOT drive a car, operate machinery or make important decisions.  DIET: Return to your usual diet.  NOTIFY YOUR SURGEON IF: You have any bleeding that does not stop, any pus draining from your wound, any fever (over 100.4 F) or chills, persistent nausea/vomiting, persistent diarrhea, or if your pain is not controlled on your discharge pain medications.  FOLLOW-UP:  1. Please call to make a follow-up appointment within one week of discharge   2. Please follow up with your primary care physician in one week regarding your hospitalization.    Please notify your Attending Physician if after discharge you have fever, chills, abdominal pain, purulent drainage from your incision, in ability to pass flatus, not tolerating PO diet, abdominal bloating/distention, naseau and or vomiting. Additionally any acute changes as well. Please report back to the Emergency Department if unable to reach Physician. ACTIVITY: No heavy lifting or straining. Otherwise, you may return to your usual level of physical activity. If you are taking narcotic pain medication (such as Percocet), do NOT drive a car, operate machinery or make important decisions.  DIET: Return to your usual diet.  NOTIFY YOUR SURGEON IF: You have any bleeding that does not stop, any pus draining from your wound, any fever (over 100.4 F) or chills, persistent nausea/vomiting, persistent diarrhea, or if your pain is not controlled on your discharge pain medications.  FOLLOW-UP:  1. Please call to make a follow-up appointment within one week of discharge with Dr. Michel.   2. Please follow up with your primary care physician in one week regarding your hospitalization.  3. Please follow up within one week of discharge with Dr. Duckworth (your Pulmonologist).   4. Please follow up within one week of discharge with Dr. Fernández. (Cardiologist).   5. Please follow up with one week with Dr. Caicedo your Gastroenterologists to determine the steroid angel.   ** As per GI instructed to take normal Humira level 40 mg dose once he returns home.     Please notify your Attending Physician if after discharge you have fever, chills, abdominal pain, purulent drainage from your incision, in ability to pass flatus, not tolerating PO diet, abdominal bloating/distention, naseau and or vomiting. Additionally any acute changes as well. Please report back to the Emergency Department if unable to reach Physician. ACTIVITY: No heavy lifting or straining. Otherwise, you may return to your usual level of physical activity. If you are taking narcotic pain medication (such as Percocet), do NOT drive a car, operate machinery or make important decisions.  DIET: Return to your usual diet.  NOTIFY YOUR SURGEON IF: You have any bleeding that does not stop, any pus draining from your wound, any fever (over 100.4 F) or chills, persistent nausea/vomiting, persistent diarrhea, or if your pain is not controlled on your discharge pain medications.  FOLLOW-UP:  1. Please call to make a follow-up appointment within one week of discharge with Dr. Michel.   2. Please follow up with your primary care physician in one week regarding your hospitalization.  3. Please follow up within one week of discharge with Dr. Duckworth (your Pulmonologist).   4. Please follow up within one week of discharge with Dr. Fernández. (Cardiologist). DO NOT TAKE LOSARTAN AT THIS TIME TILL YOU SEE DR. FERNÁNDEZ.   5. Please follow up with one week with Dr. Caicedo your Gastroenterologists to determine the steroid angel this Friday 9/14/2018.   ** As per GI instructed to take normal Humira level 40 mg dose once he returns home.     Please notify your Attending Physician if after discharge you have fever, chills, abdominal pain, purulent drainage from your incision, in ability to pass flatus, not tolerating PO diet, abdominal bloating/distention, naseau and or vomiting. Additionally any acute changes as well. Please report back to the Emergency Department if unable to reach Physician.

## 2018-09-12 NOTE — PROGRESS NOTE ADULT - SUBJECTIVE AND OBJECTIVE BOX
SUBJECTIVE:  Feels well, +BM x 2, tolerating diet without nausea, vomiting or abdominal pain.  _____________________________________________________  OBJECTIVE:    T(C): 36.7 (09-12-18 @ 05:48), Max: 37 (09-11-18 @ 15:33)  HR: 61 (09-12-18 @ 05:48)  BP: 137/87 (09-12-18 @ 05:48)  RR: 18 (09-12-18 @ 05:48)  SpO2: 97% (09-12-18 @ 05:48)  Wt(kg): --    09-11 @ 07:01  -  09-12 @ 07:00  --------------------------------------------------------  IN:    dextrose 5% + sodium chloride 0.45% with potassium chloride 20 mEq/L: 2200 mL    Oral Fluid: 660 mL    Solution: 300 mL    Solution: 100 mL    Solution: 400 mL  Total IN: 3660 mL    OUT:    Voided: 2550 mL  Total OUT: 2550 mL    Total NET: 1110 mL        PHYSICAL EXAM:      Constitutional: NAD    Gastrointestinal: soft NTND +BS no r/g/HSM    _____________________________________________________  LABS:                        13.2   8.0   )-----------( 199      ( 11 Sep 2018 16:08 )             39.0     09-12    138  |  101  |  15  ----------------------------<  98  4.0   |  25  |  0.98    Ca    10.0      12 Sep 2018 06:46  Phos  4.1     09-12  Mg     2.2     09-12        _____________________________________________________  ACTIVE MEDS:  MEDICATIONS  (STANDING):  ALBUTerol/ipratropium for Nebulization 3 milliLiter(s) Nebulizer every 6 hours  aspirin  chewable 81 milliGRAM(s) Oral daily  buDESOnide 160 MICROgram(s)/formoterol 4.5 MICROgram(s) Inhaler 2 Puff(s) Inhalation two times a day  ciprofloxacin   IVPB 400 milliGRAM(s) IV Intermittent every 12 hours  ciprofloxacin   IVPB      dextrose 5% + sodium chloride 0.45% with potassium chloride 20 mEq/L 1000 milliLiter(s) (75 mL/Hr) IV Continuous <Continuous>  heparin  Injectable 5000 Unit(s) SubCutaneous every 8 hours  influenza   Vaccine 0.5 milliLiter(s) IntraMuscular once  methylPREDNISolone sodium succinate Injectable 20 milliGRAM(s) IV Push <User Schedule>  methylPREDNISolone sodium succinate Injectable 10 milliGRAM(s) IV Push <User Schedule>  metoprolol tartrate Injectable 5 milliGRAM(s) IV Push every 6 hours  metroNIDAZOLE  IVPB 500 milliGRAM(s) IV Intermittent every 8 hours  metroNIDAZOLE  IVPB      pantoprazole  Injectable 40 milliGRAM(s) IV Push daily  tiotropium 18 MICROgram(s) Capsule 1 Capsule(s) Inhalation daily    MEDICATIONS  (PRN):  artificial tears (preservative free) Ophthalmic Solution 1 Drop(s) Both EYES three times a day PRN Dry Eyes  benzocaine 15 mG/menthol 3.6 mG Lozenge 1 Lozenge Oral every 4 hours PRN Sore Throat    _____________________________________________________  ASSESSMENT:  53yMale with resolved SBO due to active Crohn's at ileocolic anastomosis.      PLAN:  1.  Can change antibiotics to oral Cipro/Flagyl x 2 weeks total  2.  Change steroids to prednisone 20 mg twice a day  3.  Humira level drawn yesterday, patient can take his usual 40 mg dose once he gets home  4.  See Dr. Darrin Caicedo in the office in next 7-10 days to determine appropriate treatment and taper steroids    Altaf Mcqueen M.D.  [facility]  (O) [phone]

## 2018-09-12 NOTE — PROGRESS NOTE ADULT - REASON FOR ADMISSION
Abd Pain x 1 day

## 2018-09-12 NOTE — PROGRESS NOTE ADULT - PROBLEM SELECTOR PLAN 3
CPAP on hold; failed Dental device--out pt rx
failed Dental device--out pt rx  attempt NIPPV as able as bowel obstruction resolves
CPAP on hold; failed Dental device--out pt rx

## 2018-09-12 NOTE — PROGRESS NOTE ADULT - ATTENDING COMMENTS
as above--improved  Asthma /allergies--stable at present--continue BD regimen as above- singulair  OSAS-rx as out pt  CAD-Pradeep et al  GI as per Marifer/Marilu--+advance diet   ambulate    Leonel Duckworth MD-Pulmonary   [phone]

## 2018-09-12 NOTE — PROGRESS NOTE ADULT - PROBLEM SELECTOR PLAN 1
-secondary to bowel obstruction   -continue NGT decompression  -plan as per Surgery and GI
as per Marilu/Kiley
as per Marilu/Kiley-NGT out
as per Marilu/Marifer horner al-NGT out; diet advanced
as per Marilu/Kiley

## 2018-09-12 NOTE — DISCHARGE NOTE ADULT - CARE PROVIDER_API CALL
Lalit Michel), ColonRectal Surgery; Surgery  [address] [facility]  [address] [place] [address]  Phone: [phone]  Fax: [phone] Lalit Michel), ColonRectal Surgery; Surgery  [address] [facility]  [address]  [place] [address]  Phone: [phone]  Fax: [phone]    Randell Fernández), Cardiovascular Disease; Internal Medicine; Nuclear Cardiology  [address] [facility]  [address]  [place], [place] [id number]  Phone: [phone]  Fax: [phone]    Leonel Duckworth), Internal Medicine; Pulmonary Disease  [address] [facility]  [address]  [place] [address]  Phone: [phone]  Fax: [phone]    Darrin Caicedo), Gastroenterology; Internal Medicine  [address] [facility]  [address]  [place] [address]  Phone: [phone]  Fax: [phone]

## 2018-09-12 NOTE — PROGRESS NOTE ADULT - PROBLEM SELECTOR PROBLEM 3
MARYAM (obstructive sleep apnea)

## 2018-09-12 NOTE — PROGRESS NOTE ADULT - PROBLEM SELECTOR PLAN 2
stable respiratory statys  patient on Solumedrol Q8  symbicort 160 2 bid  spiriva 1 puff q day  pulmonary toilet-incentive spirometry, Chest Pt-acapella/chest vest-up to 5 times per day.    maintain oxygen levels above 90%-supplemental oxygen via nasal canula-humidified    All nebulized therapy is to be administered via hand held nebulizer-(patient must gargle and spit with water after use)
stable--  symbicort 160 2 bid  spiriva 1 puff q day  pulmonary toilet-incentive spirometry, Chest Pt-acapella/chest vest-up to 5 times per day.    maintain oxygen levels above 90%-supplemental oxygen via nasal canula-humidified    All nebulized therapy is to be administered via hand held nebulizer-(patient must gargle and spit with water after use)

## 2018-09-12 NOTE — CHART NOTE - NSCHARTNOTEFT_GEN_A_CORE
Spoke with Dr. Betts who stated he would like patient to go home on prednisone 20 mg twice daily, due to current dose. He would also like patient to go home on Cipro/Flagyl for two weeks total. Patient can start his Humira as normal at home. States patient should follow up with Dr. Caicedo on Friday. Patient will call office and make appointment on his way home.     Will discuss with wife and . Spoke with Dr. Betts who stated he would like patient to go home on prednisone 20 mg twice daily, due to current dose. He would also like patient to go home on Cipro/Flagyl for two weeks total. Patient can start his Humira as normal at home. States patient should follow up with Dr. Caicedo on Friday. Patient will call office and make appointment on his way home.     Spoke with Dr. Pradeep hoffman to patients current blood pressure hold off on losartan as an outpatient. Only continue the amlodipine 5mg once dialy  and metoprolol 12.5 mg once daily, follow up within two weeks.     Will discuss with wife and .

## 2018-09-12 NOTE — PROGRESS NOTE ADULT - SUBJECTIVE AND OBJECTIVE BOX
SURGERY DAILY PROGRESS NOTE:       SUBJECTIVE/ROS: Patient examined at bedside.  -  overnight tolerated regular diet      OBJECTIVE:    Vital Signs Last 24 Hrs  T(C): 36.9 (12 Sep 2018 00:30), Max: 37 (11 Sep 2018 15:33)  T(F): 98.5 (12 Sep 2018 00:30), Max: 98.6 (11 Sep 2018 15:33)  HR: 55 (12 Sep 2018 00:30) (50 - 74)  BP: 132/77 (12 Sep 2018 00:30) (117/75 - 159/93)  BP(mean): --  RR: 18 (12 Sep 2018 00:30) (18 - 18)  SpO2: 97% (12 Sep 2018 00:30) (96% - 98%)    I&O's Detail    10 Sep 2018 07:01  -  11 Sep 2018 07:00  --------------------------------------------------------  IN:    dextrose 5% + sodium chloride 0.45% with potassium chloride 20 mEq/L: 1000 mL    dextrose 5% + sodium chloride 0.45% with potassium chloride 20 mEq/L: 1500 mL    Solution: 400 mL    Solution: 400 mL  Total IN: 3300 mL    OUT:    Nasoenteral Tube: 110 mL    Voided: 1700 mL  Total OUT: 1810 mL    Total NET: 1490 mL      11 Sep 2018 07:01  -  12 Sep 2018 05:46  --------------------------------------------------------  IN:    dextrose 5% + sodium chloride 0.45% with potassium chloride 20 mEq/L: 1200 mL    Oral Fluid: 660 mL    Solution: 200 mL    Solution: 100 mL    Solution: 300 mL  Total IN: 2460 mL    OUT:    Voided: 2000 mL  Total OUT: 2000 mL    Total NET: 460 mL          LABS:                        13.2   8.0   )-----------( 199      ( 11 Sep 2018 16:08 )             39.0     09-11    139  |  105  |  15  ----------------------------<  112<H>  4.4   |  23  |  1.04    Ca    9.2      11 Sep 2018 16:08  Phos  4.1     09-11  Mg     2.2     09-11      PT/INR - ( 11 Sep 2018 16:08 )   PT: 13.6 sec;   INR: 1.25 ratio         PTT - ( 11 Sep 2018 16:08 )  PTT:32.1 sec        EXAM:  GEN: NAD  Pulm: unlabored breathing on RA  CV: radial pulse 2+, cap refill <2sec  Abd: soft, nontender, nondistended

## 2018-09-12 NOTE — DISCHARGE NOTE ADULT - CARE PROVIDERS DIRECT ADDRESSES
,[email or web address]

## 2018-09-12 NOTE — DISCHARGE NOTE ADULT - HOSPITAL COURSE
At the time of discharge, the patient was hemodynamically stable, was tolerating PO diet, was voiding urine and passing stool, was ambulating, and was comfortable with adequate pain control. The patient was instructed to follow up with Dr. Michel within 1 week after discharge from the hospital. The patient and family felt comfortable with discharge. The patient was discharged to home. The patient had no other issues. 53 year old Male with PMH of CAD, MI s/p PCI, COPD, asthma, MARYAM, Crohn's s/p stricturoplasty x 2 and SBR 3/2018 now p/w abd pain and distention x 1 day. Patient states he was in his usual state of health following is surgery in March until yesterday afternoon when he noted abd pain which he associated to eating.  He then was awoken overnight with severe 6/10, non radiating mid abdominal pain. He noted a small amount of diarrhea yesterday and nausea but no emesis.  He has not passed gas since yesterday morning. Denies fever, chills, CP, SOB, BRBPR. CT scan was performed and showed < from: CT Abdomen and Pelvis w/ Oral Cont and w/ IV Cont (09.06.18 @ 06:31) >IMPRESSION: Re- demonstrated is a chronic partial bowel obstruction related to short segment of active inflammatory Crohn's disease with stricture distal to the right lower quadrant small bowel-small bowel anastomosis. Mild mesenteric edema. No free air or focal collection. No additional sites of active inflammation identified. Appearance is similar compared to the previous CT of February 20, 2018. < End of copied text >. Patient had NGT placed and output was monitored. Once patient was noted to pass gas and have formal BM NGT clamp trial was performed.  Less than 150 cc was noted and the NGT was removed. Patient was given Clear liquids, and was noted to tolerate. The 9/11/2018 patient felt weakness between his shoulders with numbness and tingling. The patients vitals were assessed and noted to be stable. EKG was performed with noted bradycardia. lab work was sent noted to be normal with normal Troponins.  CXR and Abdominal xray were noted to be normal, with no obstructive bowel gas pattern. The patients symptoms resolved and returned to normal when he was given LRD and fluids were increased to 75cc/hr. As per GI prednisone changed to 20 mg BID, and changed to lopressor. Prior to discharge as per GI: PLAN: 1.  Can change antibiotics to oral Cipro/Flagyl x 2 weeks total 2.  Change steroids to prednisone 20 mg twice a day 3.  Humira level drawn yesterday, patient can take his usual 40 mg dose once he gets home 4.  See Dr. Darrin Caicedo in the office in next 7-10 days to determine appropriate treatment and taper steroids. Prior to discharge as per Cardiology : PLAN: 53y Male with history of CAD and IBD admitted with abdominal pain and vomiting. CT c/w partial SBO. Resolving SBO. CV stable. Rec: Change lopressor to po, Aspirin with history of CAD and PCI, Diet and D/C planning per surgery and GI, Increase activity.     As per Pulmonology: 1. Asthma:- Currently stable pulmonary status, saturating well on RA. - No evidence of an asthma exacerbation- Suggest Duonebs Q6H prn- continue Symbicort BID and Spiriva daily- re-start Singulair as pt is to come off NPO- Keep O2 sats above 90 %- Incentive spirometry once able to tolerate 2. MARYAM:- Pt is on nocturnal APAP with a min pressure of 4 and max pressure of 9 at home - Ok to hold off on nocturnal CPAP in the setting of an acute bowel obstruction - Once acute issues have resolved and there is no longer a contraindication to NIV, we can resume nocturnal CPAP at 9 cm H20 3. Small bowel obstruction/ Crohn's disease with exacerbation/ SB stricture - on orals now as  per surgical team - On IV Cipro/ flagyl - Cont IV hydration - Serial abdominal exams - GI f/up 4. Gen:- DVT Px: Hep SQ.     During the hospital course, patient had lab work performed on a daily basis. Prior to discharge lab work was noted to be within normal. A normal WBC was noted to be down trending, and a HCT was noted within normal limits. Vitals were checked on a four hour basis, and patient was noted to be normotensive and afebrile upon discharge. Patient had a normal heart rate and adequate oxygen saturations. SOB/MORENO was denied. Pain medication was given prior to discharge with no allergic reaction, and reported adequate pain control.    At the time of discharge, the patient was hemodynamically stable, was tolerating PO diet, was voiding urine and passing stool, was ambulating, and was comfortable with adequate pain control. The patient was instructed to follow up with Dr. Michel within 1 week after discharge from the hospital. The patient and family felt comfortable with discharge. The patient was discharged to home. The patient had no other issues. Above physicians were listed as consults during patients care, patient was educated and suggested to follow up within one week of discharge. 53 year old Male with PMH of CAD, MI s/p PCI, COPD, asthma, MARYAM, Crohn's s/p stricturoplasty x 2 and SBR 3/2018 now p/w abd pain and distention x 1 day. Patient states he was in his usual state of health following is surgery in March until yesterday afternoon when he noted abd pain which he associated to eating.  He then was awoken overnight with severe 6/10, non radiating mid abdominal pain. He noted a small amount of diarrhea yesterday and nausea but no emesis.  He has not passed gas since yesterday morning. Denies fever, chills, CP, SOB, BRBPR. CT scan was performed and showed < from: CT Abdomen and Pelvis w/ Oral Cont and w/ IV Cont (09.06.18 @ 06:31) >IMPRESSION: Re- demonstrated is a chronic partial bowel obstruction related to short segment of active inflammatory Crohn's disease with stricture distal to the right lower quadrant small bowel-small bowel anastomosis. Mild mesenteric edema. No free air or focal collection. No additional sites of active inflammation identified. Appearance is similar compared to the previous CT of February 20, 2018. < End of copied text >. Patient had NGT placed and output was monitored. Once patient was noted to pass gas and have formal BM NGT clamp trial was performed.  Less than 150 cc was noted and the NGT was removed. Patient was given Clear liquids, and was noted to tolerate. The 9/11/2018 patient felt weakness between his shoulders with numbness and tingling. The patients vitals were assessed and noted to be stable. EKG was performed with noted bradycardia. lab work was sent noted to be normal with normal Troponins.  CXR and Abdominal xray were noted to be normal, with no obstructive bowel gas pattern. The patients symptoms resolved and returned to normal when he was given LRD and fluids were increased to 75cc/hr. As per GI prednisone changed to 20 mg BID, and changed to lopressor. Prior to discharge as per GI: PLAN: 1.  Can change antibiotics to oral Cipro/Flagyl x 2 weeks total 2.  Change steroids to prednisone 20 mg twice a day 3.  Humira level drawn yesterday, patient can take his usual 40 mg dose once he gets home 4.  See Dr. Darrin Caicedo in the office in next 7-10 days to determine appropriate treatment and taper steroids. Prior to discharge as per Cardiology : PLAN: 53y Male with history of CAD and IBD admitted with abdominal pain and vomiting. CT c/w partial SBO. Resolving SBO. CV stable. Rec: Change lopressor to po, Aspirin with history of CAD and PCI, Diet and D/C planning per surgery and GI, Increase activity.     As per Pulmonology: 1. Asthma:- Currently stable pulmonary status, saturating well on RA. - No evidence of an asthma exacerbation- Suggest Duonebs Q6H prn- continue Symbicort BID and Spiriva daily- re-start Singulair as pt is to come off NPO- Keep O2 sats above 90 %- Incentive spirometry once able to tolerate 2. MARYAM:- Pt is on nocturnal APAP with a min pressure of 4 and max pressure of 9 at home - Ok to hold off on nocturnal CPAP in the setting of an acute bowel obstruction - Once acute issues have resolved and there is no longer a contraindication to NIV, we can resume nocturnal CPAP at 9 cm H20 3. Small bowel obstruction/ Crohn's disease with exacerbation/ SB stricture - on orals now as  per surgical team - On IV Cipro/ flagyl - Cont IV hydration - Serial abdominal exams - GI f/up 4. Gen:- DVT Px: Hep SQ.     During the hospital course, patient had lab work performed on a daily basis. Prior to discharge lab work was noted to be within normal. A normal WBC was noted to be down trending, and a HCT was noted within normal limits. Vitals were checked on a four hour basis, and patient was noted to be normotensive and afebrile upon discharge. Patient had a normal heart rate and adequate oxygen saturations. SOB/MORENO was denied. Pain medication was given prior to discharge with no allergic reaction, and reported adequate pain control.    At the time of discharge, the patient was hemodynamically stable, was tolerating PO diet, was voiding urine and passing stool, was ambulating, and was comfortable with adequate pain control. The patient was instructed to follow up with Dr. Michel within 1 week after discharge from the hospital. The patient and family felt comfortable with discharge. The patient was discharged to home. The patient had no other issues. Above physicians were listed as consults during patients care, patient was educated and suggested to follow up within one week of discharge.       Spoke with Dr. Betts who stated he would like patient to go home on prednisone 20 mg twice daily, due to current dose. He would also like patient to go home on Cipro/Flagyl for two weeks total. Patient can start his Humira as normal at home. States patient should follow up with Dr. Caicedo on Friday. Patient will call office and make appointment on his way home.   Spoke with Dr. Pradeep hoffman to patients current blood pressure hold off on losartan as an outpatient. Only continue the amlodipine 5mg once dialy  and metoprolol 12.5 mg once daily, follow up within two weeks.   Will discuss with wife and .

## 2018-09-12 NOTE — PROGRESS NOTE ADULT - ASSESSMENT
54yo M with PMH of CAD, MI s/p PCI, COPD, asthma, MARYAM, Crohn's s/p stricturoplasty x 2 and SBR 3/2018 now a/w SBO due to acute Crohn's exacerbation with inflammatory narrowing.     - c/w regular diet  -  f/u Humira dose today, due for 2 week dose  -cont IV Cipro/flagyl  -solumedrol 20 q8h per GI recs, fu discharge recs for steroids  -serial abd exams  -cont COPD/asthma home meds per PULM  -OOB and ambulate  -  dispo planning    [facility]   [id number]

## 2018-09-12 NOTE — PROGRESS NOTE ADULT - SUBJECTIVE AND OBJECTIVE BOX
Abdominal Exam  Pain improved  PHYSICAL EXAM:  GEN: NAD  Pulm: non-labored  CV: RRR  Abd: soft, nontender, nondistended

## 2018-09-12 NOTE — DISCHARGE NOTE ADULT - MEDICATION SUMMARY - MEDICATIONS TO TAKE
I will START or STAY ON the medications listed below when I get home from the hospital:    predniSONE 20 mg oral tablet  -- 1 tab(s) by mouth 2 times a day   -- Indication: For Crohn disease    metroNIDAZOLE 500 mg oral tablet  -- 1 tab(s) by mouth every 8 hours  -- Indication: For Crohn disease    traMADol 50 mg oral tablet  -- 1 tab(s) by mouth 2 times a day, As Needed headache  -- Indication: For Pain    aspirin 81 mg oral delayed release tablet  -- 1 tab(s) by mouth once a day (in the morning)  -- Indication: For Preventative health    acetaminophen 500 mg oral tablet  -- 2 tab(s) by mouth every 6 hours  -- Indication: For Pain    Dilaudid 2 mg oral tablet  -- 1 tab(s) by mouth every 4 hours, As Needed cluster headaches  -- Indication: For Pain    Valium 5 mg oral tablet  -- 1 tab(s) by mouth 2 times a day, As Needed cluster headache  -- Indication: For headache    Zofran ODT 4 mg oral tablet, disintegrating  -- 1 tab(s) by mouth 3 times a day, As Needed nausea  -- Indication: For zofran    Reglan 5 mg oral tablet  -- 1 tab(s) by mouth 4 times a day (before meals and at bedtime), As Needed  -- Indication: For Crohn disease    Xyzal 5 mg oral tablet  -- 1 tab(s) by mouth once a day (in the evening)  -- Indication: For hives    Repatha  --  subcutaneous  every 2 weeks  -- Indication: For Cholesterol    metoprolol succinate 25 mg oral tablet, extended release  -- 0.5 tab(s) by mouth once a day  -- Indication: For blood pressure    Spiriva 18 mcg inhalation capsule  -- 1 cap(s) inhaled once a day (in the evening)  -- Indication: For breathing    Advair Diskus 250 mcg-50 mcg inhalation powder  -- 1 puff(s) inhaled 2 times a day  -- Indication: For breathing    ipratropium-albuterol 0.5 mg-2.5 mg/3 mLinhalation solution  -- 3 milliliter(s) inhaled every 6 hours  -- Indication: For breathing    budesonide-formoterol 160 mcg-4.5 mcg/inh inhalation aerosol  -- 2 puff(s) inhaled 2 times a day  -- Indication: For breathing    Norvasc 5 mg oral tablet  -- 1 tab(s) by mouth once a day  -- Indication: For blood pressure    Humira 40 mg/0.8 mL subcutaneous kit  -- 40 milligram(s) subcutaneous every 2 weeks  -- Indication: For Crohn disease    Singulair 10 mg oral tablet  -- 1 tab(s) by mouth once a day (in the evening)  -- Indication: For breathing    Co Q-10 100 mg oral capsule  -- 2 cap(s) by mouth once a day  -- Indication: For Preventative health    ocular lubricant ophthalmic solution  -- 1 drop(s) to each affected eye 3 times a day, As needed, Dry Eyes  -- Indication: For dry eyes    NexIUM 40 mg oral delayed release capsule  -- 1 cap(s) by mouth once a day  -- Indication: For reflux    ciprofloxacin 500 mg oral tablet  -- 1 tab(s) by mouth every 12 hours  -- Indication: For Crohn disease    Vitamin D2  -- 5000 unit(s) by mouth once a day  -- Indication: For Preventative health    Vitamin C 1000 mg oral tablet  -- 1 tab(s) by mouth once a day  -- Indication: For Preventative health

## 2018-09-12 NOTE — PROGRESS NOTE ADULT - PROBLEM SELECTOR PLAN 5
GI prophylaxis:  PPI   aspiration precautions-NG tube in place
GI prophylaxis:  PPI   aspiration precautions-NG tube in place
GI prophylaxis:  PPI   aspiration precautions-NG tube removed
GI prophylaxis:  PPI   aspiration precautions-diet initiated
GI prophylaxis:  PPI   aspiration precautions-NG tube in place

## 2018-09-12 NOTE — PROGRESS NOTE ADULT - PROBLEM SELECTOR PLAN 4
Nasal saline  flonase 1 sniff bid
Nasal saline  flonase 1 sniff bid
add Nasal saline  flonase 1 sniff bid

## 2018-09-12 NOTE — DISCHARGE NOTE ADULT - PATIENT PORTAL LINK FT
You can access the [email or web address], offered by [facility], by registering with the following website: [email or web address]

## 2018-09-12 NOTE — PROGRESS NOTE ADULT - ASSESSMENT
53y Male with history of CAD and IBD admitted with abdominal pain and vomiting. CT c/w partial SBO. Resolving SBO. CV stable.     Rec:  Change lopressor to po  Aspirin with history of CAD and PCI.  Diet and D/C planning per surgery and GI.   Increase activity.

## 2018-09-12 NOTE — DISCHARGE NOTE ADULT - PROVIDER TOKENS
TOKEN:'[id number]' TOKEN:'[id number]:'[id number]:'[id number]'

## 2018-09-12 NOTE — PROGRESS NOTE ADULT - ASSESSMENT
53 M with HTN, HLD, CAD s/p MI and PCI, asthma, MARYAM on nocturnal CPAP, GERD, Crohn's disease s/p stricturoplasty x 2 and small bowel resection in 3/2018, now p/w acute onset of abdominal pain, found to have a SBO with a transition point in the RLQ, current being conservatively managed with NGT decompression and bowel rest.     1. Asthma:  - Currently stable pulmonary status, saturating well on RA.   - No evidence of an asthma exacerbation  - Suggest Duonebs Q6H prn  - continue Symbicort BID and Spiriva daily  - re-start Singulair as pt is to come off NPO  - Keep O2 sats above 90 %  - Incentive spirometry once able to tolerate     2. MARYAM:  - Pt is on nocturnal APAP with a min pressure of 4 and max pressure of 9 at home   - Ok to hold off on nocturnal CPAP in the setting of an acute bowel obstruction   - Once acute issues have resolved and there is no longer a contraindication to NIV, we can resume nocturnal CPAP at 9 cm H20    3. Small bowel obstruction/ Crohn's disease with exacerbation/ SB stricture   - on orals now as  per surgical team   - On IV Cipro/ flagyl   - Cont IV hydration   - Serial abdominal exams   - GI f/up    4. Gen:  - DVT Px: Hep SQ

## 2018-09-15 LAB
ADALIMUMAB AB SERPL-MCNC: 231 NG/ML — SIGNIFICANT CHANGE UP
ADALIMUMAB SERPL-MCNC: 1.5 UG/ML — SIGNIFICANT CHANGE UP

## 2018-10-03 ENCOUNTER — TRANSCRIPTION ENCOUNTER (OUTPATIENT)
Age: 53
End: 2018-10-03

## 2018-10-11 ENCOUNTER — RX RENEWAL (OUTPATIENT)
Age: 53
End: 2018-10-11

## 2018-10-16 ENCOUNTER — APPOINTMENT (OUTPATIENT)
Dept: SURGERY | Facility: CLINIC | Age: 53
End: 2018-10-16
Payer: MEDICARE

## 2018-10-16 PROCEDURE — 45990 SURG DX EXAM ANORECTAL: CPT | Mod: 59

## 2018-10-16 PROCEDURE — 46505 CHEMODENERVATION ANAL MUSC: CPT

## 2018-11-24 ENCOUNTER — TRANSCRIPTION ENCOUNTER (OUTPATIENT)
Age: 53
End: 2018-11-24

## 2018-11-29 ENCOUNTER — APPOINTMENT (OUTPATIENT)
Dept: PULMONOLOGY | Facility: CLINIC | Age: 53
End: 2018-11-29
Payer: MEDICARE

## 2018-11-29 VITALS
DIASTOLIC BLOOD PRESSURE: 85 MMHG | OXYGEN SATURATION: 97 % | HEART RATE: 93 BPM | HEIGHT: 66 IN | BODY MASS INDEX: 29.89 KG/M2 | WEIGHT: 186 LBS | SYSTOLIC BLOOD PRESSURE: 140 MMHG | RESPIRATION RATE: 17 BRPM

## 2018-11-29 PROCEDURE — 71046 X-RAY EXAM CHEST 2 VIEWS: CPT

## 2018-11-29 PROCEDURE — 99214 OFFICE O/P EST MOD 30 MIN: CPT | Mod: 25

## 2018-11-29 NOTE — HISTORY OF PRESENT ILLNESS
[FreeTextEntry1] : Mr. Mota is a 53 year old male presenting to the office for with history of asthma, allergic rhinitis, CAD, CPAP use, low vitamin D and MARYAM for a sick visit . His chief complaint are his gastrointestinal problems\par -he reports he was sick 5-6 days ago with a cough and congested sinuses\par -he notes he was put on Prednisone after presenting to Urgent Care\par -he states he Say Dr. Fernández 3 days ago and was put on Levaquin\par -he reports he is still with diarrhea, having 10-15 bowel movements a day\par -he notes his weight is stable\par -he states he has not been exercising recently / since discharge from hospital\par -he reports he occasionally has itchy eyes\par -he notes he generally uses the CPAP machine for 4-5 hours each night\par -he states he wants to try a new mask for the machine\par -he reports he has not been using his nebulizer \par -he notes he has chest pain due to his cough\par -he denies any chest pressure, constipation, dysphagia, dizziness, itchy ears, heartburn, reflux, or sour taste in the mouth.\par \par   \par

## 2018-11-29 NOTE — PROCEDURE
[FreeTextEntry1] : CXR reveals a normal sized heart; no evidence of infiltrate or effusion--a normal appearing chest radiograph\par \par \par

## 2018-11-29 NOTE — ASSESSMENT
[FreeTextEntry1] : Mr. Mota is a 53 year old male doing well from a pulmonary perspective in spite of recent bowel obstruction s/p GI surgery, with a history of severe persistent asthma, allergies, OSAS, GERD and is now in the midst of acute bronchitis/ asthmatic bronchitis \par \par Problem 1: Severe persistent asthma, flair\par -add Prednisone taper, 30mg for 5 days, 20mg for 5 days, 10 mg for 5 days\par Information sheet given to the patient to be reviewed, this medication is never to be used without consulting the prescribing physician. Proper dietary restraint is necessary specifically salt containing foods, if any reaction may occur should be reported. \par \par -add albuterol by the nebulizer QID, prn \par -continue to use Symbicort 160 at 2 inhalations BID\par -continue to use Spiriva (2 puffs QD)\par -continue to use Singulair 10 mg QHS\par -Asthma is believed to be caused by inherited (genetic) and environmental factor, but its exact cause is unknown. Asthma may be triggered by allergens, lung infections, or irritants in the air. Asthma triggers are different for each person\par -Inhaler technique reviewed as well as oral hygiene techniques reviewed with patient. Avoidance of cold air, extremes of temperature, rescue inhaler should be used before exercise. Order of medication reviewed with patient. Recommended use of a cool mist humidifier in the bedroom. \par \par Problem 2: allergies\par - Xyzal 5 mg QHS\par -continue Qnasl 1 sniff/nostril BID \par -Environmental measures for allergies were encouraged including mattress and pillow cover, air purifier, and environmental controls.\par \par Problem 3: GERD\par -continue to use Protonix 40 mg before breakfast\par -continue Zantac 300 mg QHS \par -Rule of 2's- Avoid eating too much, too late, too poorly, too spicy, or two hours before bed \par -Things to avoid including overeating, spicy foods, tight clothing, eating within three hours of bed, this list is not all inclusive. \par -For treatment of reflux, possible options discussed including diet control, H2 blockers, PPIs, as well as coating motility agents discussed as treatment options. Timing of meals and proximity of last meal to sleep were discussed. If symptoms persist, a formal gastrointestinal evaluation is needed.\par \par Problem 4: OSAS, reinforced\par -continue to use the CPAP machine, tolerating it well and seeing benefits \par - prescription given for him to try new masks\par - Good sleep hygiene was encouraged including avoiding watching television an hour before bed, keeping caffeine at a low, avoiding reading in bed, no drinking any liquids three hours before bedtime, and only getting into bed when tired. \par - Discussed the risks/associations with coronary artery disease, atrial fibrillation, arrhythmia, memory loss, issues with concentration, hypertension, nocturia, chronic reflux/Olvera’s esophagus some but not all inclusive. Treatment options discussed including CPAP/BiPAP machine, oral appliance, ProVent therapy, new technologies, or positional sleep.\par \par Problem 5: Obesity \par -resolved\par Weight loss, exercise, and diet control were discussed and are highly encouraged. Treatment options were given such as, aqua therapy, and contacting a nutritionist. Recommended to use the elliptical, stationary bike, less use of treadmill. Mindful eating was explained to the patient Obesity is associated with worsening asthma, shortness of breath, and potential for cardiac disease, diabetes, and other underlying medical conditions.  \par \par Problem 6: CAD\par as per Dr. Fernández, likely the downstream effect of uncontrolled total body inflammation\par \par problem 7: bowel obstruction s/p surgery\par -encouraged to look at functional medicine consult, referred to Dr. Sheryl Leventhal,\par  -GI follow up with Dr. Linton al.\par \par \par Problem 8: health maintenance\par -s/p flu shot in 2018\par -recommended strep pneumonia vaccines: Prevnar-13 vaccine, followed by Pneumo vaccine 23 on year following\par -recommended early intervention for URIs\par -recommended osteoporosis evaluations\par -recommended early dermatological evaluations\par -recommended after the age of 50 to the age of 70, colonoscopy every 5 years\par \par Follow up in 4 months\par Encouraged to follow up with questions, concerns, and changes.

## 2018-11-29 NOTE — PHYSICAL EXAM

## 2018-11-29 NOTE — REASON FOR VISIT
[Follow-Up] : a follow-up visit [FreeTextEntry1] : sick visit:  asthma, allergic rhinitis, CAD, CPAP use, low vitamin D and MARYAM

## 2018-11-29 NOTE — REVIEW OF SYSTEMS
[Negative] : Sleep Disorder [Nasal Congestion] : nasal congestion [Sinus Problems] : sinus problems [Cough] : cough [Pleuritic Pain] : pleuritic pain [Itchy Eyes] : itching of ~T the eyes [As Noted in HPI] : as noted in HPI [Diarrhea] : diarrhea

## 2018-11-29 NOTE — ADDENDUM
[FreeTextEntry1] : Documented by Owen Barney acting as a scribe for Dr. Leonel Duckworth on 11/29/18.\par \par All medical record entries made by the Scribe were at my, Dr. Leonel Duckworth's, direction and personally dictated by me on 11/29/18. I have reviewed the chart and agree that the record accurately reflects my personal performance of the history, physical exam, assessment and plan. I have also personally directed, reviewed, and agree with the discharge instructions. \par \par \par

## 2018-12-03 ENCOUNTER — TRANSCRIPTION ENCOUNTER (OUTPATIENT)
Age: 53
End: 2018-12-03

## 2018-12-05 ENCOUNTER — MEDICATION RENEWAL (OUTPATIENT)
Age: 53
End: 2018-12-05

## 2018-12-07 ENCOUNTER — APPOINTMENT (OUTPATIENT)
Dept: DERMATOLOGY | Facility: CLINIC | Age: 53
End: 2018-12-07
Payer: MEDICARE

## 2018-12-07 VITALS — SYSTOLIC BLOOD PRESSURE: 140 MMHG | DIASTOLIC BLOOD PRESSURE: 80 MMHG

## 2018-12-07 DIAGNOSIS — Z12.83 ENCOUNTER FOR SCREENING FOR MALIGNANT NEOPLASM OF SKIN: ICD-10-CM

## 2018-12-07 DIAGNOSIS — D22.9 MELANOCYTIC NEVI, UNSPECIFIED: ICD-10-CM

## 2018-12-07 PROCEDURE — 99214 OFFICE O/P EST MOD 30 MIN: CPT | Mod: GC

## 2018-12-07 RX ORDER — ADALIMUMAB 40MG/0.8ML
40 KIT SUBCUTANEOUS
Qty: 6 | Refills: 0 | Status: COMPLETED | COMMUNITY
Start: 2018-05-07 | End: 2018-12-07

## 2018-12-07 RX ORDER — RANITIDINE 300 MG/1
300 TABLET ORAL
Qty: 90 | Refills: 1 | Status: COMPLETED | COMMUNITY
Start: 2018-04-19 | End: 2018-12-07

## 2018-12-07 RX ORDER — BECLOMETHASONE DIPROPIONATE 80 UG/1
80 AEROSOL, METERED NASAL
Qty: 3 | Refills: 1 | Status: COMPLETED | COMMUNITY
Start: 2018-04-19 | End: 2018-12-07

## 2018-12-10 ENCOUNTER — TRANSCRIPTION ENCOUNTER (OUTPATIENT)
Age: 53
End: 2018-12-10

## 2018-12-10 LAB
ALBUMIN SERPL ELPH-MCNC: 5 G/DL
ALP BLD-CCNC: 66 U/L
ALT SERPL-CCNC: 35 U/L
ANION GAP SERPL CALC-SCNC: 14 MMOL/L
AST SERPL-CCNC: 24 U/L
BILIRUB SERPL-MCNC: 0.4 MG/DL
BUN SERPL-MCNC: 12 MG/DL
CALCIUM SERPL-MCNC: 10.2 MG/DL
CHLORIDE SERPL-SCNC: 100 MMOL/L
CO2 SERPL-SCNC: 23 MMOL/L
CREAT SERPL-MCNC: 0.66 MG/DL
GLUCOSE SERPL-MCNC: 118 MG/DL
POTASSIUM SERPL-SCNC: 4.3 MMOL/L
PROT SERPL-MCNC: 7.7 G/DL
SODIUM SERPL-SCNC: 137 MMOL/L

## 2018-12-23 PROCEDURE — 84132 ASSAY OF SERUM POTASSIUM: CPT

## 2018-12-23 PROCEDURE — 74018 RADEX ABDOMEN 1 VIEW: CPT

## 2018-12-23 PROCEDURE — 85014 HEMATOCRIT: CPT

## 2018-12-23 PROCEDURE — 86850 RBC ANTIBODY SCREEN: CPT

## 2018-12-23 PROCEDURE — 83690 ASSAY OF LIPASE: CPT

## 2018-12-23 PROCEDURE — 80145 DRUG ASSAY ADALIMUMAB: CPT

## 2018-12-23 PROCEDURE — 84484 ASSAY OF TROPONIN QUANT: CPT

## 2018-12-23 PROCEDURE — 93005 ELECTROCARDIOGRAM TRACING: CPT

## 2018-12-23 PROCEDURE — 81001 URINALYSIS AUTO W/SCOPE: CPT

## 2018-12-23 PROCEDURE — 82803 BLOOD GASES ANY COMBINATION: CPT

## 2018-12-23 PROCEDURE — 71045 X-RAY EXAM CHEST 1 VIEW: CPT

## 2018-12-23 PROCEDURE — 82947 ASSAY GLUCOSE BLOOD QUANT: CPT

## 2018-12-23 PROCEDURE — 82330 ASSAY OF CALCIUM: CPT

## 2018-12-23 PROCEDURE — 74177 CT ABD & PELVIS W/CONTRAST: CPT

## 2018-12-23 PROCEDURE — 94640 AIRWAY INHALATION TREATMENT: CPT

## 2018-12-23 PROCEDURE — 86901 BLOOD TYPING SEROLOGIC RH(D): CPT

## 2018-12-23 PROCEDURE — 80053 COMPREHEN METABOLIC PANEL: CPT

## 2018-12-23 PROCEDURE — 84100 ASSAY OF PHOSPHORUS: CPT

## 2018-12-23 PROCEDURE — 96375 TX/PRO/DX INJ NEW DRUG ADDON: CPT

## 2018-12-23 PROCEDURE — 83735 ASSAY OF MAGNESIUM: CPT

## 2018-12-23 PROCEDURE — 83605 ASSAY OF LACTIC ACID: CPT

## 2018-12-23 PROCEDURE — 85027 COMPLETE CBC AUTOMATED: CPT

## 2018-12-23 PROCEDURE — 85610 PROTHROMBIN TIME: CPT

## 2018-12-23 PROCEDURE — 80048 BASIC METABOLIC PNL TOTAL CA: CPT

## 2018-12-23 PROCEDURE — 86900 BLOOD TYPING SEROLOGIC ABO: CPT

## 2018-12-23 PROCEDURE — 99285 EMERGENCY DEPT VISIT HI MDM: CPT | Mod: 25

## 2018-12-23 PROCEDURE — 82435 ASSAY OF BLOOD CHLORIDE: CPT

## 2018-12-23 PROCEDURE — 96365 THER/PROPH/DIAG IV INF INIT: CPT | Mod: XU

## 2018-12-23 PROCEDURE — 85730 THROMBOPLASTIN TIME PARTIAL: CPT

## 2018-12-23 PROCEDURE — 82397 CHEMILUMINESCENT ASSAY: CPT

## 2018-12-23 PROCEDURE — 84295 ASSAY OF SERUM SODIUM: CPT

## 2018-12-23 PROCEDURE — 96361 HYDRATE IV INFUSION ADD-ON: CPT

## 2018-12-26 ENCOUNTER — APPOINTMENT (OUTPATIENT)
Dept: PAIN MANAGEMENT | Facility: CLINIC | Age: 53
End: 2018-12-26

## 2018-12-31 ENCOUNTER — MEDICATION RENEWAL (OUTPATIENT)
Age: 53
End: 2018-12-31

## 2018-12-31 ENCOUNTER — TRANSCRIPTION ENCOUNTER (OUTPATIENT)
Age: 53
End: 2018-12-31

## 2019-01-02 NOTE — PATIENT PROFILE ADULT. - NS PRO TALK SOMEONE YN
----- Message from Odalis Neff MA sent at 1/2/2019  8:15 AM EST -----      ----- Message -----  From: Ronni Hinton MD  Sent: 12/28/2018   7:43 AM  To: Kathie Shahram    Tell him that the myelogram and CT scan really just did not look that bad and that although there is some slight narrowing around the nerves at don't see anything that is an obvious source of his leg pain.  Since the epidurals did not work I really have no confirmation that the pain is coming from his nerves.  Get him into see Dr. Mt Mclaughlin or Sean, vascular surgeons for further evaluation.   no

## 2019-01-07 ENCOUNTER — TRANSCRIPTION ENCOUNTER (OUTPATIENT)
Age: 54
End: 2019-01-07

## 2019-01-08 ENCOUNTER — TRANSCRIPTION ENCOUNTER (OUTPATIENT)
Age: 54
End: 2019-01-08

## 2019-01-23 ENCOUNTER — TRANSCRIPTION ENCOUNTER (OUTPATIENT)
Age: 54
End: 2019-01-23

## 2019-01-23 ENCOUNTER — RX RENEWAL (OUTPATIENT)
Age: 54
End: 2019-01-23

## 2019-03-02 ENCOUNTER — TRANSCRIPTION ENCOUNTER (OUTPATIENT)
Age: 54
End: 2019-03-02

## 2019-03-08 ENCOUNTER — APPOINTMENT (OUTPATIENT)
Dept: DERMATOLOGY | Facility: CLINIC | Age: 54
End: 2019-03-08
Payer: MEDICARE

## 2019-03-08 ENCOUNTER — RX RENEWAL (OUTPATIENT)
Age: 54
End: 2019-03-08

## 2019-03-08 VITALS — SYSTOLIC BLOOD PRESSURE: 130 MMHG | DIASTOLIC BLOOD PRESSURE: 86 MMHG

## 2019-03-08 DIAGNOSIS — L82.0 INFLAMED SEBORRHEIC KERATOSIS: ICD-10-CM

## 2019-03-08 DIAGNOSIS — L82.1 OTHER SEBORRHEIC KERATOSIS: ICD-10-CM

## 2019-03-08 DIAGNOSIS — L85.3 XEROSIS CUTIS: ICD-10-CM

## 2019-03-08 DIAGNOSIS — D23.9 OTHER BENIGN NEOPLASM OF SKIN, UNSPECIFIED: ICD-10-CM

## 2019-03-08 DIAGNOSIS — B35.3 TINEA PEDIS: ICD-10-CM

## 2019-03-08 PROCEDURE — 17110 DESTRUCTION B9 LES UP TO 14: CPT | Mod: GC

## 2019-03-08 PROCEDURE — 99214 OFFICE O/P EST MOD 30 MIN: CPT | Mod: 25,GC

## 2019-03-08 RX ORDER — AMMONIUM LACTATE 12 %
12 CREAM (GRAM) TOPICAL
Qty: 1 | Refills: 3 | Status: ACTIVE | COMMUNITY
Start: 2017-02-08 | End: 1900-01-01

## 2019-03-08 RX ORDER — HALOBETASOL PROPIONATE 0.5 MG/G
0.05 OINTMENT TOPICAL
Qty: 1 | Refills: 0 | Status: ACTIVE | COMMUNITY
Start: 2019-03-08 | End: 1900-01-01

## 2019-03-23 ENCOUNTER — RX RENEWAL (OUTPATIENT)
Age: 54
End: 2019-03-23

## 2019-03-29 ENCOUNTER — APPOINTMENT (OUTPATIENT)
Dept: PULMONOLOGY | Facility: CLINIC | Age: 54
End: 2019-03-29
Payer: MEDICARE

## 2019-03-29 ENCOUNTER — NON-APPOINTMENT (OUTPATIENT)
Age: 54
End: 2019-03-29

## 2019-03-29 VITALS
HEART RATE: 89 BPM | DIASTOLIC BLOOD PRESSURE: 80 MMHG | WEIGHT: 190 LBS | BODY MASS INDEX: 30.53 KG/M2 | RESPIRATION RATE: 16 BRPM | HEIGHT: 66 IN | OXYGEN SATURATION: 98 % | SYSTOLIC BLOOD PRESSURE: 120 MMHG

## 2019-03-29 PROCEDURE — 99214 OFFICE O/P EST MOD 30 MIN: CPT | Mod: 25

## 2019-03-29 PROCEDURE — 95012 NITRIC OXIDE EXP GAS DETER: CPT

## 2019-03-29 PROCEDURE — 94010 BREATHING CAPACITY TEST: CPT

## 2019-03-29 NOTE — HISTORY OF PRESENT ILLNESS
[FreeTextEntry1] : Mr. KEMP is a 53 year year old male with a history of severe persistent asthma, allergies, OSAS, GERD who presents for a follow-up pulmonary evaluation. His chief complaint is diarrhea.\par -he is feeling okay\par -he still has diarrhea 10-15 times per day\par -his sinuses are congested\par -his heartburn is controlled with Nexium\par -he has sour taste in the mouth\par -he uses his CPAP almost daily for on average 3-4 hours per night\par -he exercises at the [facility] and occasionally walks on the track\par -he denies any headaches, nausea, vomiting, fever, chills, sweats, chest pain, chest pressure, diarrhea, constipation, dysphagia, dizziness, leg swelling, leg pain, itchy eyes, or itchy ears.

## 2019-03-29 NOTE — PHYSICAL EXAM

## 2019-03-29 NOTE — ADDENDUM
[FreeTextEntry1] : Documented by Ramses Manley acting as a scribe for Dr. Leonel Duckworth on 03/29/2019.\par All medical record entries made by the Scribe were at my, Dr. Leonel Duckworth's, direction and personally dictated by me on 03/29/2019. I have reviewed the chart and agree that the record accurately reflects my personal performance of the history, physical exam, assessment and plan. I have also personally directed, reviewed, and agree with the discharge instructions.

## 2019-03-29 NOTE — ASSESSMENT
[FreeTextEntry1] : Mr. Mota is a 53 year old male doing well from a pulmonary perspective in spite of recent bowel obstruction s/p GI surgery, with a history of severe persistent asthma, allergies, OSAS, GERD and is now with active PND / asthma.\par \par Problem 1: Severe persistent asthma, flair\par -add albuterol by the nebulizer QID, prn \par -continue to use Symbicort 160 at 2 inhalations BID\par -continue to use Spiriva (2 puffs QD)\par -continue to use Singulair 10 mg QHS\par -Asthma is believed to be caused by inherited (genetic) and environmental factor, but its exact cause is unknown. Asthma may be triggered by allergens, lung infections, or irritants in the air. Asthma triggers are different for each person\par -Inhaler technique reviewed as well as oral hygiene techniques reviewed with patient. Avoidance of cold air, extremes of temperature, rescue inhaler should be used before exercise. Order of medication reviewed with patient. Recommended use of a cool mist humidifier in the bedroom. \par \par Problem 2: allergies\par - Xyzal 5 mg QHS\par -continue Qnasl 1 sniff/nostril BID \par -add Astelin 0.15% 1 sniff BID\par -Environmental measures for allergies were encouraged including mattress and pillow cover, air purifier, and environmental controls.\par \par Problem 3: GERD\par -continue to use Protonix 40 mg before breakfast\par -continue Zantac 300 mg QHS \par -Rule of 2's- Avoid eating too much, too late, too poorly, too spicy, or two hours before bed \par -Things to avoid including overeating, spicy foods, tight clothing, eating within three hours of bed, this list is not all inclusive. \par -For treatment of reflux, possible options discussed including diet control, H2 blockers, PPIs, as well as coating motility agents discussed as treatment options. Timing of meals and proximity of last meal to sleep were discussed. If symptoms persist, a formal gastrointestinal evaluation is needed.\par \par Problem 4: OSAS, reinforced\par -continue to use the CPAP machine, tolerating it well and seeing benefits \par - prescription given for him to try new masks\par - Good sleep hygiene was encouraged including avoiding watching television an hour before bed, keeping caffeine at a low, avoiding reading in bed, no drinking any liquids three hours before bedtime, and only getting into bed when tired. \par - Discussed the risks/associations with coronary artery disease, atrial fibrillation, arrhythmia, memory loss, issues with concentration, hypertension, nocturia, chronic reflux/Olvera’s esophagus some but not all inclusive. Treatment options discussed including CPAP/BiPAP machine, oral appliance, ProVent therapy, new technologies, or positional sleep.\par \par Problem 5: Obesity \par -resolved\par Weight loss, exercise, and diet control were discussed and are highly encouraged. Treatment options were given such as, aqua therapy, and contacting a nutritionist. Recommended to use the elliptical, stationary bike, less use of treadmill. Mindful eating was explained to the patient Obesity is associated with worsening asthma, shortness of breath, and potential for cardiac disease, diabetes, and other underlying medical conditions.  \par \par Problem 6: CAD\par as per Dr. Fernández, likely the downstream effect of uncontrolled total body inflammation\par \par problem 7: bowel obstruction s/p surgery\par -encouraged to look at functional medicine consult, referred to Dr. Sheryl Leventhal,\par  -GI follow up with Dr. Linton al.\par \par \par Problem 8: health maintenance\par -s/p flu shot in 2018\par -recommended strep pneumonia vaccines: Prevnar-13 vaccine, followed by Pneumo vaccine 23 on year following\par -recommended early intervention for URIs\par -recommended osteoporosis evaluations\par -recommended early dermatological evaluations\par -recommended after the age of 50 to the age of 70, colonoscopy every 5 years\par \par Follow up in 4 months\par Encouraged to follow up with questions, concerns, and changes.

## 2019-03-29 NOTE — PROCEDURE
[FreeTextEntry1] : PFT- spi reveals normal flows; FEV1 was 3.02 L which is 90% of predicted; normal flow volume loop. \par \par FENO was 14; a normal value being less than 25\par \par Fractional exhaled nitric oxide (FENO) is regarded as a simple, noninvasive method for assessing eosinophilic airway inflammation. Produced by a variety of cells within the lung, nitric oxide (NO) concentrations are generally low in healthy individuals. However, high concentrations of NO appear to be involved in nonspecific host defense mechanisms and chronic inflammatory diseases such as asthma. The American Thoracic Society (ATS) therefore has recommended using FENO to aid in the diagnosis and monitoring of eosinophilic airway inflammation and asthma, and for identifying steroid responsive individuals whose chronic respiratory symptoms may be caused by airway inflammation.

## 2019-04-12 ENCOUNTER — TRANSCRIPTION ENCOUNTER (OUTPATIENT)
Age: 54
End: 2019-04-12

## 2019-04-16 ENCOUNTER — APPOINTMENT (OUTPATIENT)
Dept: SURGERY | Facility: CLINIC | Age: 54
End: 2019-04-16
Payer: MEDICARE

## 2019-04-16 PROCEDURE — 45990 SURG DX EXAM ANORECTAL: CPT

## 2019-04-16 PROCEDURE — 46505 CHEMODENERVATION ANAL MUSC: CPT

## 2019-05-15 ENCOUNTER — RX RENEWAL (OUTPATIENT)
Age: 54
End: 2019-05-15

## 2019-05-31 ENCOUNTER — TRANSCRIPTION ENCOUNTER (OUTPATIENT)
Age: 54
End: 2019-05-31

## 2019-06-14 ENCOUNTER — APPOINTMENT (OUTPATIENT)
Dept: DERMATOLOGY | Facility: CLINIC | Age: 54
End: 2019-06-14
Payer: MEDICARE

## 2019-06-14 VITALS — SYSTOLIC BLOOD PRESSURE: 124 MMHG | DIASTOLIC BLOOD PRESSURE: 84 MMHG

## 2019-06-14 DIAGNOSIS — B35.1 TINEA UNGUIUM: ICD-10-CM

## 2019-06-14 DIAGNOSIS — B07.8 OTHER VIRAL WARTS: ICD-10-CM

## 2019-06-14 DIAGNOSIS — L40.9 PSORIASIS, UNSPECIFIED: ICD-10-CM

## 2019-06-14 PROCEDURE — 17110 DESTRUCTION B9 LES UP TO 14: CPT | Mod: GC

## 2019-06-14 PROCEDURE — 99214 OFFICE O/P EST MOD 30 MIN: CPT | Mod: 25,GC

## 2019-06-14 RX ORDER — TACROLIMUS 1 MG/G
0.1 OINTMENT TOPICAL TWICE DAILY
Qty: 1 | Refills: 1 | Status: ACTIVE | COMMUNITY
Start: 2017-02-08 | End: 1900-01-01

## 2019-06-17 ENCOUNTER — RX RENEWAL (OUTPATIENT)
Age: 54
End: 2019-06-17

## 2019-06-17 ENCOUNTER — APPOINTMENT (OUTPATIENT)
Dept: MRI IMAGING | Facility: IMAGING CENTER | Age: 54
End: 2019-06-17
Payer: MEDICARE

## 2019-06-17 ENCOUNTER — OUTPATIENT (OUTPATIENT)
Dept: OUTPATIENT SERVICES | Facility: HOSPITAL | Age: 54
LOS: 1 days | End: 2019-06-17
Payer: MEDICARE

## 2019-06-17 DIAGNOSIS — Z98.89 OTHER SPECIFIED POSTPROCEDURAL STATES: Chronic | ICD-10-CM

## 2019-06-17 DIAGNOSIS — Z87.898 PERSONAL HISTORY OF OTHER SPECIFIED CONDITIONS: Chronic | ICD-10-CM

## 2019-06-17 DIAGNOSIS — Z00.8 ENCOUNTER FOR OTHER GENERAL EXAMINATION: ICD-10-CM

## 2019-06-17 LAB
ALBUMIN SERPL ELPH-MCNC: 4.8 G/DL
ALP BLD-CCNC: 79 U/L
ALT SERPL-CCNC: 50 U/L
ANION GAP SERPL CALC-SCNC: 15 MMOL/L
AST SERPL-CCNC: 32 U/L
BILIRUB SERPL-MCNC: 0.4 MG/DL
BUN SERPL-MCNC: 13 MG/DL
CALCIUM SERPL-MCNC: 9.8 MG/DL
CHLORIDE SERPL-SCNC: 101 MMOL/L
CO2 SERPL-SCNC: 24 MMOL/L
CREAT SERPL-MCNC: 0.78 MG/DL
GLUCOSE SERPL-MCNC: 111 MG/DL
POTASSIUM SERPL-SCNC: 4.4 MMOL/L
PROT SERPL-MCNC: 7 G/DL
SODIUM SERPL-SCNC: 139 MMOL/L

## 2019-06-17 PROCEDURE — 74183 MRI ABD W/O CNTR FLWD CNTR: CPT | Mod: 26

## 2019-06-17 PROCEDURE — 72197 MRI PELVIS W/O & W/DYE: CPT

## 2019-06-17 PROCEDURE — 74183 MRI ABD W/O CNTR FLWD CNTR: CPT

## 2019-06-17 PROCEDURE — 72197 MRI PELVIS W/O & W/DYE: CPT | Mod: 26

## 2019-06-17 PROCEDURE — A9585: CPT

## 2019-07-18 ENCOUNTER — EMERGENCY (EMERGENCY)
Facility: HOSPITAL | Age: 54
LOS: 1 days | Discharge: HOME | End: 2019-07-18
Attending: EMERGENCY MEDICINE
Payer: MEDICARE

## 2019-07-18 VITALS
WEIGHT: 192.02 LBS | SYSTOLIC BLOOD PRESSURE: 160 MMHG | HEART RATE: 74 BPM | RESPIRATION RATE: 18 BRPM | TEMPERATURE: 98 F | OXYGEN SATURATION: 100 % | HEIGHT: 66 IN | DIASTOLIC BLOOD PRESSURE: 105 MMHG

## 2019-07-18 VITALS
HEART RATE: 68 BPM | DIASTOLIC BLOOD PRESSURE: 86 MMHG | SYSTOLIC BLOOD PRESSURE: 135 MMHG | OXYGEN SATURATION: 98 % | RESPIRATION RATE: 16 BRPM

## 2019-07-18 DIAGNOSIS — Z98.89 OTHER SPECIFIED POSTPROCEDURAL STATES: Chronic | ICD-10-CM

## 2019-07-18 DIAGNOSIS — Z87.898 PERSONAL HISTORY OF OTHER SPECIFIED CONDITIONS: Chronic | ICD-10-CM

## 2019-07-18 PROCEDURE — 96375 TX/PRO/DX INJ NEW DRUG ADDON: CPT

## 2019-07-18 PROCEDURE — 99284 EMERGENCY DEPT VISIT MOD MDM: CPT | Mod: 25

## 2019-07-18 PROCEDURE — 96374 THER/PROPH/DIAG INJ IV PUSH: CPT

## 2019-07-18 PROCEDURE — 99284 EMERGENCY DEPT VISIT MOD MDM: CPT

## 2019-07-18 PROCEDURE — 96361 HYDRATE IV INFUSION ADD-ON: CPT

## 2019-07-18 RX ORDER — HYDROMORPHONE HYDROCHLORIDE 2 MG/ML
1 INJECTION INTRAMUSCULAR; INTRAVENOUS; SUBCUTANEOUS ONCE
Refills: 0 | Status: DISCONTINUED | OUTPATIENT
Start: 2019-07-18 | End: 2019-07-18

## 2019-07-18 RX ORDER — KETOROLAC TROMETHAMINE 30 MG/ML
15 SYRINGE (ML) INJECTION ONCE
Refills: 0 | Status: DISCONTINUED | OUTPATIENT
Start: 2019-07-18 | End: 2019-07-18

## 2019-07-18 RX ORDER — METOCLOPRAMIDE HCL 10 MG
10 TABLET ORAL ONCE
Refills: 0 | Status: COMPLETED | OUTPATIENT
Start: 2019-07-18 | End: 2019-07-18

## 2019-07-18 RX ORDER — SODIUM CHLORIDE 9 MG/ML
1000 INJECTION INTRAMUSCULAR; INTRAVENOUS; SUBCUTANEOUS ONCE
Refills: 0 | Status: COMPLETED | OUTPATIENT
Start: 2019-07-18 | End: 2019-07-18

## 2019-07-18 RX ADMIN — Medication 15 MILLIGRAM(S): at 19:35

## 2019-07-18 RX ADMIN — Medication 10 MILLIGRAM(S): at 19:46

## 2019-07-18 RX ADMIN — Medication 15 MILLIGRAM(S): at 20:06

## 2019-07-18 RX ADMIN — SODIUM CHLORIDE 1000 MILLILITER(S): 9 INJECTION INTRAMUSCULAR; INTRAVENOUS; SUBCUTANEOUS at 20:32

## 2019-07-18 RX ADMIN — SODIUM CHLORIDE 1000 MILLILITER(S): 9 INJECTION INTRAMUSCULAR; INTRAVENOUS; SUBCUTANEOUS at 19:45

## 2019-07-18 RX ADMIN — HYDROMORPHONE HYDROCHLORIDE 1 MILLIGRAM(S): 2 INJECTION INTRAMUSCULAR; INTRAVENOUS; SUBCUTANEOUS at 21:41

## 2019-07-18 RX ADMIN — HYDROMORPHONE HYDROCHLORIDE 1 MILLIGRAM(S): 2 INJECTION INTRAMUSCULAR; INTRAVENOUS; SUBCUTANEOUS at 20:30

## 2019-07-18 NOTE — ED PROVIDER NOTE - OBJECTIVE STATEMENT
54M hx of cluster headaches presents with a cc of cluster headache sudden onset today earlier this morning, typical of prior cluster HA though this more severe, no trauma, falls, car accidents, Denies numbness, tingling or loss of sensation. Denies loss of motor function, mild neck pain though is able to range, took APAP and 2mg dilaudid prior to ED arrival, has had full neurological workup prior for cluster ha, mild nausea. Denies v/f/c/cp/sob. Denies syncope, Denies chest palpitations, abdominal pain. Denies dysuria, hematuria, hematochezia, BRBPR, tarry stools, diarrhea, constipation.

## 2019-07-18 NOTE — ED PROVIDER NOTE - ATTENDING CONTRIBUTION TO CARE
55y/o M with h/o cluster headaches, c/o severe HA diffusely over front and back of head, x 1 day, similar to prior cluster HA but not helped by home tylenol or dilaudid today.  Feels the same as prior cluster HAs, but not getting usual relief with oral medications: states this has happened many times in past and needs intravenous dilaudid.  ISTOP reviewed and patient has prescription for outpatient dilaudid, and EMR reviewed, c/w patient reported history.  Pt denies weakness, numbness, vision changes or difficulty walking.  Wife states he was crying with pain earlier.  +nausea, no vomiting. Denies fever, chills, chest pain or difficulty breathing.  No LOC.    On Physical Exam:  General: well appearing, in NAD, speaking clearly in full sentences and without difficulty; cooperative with exam  HEENT:   -eyes: PERRL, Grossly normal fundoscopic exam, without retinal hemorrhage or evidence of papilledema (limited, undilated exam).   -mouth: MMM  -nose: oxygen in place by nasal cannula  Neck: no neck tenderness, no nuchal rigidity  Cardiac: normal s1, s2; RRR; no MGR  Lungs: CTABL  Abdomen: soft nontender/nondistended  : no bladder tenderness or distension  Skin: intact, no rash  Extremities: no peripheral edema, no gross deformities  Neuro: no gross neurologic deficits; CN II-XII intact, 5/5 str in all extremities, no gross areas of sensory loss.     AP: Likely cluster HA; no neurologic deficits and has multiple prior w/u's for this type of HA; will treat symptomatically with pain medication, and if improving can discharge.  If no relieve by medications in ED, consider neurology consult and/or imaging.

## 2019-07-18 NOTE — ED PROVIDER NOTE - PHYSICAL EXAMINATION
GEN APPEARANCE: WDWN, alert and cooperative, non-toxic appearing and in NAD, appears uncomfortable   HEAD: Atraumatic, normocephalic   EYES: PERRLa, EOMI, vision grossly intact.   EARS: Gross hearing intact.   NOSE: No nasal discharge, no external evidence of epistaxis.   NECK: Supple  CV: RRR, S1S2, no c/r/m/g. No cyanosis or pallor. Extremities warm, well perfused. Cap refill <2 seconds. No bruits.   LUNGS: CTAB. No wheezing. No rales. No rhonchi. No diminished breath sounds.   ABDOMEN: Soft, NTND. No guarding or rebound. No masses.   MSK: Spine appears normal, no spine point tenderness. No CVA ttp. No joint erythema or tenderness. Normal muscular development. Pelvis stable.  EXTREMITIES: No peripheral edema. No obvious joint or bony deformity.  NEURO: Alert, follows commands. Weight bearing normal. Speech normal. Sensation and motor normal x4 extremities. CN2-12 normal, ambulating normally.  SKIN: Normal color for race, warm, dry and intact. No evidence of rash.  PSYCH: Normal mood and affect.

## 2019-07-18 NOTE — ED ADULT NURSE NOTE - OBJECTIVE STATEMENT
54M aaox4 ambulatory with h/o Asthma  controlled-never intubated or hospitalized Bilateral knee pain  Bowel obstruction  CAD (coronary artery disease)  2 stents placed in LAD in 2015, RCA stent x 2 in 2006Cluster headaches  Crohn disease  GERD (gastroesophageal reflux disease)  GIB (gastrointestinal bleeding)    H/O: HTN (hypertension)  HLD (hyperlipidemia)  Inguinal mass  Joint pain  Lower back pain  MI (myocardial infarction)  MARYAM (obstructive sleep apnea)    Pancreatitis  while on 6MP for Crohn's now off of it Shoulder pain, bilateral  Unilateral recurrent inguinal hernia without obstruction or gangrene  Left. 54M aaox4 ambulatory with c/o Cluster headache that started 8-9 hours ago not relieved by PO meds at home and patient requesting IV medications including IV dilaudid to relieve his headache. Reports h/o Asthma  controlled-never intubated or hospitalized Bilateral knee pain  Bowel obstruction  CAD (coronary artery disease)  2 stents placed in LAD in 2015, RCA stent x 2 in 2006Cluster headaches  Crohn disease  GERD (gastroesophageal reflux disease)  GIB (gastrointestinal bleeding)    H/O: HTN (hypertension)  HLD (hyperlipidemia)  Inguinal mass  Joint pain  Lower back pain  MI (myocardial infarction)  MARYAM (obstructive sleep apnea)    Pancreatitis  while on 6MP for Crohn's now off of it Shoulder pain, bilateral  Unilateral recurrent inguinal hernia without obstruction or gangrene  Left.  Patient took PO tylenol at 17:30pm, PO dilaudid and zofran at home PTA in the ED without relief. Denies any chills or fever, dizziness, vomiting or abd pain.

## 2019-07-18 NOTE — ED PROVIDER NOTE - CARE PLAN
Principal Discharge DX:	Headache Principal Discharge DX:	Headache  Assessment and plan of treatment:	Thank you for visiting our Emergency Department, it has been a pleasure taking part in your healthcare.    Your discharge diagnosis is: headache  Please take all discharge medications as indicated below:  Please continue all medications as rx'd by your PMD.  Take Motrin/Tylenol for pain as needed, please follow instructions on manufacturers label. If you have any questions please consult a pharmacist or your PMD.  Please follow up with your PMD within x48 hours.  Please follow up with your neurologist within x48 hours.  A copy of resulted labs, imaging, and findings have been provided to you.   You have had a detailed discussion with your provider regarding your diagnosis, care management and discharge planning including, but not limited to: return precautions, follow up visits with existing or new providers, new prescriptions and/or medication changes, wound and/or splint/cast care or other care   aspects specific to your diagnosis and treatment. You have been given the opportunity to have your questions answered. At this time you have been deemed stable and fit for discharge.  Return precautions to the Emergency Department include but are not limited to: unrelenting nausea, vomiting, fever, chills, chest pain, shortness of breath, dizziness, chest or abdominal pain, worsening back pain, syncope, blood in urine or stool, headache that doesn't resolve, numbness or tingling, loss of sensation, loss of motor function, or any other concerning symptoms.

## 2019-07-18 NOTE — ED PROVIDER NOTE - CLINICAL SUMMARY MEDICAL DECISION MAKING FREE TEXT BOX
Sylvain PGY3: 54M hx of cluster headaches presents with a cc of cluster headache sudden onset today earlier this morning, typical of prior cluster HA though this more severe, no trauma, falls, car accidents. neuro exam non focal vss low c/f sah/ich, meningitis, vss full neck rom, likely cluster ha c/w prior will get migraine cocktail, ivf, reassess

## 2019-07-18 NOTE — ED PROVIDER NOTE - PLAN OF CARE
Thank you for visiting our Emergency Department, it has been a pleasure taking part in your healthcare.    Your discharge diagnosis is: headache  Please take all discharge medications as indicated below:  Please continue all medications as rx'd by your PMD.  Take Motrin/Tylenol for pain as needed, please follow instructions on manufacturers label. If you have any questions please consult a pharmacist or your PMD.  Please follow up with your PMD within x48 hours.  Please follow up with your neurologist within x48 hours.  A copy of resulted labs, imaging, and findings have been provided to you.   You have had a detailed discussion with your provider regarding your diagnosis, care management and discharge planning including, but not limited to: return precautions, follow up visits with existing or new providers, new prescriptions and/or medication changes, wound and/or splint/cast care or other care   aspects specific to your diagnosis and treatment. You have been given the opportunity to have your questions answered. At this time you have been deemed stable and fit for discharge.  Return precautions to the Emergency Department include but are not limited to: unrelenting nausea, vomiting, fever, chills, chest pain, shortness of breath, dizziness, chest or abdominal pain, worsening back pain, syncope, blood in urine or stool, headache that doesn't resolve, numbness or tingling, loss of sensation, loss of motor function, or any other concerning symptoms.

## 2019-07-18 NOTE — ED ADULT NURSE REASSESSMENT NOTE - NS ED NURSE REASSESS COMMENT FT1
Patient verbalized improvement after medications, denies any nausea or headache, VS wnl. DC home and advised to ff-up with Neurologist in 1-2 days and continue taking PO meds at home.

## 2019-07-18 NOTE — ED ADULT NURSE NOTE - NSIMPLEMENTINTERV_GEN_ALL_ED
Implemented All Universal Safety Interventions:  Smoaks to call system. Call bell, personal items and telephone within reach. Instruct patient to call for assistance. Room bathroom lighting operational. Non-slip footwear when patient is off stretcher. Physically safe environment: no spills, clutter or unnecessary equipment. Stretcher in lowest position, wheels locked, appropriate side rails in place. Consent Type: Consent 1 (Standard)

## 2019-07-18 NOTE — ED PROVIDER NOTE - PROGRESS NOTE DETAILS
Sylvain PGY3: Pt assessed at beside. Pt resting comfortably, pain controlled, pt questions answered. Vital signs stable. Feels better after meds, asking to be dc'd, pt to fu w neurology, strict return precautions. understands plan, agrees with dc. Will d/c with PMD f/u, strict return precautions given with read back per pt/family/caregiver.

## 2019-07-19 ENCOUNTER — EMERGENCY (EMERGENCY)
Facility: HOSPITAL | Age: 54
LOS: 1 days | Discharge: HOME | End: 2019-07-19
Attending: EMERGENCY MEDICINE
Payer: MEDICARE

## 2019-07-19 VITALS
WEIGHT: 192.02 LBS | SYSTOLIC BLOOD PRESSURE: 158 MMHG | OXYGEN SATURATION: 100 % | DIASTOLIC BLOOD PRESSURE: 103 MMHG | TEMPERATURE: 98 F | HEART RATE: 78 BPM | HEIGHT: 66 IN | RESPIRATION RATE: 16 BRPM

## 2019-07-19 DIAGNOSIS — Z98.89 OTHER SPECIFIED POSTPROCEDURAL STATES: Chronic | ICD-10-CM

## 2019-07-19 DIAGNOSIS — Z87.898 PERSONAL HISTORY OF OTHER SPECIFIED CONDITIONS: Chronic | ICD-10-CM

## 2019-07-19 LAB
ANION GAP SERPL CALC-SCNC: 14 MMOL/L — SIGNIFICANT CHANGE UP (ref 5–17)
BASOPHILS # BLD AUTO: 0 K/UL — SIGNIFICANT CHANGE UP (ref 0–0.2)
BASOPHILS NFR BLD AUTO: 0 % — SIGNIFICANT CHANGE UP (ref 0–2)
BUN SERPL-MCNC: 14 MG/DL — SIGNIFICANT CHANGE UP (ref 7–23)
CALCIUM SERPL-MCNC: 9.8 MG/DL — SIGNIFICANT CHANGE UP (ref 8.4–10.5)
CHLORIDE SERPL-SCNC: 104 MMOL/L — SIGNIFICANT CHANGE UP (ref 96–108)
CO2 SERPL-SCNC: 25 MMOL/L — SIGNIFICANT CHANGE UP (ref 22–31)
CREAT SERPL-MCNC: 0.7 MG/DL — SIGNIFICANT CHANGE UP (ref 0.5–1.3)
EOSINOPHIL # BLD AUTO: 0.1 K/UL — SIGNIFICANT CHANGE UP (ref 0–0.5)
EOSINOPHIL NFR BLD AUTO: 1.2 % — SIGNIFICANT CHANGE UP (ref 0–6)
GLUCOSE SERPL-MCNC: 68 MG/DL — LOW (ref 70–99)
HCT VFR BLD CALC: 38.5 % — LOW (ref 39–50)
HGB BLD-MCNC: 13.2 G/DL — SIGNIFICANT CHANGE UP (ref 13–17)
LYMPHOCYTES # BLD AUTO: 1.1 K/UL — SIGNIFICANT CHANGE UP (ref 1–3.3)
LYMPHOCYTES # BLD AUTO: 19.8 % — SIGNIFICANT CHANGE UP (ref 13–44)
MCHC RBC-ENTMCNC: 28.5 PG — SIGNIFICANT CHANGE UP (ref 27–34)
MCHC RBC-ENTMCNC: 34.2 GM/DL — SIGNIFICANT CHANGE UP (ref 32–36)
MCV RBC AUTO: 83.4 FL — SIGNIFICANT CHANGE UP (ref 80–100)
MONOCYTES # BLD AUTO: 0.4 K/UL — SIGNIFICANT CHANGE UP (ref 0–0.9)
MONOCYTES NFR BLD AUTO: 7.7 % — SIGNIFICANT CHANGE UP (ref 2–14)
NEUTROPHILS # BLD AUTO: 4 K/UL — SIGNIFICANT CHANGE UP (ref 1.8–7.4)
NEUTROPHILS NFR BLD AUTO: 71.4 % — SIGNIFICANT CHANGE UP (ref 43–77)
PLATELET # BLD AUTO: 164 K/UL — SIGNIFICANT CHANGE UP (ref 150–400)
POTASSIUM SERPL-MCNC: 3.8 MMOL/L — SIGNIFICANT CHANGE UP (ref 3.5–5.3)
POTASSIUM SERPL-SCNC: 3.8 MMOL/L — SIGNIFICANT CHANGE UP (ref 3.5–5.3)
RBC # BLD: 4.62 M/UL — SIGNIFICANT CHANGE UP (ref 4.2–5.8)
RBC # FLD: 13.5 % — SIGNIFICANT CHANGE UP (ref 10.3–14.5)
SODIUM SERPL-SCNC: 143 MMOL/L — SIGNIFICANT CHANGE UP (ref 135–145)
WBC # BLD: 5.6 K/UL — SIGNIFICANT CHANGE UP (ref 3.8–10.5)
WBC # FLD AUTO: 5.6 K/UL — SIGNIFICANT CHANGE UP (ref 3.8–10.5)

## 2019-07-19 PROCEDURE — 99284 EMERGENCY DEPT VISIT MOD MDM: CPT

## 2019-07-19 RX ORDER — KETOROLAC TROMETHAMINE 30 MG/ML
30 SYRINGE (ML) INJECTION ONCE
Refills: 0 | Status: DISCONTINUED | OUTPATIENT
Start: 2019-07-19 | End: 2019-07-19

## 2019-07-19 RX ORDER — METOCLOPRAMIDE HCL 10 MG
10 TABLET ORAL ONCE
Refills: 0 | Status: COMPLETED | OUTPATIENT
Start: 2019-07-19 | End: 2019-07-19

## 2019-07-19 RX ORDER — ACETAMINOPHEN 500 MG
975 TABLET ORAL ONCE
Refills: 0 | Status: COMPLETED | OUTPATIENT
Start: 2019-07-19 | End: 2019-07-19

## 2019-07-19 RX ORDER — DIVALPROEX SODIUM 500 MG/1
1000 TABLET, DELAYED RELEASE ORAL ONCE
Refills: 0 | Status: DISCONTINUED | OUTPATIENT
Start: 2019-07-19 | End: 2019-07-19

## 2019-07-19 RX ORDER — DEXAMETHASONE 0.5 MG/5ML
10 ELIXIR ORAL ONCE
Refills: 0 | Status: COMPLETED | OUTPATIENT
Start: 2019-07-19 | End: 2019-07-19

## 2019-07-19 RX ORDER — SODIUM CHLORIDE 9 MG/ML
1000 INJECTION INTRAMUSCULAR; INTRAVENOUS; SUBCUTANEOUS ONCE
Refills: 0 | Status: COMPLETED | OUTPATIENT
Start: 2019-07-19 | End: 2019-07-19

## 2019-07-19 RX ORDER — MAGNESIUM SULFATE 500 MG/ML
2 VIAL (ML) INJECTION ONCE
Refills: 0 | Status: COMPLETED | OUTPATIENT
Start: 2019-07-19 | End: 2019-07-19

## 2019-07-19 RX ORDER — VALPROIC ACID (AS SODIUM SALT) 250 MG/5ML
1000 SOLUTION, ORAL ORAL ONCE
Refills: 0 | Status: COMPLETED | OUTPATIENT
Start: 2019-07-19 | End: 2019-07-19

## 2019-07-19 RX ADMIN — Medication 102 MILLIGRAM(S): at 21:30

## 2019-07-19 RX ADMIN — Medication 10 MILLIGRAM(S): at 22:00

## 2019-07-19 RX ADMIN — Medication 30 MILLIGRAM(S): at 21:30

## 2019-07-19 RX ADMIN — Medication 30 MILLIGRAM(S): at 21:45

## 2019-07-19 RX ADMIN — Medication 2 GRAM(S): at 23:05

## 2019-07-19 RX ADMIN — Medication 975 MILLIGRAM(S): at 19:20

## 2019-07-19 RX ADMIN — Medication 50 GRAM(S): at 22:05

## 2019-07-19 RX ADMIN — SODIUM CHLORIDE 1000 MILLILITER(S): 9 INJECTION INTRAMUSCULAR; INTRAVENOUS; SUBCUTANEOUS at 17:29

## 2019-07-19 RX ADMIN — Medication 10 MILLIGRAM(S): at 17:29

## 2019-07-19 RX ADMIN — Medication 975 MILLIGRAM(S): at 17:29

## 2019-07-19 NOTE — ED PROVIDER NOTE - NSFOLLOWUPINSTRUCTIONS_ED_ALL_ED_FT
You were evaluated and treated in the ER at [facility] for a headache.     - Please follow up with your neurologist in the office on tuesday (Dr. Corona)    - If you develop worsening headaches, nausea/vomiting, blurry vision, weakness/numbness in your extremities or slurred speech, please return to the ER for further evaluation

## 2019-07-19 NOTE — ED PROVIDER NOTE - PLAN OF CARE
54M with PMH/PSH including CAD s/p MI, stents x 4, Crohns s/p bowel resection, appendectomy, ventral hernia repair, bowel obstructions, COPD/asthma, anemia, MARYAM, cluster HA, anal fissures, ADHD, lipoma excision from back, inguinal hernia repair presents to the ED with cluster HA.  Reports headache started yesterday, had emetic episode, was given Toradol and Dilaudid, reports returned late morning today.   Neurology consulted. Given toradol, decadron, MgS and depakote with headache pain decreased to 1/10. Neurology recommending tylenol as outpatient with f/u with Dr. Corona (neurology) on tuesday

## 2019-07-19 NOTE — ED PROVIDER NOTE - PSH
H/O colonoscopy    H/O lipoma  removed from back  H/O percutaneous transluminal coronary angioplasty  with DE RCA 2006, 2 stents placed 2015  History of herniorrhaphy  left inguinal herniorrhaphy  S/P cardiac catheterization  2017-resulted in [place]-no stents placed  S/P hernia surgery

## 2019-07-19 NOTE — CONSULT NOTE ADULT - ASSESSMENT
54M with history of cluster headaches presenting to the ED with his typical headache. Neurological exam non-focal. Pain has improved from 8/10 to 4/10 with tylenol and reglan.     Recommendations:  30 mg IV toradol x 1  2 g magnesium sulfate IVPB x 1  10 mg dexamethasone x 1   10 m reglan x 1   If patient improved after above treatments, can follow-up outpatient with his own neurologist Dr. Moreno

## 2019-07-19 NOTE — CONSULT NOTE ADULT - SUBJECTIVE AND OBJECTIVE BOX
HPI:    Patient is a 54M with a history of CAD, Crohn's disease, COPD, and cluster headaches who presents to the ED due to headache. Patient follows with Dr. Corona from neurology for his history of cluster headaches. His headaches have been well controlled outpatient and he only gets them every few months for which he takes abortive treatments at home including tylenol and PO dilaudid. He developed his typical headache yesterday which did not resolve with his usual treatments so he came to the ED and was given reglan and toradol with some resolution. His headache returned today so he again presented to the ED. He describes this as his typical cluster headache, with location in the bifrontal region and radiating to the L occiput and describes it as a sharp, throbbing sensation. It was initially a 8-9/10 upon ED presentation and has improved to 4-5/10 following tylenol and reglan.       PAST MEDICAL & SURGICAL HISTORY:  Bowel obstruction  Cluster headaches  Unilateral recurrent inguinal hernia without obstruction or gangrene: Left  MI (myocardial infarction)  GIB (gastrointestinal bleeding)  MARYAM (obstructive sleep apnea)  Asthma: controlled-never intubated or hospitalized  Joint pain  CAD (coronary artery disease): 2 stents placed in LAD in 2015, RCA stent x 2 in 2006  Pancreatitis: while on 6MP for Crohn&#x27;s now off of it  Crohn disease  HLD (hyperlipidemia)  Inguinal mass  Bilateral knee pain  Shoulder pain, bilateral  Lower back pain  GERD (gastroesophageal reflux disease)  H/O: HTN (hypertension)  S/P hernia surgery  H/O lipoma: removed from back  S/P cardiac catheterization: 2017-resulted in [place]-no stents placed  H/O colonoscopy  H/O percutaneous transluminal coronary angioplasty: with DE RCA 2006, 2 stents placed 2015  History of herniorrhaphy: left inguinal herniorrhaphy    FAMILY HISTORY:  Family history of premature CAD (Father): Father CABG at age 54  Family history of Crohn's disease (Father)    Allergies    No Known Allergies      Vital Signs Last 24 Hrs  T(C): 36.7 (19 Jul 2019 17:30), Max: 36.8 (19 Jul 2019 15:10)  T(F): 98 (19 Jul 2019 17:30), Max: 98.3 (19 Jul 2019 15:10)  HR: 65 (19 Jul 2019 21:40) (65 - 78)  BP: 143/97 (19 Jul 2019 21:40) (143/97 - 158/103)  BP(mean): --  RR: 18 (19 Jul 2019 21:40) (16 - 18)  SpO2: 97% (19 Jul 2019 21:40) (97% - 100%)    General Exam:   General appearance: No acute distress    Neurological Exam:  Mental Status: Orientated to self, date and place.  Attention intact.  No dysarthria. Speech fluent.  Cranial Nerves:   PERRL, EOMI, VFF, no nystagmus.    CN V1-3 intact to light touch .  No facial asymmetry.  Motor:   Tone: normal.                  Strength:     [] Upper extremity                      Delt       Bicep    Tricep                                                  R         5/5        5/5        5/5       5/5                                               L          5/5        5/5        5/5       5/5  [] Lower extremity                       HF          KE          KF        DF         PF                                               R        5/5        5/5        5/5       5/5       5/5                                               L         5/5        5/5       5/5       5/5        5/5  Pronator drift: none                 Dysmetria: None to finger-nose-finger  Sensation: intact to light touch    Gait: not tested                  13.2   5.6   )-----------( 164      ( 19 Jul 2019 21:34 )             38.5

## 2019-07-19 NOTE — ED ADULT NURSE REASSESSMENT NOTE - NS ED NURSE REASSESS COMMENT FT1
1945- awaiting to be reassesed by MD at this time. PAtient with no worsening of headache but not completely resolving.

## 2019-07-19 NOTE — ED PROVIDER NOTE - OBJECTIVE STATEMENT
Attending MD Doll: I personally have seen and examined this patient.  Resident note reviewed and agree on plan of care and except where noted.  See below for details.     Seen in [facility],   Dr Dean Moreno neuro (reports has appointment on 7/23/19)  Dr. Fernández (PMD)    54M with PMH/PSH including CAD s/p MI, stents x 4, Crohns s/p bowel resection, appendectomy, ventral hernia repair, bowel obstructions, COPD/asthma, anemia, MARYAM, cluster HA, anal fissures, ADHD, lipoma excision from back, inguinal hernia repair presents to the ED with cluster HA.  Reports headache started yesterday, had emetic episode, was given Toradol and Dilaudid, reports returned late morning today.  Reports it is typical for his cluster HAs that he gets them on subsequent days.  Reports feels like his cluster headache, reports usually last for 2 days.  Reports he is prescribed po Dilaudid by his neurologist and reports took 2mg po Dilaudid and Zofran yesterday without improvement.  Reports that the pain is concentrated at forehead, L sided and posterior.  Denies change in vision, double vision, sudden loss of vision. Denies loss of hearing, reports has history of tinnitus, unchanged.  Denies abdominal pain, vomiting, blood in stools. Reports nausea since yesterday, chronic diarrhea.  Denies dysuria, hematuria, change in urinary habits including frequency, urgency. Denies fevers.  On exam, NAD, head NCAT, PERRL, FROM at neck, no tenderness to midline palpation, no stepoffs along length of spine, lungs CTAB with good inspiratory effort, +S1S2, no m/r/g, abdomen soft with +BS, NT, ND, no CVAT, moving all extremities with 5/5 strength bilateral upper and lower extremities, good and equal  strength bilaterally; A/P: 54M with headache, will attempt pain control, given return will consult neuro for recs (reports last episode was about 6 mo ago)    Meds: Metoprolol, ASA 81, Norvasc, Losartan, Vit D, Symbicort, Nexium, Xysal, Singulair, Spiriva, Repatha inj, Stelara inj, Zofran prn, Dilaudid prn Attending MD Doll: I personally have seen and examined this patient.  Resident note reviewed and agree on plan of care and except where noted.  See below for details.     Seen in [facility],   Dr Dean Moreno neuro (reports has appointment on 7/23/19)  Dr. Fernández (PMD)    54M with PMH/PSH including CAD s/p MI, stents x 4, Crohns s/p bowel resection, appendectomy, ventral hernia repair, bowel obstructions, COPD/asthma, anemia, MARYAM, cluster HA, anal fissures, ADHD, lipoma excision from back, inguinal hernia repair presents to the ED with cluster HA.  Reports headache started yesterday, had emetic episode, was given Toradol and Dilaudid, reports returned late morning today.  Reports it is typical for his cluster HAs that he gets them on subsequent days.  Reports feels like his cluster headache, reports usually last for 2 days.  Reports he is prescribed po Dilaudid by his neurologist and reports took 2mg po Dilaudid and Zofran yesterday without improvement.  Reports that the pain is concentrated at forehead, L sided and posterior.  Denies change in vision, double vision, sudden loss of vision. Denies loss of hearing, reports has history of tinnitus, unchanged.  Denies abdominal pain, vomiting, blood in stools. Reports nausea since yesterday, chronic diarrhea.  Denies dysuria, hematuria, change in urinary habits including frequency, urgency. Denies fevers.  On exam, NAD, head NCAT, PERRL, FROM at neck, no tenderness to midline palpation, no stepoffs along length of spine, lungs CTAB with good inspiratory effort, +S1S2, no m/r/g, abdomen soft with +BS, NT, ND, no CVAT, moving all extremities with 5/5 strength bilateral upper and lower extremities, good and equal  strength bilaterally; A/P: 54M with headache, will attempt pain control, given return will consult neuro for recs (reports last episode was about 6 mo ago), patient does NOT want offered Toradol.  Requesting 2mg IV Dilaudid, does NOT want oral as "it does not work the same way".  Explained that do not typically give narcotics for headache.  Reports previously tried on a triptan but reports unable to take.  Explained that will consult neuro, as do not typically use narcotics as first line treatment for headache.  WIll give Reglan, IVFs, po Tylenol.      Meds: Metoprolol, ASA 81, Norvasc, Losartan, Vit D, Symbicort, Nexium, Xysal, Singulair, Spiriva, Repatha inj, Stelara inj, Zofran prn, Dilaudid prn Attending MD Doll: I personally have seen and examined this patient.  Resident note reviewed and agree on plan of care and except where noted.  See below for details.     Seen in [facility],   Dr Dean Moreno neuro (reports has appointment on 7/23/19)  Dr. Fernández (PMD)    54M with PMH/PSH including CAD s/p MI, stents x 4, Crohns s/p bowel resection, appendectomy, ventral hernia repair, bowel obstructions, COPD/asthma, anemia, MARYAM, cluster HA, anal fissures, ADHD, lipoma excision from back, inguinal hernia repair presents to the ED with cluster HA.  Reports headache started yesterday, had emetic episode, was given Toradol and Dilaudid, reports returned late morning today.  Reports it is typical for his cluster HAs that he gets them on subsequent days.  Reports feels like his cluster headache, reports usually last for 2 days.  Reports he is prescribed po Dilaudid by his neurologist and reports took 2mg po Dilaudid and Zofran yesterday without improvement.  Reports that the pain is concentrated at forehead, L sided and posterior.  Denies change in vision, double vision, sudden loss of vision. Denies loss of hearing, reports has history of tinnitus, unchanged.  Denies abdominal pain, vomiting, blood in stools. Reports nausea since yesterday, chronic diarrhea.  Denies dysuria, hematuria, change in urinary habits including frequency, urgency. Denies fevers.  On exam, NAD, head NCAT, CN 2-12 grossly intact, PERRL, FROM at neck, no tenderness to midline palpation, no stepoffs along length of spine, lungs CTAB with good inspiratory effort, +S1S2, no m/r/g, abdomen soft with +BS, NT, ND, no CVAT, moving all extremities with 5/5 strength bilateral upper and lower extremities, good and equal  strength bilaterally; A/P: 54M with headache, will attempt pain control, given return will consult neuro for recs (reports last episode was about 6 mo ago), patient does NOT want offered Toradol.  Requesting 2mg IV Dilaudid, does NOT want oral as "it does not work the same way".  Explained that do not typically give narcotics for headache.  Reports previously tried on a triptan but reports unable to take.  Explained that will consult neuro, as do not typically use narcotics as first line treatment for headache.  WIll give Reglan, IVFs, po Tylenol.      Meds: Metoprolol, ASA 81, Norvasc, Losartan, Vit D, Symbicort, Nexium, Xysal, Singulair, Spiriva, Repatha inj, Stelara inj, Zofran prn, Dilaudid prn

## 2019-07-19 NOTE — ED PROVIDER NOTE - PROGRESS NOTE DETAILS
Attending MD Doll: Neuro in the ED, recommending IV Tylenol, explained patient had Tylenol 975mg orally at 5:30pm and also does not meet criteria for IV Tylenol.  Requested 2gm Mg, 10 Decadron and Toradol 30mg.  Explained that patient had previously refused Toradol with this MD.  Went into room to discuss with patient.  Explained that patient does not meet criteria but even if he did it is a moot point because next dose of Tylenol not due for another 4 hours.  Wife demanding patient be given the IV Tylenol that the neuro resident offered.  Reports that she got it while she was an inpatient here for an appendiceal issue.  Tried to explain that she was likely npo but family yelling at this MD.  Tried to reiterate that not due for Tylenol dose regardless. Explained would give rest of cocktail (Mg, steroid, toradol).  Family report that patient sat in the ED suffering, explained he was treated with the medications that he accepted (declined Toradol).  Patient reports now willing to accept Toradol, Mg, and steroid.  Will give. Pt re-evaluated after 10mg IV decadron, 2mg MgS and 30mg toradol. Headache remains 4/10. Neurology contacted for re-evaluation. Recommending 1000mg of depakote Attending MD Doll: Received call from Neuro, Depakote must be given IV, had not been specified previously.  Medication not given yet.  Will change and call pharmacy.  KEITH Campuzano made aware. **ATTENDING ADDENDUM (Dr. Lloyd Sullivan): patient reevaluated by ED team. Headache at 1/10. Improved. Likely discharge home with close outpatient followup with primary care physician/provider.

## 2019-07-19 NOTE — ED PROVIDER NOTE - SHIFT CHANGE DETAILS
***ATTENDING ADDENDUM (Dr. Lloyd Sullivan): I have received handoff from [facility]. Awaiting pain management recommendations from neurology and completion of consultation. NO evidence of intracerebral hemorrhage, serious bacterial infection or sepsis/severe sepsis e.g. meningococcemia or meningitis (viral or bacterial) or other worrisome emergency. Likely discharge home with close outpatient followup for chronic pain and headache. Will continue to observe and monitor closely.

## 2019-07-19 NOTE — ED ADULT NURSE NOTE - OBJECTIVE STATEMENT
53 yo presents to the ED from home. A&Ox4, ambulatory with family member at bedside c/o cluster HA. pt was seen and DC from ED last night for same symptoms. received Dilaudid and states that it helped the pain, reports Toradol did not help. reports taking Dilaudid at home for pain with no relief. cluster HA returned this afternoon. denies recent fall, trauma, fever, chills, n.v. abd soft nondistended nontender. gross neuro intact. VSS.

## 2019-07-19 NOTE — ED PROVIDER NOTE - ATTENDING CONTRIBUTION TO CARE
Attending MD Doll: I personally have seen and examined this patient.  Resident note reviewed and agree on plan of care and except where noted.  See HPI for details.

## 2019-07-19 NOTE — ED ADULT NURSE NOTE - NSIMPLEMENTINTERV_GEN_ALL_ED
Implemented All Universal Safety Interventions:  [place] to call system. Call bell, personal items and telephone within reach. Instruct patient to call for assistance. Room bathroom lighting operational. Non-slip footwear when patient is off stretcher. Physically safe environment: no spills, clutter or unnecessary equipment. Stretcher in lowest position, wheels locked, appropriate side rails in place.

## 2019-07-19 NOTE — ED PROVIDER NOTE - CARE PLAN
Principal Discharge DX:	Cluster headache  Assessment and plan of treatment:	54M with PMH/PSH including CAD s/p MI, stents x 4, Crohns s/p bowel resection, appendectomy, ventral hernia repair, bowel obstructions, COPD/asthma, anemia, MARAYM, cluster HA, anal fissures, ADHD, lipoma excision from back, inguinal hernia repair presents to the ED with cluster HA.  Reports headache started yesterday, had emetic episode, was given Toradol and Dilaudid, reports returned late morning today.   Neurology consulted. Given toradol, decadron, MgS and depakote with headache pain decreased to 1/10. Neurology recommending tylenol as outpatient with f/u with Dr. Corona (neurology) on tuesday

## 2019-07-20 VITALS
SYSTOLIC BLOOD PRESSURE: 138 MMHG | HEART RATE: 67 BPM | RESPIRATION RATE: 20 BRPM | DIASTOLIC BLOOD PRESSURE: 88 MMHG | OXYGEN SATURATION: 96 % | TEMPERATURE: 98 F

## 2019-07-20 PROCEDURE — 96375 TX/PRO/DX INJ NEW DRUG ADDON: CPT

## 2019-07-20 PROCEDURE — 80048 BASIC METABOLIC PNL TOTAL CA: CPT

## 2019-07-20 PROCEDURE — 96367 TX/PROPH/DG ADDL SEQ IV INF: CPT

## 2019-07-20 PROCEDURE — 99285 EMERGENCY DEPT VISIT HI MDM: CPT | Mod: 25

## 2019-07-20 PROCEDURE — 96365 THER/PROPH/DIAG IV INF INIT: CPT

## 2019-07-20 PROCEDURE — 85027 COMPLETE CBC AUTOMATED: CPT

## 2019-07-20 RX ADMIN — Medication 1000 MILLIGRAM(S): at 02:15

## 2019-07-20 RX ADMIN — Medication 25 MILLIGRAM(S): at 00:25

## 2019-07-24 ENCOUNTER — RX RENEWAL (OUTPATIENT)
Age: 54
End: 2019-07-24

## 2019-07-26 ENCOUNTER — NON-APPOINTMENT (OUTPATIENT)
Age: 54
End: 2019-07-26

## 2019-07-26 ENCOUNTER — APPOINTMENT (OUTPATIENT)
Dept: PULMONOLOGY | Facility: CLINIC | Age: 54
End: 2019-07-26
Payer: MEDICARE

## 2019-07-26 VITALS
SYSTOLIC BLOOD PRESSURE: 120 MMHG | WEIGHT: 190 LBS | RESPIRATION RATE: 16 BRPM | DIASTOLIC BLOOD PRESSURE: 70 MMHG | HEIGHT: 66 IN | HEART RATE: 88 BPM | BODY MASS INDEX: 30.53 KG/M2 | OXYGEN SATURATION: 98 %

## 2019-07-26 DIAGNOSIS — J45.901 ACUTE BRONCHITIS, UNSPECIFIED: ICD-10-CM

## 2019-07-26 DIAGNOSIS — J20.9 ACUTE BRONCHITIS, UNSPECIFIED: ICD-10-CM

## 2019-07-26 PROCEDURE — 95012 NITRIC OXIDE EXP GAS DETER: CPT

## 2019-07-26 PROCEDURE — 94010 BREATHING CAPACITY TEST: CPT

## 2019-07-26 PROCEDURE — 99214 OFFICE O/P EST MOD 30 MIN: CPT | Mod: 25

## 2019-07-26 NOTE — PROCEDURE
[FreeTextEntry1] : PFT revealed normal flows, with a FEV1 of 3.19L, which is 96% of predicted, with a normal flow volume loop\par \par FENO was 15; a normal value being less than 25\par Fractional exhaled nitric oxide (FENO) is regarded as a simple, noninvasive method for assessing eosinophilic airway inflammation. Produced by a variety of cells within the lung, nitric oxide (NO) concentrations are generally low in healthy individuals. However, high concentrations of NO appear to be involved in nonspecific host defense mechanisms and chronic inflammatory diseases such as asthma. The American Thoracic Society (ATS) therefore has recommended using FENO to aid in the diagnosis and monitoring of eosinophilic airway inflammation and asthma, and for identifying steroid responsive individuals whose chronic respiratory symptoms may be caused by airway inflammation.

## 2019-07-26 NOTE — ASSESSMENT
[FreeTextEntry1] : Mr. Mota is a 54 year old male doing well from a pulmonary perspective, is s/p bowel obstruction s/p GI surgery, s/p hospitalization with cluster headaches / tinnitus, with a history of severe persistent asthma, allergies, OSAS, GERD and his major issues include tinnitus and bowel issues.\par \par Problem 1: Severe persistent asthma \par -add albuterol by the nebulizer QID, prn \par -continue to use Symbicort 160 at 2 inhalations BID\par -continue to use Spiriva (2 puffs QD)\par -continue to use Singulair 10 mg QHS\par -Asthma is believed to be caused by inherited (genetic) and environmental factor, but its exact cause is unknown. Asthma may be triggered by allergens, lung infections, or irritants in the air. Asthma triggers are different for each person\par -Inhaler technique reviewed as well as oral hygiene techniques reviewed with patient. Avoidance of cold air, extremes of temperature, rescue inhaler should be used before exercise. Order of medication reviewed with patient. Recommended use of a cool mist humidifier in the bedroom. \par \par Problem 2: allergies\par - Xyzal 5 mg QHS\par -continue Qnasl 1 sniff/nostril BID \par -continue Astelin 0.15% 1 sniff BID\par -Environmental measures for allergies were encouraged including mattress and pillow cover, air purifier, and environmental controls.\par \par Problem 3: GERD\par -continue to use Protonix 40 mg before breakfast\par -continue Zantac 300 mg QHS \par -Rule of 2's- Avoid eating too much, too late, too poorly, too spicy, or two hours before bed \par -Things to avoid including overeating, spicy foods, tight clothing, eating within three hours of bed, this list is not all inclusive. \par -For treatment of reflux, possible options discussed including diet control, H2 blockers, PPIs, as well as coating motility agents discussed as treatment options. Timing of meals and proximity of last meal to sleep were discussed. If symptoms persist, a formal gastrointestinal evaluation is needed.\par \par Problem 4: OSAS, reinforced\par -continue to use the CPAP machine, tolerating it well and seeing benefits \par - prescription given for him to try new masks\par - Good sleep hygiene was encouraged including avoiding watching television an hour before bed, keeping caffeine at a low, avoiding reading in bed, no drinking any liquids three hours before bedtime, and only getting into bed when tired. \par - Discussed the risks/associations with coronary artery disease, atrial fibrillation, arrhythmia, memory loss, issues with concentration, hypertension, nocturia, chronic reflux/Olvera’s esophagus some but not all inclusive. Treatment options discussed including CPAP/BiPAP machine, oral appliance, ProVent therapy, new technologies, or positional sleep.\par \par Problem 5: Obesity \par -resolved\par Weight loss, exercise, and diet control were discussed and are highly encouraged. Treatment options were given such as, aqua therapy, and contacting a nutritionist. Recommended to use the elliptical, stationary bike, less use of treadmill. Mindful eating was explained to the patient Obesity is associated with worsening asthma, shortness of breath, and potential for cardiac disease, diabetes, and other underlying medical conditions.  \par \par Problem 6: CAD\par as per Dr. Fernández, likely the downstream effect of uncontrolled total body inflammation\par \par problem 7: bowel obstruction s/p surgery\par -encouraged to look at functional medicine consult, referred to Dr. Sheryl Leventhal,\par  -GI follow up with Dr. Caicedo et al.\par \par Problem 8: Tinnitus\par -Recommended to take No Flush Niacin, Vitamin B and C, and Zinc for Tinnitus \par \par Problem 9: health maintenance\par -s/p flu shot in 2018\par -recommended strep pneumonia vaccines: Prevnar-13 vaccine, followed by Pneumo vaccine 23 on year following\par -recommended early intervention for URIs\par -recommended osteoporosis evaluations\par -recommended early dermatological evaluations\par -recommended after the age of 50 to the age of 70, colonoscopy every 5 years\par \par Follow up in 4 months\par Encouraged to follow up with questions, concerns, and changes.

## 2019-07-26 NOTE — ADDENDUM
[FreeTextEntry1] : Documented by Keo Vinson acting as a scribe for Dr. Leonel Duckworth on 07/26/2019.\par \par All medical record entries made by the Scribe were at my, Dr. Leonel Duckwroth's, direction and personally dictated by me on 07/26/2019. I have reviewed the chart and agree that the record accurately reflects my personal performance of the history, physical exam, assessment and plan. I have also personally directed, reviewed, and agree with the discharge instructions.

## 2019-07-26 NOTE — REVIEW OF SYSTEMS
[Negative] : Sleep Disorder [Dry Eyes] : dryness of the eyes [As Noted in HPI] : as noted in HPI [Diarrhea] : diarrhea

## 2019-07-26 NOTE — PHYSICAL EXAM

## 2019-07-26 NOTE — HISTORY OF PRESENT ILLNESS
[FreeTextEntry1] : Mr. KEMP is a 54 year year old male with a history of severe persistent asthma, allergies, OSAS, GERD who presents for a follow-up pulmonary evaluation. His chief complaint is cluster headaches and tinnitus.\par -he reports having been hospitalized for cluster headaches 8 and 9 days ago\par -he reports having seen Dr Corona on Tuesday and was put on amovig \par -he notes he will be having a shot soon \par -he reports he still has diarrhea with movements 10-15 times daily. He reports intermittent fasting helps his symptoms.\par -he reports having dry eyes\par -he reports his BP was elevated during his headaches\par -he notes his weight is stable\par -he reports having right sided abdominal pain at the site of his Crohn's \par -he notes his psoriasis is good\par -he reports having severe tinnitus\par -he notes his senses of vision, smell and taste are good \par -he notes he doesn’t do so much exercise\par -he reports Dr. Fernández stated his Lipoprotein A was high during an extensive lipid panel\par -he denies any coughing, wheezing, palpitations, chest pain, chest pressure, constipation, dysphagia, dizziness, leg swelling, leg pain, itchy eyes, itchy ears, heartburn, reflux, sour taste in the mouth, myalgias or arthralgias.

## 2019-08-21 NOTE — ED ADULT NURSE NOTE - PAIN RATING/NUMBER SCALE (0-10): ACTIVITY
Take medications as prescribed for cough, Tylenol and ibuprofen for pain as needed, follow-up with your primary care provider for recheck, return to the emergency department for worsening symptoms as needed.  
9

## 2019-09-17 NOTE — ED PROVIDER NOTE - CARDIOVASCULAR NEGATIVE STATEMENT, MLM
[FreeTextEntry1] : labs as in HPI  normal rate and rhythm, no chest pain and no edema. no chest pain and no edema.

## 2019-10-10 RX ORDER — ONABOTULINUMTOXINA 100 [USP'U]/1
100 INJECTION, POWDER, LYOPHILIZED, FOR SOLUTION INTRADERMAL; INTRAMUSCULAR
Qty: 1 | Refills: 4 | Status: ACTIVE | COMMUNITY
Start: 2019-10-10 | End: 1900-01-01

## 2019-10-15 ENCOUNTER — APPOINTMENT (OUTPATIENT)
Dept: SURGERY | Facility: CLINIC | Age: 54
End: 2019-10-15
Payer: MEDICARE

## 2019-10-15 PROCEDURE — 45990 SURG DX EXAM ANORECTAL: CPT

## 2019-10-15 PROCEDURE — 46505 CHEMODENERVATION ANAL MUSC: CPT

## 2019-11-06 ENCOUNTER — MEDICATION RENEWAL (OUTPATIENT)
Age: 54
End: 2019-11-06

## 2019-11-06 ENCOUNTER — TRANSCRIPTION ENCOUNTER (OUTPATIENT)
Age: 54
End: 2019-11-06

## 2019-11-19 ENCOUNTER — TRANSCRIPTION ENCOUNTER (OUTPATIENT)
Age: 54
End: 2019-11-19

## 2019-11-19 ENCOUNTER — MEDICATION RENEWAL (OUTPATIENT)
Age: 54
End: 2019-11-19

## 2019-11-26 ENCOUNTER — APPOINTMENT (OUTPATIENT)
Dept: PULMONOLOGY | Facility: CLINIC | Age: 54
End: 2019-11-26
Payer: MEDICARE

## 2019-11-26 ENCOUNTER — NON-APPOINTMENT (OUTPATIENT)
Age: 54
End: 2019-11-26

## 2019-11-26 VITALS
BODY MASS INDEX: 32.82 KG/M2 | SYSTOLIC BLOOD PRESSURE: 140 MMHG | HEIGHT: 65 IN | WEIGHT: 197 LBS | HEART RATE: 94 BPM | RESPIRATION RATE: 16 BRPM | DIASTOLIC BLOOD PRESSURE: 80 MMHG | OXYGEN SATURATION: 97 %

## 2019-11-26 PROCEDURE — 94010 BREATHING CAPACITY TEST: CPT

## 2019-11-26 PROCEDURE — 99214 OFFICE O/P EST MOD 30 MIN: CPT | Mod: 25

## 2019-11-26 PROCEDURE — 95012 NITRIC OXIDE EXP GAS DETER: CPT

## 2019-11-26 NOTE — ADDENDUM
[FreeTextEntry1] : All medical record entries made by angie Nash were at Dr. Leonel Duckworth's direction and personally dictated by me on 11/26/2019. I have reviewed the chart and agree that the record accurately reflects my personal performance of the history, physical exam, assessment and plan. I have also personally directed, reviewed, and agree with the discharge instructions.

## 2019-11-26 NOTE — HISTORY OF PRESENT ILLNESS
[FreeTextEntry1] : Mr. KEMP is a 54 year year old male with a history of severe persistent asthma, allergies, OSAS, GERD who presents for a follow-up pulmonary evaluation. He is s/p hospitalization RUL surgery inflammatory and stage 1 lung carcinoma. His chief complaint is fatigue.\par -He states that he has generally been feeling well although he was feeling very weak / fatigued for the last few weeks. he f/u with Dr. Fernández \par -he notes that he had Botox injections with Dr. Michel for sphincter spasms. he has been having difficulty having bowel movements\par -he reports that his vision has been stable, although he has frequent dry eyes\par -he states that he has still been having frequent cluster headaches. he does not feel relief with Advil \par -he notes that he has not been exercising much as his energy level has been very poor\par -he has not been using any new medications / supplements \par -he has been sleeping well, although he has not been using his CPAP machine for the last week. he has been using his SoClean device when he uses the CPAP \par -his sinuses have been congested\par -he denies any nausea, vomiting, fever, chills, sweats, chest pain, chest pressure, dysphagia, dizziness, leg swelling, leg pain, itchy ears, heartburn, reflux, or sour taste in the mouth.

## 2019-11-26 NOTE — PROCEDURE
[FreeTextEntry1] : PFT - spi reveals normal flows; FEV1 is 2.99 which is 94% of predicted, normal flow volume loop \par \par FENO was 24; a normal value being less than 25\par \par Fractional exhaled nitric oxide (FENO) is regarded as a simple, noninvasive method for assessing eosinophilic airway inflammation. Produced by a variety of cells within the lung, nitric oxide (NO) concentrations are generally low in healthy individuals. However, high concentrations of NO appear to be involved in nonspecific host defense mechanisms and chronic inflammatory diseases such as asthma. The American Thoracic Society (ATS) therefore has recommended using FENO to aid in the diagnosis and monitoring of eosinophilic airway inflammation and asthma, and for identifying steroid responsive individuals whose chronic respiratory symptoms may be caused by airway inflammation.

## 2019-11-26 NOTE — REVIEW OF SYSTEMS
[As Noted in HPI] : as noted in HPI [Diarrhea] : diarrhea [Dry Eyes] : dryness of the eyes [Negative] : Sleep Disorder

## 2019-11-26 NOTE — ASSESSMENT
[FreeTextEntry1] : Mr. Mota is a 54 year old male doing well from a pulmonary perspective, is s/p bowel obstruction s/p GI surgery, s/p hospitalization with cluster headaches / tinnitus, with a history of severe persistent asthma, allergies, OSAS, GERD. He is currently stable from a pulmonary perspective. \par \par Problem 1: Severe persistent asthma \par -continue Albuterol by the nebulizer QID, prn \par -continue Symbicort 160 at 2 inhalations BID\par -continue Spiriva 2 puffs QD\par -continue Singulair 10 mg QHS\par -Asthma is believed to be caused by inherited (genetic) and environmental factor, but its exact cause is unknown. Asthma may be triggered by allergens, lung infections, or irritants in the air. Asthma triggers are different for each person\par -Inhaler technique reviewed as well as oral hygiene techniques reviewed with patient. Avoidance of cold air, extremes of temperature, rescue inhaler should be used before exercise. Order of medication reviewed with patient. Recommended use of a cool mist humidifier in the bedroom. \par \par Problem 2: allergies\par -continue Xyzal 5 mg QHS\par -continue Qnasl 1 sniff/nostril BID \par -continue Astelin 0.15% 1 sniff BID\par -Environmental measures for allergies were encouraged including mattress and pillow cover, air purifier, and environmental controls.\par \par Problem 3: GERD\par -continue Protonix 40 mg QAM, before breakfast\par -continue Zantac 300 mg QHS \par -Rule of 2's- Avoid eating too much, too late, too poorly, too spicy, or two hours before bed \par -Things to avoid including overeating, spicy foods, tight clothing, eating within three hours of bed, this list is not all inclusive. \par -For treatment of reflux, possible options discussed including diet control, H2 blockers, PPIs, as well as coating motility agents discussed as treatment options. Timing of meals and proximity of last meal to sleep were discussed. If symptoms persist, a formal gastrointestinal evaluation is needed.\par \par Problem 4: OSAS -(reinforced)\par -continue to use the CPAP machine, tolerating it well and seeing benefits \par -prescription given for him to try new masks\par -Good sleep hygiene was encouraged including avoiding watching television an hour before bed, keeping caffeine at a low, avoiding reading in bed, no drinking any liquids three hours before bedtime, and only getting into bed when tired. \par - Discussed the risks/associations with coronary artery disease, atrial fibrillation, arrhythmia, memory loss, issues with concentration, hypertension, nocturia, chronic reflux/Olvera’s esophagus some but not all inclusive. Treatment options discussed including CPAP/BiPAP machine, oral appliance, ProVent therapy, new technologies, or positional sleep.\par \par Problem 5: Obesity \par -improved\par Weight loss, exercise, and diet control were discussed and are highly encouraged. Treatment options were given such as, aqua therapy, and contacting a nutritionist. Recommended to use the elliptical, stationary bike, less use of treadmill. Mindful eating was explained to the patient Obesity is associated with worsening asthma, shortness of breath, and potential for cardiac disease, diabetes, and other underlying medical conditions.  \par \par Problem 6: CAD\par -as per Dr. Fernández, likely the downstream effect of uncontrolled total body inflammation\par \par problem 7: bowel obstruction, s/p surgery\par -encouraged to look at functional medicine consult, referred to Dr. Sheryl Leventhal,\par  -GI follow up with Dr. Caicedo / Marilu et al.\par \par Problem 8: Tinnitus\par -Recommended to take No Flush Niacin, Vitamin B and C, and Zinc \par \par Problem 9: psoriasis / winter eczema\par -recommended to use Borage / Flax Seed Oil \par \par problem 10: health maintenance\par -s/p flu shot in 2019\par -recommended strep pneumonia vaccines: Prevnar-13 vaccine, followed by Pneumo vaccine 23 on year following\par -recommended early intervention for URIs\par -recommended osteoporosis evaluations\par -recommended early dermatological evaluations\par -recommended after the age of 50 to the age of 70, colonoscopy every 5 years\par \par Follow up in 4 months\par Encouraged to follow up with questions, concerns, and changes.

## 2019-11-26 NOTE — PHYSICAL EXAM
[General Appearance - Well Developed] : well developed [Normal Appearance] : normal appearance [General Appearance - Well Nourished] : well nourished [Well Groomed] : well groomed [No Deformities] : no deformities [Normal Conjunctiva] : the conjunctiva exhibited no abnormalities [Eyelids - No Xanthelasma] : the eyelids demonstrated no xanthelasmas [General Appearance - In No Acute Distress] : no acute distress [Normal Oropharynx] : normal oropharynx [Neck Appearance] : the appearance of the neck was normal [Neck Cervical Mass (___cm)] : no neck mass was observed [Jugular Venous Distention Increased] : there was no jugular-venous distention [Thyroid Diffuse Enlargement] : the thyroid was not enlarged [Thyroid Nodule] : there were no palpable thyroid nodules [Heart Rate And Rhythm] : heart rate and rhythm were normal [Heart Sounds] : normal S1 and S2 [Murmurs] : no murmurs present [Exaggerated Use Of Accessory Muscles For Inspiration] : no accessory muscle use [Auscultation Breath Sounds / Voice Sounds] : lungs were clear to auscultation bilaterally [Respiration, Rhythm And Depth] : normal respiratory rhythm and effort [Abdomen Soft] : soft [Abdomen Tenderness] : non-tender [Abdomen Mass (___ Cm)] : no abdominal mass palpated [Abnormal Walk] : normal gait [Gait - Sufficient For Exercise Testing] : the gait was sufficient for exercise testing [Nail Clubbing] : no clubbing of the fingernails [Cyanosis, Localized] : no localized cyanosis [Petechial Hemorrhages (___cm)] : no petechial hemorrhages [] : no rash [Skin Color & Pigmentation] : normal skin color and pigmentation [No Venous Stasis] : no venous stasis [No Skin Ulcers] : no skin ulcer [Skin Lesions] : no skin lesions [Sensation] : the sensory exam was normal to light touch and pinprick [Deep Tendon Reflexes (DTR)] : deep tendon reflexes were 2+ and symmetric [No Xanthoma] : no  xanthoma was observed [Oriented To Time, Place, And Person] : oriented to person, place, and time [No Focal Deficits] : no focal deficits [Affect] : the affect was normal [Impaired Insight] : insight and judgment were intact [II] : II [FreeTextEntry1] : I:E 1:3; clear

## 2019-11-27 ENCOUNTER — APPOINTMENT (OUTPATIENT)
Dept: SURGERY | Facility: CLINIC | Age: 54
End: 2019-11-27
Payer: MEDICARE

## 2019-11-27 VITALS
OXYGEN SATURATION: 98 % | WEIGHT: 197 LBS | SYSTOLIC BLOOD PRESSURE: 145 MMHG | TEMPERATURE: 98.8 F | HEART RATE: 82 BPM | BODY MASS INDEX: 31.66 KG/M2 | DIASTOLIC BLOOD PRESSURE: 92 MMHG | RESPIRATION RATE: 17 BRPM | HEIGHT: 66 IN

## 2019-11-27 PROCEDURE — 46600 DIAGNOSTIC ANOSCOPY SPX: CPT

## 2019-11-27 PROCEDURE — 99214 OFFICE O/P EST MOD 30 MIN: CPT | Mod: 25

## 2019-11-27 RX ORDER — FLUCONAZOLE 200 MG/1
200 TABLET ORAL
Qty: 12 | Refills: 0 | Status: DISCONTINUED | COMMUNITY
Start: 2018-12-10 | End: 2019-11-27

## 2019-11-27 RX ORDER — ONABOTULINUMTOXINA 100 [USP'U]/1
100 INJECTION, POWDER, LYOPHILIZED, FOR SOLUTION INTRADERMAL; INTRAMUSCULAR
Qty: 1 | Refills: 4 | Status: DISCONTINUED | COMMUNITY
Start: 2018-06-12 | End: 2019-11-27

## 2019-11-27 RX ORDER — MUPIROCIN 20 MG/G
2 OINTMENT TOPICAL
Qty: 1 | Refills: 1 | Status: DISCONTINUED | COMMUNITY
Start: 2017-12-15 | End: 2019-11-27

## 2019-11-27 RX ORDER — KETOCONAZOLE 20 MG/G
2 CREAM TOPICAL
Qty: 1 | Refills: 2 | Status: DISCONTINUED | COMMUNITY
Start: 2019-03-08 | End: 2019-11-27

## 2019-11-27 RX ORDER — LOPERAMIDE HCL 2 MG
CAPSULE ORAL
Refills: 0 | Status: DISCONTINUED | COMMUNITY
End: 2019-11-27

## 2019-11-27 RX ORDER — HYDROCORTISONE 25 MG/G
2.5 OINTMENT TOPICAL
Qty: 1 | Refills: 1 | Status: DISCONTINUED | COMMUNITY
Start: 2019-06-19 | End: 2019-11-27

## 2019-11-27 RX ORDER — AZELASTINE HYDROCHLORIDE 205.5 UG/1
0.15 SPRAY, METERED NASAL TWICE DAILY
Qty: 3 | Refills: 1 | Status: DISCONTINUED | COMMUNITY
Start: 2019-03-29 | End: 2019-11-27

## 2019-11-27 RX ORDER — IRON/IRON ASP GLY/FA/MV-MIN 38 125-25-1MG
TABLET ORAL
Refills: 0 | Status: DISCONTINUED | COMMUNITY
End: 2019-11-27

## 2019-11-27 RX ORDER — PREDNISONE 10 MG/1
10 TABLET ORAL
Qty: 50 | Refills: 0 | Status: DISCONTINUED | COMMUNITY
Start: 2018-11-29 | End: 2019-11-27

## 2019-11-27 NOTE — HISTORY OF PRESENT ILLNESS
[FreeTextEntry1] : Alec is a 55 y/o male here for a follow-up visit. The patient has a history of Crohns disease, on stellara, a posterior fissure and severe internal sphincter spasm. The patient underwent a EUA with Botox injections on 10/15/19. Patient is also s/p Single incision laparoscopic small bowel resection.\par  Appendectomy.Strictureplasty x2.Repair of umbilical hernia on 3/12/18 for multiple strictures.\par Patient reports constipation in the past 2 weeks. Reports "tight anal sphincter" as per Dr. Caicedo. \par Patient using NTG before every BM. Reports he cannot have a BM without putting NTG before his BM. \par Patient having 20-30 small BMs daily for the past few weeks. Baseline 5-10 loose BMs daily.\par Patient has full control of BMs and flatulence. Intermittent BRB on TP. 
02-Aug-2018 16:32

## 2019-11-27 NOTE — ASSESSMENT
[FreeTextEntry1] : Patient's fissure is currently healed. However he continues to have severe sphincter spasm which is making it difficult to pass his stool. Topical nitroglycerin does help. Since the patient has multiple loose bowel movements, sphincterotomy should only be performed if the patient's fissure pain cannot be controlled with Botox. I prescribed physical therapy which may help if there is associated external sphincter spasm. Unfortunately, these are the only conservative measures that may be of benefit.

## 2019-12-02 ENCOUNTER — RX RENEWAL (OUTPATIENT)
Age: 54
End: 2019-12-02

## 2019-12-11 ENCOUNTER — TRANSCRIPTION ENCOUNTER (OUTPATIENT)
Age: 54
End: 2019-12-11

## 2019-12-11 ENCOUNTER — MEDICATION RENEWAL (OUTPATIENT)
Age: 54
End: 2019-12-11

## 2020-01-08 ENCOUNTER — MEDICATION RENEWAL (OUTPATIENT)
Age: 55
End: 2020-01-08

## 2020-01-09 ENCOUNTER — MEDICATION RENEWAL (OUTPATIENT)
Age: 55
End: 2020-01-09

## 2020-01-15 NOTE — ED ADULT NURSE REASSESSMENT NOTE - NS ED NURSE REASSESS COMMENT FT1
pt still c/o abd pain, no pain orders, inpatient team contacted again, state pt needs an abdominal exam before they place orders for pain meds, gave pt's location in ER and will await orders 69

## 2020-01-21 ENCOUNTER — RX RENEWAL (OUTPATIENT)
Age: 55
End: 2020-01-21

## 2020-01-23 ENCOUNTER — OUTPATIENT (OUTPATIENT)
Dept: OUTPATIENT SERVICES | Facility: HOSPITAL | Age: 55
LOS: 1 days | End: 2020-01-23
Payer: MEDICARE

## 2020-01-23 ENCOUNTER — APPOINTMENT (OUTPATIENT)
Dept: CT IMAGING | Facility: IMAGING CENTER | Age: 55
End: 2020-01-23

## 2020-01-23 DIAGNOSIS — Z00.8 ENCOUNTER FOR OTHER GENERAL EXAMINATION: ICD-10-CM

## 2020-01-23 DIAGNOSIS — Z87.898 PERSONAL HISTORY OF OTHER SPECIFIED CONDITIONS: Chronic | ICD-10-CM

## 2020-01-23 DIAGNOSIS — Z98.89 OTHER SPECIFIED POSTPROCEDURAL STATES: Chronic | ICD-10-CM

## 2020-01-23 PROCEDURE — 82565 ASSAY OF CREATININE: CPT

## 2020-01-23 PROCEDURE — 74177 CT ABD & PELVIS W/CONTRAST: CPT | Mod: 26

## 2020-01-23 PROCEDURE — 74177 CT ABD & PELVIS W/CONTRAST: CPT

## 2020-01-28 ENCOUNTER — TRANSCRIPTION ENCOUNTER (OUTPATIENT)
Age: 55
End: 2020-01-28

## 2020-02-04 ENCOUNTER — TRANSCRIPTION ENCOUNTER (OUTPATIENT)
Age: 55
End: 2020-02-04

## 2020-03-02 ENCOUNTER — RX RENEWAL (OUTPATIENT)
Age: 55
End: 2020-03-02

## 2020-03-04 ENCOUNTER — APPOINTMENT (OUTPATIENT)
Dept: GASTROENTEROLOGY | Facility: CLINIC | Age: 55
End: 2020-03-04
Payer: MEDICARE

## 2020-03-04 VITALS
OXYGEN SATURATION: 96 % | DIASTOLIC BLOOD PRESSURE: 96 MMHG | BODY MASS INDEX: 31.98 KG/M2 | WEIGHT: 199 LBS | SYSTOLIC BLOOD PRESSURE: 132 MMHG | HEIGHT: 66 IN | HEART RATE: 71 BPM | RESPIRATION RATE: 16 BRPM

## 2020-03-04 DIAGNOSIS — K63.89 OTHER SPECIFIED DISEASES OF INTESTINE: ICD-10-CM

## 2020-03-04 PROCEDURE — 99203 OFFICE O/P NEW LOW 30 MIN: CPT

## 2020-03-04 NOTE — ASSESSMENT
[FreeTextEntry1] : 54 year old male with Crohn's s/p SBR in 2018 and stricturoplasty referred by his brother who is a IR at [facility] for a retrograde single balloon enteroscopy for removal of a small bowel polyp in the distal ileum. Patient recently had cross sectional imaging showing this. \par \par I discussed the proposed retrograde single balloon enteroscopy with the patient.  I discussed the risks (bleeding, infection, perforation,  and anesthesia risks), benefits, and alternatives  with the patient. I also described that it may not be possible to reach the polyp if adhesions from prior surgery prevent the SBR apparatus from reaching the area in the distal ileum. The patient agrees to proceed. \par \par We will schedule the procedure for 2 hrs at [facility]. \par \par Thank you for involving me in their care.\par \par Vikram Perea MD\par Director of Endoscopy\par [facility]\par [facility]\par Phone: [phone]\par Fax [phone]\par

## 2020-03-04 NOTE — HISTORY OF PRESENT ILLNESS
[de-identified] : 54 year old male with Crohn's s/p SBR in 2018 and stricturoplasty referred by his brother who is a IR at [facility] for a retrograde single balloon enteroscopy for removal of a small bowel polyp. Patient recently had cross sectional imaging showing this. \par \par Patient currently on ustekibumab  every 6 weeks prescribed by his primary gastroenterologist Dr Caicedo. Patient currently with diarrhea and chronic RUQ pain. he states the pSBOs have stopped since on the Stelara. \par \par His last colonoscopy (scanned in chart) was 3/13/19 that showed a normal colon (some small polyps) and aphthous ulceration in the TI.

## 2020-03-04 NOTE — PHYSICAL EXAM
[General Appearance - Alert] : alert [Outer Ear] : the ears and nose were normal in appearance [General Appearance - In No Acute Distress] : in no acute distress [Sclera] : the sclera and conjunctiva were normal [Neck Appearance] : the appearance of the neck was normal [] : no respiratory distress [Bowel Sounds] : normal bowel sounds [Abdomen Soft] : soft [Abdomen Tenderness] : non-tender

## 2020-03-17 ENCOUNTER — APPOINTMENT (OUTPATIENT)
Dept: SURGERY | Facility: CLINIC | Age: 55
End: 2020-03-17
Payer: MEDICARE

## 2020-03-17 PROCEDURE — 46505 CHEMODENERVATION ANAL MUSC: CPT

## 2020-04-28 ENCOUNTER — TRANSCRIPTION ENCOUNTER (OUTPATIENT)
Age: 55
End: 2020-04-28

## 2020-05-05 ENCOUNTER — RX RENEWAL (OUTPATIENT)
Age: 55
End: 2020-05-05

## 2020-05-18 ENCOUNTER — APPOINTMENT (OUTPATIENT)
Dept: CT IMAGING | Facility: IMAGING CENTER | Age: 55
End: 2020-05-18
Payer: MEDICARE

## 2020-05-18 ENCOUNTER — OUTPATIENT (OUTPATIENT)
Dept: OUTPATIENT SERVICES | Facility: HOSPITAL | Age: 55
LOS: 1 days | End: 2020-05-18
Payer: MEDICARE

## 2020-05-18 DIAGNOSIS — Z98.89 OTHER SPECIFIED POSTPROCEDURAL STATES: Chronic | ICD-10-CM

## 2020-05-18 DIAGNOSIS — Z00.8 ENCOUNTER FOR OTHER GENERAL EXAMINATION: ICD-10-CM

## 2020-05-18 DIAGNOSIS — Z87.898 PERSONAL HISTORY OF OTHER SPECIFIED CONDITIONS: Chronic | ICD-10-CM

## 2020-05-18 PROCEDURE — 74177 CT ABD & PELVIS W/CONTRAST: CPT | Mod: 26

## 2020-05-18 PROCEDURE — 74177 CT ABD & PELVIS W/CONTRAST: CPT

## 2020-05-18 PROCEDURE — 82565 ASSAY OF CREATININE: CPT

## 2020-05-27 ENCOUNTER — APPOINTMENT (OUTPATIENT)
Dept: PULMONOLOGY | Facility: CLINIC | Age: 55
End: 2020-05-27
Payer: MEDICARE

## 2020-05-27 VITALS
BODY MASS INDEX: 31.65 KG/M2 | DIASTOLIC BLOOD PRESSURE: 90 MMHG | OXYGEN SATURATION: 98 % | TEMPERATURE: 98.5 F | HEART RATE: 96 BPM | HEIGHT: 65 IN | SYSTOLIC BLOOD PRESSURE: 120 MMHG | WEIGHT: 190 LBS | RESPIRATION RATE: 17 BRPM

## 2020-05-27 DIAGNOSIS — Z01.811 ENCOUNTER FOR PREPROCEDURAL RESPIRATORY EXAMINATION: ICD-10-CM

## 2020-05-27 PROCEDURE — 94618 PULMONARY STRESS TESTING: CPT

## 2020-05-27 PROCEDURE — 99214 OFFICE O/P EST MOD 30 MIN: CPT | Mod: 25

## 2020-05-27 NOTE — PHYSICAL EXAM
[General Appearance - Well Developed] : well developed [Normal Appearance] : normal appearance [Well Groomed] : well groomed [General Appearance - Well Nourished] : well nourished [No Deformities] : no deformities [General Appearance - In No Acute Distress] : no acute distress [Normal Conjunctiva] : the conjunctiva exhibited no abnormalities [Eyelids - No Xanthelasma] : the eyelids demonstrated no xanthelasmas [Normal Oropharynx] : normal oropharynx [Neck Appearance] : the appearance of the neck was normal [II] : II [Neck Cervical Mass (___cm)] : no neck mass was observed [Jugular Venous Distention Increased] : there was no jugular-venous distention [Thyroid Diffuse Enlargement] : the thyroid was not enlarged [Thyroid Nodule] : there were no palpable thyroid nodules [Heart Rate And Rhythm] : heart rate and rhythm were normal [Heart Sounds] : normal S1 and S2 [Murmurs] : no murmurs present [Respiration, Rhythm And Depth] : normal respiratory rhythm and effort [Exaggerated Use Of Accessory Muscles For Inspiration] : no accessory muscle use [Auscultation Breath Sounds / Voice Sounds] : lungs were clear to auscultation bilaterally [Abdomen Soft] : soft [Abdomen Tenderness] : non-tender [Abdomen Mass (___ Cm)] : no abdominal mass palpated [Abnormal Walk] : normal gait [Gait - Sufficient For Exercise Testing] : the gait was sufficient for exercise testing [Nail Clubbing] : no clubbing of the fingernails [Cyanosis, Localized] : no localized cyanosis [Petechial Hemorrhages (___cm)] : no petechial hemorrhages [] : no rash [Skin Color & Pigmentation] : normal skin color and pigmentation [No Venous Stasis] : no venous stasis [Skin Lesions] : no skin lesions [No Skin Ulcers] : no skin ulcer [No Xanthoma] : no  xanthoma was observed [Deep Tendon Reflexes (DTR)] : deep tendon reflexes were 2+ and symmetric [Sensation] : the sensory exam was normal to light touch and pinprick [No Focal Deficits] : no focal deficits [Oriented To Time, Place, And Person] : oriented to person, place, and time [Impaired Insight] : insight and judgment were intact [Affect] : the affect was normal [FreeTextEntry1] : I:E 1:3; clear

## 2020-05-27 NOTE — PROCEDURE
[FreeTextEntry1] : 6 minute walk test reveals a low saturation of 97% with no evidence of dyspnea or fatigue; walked 782.4 meters.

## 2020-05-27 NOTE — PHYSICAL EXAM
[General Appearance - Well Developed] : well developed [Normal Appearance] : normal appearance [Well Groomed] : well groomed [No Deformities] : no deformities [General Appearance - Well Nourished] : well nourished [General Appearance - In No Acute Distress] : no acute distress [Eyelids - No Xanthelasma] : the eyelids demonstrated no xanthelasmas [Normal Conjunctiva] : the conjunctiva exhibited no abnormalities [Normal Oropharynx] : normal oropharynx [II] : II [Neck Cervical Mass (___cm)] : no neck mass was observed [Jugular Venous Distention Increased] : there was no jugular-venous distention [Neck Appearance] : the appearance of the neck was normal [Thyroid Nodule] : there were no palpable thyroid nodules [Thyroid Diffuse Enlargement] : the thyroid was not enlarged [Heart Rate And Rhythm] : heart rate and rhythm were normal [Heart Sounds] : normal S1 and S2 [Murmurs] : no murmurs present [Respiration, Rhythm And Depth] : normal respiratory rhythm and effort [Exaggerated Use Of Accessory Muscles For Inspiration] : no accessory muscle use [Abdomen Soft] : soft [Auscultation Breath Sounds / Voice Sounds] : lungs were clear to auscultation bilaterally [FreeTextEntry1] : I:E 1:3; clear [Abdomen Tenderness] : non-tender [Gait - Sufficient For Exercise Testing] : the gait was sufficient for exercise testing [Abnormal Walk] : normal gait [Abdomen Mass (___ Cm)] : no abdominal mass palpated [Nail Clubbing] : no clubbing of the fingernails [Cyanosis, Localized] : no localized cyanosis [Petechial Hemorrhages (___cm)] : no petechial hemorrhages [] : no rash [Skin Color & Pigmentation] : normal skin color and pigmentation [Skin Lesions] : no skin lesions [No Venous Stasis] : no venous stasis [No Xanthoma] : no  xanthoma was observed [No Skin Ulcers] : no skin ulcer [Deep Tendon Reflexes (DTR)] : deep tendon reflexes were 2+ and symmetric [Sensation] : the sensory exam was normal to light touch and pinprick [Oriented To Time, Place, And Person] : oriented to person, place, and time [No Focal Deficits] : no focal deficits [Affect] : the affect was normal [Impaired Insight] : insight and judgment were intact

## 2020-05-27 NOTE — ASSESSMENT
[FreeTextEntry1] : Mr. Mota is a 54 year old male doing well from a pulmonary perspective, is s/p bowel obstruction s/p GI surgery, s/p hospitalization with cluster headaches / tinnitus, with a history of severe persistent asthma, allergies, OSAS, GERD. He is currently stable from a pulmonary perspective.- preop clearance for retrograde balloon enteroscopy clearance\par \par ******PRE-OP CLEARANCE*******\par \par Problem 1: Severe persistent asthma  (stable)\par -continue Albuterol by the nebulizer QID, prn \par -continue Symbicort 160 at 2 inhalations BID\par -continue Spiriva 2 puffs QD\par -continue Singulair 10 mg QHS\par -Asthma is believed to be caused by inherited (genetic) and environmental factor, but its exact cause is unknown. Asthma may be triggered by allergens, lung infections, or irritants in the air. Asthma triggers are different for each person\par -Inhaler technique reviewed as well as oral hygiene techniques reviewed with patient. Avoidance of cold air, extremes of temperature, rescue inhaler should be used before exercise. Order of medication reviewed with patient. Recommended use of a cool mist humidifier in the bedroom. \par \par Problem 2: allergies\par -continue Xyzal 5 mg QHS\par -continue Qnasl 1 sniff/nostril BID \par -continue Astelin 0.15% 1 sniff BID\par -Environmental measures for allergies were encouraged including mattress and pillow cover, air purifier, and environmental controls.\par \par Problem 3: GERD\par -continue Nexium 40 mg QAM, before breakfast\par -Add Pepcid 40 mg QHS \par -Rule of 2's- Avoid eating too much, too late, too poorly, too spicy, or two hours before bed \par -Things to avoid including overeating, spicy foods, tight clothing, eating within three hours of bed, this list is not all inclusive. \par -For treatment of reflux, possible options discussed including diet control, H2 blockers, PPIs, as well as coating motility agents discussed as treatment options. Timing of meals and proximity of last meal to sleep were discussed. If symptoms persist, a formal gastrointestinal evaluation is needed.\par \par Problem 4: OSAS -(reinforced)\par -continue to use the CPAP machine, tolerating it well and seeing benefits \par -prescription given for him to try new masks\par -Good sleep hygiene was encouraged including avoiding watching television an hour before bed, keeping caffeine at a low, avoiding reading in bed, no drinking any liquids three hours before bedtime, and only getting into bed when tired. \par - Discussed the risks/associations with coronary artery disease, atrial fibrillation, arrhythmia, memory loss, issues with concentration, hypertension, nocturia, chronic reflux/Olvera’s esophagus some but not all inclusive. Treatment options discussed including CPAP/BiPAP machine, oral appliance, ProVent therapy, new technologies, or positional sleep.\par \par Problem 5: Obesity \par -improved\par Weight loss, exercise, and diet control were discussed and are highly encouraged. Treatment options were given such as, aqua therapy, and contacting a nutritionist. Recommended to use the elliptical, stationary bike, less use of treadmill. Mindful eating was explained to the patient Obesity is associated with worsening asthma, shortness of breath, and potential for cardiac disease, diabetes, and other underlying medical conditions.  \par \par Problem 6: CAD\par -as per Dr. Fernández, likely the downstream effect of uncontrolled total body inflammation\par \par problem 7: bowel obstruction, s/p surgery\par -encouraged to look at functional medicine consult, referred to Dr. Sheryl Leventhal,\par  -GI follow up with Dr. Caicedo / Marilu et al.\par \par Problem 8: Tinnitus\par -Recommended to take No Flush Niacin, Vitamin B and C, and Zinc \par \par Problem 9: psoriasis / winter eczema\par -recommended to use Borage / Flax Seed Oil \par \par problem 10: Pre-op Clearance for retrograde balloon enteroscopy clearance\par -at this point in time there are no absolute pulmonary contraindications to go forward with the planned procedure \par -at the time of surgery s/he should have optimal pain control, incentive spirometry, early ambulation, DVT and GI prophylaxis.\par \par problem 11: Health Maintenance/COVID19 Precautions:\par - Clean your hands often. Wash your hands often with soap and water for at least 20 seconds, especially after blowing your nose, coughing, or sneezing, or having been in a public place.\par - If soap and water are not available, use a hand  that contains at least 60% alcohol.\par - To the extent possible, avoid touching high-touch surfaces in public places - elevator buttons, door handles, handrails, handshaking with people, etc. Use a tissue or your sleeve to cover your hand or finger if you must touch something.\par - Wash your hands after touching surfaces in public places.\par - Avoid touching your face, nose, eyes, etc.\par - Clean and disinfect your home to remove germs: practice routine cleaning of frequently touched surfaces (for example: tables, doorknobs, light switches, handles, desks, toilets, faucets, sinks & cell phones)\par - Avoid crowds, especially in poorly ventilated spaces. Your risk of exposure to respiratory viruses like COVID-19 may increase in crowded, closed-in settings with little air circulation if there are people in the crowd who are sick. All patients are recommended to practice social distancing and stay at least 6 feet away from others.\par - Avoid all non-essential travel including plane trips, and especially avoid embarking on cruise ships.\par -If COVID-19 is spreading in your community, take extra measures to put distance between yourself and other people to further reduce your risk of being exposed to this new virus.\par -Stay home as much as possible.\par - Consider ways of getting food brought to your house through family, social, or commercial networks\par -Be aware that the virus has been known to live in the air up to 3 hours post exposure, cardboard up to 24 hours post exposure, copper up to 4 hours post exposure, steel and plastic up to 2-3 days post exposure. Risk of transmission from these surfaces are affected by many variables.\par Immune Support Recommendations:\par -OTC Vitamin C 500mg BID \par -OTC Quercetin 250-500mg BID \par -OTC Zinc 75-100mg per day \par -OTC Melatonin 1 or 2 mg a night \par -OTC Vitamin D 1-4000mg per day \par -OTC Tonic Water 8oz per day\par Asthma and COVID19:\par You need to make sure your asthma is under control. This often requires the use of inhaled corticosteroids (and sometimes oral corticosteroids). Inhaled corticosteroids do not likely reduce your immune system’s ability to fight infections, but oral corticosteroids may. It is important to use the steps above to protect yourself to limit your exposure to any respiratory virus. \par \par problem 12: health maintenance\par -s/p flu shot in 2019\par -recommended strep pneumonia vaccines: Prevnar-13 vaccine, followed by Pneumo vaccine 23 on year following\par -recommended early intervention for URIs\par -recommended osteoporosis evaluations\par -recommended early dermatological evaluations\par -recommended after the age of 50 to the age of 70, colonoscopy every 5 years\par \par Follow up in 4 months\par Encouraged to follow up with questions, concerns, and changes.

## 2020-05-27 NOTE — ADDENDUM
[FreeTextEntry1] : Documented by Mir Brady acting as a scribe for Dr. Leonel Duckworth on 05/27/2020.\par \par All medical record entries made by the Scribe were at my, Dr. Leonel Duckworth's, direction and personally dictated by me on 05/27/2020. I have reviewed the chart and agree that the record accurately reflects my personal performance of the history, physical exam, assessment and plan. I have also personally directed, reviewed, and agree with the discharge instructions.

## 2020-05-27 NOTE — HISTORY OF PRESENT ILLNESS
[FreeTextEntry1] : Mr. KEMP is a 54 year year old male with a history of severe persistent asthma, allergies, OSAS, GERD who presents for a follow-up pulmonary evaluation. He is retrograde balloon enteroscopy clearance\par \par -he notes recent SBO, hydrated, didn't eat 5 to 6 days, from home, on Rx (not prednisone), different steroid for 2 months, thinks budesonide\par -he notes breathing is generally well\par -he notes 10 loose bowel movements a day\par -he denies SOB\par -he denies wheezing\par -he notes sleeping is good\par -he notes noncompliance with CPAP\par -he notes loss of 10 lbs\par \par -he denies any chest pain, chest pressure, dysphagia, dizziness, sour taste in the mouth, leg swelling, leg pain, itchy eyes, itchy ears, heartburn, reflux, myalgias or arthralgias.

## 2020-05-31 DIAGNOSIS — Z01.818 ENCOUNTER FOR OTHER PREPROCEDURAL EXAMINATION: ICD-10-CM

## 2020-06-01 ENCOUNTER — APPOINTMENT (OUTPATIENT)
Dept: DISASTER EMERGENCY | Facility: CLINIC | Age: 55
End: 2020-06-01

## 2020-06-01 RX ORDER — SODIUM CHLORIDE 9 MG/ML
1000 INJECTION, SOLUTION INTRAVENOUS
Refills: 0 | Status: DISCONTINUED | OUTPATIENT
Start: 2020-06-02 | End: 2020-06-17

## 2020-06-02 ENCOUNTER — RESULT REVIEW (OUTPATIENT)
Age: 55
End: 2020-06-02

## 2020-06-02 ENCOUNTER — APPOINTMENT (OUTPATIENT)
Dept: GASTROENTEROLOGY | Facility: HOSPITAL | Age: 55
End: 2020-06-02

## 2020-06-02 ENCOUNTER — OUTPATIENT (OUTPATIENT)
Dept: OUTPATIENT SERVICES | Facility: HOSPITAL | Age: 55
LOS: 1 days | Discharge: HOME | End: 2020-06-02
Payer: MEDICARE

## 2020-06-02 VITALS
SYSTOLIC BLOOD PRESSURE: 140 MMHG | OXYGEN SATURATION: 97 % | HEART RATE: 73 BPM | RESPIRATION RATE: 14 BRPM | DIASTOLIC BLOOD PRESSURE: 78 MMHG

## 2020-06-02 VITALS
RESPIRATION RATE: 16 BRPM | HEIGHT: 65 IN | WEIGHT: 190.04 LBS | HEART RATE: 73 BPM | SYSTOLIC BLOOD PRESSURE: 144 MMHG | DIASTOLIC BLOOD PRESSURE: 83 MMHG | TEMPERATURE: 98 F | OXYGEN SATURATION: 97 %

## 2020-06-02 DIAGNOSIS — Z98.89 OTHER SPECIFIED POSTPROCEDURAL STATES: Chronic | ICD-10-CM

## 2020-06-02 DIAGNOSIS — Z87.898 PERSONAL HISTORY OF OTHER SPECIFIED CONDITIONS: Chronic | ICD-10-CM

## 2020-06-02 DIAGNOSIS — K50.90 CROHN'S DISEASE, UNSPECIFIED, WITHOUT COMPLICATIONS: ICD-10-CM

## 2020-06-02 LAB — SARS-COV-2 N GENE NPH QL NAA+PROBE: NOT DETECTED

## 2020-06-02 PROCEDURE — 88305 TISSUE EXAM BY PATHOLOGIST: CPT | Mod: 26

## 2020-06-02 PROCEDURE — 44364 SMALL BOWEL ENDOSCOPY: CPT | Mod: GC

## 2020-06-02 PROCEDURE — 45380 COLONOSCOPY AND BIOPSY: CPT | Mod: GC,59

## 2020-06-02 RX ADMIN — SODIUM CHLORIDE 30 MILLILITER(S): 9 INJECTION, SOLUTION INTRAVENOUS at 08:54

## 2020-06-02 NOTE — ASU PATIENT PROFILE, ADULT - PSH
H/O colonoscopy    H/O lipoma  removed from back  H/O percutaneous transluminal coronary angioplasty  with DE RCA 2006, 2 stents placed 2015  History of herniorrhaphy  left inguinal herniorrhaphy  S/P cardiac catheterization  2017-resulted in [place]-no stents placed  S/P hernia surgery

## 2020-06-03 NOTE — PATIENT PROFILE ADULT. - NS PRO ABUSE SCREEN AFRAID ANYONE YN
RE: Plan of Care    Dear Dr. Néstor Robb DPM    Thank you for referring Violeta Todd. The following information reflects my assessment and plan of care.           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.2 (ICD-10-CM) - Plantar fascial fibromatosis                Physical Therapy -  Initial Evaluation    Visit:  1   Treatment Diagnosis: left: footsymptoms with impaired tissue mobility, impaired gait, impaired activity tolerance, impaired range of motion, increased pain/symptoms  Date of onset: 10 years on and off, worsening recently  Chart reviewed at time of initial evaluation (relevant co-morbidities, allergies, tests and medications listed):  mild CAD, high glucose    SUBJECTIVE                                                                                                             MISHA = 1/5    6/3/20 L>R foot pain most recently. Has a large heel spur on the L. Pain to  Bottom of her heel and into plantarfascia area. Hard to get her foot flat in the am and unweights off her L foot due to pain.  Worse when first getting OOB or if she has been sitting too long. MD rec some type of insert which she could get OTC but pt is wearing sandals all the time so won't work. Trying to wear a sandal with some support. tryng to do some ankle ROM - tighter with DF. Tried a night splint for DF but she is so tight that it's hard to get on. Generally not flexible       Pain / Symptoms:  Pain/symptom is:  constant  Pain rating (out of 10): ; Worst: 8  Location: L heel and bottom of the foot   Quality / Description: sharp, shooting, ache.  Alleviating Factors: change in position.   Function:   Limitations / Exacerbation Factors: pain, difficulty, household distances, community distances, Jogging. Getting OOB in am  Prior Level of Function: declining function, therefore referred to therapy,  Patient Goals: return to sport/leisure activities and decreased pain    Prior treatment: no therapies  Discharged from hospital, home health, or skilled nursing facility in last 30 days: no    Home Environment:   Patient lives with alone. children  Assistance available: as needed  Feels safe at home/work/school.  2 or more falls or an unexplained fall with injury in the last year:  No    OBJECTIVE                                                                                                                     Observation:   Comments / Details: Decreased heel strike and roll over on L, decreased lateral weight shift  Range of Motion (ROM)   (norms in parentheses, degrees unless noted, active unless noted):    Ankle:    - Dorsiflexion (20; negative=lacking to 0, positive=beyond 0):        • Left: 5        • Right: 10     - Plantar Flexion (40-50):        • Left: WNL        • Right: WNL    - Inversion (30-35):        • Left: 30        • Right: WNL    - Eversion (15-20):        • Left: 20        • Right: WNL  First MTP (Hallux):    - Extension (50-70):        • Left: Passive: 40  Details / Comments: Tightness with PROM ankle DF  HS, pirifromis tightness  AROM toe curls - WFL  AROM great toe extn harder and decrease ROM on L  Seated pronation/supination limited on L      Palpation:     Comments / Details: Tenderness to bottom of the foot L heel and plantar fascia        Ankle/Foot Circumference  Comments / Details: Mild swelling to medial mallelous and top of the foot    Lower Extremity Functional Scale (LEFS): Score: 46  scored  0=extreme difficulty; 80=no difficulty    TREATMENT                                                                                                                  Therapeutic Exercise:  HS stretch x 2 20 sec  Calf stretch foot straight and turned in x 2 20 sec  Piriformis x 2 20 sec   Ankle DF stretch on step x 2 20 sec  Great toe extn x 5  plantarfascia stretch on step x 0    Reaction: no adverse reaction to treatment    Skilled input: verbal instruction/cues    Home Exercise Program: (*above indicates provided as part of home exercise program)   Access Code: [email or web address]   URL: [email or web address]   Date: 06/03/2020   Prepared by: Kelly Bravo      Exercises Seated Calf Towel Stretch - 2 reps - 1 sets - 20 sec hold - 1x daily - 7x weekly   Seated Plantar Fascia Mobilization with Small Ball - 10 reps - 1 sets - 1x daily - 7x weekly   Standing Dorsiflexion Self-Mobilization on Step - 2 reps - 1 sets - 20 sec hold - 2x daily - 7x weekly   Gastroc Stretch on Wall - 2 reps - 1 sets - 20 sec hold - 2x daily - 7x weekly   Supine Hamstring Stretch - 2 reps - 1 sets - 20 sec hold - 2x daily - 7x weekly   Supine Piriformis Stretch with Leg Straight - 2 reps - 1 sets - 20 sec hold - 2x daily - 7x weekly         ASSESSMENT                                                                                                             52 year old female patient has reported functional limitations listed above impacted by signs and symptoms consistent with left foot, impaired tissue mobility, impaired gait, impaired activity tolerance, impaired range of motion and increased pain/symptoms.  6/3/20 Pt presents with limited ankle DF and pain affecting gait.     Prognosis: patient will benefit from skilled therapy  Rehabilitative Potential: good  Predicted patient presentation: Low (stable) - Patient comorbidities and complexities, as defined above, will have little effect on progress for prescribed plan of care.     Patient Education:   Who will be receiving education: patient  Are they ready to learn: yes  Preferred learning style: written, verbal and demonstration  Barriers to learning: no barriers apparent at this time  Results of above outlined education: Verbalizes understanding and Demonstrates understanding     PLAN                                                                                                                           The following skilled interventions to be implemented to achieve goals listed below:  Manual Therapy (08210)  Therapeutic Activity (59499)  Therapeutic Exercise (31274)  Electrical Stimulation (61879//97096)   Ultrasound/Phonophoresis (02008)      Frequency / Duration: 1 times per week tapering as patient progresses for an estimated total of 5 visits    patient involved in and agreed to plan of care and goals.  Patient has been given attendance policy at time of initial evaluation.    GOALS                                                                                                                       Long Term Goals: To be met by end of plan of care:      Home Exercise Program: Independent with progressed and modified home exercise program (HEP)      Patient Reported Outcome Measure: Improvement in function /disability/impairment as indicated by Lower Extremity Functional Scale (minimal detectable change - 9 points) > or =   53     50% less pain when getting OOB in am.       Procedures and total treatment time documented Time Entry flowsheet.           Updated Participants     Name Type Comments Contact Info    Néstor Robb DPM Provider  [phone]    Signature pending    Kelly Bravo PT PT              Please complete the attached form to indicate your approval of the plan of care upon receipt.  Insurance compliance requires your approval be on file.  Should you have any questions, feel free to contact me.     Kelly Bravo PT  [facility] PHYSICAL  MEDICINE & REHAB  [address] [facility] [address]  Fax: [phone]                RE: Plan of Care    I certify the need for these services, furnished under this plan of treatment and while under my care.  I agree with the plan of care as stated and request that therapy proceed.        __________________________________________________________________________________  MD Signature         Date   Time no

## 2020-06-11 ENCOUNTER — EMERGENCY (EMERGENCY)
Facility: HOSPITAL | Age: 55
LOS: 1 days | Discharge: HOME | End: 2020-06-11
Attending: EMERGENCY MEDICINE
Payer: MEDICARE

## 2020-06-11 VITALS
OXYGEN SATURATION: 97 % | DIASTOLIC BLOOD PRESSURE: 90 MMHG | SYSTOLIC BLOOD PRESSURE: 162 MMHG | TEMPERATURE: 99 F | RESPIRATION RATE: 18 BRPM | HEART RATE: 100 BPM

## 2020-06-11 VITALS
SYSTOLIC BLOOD PRESSURE: 170 MMHG | OXYGEN SATURATION: 98 % | DIASTOLIC BLOOD PRESSURE: 91 MMHG | RESPIRATION RATE: 22 BRPM | HEART RATE: 106 BPM | HEIGHT: 65 IN

## 2020-06-11 DIAGNOSIS — Z98.89 OTHER SPECIFIED POSTPROCEDURAL STATES: Chronic | ICD-10-CM

## 2020-06-11 DIAGNOSIS — Z87.898 PERSONAL HISTORY OF OTHER SPECIFIED CONDITIONS: Chronic | ICD-10-CM

## 2020-06-11 LAB
ALBUMIN SERPL ELPH-MCNC: 4.7 G/DL — SIGNIFICANT CHANGE UP (ref 3.3–5)
ALP SERPL-CCNC: 75 U/L — SIGNIFICANT CHANGE UP (ref 40–120)
ALT FLD-CCNC: 68 U/L — HIGH (ref 10–45)
ANION GAP SERPL CALC-SCNC: 15 MMOL/L — SIGNIFICANT CHANGE UP (ref 5–17)
APTT BLD: 25.7 SEC — LOW (ref 27.5–36.3)
AST SERPL-CCNC: 31 U/L — SIGNIFICANT CHANGE UP (ref 10–40)
BASOPHILS # BLD AUTO: 0.01 K/UL — SIGNIFICANT CHANGE UP (ref 0–0.2)
BASOPHILS NFR BLD AUTO: 0.1 % — SIGNIFICANT CHANGE UP (ref 0–2)
BILIRUB SERPL-MCNC: 0.3 MG/DL — SIGNIFICANT CHANGE UP (ref 0.2–1.2)
BLD GP AB SCN SERPL QL: NEGATIVE — SIGNIFICANT CHANGE UP
BUN SERPL-MCNC: 12 MG/DL — SIGNIFICANT CHANGE UP (ref 7–23)
CALCIUM SERPL-MCNC: 9.7 MG/DL — SIGNIFICANT CHANGE UP (ref 8.4–10.5)
CHLORIDE SERPL-SCNC: 101 MMOL/L — SIGNIFICANT CHANGE UP (ref 96–108)
CO2 SERPL-SCNC: 25 MMOL/L — SIGNIFICANT CHANGE UP (ref 22–31)
CREAT SERPL-MCNC: 0.75 MG/DL — SIGNIFICANT CHANGE UP (ref 0.5–1.3)
EOSINOPHIL # BLD AUTO: 0.07 K/UL — SIGNIFICANT CHANGE UP (ref 0–0.5)
EOSINOPHIL NFR BLD AUTO: 0.8 % — SIGNIFICANT CHANGE UP (ref 0–6)
GLUCOSE SERPL-MCNC: 142 MG/DL — HIGH (ref 70–99)
HCT VFR BLD CALC: 41.3 % — SIGNIFICANT CHANGE UP (ref 39–50)
HGB BLD-MCNC: 13.8 G/DL — SIGNIFICANT CHANGE UP (ref 13–17)
IMM GRANULOCYTES NFR BLD AUTO: 1.1 % — SIGNIFICANT CHANGE UP (ref 0–1.5)
INR BLD: 1.01 RATIO — SIGNIFICANT CHANGE UP (ref 0.88–1.16)
LIDOCAIN IGE QN: 53 U/L — SIGNIFICANT CHANGE UP (ref 7–60)
LYMPHOCYTES # BLD AUTO: 1.97 K/UL — SIGNIFICANT CHANGE UP (ref 1–3.3)
LYMPHOCYTES # BLD AUTO: 22.3 % — SIGNIFICANT CHANGE UP (ref 13–44)
MCHC RBC-ENTMCNC: 27.9 PG — SIGNIFICANT CHANGE UP (ref 27–34)
MCHC RBC-ENTMCNC: 33.4 GM/DL — SIGNIFICANT CHANGE UP (ref 32–36)
MCV RBC AUTO: 83.6 FL — SIGNIFICANT CHANGE UP (ref 80–100)
MONOCYTES # BLD AUTO: 0.58 K/UL — SIGNIFICANT CHANGE UP (ref 0–0.9)
MONOCYTES NFR BLD AUTO: 6.6 % — SIGNIFICANT CHANGE UP (ref 2–14)
NEUTROPHILS # BLD AUTO: 6.12 K/UL — SIGNIFICANT CHANGE UP (ref 1.8–7.4)
NEUTROPHILS NFR BLD AUTO: 69.1 % — SIGNIFICANT CHANGE UP (ref 43–77)
NRBC # BLD: 0 /100 WBCS — SIGNIFICANT CHANGE UP (ref 0–0)
PLATELET # BLD AUTO: 159 K/UL — SIGNIFICANT CHANGE UP (ref 150–400)
POTASSIUM SERPL-MCNC: 3.8 MMOL/L — SIGNIFICANT CHANGE UP (ref 3.5–5.3)
POTASSIUM SERPL-SCNC: 3.8 MMOL/L — SIGNIFICANT CHANGE UP (ref 3.5–5.3)
PROT SERPL-MCNC: 7.3 G/DL — SIGNIFICANT CHANGE UP (ref 6–8.3)
PROTHROM AB SERPL-ACNC: 11.5 SEC — SIGNIFICANT CHANGE UP (ref 10–12.9)
RBC # BLD: 4.94 M/UL — SIGNIFICANT CHANGE UP (ref 4.2–5.8)
RBC # FLD: 13.6 % — SIGNIFICANT CHANGE UP (ref 10.3–14.5)
RH IG SCN BLD-IMP: POSITIVE — SIGNIFICANT CHANGE UP
SARS-COV-2 RNA SPEC QL NAA+PROBE: SIGNIFICANT CHANGE UP
SODIUM SERPL-SCNC: 141 MMOL/L — SIGNIFICANT CHANGE UP (ref 135–145)
WBC # BLD: 8.85 K/UL — SIGNIFICANT CHANGE UP (ref 3.8–10.5)
WBC # FLD AUTO: 8.85 K/UL — SIGNIFICANT CHANGE UP (ref 3.8–10.5)

## 2020-06-11 PROCEDURE — 70450 CT HEAD/BRAIN W/O DYE: CPT

## 2020-06-11 PROCEDURE — 86901 BLOOD TYPING SEROLOGIC RH(D): CPT

## 2020-06-11 PROCEDURE — 71045 X-RAY EXAM CHEST 1 VIEW: CPT | Mod: 26

## 2020-06-11 PROCEDURE — 74177 CT ABD & PELVIS W/CONTRAST: CPT

## 2020-06-11 PROCEDURE — 85610 PROTHROMBIN TIME: CPT

## 2020-06-11 PROCEDURE — 70450 CT HEAD/BRAIN W/O DYE: CPT | Mod: 26

## 2020-06-11 PROCEDURE — 73610 X-RAY EXAM OF ANKLE: CPT | Mod: 26,77,LT

## 2020-06-11 PROCEDURE — 73700 CT LOWER EXTREMITY W/O DYE: CPT

## 2020-06-11 PROCEDURE — 86900 BLOOD TYPING SEROLOGIC ABO: CPT

## 2020-06-11 PROCEDURE — 73620 X-RAY EXAM OF FOOT: CPT | Mod: 26,LT

## 2020-06-11 PROCEDURE — 72125 CT NECK SPINE W/O DYE: CPT

## 2020-06-11 PROCEDURE — 80053 COMPREHEN METABOLIC PANEL: CPT

## 2020-06-11 PROCEDURE — 71045 X-RAY EXAM CHEST 1 VIEW: CPT

## 2020-06-11 PROCEDURE — 73610 X-RAY EXAM OF ANKLE: CPT

## 2020-06-11 PROCEDURE — 73552 X-RAY EXAM OF FEMUR 2/>: CPT | Mod: 26,LT

## 2020-06-11 PROCEDURE — 73552 X-RAY EXAM OF FEMUR 2/>: CPT

## 2020-06-11 PROCEDURE — 73600 X-RAY EXAM OF ANKLE: CPT

## 2020-06-11 PROCEDURE — 76377 3D RENDER W/INTRP POSTPROCES: CPT

## 2020-06-11 PROCEDURE — 72170 X-RAY EXAM OF PELVIS: CPT | Mod: 26

## 2020-06-11 PROCEDURE — 96375 TX/PRO/DX INJ NEW DRUG ADDON: CPT | Mod: XU

## 2020-06-11 PROCEDURE — 74177 CT ABD & PELVIS W/CONTRAST: CPT | Mod: 26

## 2020-06-11 PROCEDURE — 99291 CRITICAL CARE FIRST HOUR: CPT | Mod: 25

## 2020-06-11 PROCEDURE — 83690 ASSAY OF LIPASE: CPT

## 2020-06-11 PROCEDURE — 73620 X-RAY EXAM OF FOOT: CPT

## 2020-06-11 PROCEDURE — 71260 CT THORAX DX C+: CPT | Mod: 26

## 2020-06-11 PROCEDURE — 73610 X-RAY EXAM OF ANKLE: CPT | Mod: 26,LT

## 2020-06-11 PROCEDURE — 72125 CT NECK SPINE W/O DYE: CPT | Mod: 26

## 2020-06-11 PROCEDURE — 28405 CLTX CALCANEAL FX W/MNPJ: CPT

## 2020-06-11 PROCEDURE — 96376 TX/PRO/DX INJ SAME DRUG ADON: CPT | Mod: XU

## 2020-06-11 PROCEDURE — 85027 COMPLETE CBC AUTOMATED: CPT

## 2020-06-11 PROCEDURE — 73562 X-RAY EXAM OF KNEE 3: CPT

## 2020-06-11 PROCEDURE — 71260 CT THORAX DX C+: CPT

## 2020-06-11 PROCEDURE — 73562 X-RAY EXAM OF KNEE 3: CPT | Mod: 26,LT

## 2020-06-11 PROCEDURE — 72170 X-RAY EXAM OF PELVIS: CPT

## 2020-06-11 PROCEDURE — 93005 ELECTROCARDIOGRAM TRACING: CPT | Mod: XU

## 2020-06-11 PROCEDURE — 73590 X-RAY EXAM OF LOWER LEG: CPT

## 2020-06-11 PROCEDURE — 73700 CT LOWER EXTREMITY W/O DYE: CPT | Mod: 26,LT

## 2020-06-11 PROCEDURE — 99291 CRITICAL CARE FIRST HOUR: CPT | Mod: CS,GC

## 2020-06-11 PROCEDURE — 96374 THER/PROPH/DIAG INJ IV PUSH: CPT | Mod: XU

## 2020-06-11 PROCEDURE — 76377 3D RENDER W/INTRP POSTPROCES: CPT | Mod: 26

## 2020-06-11 PROCEDURE — 86850 RBC ANTIBODY SCREEN: CPT

## 2020-06-11 PROCEDURE — 85730 THROMBOPLASTIN TIME PARTIAL: CPT

## 2020-06-11 PROCEDURE — 73590 X-RAY EXAM OF LOWER LEG: CPT | Mod: 26,LT

## 2020-06-11 RX ORDER — HYDROMORPHONE HYDROCHLORIDE 2 MG/ML
1 INJECTION INTRAMUSCULAR; INTRAVENOUS; SUBCUTANEOUS ONCE
Refills: 0 | Status: DISCONTINUED | OUTPATIENT
Start: 2020-06-11 | End: 2020-06-11

## 2020-06-11 RX ORDER — SODIUM CHLORIDE 9 MG/ML
1000 INJECTION INTRAMUSCULAR; INTRAVENOUS; SUBCUTANEOUS ONCE
Refills: 0 | Status: COMPLETED | OUTPATIENT
Start: 2020-06-11 | End: 2020-06-11

## 2020-06-11 RX ORDER — OXYCODONE HYDROCHLORIDE 5 MG/1
1 TABLET ORAL
Qty: 12 | Refills: 0
Start: 2020-06-11 | End: 2020-06-13

## 2020-06-11 RX ORDER — MORPHINE SULFATE 50 MG/1
4 CAPSULE, EXTENDED RELEASE ORAL ONCE
Refills: 0 | Status: DISCONTINUED | OUTPATIENT
Start: 2020-06-11 | End: 2020-06-11

## 2020-06-11 RX ORDER — OXYCODONE HYDROCHLORIDE 5 MG/1
1 TABLET ORAL
Qty: 15 | Refills: 0
Start: 2020-06-11 | End: 2020-06-15

## 2020-06-11 RX ORDER — ACETAMINOPHEN 500 MG
975 TABLET ORAL ONCE
Refills: 0 | Status: COMPLETED | OUTPATIENT
Start: 2020-06-11 | End: 2020-06-11

## 2020-06-11 RX ORDER — KETOROLAC TROMETHAMINE 30 MG/ML
15 SYRINGE (ML) INJECTION ONCE
Refills: 0 | Status: DISCONTINUED | OUTPATIENT
Start: 2020-06-11 | End: 2020-06-11

## 2020-06-11 RX ADMIN — HYDROMORPHONE HYDROCHLORIDE 1 MILLIGRAM(S): 2 INJECTION INTRAMUSCULAR; INTRAVENOUS; SUBCUTANEOUS at 16:49

## 2020-06-11 RX ADMIN — Medication 975 MILLIGRAM(S): at 18:12

## 2020-06-11 RX ADMIN — SODIUM CHLORIDE 1000 MILLILITER(S): 9 INJECTION INTRAMUSCULAR; INTRAVENOUS; SUBCUTANEOUS at 12:45

## 2020-06-11 RX ADMIN — MORPHINE SULFATE 4 MILLIGRAM(S): 50 CAPSULE, EXTENDED RELEASE ORAL at 12:15

## 2020-06-11 RX ADMIN — HYDROMORPHONE HYDROCHLORIDE 1 MILLIGRAM(S): 2 INJECTION INTRAMUSCULAR; INTRAVENOUS; SUBCUTANEOUS at 12:32

## 2020-06-11 RX ADMIN — HYDROMORPHONE HYDROCHLORIDE 1 MILLIGRAM(S): 2 INJECTION INTRAMUSCULAR; INTRAVENOUS; SUBCUTANEOUS at 14:25

## 2020-06-11 RX ADMIN — Medication 15 MILLIGRAM(S): at 18:13

## 2020-06-11 RX ADMIN — HYDROMORPHONE HYDROCHLORIDE 1 MILLIGRAM(S): 2 INJECTION INTRAMUSCULAR; INTRAVENOUS; SUBCUTANEOUS at 13:06

## 2020-06-11 NOTE — CONSULT NOTE ADULT - SUBJECTIVE AND OBJECTIVE BOX
Ortho consult for L talus and calcaneal fxs.  Patient fell off ladder ~10 feet earlier today and landed on his L foot.  +HS, no LOC. Was not able to ambulate after injury.  Denies and numbness/tingling.  Denies any other orthopaedic injuries at this time.    ICU Vital Signs Last 24 Hrs  T(C): 36.8 (11 Jun 2020 12:07), Max: 36.8 (11 Jun 2020 12:07)  T(F): 98.3 (11 Jun 2020 12:07), Max: 98.3 (11 Jun 2020 12:07)  HR: 95 (11 Jun 2020 12:11) (95 - 106)  BP: 160/97 (11 Jun 2020 12:11) (151/106 - 170/91)  BP(mean): --  ABP: --  ABP(mean): --  RR: 22 (11 Jun 2020 12:11) (22 - 22)  SpO2: 99% (11 Jun 2020 12:11) (98% - 100%)      PE:   Resting comfortably in bed, NAD  LLE:   Skin intact, mild swelling, +TTP medial/lateral malleolus  motor intact distally  SILT s/s/sp/dp/t  2+ DP    Secondary:  No TTP over bony landmarks, SILT BL, ROM intact BL, distal pulses palpable.    A/P: 54yMale with L talar neck / calcaneus fractures s/p closed reduction and splinting    - Pain control  - NWB on affected extremity in splint  - Keep splint clean, dry and intact until follow up  - Cane/crutches/walker as needed  - Ice/elevation  - Follow up with Dr. Rico or Dr. Gayle as soon as possible; call for appointment. Ortho consult for L talus and calcaneal fxs.  Patient fell off ladder ~10 feet earlier today and landed on his L foot.  +HS, no LOC. Was not able to ambulate after injury.  Denies and numbness/tingling.  Denies any other orthopaedic injuries at this time.    ICU Vital Signs Last 24 Hrs  T(C): 36.8 (11 Jun 2020 12:07), Max: 36.8 (11 Jun 2020 12:07)  T(F): 98.3 (11 Jun 2020 12:07), Max: 98.3 (11 Jun 2020 12:07)  HR: 95 (11 Jun 2020 12:11) (95 - 106)  BP: 160/97 (11 Jun 2020 12:11) (151/106 - 170/91)  BP(mean): --  ABP: --  ABP(mean): --  RR: 22 (11 Jun 2020 12:11) (22 - 22)  SpO2: 99% (11 Jun 2020 12:11) (98% - 100%)      PE:   Resting comfortably in bed, NAD  LLE:   Skin intact, mild swelling, +TTP medial/lateral malleolus  motor intact distally  SILT s/s/sp/dp/t  2+ DP  able to SLR    Secondary:  No TTP over bony landmarks, SILT BL, ROM intact BL, distal pulses palpable.    A/P: 54yMale with L talar neck / calcaneus fractures s/p closed reduction and splinting    - Pain control  - NWB on affected extremity in splint  - Keep splint clean, dry and intact until follow up  - Cane/crutches/walker as needed  - Ice/elevation  - Follow up with Dr. Rico or Dr. Gayle as soon as possible; call for appointment. Ortho consult for L talus and calcaneal fxs.  Patient fell off ladder ~10 feet earlier today and landed on his L foot.  +HS, no LOC. Was not able to ambulate after injury.  Denies and numbness/tingling.  Denies any other orthopaedic injuries at this time.  Denies back pain.  Hx of Chron's w/ recent GI block, on steroids.  Denies smoking history or alcohol abuse.     ICU Vital Signs Last 24 Hrs  T(C): 36.8 (11 Jun 2020 12:07), Max: 36.8 (11 Jun 2020 12:07)  T(F): 98.3 (11 Jun 2020 12:07), Max: 98.3 (11 Jun 2020 12:07)  HR: 95 (11 Jun 2020 12:11) (95 - 106)  BP: 160/97 (11 Jun 2020 12:11) (151/106 - 170/91)  BP(mean): --  ABP: --  ABP(mean): --  RR: 22 (11 Jun 2020 12:11) (22 - 22)  SpO2: 99% (11 Jun 2020 12:11) (98% - 100%)      PE:   Resting comfortably in bed, NAD  LLE:   Skin intact, mild swelling, +TTP medial/lateral malleolus  motor intact distally  SILT s/s/sp/dp/t  2+ DP  able to SLR    Secondary:  No TTP over bony landmarks, SILT BL, ROM intact BL, distal pulses palpable.    A/P: 54yMale with L talar neck / calcaneus fractures s/p closed reduction and splinting    - Pain control  - NWB on affected extremity in splint  - Keep splint clean, dry and intact until follow up  - Cane/crutches/walker as needed  - Ice/elevation  - Follow up with Dr. Rico or Dr. Gayle as soon as possible; call for appointment.

## 2020-06-11 NOTE — ED PROVIDER NOTE - PSH
H/O colonoscopy    H/O lipoma  removed from back  H/O percutaneous transluminal coronary angioplasty  with DE RCA 2006, 2 stents placed 2015  History of herniorrhaphy  left inguinal herniorrhaphy  S/P cardiac catheterization  2017-resulted in [place]-no stents placed  S/P hernia surgery

## 2020-06-11 NOTE — ED ADULT NURSE REASSESSMENT NOTE - NS ED NURSE REASSESS COMMENT FT1
Ortho resident and attending saw pt and splinted left leg, pt aware of and understands plan of care, pt awaiting d/c. Pt resting in bed, safety maintained.

## 2020-06-11 NOTE — ED PROVIDER NOTE - CARE PROVIDER_API CALL
Radha Rico  ORTHOPAEDIC SURGERY  [address] [place] [address], [address]  [place] [address]  Phone: [phone]  Fax: [phone]  Follow Up Time:

## 2020-06-11 NOTE — ED PROVIDER NOTE - PHYSICAL EXAMINATION
Primary Survey  A - airway intact  B - bilateral breath sounds and good chest rise  C - initially BP: 160/97, palpable pulses in all extremities  D - GCS 15 on arrival  Exposure obtained    Secondary survey  Gen: NAD  HEENT: NC, C-spine no ttp  CV: pulse regularly present  Pulm: CTA B/L  Chest: intact without ttp  Abd: Soft, ND, epigastric ttp no murphys no mcburneys, no rebound, no guarding  Groin: Normal appearing  Ext: Palp radial b/l, palp DP bilateral, left ankle neurovasc intact but with ankle deformity along medial joint line, no discolorations   Back: no TTP, no palpable runoff, stepoff, or deformity

## 2020-06-11 NOTE — ED ADULT NURSE REASSESSMENT NOTE - NS ED NURSE REASSESS COMMENT FT1
Ortho resident informs ED RN he is awaiting attending arrival to splint pt left ankle. Pt aware of delay of care. Resting in bed, safety maintained.

## 2020-06-11 NOTE — ED PROVIDER NOTE - PATIENT PORTAL LINK FT
You can access the FollowMyHealth Patient Portal offered by [facility] by registering at the following website: [email or web address]. By joining [employer]’s FollowMyHealth portal, you will also be able to view your health information using other applications (apps) compatible with our system.

## 2020-06-11 NOTE — ED ADULT NURSE NOTE - PSH
H/O colonoscopy    H/O lipoma  removed from back  H/O percutaneous transluminal coronary angioplasty  with DE RCA 2006, 2 stents placed 2015  History of herniorrhaphy  left inguinal herniorrhaphy  S/P cardiac catheterization  2017-resulted in [place]-no stents placed  S/P hernia surgery

## 2020-06-11 NOTE — ED PROVIDER NOTE - OBJECTIVE STATEMENT
54M PMH/PSH CAD s/p MI, stents x 4, Crohns s/p bowel resection, appendectomy, ventral hernia repair, COPD/asthma, anemia, MARYAM pw left ankle injury after fall from roof. reports was on 12 foot roof with  and lost footing and fell off onto concrete floor, unsure if loc or head trauma, unable to ambulate with left ankle deformity.  Denies other recent trauma, fevers, chills, headache, dizziness, nausea, vomiting, dysuria, freq, hematuria, diarrhea, constipation, chest pain, shortness of breath, cough.    ccollar in place by ems

## 2020-06-11 NOTE — ED PROVIDER NOTE - CLINICAL SUMMARY MEDICAL DECISION MAKING FREE TEXT BOX
54M PMH/PSH CAD s/p MI, stents x 4, Crohns s/p bowel resection, appendectomy, ventral hernia repair, COPD/asthma, anemia, MARYAM pw left ankle injury after fall from roof. some abd ttp along epigastric area on exam otherwise left ankle deformity will get labs ct's, xrays and analgesia 54M PMH/PSH CAD s/p MI, stents x 4, Crohns s/p bowel resection, appendectomy, ventral hernia repair, COPD/asthma, anemia, MARYAM pw left ankle injury after fall from roof. some abd ttp along epigastric area on exam otherwise left ankle deformity will get labs ct's, xrays and analgesia  Geraldo: 54 year old male with mi, stents x 4. crohns s/p bowel resection here with s/p fall from roof about 10-12 feet. ? loc. + severe pain to left foot/ankle with deformity.  + 2 dp b/l le. will get ct imaging, pain control, xrays, ortho consult.

## 2020-06-11 NOTE — ED PROVIDER NOTE - CRITICAL CARE PROVIDED
direct patient care (not related to procedure)/consultation with other physicians/consult w/ pt's family directly relating to pts condition/telephone consultation with the patient's family/additional history taking/interpretation of diagnostic studies/documentation

## 2020-06-11 NOTE — ED PROVIDER NOTE - CARE PLAN
Principal Discharge DX:	Closed displaced fracture of left calcaneus, unspecified portion of calcaneus, initial encounter

## 2020-06-11 NOTE — ED ADULT NURSE NOTE - NSIMPLEMENTINTERV_GEN_ALL_ED
Implemented All Fall Risk Interventions:  [place] to call system. Call bell, personal items and telephone within reach. Instruct patient to call for assistance. Room bathroom lighting operational. Non-slip footwear when patient is off stretcher. Physically safe environment: no spills, clutter or unnecessary equipment. Stretcher in lowest position, wheels locked, appropriate side rails in place. Provide visual cue, wrist band, yellow gown, etc. Monitor gait and stability. Monitor for mental status changes and reorient to person, place, and time. Review medications for side effects contributing to fall risk. Reinforce activity limits and safety measures with patient and family.

## 2020-06-11 NOTE — ED PROVIDER NOTE - NSFOLLOWUPINSTRUCTIONS_ED_ALL_ED_FT
Follow up with Dr. Rico in one week.   Take oxycodone 5 mg by mouth every 6 hours as needed for pain.   Keep off the ankle, keep it elevated, ice the ankle.   Do not wet splint.   Return to the ER immediately for worsening pain.

## 2020-06-11 NOTE — ED PROVIDER NOTE - PROGRESS NOTE DETAILS
called and spoke with [phone]- wife Tiana, dr. carlos [phone] brother, updated with pts condition and still pending results consulted ortho for left ankle fracture ortho evaluating patient Lulú Matos, Resident: patient seen and evaluated by Dr. Mendoza, ok to DC will follow up outpat.

## 2020-06-11 NOTE — ED ADULT NURSE REASSESSMENT NOTE - NS ED NURSE REASSESS COMMENT FT1
Pt AAOx4, NAD, resting in bed, pt ready for DC as per MD. Pt still c/o 8/10 pain, MD aware and will instruct RN regarding pain meds. MD cleared C-collar and reviewed pt pain med prescription at pharmacy with him. Pt provided with crutches and demonstrates correct use via teach bach method. Pt denies headache, dizziness, chest pain, cough, SOB, abdominal pain, n/v/d, fevers, chills at this time.

## 2020-06-16 ENCOUNTER — INPATIENT (INPATIENT)
Facility: HOSPITAL | Age: 55
LOS: 0 days | Discharge: HOME | DRG: 603 | End: 2020-06-17
Attending: HOSPITALIST | Admitting: HOSPITALIST
Payer: COMMERCIAL

## 2020-06-16 VITALS
OXYGEN SATURATION: 98 % | DIASTOLIC BLOOD PRESSURE: 85 MMHG | WEIGHT: 190.04 LBS | HEART RATE: 128 BPM | RESPIRATION RATE: 16 BRPM | TEMPERATURE: 100 F | SYSTOLIC BLOOD PRESSURE: 139 MMHG | HEIGHT: 65 IN

## 2020-06-16 DIAGNOSIS — Z98.89 OTHER SPECIFIED POSTPROCEDURAL STATES: Chronic | ICD-10-CM

## 2020-06-16 DIAGNOSIS — Z87.898 PERSONAL HISTORY OF OTHER SPECIFIED CONDITIONS: Chronic | ICD-10-CM

## 2020-06-16 LAB
ALBUMIN SERPL ELPH-MCNC: 4.6 G/DL — SIGNIFICANT CHANGE UP (ref 3.3–5)
ALP SERPL-CCNC: 81 U/L — SIGNIFICANT CHANGE UP (ref 40–120)
ALT FLD-CCNC: 45 U/L — SIGNIFICANT CHANGE UP (ref 10–45)
ANION GAP SERPL CALC-SCNC: 13 MMOL/L — SIGNIFICANT CHANGE UP (ref 5–17)
AST SERPL-CCNC: 23 U/L — SIGNIFICANT CHANGE UP (ref 10–40)
BASOPHILS # BLD AUTO: 0.01 K/UL — SIGNIFICANT CHANGE UP (ref 0–0.2)
BASOPHILS NFR BLD AUTO: 0.1 % — SIGNIFICANT CHANGE UP (ref 0–2)
BILIRUB SERPL-MCNC: 0.7 MG/DL — SIGNIFICANT CHANGE UP (ref 0.2–1.2)
BUN SERPL-MCNC: 15 MG/DL — SIGNIFICANT CHANGE UP (ref 7–23)
CALCIUM SERPL-MCNC: 9.7 MG/DL — SIGNIFICANT CHANGE UP (ref 8.4–10.5)
CHLORIDE SERPL-SCNC: 103 MMOL/L — SIGNIFICANT CHANGE UP (ref 96–108)
CO2 SERPL-SCNC: 23 MMOL/L — SIGNIFICANT CHANGE UP (ref 22–31)
CREAT SERPL-MCNC: 0.84 MG/DL — SIGNIFICANT CHANGE UP (ref 0.5–1.3)
CRP SERPL-MCNC: 6.29 MG/DL — HIGH (ref 0–0.4)
EOSINOPHIL # BLD AUTO: 0.06 K/UL — SIGNIFICANT CHANGE UP (ref 0–0.5)
EOSINOPHIL NFR BLD AUTO: 0.6 % — SIGNIFICANT CHANGE UP (ref 0–6)
GAS PNL BLDV: SIGNIFICANT CHANGE UP
GLUCOSE SERPL-MCNC: 122 MG/DL — HIGH (ref 70–99)
HCT VFR BLD CALC: 40.1 % — SIGNIFICANT CHANGE UP (ref 39–50)
HGB BLD-MCNC: 13.3 G/DL — SIGNIFICANT CHANGE UP (ref 13–17)
IMM GRANULOCYTES NFR BLD AUTO: 0.6 % — SIGNIFICANT CHANGE UP (ref 0–1.5)
LYMPHOCYTES # BLD AUTO: 1.34 K/UL — SIGNIFICANT CHANGE UP (ref 1–3.3)
LYMPHOCYTES # BLD AUTO: 13.2 % — SIGNIFICANT CHANGE UP (ref 13–44)
MCHC RBC-ENTMCNC: 27.5 PG — SIGNIFICANT CHANGE UP (ref 27–34)
MCHC RBC-ENTMCNC: 33.2 GM/DL — SIGNIFICANT CHANGE UP (ref 32–36)
MCV RBC AUTO: 83 FL — SIGNIFICANT CHANGE UP (ref 80–100)
MONOCYTES # BLD AUTO: 0.71 K/UL — SIGNIFICANT CHANGE UP (ref 0–0.9)
MONOCYTES NFR BLD AUTO: 7 % — SIGNIFICANT CHANGE UP (ref 2–14)
NEUTROPHILS # BLD AUTO: 8 K/UL — HIGH (ref 1.8–7.4)
NEUTROPHILS NFR BLD AUTO: 78.5 % — HIGH (ref 43–77)
NRBC # BLD: 0 /100 WBCS — SIGNIFICANT CHANGE UP (ref 0–0)
PLATELET # BLD AUTO: 200 K/UL — SIGNIFICANT CHANGE UP (ref 150–400)
POTASSIUM SERPL-MCNC: 3.9 MMOL/L — SIGNIFICANT CHANGE UP (ref 3.5–5.3)
POTASSIUM SERPL-SCNC: 3.9 MMOL/L — SIGNIFICANT CHANGE UP (ref 3.5–5.3)
PROT SERPL-MCNC: 7.8 G/DL — SIGNIFICANT CHANGE UP (ref 6–8.3)
RBC # BLD: 4.83 M/UL — SIGNIFICANT CHANGE UP (ref 4.2–5.8)
RBC # FLD: 13.7 % — SIGNIFICANT CHANGE UP (ref 10.3–14.5)
SODIUM SERPL-SCNC: 139 MMOL/L — SIGNIFICANT CHANGE UP (ref 135–145)
WBC # BLD: 10.18 K/UL — SIGNIFICANT CHANGE UP (ref 3.8–10.5)
WBC # FLD AUTO: 10.18 K/UL — SIGNIFICANT CHANGE UP (ref 3.8–10.5)

## 2020-06-16 PROCEDURE — 99285 EMERGENCY DEPT VISIT HI MDM: CPT | Mod: CS,GC

## 2020-06-16 PROCEDURE — 73590 X-RAY EXAM OF LOWER LEG: CPT | Mod: 26,LT

## 2020-06-16 PROCEDURE — 73610 X-RAY EXAM OF ANKLE: CPT | Mod: 26,LT

## 2020-06-16 PROCEDURE — 93971 EXTREMITY STUDY: CPT | Mod: 26

## 2020-06-16 PROCEDURE — 73630 X-RAY EXAM OF FOOT: CPT | Mod: 26,LT

## 2020-06-16 RX ORDER — SODIUM CHLORIDE 9 MG/ML
500 INJECTION INTRAMUSCULAR; INTRAVENOUS; SUBCUTANEOUS ONCE
Refills: 0 | Status: COMPLETED | OUTPATIENT
Start: 2020-06-16 | End: 2020-06-16

## 2020-06-16 RX ORDER — MORPHINE SULFATE 50 MG/1
4 CAPSULE, EXTENDED RELEASE ORAL ONCE
Refills: 0 | Status: DISCONTINUED | OUTPATIENT
Start: 2020-06-16 | End: 2020-06-16

## 2020-06-16 RX ORDER — VANCOMYCIN HCL 1 G
1250 VIAL (EA) INTRAVENOUS ONCE
Refills: 0 | Status: COMPLETED | OUTPATIENT
Start: 2020-06-16 | End: 2020-06-16

## 2020-06-16 NOTE — ED ADULT TRIAGE NOTE - CHIEF COMPLAINT QUOTE
Pt c/o L heel pain/ fever s/p fall last week.  Pt diagnosed with L heel fracture, now having fevers.  Pt had blisters on L foot that were popped yesterday

## 2020-06-16 NOTE — ED ADULT NURSE REASSESSMENT NOTE - NS ED NURSE REASSESS COMMENT FT1
Pt currently at US. Vancomycin not available in pyxis. Spoke with pharmacist who states medication will be sent to tube #52. Awaiting medication arrival.

## 2020-06-17 ENCOUNTER — TRANSCRIPTION ENCOUNTER (OUTPATIENT)
Age: 55
End: 2020-06-17

## 2020-06-17 VITALS
DIASTOLIC BLOOD PRESSURE: 92 MMHG | SYSTOLIC BLOOD PRESSURE: 146 MMHG | HEART RATE: 86 BPM | RESPIRATION RATE: 18 BRPM | TEMPERATURE: 99 F | OXYGEN SATURATION: 98 %

## 2020-06-17 DIAGNOSIS — M79.89 OTHER SPECIFIED SOFT TISSUE DISORDERS: ICD-10-CM

## 2020-06-17 DIAGNOSIS — Z29.9 ENCOUNTER FOR PROPHYLACTIC MEASURES, UNSPECIFIED: ICD-10-CM

## 2020-06-17 DIAGNOSIS — R09.89 OTHER SPECIFIED SYMPTOMS AND SIGNS INVOLVING THE CIRCULATORY AND RESPIRATORY SYSTEMS: ICD-10-CM

## 2020-06-17 DIAGNOSIS — S92.002K: ICD-10-CM

## 2020-06-17 DIAGNOSIS — A41.9 SEPSIS, UNSPECIFIED ORGANISM: ICD-10-CM

## 2020-06-17 DIAGNOSIS — L03.116 CELLULITIS OF LEFT LOWER LIMB: ICD-10-CM

## 2020-06-17 DIAGNOSIS — L03.90 CELLULITIS, UNSPECIFIED: ICD-10-CM

## 2020-06-17 DIAGNOSIS — K50.918 CROHN'S DISEASE, UNSPECIFIED, WITH OTHER COMPLICATION: ICD-10-CM

## 2020-06-17 DIAGNOSIS — I10 ESSENTIAL (PRIMARY) HYPERTENSION: ICD-10-CM

## 2020-06-17 LAB
ERYTHROCYTE [SEDIMENTATION RATE] IN BLOOD: 86 MM/HR — HIGH (ref 0–20)
GAS PNL BLDV: SIGNIFICANT CHANGE UP
SARS-COV-2 RNA SPEC QL NAA+PROBE: SIGNIFICANT CHANGE UP

## 2020-06-17 PROCEDURE — 82330 ASSAY OF CALCIUM: CPT

## 2020-06-17 PROCEDURE — 84132 ASSAY OF SERUM POTASSIUM: CPT

## 2020-06-17 PROCEDURE — 82435 ASSAY OF BLOOD CHLORIDE: CPT

## 2020-06-17 PROCEDURE — 84295 ASSAY OF SERUM SODIUM: CPT

## 2020-06-17 PROCEDURE — 73610 X-RAY EXAM OF ANKLE: CPT

## 2020-06-17 PROCEDURE — 80053 COMPREHEN METABOLIC PANEL: CPT

## 2020-06-17 PROCEDURE — 94640 AIRWAY INHALATION TREATMENT: CPT

## 2020-06-17 PROCEDURE — 99223 1ST HOSP IP/OBS HIGH 75: CPT

## 2020-06-17 PROCEDURE — 96375 TX/PRO/DX INJ NEW DRUG ADDON: CPT

## 2020-06-17 PROCEDURE — 93005 ELECTROCARDIOGRAM TRACING: CPT

## 2020-06-17 PROCEDURE — 94660 CPAP INITIATION&MGMT: CPT

## 2020-06-17 PROCEDURE — 86140 C-REACTIVE PROTEIN: CPT

## 2020-06-17 PROCEDURE — 83605 ASSAY OF LACTIC ACID: CPT

## 2020-06-17 PROCEDURE — 73590 X-RAY EXAM OF LOWER LEG: CPT

## 2020-06-17 PROCEDURE — 93971 EXTREMITY STUDY: CPT

## 2020-06-17 PROCEDURE — 97161 PT EVAL LOW COMPLEX 20 MIN: CPT

## 2020-06-17 PROCEDURE — 87040 BLOOD CULTURE FOR BACTERIA: CPT

## 2020-06-17 PROCEDURE — 82803 BLOOD GASES ANY COMBINATION: CPT

## 2020-06-17 PROCEDURE — 85652 RBC SED RATE AUTOMATED: CPT

## 2020-06-17 PROCEDURE — 85014 HEMATOCRIT: CPT

## 2020-06-17 PROCEDURE — 73630 X-RAY EXAM OF FOOT: CPT

## 2020-06-17 PROCEDURE — 99285 EMERGENCY DEPT VISIT HI MDM: CPT | Mod: 25

## 2020-06-17 PROCEDURE — 96374 THER/PROPH/DIAG INJ IV PUSH: CPT

## 2020-06-17 PROCEDURE — 12345: CPT | Mod: NC,GC

## 2020-06-17 PROCEDURE — 82947 ASSAY GLUCOSE BLOOD QUANT: CPT

## 2020-06-17 PROCEDURE — 99232 SBSQ HOSP IP/OBS MODERATE 35: CPT

## 2020-06-17 PROCEDURE — 85027 COMPLETE CBC AUTOMATED: CPT

## 2020-06-17 RX ORDER — CEFAZOLIN SODIUM 1 G
1000 VIAL (EA) INJECTION ONCE
Refills: 0 | Status: COMPLETED | OUTPATIENT
Start: 2020-06-17 | End: 2020-06-17

## 2020-06-17 RX ORDER — TIOTROPIUM BROMIDE 18 UG/1
1 CAPSULE ORAL; RESPIRATORY (INHALATION) DAILY
Refills: 0 | Status: DISCONTINUED | OUTPATIENT
Start: 2020-06-17 | End: 2020-06-17

## 2020-06-17 RX ORDER — OXYCODONE HYDROCHLORIDE 5 MG/1
1 TABLET ORAL
Qty: 30 | Refills: 0
Start: 2020-06-17 | End: 2020-06-21

## 2020-06-17 RX ORDER — OXYCODONE HYDROCHLORIDE 5 MG/1
5 TABLET ORAL EVERY 4 HOURS
Refills: 0 | Status: DISCONTINUED | OUTPATIENT
Start: 2020-06-17 | End: 2020-06-17

## 2020-06-17 RX ORDER — MORPHINE SULFATE 50 MG/1
4 CAPSULE, EXTENDED RELEASE ORAL EVERY 4 HOURS
Refills: 0 | Status: DISCONTINUED | OUTPATIENT
Start: 2020-06-17 | End: 2020-06-17

## 2020-06-17 RX ORDER — MONTELUKAST 4 MG/1
10 TABLET, CHEWABLE ORAL AT BEDTIME
Refills: 0 | Status: DISCONTINUED | OUTPATIENT
Start: 2020-06-17 | End: 2020-06-17

## 2020-06-17 RX ORDER — LOSARTAN POTASSIUM 100 MG/1
100 TABLET, FILM COATED ORAL DAILY
Refills: 0 | Status: DISCONTINUED | OUTPATIENT
Start: 2020-06-17 | End: 2020-06-17

## 2020-06-17 RX ORDER — PANTOPRAZOLE SODIUM 20 MG/1
40 TABLET, DELAYED RELEASE ORAL
Refills: 0 | Status: DISCONTINUED | OUTPATIENT
Start: 2020-06-17 | End: 2020-06-17

## 2020-06-17 RX ORDER — AMLODIPINE BESYLATE 2.5 MG/1
5 TABLET ORAL DAILY
Refills: 0 | Status: DISCONTINUED | OUTPATIENT
Start: 2020-06-17 | End: 2020-06-17

## 2020-06-17 RX ORDER — MORPHINE SULFATE 50 MG/1
2 CAPSULE, EXTENDED RELEASE ORAL EVERY 6 HOURS
Refills: 0 | Status: DISCONTINUED | OUTPATIENT
Start: 2020-06-17 | End: 2020-06-17

## 2020-06-17 RX ORDER — ERENUMAB-AOOE 70 MG/ML
0 INJECTION SUBCUTANEOUS
Qty: 0 | Refills: 0 | DISCHARGE

## 2020-06-17 RX ORDER — LORATADINE 10 MG/1
10 TABLET ORAL DAILY
Refills: 0 | Status: DISCONTINUED | OUTPATIENT
Start: 2020-06-17 | End: 2020-06-17

## 2020-06-17 RX ORDER — CEFAZOLIN SODIUM 1 G
1000 VIAL (EA) INJECTION EVERY 8 HOURS
Refills: 0 | Status: DISCONTINUED | OUTPATIENT
Start: 2020-06-17 | End: 2020-06-17

## 2020-06-17 RX ORDER — ACETAMINOPHEN 500 MG
650 TABLET ORAL EVERY 6 HOURS
Refills: 0 | Status: DISCONTINUED | OUTPATIENT
Start: 2020-06-17 | End: 2020-06-17

## 2020-06-17 RX ORDER — EVOLOCUMAB 140 MG/ML
0 INJECTION, SOLUTION SUBCUTANEOUS
Qty: 0 | Refills: 0 | DISCHARGE

## 2020-06-17 RX ORDER — OXYCODONE HYDROCHLORIDE 5 MG/1
5 TABLET ORAL EVERY 6 HOURS
Refills: 0 | Status: DISCONTINUED | OUTPATIENT
Start: 2020-06-17 | End: 2020-06-17

## 2020-06-17 RX ORDER — ASPIRIN/CALCIUM CARB/MAGNESIUM 324 MG
81 TABLET ORAL DAILY
Refills: 0 | Status: DISCONTINUED | OUTPATIENT
Start: 2020-06-17 | End: 2020-06-17

## 2020-06-17 RX ORDER — BUDESONIDE AND FORMOTEROL FUMARATE DIHYDRATE 160; 4.5 UG/1; UG/1
2 AEROSOL RESPIRATORY (INHALATION)
Refills: 0 | Status: DISCONTINUED | OUTPATIENT
Start: 2020-06-17 | End: 2020-06-17

## 2020-06-17 RX ORDER — BUDESONIDE, MICRONIZED 100 %
3 POWDER (GRAM) MISCELLANEOUS DAILY
Refills: 0 | Status: DISCONTINUED | OUTPATIENT
Start: 2020-06-17 | End: 2020-06-17

## 2020-06-17 RX ORDER — USTEKINUMAB 45 MG/.5ML
0 INJECTION, SOLUTION SUBCUTANEOUS
Qty: 0 | Refills: 0 | DISCHARGE

## 2020-06-17 RX ORDER — METOPROLOL TARTRATE 50 MG
25 TABLET ORAL DAILY
Refills: 0 | Status: DISCONTINUED | OUTPATIENT
Start: 2020-06-17 | End: 2020-06-17

## 2020-06-17 RX ORDER — AZTREONAM 2 G
2 VIAL (EA) INJECTION
Qty: 40 | Refills: 0
Start: 2020-06-17 | End: 2020-06-26

## 2020-06-17 RX ORDER — SODIUM CHLORIDE 9 MG/ML
1000 INJECTION, SOLUTION INTRAVENOUS
Refills: 0 | Status: DISCONTINUED | OUTPATIENT
Start: 2020-06-17 | End: 2020-06-17

## 2020-06-17 RX ORDER — ACETAMINOPHEN 500 MG
1000 TABLET ORAL EVERY 6 HOURS
Refills: 0 | Status: DISCONTINUED | OUTPATIENT
Start: 2020-06-17 | End: 2020-06-17

## 2020-06-17 RX ORDER — KETOROLAC TROMETHAMINE 30 MG/ML
15 SYRINGE (ML) INJECTION ONCE
Refills: 0 | Status: DISCONTINUED | OUTPATIENT
Start: 2020-06-17 | End: 2020-06-17

## 2020-06-17 RX ORDER — OXYCODONE HYDROCHLORIDE 5 MG/1
10 TABLET ORAL EVERY 6 HOURS
Refills: 0 | Status: DISCONTINUED | OUTPATIENT
Start: 2020-06-17 | End: 2020-06-17

## 2020-06-17 RX ORDER — SODIUM CHLORIDE 9 MG/ML
1000 INJECTION INTRAMUSCULAR; INTRAVENOUS; SUBCUTANEOUS ONCE
Refills: 0 | Status: COMPLETED | OUTPATIENT
Start: 2020-06-17 | End: 2020-06-17

## 2020-06-17 RX ORDER — ACETAMINOPHEN 500 MG
975 TABLET ORAL ONCE
Refills: 0 | Status: COMPLETED | OUTPATIENT
Start: 2020-06-17 | End: 2020-06-17

## 2020-06-17 RX ORDER — ENOXAPARIN SODIUM 100 MG/ML
40 INJECTION SUBCUTANEOUS DAILY
Refills: 0 | Status: DISCONTINUED | OUTPATIENT
Start: 2020-06-17 | End: 2020-06-17

## 2020-06-17 RX ADMIN — Medication 100 MILLIGRAM(S): at 01:28

## 2020-06-17 RX ADMIN — Medication 975 MILLIGRAM(S): at 04:27

## 2020-06-17 RX ADMIN — LORATADINE 10 MILLIGRAM(S): 10 TABLET ORAL at 13:18

## 2020-06-17 RX ADMIN — Medication 3 MILLIGRAM(S): at 06:41

## 2020-06-17 RX ADMIN — BUDESONIDE AND FORMOTEROL FUMARATE DIHYDRATE 2 PUFF(S): 160; 4.5 AEROSOL RESPIRATORY (INHALATION) at 05:07

## 2020-06-17 RX ADMIN — Medication 166.67 MILLIGRAM(S): at 00:01

## 2020-06-17 RX ADMIN — MORPHINE SULFATE 4 MILLIGRAM(S): 50 CAPSULE, EXTENDED RELEASE ORAL at 00:01

## 2020-06-17 RX ADMIN — PANTOPRAZOLE SODIUM 40 MILLIGRAM(S): 20 TABLET, DELAYED RELEASE ORAL at 06:10

## 2020-06-17 RX ADMIN — Medication 15 MILLIGRAM(S): at 00:12

## 2020-06-17 RX ADMIN — LOSARTAN POTASSIUM 100 MILLIGRAM(S): 100 TABLET, FILM COATED ORAL at 05:08

## 2020-06-17 RX ADMIN — AMLODIPINE BESYLATE 5 MILLIGRAM(S): 2.5 TABLET ORAL at 05:07

## 2020-06-17 RX ADMIN — Medication 25 MILLIGRAM(S): at 05:08

## 2020-06-17 RX ADMIN — ENOXAPARIN SODIUM 40 MILLIGRAM(S): 100 INJECTION SUBCUTANEOUS at 13:16

## 2020-06-17 RX ADMIN — OXYCODONE HYDROCHLORIDE 10 MILLIGRAM(S): 5 TABLET ORAL at 13:50

## 2020-06-17 RX ADMIN — SODIUM CHLORIDE 500 MILLILITER(S): 9 INJECTION INTRAMUSCULAR; INTRAVENOUS; SUBCUTANEOUS at 00:01

## 2020-06-17 RX ADMIN — Medication 1000 MILLIGRAM(S): at 17:18

## 2020-06-17 RX ADMIN — SODIUM CHLORIDE 1000 MILLILITER(S): 9 INJECTION INTRAMUSCULAR; INTRAVENOUS; SUBCUTANEOUS at 01:29

## 2020-06-17 RX ADMIN — Medication 100 MILLIGRAM(S): at 06:09

## 2020-06-17 RX ADMIN — SODIUM CHLORIDE 100 MILLILITER(S): 9 INJECTION, SOLUTION INTRAVENOUS at 05:06

## 2020-06-17 RX ADMIN — TIOTROPIUM BROMIDE 1 CAPSULE(S): 18 CAPSULE ORAL; RESPIRATORY (INHALATION) at 11:58

## 2020-06-17 RX ADMIN — MORPHINE SULFATE 4 MILLIGRAM(S): 50 CAPSULE, EXTENDED RELEASE ORAL at 05:01

## 2020-06-17 RX ADMIN — Medication 81 MILLIGRAM(S): at 13:17

## 2020-06-17 RX ADMIN — Medication 100 MILLIGRAM(S): at 13:15

## 2020-06-17 NOTE — H&P ADULT - PROBLEM SELECTOR PROBLEM 4
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

## 2020-06-17 NOTE — ED PROVIDER NOTE - PSH
H/O colonoscopy    H/O lipoma  removed from back  H/O percutaneous transluminal coronary angioplasty  with DE RCA 2006, 2 stents placed 2015  History of herniorrhaphy  left inguinal herniorrhaphy  S/P cardiac catheterization  2017-resulted in [place]-no stents placed  S/P hernia surgery

## 2020-06-17 NOTE — H&P ADULT - NSICDXFAMILYHX_GEN_ALL_CORE_FT
FAMILY HISTORY:  Family history of Crohn's disease  Family history of premature CAD, Father CABG at age 54

## 2020-06-17 NOTE — DISCHARGE NOTE PROVIDER - PROVIDER TOKENS
FREE:[LAST:[Sabine],FIRST:[Ricardo],PHONE:[phone]],FAX:[(   )    -],ADDRESS:[[address] [place] [address]]]

## 2020-06-17 NOTE — PROGRESS NOTE ADULT - ATTENDING COMMENTS
pt seen and examined.  above plan discussed on rounds today.  In addition,    cellulitis improved.  stable for discharge today with po abx  Discharge time spent: 31 min

## 2020-06-17 NOTE — DISCHARGE NOTE PROVIDER - HOSPITAL COURSE
54M c hx crohn's disease on stelara s/p partial small bowel resection c/b frequent bowel obstructions, currently on PO budesonide taper, cluster headaches on aimovig, CAD s/p stents on repatha, COPD, asthma, MARYAM on nocturnal CPAP, GERD, recent left comminuted calcaneous fracture, Admitted for sepsis 2/2 left foot cellulitis. Patient on admission was found to have a low grade fever to 100.8 w/ tachycardia to 120s. Patient admitted to medicine and started on IV meropenem. No fevers or leukocytosis noted. Blood cultures drawn. Patient deemed hemodynamically stable for discharge on PO antibiotics to follow up with Dr. Regalado next week for fracture repair.

## 2020-06-17 NOTE — H&P ADULT - NSHPSOCIALHISTORY_GEN_ALL_CORE
Social History:    Marital Status: ( x ) , (  ) Single, (  ) , (  ) , (  )   # of Children: 2 children  Lives with: (  ) alone, ( x ) children, ( x ) spouse, (  ) parents, (  ) siblings, (  ) friends, (  ) other:   Occupation:     Substance Use/Illicit Drugs: (  ) never used vs other:   Tobacco Usage: ( x ) never smoked, (  ) former smoker, (  ) current smoker and Total Pack-Years:   Last Alcohol Usage/Frequency/Amount/Withdrawal/Hx of Abuse:  6 months ago  Foreign travel:   Animal exposure:

## 2020-06-17 NOTE — H&P ADULT - NSHPLABSRESULTS_GEN_ALL_CORE
Personally reviewed labs.   Personally reviewed imaging.                         13.3   10.18 )-----------( 200      ( 16 Jun 2020 23:24 )             40.1       06-16    139  |  103  |  15  ----------------------------<  122<H>  3.9   |  23  |  0.84    Ca    9.7      16 Jun 2020 23:24    TPro  7.8  /  Alb  4.6  /  TBili  0.7  /  DBili  x   /  AST  23  /  ALT  45  /  AlkPhos  81  06-16            LIVER FUNCTIONS - ( 16 Jun 2020 23:24 )  Alb: 4.6 g/dL / Pro: 7.8 g/dL / ALK PHOS: 81 U/L / ALT: 45 U/L / AST: 23 U/L / GGT: x

## 2020-06-17 NOTE — PHYSICAL THERAPY INITIAL EVALUATION ADULT - MANUAL MUSCLE TESTING RESULTS, REHAB EVAL
BUE and BLE grossly at least 3+/5 throughout L ankle not assessed 2/2 fx and expected pain/grossly assessed due to

## 2020-06-17 NOTE — ED PROVIDER NOTE - PROGRESS NOTE DETAILS
Jonathan Weil, PGY3 - fluid repletion for sepsis determined by clinical need, with risk/benefit ratio not favoring large volume resuscitation @ 30ml/kg. Will recheck lactate.

## 2020-06-17 NOTE — PROGRESS NOTE ADULT - PROBLEM SELECTOR PROBLEM 3
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

## 2020-06-17 NOTE — H&P ADULT - NSHPREVIEWOFSYSTEMS_GEN_ALL_CORE
REVIEW OF SYSTEMS:  CONSTITUTIONAL: No weakness. +fevers. No chills. No rigors. No weight loss. No night sweats. No poor appetite.  EYES: No visual changes. No eye pain.  ENT: No hearing difficulty. No vertigo. No dysphagia. No sore throat. No Sinusitis/rhinorrhea.   NECK: No pain. No stiffness/rigidity.  CARDIAC: No chest pain. No palpitations. No lightheadedness.  RESPIRATORY: No cough. No SOB. No hemoptysis.  GASTROINTESTINAL: +mild abdominal pain. No nausea. No vomiting. No hematemesis. No diarrhea. No constipation. No melena. No hematochezia.  GENITOURINARY: No dysuria. No frequency. No hesitancy. No hematuria. No oliguria.  NEUROLOGICAL: No numbness/tingling. No focal weakness. No urinary or fecal incontinence. No headache.   BACK: No back pain. No flank pain.  EXTREMITIES: +Left lower extremity edema. limited ROM of LLE. full rom of other extremities  SKIN: No itching. +blisters on medial aspect of left foot  PSYCHIATRIC: No depression. No anxiety. No SI/HI.  ALLERGIC: No lip swelling. No hives.  All other review of systems is negative unless indicated above.  Unless indicated above, unable to assess ROS 2/2

## 2020-06-17 NOTE — PHYSICAL THERAPY INITIAL EVALUATION ADULT - ADDITIONAL COMMENTS
Pt states prior to fall being independent with ADLs and functional mobility. Pt states he lives with spouse and children in a private home with 4 steps to enter and then he goes down a flight of steps where all needs can be met in the basement since the fall. Pt states he negotiates steps via scooting into the basement and the family supervises him into the house. Pt states he has been ambulatory with axillary crutches without difficulty since fall.

## 2020-06-17 NOTE — PROGRESS NOTE ADULT - SUBJECTIVE AND OBJECTIVE BOX
Sangeetha Hubbard MD  PGY 1 Department of Internal Medicine  Pager: [phone] ([facility])   Pager: [phone] ([facility])    Patient is a 54y old  Male who presents with a chief complaint of fever, LLE pain (17 Jun 2020 04:09)      SUBJECTIVE / OVERNIGHT EVENTS: Pt seen and examined. No acute overnight events.       MEDICATIONS  (STANDING):  amLODIPine   Tablet 5 milliGRAM(s) Oral daily  aspirin enteric coated 81 milliGRAM(s) Oral daily  buDESOnide    EC Capsule 3 milliGRAM(s) Oral daily  budesonide 160 MICROgram(s)/formoterol 4.5 MICROgram(s) Inhaler 2 Puff(s) Inhalation two times a day  ceFAZolin   IVPB 1000 milliGRAM(s) IV Intermittent every 8 hours  enoxaparin Injectable 40 milliGRAM(s) SubCutaneous daily  lactated ringers. 1000 milliLiter(s) (100 mL/Hr) IV Continuous <Continuous>  loratadine 10 milliGRAM(s) Oral daily  losartan 100 milliGRAM(s) Oral daily  metoprolol succinate ER 25 milliGRAM(s) Oral daily  montelukast 10 milliGRAM(s) Oral at bedtime  pantoprazole    Tablet 40 milliGRAM(s) Oral before breakfast  tiotropium 18 MICROgram(s) Capsule 1 Capsule(s) Inhalation daily    MEDICATIONS  (PRN):  morphine  - Injectable 4 milliGRAM(s) IV Push every 4 hours PRN Severe Pain (7 - 10)  oxyCODONE    IR 5 milliGRAM(s) Oral every 4 hours PRN Moderate Pain (4 - 6)      I&O's Summary      Vital Signs Last 24 Hrs  T(C): 36.6 (17 Jun 2020 03:38), Max: 38.2 (16 Jun 2020 23:57)  T(F): 97.8 (17 Jun 2020 03:38), Max: 100.8 (16 Jun 2020 23:57)  HR: 74 (17 Jun 2020 05:38) (74 - 128)  BP: 158/95 (17 Jun 2020 03:38) (139/85 - 158/95)  BP(mean): --  RR: 19 (17 Jun 2020 03:38) (16 - 20)  SpO2: 97% (17 Jun 2020 05:38) (95% - 98%)    CAPILLARY BLOOD GLUCOSE          PHYSICAL EXAM:              GENERAL: Alert. No acute distress.   	HEAD:  Atraumatic. Normocephalic.  	ENT: Neck supple. No JVD. Moist oral mucosa. Not edentulous. No thrush.  	LYMPH: Normal supraclavicular/cervical lymph nodes.   	CARDIAC: S1S2 normal, RRR, No mrg   	LUNG/CHEST: CTAB. BS equal bilaterally. No wheezes. No rales. No rhonchi.  	ABDOMEN: Soft. minimal tenderness, ND, Normal bowel sounds.  	BACK: No midline/vertebral tenderness. No flank tenderness.  	EXTREMITIES:  +3 LLE pitting edema. Moving all 4.  	NEUROLOGY: A&Ox3. Non-focal exam.  	SKIN:  4-5 flaccid blisters that have been popped on medial aspect of left foot. scattered ecchymoses and erythema on left foot up to distal calf. no appreciable warmth.  	    LABS:                        13.3   10.18 )-----------( 200      ( 16 Jun 2020 23:24 )             40.1     Auto Eosinophil # 0.06  / Auto Eosinophil % 0.6   / Auto Neutrophil # 8.00  / Auto Neutrophil % 78.5  / BANDS % x        06-16    139  |  103  |  15  ----------------------------<  122<H>  3.9   |  23  |  0.84    Ca    9.7      16 Jun 2020 23:24  TPro  7.8  /  Alb  4.6  /  TBili  0.7  /  DBili  x   /  AST  23  /  ALT  45  /  AlkPhos  81  06-16                  RADIOLOGY & ADDITIONAL TESTS:    Imaging Personally Reviewed:    Consultant(s) Notes Reviewed:      Care Discussed with Consultants/Other Providers: Sangeetha Hubbard MD  PGY 1 Department of Internal Medicine  Pager: [phone] ([facility])   Pager: [phone] ([facility])    Patient is a 54y old  Male who presents with a chief complaint of fever, LLE pain (17 Jun 2020 04:09)      SUBJECTIVE / OVERNIGHT EVENTS: Pt seen and examined. No acute overnight events. This AM states leg pain is improved. Denies fevers, chills, nausea, vomiting, chest pain, SOB, constipation, diarrhea.       MEDICATIONS  (STANDING):  amLODIPine   Tablet 5 milliGRAM(s) Oral daily  aspirin enteric coated 81 milliGRAM(s) Oral daily  buDESOnide    EC Capsule 3 milliGRAM(s) Oral daily  budesonide 160 MICROgram(s)/formoterol 4.5 MICROgram(s) Inhaler 2 Puff(s) Inhalation two times a day  ceFAZolin   IVPB 1000 milliGRAM(s) IV Intermittent every 8 hours  enoxaparin Injectable 40 milliGRAM(s) SubCutaneous daily  lactated ringers. 1000 milliLiter(s) (100 mL/Hr) IV Continuous <Continuous>  loratadine 10 milliGRAM(s) Oral daily  losartan 100 milliGRAM(s) Oral daily  metoprolol succinate ER 25 milliGRAM(s) Oral daily  montelukast 10 milliGRAM(s) Oral at bedtime  pantoprazole    Tablet 40 milliGRAM(s) Oral before breakfast  tiotropium 18 MICROgram(s) Capsule 1 Capsule(s) Inhalation daily    MEDICATIONS  (PRN):  morphine  - Injectable 4 milliGRAM(s) IV Push every 4 hours PRN Severe Pain (7 - 10)  oxyCODONE    IR 5 milliGRAM(s) Oral every 4 hours PRN Moderate Pain (4 - 6)      I&O's Summary      Vital Signs Last 24 Hrs  T(C): 36.6 (17 Jun 2020 03:38), Max: 38.2 (16 Jun 2020 23:57)  T(F): 97.8 (17 Jun 2020 03:38), Max: 100.8 (16 Jun 2020 23:57)  HR: 74 (17 Jun 2020 05:38) (74 - 128)  BP: 158/95 (17 Jun 2020 03:38) (139/85 - 158/95)  BP(mean): --  RR: 19 (17 Jun 2020 03:38) (16 - 20)  SpO2: 97% (17 Jun 2020 05:38) (95% - 98%)    CAPILLARY BLOOD GLUCOSE          PHYSICAL EXAM:              GENERAL: Alert. No acute distress.   	HEAD:  Atraumatic. Normocephalic.  	ENT: Neck supple. No JVD. Moist oral mucosa. Not edentulous. No thrush.  	LYMPH: Normal supraclavicular/cervical lymph nodes.   	CARDIAC: S1S2 normal, RRR, No mrg   	LUNG/CHEST: CTAB. BS equal bilaterally. No wheezes. No rales. No rhonchi.  	ABDOMEN: Soft. minimal tenderness, ND, Normal bowel sounds.  	BACK: No midline/vertebral tenderness. No flank tenderness.  	EXTREMITIES:  +3 LLE pitting edema. Moving all 4.  	NEUROLOGY: A&Ox3. Non-focal exam.  	SKIN:  4-5 flaccid blisters that have been popped on medial aspect of left foot. scattered ecchymoses and erythema on left foot up to distal calf. no appreciable warmth.  	    LABS:                        13.3   10.18 )-----------( 200      ( 16 Jun 2020 23:24 )             40.1     Auto Eosinophil # 0.06  / Auto Eosinophil % 0.6   / Auto Neutrophil # 8.00  / Auto Neutrophil % 78.5  / BANDS % x        06-16    139  |  103  |  15  ----------------------------<  122<H>  3.9   |  23  |  0.84    Ca    9.7      16 Jun 2020 23:24  TPro  7.8  /  Alb  4.6  /  TBili  0.7  /  DBili  x   /  AST  23  /  ALT  45  /  AlkPhos  81  06-16                  RADIOLOGY & ADDITIONAL TESTS:    Imaging Personally Reviewed:    Consultant(s) Notes Reviewed:      Care Discussed with Consultants/Other Providers:

## 2020-06-17 NOTE — H&P ADULT - NSHPPHYSICALEXAM_GEN_ALL_CORE
PHYSICAL EXAM:   GENERAL: Alert. Not confused. No acute distress. Not thin. Not cachectic. Not obese.  HEAD:  Atraumatic. Normocephalic.  EYES: EOMI. PERRLA. Normal conjunctiva/sclera.  ENT: Neck supple. No JVD. Moist oral mucosa. Not edentulous. No thrush.  LYMPH: Normal supraclavicular/cervical lymph nodes.   CARDIAC: Not tachy, Not bety. Regular rhythm. Not irregularly irregular. S1. S2. No murmur. No rub. No distant heart sounds.  LUNG/CHEST: CTAB. BS equal bilaterally. No wheezes. No rales. No rhonchi.  ABDOMEN: Soft. minimal tenderness. No distension. No fluid wave. Normal bowel sounds.  BACK: No midline/vertebral tenderness. No flank tenderness.  VASCULAR: +2 b/l radial or ulnar pulses. Palpable DP pulses.  EXTREMITIES:  No clubbing. No cyanosis. +3 LLE pitting edema. Moving all 4.  NEUROLOGY: A&Ox3. Non-focal exam. Cranial nerves intact. Normal speech. Sensation intact.  PSYCH: Normal behavior. Normal affect.  SKIN: No jaundice. linear orientation of 4-5 flacid blisters that have been popped on medial aspect of left foot. scattered ecchymoses and erythema on left foot up to distal calf. no appreciable warmth.  Vascular Access:     ICU Vital Signs Last 24 Hrs  T(C): 36.6 (17 Jun 2020 03:38), Max: 38.2 (16 Jun 2020 23:57)  T(F): 97.8 (17 Jun 2020 03:38), Max: 100.8 (16 Jun 2020 23:57)  HR: 74 (17 Jun 2020 03:38) (74 - 128)  BP: 158/95 (17 Jun 2020 03:38) (139/85 - 158/95)  BP(mean): --  ABP: --  ABP(mean): --  RR: 19 (17 Jun 2020 03:38) (16 - 20)  SpO2: 98% (17 Jun 2020 03:38) (95% - 98%)      I&O's Summary

## 2020-06-17 NOTE — H&P ADULT - PROBLEM SELECTOR PROBLEM 3
Leg swelling Closed displaced fracture of left calcaneus with nonunion, unspecified portion of calcaneus, subsequent encounter

## 2020-06-17 NOTE — CONSULT NOTE ADULT - ATTENDING COMMENTS
53 yo male recently fell from roof and sustained a comminuted fracture of left calcaneus, scheduled for repair at \[facility]\""  Frequent use of steroids to treat underlying Crohn's  Reported blisters of left medial ankle area are related to underlying ecchymosis   Very limited ability to move left foot  Status discussed

## 2020-06-17 NOTE — DISCHARGE NOTE PROVIDER - NSDCFUSCHEDAPPT_GEN_ALL_CORE_FT
PRITI KEMP ; 09/03/2020 ; NPP Puled [facility] PRITI KEMP ; 09/03/2020 ; NPP Puled [address] [facility]

## 2020-06-17 NOTE — H&P ADULT - ASSESSMENT
54M c hx crohn's disease on stelara s/p partial small bowel resection c/b frequent bowel obstructions, currently on PO budesonide taper, cluster headaches on aimovig, CAD s/p stents on repatha, COPD, asthma, MARYAM on nocturnal CPAP, GERD, recent left comminuted calcaneous fracture, pw sepsis poss 2/2 cellulitis vs infected blisters of LLE

## 2020-06-17 NOTE — ED PROVIDER NOTE - CLINICAL SUMMARY MEDICAL DECISION MAKING FREE TEXT BOX
Jonathan Weil, PGY3 - suspect cellulitis resulting from the ruptured blisters. Will cover with abx, r/o DVT, repeat xrays, and probably admit given that he will meet sepsis criteria.

## 2020-06-17 NOTE — ED PROVIDER NOTE - PHYSICAL EXAMINATION
General: A&Ox3, well nourished, no acute distress  HENT: NC/AT. Posterior oropharynx clear. Patent airway  Eyes: PERRL, EOMI  CV: RRR, no m/r/g. 2+ peripheral pulses. Extremities are warm and well perfused.  Respiratory: CTAB no w/r/r. No respiratory distress.  Abdominal: soft, non-distended, non-tender, no rebound, guarding, or rigidity  Neuro: No focal deficits  Skin: no rashes  MSK: edema and tenderness over the left ankle circumferentially with ecchymosis over the medial malleolus. Two small blood blisters which are ruptured overly the medial malleolus. There is erythema proximal to the ankle, overlying the anterior distal tib-fib, and over the medial malleolus.  Psych: normal mood and affect

## 2020-06-17 NOTE — PHYSICAL THERAPY INITIAL EVALUATION ADULT - DISCHARGE DISPOSITION, PT EVAL
pt cleared for DC from PT standpoint, pt presents at previous level of function/no skilled PT needs/home w/ assist

## 2020-06-17 NOTE — ED PROVIDER NOTE - OBJECTIVE STATEMENT
Jonathan Weil, PGY3 - 54M hx crohn's disease p/w a fever and left leg pain. He suffered a fall from a roof 5 days ago, causing left talar/calcaneous fractures. Ortho splinted and referred to an outside surgeon with plan for intervention later this week. However in the past day or so he has developed increased pain around the injury with fever Tmax = 100.4 today. No vomiting.

## 2020-06-17 NOTE — CONSULT NOTE ADULT - ASSESSMENT
Impression:    Left foot blisters resolving    Recommend:  1.) topical therapy: left foot blisters - cleanse with NS, pat dry, apply adaptic, cover with allevyn foam dressing every 3 days.  2.) Patient to f/u with orthopedist at [facility] for definitive management of Left foot calcaneous fx, edema, blisters  3.) Offload foot/heels - patient fitted with CAM boot, weight bearing with CAM boot per ortho  4.) Nutrition optimization  5.) Glycemic control    Care as per medicine. Will not follow. Please recall for new issues.  Upon discharge [facility] [address] [place] [address] [phone]  Seen with Dr. Reyes and discussed with clinical nurse  Thank you for this consult  Claudia Senior, NP-C, CWOCN [id number]

## 2020-06-17 NOTE — PROGRESS NOTE ADULT - ASSESSMENT
54M c hx crohn's disease on stelara s/p partial small bowel resection c/b frequent bowel obstructions, currently on PO budesonide taper, cluster headaches on aimovig, CAD s/p stents on repatha, COPD, asthma, MARYAM on nocturnal CPAP, GERD, recent left comminuted calcaneous fracture, pw sepsis poss 2/2 cellulitis vs infected blisters of LLE 54M c hx crohn's disease on stelara s/p partial small bowel resection c/b frequent bowel obstructions, currently on PO budesonide taper, cluster headaches on aimovig, CAD s/p stents on repatha, COPD, asthma, MARYAM on nocturnal CPAP, GERD, recent left comminuted calcaneous fracture, admitted for sepsis likely 2/2 cellulitis

## 2020-06-17 NOTE — DISCHARGE NOTE NURSING/CASE MANAGEMENT/SOCIAL WORK - PATIENT PORTAL LINK FT
You can access the FollowMyHealth Patient Portal offered by [facility] by registering at the following website: [email or web address]. By joining [employer]s FollowMyHealth portal, you will also be able to view your health information using other applications (apps) compatible with our system.

## 2020-06-17 NOTE — ED ADULT NURSE NOTE - OBJECTIVE STATEMENT
Patient is a 54 year old male complaining of ankle pain. Patient has history of bowel obstructions, headaches, CAD, Crohns, htn. Patient is A&O x 4. Pt reports falling off ladder five days ago and fx left ankle. pt states last two days fevers with increased pain. pt states he has been taking Tylenol and oxycodone. pt has redness to ankle. as per pt he has increased ecchymosis.  Denies complaints of chest pain, sob, n/v/d, headache, syncope, burning urination, blood in urine, blood in stool. Abd is soft, non tender, non distended. Skin is warm and dry. Color is consistent with ethnicity. Safety and comfort maintained. Will continue to monitor.

## 2020-06-17 NOTE — H&P ADULT - NSICDXPASTSURGICALHX_GEN_ALL_CORE_FT
PAST SURGICAL HISTORY:  H/O colonoscopy     H/O lipoma removed from back    H/O percutaneous transluminal coronary angioplasty with DE RCA 2006, 2 stents placed 2015    History of herniorrhaphy left inguinal herniorrhaphy    S/P cardiac catheterization 2017-resulted in [place]-no stents placed    S/P hernia surgery

## 2020-06-17 NOTE — CONSULT NOTE ADULT - ASSESSMENT
54M c hx crohn's disease on stelara s/p partial small bowel resection c/b frequent bowel obstructions, currently on PO budesonide taper, cluster headaches on aimovig, CAD s/p stents on repatha, COPD, asthma, MARYAM on nocturnal CPAP, GERD, recent left comminuted calcaneous fracture, pw sepsis poss 2/2 cellulitis vs infected blisters of LLE.    1. Cellulitis  abx per primary team   le doppler neg for dvt   med f/u     2. Left comminuted calcaneous fracture  -f/u with ortho as outpt for surgery next week  -pre-op cardiac clearance to be obtained tomorrow with Dr. Fernández     3.  CAD s/p PCI  cv stable  cont asa, bb, arb    4. HTN  stable  continue home med regimen     dvt ppx

## 2020-06-17 NOTE — H&P ADULT - NSICDXPASTMEDICALHX_GEN_ALL_CORE_FT
PAST MEDICAL HISTORY:  Asthma controlled-never intubated or hospitalized    Bilateral knee pain     Bowel obstruction     CAD (coronary artery disease) 2 stents placed in LAD in 2015, RCA stent x 2 in 2006    Cluster headaches     Crohn disease     GERD (gastroesophageal reflux disease)     GIB (gastrointestinal bleeding)     H/O: HTN (hypertension)     HLD (hyperlipidemia)     Inguinal mass     Joint pain     Lower back pain     MI (myocardial infarction)     MARYAM (obstructive sleep apnea)     Pancreatitis while on 6MP for Crohn's now off of it    Shoulder pain, bilateral     Unilateral recurrent inguinal hernia without obstruction or gangrene Left

## 2020-06-17 NOTE — PHYSICAL THERAPY INITIAL EVALUATION ADULT - GENERAL OBSERVATIONS, REHAB EVAL
Pt rubén 40 min eval well. Pt rec'd in bed, premedicated, peripheral IV locked by RN, NAD. Pt agreed to session. Pt rubén 40 min eval well. Pt rec'd in bed, premedicated, peripheral IV locked by RN, NAD. Pt agreed to session. CAM boot/orthotic donned at [facility]

## 2020-06-17 NOTE — ED PROVIDER NOTE - ATTENDING CONTRIBUTION TO CARE
54M c hx crohn's disease on stelara s/p partial small bowel resection c/b frequent bowel obstructions, currently on PO budesonide taper, cluster headaches on aimovig, CAD s/p stents on repatha, COPD, asthma, MARYAM on nocturnal CPAP, GERD, recent left comminuted calcaneous fracture, Admitted for sepsis 2/2 left foot cellulitis possible infected fracture blister, ortho consult, r/o dvt, us ordered, sepsis work up tachy, febrile, abx.

## 2020-06-17 NOTE — PHYSICAL THERAPY INITIAL EVALUATION ADULT - PERTINENT HX OF CURRENT PROBLEM, REHAB EVAL
53 y/o M with h/o Crohn's and COPD initially presented to ED on 6/11 s/p fall from roof and suffered a comminuted L calcaneal fx. Pt referred to Dr. Regalado at [facility]. Pt p/w low grade fevers and worsening pain of LLE. Pt p/w cellulitis likely 2/2 popping of fx blisters. Pt reports he has an appointment planned for PST tomorrow with [facility]. Doppler neg for DVT. Imaging reflecting above calcaneal fx.

## 2020-06-17 NOTE — DISCHARGE NOTE PROVIDER - CARE PROVIDER_API CALL
Ricardo Regalado  [address] [place] [address], [place] [facility]  Phone: [phone]  Fax: (   )    -  Follow Up Time:

## 2020-06-17 NOTE — H&P ADULT - PROBLEM SELECTOR PLAN 3
- most likely related to fx  - will check LE duplex - f/u with ortho as outpt for surgery next week  - oxycodone + morphine IV prn  - per ortho, NWB EVE, PT dg

## 2020-06-17 NOTE — DISCHARGE NOTE PROVIDER - NSDCCPCAREPLAN_GEN_ALL_CORE_FT
PRINCIPAL DISCHARGE DIAGNOSIS  Diagnosis: Cellulitis  Assessment and Plan of Treatment: You presented to the hospital with left lower foot pain and swelling likely due to infection. You were treated with IV antibiotics. You remained without fevers during your hospital course. Blood cultures were drawn and will be followed up following your discharge. Should these cultures return positive you will be contacted by the medicine team for further recommendations. At this time you are stable for discharge with oral antibiotics (bactrim). Please continue taking all your medications as recommended following your discharge. Please following up with Dr. Regalado next week for repair of your fracture. Please return to the E.D should you experience worsening leg pain, recurrent fevers, chills, nausea, vomiting, chest pain, difficulty breathing, diffuse rash, constipation, diarrhea, dizziness or light headedness.

## 2020-06-17 NOTE — H&P ADULT - PROBLEM SELECTOR PLAN 4
- f/u with ortho as outpt for surgery next week  - oxycodone + morphine IV prn - cont budesonide taper, 2 more days

## 2020-06-17 NOTE — PROGRESS NOTE ADULT - PROBLEM SELECTOR PLAN 2
- Continue abx as above  - cont to monitor, no clearly demarcated area of erythema at this time  - Duplex neg for DVT

## 2020-06-17 NOTE — H&P ADULT - HISTORY OF PRESENT ILLNESS
54M c hx crohn's disease on stelara s/p partial small bowel resection c/b frequent bowel obstructions, currently on PO budesonide taper, cluster headaches on aimovig, CAD s/p stents on repatha, COPD, asthma, MARYAM on nocturnal CPAP, GERD, recent left comminuted calcaneous fracture, pw 2 days fever, and worsening pain.    Of note, pt is on 3 different biologics. Regarding crohns, pt reports transient bowel obstructions occurring every 2-3 months, that usually self-resolve. Pt recently completed cipro/flagyl course, last dose 1 week ago, and currently on po budesonide taper, of which he has 2 more days. Pt states he had mechanical fall from his roof 6 days ago, for which he was found to have fracture. Pt was splinted, and is supposed to get repair as outpt @ [facility] with Dr. Regalado next tuesday, ?6/23/20. Pt went to see his orthopedic doctor 3 days ago, where his fracture blisters were popped. Pt reports serosanguinous drainage only. Pt reports increasing fevers at home for the past 2-3 days. Pt also reports worsening pain located in his foot and up to his mid-tibia. Pt reports persistent swelling and ecchymoses of the left foot since the fracture, without much skin changes. Other ROS as below.    VS: Tm 100.8, P 128, Bp 139/85, R 20, 95% RA  in the Ed, received vanc, cefazolin, IVF 1.5L, toradol, morphine 2+4 IV, oxycodone

## 2020-06-17 NOTE — PROGRESS NOTE ADULT - PROBLEM SELECTOR PLAN 3
- F/u with ortho as outpt for surgery next week  - oxycodone + morphine IV prn  - per ortho, NWB EVE, PT dg

## 2020-06-17 NOTE — CONSULT NOTE ADULT - SUBJECTIVE AND OBJECTIVE BOX
54y Male p/w L foot pain/deformity and fever. Patient initially presented to [facility] ED on 6/11 after fall from roof and suffered a comminuted L calcaneal fracture. Patient was evaluated by Dr. Rico at the time, who referred patient to Dr. Regalado at [facility] for advanced surgical care. Patient saw Dr. Regalado in the office yesterday who popped his fracture blisters in the office placed patient in  a CAM boot.  Plan was to have surgery next Thursday once swelling around foot had subsided. Patient presents to the ED today with low grade fevers and worsening pain of LLE extending up his leg. Denies numbness/tingling in the feet/toes. No other bone or joint complaints.     MEDICATIONS  (STANDING):  ceFAZolin   IVPB 1000 milliGRAM(s) IV Intermittent once  sodium chloride 0.9% Bolus 1000 milliLiter(s) IV Bolus once    Allergies    No Known Allergies    Intolerances                              13.3   10.18 )-----------( 200      ( 16 Jun 2020 23:24 )             40.1     16 Jun 2020 23:24    139    |  103    |  15     ----------------------------<  122    3.9     |  23     |  0.84     Ca    9.7        16 Jun 2020 23:24    TPro  7.8    /  Alb  4.6    /  TBili  0.7    /  DBili  x      /  AST  23     /  ALT  45     /  AlkPhos  81     16 Jun 2020 23:24      Vital Signs Last 24 Hrs  T(C): 38.2 (06-16-20 @ 23:57), Max: 38.2 (06-16-20 @ 23:57)  T(F): 100.8 (06-16-20 @ 23:57), Max: 100.8 (06-16-20 @ 23:57)  HR: 97 (06-16-20 @ 23:57) (97 - 128)  BP: 139/97 (06-16-20 @ 23:57) (139/85 - 139/97)  BP(mean): --  RR: 20 (06-16-20 @ 23:57) (16 - 20)  SpO2: 98% (06-16-20 @ 23:57) (98% - 98%)    Imaging: XR demonstrates L comminuted calcaneal fracture    Physical Exam  Gen: Nad  LLE: fracture blisters on medial ankle have been popped, there is erythematous and tender skin over the popped fracture blisters, extending to the dorsum of the foot and up the ankle  TTP over calcaneus  motor intact distally  SILT s/s/sp/dp/t  2+ DP      A/P: 56y M with L Calcaneal fracture, presenting with cellulitis likely secondary to popping of fracture blisters  - ESR/CRP  - Blood cx  - Empiric antibiotics for cellulitis  - No acute orthopaedic intervention at [facility]  - Patient plans to follow up outpatient with Dr. Regalado at [facility] for continuation of his orthopaedic care  - Pain control  - NWB LLE in cam boot   - PT/OT  - Dopplers negative for DVT  - Strict Ice/elevation  - Can also follow up with Dr. Rico if patient desires after discharge.

## 2020-06-17 NOTE — H&P ADULT - PROBLEM SELECTOR PLAN 1
- will cont cefazolin for now  - cont to monitor, no clearly demarcated area of erythema at this time  - poss also infected fracture blisters? - will cont cefazolin for now  - cont to monitor, no clearly demarcated area of erythema at this time  - poss also infected fracture blisters?  - duplex neg for DVT

## 2020-06-17 NOTE — PROGRESS NOTE ADULT - PROBLEM SELECTOR PLAN 1
-Met sepsis criteria on admission w/ temp to 100.8 and tachycardia to 128. Source likely 2/2 cellulitis vs LLE blisters  -s/p cefazolin and vanc in the E.D  -s/p 2L NS  -F/u wound care recs   -F/u blood cultures  -Send wound cultures  -Monitor fever curve and CBC  -Will follow up with [facility] s/p discharge for management of fractures -Met sepsis criteria on admission w/ temp to 100.8 and tachycardia to 128. Source likely 2/2 cellulitis vs LLE blisters  -s/p cefazolin and vanc in the E.D  -Will transition to PO bactrim on discharge  -s/p 2L NS  -F/u wound care recs   -F/u blood cultures  -Monitor fever curve and CBC  -Will follow up with [facility] s/p discharge for management of fractures

## 2020-06-17 NOTE — CONSULT NOTE ADULT - SUBJECTIVE AND OBJECTIVE BOX
CARDIOLOGY CONSULT - Dr. Alvarado     CHIEF COMPLAINT:    HPI:  54M c hx crohn's disease on stelara s/p partial small bowel resection c/b frequent bowel obstructions, currently on PO budesonide taper, cluster headaches on aimovig, CAD s/p stents on repatha, COPD, asthma, MARYAM on nocturnal CPAP, GERD, recent left comminuted calcaneous fracture, pw 2 days fever, and worsening pain, admitted with cellulitis. Plan for OR next week at [facility]. Patient denies any chest pain, dyspnea, palpitations, cough, syncope, edema, exertional symptoms, nausea, abdominal pain, fever, chills,  or rash.  pt. to see dr. fernández tomorrow for pre-op clearance .     PAST MEDICAL & SURGICAL HISTORY:  Bowel obstruction  Cluster headaches  Unilateral recurrent inguinal hernia without obstruction or gangrene: Left  MI (myocardial infarction)  GIB (gastrointestinal bleeding)  MARYAM (obstructive sleep apnea)  Asthma: controlled-never intubated or hospitalized  Joint pain  CAD (coronary artery disease): 2 stents placed in LAD in , RCA stent x 2 in   Pancreatitis: while on 6MP for Crohn&#x27;s now off of it  Crohn disease  HLD (hyperlipidemia)  Inguinal mass  Bilateral knee pain  Shoulder pain, bilateral  Lower back pain  GERD (gastroesophageal reflux disease)  H/O: HTN (hypertension)  S/P hernia surgery  H/O lipoma: removed from back  S/P cardiac catheterization: -resulted in [place]-no stents placed  H/O colonoscopy  H/O percutaneous transluminal coronary angioplasty: with DE RCA , 2 stents placed   History of herniorrhaphy: left inguinal herniorrhaphy          PREVIOUS DIAGNOSTIC TESTING:    [ ] Echocardiogram:   [ ]  Catheterization: < from: Cardiac Cath Lab - Adult (10.05.17 @ 17:55) >    [facility]  Department of Cardiology  [address] [place] [address]  [phone]  Cath Lab Report -- Comprehensive Report  Patient: PRITI KEMP  Study date: 10/05/2017  Account number: [id number]  MR number: [id number]  : 1965  Gender: Male  Race: W  Case Physician(s):  Dory Gary M.D.  Fellow:  Davis Sun M.D.  Referring Physician:  Randell Fernández M.D.  INDICATIONS: Abnormal stress test.  HISTORY: The patient has a history of coronary artery disease. The patient  has hypertension. PRIOR CARDIOVASCULAR PROCEDURES: Stent of the mid LAD.  Stent of the proximal RCA. Stent of the mid RCA.  PROCEDURE:  --  Left heart catheterization.  --  Left coronary angiography.  --  Right coronary angiography.  --  Diagnostic myocardial fractional flow reserve.  --  Sonosite - Diagnostic.  --  Hemostasis with Angioseal.  TECHNIQUE: The risks and alternatives of the procedures and conscious  sedation were explained to the patient and informed consent was obtained.  Cardiac catheterization performed electively.  Local anesthetic given. Right femoral artery access. A 5FR SHEATH PINNACLE  was inserted in the vessel. Left heart catheterization. A 5FR  EXPO  catheter was utilized. After recording ascending aortic pressure, the  catheter was advanced across the aortic valve and left ventricular  pressure was recorded. Left coronary artery angiography. The vessel was  injected utilizing a 5FR FL4.0 EXPO catheter. Right coronary artery  angiography. The vessel was injected utilizing a 5FR FR4.0 EXPO catheter.  For proper position, 5FR AR1 EXPO catheter(s) were also used. Myocardial  (fractional) flow reserve in the lesion in the mid LAD. Based on the  results of this study, the lesion was judged to be non-significant and no  intervention was performed. Sonosite - Diagnostic. Hemostasis with  Angioseal. RADIATION EXPOSURE: 6.7 min.  CONTRAST GIVEN: Omnipaque 54 ml.  MEDICATIONS GIVEN: Midazolam, 1 mg, IV. Fentanyl, 25 mcg, IV. Midazolam, 1  mg, IV. Fentanyl, 25 mcg, IV. Nitroglycerin, 100 mcg, intracoronary.  Nitroglycerin, 200 mcg, intracoronary. Bivalirudin (Angiomax) 250 mg/NS 50  mL, 12 ml, IV. Bivalirudin (Angiomax) 250 mg/NS 50 mL, infusion rate of  1.75, IV.  VENTRICLES: No left ventriculogram was performed.  CORONARY VESSELS: The coronary circulation is right dominant.  LM:   --  LM: Normal.  LAD:   --  Mid LAD: There was a discrete 50 % stenosis in the proximal  third of the vessel segment. In a second lesion, there was a tubular 10 %  stenosis at the site of a prior stent, in-stent.  --  Distal LAD: There was a discrete 50 % stenosis - distal third of the  segment.  CX:   --  Circumflex: The vessel was small sized. Angiography showed minor  luminal irregularities with no flow limiting lesions.  RI:   --  Proximal ramus intermedius: There was a discrete 50 % stenosis.  RCA:   --  RCA: The vessel was small sized. Angiography showed severe  atherosclerosis.  --  Proximal RCA: There was a tubular 60 % stenosis at the site of a prior  stent, in-stent.  --  Mid RCA: There was a discrete 70 % stenosis in the proximal third of  the vessel segment. In a second lesion, there was a discrete 70 % stenosis  at the site of a prior stent, in-stent.  --  Distal RCA: There was a40 % stenosis.  COMPLICATIONS: There were no complications.  DIAGNOSTIC IMPRESSIONS: Small severe RCA disease and restenosis - mostly  unchanged from prior films. iFR of the Ramus and LAD not significant.  DIAGNOSTIC RECOMMENDATIONS: Titrate medical therapy.  Prepared and signed by  Dory Gary M.D.    < end of copied text >    [ ] Stress Test:  	    MEDICATIONS:  MEDICATIONS  (STANDING):  acetaminophen   Tablet .. 1000 milliGRAM(s) Oral every 6 hours  amLODIPine   Tablet 5 milliGRAM(s) Oral daily  aspirin enteric coated 81 milliGRAM(s) Oral daily  buDESOnide    EC Capsule 3 milliGRAM(s) Oral daily  budesonide 160 MICROgram(s)/formoterol 4.5 MICROgram(s) Inhaler 2 Puff(s) Inhalation two times a day  ceFAZolin   IVPB 1000 milliGRAM(s) IV Intermittent every 8 hours  enoxaparin Injectable 40 milliGRAM(s) SubCutaneous daily  lactated ringers. 1000 milliLiter(s) (100 mL/Hr) IV Continuous <Continuous>  loratadine 10 milliGRAM(s) Oral daily  losartan 100 milliGRAM(s) Oral daily  metoprolol succinate ER 25 milliGRAM(s) Oral daily  montelukast 10 milliGRAM(s) Oral at bedtime  pantoprazole    Tablet 40 milliGRAM(s) Oral before breakfast  tiotropium 18 MICROgram(s) Capsule 1 Capsule(s) Inhalation daily      FAMILY HISTORY:  Family history of premature CAD: Father CABG at age 54  Family history of Crohn's disease      SOCIAL HISTORY:    [x ] Non-smoker  [ ] Smoker  [ ] Alcohol    Allergies    No Known Allergies    Intolerances    	    REVIEW OF SYSTEMS:  CONSTITUTIONAL: No fever, weight loss, or fatigue  EYES: No eye pain, visual disturbances, or discharge  ENMT:  No difficulty hearing, tinnitus, vertigo; No sinus or throat pain  NECK: No pain or stiffness  RESPIRATORY: No cough, wheezing, chills or hemoptysis; No Shortness of Breath  CARDIOVASCULAR: No chest pain, palpitations, passing out, dizziness, or leg swelling  GASTROINTESTINAL: No abdominal or epigastric pain. No nausea, vomiting, or hematemesis; No diarrhea or constipation. No melena or hematochezia.  GENITOURINARY: No dysuria, frequency, hematuria, or incontinence  NEUROLOGICAL: No headaches, memory loss, loss of strength, numbness, or tremors  SKIN: No itching, burning, rashes, or lesions ,+blisters on Left ankle, +redness, +swelling left foot, ankle   	    [ x] All others negative	  [ ] Unable to obtain    PHYSICAL EXAM:  T(C): 37.2 (20 @ 14:07), Max: 38.2 (20 @ 23:57)  HR: 86 (20 @ 14:07) (74 - 128)  BP: 146/92 (20 @ 14:07) (139/85 - 158/95)  RR: 18 (20 @ 14:07) (16 - 20)  SpO2: 98% (20 @ 14:07) (95% - 98%)  Wt(kg): --  I&O's Summary      Appearance: Normal	  Psychiatry: A & O x 3, Mood & affect appropriate  HEENT:   Normal oral mucosa, PERRL, EOMI	  Lymphatic: No lymphadenopathy  Cardiovascular: Normal S1 S2,RRR, No JVD, No murmurs  Respiratory: Lungs clear to auscultation	  Gastrointestinal:  Soft, Non-tender, + BS	  Skin: No rashes, No ecchymoses, No cyanosis	  Neurologic: Non-focal  Extremities: Normal range of motion, No clubbing, cyanosis or edema  Vascular: Peripheral pulses palpable 2+ bilaterally    TELEMETRY: 	    ECG:  	  RADIOLOGY:   OTHER: 	  	  LABS:	 	    CARDIAC MARKERS:                                  13.3   10.18 )-----------( 200      ( 2020 23:24 )             40.1     06-    139  |  103  |  15  ----------------------------<  122<H>  3.9   |  23  |  0.84    Ca    9.7      2020 23:24    TPro  7.8  /  Alb  4.6  /  TBili  0.7  /  DBili  x   /  AST  23  /  ALT  45  /  AlkPhos  81  06-16      proBNP:   Lipid Profile:   HgA1c:   TSH:

## 2020-06-17 NOTE — DISCHARGE NOTE PROVIDER - NSDCMRMEDTOKEN_GEN_ALL_CORE_FT
acetaminophen 500 mg oral tablet: 2 tab(s) orally every 6 hours  Aimovig 70 mg/mL subcutaneous solution:   aspirin 81 mg oral delayed release tablet: 1 tab(s) orally once a day (in the morning)  Bactrim  mg-160 mg oral tablet: 2 tab(s) orally 2 times a day   budesonide 3 mg oral capsule, extended release: 1 tab(s) orally once a day  budesonide-formoterol 160 mcg-4.5 mcg/inh inhalation aerosol: 2 puff(s) inhaled 2 times a day  Co Q-10 100 mg oral capsule: 2 cap(s) orally once a day  losartan 100 mg oral tablet: 1 tab(s) orally once a day  metoprolol succinate 25 mg oral tablet, extended release: 0.5 tab(s) orally once a day  NexIUM 40 mg oral delayed release capsule: 1 cap(s) orally once a day  Norvasc 5 mg oral tablet: 1 tab(s) orally once a day  ocular lubricant ophthalmic solution: 1 drop(s) to each affected eye 3 times a day, As needed, Dry Eyes  Repatha:  subcutaneous  every 2 weeks  Singulair 10 mg oral tablet: 1 tab(s) orally once a day (in the evening)  Spiriva 18 mcg inhalation capsule: 1 cap(s) inhaled once a day (in the evening)  Stelara 45 mg/0.5 mL subcutaneous solution:   Vitamin C 1000 mg oral tablet: 1 tab(s) orally once a day  Vitamin D2: 5000 unit(s) orally once a day  Xyzal 5 mg oral tablet: 1 tab(s) orally once a day (in the evening)  Zofran ODT 4 mg oral tablet, disintegratin tab(s) orally 3 times a day, As Needed nausea acetaminophen 500 mg oral tablet: 2 tab(s) orally every 6 hours  Aimovig 70 mg/mL subcutaneous solution:   aspirin 81 mg oral delayed release tablet: 1 tab(s) orally once a day (in the morning)  Bactrim  mg-160 mg oral tablet: 2 tab(s) orally 2 times a day   budesonide 3 mg oral capsule, extended release: 1 tab(s) orally once a day  budesonide-formoterol 160 mcg-4.5 mcg/inh inhalation aerosol: 2 puff(s) inhaled 2 times a day  Co Q-10 100 mg oral capsule: 2 cap(s) orally once a day  losartan 100 mg oral tablet: 1 tab(s) orally once a day  metoprolol succinate 25 mg oral tablet, extended release: 0.5 tab(s) orally once a day  NexIUM 40 mg oral delayed release capsule: 1 cap(s) orally once a day  Norvasc 5 mg oral tablet: 1 tab(s) orally once a day  ocular lubricant ophthalmic solution: 1 drop(s) to each affected eye 3 times a day, As needed, Dry Eyes  Oxaydo 5 mg oral tablet: 1 tab(s) orally every 6 hours, As Needed   1 tab (s) for mild pain  2 tabs(s) for severe pain MDD:40mg  Repatha:  subcutaneous  every 2 weeks  Singulair 10 mg oral tablet: 1 tab(s) orally once a day (in the evening)  Spiriva 18 mcg inhalation capsule: 1 cap(s) inhaled once a day (in the evening)  Stelara 45 mg/0.5 mL subcutaneous solution:   Vitamin C 1000 mg oral tablet: 1 tab(s) orally once a day  Vitamin D2: 5000 unit(s) orally once a day  Xyzal 5 mg oral tablet: 1 tab(s) orally once a day (in the evening)  Zofran ODT 4 mg oral tablet, disintegratin tab(s) orally 3 times a day, As Needed nausea

## 2020-06-22 LAB
CULTURE RESULTS: SIGNIFICANT CHANGE UP
CULTURE RESULTS: SIGNIFICANT CHANGE UP
SPECIMEN SOURCE: SIGNIFICANT CHANGE UP
SPECIMEN SOURCE: SIGNIFICANT CHANGE UP

## 2020-07-07 NOTE — H&P ADULT. - MUSCULOSKELETAL
Is This A New Presentation, Or A Follow-Up?: Skin Lesion How Severe Is Your Skin Lesion?: moderate Additional History: Previously excised dysplastic nevus on lower back in 2001 by surgeon. details… detailed exam ROM intact

## 2020-07-20 ENCOUNTER — RX RENEWAL (OUTPATIENT)
Age: 55
End: 2020-07-20

## 2020-08-10 NOTE — ED ADULT NURSE NOTE - NS ED NURSE DISCH DISPOSITION
Admitted I have personally performed a face to face diagnostic evaluation on this patient. I have reviewed the ACP note and agree with the history, exam and plan of care, except as noted.

## 2020-08-17 NOTE — H&P ADULT. - VENOUS THROMBOEMBOLISM
What Type Of Note Output Would You Prefer (Optional)?: Standard Output How Severe Are Your Spot(S)?: mild Have Your Spot(S) Been Treated In The Past?: has not been treated Hpi Title: Evaluation of Skin Lesions no

## 2020-09-03 ENCOUNTER — APPOINTMENT (OUTPATIENT)
Dept: PULMONOLOGY | Facility: CLINIC | Age: 55
End: 2020-09-03

## 2020-09-15 NOTE — ED ADULT TRIAGE NOTE - PAIN RATING/NUMBER SCALE (0-10): REST
Detail Level: Detailed
Post-Care Instructions: I reviewed with the patient in detail post-care instructions. Patient is to wear sunprotection, and avoid picking at any of the treated lesions. Pt may apply Vaseline to crusted or scabbing areas.
Render Post-Care Instructions In Note?: no
Duration Of Freeze Thaw-Cycle (Seconds): 0
Consent: The patient's consent was obtained including but not limited to risks of crusting, scabbing, blistering, scarring, darker or lighter pigmentary change, recurrence, incomplete removal and infection.
Medical Necessity Clause: This procedure was medically necessary because the lesions that were treated were:
Medical Necessity Information: It is in your best interest to select a reason for this procedure from the list below. All of these items fulfill various CMS LCD requirements except the new and changing color options.
5

## 2020-09-22 ENCOUNTER — APPOINTMENT (OUTPATIENT)
Dept: PULMONOLOGY | Facility: CLINIC | Age: 55
End: 2020-09-22
Payer: MEDICARE

## 2020-09-22 VITALS
RESPIRATION RATE: 16 BRPM | TEMPERATURE: 98.1 F | HEART RATE: 98 BPM | SYSTOLIC BLOOD PRESSURE: 120 MMHG | OXYGEN SATURATION: 98 % | DIASTOLIC BLOOD PRESSURE: 80 MMHG

## 2020-09-22 PROCEDURE — 95012 NITRIC OXIDE EXP GAS DETER: CPT

## 2020-09-22 PROCEDURE — 99214 OFFICE O/P EST MOD 30 MIN: CPT | Mod: 25

## 2020-09-22 NOTE — PHYSICAL EXAM

## 2020-09-22 NOTE — HISTORY OF PRESENT ILLNESS
[FreeTextEntry1] : Mr. KEMP is a 55 year year old male with a history of severe persistent asthma, allergies, OSAS, GERD who presents for a follow-up pulmonary evaluation. His chief complaint is ankle pain\par -he notes he went to his surgeon last week for the first time since his surgery in June\par -he is cleared for doing load-bearing PT\par -he reports he exercises by using a stationary bike\par -he reports he has diarrhea regularly, many times per day\par -he reports he was diagnosed with osteopenia\par -he notes his energy level is low, as he is not sleeping well due to his ankle pain\par -he uses ice on his ankle, which temporarily helps the pain. He wears a special boot at night that has a cold liner on the inside\par -he notes he had been on narcotics for his pain, and is now on Tylenol, which is not completely dealing with his pain\par -he denies any chest pain, chest pressure, constipation, dysphagia, dizziness, sour taste in the mouth, itchy eyes, itchy ears, heartburn, reflux

## 2020-09-22 NOTE — ASSESSMENT
[FreeTextEntry1] : Mr. Mota is a 55 year old male doing well from a pulmonary perspective, is s/p bowel obstruction s/p GI surgery, s/p hospitalization with cluster headaches / tinnitus, with a history of severe persistent asthma, allergies, OSAS, GERD. He is currently stable from a pulmonary perspective. He is dealing with a left foot injury.\par \par ******PRE-OP CLEARANCE*******\par \par Problem 1: Severe persistent asthma  (stable)\par -continue Albuterol by the nebulizer QID, prn \par -continue Symbicort 160 at 2 inhalations BID\par -continue Spiriva 2 puffs QD\par -continue Singulair 10 mg QHS\par -Asthma is believed to be caused by inherited (genetic) and environmental factor, but its exact cause is unknown. Asthma may be triggered by allergens, lung infections, or irritants in the air. Asthma triggers are different for each person\par -Inhaler technique reviewed as well as oral hygiene techniques reviewed with patient. Avoidance of cold air, extremes of temperature, rescue inhaler should be used before exercise. Order of medication reviewed with patient. Recommended use of a cool mist humidifier in the bedroom. \par \par Problem 2: allergies\par -continue Xyzal 5 mg QHS\par -continue Qnasl 1 sniff/nostril BID \par -continue Astelin 0.15% 1 sniff BID\par -Environmental measures for allergies were encouraged including mattress and pillow cover, air purifier, and environmental controls.\par \par Problem 3: GERD\par -continue Nexium 40 mg QAM, before breakfast\par -Add Pepcid 40 mg QHS \par -Rule of 2's- Avoid eating too much, too late, too poorly, too spicy, or two hours before bed \par -Things to avoid including overeating, spicy foods, tight clothing, eating within three hours of bed, this list is not all inclusive. \par -For treatment of reflux, possible options discussed including diet control, H2 blockers, PPIs, as well as coating motility agents discussed as treatment options. Timing of meals and proximity of last meal to sleep were discussed. If symptoms persist, a formal gastrointestinal evaluation is needed.\par \par Problem 4: OSAS -(reinforced)\par -continue to use the CPAP machine, tolerating it well and seeing benefits \par -prescription given for him to try new masks\par -Good sleep hygiene was encouraged including avoiding watching television an hour before bed, keeping caffeine at a low, avoiding reading in bed, no drinking any liquids three hours before bedtime, and only getting into bed when tired. \par - Discussed the risks/associations with coronary artery disease, atrial fibrillation, arrhythmia, memory loss, issues with concentration, hypertension, nocturia, chronic reflux/Olvera’s esophagus some but not all inclusive. Treatment options discussed including CPAP/BiPAP machine, oral appliance, ProVent therapy, new technologies, or positional sleep.\par \par Problem 5: Obesity \par -improved\par Weight loss, exercise, and diet control were discussed and are highly encouraged. Treatment options were given such as, aqua therapy, and contacting a nutritionist. Recommended to use the elliptical, stationary bike, less use of treadmill. Mindful eating was explained to the patient Obesity is associated with worsening asthma, shortness of breath, and potential for cardiac disease, diabetes, and other underlying medical conditions.  \par \par Problem 6: CAD\par -as per Dr. Fernández, likely the downstream effect of uncontrolled total body inflammation\par \par problem 7: bowel obstruction, s/p surgery\par -encouraged to look at functional medicine consult, referred to Dr. Sheryl Leventhal,\par  -GI follow up with Dr. Caicedo / Marilu et al.\par \par Problem 8: Tinnitus\par -Recommended to take No Flush Niacin, Vitamin B and C, and Zinc \par \par Problem 9: psoriasis / winter eczema\par -recommended to use Borage / Flax Seed Oil \par \par problem 10: Health Maintenance/COVID19 Precautions:\par - Clean your hands often. Wash your hands often with soap and water for at least 20 seconds, especially after blowing your nose, coughing, or sneezing, or having been in a public place.\par - If soap and water are not available, use a hand  that contains at least 60% alcohol.\par - To the extent possible, avoid touching high-touch surfaces in public places - elevator buttons, door handles, handrails, handshaking with people, etc. Use a tissue or your sleeve to cover your hand or finger if you must touch something.\par - Wash your hands after touching surfaces in public places.\par - Avoid touching your face, nose, eyes, etc.\par - Clean and disinfect your home to remove germs: practice routine cleaning of frequently touched surfaces (for example: tables, doorknobs, light switches, handles, desks, toilets, faucets, sinks & cell phones)\par - Avoid crowds, especially in poorly ventilated spaces. Your risk of exposure to respiratory viruses like COVID-19 may increase in crowded, closed-in settings with little air circulation if there are people in the crowd who are sick. All patients are recommended to practice social distancing and stay at least 6 feet away from others.\par - Avoid all non-essential travel including plane trips, and especially avoid embarking on cruise ships.\par -If COVID-19 is spreading in your community, take extra measures to put distance between yourself and other people to further reduce your risk of being exposed to this new virus.\par -Stay home as much as possible.\par - Consider ways of getting food brought to your house through family, social, or commercial networks\par -Be aware that the virus has been known to live in the air up to 3 hours post exposure, cardboard up to 24 hours post exposure, copper up to 4 hours post exposure, steel and plastic up to 2-3 days post exposure. Risk of transmission from these surfaces are affected by many variables.\par Immune Support Recommendations:\par -OTC Vitamin C 500mg BID \par -OTC Quercetin 250-500mg BID \par -OTC Zinc 75-100mg per day \par -OTC Melatonin 1 or 2 mg a night \par -OTC Vitamin D 1-4000mg per day \par -OTC Tonic Water 8oz per day\par Asthma and COVID19:\par You need to make sure your asthma is under control. This often requires the use of inhaled corticosteroids (and sometimes oral corticosteroids). Inhaled corticosteroids do not likely reduce your immune system’s ability to fight infections, but oral corticosteroids may. It is important to use the steps above to protect yourself to limit your exposure to any respiratory virus. \par \par problem 12: health maintenance\par -s/p flu shot in 2019\par -recommended strep pneumonia vaccines: Prevnar-13 vaccine, followed by Pneumo vaccine 23 on year following\par -recommended early intervention for URIs\par -recommended osteoporosis evaluations\par -recommended early dermatological evaluations\par -recommended after the age of 50 to the age of 70, colonoscopy every 5 years\par \par Follow up in 4 months\par Encouraged to follow up with questions, concerns, and changes.

## 2020-09-22 NOTE — REVIEW OF SYSTEMS
[Negative] : Endocrine [EDS] : eds [Diarrhea] : diarrhea [Arthralgias] : arthralgias [Chronic Pain] : chronic pain [Chest Discomfort] : no chest discomfort [GERD] : no gerd [Constipation] : no constipation [Dysphagia] : no dysphagia [Dizziness] : no dizziness

## 2020-09-25 NOTE — PATIENT PROFILE ADULT. - VISION (WITH CORRECTIVE LENSES IF THE PATIENT USUALLY WEARS THEM):
Anesthesia Type: 1% lidocaine with epinephrine Partially impaired: cannot see medication labels or newsprint, but can see obstacles in path, and the surrounding layout; can count fingers at arm's length Treated Area: medium area Price (Use Numbers Only, No Special Characters Or $): 200 Treatment Number: 1 Treated Area: small area Procedure Text: The patient's skin was cleaned and the procedure was performed using the parameters noted above. Pulse Delay (In Milliseconds): 25 Skin Type (Optional): III Fluence Units: J/cm2 External Cooling Fan Speed: 0 Fluence: 18 Post-Care Instructions: I reviewed with the patient in detail post-care instructions. Patient should stay away from the sun and wear sun protection until treated areas are fully healed. Pulse Duration (In Milliseconds): 4 Consent: Written consent obtained, risks reviewed including but not limited to crusting, scabbing, blistering, scarring, darker or lighter pigmentary change, bruising, and/or incomplete response. Detail Level: Zone Pulse Delay (In Milliseconds): 20 Total Pulses: 57 Pulse Duration (In Milliseconds): 3

## 2020-10-03 ENCOUNTER — RESULT REVIEW (OUTPATIENT)
Age: 55
End: 2020-10-03

## 2020-10-03 ENCOUNTER — APPOINTMENT (OUTPATIENT)
Dept: ULTRASOUND IMAGING | Facility: IMAGING CENTER | Age: 55
End: 2020-10-03
Payer: MEDICARE

## 2020-10-03 ENCOUNTER — OUTPATIENT (OUTPATIENT)
Dept: OUTPATIENT SERVICES | Facility: HOSPITAL | Age: 55
LOS: 1 days | End: 2020-10-03
Payer: MEDICARE

## 2020-10-03 DIAGNOSIS — Z00.8 ENCOUNTER FOR OTHER GENERAL EXAMINATION: ICD-10-CM

## 2020-10-03 DIAGNOSIS — Z98.89 OTHER SPECIFIED POSTPROCEDURAL STATES: Chronic | ICD-10-CM

## 2020-10-03 DIAGNOSIS — Z87.898 PERSONAL HISTORY OF OTHER SPECIFIED CONDITIONS: Chronic | ICD-10-CM

## 2020-10-03 PROCEDURE — 93971 EXTREMITY STUDY: CPT | Mod: 26,LT

## 2020-10-03 PROCEDURE — 93971 EXTREMITY STUDY: CPT

## 2020-11-02 NOTE — REVIEW OF SYSTEMS
Duane [Chest Pain] : no chest pain [Palpitations] : no palpitations [Cough] : no cough [SOB on Exertion] : no shortness of breath during exertion [As Noted in HPI] : as noted in HPI [Negative] : Neurological

## 2020-12-15 NOTE — ED PROVIDER NOTE - CRTICAL CARE TIME SPENT (MIN)
Patient requesting a referral for Dr. Obi Ignacio has an appointment on 12/17. Patient was advise to schedule an appointment after ER discharge, please call when ready.    35

## 2020-12-21 ENCOUNTER — RX RENEWAL (OUTPATIENT)
Age: 55
End: 2020-12-21

## 2020-12-21 ENCOUNTER — TRANSCRIPTION ENCOUNTER (OUTPATIENT)
Age: 55
End: 2020-12-21

## 2021-01-06 ENCOUNTER — APPOINTMENT (OUTPATIENT)
Dept: PULMONOLOGY | Facility: CLINIC | Age: 56
End: 2021-01-06
Payer: MEDICARE

## 2021-01-06 VITALS
RESPIRATION RATE: 16 BRPM | HEART RATE: 98 BPM | SYSTOLIC BLOOD PRESSURE: 110 MMHG | HEIGHT: 64 IN | TEMPERATURE: 98 F | DIASTOLIC BLOOD PRESSURE: 60 MMHG | WEIGHT: 191 LBS | OXYGEN SATURATION: 98 % | BODY MASS INDEX: 32.61 KG/M2

## 2021-01-06 PROCEDURE — 99214 OFFICE O/P EST MOD 30 MIN: CPT | Mod: 25

## 2021-01-06 PROCEDURE — 71046 X-RAY EXAM CHEST 2 VIEWS: CPT

## 2021-01-06 RX ORDER — ALBUTEROL SULFATE 2.5 MG/3ML
(2.5 MG/3ML) SOLUTION RESPIRATORY (INHALATION)
Qty: 120 | Refills: 5 | Status: ACTIVE | COMMUNITY
Start: 2018-04-19 | End: 1900-01-01

## 2021-01-06 NOTE — HISTORY OF PRESENT ILLNESS
[FreeTextEntry1] : Mr. KEMP is a 55 year year old male with a history of severe persistent asthma, allergies, OSAS, GERD who presents for a follow-up pulmonary evaluation. His chief complaint is current illness \par -he notes he got sick about 3 weeks ago\par -his symptoms began as a cough, chest congestion / discomfort, wheezing\par -he is bringing up green sputum\par -he reports he believes his vision is getting worse from working on computers\par -he notes his sinuses are slightly congested\par -he reports having more reflux than usual despite taking Nexium BID\par -he saw Dr. Fernández (cardiologist) and is awaiting a nuclear stress test on January 16th. He states he will be needing to use Dobutamine instead of adenosine with the stress test because of his asthma.\par -he notes he usually gets sick around this time of year. He is currently using xyzal, singulair, spiriva, and symbicort. He has misplaced his nebulizer\par -he denies any chest pain, chest pressure, diarrhea, constipation, dysphagia, dizziness, sour taste in the mouth

## 2021-01-06 NOTE — PHYSICAL EXAM

## 2021-01-06 NOTE — REVIEW OF SYSTEMS
[Negative] : Endocrine [Nasal Congestion] : nasal congestion [Cough] : cough [Chest Tightness] : chest tightness [Sputum] : sputum [Wheezing] : wheezing [GERD] : gerd [Chest Discomfort] : no chest discomfort [Diarrhea] : no diarrhea [Constipation] : no constipation [Dysphagia] : no dysphagia [Dizziness] : no dizziness

## 2021-01-06 NOTE — ADDENDUM
[FreeTextEntry1] : Documented by Keo Vinson acting as a scribe for Dr. Leonel Duckworth on 01/06/2021.\par \par All medical record entries made by the Scribe were at my, Dr. Leonel Duckworth's, direction and personally dictated by me on 01/06/2021. I have reviewed the chart and agree that the record accurately reflects my personal performance of the history, physical exam, assessment and plan. I have also personally directed, reviewed, and agree with the discharge instructions.

## 2021-01-06 NOTE — ASSESSMENT
[FreeTextEntry1] : Mr. Mota is a 55 year old male doing well from a pulmonary perspective, is s/p bowel obstruction s/p GI surgery, s/p hospitalization with cluster headaches / tinnitus, with a history of severe persistent asthma, allergies, OSAS, GERD. He is currently in the midst of asthmatic bronchitis and sinusitis.\par \par Problem 1: Severe persistent asthma  - asthmatic bronchitis\par -Add a course of Prednisone; 30 mg for 5 days, then 20 mg for 5 days, then 10 mg for 5 days\par Information sheet given to the patient to be reviewed, this medication is never to be used without consulting the prescribing physician. Proper dietary restraint is necessary specifically salt containing foods, if any reaction may occur should be reported. \par \par -continue Albuterol by the nebulizer QID, prn \par -continue Symbicort 160 at 2 inhalations BID\par -continue Spiriva 2 puffs QD\par -continue Singulair 10 mg QHS\par -Asthma is believed to be caused by inherited (genetic) and environmental factor, but its exact cause is unknown. Asthma may be triggered by allergens, lung infections, or irritants in the air. Asthma triggers are different for each person\par -Inhaler technique reviewed as well as oral hygiene techniques reviewed with patient. Avoidance of cold air, extremes of temperature, rescue inhaler should be used before exercise. Order of medication reviewed with patient. Recommended use of a cool mist humidifier in the bedroom. \par \par Problem 2: allergies\par -continue Xyzal 5 mg QHS\par -continue Qnasl 1 sniff/nostril BID \par -continue Astelin 0.15% 1 sniff BID\par -Environmental measures for allergies were encouraged including mattress and pillow cover, air purifier, and environmental controls.\par \par Problem 2A: Acute Sinusitis\par -Add Augmentin 875 mg BID (25)\par You have a clinical scenario most c/w acute sinusitis the etiology of which is unknown (bacterial/viral/fungal). Hydration, steaming, intranasal saline is needed.\par \par Problem 3: GERD\par -continue Nexium 40 mg QAM, before breakfast\par -Add Pepcid 40 mg QHS \par -Rule of 2's- Avoid eating too much, too late, too poorly, too spicy, or two hours before bed \par -Things to avoid including overeating, spicy foods, tight clothing, eating within three hours of bed, this list is not all inclusive. \par -For treatment of reflux, possible options discussed including diet control, H2 blockers, PPIs, as well as coating motility agents discussed as treatment options. Timing of meals and proximity of last meal to sleep were discussed. If symptoms persist, a formal gastrointestinal evaluation is needed.\par \par Problem 4: OSAS -(reinforced)\par -continue to use the CPAP machine, tolerating it well and seeing benefits \par -prescription given for him to try new masks\par -Good sleep hygiene was encouraged including avoiding watching television an hour before bed, keeping caffeine at a low, avoiding reading in bed, no drinking any liquids three hours before bedtime, and only getting into bed when tired. \par - Discussed the risks/associations with coronary artery disease, atrial fibrillation, arrhythmia, memory loss, issues with concentration, hypertension, nocturia, chronic reflux/Olvera’s esophagus some but not all inclusive. Treatment options discussed including CPAP/BiPAP machine, oral appliance, ProVent therapy, new technologies, or positional sleep.\par \par Problem 5: Obesity \par -improved\par Weight loss, exercise, and diet control were discussed and are highly encouraged. Treatment options were given such as, aqua therapy, and contacting a nutritionist. Recommended to use the elliptical, stationary bike, less use of treadmill. Mindful eating was explained to the patient Obesity is associated with worsening asthma, shortness of breath, and potential for cardiac disease, diabetes, and other underlying medical conditions.  \par \par Problem 6: CAD\par -as per Dr. Fernández, likely the downstream effect of uncontrolled total body inflammation\par -He is not to use Adenosine for his stress test\par \par problem 7: bowel obstruction, s/p surgery\par -encouraged to look at functional medicine consult, referred to Dr. Sheryl Leventhal,\par  -GI follow up with Dr. Caicedo / Marilu et al.\par \par Problem 8: Tinnitus\par -Recommended to take No Flush Niacin, Vitamin B and C, and Zinc \par \par Problem 9: psoriasis / winter eczema\par -recommended to use Borage / Flax Seed Oil \par \par problem 10: Health Maintenance/COVID19 Precautions:\par - Clean your hands often. Wash your hands often with soap and water for at least 20 seconds, especially after blowing your nose, coughing, or sneezing, or having been in a public place.\par - If soap and water are not available, use a hand  that contains at least 60% alcohol.\par - To the extent possible, avoid touching high-touch surfaces in public places - elevator buttons, door handles, handrails, handshaking with people, etc. Use a tissue or your sleeve to cover your hand or finger if you must touch something.\par - Wash your hands after touching surfaces in public places.\par - Avoid touching your face, nose, eyes, etc.\par - Clean and disinfect your home to remove germs: practice routine cleaning of frequently touched surfaces (for example: tables, doorknobs, light switches, handles, desks, toilets, faucets, sinks & cell phones)\par - Avoid crowds, especially in poorly ventilated spaces. Your risk of exposure to respiratory viruses like COVID-19 may increase in crowded, closed-in settings with little air circulation if there are people in the crowd who are sick. All patients are recommended to practice social distancing and stay at least 6 feet away from others.\par - Avoid all non-essential travel including plane trips, and especially avoid embarking on cruise ships.\par -If COVID-19 is spreading in your community, take extra measures to put distance between yourself and other people to further reduce your risk of being exposed to this new virus.\par -Stay home as much as possible.\par - Consider ways of getting food brought to your house through family, social, or commercial networks\par -Be aware that the virus has been known to live in the air up to 3 hours post exposure, cardboard up to 24 hours post exposure, copper up to 4 hours post exposure, steel and plastic up to 2-3 days post exposure. Risk of transmission from these surfaces are affected by many variables.\par Immune Support Recommendations:\par -OTC Vitamin C 500mg BID \par -OTC Quercetin 250-500mg BID \par -OTC Zinc 75-100mg per day \par -OTC Melatonin 1 or 2 mg a night \par -OTC Vitamin D 1-4000mg per day \par -OTC Tonic Water 8oz per day\par Asthma and COVID19:\par You need to make sure your asthma is under control. This often requires the use of inhaled corticosteroids (and sometimes oral corticosteroids). Inhaled corticosteroids do not likely reduce your immune system’s ability to fight infections, but oral corticosteroids may. It is important to use the steps above to protect yourself to limit your exposure to any respiratory virus. \par \par problem 12: health maintenance\par -s/p flu shot in 2019\par -recommended strep pneumonia vaccines: Prevnar-13 vaccine, followed by Pneumo vaccine 23 on year following\par -recommended early intervention for URIs\par -recommended osteoporosis evaluations\par -recommended early dermatological evaluations\par -recommended after the age of 50 to the age of 70, colonoscopy every 5 years\par \par Follow up in 4 months\par Encouraged to follow up with questions, concerns, and changes.

## 2021-01-16 ENCOUNTER — RX RENEWAL (OUTPATIENT)
Age: 56
End: 2021-01-16

## 2021-01-29 ENCOUNTER — APPOINTMENT (OUTPATIENT)
Dept: PULMONOLOGY | Facility: CLINIC | Age: 56
End: 2021-01-29

## 2021-02-11 NOTE — DISCHARGE NOTE ADULT - MEDICATION SUMMARY - MEDICATIONS TO STOP TAKING
Provider:     Caller: PT    Relationship to Patient: SELF    Pharmacy: NA    Phone Number: [phone]    Reason for Call: PATIENT IS CONCERNED WITH WHY SHE CANNOT HAVE INFUSION DONE DUE TO DRUG CHANGE NOT BEING APPROVED. SHE SAID SHE HAD GOTTEN A LETTER IN THE MAIL SAYING INSURANCE WAS COVERING THE DRUG FOR INFUSION SO SHE IS CONFUSED AS TO WHY SHE CAN'T HAVE INFUSION. PLEASE ADVISE.     When was the patient last seen: 1-27-21     I will STOP taking the medications listed below when I get home from the hospital:    Entyvio 300 mg intravenous injection  --  intravenous (week 0, week 2, week 6 then every 8 weeks) Next dose: 3/9/17    Entyvio 300 mg intravenous injection  -- 300 milligram(s) intravenous in 250mL of Saline over 30 minutes  -- Verified with [facility] KEITH Trevino [phone]

## 2021-03-05 NOTE — DISCHARGE NOTE ADULT - VISION (WITH CORRECTIVE LENSES IF THE PATIENT USUALLY WEARS THEM):
Partially impaired: cannot see medication labels or newsprint, but can see obstacles in path, and the surrounding layout; can count fingers at arm's length
28-Jan-2021

## 2021-03-25 ENCOUNTER — RX RENEWAL (OUTPATIENT)
Age: 56
End: 2021-03-25

## 2021-03-29 DIAGNOSIS — Z01.812 ENCOUNTER FOR PREPROCEDURAL LABORATORY EXAMINATION: ICD-10-CM

## 2021-04-05 NOTE — ED PROVIDER NOTE - ATTENDING CONTRIBUTION TO CARE
[facility]IL[facility]  [address]  [place] [address]  [phone]    2021    Re: Braxton Fairbanks   :   1987  MRN:  [id number]   REFERRING PHYSICIAN:   Migel FORD [facility]    Date of Initial Evaluation:  21  Visits: 1 Rxs Used: 1  Reason for Referral:  Acute pain of right knee    Physical Therapy Initial Evaluation    Subjective:  The history is provided by the patient. No  was used.     Patient Health History  Braxton Fairbanks being seen for R Knee Strain.   Date of Onset: 3/14/21.   Problem occurred: Snowboard Fall   Pain score: ranges 0-8/10.  General health as reported by patient is good.  Pertinent medical history includes: high blood pressure. Other medical history details: Kidney disease.   Red flags:  None as reported by patient.  Medical allergies: none.    Other surgery history details: Abdominal Hernia.    Current medications:  Anti-inflammatory and high blood pressure medication.    Current occupation is HVAC TECH.   Primary job tasks include:  Computer work, lifting/carrying and prolonged sitting.                Therapist Generated HPI Evaluation  Problem details: MRI 3/18/21:  IMPRESSION: Injury left/strain of the MCL; normal coursing fibers are identified.  Injury of the femoral attachment of the medial patellar retinaculum.  Bone contusion of the posterior most aspect of lateral femoral condyle.  Moderate size joint effusion.  Mild patellar tendon lateral femoral condyle impingement syndrome.  REPORT SIGNED BY DR. Mattie Hewitt    Type of problem:  Left knee.  This is a new condition.  Condition occurred with:  A fall/slip.  Where condition occurred: during recreation/sport (Snowboard).  Patient reports pain:  Medial.  Pain is described as sharp and is intermittent.  Pain is the same all the time.  Since onset symptoms are unchanged.  Re: Braxton LILIANA Magdi   :    1987  Page 2    Associated symptoms:  Loss of motion/stiffness. Symptoms are exacerbated by bending/squatting, ascending stairs, descending stairs and kneeling (Bending Knee)  and relieved by rest, ice and NSAID's.  Special tests included:  MRI.  Restrictions due to condition include:  Working in normal job without restrictions.  Barriers include:  None as reported by patient.    Objective:  Standing Alignment:    Ankle/Foot:  Pes planus L and pes planus R  Gait:    Gait Type:  Antalgic   Assistive Devices:  None  Non-Weight Bearing:    Knee:  Normal  Flexibility/Screens:   Positive screens:  Knee  Lower Extremity:  Decreased right lower extremity flexibility:  Quadriceps; Hamstrings; Gastroc and Soleus    Knee Evaluation:  ROM:  Strength wnl knee: Fair Quad Set Right. Good strength w/o pain on resisted right knee ext and flexion.  PROM  Hyperextension: Left: 5   Right:   Extension: Left:   Right:  Lacking 5 degrees of extension   Flexion: Left: 140    Right:  65  Ligament Testing:    Varus 0:  Right:  Pos  Valgus 0:  Right:  Pos  Special Tests:   Right knee negative for the following special tests:  Meniscal and Patellar Compression  Palpation:    Right knee tenderness present at:  Medial Joint Line  Right knee tenderness not present at:  Lateral Joint Line; Patellar Tendon; Popliteal; Patellar Medial; Patellar Lateral; Patellar Superior and Patellar Inferior  Edema:  Normal  Mobility Testing:  Not Assessed  Functional Testing:  not assessed    Assessment/Plan:    Patient is a 34 year old male with left side knee complaints.    Patient has the following significant findings with corresponding treatment plan.                Diagnosis 1:  L MCL Strain  Pain -  hot/cold therapy, manual therapy, self management, education and home program  Decreased ROM/flexibility - manual therapy, therapeutic exercise and home program  Decreased strength - therapeutic exercise, therapeutic activities and home  program  Inflammation - cold therapy and self management/home program  Impaired gait - gait training and home program  Impaired muscle performance - neuro re-education and home program  Decreased function - therapeutic activities and home program  Re: Braxton Fairbanks   :   1987  Page 3    Therapy Evaluation Codes:   1) History comprised of:   Personal factors that impact the plan of care:      None.    Comorbidity factors that impact the plan of care are:      High blood pressure and Kidney Disease.     Medications impacting care: Anti-inflammatory and High blood pressure.  2) Examination of Body Systems comprised of:   Body structures and functions that impact the plan of care:      Knee.   Activity limitations that impact the plan of care are:      Bending, Driving, Sports, Squatting/kneeling, Stairs and Walking.  3) Clinical presentation characteristics are:   Stable/Uncomplicated.  4) Decision-Making    Low complexity using standardized patient assessment instrument and/or measureable assessment of functional outcome.    Cumulative Therapy Evaluation is: Low complexity.  Previous and current functional limitations:  (See Goal Flow Sheet for this information)    Short term and Long term goals: (See Goal Flow Sheet for this information)   Communication ability:  Patient appears to be able to clearly communicate and understand verbal and written communication and follow directions correctly.  Treatment Explanation - The following has been discussed with the patient:   RX ordered/plan of care  Anticipated outcomes  Possible risks and side effects  This patient would benefit from PT intervention to resume normal activities.   Rehab potential is good.  Frequency:  2 X week, once daily  Duration:  for 3 weeks  Discharge Plan:  Achieve all LTG.  Independent in home treatment program.  Return to previous functional level by discharge.  Reach maximal therapeutic benefit.    Thank you for your  "referral.    INQUIRIES    Therapist: Janiya \"Peg\" Elda [address]  [facility] [address]  Phone: [phone]  Fax: [phone]     " I performed a history and physical exam of the patient and discussed their management with the resident. I reviewed the resident's note and agree with the documented findings and plan of care. My medical decision making and observations are found above.

## 2021-07-21 ENCOUNTER — TRANSCRIPTION ENCOUNTER (OUTPATIENT)
Age: 56
End: 2021-07-21

## 2021-07-22 ENCOUNTER — APPOINTMENT (OUTPATIENT)
Dept: PULMONOLOGY | Facility: CLINIC | Age: 56
End: 2021-07-22
Payer: MEDICARE

## 2021-07-22 VITALS
OXYGEN SATURATION: 98 % | DIASTOLIC BLOOD PRESSURE: 64 MMHG | HEIGHT: 65 IN | RESPIRATION RATE: 16 BRPM | WEIGHT: 189 LBS | SYSTOLIC BLOOD PRESSURE: 114 MMHG | BODY MASS INDEX: 31.49 KG/M2 | TEMPERATURE: 98.1 F | HEART RATE: 79 BPM

## 2021-07-22 DIAGNOSIS — Z87.09 PERSONAL HISTORY OF OTHER DISEASES OF THE RESPIRATORY SYSTEM: ICD-10-CM

## 2021-07-22 DIAGNOSIS — J45.909 UNSPECIFIED ASTHMA, UNCOMPLICATED: ICD-10-CM

## 2021-07-22 PROCEDURE — ZZZZZ: CPT

## 2021-07-22 PROCEDURE — 94010 BREATHING CAPACITY TEST: CPT

## 2021-07-22 PROCEDURE — 99214 OFFICE O/P EST MOD 30 MIN: CPT | Mod: 25

## 2021-07-22 PROCEDURE — 94729 DIFFUSING CAPACITY: CPT

## 2021-07-22 PROCEDURE — 95012 NITRIC OXIDE EXP GAS DETER: CPT

## 2021-07-22 PROCEDURE — 94727 GAS DIL/WSHOT DETER LNG VOL: CPT

## 2021-07-22 NOTE — HISTORY OF PRESENT ILLNESS
[FreeTextEntry1] : Mr. KEMP is a 56 year year old male with a history of severe persistent asthma, allergies, OSAS, GERD who presents for a follow-up pulmonary evaluation. His chief complaint is \par -He notes walking more frequently in the past month for exercise\par -he notes having diarrhea 7+ times per day\par -He notes having left ankle swelling due to injury\par -He notes his energy level is 6-7/10\par -He denies being able to exert himself how he used to \par -He notes his sinuses are clear \par -He notes needing a new prescription for his glasses, otherwise has no visual issues\par -He notes having a recent dx of high uric acid and is on Metoprolol\par - S/p Covid 19 vaccine (Moderna) x2 without any issues \par -he notes his psoriasis is controlled \par -He notes stopping his CPAP due to the recent recall, getting his paperwork done, but has not heard back from the company\par \par \par - patient denies any headaches, nausea, vomiting, fever, chills, sweats, chest pain, chest pressure, palpitations, coughing, wheezing, fatigue, diarrhea, constipation, dysphagia, myalgias, dizziness, leg swelling, leg pain, itchy eyes, itchy ears, heartburn, reflux or sour taste in the mouth\par

## 2021-07-22 NOTE — PHYSICAL EXAM

## 2021-07-22 NOTE — PROCEDURE
[FreeTextEntry1] : Full PFT revealed mild obstructive dysfunction, with a FEV1 of 2.99L, which is 111% of predicted, mid to low lung volumes, and a diffusion of 2.63, which is 113% of predicted, with a normal flow volume loop\par \par Feno was 26; a normal value being less than 25. Fractional exhaled nitric oxide (FENO) is regarded as a simple, noninvasive method for assessing eosinophilic airway inflammation. Produced by a variety of cells within the lung, nitric oxide (NO) concentrations are generally low in healthy individuals. However, high concentrations of NO appear to be involved in nonspecific host defense mechanisms and chronic inflammatory  diseases such as asthma. The American Thoracic Society (ATS) therefore recommended using FENO to aid in the diagnosis and monitoring of eosinophilic airway inflammation and asthma, and for identifying steroid responsive individuals whose chronic respiratory symptoms may be caused by airway inflammation \par \par -Images and procedures reviewed in detail and discussed with patient.

## 2021-07-22 NOTE — ASSESSMENT
[FreeTextEntry1] : Mr. Mota is a 56 year old male doing well from a pulmonary perspective, is s/p bowel obstruction s/p GI surgery, s/p hospitalization with cluster headaches / tinnitus, with a history of severe persistent asthma, allergies, OSAS, GERD. He is currently stable\par \par Problem 1: Severe persistent asthma  - asthmatic bronchitis\par -s/p Prednisone; 30 mg for 5 days, then 20 mg for 5 days, then 10 mg for 5 days (1/2021)\par Information sheet given to the patient to be reviewed, this medication is never to be used without consulting the prescribing physician. Proper dietary restraint is necessary specifically salt containing foods, if any reaction may occur should be reported. \par \par -continue Albuterol by the nebulizer QID, prn \par -continue Symbicort 160 at 2 inhalations BID\par -continue Spiriva 2 puffs QD\par -continue Singulair 10 mg QHS\par -Asthma is believed to be caused by inherited (genetic) and environmental factor, but its exact cause is unknown. Asthma may be triggered by allergens, lung infections, or irritants in the air. Asthma triggers are different for each person\par -Inhaler technique reviewed as well as oral hygiene techniques reviewed with patient. Avoidance of cold air, extremes of temperature, rescue inhaler should be used before exercise. Order of medication reviewed with patient. Recommended use of a cool mist humidifier in the bedroom. \par \par Problem 2: allergies\par -continue Xyzal 5 mg QHS\par -continue Qnasl 1 sniff/nostril BID \par -continue Astelin 0.15% 1 sniff BID\par -Environmental measures for allergies were encouraged including mattress and pillow cover, air purifier, and environmental controls.\par \par \par Problem 3: GERD\par -continue Nexium 40 mg QAM, before breakfast\par -Add Pepcid 40 mg QHS \par -Rule of 2's- Avoid eating too much, too late, too poorly, too spicy, or two hours before bed \par -Things to avoid including overeating, spicy foods, tight clothing, eating within three hours of bed, this list is not all inclusive. \par -For treatment of reflux, possible options discussed including diet control, H2 blockers, PPIs, as well as coating motility agents discussed as treatment options. Timing of meals and proximity of last meal to sleep were discussed. If symptoms persist, a formal gastrointestinal evaluation is needed.\par \par Problem 4: OSAS -(reinforced)\par -continue to use the APAP machine, tolerating it well and seeing benefits; recall of Dreamstation (Aircurve)\par -prescription given for him to try new masks\par -Good sleep hygiene was encouraged including avoiding watching television an hour before bed, keeping caffeine at a low, avoiding reading in bed, no drinking any liquids three hours before bedtime, and only getting into bed when tired. \par - Discussed the risks/associations with coronary artery disease, atrial fibrillation, arrhythmia, memory loss, issues with concentration, hypertension, nocturia, chronic reflux/Olvera’s esophagus some but not all inclusive. Treatment options discussed including CPAP/BiPAP machine, oral appliance, ProVent therapy, new technologies, or positional sleep.\par \par Problem 5: Obesity \par -improved\par Weight loss, exercise, and diet control were discussed and are highly encouraged. Treatment options were given such as, aqua therapy, and contacting a nutritionist. Recommended to use the elliptical, stationary bike, less use of treadmill. Mindful eating was explained to the patient Obesity is associated with worsening asthma, shortness of breath, and potential for cardiac disease, diabetes, and other underlying medical conditions.  \par \par Problem 6: CAD\par -as per Dr. Fernández, likely the downstream effect of uncontrolled total body inflammation\par -He is not to use Adenosine for his stress test\par \par problem 7: bowel obstruction, s/p surgery\par -encouraged to look at functional medicine consult, referred to Dr. Sheryl Leventhal,\par  -GI follow up with Dr. Caicedo / Marilu et al.\par \par Problem 8: Tinnitus\par -Recommended to take No Flush Niacin, Vitamin B and C, and Zinc \par \par Problem 9: psoriasis / winter eczema\par -recommended to use Borage / Flax Seed Oil \par \par problem 10: Health Maintenance/COVID19 Precautions:\par - S/p Covid 19 vaccine (Moderna) x2 \par - Clean your hands often. Wash your hands often with soap and water for at least 20 seconds, especially after blowing your nose, coughing, or sneezing, or having been in a public place.\par - If soap and water are not available, use a hand  that contains at least 60% alcohol.\par - To the extent possible, avoid touching high-touch surfaces in public places - elevator buttons, door handles, handrails, handshaking with people, etc. Use a tissue or your sleeve to cover your hand or finger if you must touch something.\par - Wash your hands after touching surfaces in public places.\par - Avoid touching your face, nose, eyes, etc.\par - Clean and disinfect your home to remove germs: practice routine cleaning of frequently touched surfaces (for example: tables, doorknobs, light switches, handles, desks, toilets, faucets, sinks & cell phones)\par - Avoid crowds, especially in poorly ventilated spaces. Your risk of exposure to respiratory viruses like COVID-19 may increase in crowded, closed-in settings with little air circulation if there are people in the crowd who are sick. All patients are recommended to practice social distancing and stay at least 6 feet away from others.\par - Avoid all non-essential travel including plane trips, and especially avoid embarking on cruise ships.\par -If COVID-19 is spreading in your community, take extra measures to put distance between yourself and other people to further reduce your risk of being exposed to this new virus.\par -Stay home as much as possible.\par - Consider ways of getting food brought to your house through family, social, or commercial networks\par -Be aware that the virus has been known to live in the air up to 3 hours post exposure, cardboard up to 24 hours post exposure, copper up to 4 hours post exposure, steel and plastic up to 2-3 days post exposure. Risk of transmission from these surfaces are affected by many variables.\par Immune Support Recommendations:\par -OTC Vitamin C 500mg BID \par -OTC Quercetin 250-500mg BID \par -OTC Zinc 75-100mg per day \par -OTC Melatonin 1 or 2 mg a night \par -OTC Vitamin D 1-4000mg per day \par -OTC Tonic Water 8oz per day\par Asthma and COVID19:\par You need to make sure your asthma is under control. This often requires the use of inhaled corticosteroids (and sometimes oral corticosteroids). Inhaled corticosteroids do not likely reduce your immune system’s ability to fight infections, but oral corticosteroids may. It is important to use the steps above to protect yourself to limit your exposure to any respiratory virus. \par \par problem 12: health maintenance\par -s/p flu shot in 2019\par -recommended strep pneumonia vaccines: Prevnar-13 vaccine, followed by Pneumo vaccine 23 on year following\par -recommended early intervention for URIs\par -recommended osteoporosis evaluations\par -recommended early dermatological evaluations\par -recommended after the age of 50 to the age of 70, colonoscopy every 5 years\par \par Follow up in 4 months\par Encouraged to follow up with questions, concerns, and changes.

## 2021-07-24 ENCOUNTER — RX RENEWAL (OUTPATIENT)
Age: 56
End: 2021-07-24

## 2021-08-30 ENCOUNTER — RX RENEWAL (OUTPATIENT)
Age: 56
End: 2021-08-30

## 2021-09-30 ENCOUNTER — RX RENEWAL (OUTPATIENT)
Age: 56
End: 2021-09-30

## 2021-10-13 ENCOUNTER — NON-APPOINTMENT (OUTPATIENT)
Age: 56
End: 2021-10-13

## 2021-10-28 ENCOUNTER — APPOINTMENT (OUTPATIENT)
Dept: SURGICAL ONCOLOGY | Facility: CLINIC | Age: 56
End: 2021-10-28
Payer: MEDICARE

## 2021-10-28 VITALS
TEMPERATURE: 97.1 F | DIASTOLIC BLOOD PRESSURE: 95 MMHG | WEIGHT: 190 LBS | HEART RATE: 72 BPM | BODY MASS INDEX: 31.65 KG/M2 | SYSTOLIC BLOOD PRESSURE: 156 MMHG | RESPIRATION RATE: 17 BRPM | HEIGHT: 65 IN | OXYGEN SATURATION: 98 %

## 2021-10-28 PROCEDURE — 99204 OFFICE O/P NEW MOD 45 MIN: CPT

## 2021-11-12 ENCOUNTER — RESULT REVIEW (OUTPATIENT)
Age: 56
End: 2021-11-12

## 2021-11-12 ENCOUNTER — OUTPATIENT (OUTPATIENT)
Dept: OUTPATIENT SERVICES | Facility: HOSPITAL | Age: 56
LOS: 1 days | End: 2021-11-12

## 2021-11-12 DIAGNOSIS — L81.8 OTHER SPECIFIED DISORDERS OF PIGMENTATION: ICD-10-CM

## 2021-11-12 DIAGNOSIS — Z87.898 PERSONAL HISTORY OF OTHER SPECIFIED CONDITIONS: Chronic | ICD-10-CM

## 2021-11-12 DIAGNOSIS — Z98.89 OTHER SPECIFIED POSTPROCEDURAL STATES: Chronic | ICD-10-CM

## 2021-11-13 ENCOUNTER — APPOINTMENT (OUTPATIENT)
Dept: PLASTIC SURGERY | Facility: CLINIC | Age: 56
End: 2021-11-13
Payer: MEDICARE

## 2021-11-13 VITALS
HEART RATE: 76 BPM | SYSTOLIC BLOOD PRESSURE: 173 MMHG | OXYGEN SATURATION: 99 % | BODY MASS INDEX: 32.47 KG/M2 | WEIGHT: 202 LBS | DIASTOLIC BLOOD PRESSURE: 89 MMHG | HEIGHT: 66 IN | TEMPERATURE: 98 F

## 2021-11-13 PROCEDURE — 99203 OFFICE O/P NEW LOW 30 MIN: CPT

## 2021-11-13 NOTE — CONSULT LETTER
[Dear  ___] : Dear  [unfilled], [Consult Letter:] : I had the pleasure of evaluating your patient, [unfilled]. [Please see my note below.] : Please see my note below. [Consult Closing:] : Thank you very much for allowing me to participate in the care of this patient.  If you have any questions, please do not hesitate to contact me. [Sincerely,] : Sincerely, [FreeTextEntry3] : Patel Santizo MD, FACS

## 2021-11-15 LAB — SURGICAL PATHOLOGY STUDY: SIGNIFICANT CHANGE UP

## 2021-11-18 ENCOUNTER — OUTPATIENT (OUTPATIENT)
Dept: OUTPATIENT SERVICES | Facility: HOSPITAL | Age: 56
LOS: 1 days | End: 2021-11-18
Payer: MEDICARE

## 2021-11-18 VITALS
WEIGHT: 195.99 LBS | OXYGEN SATURATION: 99 % | TEMPERATURE: 97 F | DIASTOLIC BLOOD PRESSURE: 88 MMHG | SYSTOLIC BLOOD PRESSURE: 130 MMHG | HEIGHT: 66 IN | RESPIRATION RATE: 16 BRPM | HEART RATE: 80 BPM

## 2021-11-18 DIAGNOSIS — L81.8 OTHER SPECIFIED DISORDERS OF PIGMENTATION: ICD-10-CM

## 2021-11-18 DIAGNOSIS — Z87.898 PERSONAL HISTORY OF OTHER SPECIFIED CONDITIONS: Chronic | ICD-10-CM

## 2021-11-18 DIAGNOSIS — G47.33 OBSTRUCTIVE SLEEP APNEA (ADULT) (PEDIATRIC): ICD-10-CM

## 2021-11-18 DIAGNOSIS — J44.9 CHRONIC OBSTRUCTIVE PULMONARY DISEASE, UNSPECIFIED: ICD-10-CM

## 2021-11-18 DIAGNOSIS — S92.009A UNSPECIFIED FRACTURE OF UNSPECIFIED CALCANEUS, INITIAL ENCOUNTER FOR CLOSED FRACTURE: Chronic | ICD-10-CM

## 2021-11-18 DIAGNOSIS — I10 ESSENTIAL (PRIMARY) HYPERTENSION: ICD-10-CM

## 2021-11-18 DIAGNOSIS — Z98.89 OTHER SPECIFIED POSTPROCEDURAL STATES: Chronic | ICD-10-CM

## 2021-11-18 DIAGNOSIS — J45.909 UNSPECIFIED ASTHMA, UNCOMPLICATED: ICD-10-CM

## 2021-11-18 DIAGNOSIS — I25.10 ATHEROSCLEROTIC HEART DISEASE OF NATIVE CORONARY ARTERY WITHOUT ANGINA PECTORIS: ICD-10-CM

## 2021-11-18 DIAGNOSIS — Z90.49 ACQUIRED ABSENCE OF OTHER SPECIFIED PARTS OF DIGESTIVE TRACT: Chronic | ICD-10-CM

## 2021-11-18 LAB
ALBUMIN SERPL ELPH-MCNC: 4.5 G/DL — SIGNIFICANT CHANGE UP (ref 3.3–5)
ALP SERPL-CCNC: 78 U/L — SIGNIFICANT CHANGE UP (ref 40–120)
ALT FLD-CCNC: 88 U/L — HIGH (ref 4–41)
ANION GAP SERPL CALC-SCNC: 13 MMOL/L — SIGNIFICANT CHANGE UP (ref 7–14)
AST SERPL-CCNC: 56 U/L — HIGH (ref 4–40)
BILIRUB SERPL-MCNC: 0.4 MG/DL — SIGNIFICANT CHANGE UP (ref 0.2–1.2)
BUN SERPL-MCNC: 13 MG/DL — SIGNIFICANT CHANGE UP (ref 7–23)
CALCIUM SERPL-MCNC: 9.5 MG/DL — SIGNIFICANT CHANGE UP (ref 8.4–10.5)
CHLORIDE SERPL-SCNC: 104 MMOL/L — SIGNIFICANT CHANGE UP (ref 98–107)
CO2 SERPL-SCNC: 24 MMOL/L — SIGNIFICANT CHANGE UP (ref 22–31)
CREAT SERPL-MCNC: 0.76 MG/DL — SIGNIFICANT CHANGE UP (ref 0.5–1.3)
GLUCOSE SERPL-MCNC: 138 MG/DL — HIGH (ref 70–99)
HCT VFR BLD CALC: 42.9 % — SIGNIFICANT CHANGE UP (ref 39–50)
HGB BLD-MCNC: 13.9 G/DL — SIGNIFICANT CHANGE UP (ref 13–17)
MCHC RBC-ENTMCNC: 26.3 PG — LOW (ref 27–34)
MCHC RBC-ENTMCNC: 32.4 GM/DL — SIGNIFICANT CHANGE UP (ref 32–36)
MCV RBC AUTO: 81.1 FL — SIGNIFICANT CHANGE UP (ref 80–100)
NRBC # BLD: 0 /100 WBCS — SIGNIFICANT CHANGE UP
NRBC # FLD: 0 K/UL — SIGNIFICANT CHANGE UP
PLATELET # BLD AUTO: 173 K/UL — SIGNIFICANT CHANGE UP (ref 150–400)
POTASSIUM SERPL-MCNC: 4.2 MMOL/L — SIGNIFICANT CHANGE UP (ref 3.5–5.3)
POTASSIUM SERPL-SCNC: 4.2 MMOL/L — SIGNIFICANT CHANGE UP (ref 3.5–5.3)
PROT SERPL-MCNC: 7.1 G/DL — SIGNIFICANT CHANGE UP (ref 6–8.3)
RBC # BLD: 5.29 M/UL — SIGNIFICANT CHANGE UP (ref 4.2–5.8)
RBC # FLD: 14.4 % — SIGNIFICANT CHANGE UP (ref 10.3–14.5)
SODIUM SERPL-SCNC: 141 MMOL/L — SIGNIFICANT CHANGE UP (ref 135–145)
WBC # BLD: 5.89 K/UL — SIGNIFICANT CHANGE UP (ref 3.8–10.5)
WBC # FLD AUTO: 5.89 K/UL — SIGNIFICANT CHANGE UP (ref 3.8–10.5)

## 2021-11-18 PROCEDURE — 93010 ELECTROCARDIOGRAM REPORT: CPT

## 2021-11-18 RX ORDER — ASCORBIC ACID 60 MG
1 TABLET,CHEWABLE ORAL
Qty: 0 | Refills: 0 | DISCHARGE

## 2021-11-18 RX ORDER — UBIDECARENONE 100 MG
2 CAPSULE ORAL
Qty: 0 | Refills: 0 | DISCHARGE

## 2021-11-18 RX ORDER — TACROLIMUS/VEHICLE BASE NO.238 0.1 %
1 CREAM (GRAM) TOPICAL
Qty: 0 | Refills: 0 | DISCHARGE

## 2021-11-18 RX ORDER — AMLODIPINE BESYLATE 2.5 MG/1
1 TABLET ORAL
Qty: 0 | Refills: 0 | DISCHARGE

## 2021-11-18 RX ORDER — LOSARTAN POTASSIUM 100 MG/1
1 TABLET, FILM COATED ORAL
Qty: 0 | Refills: 0 | DISCHARGE

## 2021-11-18 RX ORDER — EVOLOCUMAB 140 MG/ML
0 INJECTION, SOLUTION SUBCUTANEOUS
Qty: 0 | Refills: 0 | DISCHARGE

## 2021-11-18 RX ORDER — ONDANSETRON 8 MG/1
1 TABLET, FILM COATED ORAL
Qty: 0 | Refills: 0 | DISCHARGE

## 2021-11-18 RX ORDER — SODIUM CHLORIDE 9 MG/ML
1000 INJECTION, SOLUTION INTRAVENOUS
Refills: 0 | Status: DISCONTINUED | OUTPATIENT
Start: 2021-11-30 | End: 2021-12-14

## 2021-11-18 RX ORDER — ERGOCALCIFEROL 1.25 MG/1
5000 CAPSULE ORAL
Qty: 0 | Refills: 0 | DISCHARGE

## 2021-11-18 RX ORDER — USTEKINUMAB 45 MG/.5ML
0 INJECTION, SOLUTION SUBCUTANEOUS
Qty: 0 | Refills: 0 | DISCHARGE

## 2021-11-18 RX ORDER — LEVOCETIRIZINE DIHYDROCHLORIDE 0.5 MG/ML
1 SOLUTION ORAL
Qty: 0 | Refills: 0 | DISCHARGE

## 2021-11-18 RX ORDER — TRAMADOL HYDROCHLORIDE 50 MG/1
1 TABLET ORAL
Qty: 0 | Refills: 0 | DISCHARGE

## 2021-11-18 RX ORDER — METOCLOPRAMIDE HCL 10 MG
1 TABLET ORAL
Qty: 0 | Refills: 0 | DISCHARGE

## 2021-11-18 RX ORDER — ESOMEPRAZOLE MAGNESIUM 40 MG/1
1 CAPSULE, DELAYED RELEASE ORAL
Qty: 0 | Refills: 0 | DISCHARGE

## 2021-11-18 RX ORDER — METOPROLOL TARTRATE 50 MG
0.5 TABLET ORAL
Qty: 0 | Refills: 0 | DISCHARGE

## 2021-11-18 RX ORDER — SODIUM CHLORIDE 9 MG/ML
3 INJECTION INTRAMUSCULAR; INTRAVENOUS; SUBCUTANEOUS ONCE
Refills: 0 | Status: DISCONTINUED | OUTPATIENT
Start: 2021-11-30 | End: 2021-12-14

## 2021-11-18 RX ORDER — DIAZEPAM 5 MG
1 TABLET ORAL
Qty: 0 | Refills: 0 | DISCHARGE

## 2021-11-18 RX ORDER — ERENUMAB-AOOE 70 MG/ML
0 INJECTION SUBCUTANEOUS
Qty: 0 | Refills: 0 | DISCHARGE

## 2021-11-18 RX ORDER — TIOTROPIUM BROMIDE 18 UG/1
1 CAPSULE ORAL; RESPIRATORY (INHALATION)
Qty: 0 | Refills: 0 | DISCHARGE

## 2021-11-18 RX ORDER — ESOMEPRAZOLE MAGNESIUM 40 MG/1
0 CAPSULE, DELAYED RELEASE ORAL
Qty: 0 | Refills: 0 | DISCHARGE

## 2021-11-18 RX ORDER — CYST/ALA/Q10/PHOS.SER/DHA/BROC 100-20-50
1 POWDER (GRAM) ORAL
Qty: 0 | Refills: 0 | DISCHARGE

## 2021-11-18 RX ORDER — BUDESONIDE, MICRONIZED 100 %
1 POWDER (GRAM) MISCELLANEOUS
Qty: 0 | Refills: 0 | DISCHARGE

## 2021-11-18 RX ORDER — ADALIMUMAB 40MG/0.8ML
40 KIT SUBCUTANEOUS
Qty: 0 | Refills: 0 | DISCHARGE

## 2021-11-18 RX ORDER — PREGABALIN 225 MG/1
1 CAPSULE ORAL
Qty: 0 | Refills: 0 | DISCHARGE

## 2021-11-18 RX ORDER — SOD,AMMONIUM,POTASSIUM LACTATE
1 CREAM (GRAM) TOPICAL
Qty: 0 | Refills: 0 | DISCHARGE

## 2021-11-18 RX ORDER — HYDROMORPHONE HYDROCHLORIDE 2 MG/ML
1 INJECTION INTRAMUSCULAR; INTRAVENOUS; SUBCUTANEOUS
Qty: 0 | Refills: 0 | DISCHARGE

## 2021-11-18 NOTE — H&P PST ADULT - PROBLEM SELECTOR PLAN 1
preop for wide excision pre cancerous lesion left back. Dr. Santizo: Reconstruction of Mid Back Wound, Possible Local Flap, Possible skin graft on 11/30/21  preop instructions given, pt verbalized understanding  pt will take prescribed omeprazole AM of surgery for GI prophylaxis   chlorhexidine wash provided  pt is scheduled for COVID testing preop preop for wide excision pre cancerous lesion left back. Dr. Santizo: Reconstruction of Mid Back Wound, Possible Local Flap, Possible skin graft on 11/30/21  preop instructions given, pt verbalized understanding  pt will take prescribed esomeprazole AM of surgery for GI prophylaxis   chlorhexidine wash provided  pt is scheduled for COVID testing preop

## 2021-11-18 NOTE — H&P PST ADULT - NSICDXPASTSURGICALHX_GEN_ALL_CORE_FT
PAST SURGICAL HISTORY:  Calcaneus fracture s/p repair with hardware    H/O colonoscopy     H/O lipoma removed from back    H/O percutaneous transluminal coronary angioplasty with DE RCA 2006, 2 stents placed 2015    History of herniorrhaphy left inguinal herniorrhaphy    S/P cardiac catheterization 2017-resulted in [place]-no stents placed    S/P hernia surgery     S/P small bowel resection

## 2021-11-18 NOTE — H&P PST ADULT - ATTENDING COMMENTS
56-year-old man with a melanocytic tumor of unclear malignant potential (possible 0.1 mm melanoma) of the left back, scheduled for excision with plastic closure (Dr. Patel Santizo).    Discussed with patient prior to surgery, and again on day of operation.    All questions answered, consent on chart

## 2021-11-18 NOTE — H&P PST ADULT - RESPIRATORY AND THORAX COMMENTS
h/o asthma- no recent exacerbation h/o asthma- no recent exacerbation. pt uses prescribed inhalers. last visit with pulmonary Jan 2021

## 2021-11-18 NOTE — H&P PST ADULT - PRIMARY CARE PROVIDER
Internist/ Cardiologist Dr. Randell Fernández [phone] Internist/Cardiologist Dr. Randell Fernández [phone]

## 2021-11-18 NOTE — H&P PST ADULT - SKIN COMMENTS
biopsied site- pink lesion to left back biopsied site-->pink flat lesion to left back. preop dx of other specified disorders of pigmentation. see HPI

## 2021-11-18 NOTE — H&P PST ADULT - HISTORY OF PRESENT ILLNESS
55 y/o male with CAD s/p coronary stents x4, HTN, HLD, fatty Liver, MARYAM on CPAP, Asthma, GERD and elevated uric acid presents to PST preop for wide excision pre cancerous lesion left back. Dr. Santizo: Reconstruction of Mid Back Wound, Possible Local Flap, Possible skin graft. pt says left back skin lesion was found during a routine dermatological exam. s/p biopsy which showed "atypical melanocytic hyperplasia".  55 y/o male with CAD s/p coronary stents x4, HTN, HLD, fatty Liver, MARYAM on CPAP, Asthma, GERD and elevated uric acid presents to PST preop for wide excision pre cancerous lesion left back. Dr. Santizo: Reconstruction of Mid Back Wound, Possible Local Flap, Possible skin graft. pt says left back pigmented skin lesion was found during a routine dermatological exam. s/p biopsy which showed "atypical melanocytic hyperplasia".

## 2021-11-18 NOTE — H&P PST ADULT - LAST CARDIAC ANGIOGRAM/IMAGING
s/p cardiac cath in 2006, 2015 and 2017. total 4 coronary stents s/p cardiac cath in 2006 (2 stents) 2015 (2 stents) and 2017 (no intervention). report from 2017 in chart

## 2021-11-18 NOTE — H&P PST ADULT - NSICDXPASTMEDICALHX_GEN_ALL_CORE_FT
PAST MEDICAL HISTORY:  Anemia     Asthma controlled-never intubated or hospitalized    Atypical melanocytic hyperplasia     Bilateral knee pain     Bowel obstruction     CAD (coronary artery disease) 2 stents placed in LAD in 2015, RCA stent x 2 in 2006    Cluster headaches     Crohn disease     Elevated blood uric acid level     Fatty liver     GERD (gastroesophageal reflux disease)     GIB (gastrointestinal bleeding)     HLD (hyperlipidemia)     HTN (hypertension)     Inguinal mass     Joint pain     Lower back pain     MI (myocardial infarction)     Migraine headache     MARYAM (obstructive sleep apnea) on CPAP    Other specified disorders of pigmentation     Pancreatitis while on 6MP for Crohn's now off of it    Psoriasis     Shoulder pain, bilateral     Stented coronary artery     Unilateral recurrent inguinal hernia without obstruction or gangrene Left

## 2021-11-18 NOTE — H&P PST ADULT - PROBLEM SELECTOR PLAN 3
pt will take blood pressure meds AM of surgery as prescribed pt will take blood pressure meds Norvasc, Losartan, Metoprolol AM of surgery as prescribed

## 2021-11-18 NOTE — H&P PST ADULT - PROBLEM SELECTOR PLAN 2
pt will remain on low dose Aspirin due to presence of multiple coronary stents pt will remain on low dose Aspirin due to presence of multiple coronary stents  cardiac clearance requested (h/o HTN, CAD, coronary stenting, reports of chest tightness and SON on exertion) pt will remain on low dose Aspirin due to presence of multiple coronary stents  cardiac clearance requested (h/o HTN, CAD, coronary stents, reports of chest tightness and SOB on exertion)  ECHO and Stress test reports requested with clearance letter

## 2021-11-27 ENCOUNTER — APPOINTMENT (OUTPATIENT)
Dept: DISASTER EMERGENCY | Facility: CLINIC | Age: 56
End: 2021-11-27

## 2021-11-27 LAB — SARS-COV-2 N GENE NPH QL NAA+PROBE: NOT DETECTED

## 2021-11-29 ENCOUNTER — TRANSCRIPTION ENCOUNTER (OUTPATIENT)
Age: 56
End: 2021-11-29

## 2021-11-29 NOTE — ASU PATIENT PROFILE, ADULT - FALL HARM RISK - UNIVERSAL INTERVENTIONS
Bed in lowest position, wheels locked, appropriate side rails in place/Call bell, personal items and telephone in reach/Instruct patient to call for assistance before getting out of bed or chair/Non-slip footwear when patient is out of bed/[place] to call system/Physically safe environment - no spills, clutter or unnecessary equipment/Purposeful Proactive Rounding/Room/bathroom lighting operational, light cord in reach

## 2021-11-29 NOTE — ASU PATIENT PROFILE, ADULT - PRO MENTAL HEALTH SX RECENT
Subjective:      Patient ID: Shraddha Moore is a 48 y.o. female.    Chief Complaint: Heel Pain (Right Foot)    Shraddha is a 48 y.o. female who presents to the clinic complaining of heel pain in the right foot, especially with the first step in the morning and with long workouts. The pain is described as Aching and Sharp. The onset of the pain was gradual and has worsened over the past several months. Shraddha rates the pain as 4/10. She denies a history of trauma. She has increased time at gym and walking recently after recovering from UTI. Wearing flats today. Prior treatments include sneakers.    6/7/17: f/u for right heel pain. Pain is over 90% improved with NSAIdS, RICE, good shoes, stretching. Mild, occasional pain at medial arch only without shoes. Pleased with result to date and planning to obtain arch supports.     Review of Systems   Constitution: Negative for chills, fever, weakness, malaise/fatigue and night sweats.   Cardiovascular: Negative for chest pain, leg swelling, orthopnea and palpitations.   Respiratory: Negative for cough, shortness of breath and wheezing.    Skin: Negative for color change, itching, poor wound healing and rash.   Musculoskeletal: Negative for arthritis, gout, joint pain, joint swelling, muscle weakness and myalgias.   Gastrointestinal: Negative for abdominal pain, constipation and nausea.   Neurological: Negative for disturbances in coordination, dizziness, focal weakness, numbness and tremors.           Objective:      Physical Exam   Constitutional: She is oriented to person, place, and time. Vital signs are normal. She appears well-developed. She is cooperative. No distress.   Cardiovascular: Intact distal pulses.    Pulses:       Dorsalis pedis pulses are 2+ on the right side, and 2+ on the left side.        Posterior tibial pulses are 2+ on the right side, and 2+ on the left side.   Musculoskeletal:        Right ankle: Normal.        Left ankle: Normal.        Right foot: There is  normal range of motion, no bony tenderness, normal capillary refill, no crepitus and no deformity.        Left foot: There is normal range of motion, no bony tenderness, normal capillary refill, no crepitus and no deformity.   No pain on palpation plantar medial and central heel. No pain with ROM or MMT. No pain with medial and lateral compression of heel.    No pain at PT tendon. No pain with heel rise.          Neurological: She is alert and oriented to person, place, and time. She has normal strength. No sensory deficit. She exhibits normal muscle tone. Gait normal.   Reflex Scores:       Achilles reflexes are 2+ on the right side and 2+ on the left side.  Negative Tinels sign and Praneeth's click, bilat.   Skin: Skin is intact. No ecchymosis and no lesion noted. No erythema. Nails show no clubbing.   No foot and ankle edema.  No open wounds.               Assessment:       Encounter Diagnosis   Name Primary?    Plantar fasciitis - Right Foot Yes         Plan:       Shraddha was seen today for heel pain.    Diagnoses and all orders for this visit:    Plantar fasciitis - Right Foot    Other orders  -     Cancel: methylPREDNISolone acetate injection 40 mg; Inject 1 mL (40 mg total) into the muscle one time.      I counseled the patient on her conditions, their implications and medical management.    Reviewed xrays with patient noting small plantar heel spur and mild pes planus.    1. Stretch calf and plantar fascia 10x per day for 30 sec and before getting out of bed.    2. Supportive shoes at all times. Consider ankle brace as needed    3. Orthotic, OTC. Will consider custom as needed.    4. Mobic x 1 week if pain resumes.    5. ICE massage with frozen water bottle 2x per day for 30 minutes.    6. Will consider night splint, steroid injection and/or physical therapy as needed.      Side effects of medication(s) were discussed in detail and patient voiced understanding. Patient will call back for any issues or  complications.            none

## 2021-11-30 ENCOUNTER — APPOINTMENT (OUTPATIENT)
Dept: PLASTIC SURGERY | Facility: HOSPITAL | Age: 56
End: 2021-11-30
Payer: MEDICARE

## 2021-11-30 ENCOUNTER — RESULT REVIEW (OUTPATIENT)
Age: 56
End: 2021-11-30

## 2021-11-30 ENCOUNTER — APPOINTMENT (OUTPATIENT)
Dept: SURGICAL ONCOLOGY | Facility: HOSPITAL | Age: 56
End: 2021-11-30

## 2021-11-30 ENCOUNTER — OUTPATIENT (OUTPATIENT)
Dept: OUTPATIENT SERVICES | Facility: HOSPITAL | Age: 56
LOS: 1 days | Discharge: HOME | End: 2021-11-30
Payer: MEDICARE

## 2021-11-30 VITALS
WEIGHT: 195.99 LBS | DIASTOLIC BLOOD PRESSURE: 91 MMHG | RESPIRATION RATE: 18 BRPM | TEMPERATURE: 99 F | OXYGEN SATURATION: 98 % | HEIGHT: 66 IN | SYSTOLIC BLOOD PRESSURE: 137 MMHG | HEART RATE: 66 BPM

## 2021-11-30 VITALS
DIASTOLIC BLOOD PRESSURE: 76 MMHG | SYSTOLIC BLOOD PRESSURE: 119 MMHG | TEMPERATURE: 98 F | RESPIRATION RATE: 12 BRPM | HEART RATE: 64 BPM | OXYGEN SATURATION: 95 %

## 2021-11-30 DIAGNOSIS — Z90.49 ACQUIRED ABSENCE OF OTHER SPECIFIED PARTS OF DIGESTIVE TRACT: Chronic | ICD-10-CM

## 2021-11-30 DIAGNOSIS — Z98.89 OTHER SPECIFIED POSTPROCEDURAL STATES: Chronic | ICD-10-CM

## 2021-11-30 DIAGNOSIS — Z87.898 PERSONAL HISTORY OF OTHER SPECIFIED CONDITIONS: Chronic | ICD-10-CM

## 2021-11-30 DIAGNOSIS — L81.8 OTHER SPECIFIED DISORDERS OF PIGMENTATION: ICD-10-CM

## 2021-11-30 DIAGNOSIS — S92.009A UNSPECIFIED FRACTURE OF UNSPECIFIED CALCANEUS, INITIAL ENCOUNTER FOR CLOSED FRACTURE: Chronic | ICD-10-CM

## 2021-11-30 PROCEDURE — 15734 MUSCLE-SKIN GRAFT TRUNK: CPT

## 2021-11-30 PROCEDURE — 88305 TISSUE EXAM BY PATHOLOGIST: CPT | Mod: 26

## 2021-11-30 PROCEDURE — 11603 EXC TR-EXT MAL+MARG 2.1-3 CM: CPT

## 2021-11-30 RX ORDER — OXYCODONE AND ACETAMINOPHEN 5; 325 MG/1; MG/1
1 TABLET ORAL
Qty: 5 | Refills: 0
Start: 2021-11-30 | End: 2021-11-30

## 2021-11-30 NOTE — ASU DISCHARGE PLAN (ADULT/PEDIATRIC) - NURSING INSTRUCTIONS
Last dose of TYLENOL for pain management was at 12:50 pm Next dose of TYLENOL may be taken at or after 6:50 pm if needed. DO NOT take any additional products containing TYLENOL or ACETAMINOPHEN, such as VICODIN, PERCOCET, NORCO, EXCEDRIN, and any over-the-counter cold medications. DO NOT CONSUME MORE THAN 2824-6878 MG OF TYLENOL (acetaminophen) in a 24-hour period.

## 2021-11-30 NOTE — BRIEF OPERATIVE NOTE - NSICDXBRIEFPROCEDURE_GEN_ALL_CORE_FT
PROCEDURES:  Adjacent tissue transfer of trunk, defect size 5 sq cm or less 30-Nov-2021 13:12:07  Kristina Rincon

## 2021-11-30 NOTE — ASU DISCHARGE PLAN (ADULT/PEDIATRIC) - NS MD DC FALL RISK RISK
For information on Fall & Injury Prevention, visit: [email or web address] OR  [email or web address] OR  https://www.cdc.gov/steadi/patient.html

## 2021-11-30 NOTE — BRIEF OPERATIVE NOTE - NSICDXBRIEFPREOP_GEN_ALL_CORE_FT
PRE-OP DIAGNOSIS:  Melanoma of back 30-Nov-2021 13:12:46  Kristina Rincon  
PRE-OP DIAGNOSIS:  Melanoma of back 30-Nov-2021 13:11:09  Martha Brown

## 2021-11-30 NOTE — BRIEF OPERATIVE NOTE - OPERATION/FINDINGS
Adjacent tissue rearrangement to reconstruct defect resulting from excision of precancerous lesion versus melanoma of left back
Excision of melanoma from the left back with plastics closure

## 2021-11-30 NOTE — ASU DISCHARGE PLAN (ADULT/PEDIATRIC) - ASU DC SPECIAL INSTRUCTIONSFT
Initial followup with plastic surgery in the next 5-15 days, regarding matters of bandages, activities, and showering.    Dr. Lopez should call with pathology report in approximately 2 weeks.  The conversation will determine further management. Leave dressing in place until follow up. You may shower starting tomorrow with the dressing in place.  Take prescription pain medication if pain is severe. If pain is not severe, may take tylenol. Please follow up with Dr. Santizo within x1 week after discharge from the hospital. You may call [phone] to schedule an appointment.     Dr. Lopez should call with pathology report in approximately 2 weeks.  The conversation will determine further management.

## 2021-11-30 NOTE — BRIEF OPERATIVE NOTE - NSICDXBRIEFPOSTOP_GEN_ALL_CORE_FT
POST-OP DIAGNOSIS:  Melanoma of back 30-Nov-2021 13:12:57  Kristina Rincon  
POST-OP DIAGNOSIS:  Melanoma of back 30-Nov-2021 13:11:22  Martha Brown

## 2021-12-01 ENCOUNTER — NON-APPOINTMENT (OUTPATIENT)
Age: 56
End: 2021-12-01

## 2021-12-01 PROBLEM — G43.909 MIGRAINE, UNSPECIFIED, NOT INTRACTABLE, WITHOUT STATUS MIGRAINOSUS: Chronic | Status: ACTIVE | Noted: 2021-11-18

## 2021-12-01 PROBLEM — L81.8 OTHER SPECIFIED DISORDERS OF PIGMENTATION: Chronic | Status: ACTIVE | Noted: 2021-11-18

## 2021-12-01 PROBLEM — D64.9 ANEMIA, UNSPECIFIED: Chronic | Status: ACTIVE | Noted: 2021-11-18

## 2021-12-01 PROBLEM — I10 ESSENTIAL (PRIMARY) HYPERTENSION: Chronic | Status: ACTIVE | Noted: 2021-11-18

## 2021-12-01 PROBLEM — K76.0 FATTY (CHANGE OF) LIVER, NOT ELSEWHERE CLASSIFIED: Chronic | Status: ACTIVE | Noted: 2021-11-18

## 2021-12-01 PROBLEM — Z95.5 PRESENCE OF CORONARY ANGIOPLASTY IMPLANT AND GRAFT: Chronic | Status: ACTIVE | Noted: 2021-11-18

## 2021-12-01 PROBLEM — L40.9 PSORIASIS, UNSPECIFIED: Chronic | Status: ACTIVE | Noted: 2021-11-18

## 2021-12-01 PROBLEM — E79.0 HYPERURICEMIA WITHOUT SIGNS OF INFLAMMATORY ARTHRITIS AND TOPHACEOUS DISEASE: Chronic | Status: ACTIVE | Noted: 2021-11-18

## 2021-12-02 ENCOUNTER — APPOINTMENT (OUTPATIENT)
Dept: PLASTIC SURGERY | Facility: CLINIC | Age: 56
End: 2021-12-02
Payer: MEDICARE

## 2021-12-02 VITALS
BODY MASS INDEX: 32.14 KG/M2 | HEART RATE: 66 BPM | HEIGHT: 66 IN | TEMPERATURE: 98.2 F | DIASTOLIC BLOOD PRESSURE: 87 MMHG | OXYGEN SATURATION: 97 % | WEIGHT: 200 LBS | SYSTOLIC BLOOD PRESSURE: 146 MMHG

## 2021-12-02 DIAGNOSIS — D48.5 NEOPLASM OF UNCERTAIN BEHAVIOR OF SKIN: ICD-10-CM

## 2021-12-02 PROCEDURE — 99024 POSTOP FOLLOW-UP VISIT: CPT

## 2021-12-06 LAB — SURGICAL PATHOLOGY STUDY: SIGNIFICANT CHANGE UP

## 2021-12-07 ENCOUNTER — APPOINTMENT (OUTPATIENT)
Dept: PLASTIC SURGERY | Facility: CLINIC | Age: 56
End: 2021-12-07
Payer: MEDICARE

## 2021-12-07 VITALS
SYSTOLIC BLOOD PRESSURE: 137 MMHG | TEMPERATURE: 98.2 F | WEIGHT: 200 LBS | HEIGHT: 66 IN | BODY MASS INDEX: 32.14 KG/M2 | OXYGEN SATURATION: 98 % | HEART RATE: 77 BPM | DIASTOLIC BLOOD PRESSURE: 84 MMHG

## 2021-12-07 DIAGNOSIS — Z09 ENCOUNTER FOR FOLLOW-UP EXAMINATION AFTER COMPLETED TREATMENT FOR CONDITIONS OTHER THAN MALIGNANT NEOPLASM: ICD-10-CM

## 2021-12-07 PROCEDURE — 99024 POSTOP FOLLOW-UP VISIT: CPT
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
## 2021-12-14 ENCOUNTER — APPOINTMENT (OUTPATIENT)
Dept: PLASTIC SURGERY | Facility: CLINIC | Age: 56
End: 2021-12-14
Payer: MEDICARE

## 2021-12-14 VITALS — WEIGHT: 200 LBS | TEMPERATURE: 98 F | HEIGHT: 66 IN | BODY MASS INDEX: 32.14 KG/M2

## 2021-12-14 DIAGNOSIS — L81.8 OTHER SPECIFIED DISORDERS OF PIGMENTATION: ICD-10-CM

## 2021-12-14 PROCEDURE — 99024 POSTOP FOLLOW-UP VISIT: CPT

## 2021-12-14 NOTE — PHYSICAL EXAM
[Normal] : supple, no neck mass and thyroid not enlarged [Normal Neck Lymph Nodes] : normal neck lymph nodes  [Normal Supraclavicular Lymph Nodes] : normal supraclavicular lymph nodes [Normal Axillary Lymph Nodes] : normal axillary lymph nodes [Normal] : full range of motion and no deformities appreciated [de-identified] : Groins not examined [de-identified] : Below

## 2021-12-14 NOTE — HISTORY OF PRESENT ILLNESS
[de-identified] : 56-year-old man referred by his dermatologist (Dr. James DELGADILLO) with an atypical melanocytic lesion of his LEFT UPPER BACK.\par A 0.10 MM MELANOMA CANNOT BE RULED OUT\par \par This was an asymptomatic pigmented lesion of unclear duration noticed on baseline skin examination at a new practice.\par His last visit was at least 2019.\par \par No previous personal history of skin cancer.\par \par No prior personal history of malignancy.\par \par No relatives with skin cancer.\par \par No family history of malignancy.\par \par \par + Personal history:\par + Psoriasis.\par \par ~2012 I resected a large lipoma from his back\par \par \par Derm: Dr. James DELGADILLO.\par \par \par PMD: Dr. Randell DURAN.\par He is also a cardiologist.\par \par No pacemaker or defibrillator.\par No anticoagulants.\par \par 81 mg aspirin daily.\par \par \par +CROHN'S.\par Previous small bowel resection, 2018.\par Previous hospitalizations for SBO.\par Medical regimen consists of Stelara.\par GI: Dr. Darrin ADAMS\par CRS: Dr. Lalit HAYDEN\par \par + Cluster headaches.\par Treated with Aimovig.\par Neurologist: Dr. 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 He uses Singulair, Spiriva, Xyzal, and Advair\par Pulmonary: Dr. Leonel HANLEY\par \par + GERD.\par Symptoms treated with esomeprazole.\par Endoscopically proven, GI: Dr. Darrin Adams.\par \par + Gout.\par He takes allopurinol.\par Rheumatology: Dr. Avram GOLDBERG\par \par \par Summer 2020: Hospitalized with left foot cellulitis.\par History of comminuted left calcaneal fracture.\par \par \par June 2020 colonoscopy (Dr. Vikram RODNEY)\par \par :\par No worrisome findings.

## 2021-12-14 NOTE — REASON FOR VISIT
[Initial Consultation] : an initial consultation for [Other: _____] : [unfilled] [FreeTextEntry2] : Left back, atypical melanocytic lesion, ***a 0.10 mm melanoma is a possibility

## 2021-12-14 NOTE — ASSESSMENT
[FreeTextEntry1] : \par \par \par We had a discussion regarding his recently diagnosed atypical melanocytic lesion of the left back.\par It could possibly represent a T1a (0.10 mm melanoma)\par \par I explained the indications and technique for appropriate excision of the area.\par We will coordinate with plastic surgery\par \par Reviewed in detail, all questions answered.\par \par He would like to proceed with the operation, paperwork submitted.\par \par Note dictated to his referring physicians.\par \par \par \par 12/14/21:\par We spoke.\par November 30, 2021, he had wide excision of a possible 0.10 mm melanoma from the left back.\par Plastics closure: Dr. Patel Santizo.\par Surgical pathology:\par No residual melanoma.\par + Scar from prior biopsy.\par \par Presently, no further intervention is warranted.\par Note dictated to the referring physician.\par My office will call to schedule a postoperative visit

## 2021-12-14 NOTE — REVIEW OF SYSTEMS
[Negative] : Heme/Lymph [FreeTextEntry5] : CAD [FreeTextEntry8] : Crohn's [de-identified] : History of soft tissue neoplasm of the back, resected [de-identified] : Atypical melanocytic lesion [de-identified] : Cluster headaches

## 2021-12-15 ENCOUNTER — APPOINTMENT (OUTPATIENT)
Dept: PULMONOLOGY | Facility: CLINIC | Age: 56
End: 2021-12-15
Payer: MEDICARE

## 2021-12-15 ENCOUNTER — NON-APPOINTMENT (OUTPATIENT)
Age: 56
End: 2021-12-15

## 2021-12-15 VITALS
SYSTOLIC BLOOD PRESSURE: 140 MMHG | HEIGHT: 65 IN | RESPIRATION RATE: 16 BRPM | BODY MASS INDEX: 32.99 KG/M2 | OXYGEN SATURATION: 98 % | TEMPERATURE: 97.2 F | DIASTOLIC BLOOD PRESSURE: 70 MMHG | HEART RATE: 71 BPM | WEIGHT: 198 LBS

## 2021-12-15 PROCEDURE — 95012 NITRIC OXIDE EXP GAS DETER: CPT

## 2021-12-15 PROCEDURE — 94010 BREATHING CAPACITY TEST: CPT

## 2021-12-15 PROCEDURE — 99214 OFFICE O/P EST MOD 30 MIN: CPT | Mod: 25

## 2021-12-15 RX ORDER — AMOXICILLIN AND CLAVULANATE POTASSIUM 875; 125 MG/1; MG/1
875-125 TABLET, COATED ORAL
Qty: 28 | Refills: 0 | Status: DISCONTINUED | COMMUNITY
Start: 2021-01-06 | End: 2021-12-15

## 2021-12-15 NOTE — ADDENDUM
[FreeTextEntry1] : Documented by Pete Ledezma acting as a scribe for Dr. Leonel Duckworth on 12/15/2021 \par \par All medical record entries made by the Scribe were at my, Dr. Leonel Duckworth's, direction and personally dictated by me on 12/15/2021 . I have reviewed the chart and agree that the record accurately reflects my personal performance of the history, physical exam, assessment and plan. I have also personally directed, reviewed, and agree with the discharge instructions

## 2021-12-15 NOTE — ASSESSMENT
[FreeTextEntry1] : Mr. Mota is a 56 year old male doing well from a pulmonary perspective, is s/p bowel obstruction s/p GI surgery, s/p hospitalization with cluster headaches / tinnitus, with a history of severe persistent asthma, allergies, OSAS, GERD. He is currently stable but GI/ ortho issues/ Blood sugar \par \par Problem 1: Severe persistent asthma  - asthmatic bronchitis\par -s/p Prednisone; 30 mg for 5 days, then 20 mg for 5 days, then 10 mg for 5 days (1/2021)\par Information sheet given to the patient to be reviewed, this medication is never to be used without consulting the prescribing physician. Proper dietary restraint is necessary specifically salt containing foods, if any reaction may occur should be reported. \par \par -continue Albuterol by the nebulizer QID, prn \par -continue Symbicort 160 at 2 inhalations BID\par -continue Spiriva 2 puffs QD\par -continue Singulair 10 mg QHS\par -Asthma is believed to be caused by inherited (genetic) and environmental factor, but its exact cause is unknown. Asthma may be triggered by allergens, lung infections, or irritants in the air. Asthma triggers are different for each person\par -Inhaler technique reviewed as well as oral hygiene techniques reviewed with patient. Avoidance of cold air, extremes of temperature, rescue inhaler should be used before exercise. Order of medication reviewed with patient. Recommended use of a cool mist humidifier in the bedroom. \par \par Problem 2: allergies\par -continue Xyzal 5 mg QHS\par -continue Qnasl 1 sniff/nostril BID \par -continue Astelin 0.15% 1 sniff BID\par -Environmental measures for allergies were encouraged including mattress and pillow cover, air purifier, and environmental controls.\par \par \par Problem 3: GERD\par -continue Nexium 40 mg QAM, before breakfast\par -Add Pepcid 40 mg QHS \par -Rule of 2's- Avoid eating too much, too late, too poorly, too spicy, or two hours before bed \par -Things to avoid including overeating, spicy foods, tight clothing, eating within three hours of bed, this list is not all inclusive. \par -For treatment of reflux, possible options discussed including diet control, H2 blockers, PPIs, as well as coating motility agents discussed as treatment options. Timing of meals and proximity of last meal to sleep were discussed. If symptoms persist, a formal gastrointestinal evaluation is needed.\par \par Problem 4: OSAS -(reinforced)\par -continue to use the APAP machine, tolerating it well and seeing benefits; recall of Dreamstation (Aircurve)\par -prescription given for him to try new masks\par -Good sleep hygiene was encouraged including avoiding watching television an hour before bed, keeping caffeine at a low, avoiding reading in bed, no drinking any liquids three hours before bedtime, and only getting into bed when tired. \par - Discussed the risks/associations with coronary artery disease, atrial fibrillation, arrhythmia, memory loss, issues with concentration, hypertension, nocturia, chronic reflux/Olvera’s esophagus some but not all inclusive. Treatment options discussed including CPAP/BiPAP machine, oral appliance, ProVent therapy, new technologies, or positional sleep.\par \par Problem 5: Obesity "poor"\par -recommended "MUNIQ" OTC supplement \par Weight loss, exercise, and diet control were discussed and are highly encouraged. Treatment options were given such as, aqua therapy, and contacting a nutritionist. Recommended to use the elliptical, stationary bike, less use of treadmill. Mindful eating was explained to the patient Obesity is associated with worsening asthma, shortness of breath, and potential for cardiac disease, diabetes, and other underlying medical conditions.  \par \par Problem 6: CAD\par -as per Dr. Fernández, likely the downstream effect of uncontrolled total body inflammation\par -He is not to use Adenosine for his stress test\par \par problem 7: bowel obstruction, s/p surgery\par -encouraged to look at functional medicine consult, referred to Dr. Sheryl Leventhal,\par  -GI follow up with Dr. Caicedo / Marilu et al.\par \par Problem 8: Tinnitus\par -Recommended to take No Flush Niacin, Vitamin B and C, and Zinc \par \par Problem 9: psoriasis / winter eczema\par -recommended to use Borage / Flax Seed Oil \par \par problem 10: Health Maintenance/COVID19 Precautions:\par - S/p Covid 19 vaccine (Moderna) x3\par - Clean your hands often. Wash your hands often with soap and water for at least 20 seconds, especially after blowing your nose, coughing, or sneezing, or having been in a public place.\par - If soap and water are not available, use a hand  that contains at least 60% alcohol.\par - To the extent possible, avoid touching high-touch surfaces in public places - elevator buttons, door handles, handrails, handshaking with people, etc. Use a tissue or your sleeve to cover your hand or finger if you must touch something.\par - Wash your hands after touching surfaces in public places.\par - Avoid touching your face, nose, eyes, etc.\par - Clean and disinfect your home to remove germs: practice routine cleaning of frequently touched surfaces (for example: tables, doorknobs, light switches, handles, desks, toilets, faucets, sinks & cell phones)\par - Avoid crowds, especially in poorly ventilated spaces. Your risk of exposure to respiratory viruses like COVID-19 may increase in crowded, closed-in settings with little air circulation if there are people in the crowd who are sick. All patients are recommended to practice social distancing and stay at least 6 feet away from others.\par - Avoid all non-essential travel including plane trips, and especially avoid embarking on cruise ships.\par -If COVID-19 is spreading in your community, take extra measures to put distance between yourself and other people to further reduce your risk of being exposed to this new virus.\par -Stay home as much as possible.\par - Consider ways of getting food brought to your house through family, social, or commercial networks\par -Be aware that the virus has been known to live in the air up to 3 hours post exposure, cardboard up to 24 hours post exposure, copper up to 4 hours post exposure, steel and plastic up to 2-3 days post exposure. Risk of transmission from these surfaces are affected by many variables.\par Immune Support Recommendations:\par -OTC Vitamin C 500mg BID \par -OTC Quercetin 250-500mg BID \par -OTC Zinc 75-100mg per day \par -OTC Melatonin 1 or 2 mg a night \par -OTC Vitamin D 1-4000mg per day \par -OTC Tonic Water 8oz per day\par Asthma and COVID19:\par You need to make sure your asthma is under control. This often requires the use of inhaled corticosteroids (and sometimes oral corticosteroids). Inhaled corticosteroids do not likely reduce your immune system’s ability to fight infections, but oral corticosteroids may. It is important to use the steps above to protect yourself to limit your exposure to any respiratory virus. \par \par problem 12: health maintenance\par -recommended functional medicine consultation \par -s/p flu shot in 2021\par -recommended strep pneumonia vaccines: Prevnar-13 vaccine, followed by Pneumo vaccine 23 on year following\par -recommended early intervention for URIs\par -recommended osteoporosis evaluations\par -recommended early dermatological evaluations\par -recommended after the age of 50 to the age of 70, colonoscopy every 5 years\par \par Follow up in 4 months\par Encouraged to follow up with questions, concerns, and changes.

## 2021-12-15 NOTE — PROCEDURE
[FreeTextEntry1] : FENO was 21 ; a normal value being less than 25\par Fractional exhaled nitric oxide (FENO) is regarded as a simple, noninvasive method for assessing eosinophilic airway inflammation. Produced by a variety of cells within the lung, nitric oxide (NO) concentrations are generally low in healthy individuals. However, high concentrations of NO appear to be involved in nonspecific host defense mechanisms and chronic inflammatory diseases such as asthma. The American Thoracic Society (ATS) therefore has recommended using FENO to aid in the diagnosis and monitoring of eosinophilic airway inflammation and asthma, and for identifying steroid responsive individuals whose chronic respiratory symptoms may be caused by airway inflammation. \par \par PFT revealed normal flows, with a FEV1 of 3.07L,which is 98% of predicted with a normal flow volume loop

## 2021-12-27 NOTE — REVIEW OF SYSTEMS
----- Message from Naz Cervantes CNP sent at 12/24/2021  1:18 PM CST -----  -Blood work shows anemia and low iron levels.  Start iron supplement as discussed at office visit.  Recheck CBC and iron levels in 8 weeks.  -CMP, lipids and thyroid are normal  -Vitamin D is low-start vitamin D3 2000 IUs daily if not already taking  
Spoke to pt in regards lab results and provider instructions, pt agreed.  
[Negative] : Endocrine

## 2022-01-03 ENCOUNTER — TRANSCRIPTION ENCOUNTER (OUTPATIENT)
Age: 57
End: 2022-01-03

## 2022-01-10 DIAGNOSIS — I25.10 ATHEROSCLEROTIC HEART DISEASE OF NATIVE CORONARY ARTERY W/OUT ANGINA PECTORIS: ICD-10-CM

## 2022-02-25 ENCOUNTER — TRANSCRIPTION ENCOUNTER (OUTPATIENT)
Age: 57
End: 2022-02-25

## 2022-04-12 ENCOUNTER — TRANSCRIPTION ENCOUNTER (OUTPATIENT)
Age: 57
End: 2022-04-12

## 2022-04-26 ENCOUNTER — APPOINTMENT (OUTPATIENT)
Dept: CT IMAGING | Facility: IMAGING CENTER | Age: 57
End: 2022-04-26
Payer: MEDICARE

## 2022-04-26 ENCOUNTER — RESULT REVIEW (OUTPATIENT)
Age: 57
End: 2022-04-26

## 2022-04-26 ENCOUNTER — OUTPATIENT (OUTPATIENT)
Dept: OUTPATIENT SERVICES | Facility: HOSPITAL | Age: 57
LOS: 1 days | End: 2022-04-26
Payer: MEDICARE

## 2022-04-26 DIAGNOSIS — Z98.89 OTHER SPECIFIED POSTPROCEDURAL STATES: Chronic | ICD-10-CM

## 2022-04-26 DIAGNOSIS — Z87.898 PERSONAL HISTORY OF OTHER SPECIFIED CONDITIONS: Chronic | ICD-10-CM

## 2022-04-26 DIAGNOSIS — Z00.8 ENCOUNTER FOR OTHER GENERAL EXAMINATION: ICD-10-CM

## 2022-04-26 DIAGNOSIS — S92.009A UNSPECIFIED FRACTURE OF UNSPECIFIED CALCANEUS, INITIAL ENCOUNTER FOR CLOSED FRACTURE: Chronic | ICD-10-CM

## 2022-04-26 DIAGNOSIS — Z90.49 ACQUIRED ABSENCE OF OTHER SPECIFIED PARTS OF DIGESTIVE TRACT: Chronic | ICD-10-CM

## 2022-04-26 PROCEDURE — 72192 CT PELVIS W/O DYE: CPT | Mod: MH

## 2022-04-26 PROCEDURE — 72192 CT PELVIS W/O DYE: CPT | Mod: 26,MH

## 2022-05-03 ENCOUNTER — OUTPATIENT (OUTPATIENT)
Dept: OUTPATIENT SERVICES | Facility: HOSPITAL | Age: 57
LOS: 1 days | End: 2022-05-03
Payer: MEDICARE

## 2022-05-03 ENCOUNTER — RESULT REVIEW (OUTPATIENT)
Age: 57
End: 2022-05-03

## 2022-05-03 ENCOUNTER — APPOINTMENT (OUTPATIENT)
Dept: CT IMAGING | Facility: IMAGING CENTER | Age: 57
End: 2022-05-03
Payer: MEDICARE

## 2022-05-03 DIAGNOSIS — Z98.89 OTHER SPECIFIED POSTPROCEDURAL STATES: Chronic | ICD-10-CM

## 2022-05-03 DIAGNOSIS — Z00.8 ENCOUNTER FOR OTHER GENERAL EXAMINATION: ICD-10-CM

## 2022-05-03 DIAGNOSIS — Z90.49 ACQUIRED ABSENCE OF OTHER SPECIFIED PARTS OF DIGESTIVE TRACT: Chronic | ICD-10-CM

## 2022-05-03 DIAGNOSIS — Z87.898 PERSONAL HISTORY OF OTHER SPECIFIED CONDITIONS: Chronic | ICD-10-CM

## 2022-05-03 DIAGNOSIS — S92.009A UNSPECIFIED FRACTURE OF UNSPECIFIED CALCANEUS, INITIAL ENCOUNTER FOR CLOSED FRACTURE: Chronic | ICD-10-CM

## 2022-05-03 PROCEDURE — 76700 US EXAM ABDOM COMPLETE: CPT

## 2022-05-03 PROCEDURE — 74177 CT ABD & PELVIS W/CONTRAST: CPT | Mod: MH

## 2022-05-03 PROCEDURE — 76856 US EXAM PELVIC COMPLETE: CPT | Mod: 26

## 2022-05-03 PROCEDURE — 76700 US EXAM ABDOM COMPLETE: CPT | Mod: 26

## 2022-05-03 PROCEDURE — 76856 US EXAM PELVIC COMPLETE: CPT

## 2022-05-03 PROCEDURE — 74177 CT ABD & PELVIS W/CONTRAST: CPT | Mod: 26,MH

## 2022-06-15 ENCOUNTER — NON-APPOINTMENT (OUTPATIENT)
Age: 57
End: 2022-06-15

## 2022-06-15 ENCOUNTER — APPOINTMENT (OUTPATIENT)
Dept: PULMONOLOGY | Facility: CLINIC | Age: 57
End: 2022-06-15
Payer: MEDICARE

## 2022-06-15 VITALS
RESPIRATION RATE: 16 BRPM | WEIGHT: 165 LBS | BODY MASS INDEX: 27.49 KG/M2 | OXYGEN SATURATION: 99 % | HEIGHT: 65 IN | TEMPERATURE: 97.2 F | DIASTOLIC BLOOD PRESSURE: 72 MMHG | HEART RATE: 71 BPM | SYSTOLIC BLOOD PRESSURE: 114 MMHG

## 2022-06-15 PROCEDURE — 95012 NITRIC OXIDE EXP GAS DETER: CPT

## 2022-06-15 PROCEDURE — 94010 BREATHING CAPACITY TEST: CPT

## 2022-06-15 PROCEDURE — 99214 OFFICE O/P EST MOD 30 MIN: CPT | Mod: 25

## 2022-06-15 RX ORDER — PREDNISONE 10 MG/1
10 TABLET ORAL
Qty: 50 | Refills: 0 | Status: DISCONTINUED | COMMUNITY
Start: 2021-01-06 | End: 2022-06-15

## 2022-06-15 RX ORDER — ALLOPURINOL 200 MG/1
TABLET ORAL
Refills: 0 | Status: ACTIVE | COMMUNITY

## 2022-06-15 RX ORDER — EVOLOCUMAB 140 MG/ML
140 INJECTION, SOLUTION SUBCUTANEOUS
Refills: 0 | Status: ACTIVE | COMMUNITY

## 2022-06-15 RX ORDER — SODIUM SULFATE, POTASSIUM SULFATE, MAGNESIUM SULFATE 17.5; 3.13; 1.6 G/ML; G/ML; G/ML
17.5-3.13-1.6 SOLUTION, CONCENTRATE ORAL
Qty: 1 | Refills: 0 | Status: DISCONTINUED | COMMUNITY
Start: 2020-05-14 | End: 2022-06-15

## 2022-06-15 NOTE — PROCEDURE
[FreeTextEntry1] : Feno was 16; a normal value being less than 25. Fractional exhaled nitric oxide (FENO) is regarded as a simple, noninvasive method for assessing eosinophilic airway inflammation. Produced by a variety of cells within the lung, nitric oxide (NO) concentrations are generally low in healthy individuals. However, high concentrations of NO appear to be involved in nonspecific host defense mechanisms and chronic inflammatory  diseases such as asthma. The American Thoracic Society (ATS) therefore recommended using FENO to aid in the diagnosis and monitoring of eosinophilic airway inflammation and asthma, and for identifying steroid responsive individuals whose chronic respiratory symptoms may be caused by airway inflammation \par \par PFT revealed normal flows, with a FEV1 of 3.18L, which is 102% of predicted, with a normal flow volume loop \par \par CT Enterography (5/3/2022) revealed concentric wall thickening and mucosal hyperenhancement of the terminal ileum immediately distal to the small bowel anastomosis, suspicious for mildly active Crohn's disease. No evidence of bowel obstruction, fistula, or abscess.\par \par Mild arthritic changes L4,S1 with some narrowing of the disc space.\par \par Lab work (6/14/2022) revealed CRP 0.6, ESR 24

## 2022-06-15 NOTE — ADDENDUM
[FreeTextEntry1] : Documented by Nayan Walker acting as a scribe for Dr. Leonel Duckworth on 06/15/2022.\par \par All medical record entries made by the Scribe were at my, Dr. Leonel Duckworth's, direction and personally dictated by me on 06/15/2022. I have reviewed the chart and agree that the record accurately reflects my personal performance of the history, physical exam, assessment and plan. I have also personally directed, reviewed, and agree with the discharge instructions.

## 2022-06-15 NOTE — ASSESSMENT
[FreeTextEntry1] : Mr. Mota is a 56 year old male doing well from a pulmonary perspective, is s/p bowel obstruction s/p GI surgery, s/p hospitalization with cluster headaches / tinnitus, with a history of severe persistent asthma, allergies, OSAS, GERD. He is currently stable but GI/ ortho issues/ Blood sugar  - s/p coronary stents (9-last 12/2021)\par \par Problem 1: Severe persistent asthma  - asthmatic bronchitis\par -s/p Prednisone; 30 mg for 5 days, then 20 mg for 5 days, then 10 mg for 5 days (1/2021)\par -Information sheet given to the patient to be reviewed, this medication is never to be used without consulting the prescribing physician. Proper dietary restraint is necessary specifically salt containing foods, if any reaction may occur should be reported. \par -continue Albuterol by the nebulizer QID, prn \par -continue Symbicort 160 at 2 inhalations BID\par -continue Spiriva 2 puffs QD\par -continue Singulair 10 mg QHS\par -Asthma is believed to be caused by inherited (genetic) and environmental factor, but its exact cause is unknown. Asthma may be triggered by allergens, lung infections, or irritants in the air. Asthma triggers are different for each person\par -Inhaler technique reviewed as well as oral hygiene techniques reviewed with patient. Avoidance of cold air, extremes of temperature, rescue inhaler should be used before exercise. Order of medication reviewed with patient. Recommended use of a cool mist humidifier in the bedroom. \par \par Problem 2: allergies\par -continue Xyzal 5 mg QHS\par -continue Qnasl 1 sniff/nostril BID \par -continue Astelin 0.15% 1 sniff BID\par -Environmental measures for allergies were encouraged including mattress and pillow cover, air purifier, and environmental controls.\par \par \par Problem 3: GERD\par -off Nexium 40 mg QAM, before breakfast\par -Add Pepcid 40 mg QHS \par -Rule of 2's- Avoid eating too much, too late, too poorly, too spicy, or two hours before bed \par -Things to avoid including overeating, spicy foods, tight clothing, eating within three hours of bed, this list is not all inclusive. \par -For treatment of reflux, possible options discussed including diet control, H2 blockers, PPIs, as well as coating motility agents discussed as treatment options. Timing of meals and proximity of last meal to sleep were discussed. If symptoms persist, a formal gastrointestinal evaluation is needed.\par \par Problem 4: OSAS -(reinforced)\par -continue to use the APAP machine, tolerating it well and seeing benefits; recall of Dreamstation (Aircurve)\par -prescription given for him to try new masks\par -Good sleep hygiene was encouraged including avoiding watching television an hour before bed, keeping caffeine at a low, avoiding reading in bed, no drinking any liquids three hours before bedtime, and only getting into bed when tired. \par - Discussed the risks/associations with coronary artery disease, atrial fibrillation, arrhythmia, memory loss, issues with concentration, hypertension, nocturia, chronic reflux/Olvera’s esophagus some but not all inclusive. Treatment options discussed including CPAP/BiPAP machine, oral appliance, ProVent therapy, new technologies, or positional sleep.\par \par Problem 5: Obesity "poor"\par -Recommended Vladislav Willard's 10-day detox diet and book. \par -recommended "MUNIQ" OTC supplement \par Weight loss, exercise, and diet control were discussed and are highly encouraged. Treatment options were given such as, aqua therapy, and contacting a nutritionist. Recommended to use the elliptical, stationary bike, less use of treadmill. Mindful eating was explained to the patient Obesity is associated with worsening asthma, shortness of breath, and potential for cardiac disease, diabetes, and other underlying medical conditions.  \par \par Problem 6: CAD (9 stents 12/2021)\par -as per Dr. Fernández, likely the downstream effect of uncontrolled total body inflammation\par -He is not to use Adenosine for his stress test\par \par problem 7: bowel obstruction, s/p surgery\par -encouraged to look at functional medicine consult, referred to Dr. Sheryl Leventhal,\par  -GI follow up with Dr. Caicedo / Marilu et al.\par \par Problem 8: Tinnitus\par -Recommended to take No Flush Niacin, Vitamin B and C, and Zinc \par \par Problem 9: psoriasis / winter eczema\par -recommended to use Borage / Flax Seed Oil \par \par problem 10: Health Maintenance/COVID19 Precautions:--Functional Medicine evaln\par -recommended book "[employer]"\par - S/p Covid 19 vaccine (Moderna) x4\par - Clean your hands often. Wash your hands often with soap and water for at least 20 seconds, especially after blowing your nose, coughing, or sneezing, or having been in a public place.\par - If soap and water are not available, use a hand  that contains at least 60% alcohol.\par - To the extent possible, avoid touching high-touch surfaces in public places - elevator buttons, door handles, handrails, handshaking with people, etc. Use a tissue or your sleeve to cover your hand or finger if you must touch something.\par - Wash your hands after touching surfaces in public places.\par - Avoid touching your face, nose, eyes, etc.\par - Clean and disinfect your home to remove germs: practice routine cleaning of frequently touched surfaces (for example: tables, doorknobs, light switches, handles, desks, toilets, faucets, sinks & cell phones)\par - Avoid crowds, especially in poorly ventilated spaces. Your risk of exposure to respiratory viruses like COVID-19 may increase in crowded, closed-in settings with little air circulation if there are people in the crowd who are sick. All patients are recommended to practice social distancing and stay at least 6 feet away from others.\par - Avoid all non-essential travel including plane trips, and especially avoid embarking on cruise ships.\par -If COVID-19 is spreading in your community, take extra measures to put distance between yourself and other people to further reduce your risk of being exposed to this new virus.\par -Stay home as much as possible.\par - Consider ways of getting food brought to your house through family, social, or commercial networks\par -Be aware that the virus has been known to live in the air up to 3 hours post exposure, cardboard up to 24 hours post exposure, copper up to 4 hours post exposure, steel and plastic up to 2-3 days post exposure. Risk of transmission from these surfaces are affected by many variables.\par Immune Support Recommendations:\par -OTC Vitamin C 500mg BID \par -OTC Quercetin 250-500mg BID \par -OTC Zinc 75-100mg per day \par -OTC Melatonin 1 or 2 mg a night \par -OTC Vitamin D 1-4000mg per day \par -OTC Tonic Water 8oz per day\par Asthma and COVID19:\par You need to make sure your asthma is under control. This often requires the use of inhaled corticosteroids (and sometimes oral corticosteroids). Inhaled corticosteroids do not likely reduce your immune system’s ability to fight infections, but oral corticosteroids may. It is important to use the steps above to protect yourself to limit your exposure to any respiratory virus. \par \par problem 12: health maintenance\par -recommended functional medicine consultation \par -s/p flu shot in 2021\par -recommended strep pneumonia vaccines: Prevnar-13 vaccine, followed by Pneumo vaccine 23 on year following\par -recommended early intervention for URIs\par -recommended osteoporosis evaluations\par -recommended early dermatological evaluations\par -recommended after the age of 50 to the age of 70, colonoscopy every 5 years\par \par Follow up in 4 months\par Encouraged to follow up with questions, concerns, and changes.

## 2022-06-15 NOTE — HISTORY OF PRESENT ILLNESS
[FreeTextEntry1] : Mr. KEMP is a 56 year year old male with a history of severe persistent asthma, allergies, OSAS, GERD who presents for a follow-up pulmonary evaluation. His chief complaint is \par -he notes that his energy levels are good\par -he notes having another 5 stents placed (9 total; Carlito Shi)\par -he notes that he adjusted his diet (low carb)\par -he notes doing yoga and flexibility training\par -he notes that his balance has gotten better since changing his diet\par -he notes 5-7 bowel movements per day\par -he notes joint pains\par -he notes that he wakes up a few times per night \par -s/p covid 19 vaccine x4\par \par \par -patient denies any headaches, nausea, vomiting, fever, chills, sweats, chest pain, chest pressure, palpitations, coughing, wheezing, fatigue, diarrhea, constipation, dysphagia, myalgias, dizziness, leg swelling, leg pain, itchy eyes, itchy ears, heartburn, reflux or sour taste in the mouth

## 2022-07-22 ENCOUNTER — TRANSCRIPTION ENCOUNTER (OUTPATIENT)
Age: 57
End: 2022-07-22

## 2022-08-15 ENCOUNTER — NON-APPOINTMENT (OUTPATIENT)
Age: 57
End: 2022-08-15

## 2022-09-07 ENCOUNTER — TRANSCRIPTION ENCOUNTER (OUTPATIENT)
Age: 57
End: 2022-09-07

## 2022-10-01 ENCOUNTER — RX RENEWAL (OUTPATIENT)
Age: 57
End: 2022-10-01

## 2022-11-30 ENCOUNTER — NON-APPOINTMENT (OUTPATIENT)
Age: 57
End: 2022-11-30

## 2022-12-14 ENCOUNTER — NON-APPOINTMENT (OUTPATIENT)
Age: 57
End: 2022-12-14

## 2022-12-14 ENCOUNTER — APPOINTMENT (OUTPATIENT)
Dept: PULMONOLOGY | Facility: CLINIC | Age: 57
End: 2022-12-14

## 2022-12-14 VITALS
SYSTOLIC BLOOD PRESSURE: 116 MMHG | OXYGEN SATURATION: 98 % | WEIGHT: 172 LBS | HEART RATE: 77 BPM | DIASTOLIC BLOOD PRESSURE: 68 MMHG | RESPIRATION RATE: 16 BRPM | BODY MASS INDEX: 27.64 KG/M2 | TEMPERATURE: 97.3 F | HEIGHT: 66 IN

## 2022-12-14 PROCEDURE — 94010 BREATHING CAPACITY TEST: CPT

## 2022-12-14 PROCEDURE — 95012 NITRIC OXIDE EXP GAS DETER: CPT

## 2022-12-14 PROCEDURE — 99214 OFFICE O/P EST MOD 30 MIN: CPT | Mod: 25

## 2022-12-14 NOTE — ASSESSMENT
[FreeTextEntry1] : Mr. Mota is a 57 year old male doing well from a pulmonary perspective, is s/p bowel obstruction s/p GI surgery, s/p hospitalization with cluster headaches / tinnitus, with a history of severe persistent asthma, allergies, OSAS, GERD. He is currently stable but GI/ ortho issues/ Blood sugar  - s/p coronary stents (9-last 12/2021)- stable with diet/ weight loss (Keto)\par \par Problem 1: Severe persistent asthma  - asthmatic bronchitis\par -s/p Prednisone; 30 mg for 5 days, then 20 mg for 5 days, then 10 mg for 5 days (1/2021)\par -Information sheet given to the patient to be reviewed, this medication is never to be used without consulting the prescribing physician. Proper dietary restraint is necessary specifically salt containing foods, if any reaction may occur should be reported. \par -continue Albuterol by the nebulizer QID, prn \par -continue Symbicort 160 at 2 inhalations BID\par -continue Spiriva 2 puffs QD\par -continue Singulair 10 mg QHS\par -Asthma is believed to be caused by inherited (genetic) and environmental factor, but its exact cause is unknown. Asthma may be triggered by allergens, lung infections, or irritants in the air. Asthma triggers are different for each person\par -Inhaler technique reviewed as well as oral hygiene techniques reviewed with patient. Avoidance of cold air, extremes of temperature, rescue inhaler should be used before exercise. Order of medication reviewed with patient. Recommended use of a cool mist humidifier in the bedroom. \par \par Problem 2: allergies\par -continue Xyzal 5 mg QHS\par -continue Qnasl 1 sniff/nostril BID \par -continue Astelin 0.15% 1 sniff BID\par -Environmental measures for allergies were encouraged including mattress and pillow cover, air purifier, and environmental controls.\par \par Problem 3: GERD\par -off Nexium 40 mg QAM, before breakfast\par -Add Pepcid 40 mg QHS \par -Rule of 2's- Avoid eating too much, too late, too poorly, too spicy, or two hours before bed \par -Things to avoid including overeating, spicy foods, tight clothing, eating within three hours of bed, this list is not all inclusive. \par -For treatment of reflux, possible options discussed including diet control, H2 blockers, PPIs, as well as coating motility agents discussed as treatment options. Timing of meals and proximity of last meal to sleep were discussed. If symptoms persist, a formal gastrointestinal evaluation is needed.\par \par Problem 4: OSAS -(reinforced)\par -repeat home sleep study \par -continue to use the APAP machine, tolerating it well and seeing benefits; recall of Dreamstation (Aircurve)\par -prescription given for him to try new masks\par -Good sleep hygiene was encouraged including avoiding watching television an hour before bed, keeping caffeine at a low, avoiding reading in bed, no drinking any liquids three hours before bedtime, and only getting into bed when tired. \par - Discussed the risks/associations with coronary artery disease, atrial fibrillation, arrhythmia, memory loss, issues with concentration, hypertension, nocturia, chronic reflux/Olvera’s esophagus some but not all inclusive. Treatment options discussed including CPAP/BiPAP machine, oral appliance, ProVent therapy, new technologies, or positional sleep.\par \par Problem 5: Obesity "poor"\par -Recommended Vladislav Willard's 10-day detox diet and book. \par -recommended "MUNIQ" OTC supplement \par Weight loss, exercise, and diet control were discussed and are highly encouraged. Treatment options were given such as, aqua therapy, and contacting a nutritionist. Recommended to use the elliptical, stationary bike, less use of treadmill. Mindful eating was explained to the patient Obesity is associated with worsening asthma, shortness of breath, and potential for cardiac disease, diabetes, and other underlying medical conditions.  \par \par Problem 6: CAD (9 stents 12/2021)\par -as per Dr. Fernández, likely the downstream effect of uncontrolled total body inflammation\par -He is not to use Adenosine for his stress test\par \par problem 7: bowel obstruction, s/p surgery\par -encouraged to look at functional medicine consult, referred to Dr. Sheryl Leventhal,\par  -GI follow up with Dr. Caicedo / Marilu et al.\par \par Problem 8: Tinnitus\par -Recommended to take No Flush Niacin, Vitamin B and C, and Zinc \par \par Problem 9: psoriasis / winter eczema\par -recommended to use Borage / Flax Seed Oil \par \par problem 10: Health Maintenance/COVID19 Precautions:--Functional Medicine evaln\par -recommended book "[employer]"\par - S/p Covid 19 vaccine (Moderna) x 6\par - Clean your hands often. Wash your hands often with soap and water for at least 20 seconds, especially after blowing your nose, coughing, or sneezing, or having been in a public place.\par - If soap and water are not available, use a hand  that contains at least 60% alcohol.\par - To the extent possible, avoid touching high-touch surfaces in public places - elevator buttons, door handles, handrails, handshaking with people, etc. Use a tissue or your sleeve to cover your hand or finger if you must touch something.\par - Wash your hands after touching surfaces in public places.\par - Avoid touching your face, nose, eyes, etc.\par - Clean and disinfect your home to remove germs: practice routine cleaning of frequently touched surfaces (for example: tables, doorknobs, light switches, handles, desks, toilets, faucets, sinks & cell phones)\par - Avoid crowds, especially in poorly ventilated spaces. Your risk of exposure to respiratory viruses like COVID-19 may increase in crowded, closed-in settings with little air circulation if there are people in the crowd who are sick. All patients are recommended to practice social distancing and stay at least 6 feet away from others.\par - Avoid all non-essential travel including plane trips, and especially avoid embarking on cruise ships.\par -If COVID-19 is spreading in your community, take extra measures to put distance between yourself and other people to further reduce your risk of being exposed to this new virus.\par -Stay home as much as possible.\par - Consider ways of getting food brought to your house through family, social, or commercial networks\par -Be aware that the virus has been known to live in the air up to 3 hours post exposure, cardboard up to 24 hours post exposure, copper up to 4 hours post exposure, steel and plastic up to 2-3 days post exposure. Risk of transmission from these surfaces are affected by many variables.\par Immune Support Recommendations:\par -OTC Vitamin C 500mg BID \par -OTC Quercetin 250-500mg BID \par -OTC Zinc 75-100mg per day \par -OTC Melatonin 1 or 2 mg a night \par -OTC Vitamin D 1-4000mg per day \par -OTC Tonic Water 8oz per day\par Asthma and COVID19:\par You need to make sure your asthma is under control. This often requires the use of inhaled corticosteroids (and sometimes oral corticosteroids). Inhaled corticosteroids do not likely reduce your immune system’s ability to fight infections, but oral corticosteroids may. It is important to use the steps above to protect yourself to limit your exposure to any respiratory virus. \par \par problem 12: health maintenance\par -recommended functional medicine consultation \par -s/p flu shot in 2022\par -recommended strep pneumonia vaccines: Prevnar-13 vaccine, followed by Pneumo vaccine 23 on year following\par -recommended early intervention for URIs\par -recommended osteoporosis evaluations\par -recommended early dermatological evaluations\par -recommended after the age of 50 to the age of 70, colonoscopy every 5 years\par \par Follow up in 4 months\par Encouraged to follow up with questions, concerns, and changes.

## 2022-12-14 NOTE — HISTORY OF PRESENT ILLNESS
[FreeTextEntry1] : Mr. KEMP is a 56 year year old male with a history of severe persistent asthma, allergies, OSAS, GERD who presents for a follow-up pulmonary evaluation. His chief complaint is \par \par -s/p biopsy for spine that was benign \par -he notes generally okay\par -he notes increased fatigue\par -he denies exercise due to back and leg pain \par -he notes use of CPAP and tolerating well\par -he notes weight is stable at 195-200 but looking to lose\par -he notes chronic arthralgia \par -he notes skin is more sensitivity \par -he notes  in bathroom 8 times a day due to loose bowels\par -he notes left eye vision decreased from baseline \par -he notes glucose is high\par -he notes sinus congestion with use of saline PRN\par -he notes heat is on at his house \par -he denies getting a lot of sleep due to interruptions 3-4 times a night \par \par -denies any visual issues, headaches, nausea, vomiting, fever, chills, sweats, chest pain, chest pressure, constipation, dysphagia, dizziness, leg swelling, itchy eyes, itchy ears, heartburn, reflux, or sour taste in the mouth.  room air

## 2022-12-14 NOTE — HISTORY OF PRESENT ILLNESS
[FreeTextEntry1] : Mr. KEMP is a 56 year year old male with a history of severe persistent asthma, allergies, OSAS, GERD who presents for a follow-up pulmonary evaluation. His chief complaint is \par \par -he notes generally feeling good \par -he notes persistent heel issue \par -he notes s/p physical therapy but looking to restart again in January \par -he notes energy level is stable \par -he notes persistent diarrhea \par -he notes bowel movements decreased decreased to 5 times with Keto diet \par -he notes heartburn improved \par -he notes currently using Nexium PRN\par -he notes decrease in weight with improved with Keto diet \par -he notes getting about 7-8 hrs of sleep \par -he notes sleep is interrupted by nocturia 1-2 \par -he notes off APAP device\par -he notes regular exercise \par -he notes breathing is stable \par \par -he denies any headaches, nausea, vomiting, fever, chills, sweats, chest pain, chest pressure, coughing, wheezing, palpitations, constipation, dizziness, dysphagia, itchy eyes, itchy ears, leg swelling, leg pain, arthralgias, myalgias, or sour taste in the mouth.

## 2022-12-14 NOTE — PHYSICAL EXAM
[No Acute Distress] : no acute distress [Normal Oropharynx] : normal oropharynx [III] : Mallampati Class: III [Normal Appearance] : normal appearance [No Neck Mass] : no neck mass [Normal Rate/Rhythm] : normal rate/rhythm [Normal S1, S2] : normal s1, s2 [No Murmurs] : no murmurs [No Resp Distress] : no resp distress [Clear to Auscultation Bilaterally] : clear to auscultation bilaterally [No Abnormalities] : no abnormalities [Benign] : benign [Normal Gait] : normal gait [No Clubbing] : no clubbing [No Cyanosis] : no cyanosis [No Edema] : no edema [FROM] : FROM [Normal Color/ Pigmentation] : normal color/ pigmentation [No Focal Deficits] : no focal deficits [Oriented x3] : oriented x3 [Normal Affect] : normal affect [TextBox_68] : I:E ratio 1:3; mild forced expiratory wheeze

## 2022-12-14 NOTE — ADDENDUM
[facility] Psychiatry Progress Note                                                               Nena Tang is a 57 year old female who was referred by her primary care provider for treatment and evaluation of depression and psychosis  Therapist: N/A  PCP: Rowena Haas  Other Providers: N/A     History was provided by patient and care taker Zheng Barnes (tel # [phone]) who was a limited historian.    Pertinent Background:  See section specific documentation.  Psych critical item history includes suicidal ideation, psychosis [sxs include hallucinations], mutiple psychotropic trials, psych hosp (>5) and SUBSTANCE USE: cocaine and alcohol     Interim History                                                                                                        4, 4     She complained of bilateral shoulder pain. She was seen by primary care recently and has another appointment next week.     She asked for a primary care appt today. Discussed with clinic director. A nurse will assess her and consult with one of the providers.    She reported that her mood has been stable. She reported no suicidal or homicidal thoughts.     She stated that auditory and visual hallucinations are still there but have not intensified.    She reported that she was adherent with her prescribed psychiatric medications and opted to continue them.    She talked about recently celebrating her and her  's birthday.    Also discussed her care with covering primary care provider Dr. Carr who is planning on referring her to rheumatology.     Recent Symptoms:   Depression:  suicidal ideation without plan, without intent, depressed mood, anhedonia, low energy and excessive crying  Psychosis:  delusions, auditory hallucinations and visual hallucinations    Recent Substance Use:  none reported        Social/ Family History                                  [per patient report]                             [FreeTextEntry1] : Documented by Pete Ledezma acting as a scribe for Dr. Leonel Duckworth on 12/14/2022 .\par \par All medical record entries made by the Scribe were at my, Dr. Leonel Duckworth's, direction and personally dictated by me on 12/14/2022. I have reviewed the chart and agree that the record accurately reflects my personal performance of the history, physical exam, assessment and plan. I have also personally directed, reviewed, and agree with the discharge instructions. 	       1ea,1ea   She is currently on SSDI and lives in an Adult [facility]. She is  for the last 3-4 years and was  about 12 years. She has two adult sons. She reported no history of abuse in her life    Medical / Surgical History                                                                                                                  Patient Active Problem List   Diagnosis     Osteopenia     Vitamin B12 deficiency without anemia     Hyperlipidemia LDL goal <100     Rotator cuff syndrome     Type 2 diabetes mellitus with mild nonproliferative retinopathy (H)     Illiterate     Irritable bowel syndrome     overweight - BMI >35     Takotsubo cardiomyopathy     CAD (coronary artery disease)     Restless legs syndrome (RLS)     CINDY (obstructive sleep apnea)- mild AHI 10.3     Verbal auditory hallucination     Chronic low back pain     Schizoaffective disorder, depressive type (H)     Migraine headache     HTN, goal below 140/90     Health Care Home     Lumbago     Cervicalgia     Cocaine abuse, episodic (H)     Suicidal ideation     Esophageal reflux     Mild nonproliferative diabetic retinopathy (H)     Tardive dyskinesia     Alcohol use     Left cataract     Falls frequently     History of uterine cancer     Psychophysiological insomnia     Dysuria     Asymptomatic postmenopausal status     Abdominal pain, right lower quadrant     Infectious encephalopathy     Non-intractable vomiting with nausea     Thoughts of self harm     Gastroenteritis     Posttraumatic stress disorder     Cervical cancer screening     Type 2 diabetes mellitus with stage 3 chronic kidney disease, with long-term current use of insulin (H)     Positive AIDA (antinuclear antibody)       Past Surgical History:   Procedure Laterality Date     C OOPHORECTORMY FOR JALENIG, W/BX  1983    UTERINE     CATARACT IOL, RT/LT Bilateral 2017     COLONOSCOPY N/A 3/16/2017    Procedure: COLONOSCOPY;  Surgeon: Traci Gonzalez,  MD;  Location:  GI     Coronary CTA  5/21/2014     HYSTERECTOMY  1983    uterine cancer yearly pap's per provider.     LAPAROSCOPIC CHOLECYSTECTOMY  2008     PHACOEMULSIFICATION CLEAR CORNEA WITH STANDARD INTRAOCULAR LENS IMPLANT Left 5/5/2017    Procedure: PHACOEMULSIFICATION CLEAR CORNEA WITH STANDARD INTRAOCULAR LENS IMPLANT;  LEFT EYE PHACOEMULSIFICATION CLEAR CORNEA WITH STANDARD INTRAOCULAR LENS IMPLANT ;  Surgeon: Tyra Diaz MD;  Location:  [facility]     PHACOEMULSIFICATION CLEAR CORNEA WITH STANDARD INTRAOCULAR LENS IMPLANT Right 6/30/2017    Procedure: PHACOEMULSIFICATION CLEAR CORNEA WITH STANDARD INTRAOCULAR LENS IMPLANT;  RIGHT EYE PHACOEMULSIFICATION CLEAR CORNEA WITH STANDARD INTRAOCULAR LENS IMPLANT;  Surgeon: Tyra Diaz MD;  Location:  [facility]     RELEASE TRIGGER FINGER  10/11/2012    Left thumb. Procedure: RELEASE TRIGGER FINGER;  LEFT THUMB TRIGGER RELEASE;  Surgeon: Tay Langley MD;  Location:  [facility]     RELEASE TRIGGER FINGER Right 12/26/2016    Procedure: RELEASE TRIGGER FINGER;  Surgeon: Albino Castañeda MD;  Location:  OR        Medical Review of Systems                                                                                                    2,10   A comprehensive review of systems was performed and is negative other than noted in the HPI or interim history.    Allergy                                Imidazole antifungals; Ketoprofen; Lisinopril; Metformin; Metronidazole; and Posaconazole  Current Medications                                                                                                       Current Outpatient Medications   Medication Sig Dispense Refill     acetaminophen (TYLENOL) 500 MG tablet Take 2 tablets (1,000 mg) by mouth 3 times daily as needed for mild pain 100 tablet 3     albuterol (PROAIR HFA/PROVENTIL HFA/VENTOLIN HFA) 108 (90 Base) MCG/ACT Inhaler Inhale 2 puffs into the lungs every 6 hours as needed for shortness of breath / dyspnea  or wheezing 1 Inhaler 0     aspirin (ASA) 81 MG EC tablet TAKE 1 TABLET (81MG) BY MOUTH DAILY 30 tablet 3     atorvastatin (LIPITOR) 80 MG tablet TAKE 1 TABLET (80MG) BY MOUTH DAILY 30 tablet 11     benztropine (COGENTIN) 0.5 MG tablet Take 2 tablets (1 mg) by mouth 2 times daily 120 tablet 2     calcium carbonate (OS-ALLEN 600 MG [place]. CA) 1500 (600 CA) MG tablet Take 1 tablet (1,500 mg) by mouth daily 180 tablet 3     diclofenac (VOLTAREN) 1 % topical gel Place 4 g onto the skin 4 times daily as needed for moderate pain 1 Tube 1     dulaglutide (TRULICITY) 1.5 MG/0.5ML pen Inject 1.5 mg Subcutaneous every 7 days 2 mL 3     fluticasone (FLONASE) 50 MCG/ACT spray Spray 1-2 sprays into both nostrils daily 1 Bottle 11     gabapentin (NEURONTIN) 300 MG capsule TAKE 2 CAPSULES (600MG) BY MOUTH THREE TIMES A  capsule 1     glucose 4 G CHEW chewable tablet Take 2 every 15 minutes for blood sugar <70mg/dL. Recheck blood sugar every 15 minutes until above 70mg/dL, then eat a substantial meal. 20 tablet 1     guaiFENesin (ROBITUSSIN) 20 mg/mL SOLN solution Take 10 mLs by mouth every 6 hours as needed for cough 236 mL 0     insulin aspart (NOVOLOG FLEXPEN) 100 UNIT/ML pen Inject 20 Units Subcutaneous 3 times daily (with meals) Once daily, can add additional 5 units if BGs are >500mg/dL. 15 mL 11     insulin glargine (LANTUS SOLOSTAR) 100 UNIT/ML pen Inject 48 Units Subcutaneous At Bedtime 3 mL 11     insulin pen needle (NOVOFINE 30) 30G X 8 MM USE 4 DAILY OR AS DIRECTED 400 each 0     ipratropium - albuterol 0.5 mg/2.5 mg/3 mL (DUONEB) 0.5-2.5 (3) MG/3ML neb solution Take 1 vial (3 mLs) by nebulization every 6 hours as needed for shortness of breath / dyspnea or wheezing 30 vial 0     losartan (COZAAR) 50 MG tablet Take 1 tablet (50 mg) by mouth daily 90 tablet 3     melatonin 5 MG CAPS Take 2 capsules by mouth At Bedtime 180 capsule 3     metoprolol succinate (TOPROL-XL) 25 MG 24 hr tablet Take 1 tablet (25 mg) by mouth  daily 90 tablet 1     paliperidone ER (INVEGA) 9 MG 24 hr tablet Take 1 tablet (9 mg) by mouth every morning 30 tablet 2     polyethylene glycol (MIRALAX/GLYCOLAX) powder TAKE 8.5 GRAMS (1/2 CAPFUL) BY MOUTH DAILY 527 g 1     ranitidine (ZANTAC) 300 MG tablet TAKE 1 TABLET (300MG) BY MOUTH AT BEDTIME 90 tablet 1     senna-docusate (SENOKOT-S;PERICOLACE) 8.6-50 MG per tablet Take 1 tablet by mouth 2 times daily as needed for constipation 100 tablet 1     sertraline (ZOLOFT) 100 MG tablet Take 2 tablets (200 mg) by mouth daily 60 tablet 2     spacer (OPTICHAMBER PATRICIA) holding chamber Holding/spacer device to use with inhaler. 1 each 0     SUMAtriptan (IMITREX) 25 MG tablet Take 1-2 tablets (25-50 mg) by mouth at onset of headache for migraine May repeat in 2 hours. Max 8 tablets/24 hours. 12 tablet 1     topiramate (TOPAMAX) 25 MG tablet Take 3 tablets (75 mg) by mouth 2 times daily 180 tablet 3     vitamin D3 (CHOLECALCIFEROL) 01479 UNITS capsule TAKE 1 CAPSULE (50,000 UNITS) MONTHLY 12 capsule 1     Vitals                                                                                                                       3, 3   LMP 01/06/2015    Mental Status Exam                                                                                    9, 14 cog gs     Appearance: casually groomed  Behavior/Demeanor: cooperative, with good  eye contact   Speech: normal  Language: intact  Psychomotor: abnormal movements of the tongue and lower jaw  Mood: depressed  Affect: full range; was congruent to mood; was congruent to content  Thought Process/Associations: unremarkable  Thought Content:  Reports none;  Denies suicidal ideation and violent ideation  Perception:  Reports none;  Denies auditory hallucinations and visual hallucinations  Insight: limited  Judgment: limited  Cognition: (6) does  appear grossly intact; formal cognitive testing was not done  grossly oriented to her circumstances  Gait and Station:  unremarkable    Labs and Data                                                                                                                 PHQ9   PHQ-9 SCORE 2/3/2017 3/13/2018 10/26/2018   PHQ-9 Total Score - - -   PHQ-9 Total Score - - -   PHQ-9 Total Score 0 14 20         Diagnosis and Assessment                                                                             m2, h3     Today the following issues were addressed:    1) Psychosis  2) Grief    [place] Prescription Monitoring Program [] review was not needed today.    PSYCHOTROPIC DRUG INTERACTIONS: none clinically relevant     Diagnosis:  Schizoaffective Disorder    Plan                                                                                                                    m2, h3    Continue current medications as prescribed:  Current Outpatient Medications   Medication Sig     acetaminophen (TYLENOL) 500 MG tablet Take 2 tablets (1,000 mg) by mouth 3 times daily as needed for mild pain     albuterol (PROAIR HFA/PROVENTIL HFA/VENTOLIN HFA) 108 (90 Base) MCG/ACT Inhaler Inhale 2 puffs into the lungs every 6 hours as needed for shortness of breath / dyspnea or wheezing     aspirin (ASA) 81 MG EC tablet TAKE 1 TABLET (81MG) BY MOUTH DAILY     atorvastatin (LIPITOR) 80 MG tablet TAKE 1 TABLET (80MG) BY MOUTH DAILY     benztropine (COGENTIN) 0.5 MG tablet Take 2 tablets (1 mg) by mouth 2 times daily     calcium carbonate (OS-ALLEN 600 MG [place]. CA) 1500 (600 [place]) MG tablet Take 1 tablet (1,500 mg) by mouth daily     diclofenac (VOLTAREN) 1 % topical gel Place 4 g onto the skin 4 times daily as needed for moderate pain     dulaglutide (TRULICITY) 1.5 MG/0.5ML pen Inject 1.5 mg Subcutaneous every 7 days     fluticasone (FLONASE) 50 MCG/ACT spray Spray 1-2 sprays into both nostrils daily     gabapentin (NEURONTIN) 300 MG capsule TAKE 2 CAPSULES (600MG) BY MOUTH THREE TIMES A DAY     glucose 4 G CHEW chewable tablet Take 2 every 15 minutes for blood sugar  <70mg/dL. Recheck blood sugar every 15 minutes until above 70mg/dL, then eat a substantial meal.     guaiFENesin (ROBITUSSIN) 20 mg/mL SOLN solution Take 10 mLs by mouth every 6 hours as needed for cough     insulin aspart (NOVOLOG FLEXPEN) 100 UNIT/ML pen Inject 20 Units Subcutaneous 3 times daily (with meals) Once daily, can add additional 5 units if BGs are >500mg/dL.     insulin glargine (LANTUS SOLOSTAR) 100 UNIT/ML pen Inject 48 Units Subcutaneous At Bedtime     insulin pen needle (NOVOFINE 30) 30G X 8 MM USE 4 DAILY OR AS DIRECTED     ipratropium - albuterol 0.5 mg/2.5 mg/3 mL (DUONEB) 0.5-2.5 (3) MG/3ML neb solution Take 1 vial (3 mLs) by nebulization every 6 hours as needed for shortness of breath / dyspnea or wheezing     losartan (COZAAR) 50 MG tablet Take 1 tablet (50 mg) by mouth daily     melatonin 5 MG CAPS Take 2 capsules by mouth At Bedtime     metoprolol succinate (TOPROL-XL) 25 MG 24 hr tablet Take 1 tablet (25 mg) by mouth daily     paliperidone ER (INVEGA) 9 MG 24 hr tablet Take 1 tablet (9 mg) by mouth every morning     polyethylene glycol (MIRALAX/GLYCOLAX) powder TAKE 8.5 GRAMS (1/2 CAPFUL) BY MOUTH DAILY     ranitidine (ZANTAC) 300 MG tablet TAKE 1 TABLET (300MG) BY MOUTH AT BEDTIME     senna-docusate (SENOKOT-S;PERICOLACE) 8.6-50 MG per tablet Take 1 tablet by mouth 2 times daily as needed for constipation     sertraline (ZOLOFT) 100 MG tablet Take 2 tablets (200 mg) by mouth daily     spacer (OPTICHAMBER PATRICIA) holding chamber Holding/spacer device to use with inhaler.     SUMAtriptan (IMITREX) 25 MG tablet Take 1-2 tablets (25-50 mg) by mouth at onset of headache for migraine May repeat in 2 hours. Max 8 tablets/24 hours.     topiramate (TOPAMAX) 25 MG tablet Take 3 tablets (75 mg) by mouth 2 times daily     vitamin D3 (CHOLECALCIFEROL) 13478 UNITS capsule TAKE 1 CAPSULE (50,000 UNITS) MONTHLY     No current facility-administered medications for this visit.      Discussed that I will be  leaving [facility] in May 2019.     RTC: 4 weeks.    CRISIS NUMBERS:   Provided routinely in AVS.    Treatment Risk Statement:  The patient understands the risks, benefits, adverse effects and alternatives. Agrees to treatment with the capacity to do so. No medical contraindications to treatment. Agrees to call clinic for any problems. The patient understands to call 911 or go to the nearest ED if life threatening or urgent symptoms occur.      PROVIDER:  Kraig Pedersen MD

## 2022-12-14 NOTE — PROCEDURE
[FreeTextEntry1] : PFT reveals normal flows, with an FEV1 of 2.98 L, which is 93% of predicted, with a normal flow volume loop. \par \par FENO was 19; a normal value being less than 25\par Fractional exhaled nitric oxide (FENO) is regarded as a simple, noninvasive method for assessing eosinophilic airway inflammation. Produced by a variety of cells within the lung, nitric oxide (NO) concentrations are generally low in healthy individuals. However, high concentrations of NO appear to be involved in nonspecific host defense mechanisms and chronic inflammatory diseases such as asthma. The American Thoracic Society (ATS) therefore has recommended using FENO to aid in the diagnosis and monitoring of eosinophilic airway inflammation and asthma, and for identifying steroid responsive individuals whose chronic respiratory symptoms may be caused by airway inflammation.

## 2023-01-03 ENCOUNTER — RX RENEWAL (OUTPATIENT)
Age: 58
End: 2023-01-03

## 2023-01-05 ENCOUNTER — NON-APPOINTMENT (OUTPATIENT)
Age: 58
End: 2023-01-05

## 2023-01-06 ENCOUNTER — TRANSCRIPTION ENCOUNTER (OUTPATIENT)
Age: 58
End: 2023-01-06

## 2023-01-20 NOTE — ED ADULT NURSE NOTE - CAS TRG GEN SKIN CONDITION
Warm Tissue Cultured Epidermal Autograft Text: The defect edges were debeveled with a #15 scalpel blade.  Given the location of the defect, shape of the defect and the proximity to free margins a tissue cultured epidermal autograft was deemed most appropriate.  The graft was then trimmed to fit the size of the defect.  The graft was then placed in the primary defect and oriented appropriately.

## 2023-01-25 NOTE — BRIEF OPERATIVE NOTE - DISPOSITION
Recovery
PACU > Home
PRINCIPAL DISCHARGE DIAGNOSIS  Diagnosis: Reactive airway disease  Assessment and Plan of Treatment: General tips for taking care of a child with asthma:  WHAT IS ASTHMA?   Asthma is a long-term (chronic) condition that at certain times may causes swelling and narrowing of the small air tubes in our lungs. When asthma symptoms occur, it is called an “asthma flare” or “asthma attack.” When this happens, it can be difficult for your child to breathe. Asthma flares can range from minor to life-threatening. Medicines and changing things around the home can help control your child's asthma symptoms. It is important to keep your child's asthma under control in order to decrease how much this condition interferes with his or her daily life.  MEDICATIONS:  For the first 2 days, give Albuterol every 4 hours around the clock if instructed by your provider, but no need to wake your child while sleeping unless he or she has a persistent cough.  If your child is doing well, you can then space it to every 4 hours only as needed after that.  Then, follow the Asthma Action Plan that your provider gave you at the end of your visit.  If it wasn’t done during the ED visit, follow up with your pediatrician to develop an Asthma Action Plan with them.   Follow up with your pediatrician in 1-2 days to make sure that your child is doing better.  Return to the Emergency Department if:  -Your child is continuing to have difficulty breathing.  -Your child is not getting better after taking the albuterol every 4 hours  -Your child's coughing is very severe.  -Your child can’t complete full sentences when talking.  -Your child’s breathing is continuing to be fast and/or labored.        
PRINCIPAL DISCHARGE DIAGNOSIS  Diagnosis: Reactive airway disease  Assessment and Plan of Treatment: -Please follow up with your child's pediatrician tomorrow, 1/27 to follow up breathing and swelling to arm after IV infiltration.   General tips for taking care of a child with asthma:  WHAT IS ASTHMA?   Asthma is a long-term (chronic) condition that at certain times may causes swelling and narrowing of the small air tubes in our lungs. When asthma symptoms occur, it is called an “asthma flare” or “asthma attack.” When this happens, it can be difficult for your child to breathe. Asthma flares can range from minor to life-threatening. Medicines and changing things around the home can help control your child's asthma symptoms. It is important to keep your child's asthma under control in order to decrease how much this condition interferes with his or her daily life.  MEDICATIONS:  For the first 2 days, give Albuterol every 4 hours around the clock if instructed by your provider, but no need to wake your child while sleeping unless he or she has a persistent cough.  If your child is doing well, you can then space it to every 4 hours only as needed after that.  Then, follow the Asthma Action Plan that your provider gave you at the end of your visit.  If it wasn’t done during the ED visit, follow up with your pediatrician to develop an Asthma Action Plan with them.   Follow up with your pediatrician in 1-2 days to make sure that your child is doing better.  Return to the Emergency Department if:  -Your child is continuing to have difficulty breathing.  -Your child is not getting better after taking the albuterol every 4 hours  -Your child's coughing is very severe.  -Your child can’t complete full sentences when talking.  -Your child’s breathing is continuing to be fast and/or labored.

## 2023-03-16 ENCOUNTER — TRANSCRIPTION ENCOUNTER (OUTPATIENT)
Age: 58
End: 2023-03-16

## 2023-03-20 ENCOUNTER — NON-APPOINTMENT (OUTPATIENT)
Age: 58
End: 2023-03-20

## 2023-03-21 DIAGNOSIS — Z78.9 OTHER SPECIFIED HEALTH STATUS: ICD-10-CM

## 2023-03-21 DIAGNOSIS — Z83.79 FAMILY HISTORY OF OTHER DISEASES OF THE DIGESTIVE SYSTEM: ICD-10-CM

## 2023-03-21 RX ORDER — ROSUVASTATIN CALCIUM 20 MG/1
20 TABLET, FILM COATED ORAL
Refills: 0 | Status: ACTIVE | COMMUNITY

## 2023-03-21 RX ORDER — GALCANEZUMAB 120 MG/ML
INJECTION, SOLUTION SUBCUTANEOUS
Refills: 0 | Status: ACTIVE | COMMUNITY

## 2023-03-21 RX ORDER — USTEKINUMAB 130 MG/26ML
SOLUTION INTRAVENOUS
Refills: 0 | Status: ACTIVE | COMMUNITY

## 2023-03-21 RX ORDER — CELECOXIB 50 MG/1
CAPSULE ORAL
Refills: 0 | Status: ACTIVE | COMMUNITY

## 2023-03-21 RX ORDER — TICAGRELOR 60 MG/1
TABLET ORAL
Refills: 0 | Status: DISCONTINUED | COMMUNITY
End: 2023-03-21

## 2023-03-21 RX ORDER — TRAMADOL HYDROCHLORIDE 50 MG/1
50 TABLET, COATED ORAL
Refills: 0 | Status: ACTIVE | COMMUNITY

## 2023-03-22 ENCOUNTER — APPOINTMENT (OUTPATIENT)
Dept: SURGERY | Facility: CLINIC | Age: 58
End: 2023-03-22
Payer: MEDICARE

## 2023-03-22 VITALS
WEIGHT: 185 LBS | RESPIRATION RATE: 16 BRPM | HEART RATE: 79 BPM | HEIGHT: 66 IN | TEMPERATURE: 97.3 F | BODY MASS INDEX: 29.73 KG/M2 | OXYGEN SATURATION: 98 % | DIASTOLIC BLOOD PRESSURE: 92 MMHG | SYSTOLIC BLOOD PRESSURE: 153 MMHG

## 2023-03-22 PROCEDURE — 46600 DIAGNOSTIC ANOSCOPY SPX: CPT

## 2023-03-22 PROCEDURE — 99214 OFFICE O/P EST MOD 30 MIN: CPT | Mod: 25

## 2023-03-22 NOTE — PHYSICAL EXAM
[Normal Breath Sounds] : Normal breath sounds [Normal Heart Sounds] : normal heart sounds [No Rash or Lesion] : No rash or lesion [Alert] : alert [Oriented to Person] : oriented to person [Oriented to Place] : oriented to place [Oriented to Time] : oriented to time [Calm] : calm [de-identified] : WNL [de-identified] : WNL [de-identified] : ARL [de-identified] : WNL ROM [de-identified] : WNL [FreeTextEntry1] : This is a 57 year-old well-developed male in no apparent distress.\par \par Examination of the perineum reveals small external hemorrhoids. \par Digital rectal examination reveals sphincter spasm. \par Anoscopy reveals moderately size internal hemorrhoids.  No obvious fissure was visualized however the exam was limited due to the significant sphincter spasm and the presence of the hemorrhoidal tissue.\par \par

## 2023-03-22 NOTE — ASSESSMENT
[FreeTextEntry1] : 57-year-old male with history of fissure and significant sphincter spasm that has responded well to Botox previously.  I advised for repeat Botox injection as was done in the past. Dr. Michel will make arrangements and the office will schedule with the patient.  Nitroglycerin ointment refilled to use in the meantime was sent to the pharmacy.

## 2023-03-22 NOTE — HISTORY OF PRESENT ILLNESS
[FreeTextEntry1] : Alec is a 56 y/o male here for a consultation visit, possible fissure. \par \par History of Crohns disease\par \par S/p EUA with Botox injections on  03/17/20, 10/15/19, 10/16/18, 07/17/18 by Dr. Michel\par \par Pt is s/p Single incision laparoscopic small bowel resection. Appendectomy. Strictureplasty x2, UHR on 3/12/18.\par \par Patient with history of Crohns disease. On stellara.\par Today the pt reports anorectal pain and spasm  for the past month. Reports having 10-15 loose BM's daily. Pain worse with BM's and lingers for 30 mins. Has been using Nitro-bid with some relief.  Reports BRBPR noted on TP and reports occasional itching.  Denies rectal swelling.  pt denies stool or gas incontinence . He denies rectal  swelling, constipation or straining,  On Aspirin 81 mg daily for history of cardiac stents.    \par No Family history colon CA. Family history of Crohns -Mother.\par His PSA is elevated. He is getting prostate MRI tomorrow and is scheduled to see Urology.\par Reports his anorectal pain improved significantly after each Botox injection in the past.\par \par \par \par  \par

## 2023-03-23 ENCOUNTER — RESULT REVIEW (OUTPATIENT)
Age: 58
End: 2023-03-23

## 2023-03-23 ENCOUNTER — OUTPATIENT (OUTPATIENT)
Dept: OUTPATIENT SERVICES | Facility: HOSPITAL | Age: 58
LOS: 1 days | End: 2023-03-23
Payer: MEDICARE

## 2023-03-23 ENCOUNTER — APPOINTMENT (OUTPATIENT)
Dept: MRI IMAGING | Facility: IMAGING CENTER | Age: 58
End: 2023-03-23
Payer: MEDICARE

## 2023-03-23 DIAGNOSIS — Z98.89 OTHER SPECIFIED POSTPROCEDURAL STATES: Chronic | ICD-10-CM

## 2023-03-23 DIAGNOSIS — Z90.49 ACQUIRED ABSENCE OF OTHER SPECIFIED PARTS OF DIGESTIVE TRACT: Chronic | ICD-10-CM

## 2023-03-23 DIAGNOSIS — S92.009A UNSPECIFIED FRACTURE OF UNSPECIFIED CALCANEUS, INITIAL ENCOUNTER FOR CLOSED FRACTURE: Chronic | ICD-10-CM

## 2023-03-23 DIAGNOSIS — Z00.8 ENCOUNTER FOR OTHER GENERAL EXAMINATION: ICD-10-CM

## 2023-03-23 DIAGNOSIS — Z87.898 PERSONAL HISTORY OF OTHER SPECIFIED CONDITIONS: Chronic | ICD-10-CM

## 2023-03-23 PROCEDURE — A9585: CPT

## 2023-03-23 PROCEDURE — 72197 MRI PELVIS W/O & W/DYE: CPT | Mod: 26,MH

## 2023-03-23 PROCEDURE — 76498P: CUSTOM | Mod: 26,MH

## 2023-03-23 PROCEDURE — 76498 UNLISTED MR PROCEDURE: CPT

## 2023-03-23 PROCEDURE — 72197 MRI PELVIS W/O & W/DYE: CPT

## 2023-03-28 ENCOUNTER — APPOINTMENT (OUTPATIENT)
Dept: UROLOGY | Facility: CLINIC | Age: 58
End: 2023-03-28
Payer: MEDICARE

## 2023-03-28 ENCOUNTER — NON-APPOINTMENT (OUTPATIENT)
Age: 58
End: 2023-03-28

## 2023-03-28 VITALS
HEIGHT: 66 IN | RESPIRATION RATE: 17 BRPM | SYSTOLIC BLOOD PRESSURE: 133 MMHG | TEMPERATURE: 97.6 F | HEART RATE: 73 BPM | WEIGHT: 179 LBS | DIASTOLIC BLOOD PRESSURE: 75 MMHG | BODY MASS INDEX: 28.77 KG/M2

## 2023-03-28 PROCEDURE — 99204 OFFICE O/P NEW MOD 45 MIN: CPT

## 2023-03-29 ENCOUNTER — TRANSCRIPTION ENCOUNTER (OUTPATIENT)
Age: 58
End: 2023-03-29

## 2023-03-29 ENCOUNTER — NON-APPOINTMENT (OUTPATIENT)
Age: 58
End: 2023-03-29

## 2023-03-29 LAB
ANION GAP SERPL CALC-SCNC: 15 MMOL/L
APPEARANCE: CLEAR
BACTERIA UR CULT: NORMAL
BACTERIA: NEGATIVE
BILIRUBIN URINE: NEGATIVE
BLOOD URINE: NEGATIVE
BUN SERPL-MCNC: 18 MG/DL
CALCIUM SERPL-MCNC: 9.8 MG/DL
CHLORIDE SERPL-SCNC: 104 MMOL/L
CO2 SERPL-SCNC: 24 MMOL/L
COLOR: NORMAL
CREAT SERPL-MCNC: 0.76 MG/DL
EGFR: 105 ML/MIN/1.73M2
GLUCOSE QUALITATIVE U: NEGATIVE
GLUCOSE SERPL-MCNC: 117 MG/DL
HCT VFR BLD CALC: 41.5 %
HGB BLD-MCNC: 13.1 G/DL
HYALINE CASTS: 0 /LPF
KETONES URINE: NEGATIVE
LEUKOCYTE ESTERASE URINE: NEGATIVE
MCHC RBC-ENTMCNC: 26.7 PG
MCHC RBC-ENTMCNC: 31.6 GM/DL
MCV RBC AUTO: 84.7 FL
MICROSCOPIC-UA: NORMAL
NITRITE URINE: NEGATIVE
PH URINE: 6
PLATELET # BLD AUTO: 160 K/UL
POTASSIUM SERPL-SCNC: 4.6 MMOL/L
PROTEIN URINE: NEGATIVE
PSA FREE FLD-MCNC: 18 %
PSA FREE SERPL-MCNC: 0.71 NG/ML
PSA SERPL-MCNC: 3.9 NG/ML
RBC # BLD: 4.9 M/UL
RBC # FLD: 14.2 %
RED BLOOD CELLS URINE: 0 /HPF
SODIUM SERPL-SCNC: 143 MMOL/L
SPECIFIC GRAVITY URINE: 1.02
SQUAMOUS EPITHELIAL CELLS: 0 /HPF
UROBILINOGEN URINE: NORMAL
WBC # FLD AUTO: 10.44 K/UL
WHITE BLOOD CELLS URINE: 0 /HPF

## 2023-03-29 NOTE — HISTORY OF PRESENT ILLNESS
[FreeTextEntry1] : Dear Haider Paula, \par \par Thank you so much for the referral to help care for your patient.\par \par Chief Complaint Elevated PSA\par Date of first visit: 03/28/2023\par \par Alec Mota is a 57 year old with COPD, CAD, Cardiac Stents who presents for Elevated PSA.  The patient also has a hx of crohns with anal fissures.  \par \par PSA Hx:\par 4.110  3/15/23\par 3.450  2/7/22\par 3.670  12/29/21\par 3.570  9/20/21\par 2.690  9/1/20\par 2.101  7/15/19\par 2.200  6/19/18\par 2.10   10/8/14 \par \par MRI at [facility] on 03/23/2023.  47 cc prostate with PIRADS 4  lesion#1 measuring 10 mm in Left, anterior fibromuscular stroma, zone PIRADS 4 lesion #2 measuring 9 mm in Right, posterior medial (PZpm), apex, peripheral zone. No LAD No EPE, No Bony Lesions.  The images have been reviewed and clinical implications discussed with the patient. We are adding a lesion R base TZp\par \par 03/28/2023\par IPSS 22 (noct x 4 MARYAM doesn’t use mask) QOL 3 \par ANDRESSA 20\par \par Prostate cancer screening: the patient and I spoke at length about prostate cancer screening, its risks and its benefits. The patient has (PSA, CATINA) 2 risk factors for prostate cancer.  He understands that many men with prostate cancer will die with the disease rather than of it and we also discussed the results large multi-center American and  prostate cancer screening trials. He also understands that PSA in and of itself does not diagnose prostate cancer but only assesses risk to a certain degree. The patient understands that to definitively screen for prostate cancer, a biopsy is required and this procedure has risks, including bleeding, infection, ED and urinary retention. The patient opted to have a biopsy.\par \par \par The patient denies fevers, chills, nausea and or vomiting and no unexplained weight loss.\par \par All pertinent laboratory, films and physician notes were reviewed.  Questionnaire results were discussed with patient.\par \par I spent 31 minutes of non-face to face time reviewing the patient's records, chart, uploading processing MR images, transferring MR images from PACS to an independent workstation, reviewing images on independent workstation, speaking with their physician and planning their prostate biopsy and preparation of an upcoming visit for 03/28/2023 .  The imaging and planning was highly complex and took this entire time. \par \par \par \par

## 2023-03-29 NOTE — ASSESSMENT
[FreeTextEntry1] : 57M with an elevated PSA and PIRADS 4 lesion on MRI from 3/23/23.  We added a third lesion + CATINA.\par \par Elevated PSA\par - CBC, BMP, PSA, Covid Test, UA, UCx. EKG. Stress tests and all cardiac testing reports. Chest imaging within the past year\par - Cardiac and Pulmonary Clearance\par - TP biopsy at [facility]\par - Follow up 2 weeks after biopsy with his primary urologist or ourselves\par - We will call with the path results once they are resulted\par \par IRB Notification\par The patient has been made aware of the opportunity to participate in our MR US fusion guided biopsy trial testing the next generation biopsy technology.  This is an IRB approved trial (IRB #).  He is already being consented for a standard MR US fusion guided biopsy therefore there is no change in his clinical work flow.  He has been given a copy of the consent to review before the biopsy date and given an opportunity to contact us with any further questions.  He will be consented on the day of the procedure or electronically before the biopsy.\par \par He understands that many men with prostate cancer will die with the disease rather than of it and we also discussed the results large multi-center American and  prostate cancer screening trials. He also understands that PSA in and of itself does not diagnose prostate cancer but only assesses risk to a certain degree. The patient understands that to definitively screen for prostate cancer, a biopsy is required and this procedure has risks, including bleeding, infection, ED and urinary retention. The patient opted to move forward with the biopsy.\par \par The patient is aware to expect hematuria x 2 weeks and up to 4 weeks of hematospermia.  There is a risk of infection albeit much lower than a transrectal approach. In some cases patients can experience erectile dysfunction but this is usually self limiting.  Any fever/chills after the biopsy the patient is to contact the office and go to the ER for an immediate evaluation. He has been given paper instructions outlining these items - which includes medications to avoid prior to surgery.\par \par Sincerely,\par \par \par Ean Madison D.O.\par Professor of Urology and Radiology\par  of Urology at [facility]\par System Director for Prostate Cancer\par [address] [place] [address], 5th Floor [facility], [id number]\par Phone: [phone]

## 2023-03-29 NOTE — PHYSICAL EXAM
[General Appearance - Well Developed] : well developed [General Appearance - Well Nourished] : well nourished [Edema] : no peripheral edema [] : no respiratory distress [Abdomen Soft] : soft [Urethral Meatus] : meatus normal [Penis Abnormality] : normal circumcised penis [Testes Mass (___cm)] : there were no testicular masses [Normal Station and Gait] : the gait and station were normal for the patient's age [FreeTextEntry1] : apex nodule

## 2023-03-30 ENCOUNTER — APPOINTMENT (OUTPATIENT)
Dept: MRI IMAGING | Facility: IMAGING CENTER | Age: 58
End: 2023-03-30
Payer: MEDICARE

## 2023-03-30 ENCOUNTER — OUTPATIENT (OUTPATIENT)
Dept: OUTPATIENT SERVICES | Facility: HOSPITAL | Age: 58
LOS: 1 days | End: 2023-03-30
Payer: MEDICARE

## 2023-03-30 DIAGNOSIS — Z98.89 OTHER SPECIFIED POSTPROCEDURAL STATES: Chronic | ICD-10-CM

## 2023-03-30 DIAGNOSIS — S92.009A UNSPECIFIED FRACTURE OF UNSPECIFIED CALCANEUS, INITIAL ENCOUNTER FOR CLOSED FRACTURE: Chronic | ICD-10-CM

## 2023-03-30 DIAGNOSIS — Z87.898 PERSONAL HISTORY OF OTHER SPECIFIED CONDITIONS: Chronic | ICD-10-CM

## 2023-03-30 DIAGNOSIS — Z00.8 ENCOUNTER FOR OTHER GENERAL EXAMINATION: ICD-10-CM

## 2023-03-30 DIAGNOSIS — Z90.49 ACQUIRED ABSENCE OF OTHER SPECIFIED PARTS OF DIGESTIVE TRACT: Chronic | ICD-10-CM

## 2023-03-30 PROCEDURE — 70553 MRI BRAIN STEM W/O & W/DYE: CPT | Mod: 26,MH

## 2023-03-30 PROCEDURE — A9585: CPT

## 2023-03-30 PROCEDURE — 70553 MRI BRAIN STEM W/O & W/DYE: CPT | Mod: MH

## 2023-04-03 ENCOUNTER — APPOINTMENT (OUTPATIENT)
Dept: PULMONOLOGY | Facility: CLINIC | Age: 58
End: 2023-04-03
Payer: MEDICARE

## 2023-04-03 VITALS
TEMPERATURE: 97.7 F | HEART RATE: 83 BPM | WEIGHT: 179 LBS | OXYGEN SATURATION: 98 % | HEIGHT: 66 IN | DIASTOLIC BLOOD PRESSURE: 80 MMHG | RESPIRATION RATE: 17 BRPM | SYSTOLIC BLOOD PRESSURE: 138 MMHG | BODY MASS INDEX: 28.77 KG/M2

## 2023-04-03 DIAGNOSIS — Z01.811 ENCOUNTER FOR PREPROCEDURAL RESPIRATORY EXAMINATION: ICD-10-CM

## 2023-04-03 PROCEDURE — 94618 PULMONARY STRESS TESTING: CPT

## 2023-04-03 PROCEDURE — 94729 DIFFUSING CAPACITY: CPT

## 2023-04-03 PROCEDURE — 94010 BREATHING CAPACITY TEST: CPT

## 2023-04-03 PROCEDURE — 94727 GAS DIL/WSHOT DETER LNG VOL: CPT

## 2023-04-03 PROCEDURE — 99214 OFFICE O/P EST MOD 30 MIN: CPT | Mod: 25

## 2023-04-03 PROCEDURE — 71046 X-RAY EXAM CHEST 2 VIEWS: CPT

## 2023-04-03 PROCEDURE — 95012 NITRIC OXIDE EXP GAS DETER: CPT

## 2023-04-03 NOTE — ASSESSMENT
[FreeTextEntry1] : Mr. Mota is a 57 year old male doing well from a pulmonary perspective, is s/p bowel obstruction s/p GI surgery, s/p hospitalization with cluster headaches / tinnitus, with a history of severe persistent asthma, allergies, OSAS, GERD. - s/p coronary stents (9-last 1/2022)- stable - now awaiting prostate biopsy \par \par \par                                      ********PRE-OP CLEARANCE FOR PROSTATE BIOPSY***********\par -at this point in time there are no absolute pulmonary contraindications to go forward with the planned procedure \par -at the time of surgery s/he should have optimal pain control, incentive spirometry, early ambulation, DVT and GI prophylaxis. \par \par Problem 1A: Pre-op clearance for prostate biopsy \par -at this point in time there are no absolute pulmonary contraindications to go forward with the planned procedure \par -at the time of surgery s/he should have optimal pain control, incentive spirometry, early ambulation, DVT and GI prophylaxis. \par \par \par Problem 1: Severe persistent asthma  - asthmatic bronchitis - stable \par -s/p Prednisone; 30 mg for 5 days, then 20 mg for 5 days, then 10 mg for 5 days (1/2021)\par -Information sheet given to the patient to be reviewed, this medication is never to be used without consulting the prescribing physician. Proper dietary restraint is necessary specifically salt containing foods, if any reaction may occur should be reported. \par -continue Albuterol by the nebulizer QID, prn \par -continue Symbicort 160 at 2 inhalations BID\par -continue Spiriva 2 puffs QD\par -continue Singulair 10 mg QHS\par -Asthma is believed to be caused by inherited (genetic) and environmental factor, but its exact cause is unknown. Asthma may be triggered by allergens, lung infections, or irritants in the air. Asthma triggers are different for each person\par -Inhaler technique reviewed as well as oral hygiene techniques reviewed with patient. Avoidance of cold air, extremes of temperature, rescue inhaler should be used before exercise. Order of medication reviewed with patient. Recommended use of a cool mist humidifier in the bedroom. \par \par Problem 2: allergies\par -continue Xyzal 5 mg QHS\par -continue Qnasl 1 sniff/nostril BID \par -continue Astelin 0.15% 1 sniff BID\par -Environmental measures for allergies were encouraged including mattress and pillow cover, air purifier, and environmental controls.\par \par Problem 3: GERD (Quiet)\par -off Nexium 40 mg QAM, before breakfast\par -OFF Pepcid 40 mg QHS \par -Rule of 2's- Avoid eating too much, too late, too poorly, too spicy, or two hours before bed \par -Things to avoid including overeating, spicy foods, tight clothing, eating within three hours of bed, this list is not all inclusive. \par -For treatment of reflux, possible options discussed including diet control, H2 blockers, PPIs, as well as coating motility agents discussed as treatment options. Timing of meals and proximity of last meal to sleep were discussed. If symptoms persist, a formal gastrointestinal evaluation is needed.\par \par Problem 4: s/p OSAS -(reinforced)- now resolved \par -s/p home sleep study - negative 12/2022\par -Good sleep hygiene was encouraged including avoiding watching television an hour before bed, keeping caffeine at a low, avoiding reading in bed, no drinking any liquids three hours before bedtime, and only getting into bed when tired. \par - Discussed the risks/associations with coronary artery disease, atrial fibrillation, arrhythmia, memory loss, issues with concentration, hypertension, nocturia, chronic reflux/Olvera’s esophagus some but not all inclusive. Treatment options discussed including CPAP/BiPAP machine, oral appliance, ProVent therapy, new technologies, or positional sleep.\par \par Problem 5: Obesity "poor" - in progress \par -Recommended Vladislav Willard's 10-day detox diet and book. \par -recommended "MUNIQ" OTC supplement \par Weight loss, exercise, and diet control were discussed and are highly encouraged. Treatment options were given such as, aqua therapy, and contacting a nutritionist. Recommended to use the elliptical, stationary bike, less use of treadmill. Mindful eating was explained to the patient Obesity is associated with worsening asthma, shortness of breath, and potential for cardiac disease, diabetes, and other underlying medical conditions.  \par \par Problem 6: CAD (9 stents 12/2021)\par -as per Dr. Fernández, likely the downstream effect of uncontrolled total body inflammation\par -He is not to use Adenosine for his stress test\par \par problem 7: bowel obstruction, s/p surgery\par -encouraged to look at functional medicine consult, referred to Dr. Sheryl Leventhal,\par  -GI follow up with Dr. Caicedo / Anna.\par \par Problem 8: Tinnitus\par -Recommended to take No Flush Niacin, Vitamin B and C, and Zinc \par \par Problem 9: psoriasis / winter eczema\par -recommended to use Borage / Flax Seed Oil \par \par problem 10: Health Maintenance/COVID19 Precautions:--Functional Medicine evaln\par -recommended book "[employer]"\par - S/p Covid 19 vaccine (Moderna) x 6\par - Clean your hands often. Wash your hands often with soap and water for at least 20 seconds, especially after blowing your nose, coughing, or sneezing, or having been in a public place.\par - If soap and water are not available, use a hand  that contains at least 60% alcohol.\par - To the extent possible, avoid touching high-touch surfaces in public places - elevator buttons, door handles, handrails, handshaking with people, etc. Use a tissue or your sleeve to cover your hand or finger if you must touch something.\par - Wash your hands after touching surfaces in public places.\par - Avoid touching your face, nose, eyes, etc.\par - Clean and disinfect your home to remove germs: practice routine cleaning of frequently touched surfaces (for example: tables, doorknobs, light switches, handles, desks, toilets, faucets, sinks & cell phones)\par - Avoid crowds, especially in poorly ventilated spaces. Your risk of exposure to respiratory viruses like COVID-19 may increase in crowded, closed-in settings with little air circulation if there are people in the crowd who are sick. All patients are recommended to practice social distancing and stay at least 6 feet away from others.\par - Avoid all non-essential travel including plane trips, and especially avoid embarking on cruise ships.\par -If COVID-19 is spreading in your community, take extra measures to put distance between yourself and other people to further reduce your risk of being exposed to this new virus.\par -Stay home as much as possible.\par - Consider ways of getting food brought to your house through family, social, or commercial networks\par -Be aware that the virus has been known to live in the air up to 3 hours post exposure, cardboard up to 24 hours post exposure, copper up to 4 hours post exposure, steel and plastic up to 2-3 days post exposure. Risk of transmission from these surfaces are affected by many variables.\par Immune Support Recommendations:\par -OTC Vitamin C 500mg BID \par -OTC Quercetin 250-500mg BID \par -OTC Zinc 75-100mg per day \par -OTC Melatonin 1 or 2 mg a night \par -OTC Vitamin D 1-4000mg per day \par -OTC Tonic Water 8oz per day\par Asthma and COVID19:\par You need to make sure your asthma is under control. This often requires the use of inhaled corticosteroids (and sometimes oral corticosteroids). Inhaled corticosteroids do not likely reduce your immune system’s ability to fight infections, but oral corticosteroids may. It is important to use the steps above to protect yourself to limit your exposure to any respiratory virus. \par \par problem 12: health maintenance\par -recommended functional medicine consultation \par -s/p flu shot in 2022\par -recommended strep pneumonia vaccines: Prevnar-13 vaccine, followed by Pneumo vaccine 23 on year following\par -recommended early intervention for URIs\par -recommended osteoporosis evaluations\par -recommended early dermatological evaluations\par -recommended after the age of 50 to the age of 70, colonoscopy every 5 years\par \par Follow up in 4 months\par Encouraged to follow up with questions, concerns, and changes.

## 2023-04-03 NOTE — PROCEDURE
[FreeTextEntry1] : CXR reveals normal sized heart; prominent overlapping ribs at left lower lung field,  there was no definitive infiltrate or effusion. \par \par Sleep Study (Dec.23.2022) reveals AHI of 2.7, lowest o2 sat 86%. - no sleep apnea \par \par FULL PFTs reveals normal flows; FEV1 was 3.28L which is 110% of predicted; normal lung volumes; normal diffusion of 35.8, which is 157% of predicted; normal flow volume loop \par \par 6 minute walk test reveals a low saturation of 94% with no evidence of dyspnea or fatigue; walked  619.4 meters \par \par FENO was 26; normal value being less than 25\par Fractional exhaled nitric oxide (FENO) is regarded as a simple, noninvasive method for assessing eosinophilic airway inflammation. Produced by a variety of cells within the lung, nitric oxide (NO) concentrations are generally low in healthy individuals. However, high concentrations of NO appear to be involved in nonspecific host defense mechanisms and chronic inflammatory diseases such as asthma. The American Thoracic Society (ATS) therefore has recommended using FENO to aid in the diagnosis and monitoring of eosinophilic airway inflammation and asthma, and for identifying steroid responsive individuals whose chronic respiratory symptoms may be airway inflammation.

## 2023-04-03 NOTE — ADDENDUM
[FreeTextEntry1] : Documented by Serena Lora acting as a scribe for Dr. Leonel Duckworth on 04/03/2023 \par \par All medical record entries made by the Scribe were at my, Dr. Leonel Duckworth's, direction and personally dictated by me on 04/03/2023 . I have reviewed the chart and agree that the record accurately reflects my personal performance of the history, physical exam, assessment and plan. I have also personally directed, reviewed, and agree with the discharge instructions.

## 2023-04-03 NOTE — HISTORY OF PRESENT ILLNESS
[FreeTextEntry1] : Mr. KEMP is a 57 year year old male with a history of severe persistent asthma, allergies, OSAS, GERD who presents for a follow-up pulmonary evaluation. His chief complaint is prostate, here for pre-op \par - he notes he has been feeling well except for prostate issues\par - he notes he saw a urologist and had an MRI done. There were 3 lesions found. \par - no chest pains / pressure\par - he notes some diarrhea\par - he notes he had some anal spasms but after April 18th he will have botox injections. He had this 4x before. \par - weight has been stable \par - he notes he had COVID about a month and a half ago \par - no SOB \par - no wheezing\par - no coughing \par - no palpitations\par - he notes he wakes up at night to urinate 2x \par - he has been taking Tamsulosin. \par - he notes his energy has been low; he is anemic \par - he notes he had a sleep apnea test and was told he does not have MARYAM \par -He denies any visual issues, headaches, nausea, vomiting, fever, chills, sweats, chest pains, chest pressure, diarrhea, constipation, dysphagia, myalgia, dizziness, leg swelling, leg pain, itchy eyes, itchy ears, heartburn, reflux, or sour taste in the mouth.\par

## 2023-04-07 ENCOUNTER — TRANSCRIPTION ENCOUNTER (OUTPATIENT)
Age: 58
End: 2023-04-07

## 2023-04-12 ENCOUNTER — TRANSCRIPTION ENCOUNTER (OUTPATIENT)
Age: 58
End: 2023-04-12

## 2023-04-12 NOTE — ASU PATIENT PROFILE, ADULT - NS PREOP UNDERSTANDS INFO
No solid food or milk products after 12 mid-night 4/12/2023/ water, clear apple juice or clear gaterade no later than 9:30am DOS/ photo ID and insurance card/ comfortable clothing/ wife will take him home/yes

## 2023-04-12 NOTE — ASU PATIENT PROFILE, ADULT - ABILITY TO HEAR (WITH HEARING AID OR HEARING APPLIANCE IF NORMALLY USED):
Detail Level: Detailed
Detail Level: Simple
tinnitus/Mildly to Moderately Impaired: difficulty hearing in some environments or speaker may need to increase volume or speak distinctly

## 2023-04-12 NOTE — ASU PATIENT PROFILE, ADULT - ACCEPTABLE
Spoke with the patient and his wife. They state that they have sent off the application to help with cost of C-PAP. They also state that they need help with the cost of co-pay of Amarilys Matamoros although he states he feels his Trelegy inhaler worked better but he will need help with that co-pay as well. [employer] will look for patient assistance for both.
3

## 2023-04-12 NOTE — ASU PATIENT PROFILE, ADULT - NSICDXPASTMEDICALHX_GEN_ALL_CORE_FT
PAST MEDICAL HISTORY:  Anemia     Asthma controlled-never intubated or hospitalized    Atypical melanocytic hyperplasia     Bilateral knee pain     Bowel obstruction     CAD (coronary artery disease) 2 stents placed in LAD in 2015, RCA stent x 2 in 2006    Cluster headaches     Crohn disease     Elevated blood uric acid level     Elevated PSA     Fatty liver     GERD (gastroesophageal reflux disease)     GIB (gastrointestinal bleeding)     HLD (hyperlipidemia)     HTN (hypertension)     Inguinal mass     Joint pain     Lower back pain     MI (myocardial infarction)     Migraine headache     MARYAM (obstructive sleep apnea) on CPAP    Other specified disorders of pigmentation     Pancreatitis while on 6MP for Crohn's now off of it    Psoriasis     Shoulder pain, bilateral     Stented coronary artery     Tinnitus     Unilateral recurrent inguinal hernia without obstruction or gangrene Left

## 2023-04-12 NOTE — ASU PATIENT PROFILE, ADULT - NSICDXPASTSURGICALHX_GEN_ALL_CORE_FT
PAST SURGICAL HISTORY:  Calcaneus fracture s/p repair with hardware    H/O colonoscopy     H/O lipoma removed from back    H/O percutaneous transluminal coronary angioplasty with DE RCA 2006, 2 stents placed 2015    History of herniorrhaphy left inguinal herniorrhaphy    S/P cardiac catheterization 2017-resulted in [place]-no stents placed    S/P hernia surgery     S/P small bowel resection

## 2023-04-13 ENCOUNTER — APPOINTMENT (OUTPATIENT)
Dept: UROLOGY | Facility: AMBULATORY SURGERY CENTER | Age: 58
End: 2023-04-13

## 2023-04-13 ENCOUNTER — OUTPATIENT (OUTPATIENT)
Dept: OUTPATIENT SERVICES | Facility: HOSPITAL | Age: 58
LOS: 1 days | Discharge: HOME | End: 2023-04-13
Payer: MEDICARE

## 2023-04-13 ENCOUNTER — TRANSCRIPTION ENCOUNTER (OUTPATIENT)
Age: 58
End: 2023-04-13

## 2023-04-13 ENCOUNTER — RESULT REVIEW (OUTPATIENT)
Age: 58
End: 2023-04-13

## 2023-04-13 VITALS
SYSTOLIC BLOOD PRESSURE: 116 MMHG | OXYGEN SATURATION: 96 % | DIASTOLIC BLOOD PRESSURE: 67 MMHG | HEART RATE: 78 BPM | TEMPERATURE: 97 F | RESPIRATION RATE: 15 BRPM

## 2023-04-13 VITALS
TEMPERATURE: 98 F | RESPIRATION RATE: 16 BRPM | WEIGHT: 177.47 LBS | HEIGHT: 66 IN | HEART RATE: 69 BPM | OXYGEN SATURATION: 97 %

## 2023-04-13 DIAGNOSIS — Z90.49 ACQUIRED ABSENCE OF OTHER SPECIFIED PARTS OF DIGESTIVE TRACT: Chronic | ICD-10-CM

## 2023-04-13 DIAGNOSIS — Z98.89 OTHER SPECIFIED POSTPROCEDURAL STATES: Chronic | ICD-10-CM

## 2023-04-13 DIAGNOSIS — Z87.898 PERSONAL HISTORY OF OTHER SPECIFIED CONDITIONS: Chronic | ICD-10-CM

## 2023-04-13 DIAGNOSIS — S92.009A UNSPECIFIED FRACTURE OF UNSPECIFIED CALCANEUS, INITIAL ENCOUNTER FOR CLOSED FRACTURE: Chronic | ICD-10-CM

## 2023-04-13 PROCEDURE — G0416: CPT | Mod: 26

## 2023-04-13 PROCEDURE — 76377 3D RENDER W/INTRP POSTPROCES: CPT | Mod: 26

## 2023-04-13 PROCEDURE — 88342 IMHCHEM/IMCYTCHM 1ST ANTB: CPT | Mod: 26

## 2023-04-13 PROCEDURE — 76872 US TRANSRECTAL: CPT | Mod: 26

## 2023-04-13 PROCEDURE — 88341 IMHCHEM/IMCYTCHM EA ADD ANTB: CPT | Mod: 26

## 2023-04-13 PROCEDURE — 55706 BX PRST8 NDL SAT SAMPLING: CPT | Mod: 22

## 2023-04-13 RX ORDER — ERENUMAB-AOOE 70 MG/ML
1 INJECTION SUBCUTANEOUS
Qty: 0 | Refills: 0 | DISCHARGE

## 2023-04-13 RX ORDER — ACETAMINOPHEN 500 MG
1000 TABLET ORAL ONCE
Refills: 0 | Status: DISCONTINUED | OUTPATIENT
Start: 2023-04-13 | End: 2023-04-13

## 2023-04-13 RX ORDER — SODIUM CHLORIDE 9 MG/ML
1000 INJECTION, SOLUTION INTRAVENOUS
Refills: 0 | Status: DISCONTINUED | OUTPATIENT
Start: 2023-04-13 | End: 2023-04-13

## 2023-04-13 NOTE — BRIEF OPERATIVE NOTE - NSICDXBRIEFPROCEDURE_GEN_ALL_CORE_FT
PROCEDURES:  Biopsy of prostate by transperineal approach with integrated magnetic resonance-ultrasound guidance 13-Apr-2023 13:32:29  Rafa Rojas

## 2023-04-13 NOTE — PRE-ANESTHESIA EVALUATION ADULT - NSANTHPMHFT_GEN_ALL_CORE
No CP/SOB/N/V/GERD. No numbness or weakness. All other conditions chronic and stable. METS < 4. No CP/SOB/N/V/GERD (occasional - no current symptoms). Numbness of L ankle after injury, but no other numbness or weakness. All other conditions chronic and stable including CAD/MI s/p stents (seen by cardiologist regularly), mild intermittent asthma (controlled and has not used rescue inhaler in over 3 months), MARYAM (resolved with 50 pound weight loss). METS = 4, limited by ankle pain.

## 2023-04-13 NOTE — ASU DISCHARGE PLAN (ADULT/PEDIATRIC) - CARE PROVIDER_API CALL
Dangelo Madison (DO)  Urology  [address], [address] [facility], [place] [facility]  Phone: [phone]  Fax: [phone]  Follow Up Time:

## 2023-04-13 NOTE — ASU PREOP CHECKLIST - AICD PRESENT
Per Stephany, Patient's insurance will only cover 90 days per 365 days a year on any PPI. I advised Patient if she wished to continue to take any form of PPI, it would be private pay. She voiced undertstanding.    no

## 2023-04-13 NOTE — ASU DISCHARGE PLAN (ADULT/PEDIATRIC) - ASU DC SPECIAL INSTRUCTIONSFT
PROSTATE BIOPSY    GENERAL: Expect blood in the urine, stool, and ejaculate for up to two (2) months postoperatively. This is normal and to be expected after this procedure. Increase hydration to keep the urine as clear as possible.    SURGICAL WOUND: There are often lumps and bumps that can be felt in the perineum (skin between scrotum and anus) for up to two (2) months or more post operatively. These are of no concern and with time they will soften and disappear.  Any “black and blue” bruising areas will also resolve.  You may apply an ice-pack for 15 minutes out of every hour for the first 24 -36 hours to minimize pain and swelling. You may apply over the counter Bacitracin to the wound several times a day, and keep covered with over the counter gauze as necessary.    CATHETER: Some patients are sent home with a Masterson catheter, while others go home urinating on their own. A Masterson catheter continuously drains the urine from the bladder. If you still have a catheter, the nurses will review instructions and care before you go home.     PAIN: You may have some intermittent pain for up to six (6) weeks post operatively. Pain does not signify any problem unless associated with fever, chills, or inability to void.  If you experience any fevers or chills please call immediately as this may be signs of an infection. You may take Tylenol (acetaminophen) 650-975mg and/or Motrin (ibuprofen) 400-600mg, both available over the counter, for pain every 6 hours as needed. Do not exceed 4000mg of Tylenol (acetaminophen) daily. You may alternate these medications such that you take one or the other every 3 hours for around the clock pain coverage.     ANTICOAGULATION: If you are taking any blood thinning medications, please discuss with your urologist prior to restarting these medications unless otherwise specified.    BATHING: You may shower with soap and water, and pat dry the needle insertion sites in the perineum. Avoid sitting in water for prolonged periods of time for the next few days.    DIET: You may resume your regular diet and regular medication regimen.    ACTIVITY: No heavy lifting or strenuous exercise until you are evaluated at your post-operative appointment. Otherwise, you may return to your usual level of physical activity.    FOLLOW-UP: Please follow up with Dr. Madison. If you did not already schedule your post-operative appointment, please call your urologist to schedule and follow-up appointment.    CALL YOUR UROLOGIST IF: You have any bleeding that does not stop, inability to void >8 hours, fever over 100.4 F, chills, persistent nausea/vomiting, changes in your incision concerning for infection, or if your pain is not controlled on your discharge pain medications.

## 2023-04-14 ENCOUNTER — RESULT REVIEW (OUTPATIENT)
Age: 58
End: 2023-04-14

## 2023-04-14 ENCOUNTER — OUTPATIENT (OUTPATIENT)
Dept: OUTPATIENT SERVICES | Facility: HOSPITAL | Age: 58
LOS: 1 days | End: 2023-04-14
Payer: MEDICARE

## 2023-04-14 ENCOUNTER — APPOINTMENT (OUTPATIENT)
Dept: RADIOLOGY | Facility: IMAGING CENTER | Age: 58
End: 2023-04-14
Payer: MEDICARE

## 2023-04-14 ENCOUNTER — NON-APPOINTMENT (OUTPATIENT)
Age: 58
End: 2023-04-14

## 2023-04-14 DIAGNOSIS — Z00.8 ENCOUNTER FOR OTHER GENERAL EXAMINATION: ICD-10-CM

## 2023-04-14 DIAGNOSIS — S92.009A UNSPECIFIED FRACTURE OF UNSPECIFIED CALCANEUS, INITIAL ENCOUNTER FOR CLOSED FRACTURE: Chronic | ICD-10-CM

## 2023-04-14 DIAGNOSIS — Z98.89 OTHER SPECIFIED POSTPROCEDURAL STATES: Chronic | ICD-10-CM

## 2023-04-14 DIAGNOSIS — Z90.49 ACQUIRED ABSENCE OF OTHER SPECIFIED PARTS OF DIGESTIVE TRACT: Chronic | ICD-10-CM

## 2023-04-14 DIAGNOSIS — Z87.898 PERSONAL HISTORY OF OTHER SPECIFIED CONDITIONS: Chronic | ICD-10-CM

## 2023-04-14 PROBLEM — H93.19 TINNITUS, UNSPECIFIED EAR: Chronic | Status: ACTIVE | Noted: 2023-04-13

## 2023-04-14 PROBLEM — R97.20 ELEVATED PROSTATE SPECIFIC ANTIGEN [PSA]: Chronic | Status: ACTIVE | Noted: 2023-04-13

## 2023-04-14 PROCEDURE — 77080 DXA BONE DENSITY AXIAL: CPT

## 2023-04-14 PROCEDURE — 77080 DXA BONE DENSITY AXIAL: CPT | Mod: 26

## 2023-04-15 ENCOUNTER — OUTPATIENT (OUTPATIENT)
Dept: OUTPATIENT SERVICES | Facility: HOSPITAL | Age: 58
LOS: 1 days | End: 2023-04-15
Payer: MEDICARE

## 2023-04-15 DIAGNOSIS — Z98.89 OTHER SPECIFIED POSTPROCEDURAL STATES: Chronic | ICD-10-CM

## 2023-04-15 DIAGNOSIS — S92.009A UNSPECIFIED FRACTURE OF UNSPECIFIED CALCANEUS, INITIAL ENCOUNTER FOR CLOSED FRACTURE: Chronic | ICD-10-CM

## 2023-04-15 DIAGNOSIS — R97.20 ELEVATED PROSTATE SPECIFIC ANTIGEN [PSA]: ICD-10-CM

## 2023-04-15 DIAGNOSIS — Z87.898 PERSONAL HISTORY OF OTHER SPECIFIED CONDITIONS: Chronic | ICD-10-CM

## 2023-04-15 DIAGNOSIS — Z90.49 ACQUIRED ABSENCE OF OTHER SPECIFIED PARTS OF DIGESTIVE TRACT: Chronic | ICD-10-CM

## 2023-04-15 PROCEDURE — C8001: CPT

## 2023-04-15 PROCEDURE — 76377 3D RENDER W/INTRP POSTPROCES: CPT | Mod: 26

## 2023-04-18 ENCOUNTER — APPOINTMENT (OUTPATIENT)
Dept: SURGERY | Facility: CLINIC | Age: 58
End: 2023-04-18
Payer: MEDICARE

## 2023-04-18 PROCEDURE — 46505 CHEMODENERVATION ANAL MUSC: CPT

## 2023-04-18 PROCEDURE — 45990 SURG DX EXAM ANORECTAL: CPT

## 2023-04-25 ENCOUNTER — NON-APPOINTMENT (OUTPATIENT)
Age: 58
End: 2023-04-25

## 2023-04-25 LAB — SURGICAL PATHOLOGY STUDY: SIGNIFICANT CHANGE UP

## 2023-04-27 ENCOUNTER — RX RENEWAL (OUTPATIENT)
Age: 58
End: 2023-04-27

## 2023-04-27 ENCOUNTER — TRANSCRIPTION ENCOUNTER (OUTPATIENT)
Age: 58
End: 2023-04-27

## 2023-04-27 RX ORDER — ALBUTEROL SULFATE 90 UG/1
108 (90 BASE) INHALANT RESPIRATORY (INHALATION)
Qty: 6.7 | Refills: 2 | Status: ACTIVE | COMMUNITY
Start: 2019-03-29 | End: 1900-01-01

## 2023-05-01 ENCOUNTER — NON-APPOINTMENT (OUTPATIENT)
Age: 58
End: 2023-05-01

## 2023-05-02 ENCOUNTER — APPOINTMENT (OUTPATIENT)
Dept: UROLOGY | Facility: CLINIC | Age: 58
End: 2023-05-02
Payer: MEDICARE

## 2023-05-02 VITALS
HEIGHT: 66 IN | BODY MASS INDEX: 29.25 KG/M2 | RESPIRATION RATE: 16 BRPM | SYSTOLIC BLOOD PRESSURE: 150 MMHG | WEIGHT: 182 LBS | DIASTOLIC BLOOD PRESSURE: 91 MMHG | HEART RATE: 71 BPM

## 2023-05-02 DIAGNOSIS — R97.20 ELEVATED PROSTATE, SPECIFIC ANTIGEN [PSA]: ICD-10-CM

## 2023-05-02 PROCEDURE — 99215 OFFICE O/P EST HI 40 MIN: CPT

## 2023-05-02 RX ORDER — BRAN 5 G
WAFER ORAL
Refills: 0 | Status: ACTIVE | COMMUNITY

## 2023-05-02 NOTE — REASON FOR VISIT
[Consideration of Curative Therapy] : consideration of curative therapy for prostate cancer [Family Member] : family member [Spouse] : spouse

## 2023-05-02 NOTE — DISEASE MANAGEMENT
[1] : T1 [c] : c [0-10] : 0 -10 ng/mL [Biopsy with Fusion] : Patient had a biopsy with fusion on [0] : M0 [7(3+4)] : Template Biopsy [place] Score: 7(3+4) [Biopsy results sent to PCP/Referring Physician] : Biopsy results sent to PCP/Referring Physician [] : Patient had a Prostate MRI [4] : 4 [BiopsyDate] : 4/13/2023 [TotalCores] : 25 [TotalPositiveCores] : 18 [MaxCoreInvolvement] : 80 [FreeTextEntry7] : PSA at diagnosis: 3.9 ng/mL, 3/28/2023 [IIB] : IIB

## 2023-05-02 NOTE — DISEASE MANAGEMENT
[1] : T1 [c] : c [0-10] : 0 -10 ng/mL [Biopsy with Fusion] : Patient had a biopsy with fusion on [0] : M0 [7(3+4)] : Template Biopsy [place] Score: 7(3+4) [Biopsy results sent to PCP/Referring Physician] : Biopsy results sent to PCP/Referring Physician [] : Patient had a Prostate MRI [4] : 4 [BiopsyDate] : 4/13/2023 [TotalCores] : 25 [TotalPositiveCores] : 18 [MaxCoreInvolvement] : 80 [FreeTextEntry7] : PSA at diagnosis: 3.9 ng/mL, 3/28/2023 [IIB] : IIB

## 2023-05-02 NOTE — HISTORY OF PRESENT ILLNESS
[FreeTextEntry1] : Urinary function: Stream is good. Occasional urgency and frequency. Denies any hematuria, dysuria or incontinence. Nocturia x 2-3 \par \par Sexual function: Sexually active, Denies any difficulty getting or maintaining erections.\par \par H/o Crohn's disease s/p small bowel resection in 2018.\par S/p Laparoscopic inguinal hernia repair.\par Had ventral mesh placed at the site in abdominal incision.\par \par

## 2023-05-09 ENCOUNTER — OUTPATIENT (OUTPATIENT)
Dept: OUTPATIENT SERVICES | Facility: HOSPITAL | Age: 58
LOS: 1 days | End: 2023-05-09
Payer: MEDICARE

## 2023-05-09 ENCOUNTER — APPOINTMENT (OUTPATIENT)
Dept: UROLOGY | Facility: CLINIC | Age: 58
End: 2023-05-09

## 2023-05-09 ENCOUNTER — APPOINTMENT (OUTPATIENT)
Dept: NUCLEAR MEDICINE | Facility: IMAGING CENTER | Age: 58
End: 2023-05-09

## 2023-05-09 ENCOUNTER — APPOINTMENT (OUTPATIENT)
Dept: NUCLEAR MEDICINE | Facility: IMAGING CENTER | Age: 58
End: 2023-05-09
Payer: MEDICARE

## 2023-05-09 DIAGNOSIS — Z98.89 OTHER SPECIFIED POSTPROCEDURAL STATES: Chronic | ICD-10-CM

## 2023-05-09 DIAGNOSIS — S92.009A UNSPECIFIED FRACTURE OF UNSPECIFIED CALCANEUS, INITIAL ENCOUNTER FOR CLOSED FRACTURE: Chronic | ICD-10-CM

## 2023-05-09 DIAGNOSIS — C61 MALIGNANT NEOPLASM OF PROSTATE: ICD-10-CM

## 2023-05-09 DIAGNOSIS — Z87.898 PERSONAL HISTORY OF OTHER SPECIFIED CONDITIONS: Chronic | ICD-10-CM

## 2023-05-09 DIAGNOSIS — Z90.49 ACQUIRED ABSENCE OF OTHER SPECIFIED PARTS OF DIGESTIVE TRACT: Chronic | ICD-10-CM

## 2023-05-09 PROCEDURE — 78816 PET IMAGE W/CT FULL BODY: CPT

## 2023-05-09 PROCEDURE — 78816 PET IMAGE W/CT FULL BODY: CPT | Mod: 26,MH

## 2023-05-09 PROCEDURE — A9595: CPT

## 2023-05-11 NOTE — PHYSICAL THERAPY INITIAL EVALUATION ADULT - MD/RN NOTIFIED
Writer called and spoke with patient and informed her that we have received her health history questionnaire and she will be placed in a backup file as we have a non compatible potential kidney donor starting the kidney donor evaluation. If this person would be found ineligible to be a living kidney donor, than a back up would be called to start the kidney donor evaluation. Patient verbalizes understanding.    yes

## 2023-05-16 ENCOUNTER — APPOINTMENT (OUTPATIENT)
Dept: RADIATION ONCOLOGY | Facility: CLINIC | Age: 58
End: 2023-05-16
Payer: MEDICARE

## 2023-05-16 VITALS
HEIGHT: 66 IN | HEART RATE: 60 BPM | OXYGEN SATURATION: 99 % | RESPIRATION RATE: 17 BRPM | DIASTOLIC BLOOD PRESSURE: 89 MMHG | SYSTOLIC BLOOD PRESSURE: 136 MMHG | TEMPERATURE: 97.7 F | WEIGHT: 180 LBS | BODY MASS INDEX: 28.93 KG/M2

## 2023-05-16 DIAGNOSIS — Z80.3 FAMILY HISTORY OF MALIGNANT NEOPLASM OF BREAST: ICD-10-CM

## 2023-05-16 DIAGNOSIS — E11.9 TYPE 2 DIABETES MELLITUS W/OUT COMPLICATIONS: ICD-10-CM

## 2023-05-16 DIAGNOSIS — K50.90 CROHN'S DISEASE, UNSPECIFIED, W/OUT COMPLICATIONS: ICD-10-CM

## 2023-05-16 PROCEDURE — 99204 OFFICE O/P NEW MOD 45 MIN: CPT | Mod: 25

## 2023-05-16 RX ORDER — METHYLDOPA/HYDROCHLOROTHIAZIDE 250MG-15MG
TABLET ORAL
Refills: 0 | Status: ACTIVE | COMMUNITY

## 2023-05-16 RX ORDER — INOSITOL/CHOLINE/BIOFLA/VITB,C 500 MG
TABLET ORAL
Refills: 0 | Status: ACTIVE | COMMUNITY

## 2023-05-16 RX ORDER — MULTIVIT-MIN/IRON/FOLIC ACID/K 18-600-40
CAPSULE ORAL
Refills: 0 | Status: ACTIVE | COMMUNITY

## 2023-05-16 NOTE — REVIEW OF SYSTEMS
[Patient Intake Form Reviewed] : Patient intake form was reviewed [Nocturia] : nocturia [Urinary Frequency] : urinary frequency [IPSS Score (0-40): ___] : IPSS score: [unfilled] [EPIC-CP Score (0-60): ___] : EPIC-CP score: [unfilled] [Negative] : Endocrine [Constipation: Grade 0] : Constipation: Grade 0 [Diarrhea: Grade 1 - Increase of <4 stools per day over baseline; mild increase in ostomy output compared to baseline] : Diarrhea: Grade 1 - Increase of <4 stools per day over baseline; mild increase in ostomy output compared to baseline [Hematuria: Grade 0] : Hematuria: Grade 0 [Urinary Incontinence: Grade 0] : Urinary Incontinence: Grade 0  [Urinary Retention: Grade 1 - Urinary, suprapubic or intermittent catheter placement not indicated; able to void with some residual] : Urinary Retention: Grade 1 - Urinary, suprapubic or intermittent catheter placement not indicated; able to void with some residual [Urinary Tract Pain: Grade 0] : Urinary Tract Pain: Grade 0 [Urinary Urgency: Grade 1 - Present] : Urinary Urgency: Grade 1 - Present [Urinary Frequency: Grade 1 - Present] : Urinary Frequency: Grade 1 - Present [FreeTextEntry7] : history of Crohn's  [de-identified] : history of psoriasis  [FreeTextEntry3] : history of Crohn's  [FreeTextEntry9] : taking Tamsulosin

## 2023-05-16 NOTE — VITALS
[Maximal Pain Intensity: 6/10] : 6/10 [Least Pain Intensity: 4/10] : 4/10 [Pain Description/Quality: ___] : Pain description/quality: [unfilled] [Pain Duration: ___] : Pain duration: [unfilled] [Pain Location: ___] : Pain Location: [unfilled] [Opioid] : opioid [90: Able to carry normal activity; minor signs or symptoms of disease.] : 90: Able to carry normal activity; minor signs or symptoms of disease.  [ECOG Performance Status: 1 - Restricted in physically strenuous activity but ambulatory and able to carry out work of a light or sedentary nature] : Performance Status: 1 - Restricted in physically strenuous activity but ambulatory and able to carry out work of a light or sedentary nature, e.g., light house work, office work [Date: ____________] : Patient's last distress assessment performed on [unfilled]. [1 - Distress Level] : Distress Level: 1 [Patient given social work contact information and resource sheet] : Patient was given social work contact information and resource sheet

## 2023-05-16 NOTE — DISEASE MANAGEMENT
[0-10] : 0 -10 ng/mL [Biopsy with Fusion] : Patient had a biopsy with fusion on [7(3+4)] : Fusion Biopsy [place] Score: 7(3+4) [] : Patient had a Prostate MRI [4] : 4 [II] : II [BiopsyDate] : 4/13/2023 [MeasuredProstateVolume] : 47 [TotalCores] : 25 [TotalPositiveCores] : 18 [MaxCoreInvolvement] : 80

## 2023-05-16 NOTE — HISTORY OF PRESENT ILLNESS
[FreeTextEntry1] : Mr. Mascorro is a 57 year old male who presents in consultation for consideration of radiation therapy.  He is accompanied by his wife for today's visit.  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  [place] score 3+4=7 in 9/25 cores and [place] 3+3=6 in 9/25 cores. 80% max involvement.  Perineural invasion identified.  PSA 4.0 ng/mL.  MRI T2, no CHERYL or SV involvement.  \par \par PSA Trend:\par 9/1/20 - 2.69 ng/mL\par 12/29/21 - 3.67\par 2/7/22 -  3.45 \par 3/15/23 - 4.11\par 3/28/23 - 3.90 \par \par \par HPI : \par \par 3/15/23 - PSA 4.11 ng/mL\par \par 3/23/23 - Prostate MRI:  Prostate volume 47 cc.  There are 2 lesions one in the right apex peripheral zone and another in the left anterior fibromuscular stroma as described in full report. Both are PIRADS 4.\par There is bilateral diffuse mild low signal with enhancement in the peripheral zone suggestive of inflammation.  No extra-prostatic extension noted.  No evidence of neurovascular bundle invasion, no seminal vesicle invasion, no pelvic adenopathy, and no suspicious bone lesions identified. \par \par 3/28/23 - saw Dr. Madison (urology) in consultation for elevated PSA.  MRI US Fusion guided biopsy recommended. \par \par 4/13/23 - underwent Prostate Biopsy:  Pathology - Adenocarcinoma of Prostate in 9/15 sites and 18/25 cores.  [place] score 3+4=7 in 9/25 cores and [place] 3+3=6 in 9/25 cores. 80% max involvement.  Perineural invasion identified.  \par \par 5/2/23 - saw Dr. Lehman (urology) in consultation to discuss treatment options including radical prostatectomy.  Referral to radiation oncology made as well.   PSMA PET ordered. \par \par 5/9/23 - PSMA PET:  1. Foci of mild radiotracer activity in bilateral prostate gland, most intense in posterior aspect of right base. Findings are compatible with known malignancy. The intensity of radiotracer activity suggests low PSMA expression.   2. No evidence of metastatic disease.\par \par \par 5/16/23 - presents in consultation for discussion of radiation therapy options.  Mr. Mascorro is feeling well today.  He has complaints of urinary frequency, nocturia of 3 x, and feeling of incomplete emptying.  He has a history of Crohn's disease, last colonoscopy was 4/18/23.  IPSS 27 / QOL 3 / EPIC 22  He looks forward to next steps in his treatment.

## 2023-05-19 ENCOUNTER — APPOINTMENT (OUTPATIENT)
Dept: CT IMAGING | Facility: IMAGING CENTER | Age: 58
End: 2023-05-19
Payer: MEDICARE

## 2023-05-19 ENCOUNTER — RESULT REVIEW (OUTPATIENT)
Age: 58
End: 2023-05-19

## 2023-05-19 ENCOUNTER — OUTPATIENT (OUTPATIENT)
Dept: OUTPATIENT SERVICES | Facility: HOSPITAL | Age: 58
LOS: 1 days | End: 2023-05-19
Payer: MEDICARE

## 2023-05-19 DIAGNOSIS — Z90.49 ACQUIRED ABSENCE OF OTHER SPECIFIED PARTS OF DIGESTIVE TRACT: Chronic | ICD-10-CM

## 2023-05-19 DIAGNOSIS — Z98.89 OTHER SPECIFIED POSTPROCEDURAL STATES: Chronic | ICD-10-CM

## 2023-05-19 DIAGNOSIS — S92.009A UNSPECIFIED FRACTURE OF UNSPECIFIED CALCANEUS, INITIAL ENCOUNTER FOR CLOSED FRACTURE: Chronic | ICD-10-CM

## 2023-05-19 DIAGNOSIS — Z00.8 ENCOUNTER FOR OTHER GENERAL EXAMINATION: ICD-10-CM

## 2023-05-19 PROCEDURE — 74177 CT ABD & PELVIS W/CONTRAST: CPT | Mod: 26,MH

## 2023-05-19 PROCEDURE — 74177 CT ABD & PELVIS W/CONTRAST: CPT | Mod: MH

## 2023-05-23 ENCOUNTER — OUTPATIENT (OUTPATIENT)
Dept: OUTPATIENT SERVICES | Facility: HOSPITAL | Age: 58
LOS: 1 days | End: 2023-05-23
Payer: MEDICARE

## 2023-05-23 VITALS
WEIGHT: 182.1 LBS | TEMPERATURE: 99 F | HEART RATE: 78 BPM | HEIGHT: 65 IN | OXYGEN SATURATION: 98 % | RESPIRATION RATE: 16 BRPM | DIASTOLIC BLOOD PRESSURE: 85 MMHG | SYSTOLIC BLOOD PRESSURE: 132 MMHG

## 2023-05-23 DIAGNOSIS — Z98.89 OTHER SPECIFIED POSTPROCEDURAL STATES: Chronic | ICD-10-CM

## 2023-05-23 DIAGNOSIS — Z90.49 ACQUIRED ABSENCE OF OTHER SPECIFIED PARTS OF DIGESTIVE TRACT: Chronic | ICD-10-CM

## 2023-05-23 DIAGNOSIS — C61 MALIGNANT NEOPLASM OF PROSTATE: ICD-10-CM

## 2023-05-23 DIAGNOSIS — G47.33 OBSTRUCTIVE SLEEP APNEA (ADULT) (PEDIATRIC): ICD-10-CM

## 2023-05-23 DIAGNOSIS — I25.10 ATHEROSCLEROTIC HEART DISEASE OF NATIVE CORONARY ARTERY WITHOUT ANGINA PECTORIS: ICD-10-CM

## 2023-05-23 DIAGNOSIS — I10 ESSENTIAL (PRIMARY) HYPERTENSION: ICD-10-CM

## 2023-05-23 DIAGNOSIS — S92.009A UNSPECIFIED FRACTURE OF UNSPECIFIED CALCANEUS, INITIAL ENCOUNTER FOR CLOSED FRACTURE: Chronic | ICD-10-CM

## 2023-05-23 DIAGNOSIS — E11.9 TYPE 2 DIABETES MELLITUS WITHOUT COMPLICATIONS: ICD-10-CM

## 2023-05-23 DIAGNOSIS — Z87.898 PERSONAL HISTORY OF OTHER SPECIFIED CONDITIONS: Chronic | ICD-10-CM

## 2023-05-23 LAB
A1C WITH ESTIMATED AVERAGE GLUCOSE RESULT: 5.3 % — SIGNIFICANT CHANGE UP (ref 4–5.6)
ALBUMIN SERPL ELPH-MCNC: 4.3 G/DL — SIGNIFICANT CHANGE UP (ref 3.3–5)
ALP SERPL-CCNC: 75 U/L — SIGNIFICANT CHANGE UP (ref 40–120)
ALT FLD-CCNC: 10 U/L — SIGNIFICANT CHANGE UP (ref 4–41)
ANION GAP SERPL CALC-SCNC: 12 MMOL/L — SIGNIFICANT CHANGE UP (ref 7–14)
AST SERPL-CCNC: 12 U/L — SIGNIFICANT CHANGE UP (ref 4–40)
BILIRUB SERPL-MCNC: 0.2 MG/DL — SIGNIFICANT CHANGE UP (ref 0.2–1.2)
BLD GP AB SCN SERPL QL: NEGATIVE — SIGNIFICANT CHANGE UP
BUN SERPL-MCNC: 14 MG/DL — SIGNIFICANT CHANGE UP (ref 7–23)
CALCIUM SERPL-MCNC: 9.1 MG/DL — SIGNIFICANT CHANGE UP (ref 8.4–10.5)
CHLORIDE SERPL-SCNC: 104 MMOL/L — SIGNIFICANT CHANGE UP (ref 98–107)
CO2 SERPL-SCNC: 23 MMOL/L — SIGNIFICANT CHANGE UP (ref 22–31)
CREAT SERPL-MCNC: 0.7 MG/DL — SIGNIFICANT CHANGE UP (ref 0.5–1.3)
EGFR: 107 ML/MIN/1.73M2 — SIGNIFICANT CHANGE UP
ESTIMATED AVERAGE GLUCOSE: 105 — SIGNIFICANT CHANGE UP
GLUCOSE SERPL-MCNC: 94 MG/DL — SIGNIFICANT CHANGE UP (ref 70–99)
HCT VFR BLD CALC: 37.6 % — LOW (ref 39–50)
HGB BLD-MCNC: 11.9 G/DL — LOW (ref 13–17)
MCHC RBC-ENTMCNC: 26.5 PG — LOW (ref 27–34)
MCHC RBC-ENTMCNC: 31.6 GM/DL — LOW (ref 32–36)
MCV RBC AUTO: 83.7 FL — SIGNIFICANT CHANGE UP (ref 80–100)
NRBC # BLD: 0 /100 WBCS — SIGNIFICANT CHANGE UP (ref 0–0)
NRBC # FLD: 0 K/UL — SIGNIFICANT CHANGE UP (ref 0–0)
PLATELET # BLD AUTO: 195 K/UL — SIGNIFICANT CHANGE UP (ref 150–400)
POTASSIUM SERPL-MCNC: 4.2 MMOL/L — SIGNIFICANT CHANGE UP (ref 3.5–5.3)
POTASSIUM SERPL-SCNC: 4.2 MMOL/L — SIGNIFICANT CHANGE UP (ref 3.5–5.3)
PROT SERPL-MCNC: 6.7 G/DL — SIGNIFICANT CHANGE UP (ref 6–8.3)
RBC # BLD: 4.49 M/UL — SIGNIFICANT CHANGE UP (ref 4.2–5.8)
RBC # FLD: 14.5 % — SIGNIFICANT CHANGE UP (ref 10.3–14.5)
RH IG SCN BLD-IMP: POSITIVE — SIGNIFICANT CHANGE UP
SODIUM SERPL-SCNC: 139 MMOL/L — SIGNIFICANT CHANGE UP (ref 135–145)
WBC # BLD: 7.95 K/UL — SIGNIFICANT CHANGE UP (ref 3.8–10.5)
WBC # FLD AUTO: 7.95 K/UL — SIGNIFICANT CHANGE UP (ref 3.8–10.5)

## 2023-05-23 PROCEDURE — 93010 ELECTROCARDIOGRAM REPORT: CPT

## 2023-05-23 RX ORDER — METOPROLOL TARTRATE 50 MG
25 TABLET ORAL
Qty: 0 | Refills: 0 | DISCHARGE

## 2023-05-23 RX ORDER — ASPIRIN/CALCIUM CARB/MAGNESIUM 324 MG
1 TABLET ORAL
Qty: 0 | Refills: 0 | DISCHARGE

## 2023-05-23 RX ORDER — CELECOXIB 200 MG/1
1 CAPSULE ORAL
Refills: 0 | DISCHARGE

## 2023-05-23 RX ORDER — CYST/ALA/Q10/PHOS.SER/DHA/BROC 100-20-50
1 POWDER (GRAM) ORAL
Qty: 0 | Refills: 0 | DISCHARGE

## 2023-05-23 RX ORDER — ALLOPURINOL 300 MG
1 TABLET ORAL
Qty: 0 | Refills: 0 | DISCHARGE

## 2023-05-23 RX ORDER — TIOTROPIUM BROMIDE 18 UG/1
2 CAPSULE ORAL; RESPIRATORY (INHALATION)
Qty: 0 | Refills: 0 | DISCHARGE

## 2023-05-23 RX ORDER — TRAMADOL HYDROCHLORIDE 50 MG/1
1 TABLET ORAL
Refills: 0 | DISCHARGE

## 2023-05-23 RX ORDER — ESOMEPRAZOLE MAGNESIUM 40 MG/1
1 CAPSULE, DELAYED RELEASE ORAL
Qty: 0 | Refills: 0 | DISCHARGE

## 2023-05-23 RX ORDER — ERGOCALCIFEROL 1.25 MG/1
1 CAPSULE ORAL
Qty: 0 | Refills: 0 | DISCHARGE

## 2023-05-23 RX ORDER — LEVOCETIRIZINE DIHYDROCHLORIDE 0.5 MG/ML
1 SOLUTION ORAL
Qty: 0 | Refills: 0 | DISCHARGE

## 2023-05-23 RX ORDER — ASCORBIC ACID 60 MG
1 TABLET,CHEWABLE ORAL
Qty: 0 | Refills: 0 | DISCHARGE

## 2023-05-23 RX ORDER — BUDESONIDE AND FORMOTEROL FUMARATE DIHYDRATE 160; 4.5 UG/1; UG/1
2 AEROSOL RESPIRATORY (INHALATION)
Qty: 0 | Refills: 0 | DISCHARGE

## 2023-05-23 RX ORDER — AMLODIPINE BESYLATE 2.5 MG/1
1 TABLET ORAL
Qty: 0 | Refills: 0 | DISCHARGE

## 2023-05-23 RX ORDER — LOSARTAN POTASSIUM 100 MG/1
1 TABLET, FILM COATED ORAL
Qty: 0 | Refills: 0 | DISCHARGE

## 2023-05-23 RX ORDER — USTEKINUMAB 45 MG/.5ML
1 INJECTION, SOLUTION SUBCUTANEOUS
Qty: 0 | Refills: 0 | DISCHARGE

## 2023-05-23 RX ORDER — ACETAMINOPHEN 500 MG
2 TABLET ORAL
Qty: 0 | Refills: 0 | DISCHARGE

## 2023-05-23 RX ORDER — EVOLOCUMAB 140 MG/ML
1 INJECTION, SOLUTION SUBCUTANEOUS
Qty: 0 | Refills: 0 | DISCHARGE

## 2023-05-23 NOTE — H&P PST ADULT - NSICDXPASTSURGICALHX_GEN_ALL_CORE_FT
PAST SURGICAL HISTORY:  Calcaneus fracture s/p repair with hardware    H/O colonoscopy     H/O lipoma removed from back    H/O percutaneous transluminal coronary angioplasty with DE RCA 2006, 2 stents placed 2015    History of herniorrhaphy left inguinal herniorrhaphy    S/P cardiac catheterization 2017-resulted in [place]-no stents placed    S/P hernia surgery     S/P small bowel resection

## 2023-05-23 NOTE — H&P PST ADULT - SKIN COMMENTS
biopsied site-->pink flat lesion to left back. preop dx of other specified disorders of pigmentation. see HPI

## 2023-05-23 NOTE — H&P PST ADULT - PROBLEM SELECTOR PLAN 1
scheduled for robotic assisted laparoscopic radical prostatectomy, pelvic lymph node dissection with Dr. Lehman on 06/05/2023.   Verbal and written pre-op instructions provided to patient. Patient verbalized understanding and will call surgeons office for revised instructions if surgery is rescheduled.   Pepcid for GI prophylaxis provided.   patient given verbal and written instruction with teach back on chlorhexidine shampoo, and the patient verbalized understanding with return demonstration.

## 2023-05-23 NOTE — H&P PST ADULT - RESPIRATORY AND THORAX COMMENTS
h/o asthma- no hospitalizations, recent exacerbation. pt uses prescribed inhalers. last visit with pulmonary 04/03/2023

## 2023-05-23 NOTE — H&P PST ADULT - ASSESSMENT
58 y/o male with CAD s/p coronary stents x4, HTN, HLD, fatty Liver, MARYAM on CPAP, Asthma, GERD and elevated uric acid with pre-op diagnosis of Prostate Ca scheduled for robotic assisted laparoscopic radical prostatectomy, pelvic lymph node dissection with Dr. Lehman.

## 2023-05-23 NOTE — H&P PST ADULT - HISTORY OF PRESENT ILLNESS
56 y/o male with CAD s/p coronary stents x4, HTN, HLD, fatty Liver, MARYAM on CPAP, Asthma, GERD and elevated uric acid presents to PST preop for w 56 y/o male with CAD s/p coronary stents x4, HTN, HLD, fatty Liver, MARYAM on CPAP, Asthma, GERD and elevated uric acid presents to PST unit with pre-op diagnosis of Prostate Ca scheduled for robotic assisted laparoscopic radical prostatectomy, pelvic lymph node dissection with Dr. Lehman.  58 y/o male with CAD s/p coronary stents x9, HTN, HLD, fatty Liver, MARYAM on CPAP, Asthma, GERD and elevated uric acid presents to PST unit with pre-op diagnosis of Prostate Ca scheduled for robotic assisted laparoscopic radical prostatectomy, pelvic lymph node dissection with Dr. Lehman.  58 y/o male with CAD s/p coronary stents x 9, HTN, HLD, fatty Liver, MARYAM on CPAP, Asthma, GERD and elevated uric acid presents to PST unit with pre-op diagnosis of Prostate Ca scheduled for robotic assisted laparoscopic radical prostatectomy, pelvic lymph node dissection with Dr. Lehman.

## 2023-05-23 NOTE — H&P PST ADULT - PROBLEM SELECTOR PLAN 2
Continue ASA for coronary artery stents.   Pending Cardiac eval as per surgeon request-copy requested by PST NP

## 2023-05-24 LAB
CULTURE RESULTS: NO GROWTH — SIGNIFICANT CHANGE UP
SPECIMEN SOURCE: SIGNIFICANT CHANGE UP

## 2023-06-02 NOTE — ASU PATIENT PROFILE, ADULT - PRO ARRIVE FROM
Subjective: No acute events overnight. Pt reported that she had just awoken prior to interview and did not appear to have any acute complaints.     Medications:  aspirin  chewable 81 milliGRAM(s) Oral daily  Biotene Dry Mouth Oral Rinse 5 milliLiter(s) Swish and Spit two times a day  buMETAnide Infusion 1 mG/Hr IV Continuous <Continuous>  calcitriol   Capsule 0.5 MICROGram(s) Oral daily  calcium acetate 2001 milliGRAM(s) Oral three times a day with meals  calcium carbonate 1250 mG  + Vitamin D (OsCal 500 + D) 2 Tablet(s) Oral three times a day  cefpodoxime 200 milliGRAM(s) Oral every 12 hours  gabapentin 200 milliGRAM(s) Oral three times a day  influenza   Vaccine 0.5 milliLiter(s) IntraMuscular once  insulin lispro (HumaLOG) corrective regimen sliding scale   SubCutaneous three times a day before meals  insulin lispro (HumaLOG) corrective regimen sliding scale   SubCutaneous at bedtime  levothyroxine 175 MICROGram(s) Oral daily  magnesium sulfate  IVPB 2 Gram(s) IV Intermittent once  milrinone Infusion 0.375 MICROgram(s)/kG/Min IV Continuous <Continuous>  potassium chloride    Tablet ER 40 milliEquivalent(s) Oral every 4 hours  rifaximin 550 milliGRAM(s) Oral two times a day  spironolactone 50 milliGRAM(s) Oral two times a day    Vitals:  T(C): 37.2 (11-17-18 @ 04:00), Max: 37.2 (11-17-18 @ 04:00)  HR: 110 (11-17-18 @ 04:00) (104 - 110)  BP: 110/81 (11-17-18 @ 04:00) (98/69 - 110/81)  RR: 18 (11-17-18 @ 04:00) (18 - 18)  SpO2: 93% (11-17-18 @ 04:00) (93% - 99%)    I&O's Summary    16 Nov 2018 07:01  -  17 Nov 2018 07:00  --------------------------------------------------------  IN: 1070 mL / OUT: 1400 mL / NET: -330 mL        Physical Exam:  Appearance: No Acute Distress  HEENT: JVP moderately elevated   Cardiovascular: RRR, Normal S1 S2  Respiratory: Clear to auscultation bilaterally  Gastrointestinal: Soft, Non-tender, non-distended	  Skin: no skin lesions  Neurologic: Non-focal  Extremities: No LE edema, warm and well perfused  Psychiatry: A & O x 3, Mood & affect appropriate      Labs:                        10.4   8.0   )-----------( 131      ( 17 Nov 2018 07:16 )             32.9     11-17    132<L>  |  86<L>  |  14  ----------------------------<  157<H>  3.5   |  32<H>  |  0.81    Ca    7.5<L>      17 Nov 2018 07:16  Phos  4.0     11-17  Mg     1.6     11-17 home

## 2023-06-04 ENCOUNTER — TRANSCRIPTION ENCOUNTER (OUTPATIENT)
Age: 58
End: 2023-06-04

## 2023-06-05 ENCOUNTER — APPOINTMENT (OUTPATIENT)
Dept: UROLOGY | Facility: HOSPITAL | Age: 58
End: 2023-06-05

## 2023-06-05 ENCOUNTER — INPATIENT (INPATIENT)
Facility: HOSPITAL | Age: 58
LOS: 0 days | Discharge: HOME | End: 2023-06-06
Attending: UROLOGY | Admitting: UROLOGY
Payer: MEDICARE

## 2023-06-05 ENCOUNTER — RESULT REVIEW (OUTPATIENT)
Age: 58
End: 2023-06-05

## 2023-06-05 VITALS
TEMPERATURE: 98 F | RESPIRATION RATE: 16 BRPM | SYSTOLIC BLOOD PRESSURE: 120 MMHG | HEART RATE: 65 BPM | DIASTOLIC BLOOD PRESSURE: 73 MMHG | WEIGHT: 182.1 LBS | HEIGHT: 65 IN | OXYGEN SATURATION: 100 %

## 2023-06-05 DIAGNOSIS — C61 MALIGNANT NEOPLASM OF PROSTATE: ICD-10-CM

## 2023-06-05 DIAGNOSIS — Z98.89 OTHER SPECIFIED POSTPROCEDURAL STATES: Chronic | ICD-10-CM

## 2023-06-05 DIAGNOSIS — Z90.49 ACQUIRED ABSENCE OF OTHER SPECIFIED PARTS OF DIGESTIVE TRACT: Chronic | ICD-10-CM

## 2023-06-05 DIAGNOSIS — S92.009A UNSPECIFIED FRACTURE OF UNSPECIFIED CALCANEUS, INITIAL ENCOUNTER FOR CLOSED FRACTURE: Chronic | ICD-10-CM

## 2023-06-05 DIAGNOSIS — Z87.898 PERSONAL HISTORY OF OTHER SPECIFIED CONDITIONS: Chronic | ICD-10-CM

## 2023-06-05 LAB
GLUCOSE BLDC GLUCOMTR-MCNC: 102 MG/DL — HIGH (ref 70–99)
GLUCOSE BLDC GLUCOMTR-MCNC: 104 MG/DL — HIGH (ref 70–99)
GLUCOSE BLDC GLUCOMTR-MCNC: 137 MG/DL — HIGH (ref 70–99)
GLUCOSE BLDC GLUCOMTR-MCNC: 94 MG/DL — SIGNIFICANT CHANGE UP (ref 70–99)
GLUCOSE BLDC GLUCOMTR-MCNC: 95 MG/DL — SIGNIFICANT CHANGE UP (ref 70–99)

## 2023-06-05 PROCEDURE — 88305 TISSUE EXAM BY PATHOLOGIST: CPT | Mod: 26

## 2023-06-05 PROCEDURE — 55866 LAPS SURG PRST8ECT RPBIC RAD: CPT

## 2023-06-05 PROCEDURE — 38571 LAPAROSCOPY LYMPHADENECTOMY: CPT

## 2023-06-05 PROCEDURE — 88309 TISSUE EXAM BY PATHOLOGIST: CPT | Mod: 26

## 2023-06-05 DEVICE — SURGICEL 2 X 14": Type: IMPLANTABLE DEVICE | Status: FUNCTIONAL

## 2023-06-05 DEVICE — SURGICEL SNOW 2 X 4": Type: IMPLANTABLE DEVICE | Status: FUNCTIONAL

## 2023-06-05 DEVICE — LIGATING CLIPS WECK HEMOLOK POLYMER LARGE (PURPLE) 6: Type: IMPLANTABLE DEVICE | Status: FUNCTIONAL

## 2023-06-05 RX ORDER — OXYBUTYNIN CHLORIDE 5 MG
5 TABLET ORAL THREE TIMES A DAY
Refills: 0 | Status: DISCONTINUED | OUTPATIENT
Start: 2023-06-05 | End: 2023-06-06

## 2023-06-05 RX ORDER — SODIUM CHLORIDE 9 MG/ML
1000 INJECTION, SOLUTION INTRAVENOUS
Refills: 0 | Status: DISCONTINUED | OUTPATIENT
Start: 2023-06-05 | End: 2023-06-06

## 2023-06-05 RX ORDER — MONTELUKAST 4 MG/1
10 TABLET, CHEWABLE ORAL AT BEDTIME
Refills: 0 | Status: DISCONTINUED | OUTPATIENT
Start: 2023-06-05 | End: 2023-06-06

## 2023-06-05 RX ORDER — BUDESONIDE AND FORMOTEROL FUMARATE DIHYDRATE 160; 4.5 UG/1; UG/1
2 AEROSOL RESPIRATORY (INHALATION)
Refills: 0 | DISCHARGE

## 2023-06-05 RX ORDER — INSULIN LISPRO 100/ML
VIAL (ML) SUBCUTANEOUS AT BEDTIME
Refills: 0 | Status: DISCONTINUED | OUTPATIENT
Start: 2023-06-05 | End: 2023-06-06

## 2023-06-05 RX ORDER — SODIUM CHLORIDE 9 MG/ML
1000 INJECTION, SOLUTION INTRAVENOUS
Refills: 0 | Status: DISCONTINUED | OUTPATIENT
Start: 2023-06-05 | End: 2023-06-05

## 2023-06-05 RX ORDER — CELECOXIB 200 MG/1
200 CAPSULE ORAL AT BEDTIME
Refills: 0 | Status: DISCONTINUED | OUTPATIENT
Start: 2023-06-05 | End: 2023-06-06

## 2023-06-05 RX ORDER — BUDESONIDE AND FORMOTEROL FUMARATE DIHYDRATE 160; 4.5 UG/1; UG/1
2 AEROSOL RESPIRATORY (INHALATION)
Refills: 0 | Status: DISCONTINUED | OUTPATIENT
Start: 2023-06-05 | End: 2023-06-06

## 2023-06-05 RX ORDER — AMLODIPINE BESYLATE 2.5 MG/1
5 TABLET ORAL DAILY
Refills: 0 | Status: DISCONTINUED | OUTPATIENT
Start: 2023-06-05 | End: 2023-06-06

## 2023-06-05 RX ORDER — GLUCAGON INJECTION, SOLUTION 0.5 MG/.1ML
1 INJECTION, SOLUTION SUBCUTANEOUS ONCE
Refills: 0 | Status: DISCONTINUED | OUTPATIENT
Start: 2023-06-05 | End: 2023-06-06

## 2023-06-05 RX ORDER — FENTANYL CITRATE 50 UG/ML
50 INJECTION INTRAVENOUS
Refills: 0 | Status: DISCONTINUED | OUTPATIENT
Start: 2023-06-05 | End: 2023-06-05

## 2023-06-05 RX ORDER — EVOLOCUMAB 140 MG/ML
140 INJECTION, SOLUTION SUBCUTANEOUS
Refills: 0 | DISCHARGE

## 2023-06-05 RX ORDER — ACETAMINOPHEN 500 MG
975 TABLET ORAL EVERY 6 HOURS
Refills: 0 | Status: DISCONTINUED | OUTPATIENT
Start: 2023-06-05 | End: 2023-06-06

## 2023-06-05 RX ORDER — MONTELUKAST 4 MG/1
1 TABLET, CHEWABLE ORAL
Refills: 0 | DISCHARGE

## 2023-06-05 RX ORDER — DEXTROSE 50 % IN WATER 50 %
25 SYRINGE (ML) INTRAVENOUS ONCE
Refills: 0 | Status: DISCONTINUED | OUTPATIENT
Start: 2023-06-05 | End: 2023-06-06

## 2023-06-05 RX ORDER — ONDANSETRON 8 MG/1
4 TABLET, FILM COATED ORAL ONCE
Refills: 0 | Status: DISCONTINUED | OUTPATIENT
Start: 2023-06-05 | End: 2023-06-05

## 2023-06-05 RX ORDER — DICLOFENAC SODIUM 30 MG/G
2 GEL TOPICAL
Refills: 0 | DISCHARGE

## 2023-06-05 RX ORDER — LIDOCAINE 4 G/100G
1 CREAM TOPICAL
Refills: 0 | Status: DISCONTINUED | OUTPATIENT
Start: 2023-06-05 | End: 2023-06-06

## 2023-06-05 RX ORDER — ALLOPURINOL 300 MG
100 TABLET ORAL DAILY
Refills: 0 | Status: DISCONTINUED | OUTPATIENT
Start: 2023-06-05 | End: 2023-06-06

## 2023-06-05 RX ORDER — GALCANEZUMAB 100 MG/ML
120 INJECTION, SOLUTION SUBCUTANEOUS
Refills: 0 | DISCHARGE

## 2023-06-05 RX ORDER — SENNA PLUS 8.6 MG/1
2 TABLET ORAL AT BEDTIME
Refills: 0 | Status: DISCONTINUED | OUTPATIENT
Start: 2023-06-05 | End: 2023-06-06

## 2023-06-05 RX ORDER — USTEKINUMAB 45 MG/.5ML
90 INJECTION, SOLUTION SUBCUTANEOUS
Refills: 0 | DISCHARGE

## 2023-06-05 RX ORDER — ASPIRIN/CALCIUM CARB/MAGNESIUM 324 MG
1 TABLET ORAL
Refills: 0 | DISCHARGE

## 2023-06-05 RX ORDER — LOSARTAN POTASSIUM 100 MG/1
1 TABLET, FILM COATED ORAL
Refills: 0 | DISCHARGE

## 2023-06-05 RX ORDER — EFINACONAZOLE 100 MG/ML
1 SOLUTION TOPICAL
Refills: 0 | DISCHARGE

## 2023-06-05 RX ORDER — ASPIRIN/CALCIUM CARB/MAGNESIUM 324 MG
81 TABLET ORAL DAILY
Refills: 0 | Status: DISCONTINUED | OUTPATIENT
Start: 2023-06-05 | End: 2023-06-06

## 2023-06-05 RX ORDER — HYDROMORPHONE HYDROCHLORIDE 2 MG/ML
0.5 INJECTION INTRAMUSCULAR; INTRAVENOUS; SUBCUTANEOUS
Refills: 0 | Status: DISCONTINUED | OUTPATIENT
Start: 2023-06-05 | End: 2023-06-05

## 2023-06-05 RX ORDER — DEXTROSE 50 % IN WATER 50 %
15 SYRINGE (ML) INTRAVENOUS ONCE
Refills: 0 | Status: DISCONTINUED | OUTPATIENT
Start: 2023-06-05 | End: 2023-06-06

## 2023-06-05 RX ORDER — TIOTROPIUM BROMIDE 18 UG/1
2 CAPSULE ORAL; RESPIRATORY (INHALATION) DAILY
Refills: 0 | Status: DISCONTINUED | OUTPATIENT
Start: 2023-06-05 | End: 2023-06-06

## 2023-06-05 RX ORDER — TIOTROPIUM BROMIDE 18 UG/1
2 CAPSULE ORAL; RESPIRATORY (INHALATION)
Refills: 0 | DISCHARGE

## 2023-06-05 RX ORDER — CELECOXIB 200 MG/1
1 CAPSULE ORAL
Refills: 0 | DISCHARGE

## 2023-06-05 RX ORDER — HYDROMORPHONE HYDROCHLORIDE 2 MG/ML
1 INJECTION INTRAMUSCULAR; INTRAVENOUS; SUBCUTANEOUS
Refills: 0 | Status: DISCONTINUED | OUTPATIENT
Start: 2023-06-05 | End: 2023-06-05

## 2023-06-05 RX ORDER — LOSARTAN POTASSIUM 100 MG/1
100 TABLET, FILM COATED ORAL DAILY
Refills: 0 | Status: DISCONTINUED | OUTPATIENT
Start: 2023-06-05 | End: 2023-06-06

## 2023-06-05 RX ORDER — ATORVASTATIN CALCIUM 80 MG/1
20 TABLET, FILM COATED ORAL AT BEDTIME
Refills: 0 | Status: DISCONTINUED | OUTPATIENT
Start: 2023-06-05 | End: 2023-06-06

## 2023-06-05 RX ORDER — KETOCONAZOLE 20 MG/G
1 AEROSOL, FOAM TOPICAL
Refills: 0 | DISCHARGE

## 2023-06-05 RX ORDER — ALLOPURINOL 300 MG
1 TABLET ORAL
Refills: 0 | DISCHARGE

## 2023-06-05 RX ORDER — OXYCODONE HYDROCHLORIDE 5 MG/1
5 TABLET ORAL EVERY 4 HOURS
Refills: 0 | Status: DISCONTINUED | OUTPATIENT
Start: 2023-06-05 | End: 2023-06-06

## 2023-06-05 RX ORDER — DEXTROSE 50 % IN WATER 50 %
12.5 SYRINGE (ML) INTRAVENOUS ONCE
Refills: 0 | Status: DISCONTINUED | OUTPATIENT
Start: 2023-06-05 | End: 2023-06-06

## 2023-06-05 RX ORDER — HEPARIN SODIUM 5000 [USP'U]/ML
5000 INJECTION INTRAVENOUS; SUBCUTANEOUS EVERY 8 HOURS
Refills: 0 | Status: DISCONTINUED | OUTPATIENT
Start: 2023-06-05 | End: 2023-06-06

## 2023-06-05 RX ORDER — SEMAGLUTIDE 0.68 MG/ML
0.5 INJECTION, SOLUTION SUBCUTANEOUS
Refills: 0 | DISCHARGE

## 2023-06-05 RX ORDER — OXYCODONE HYDROCHLORIDE 5 MG/1
10 TABLET ORAL EVERY 6 HOURS
Refills: 0 | Status: DISCONTINUED | OUTPATIENT
Start: 2023-06-05 | End: 2023-06-06

## 2023-06-05 RX ORDER — METOCLOPRAMIDE HCL 10 MG
10 TABLET ORAL EVERY 6 HOURS
Refills: 0 | Status: DISCONTINUED | OUTPATIENT
Start: 2023-06-05 | End: 2023-06-06

## 2023-06-05 RX ORDER — INSULIN LISPRO 100/ML
VIAL (ML) SUBCUTANEOUS
Refills: 0 | Status: DISCONTINUED | OUTPATIENT
Start: 2023-06-05 | End: 2023-06-06

## 2023-06-05 RX ORDER — AMLODIPINE BESYLATE 2.5 MG/1
1 TABLET ORAL
Refills: 0 | DISCHARGE

## 2023-06-05 RX ORDER — ROSUVASTATIN CALCIUM 5 MG/1
1 TABLET ORAL
Refills: 0 | DISCHARGE

## 2023-06-05 RX ADMIN — SENNA PLUS 2 TABLET(S): 8.6 TABLET ORAL at 21:59

## 2023-06-05 RX ADMIN — Medication 975 MILLIGRAM(S): at 21:00

## 2023-06-05 RX ADMIN — HYDROMORPHONE HYDROCHLORIDE 0.5 MILLIGRAM(S): 2 INJECTION INTRAMUSCULAR; INTRAVENOUS; SUBCUTANEOUS at 16:42

## 2023-06-05 RX ADMIN — Medication 5 MILLIGRAM(S): at 17:43

## 2023-06-05 RX ADMIN — SODIUM CHLORIDE 125 MILLILITER(S): 9 INJECTION, SOLUTION INTRAVENOUS at 16:28

## 2023-06-05 RX ADMIN — MONTELUKAST 10 MILLIGRAM(S): 4 TABLET, CHEWABLE ORAL at 22:00

## 2023-06-05 RX ADMIN — OXYCODONE HYDROCHLORIDE 5 MILLIGRAM(S): 5 TABLET ORAL at 21:00

## 2023-06-05 RX ADMIN — LIDOCAINE 1 APPLICATION(S): 4 CREAM TOPICAL at 22:01

## 2023-06-05 RX ADMIN — Medication 975 MILLIGRAM(S): at 22:00

## 2023-06-05 RX ADMIN — HYDROMORPHONE HYDROCHLORIDE 0.5 MILLIGRAM(S): 2 INJECTION INTRAMUSCULAR; INTRAVENOUS; SUBCUTANEOUS at 17:03

## 2023-06-05 RX ADMIN — HYDROMORPHONE HYDROCHLORIDE 0.5 MILLIGRAM(S): 2 INJECTION INTRAMUSCULAR; INTRAVENOUS; SUBCUTANEOUS at 16:32

## 2023-06-05 RX ADMIN — HEPARIN SODIUM 5000 UNIT(S): 5000 INJECTION INTRAVENOUS; SUBCUTANEOUS at 21:59

## 2023-06-05 RX ADMIN — ATORVASTATIN CALCIUM 20 MILLIGRAM(S): 80 TABLET, FILM COATED ORAL at 21:59

## 2023-06-05 RX ADMIN — LIDOCAINE 1 APPLICATION(S): 4 CREAM TOPICAL at 16:32

## 2023-06-05 RX ADMIN — CELECOXIB 200 MILLIGRAM(S): 200 CAPSULE ORAL at 22:50

## 2023-06-05 RX ADMIN — BUDESONIDE AND FORMOTEROL FUMARATE DIHYDRATE 2 PUFF(S): 160; 4.5 AEROSOL RESPIRATORY (INHALATION) at 22:00

## 2023-06-05 RX ADMIN — HYDROMORPHONE HYDROCHLORIDE 0.5 MILLIGRAM(S): 2 INJECTION INTRAMUSCULAR; INTRAVENOUS; SUBCUTANEOUS at 16:47

## 2023-06-05 RX ADMIN — CELECOXIB 200 MILLIGRAM(S): 200 CAPSULE ORAL at 23:43

## 2023-06-05 RX ADMIN — LIDOCAINE 1 APPLICATION(S): 4 CREAM TOPICAL at 19:43

## 2023-06-05 RX ADMIN — OXYCODONE HYDROCHLORIDE 5 MILLIGRAM(S): 5 TABLET ORAL at 22:00

## 2023-06-05 NOTE — ASU PREOP CHECKLIST - 3.
Continuous glucose monitoring device Zoie 3 on back of left upper arm Continuous glucose monitoring device Zoie 3 on back of left upper arm Dr Piedra from anesthesia aware

## 2023-06-05 NOTE — ASU PREOP CHECKLIST - INTERNAL PROSTHESES
9 cardiac stents /// 13 scerws in left ankle and plate/yes(specify) 9 cardiac stents /// 13 screws  in left ankle and plate/yes(specify)

## 2023-06-05 NOTE — PROGRESS NOTE ADULT - SUBJECTIVE AND OBJECTIVE BOX
Post op check    This 56 yo M is s/p RALP  PMH: CAD; HTN; Crohn's  Pt is awake and alert  Has c/o pain  Afeb 143/80 80 98%2L  Abd- soft; appropriately tender             wounds C&D  Masterson 60  No TRISTIAN

## 2023-06-05 NOTE — BRIEF OPERATIVE NOTE - NSICDXBRIEFPROCEDURE_GEN_ALL_CORE_FT
PROCEDURES:  Robot-assisted radical prostatectomy with pelvic lymphadenectomy with cystoscopy 05-Jun-2023 15:59:03  Oksana Najera

## 2023-06-05 NOTE — PATIENT PROFILE ADULT - FALL HARM RISK - HARM RISK INTERVENTIONS
Assistance with ambulation/Assistance OOB with selected safe patient handling equipment/Communicate Risk of Fall with Harm to all staff/Discuss with provider need for PT consult/Monitor gait and stability/Provide patient with walking aids - walker, cane, crutches/Reinforce activity limits and safety measures with patient and family/Sit up slowly, dangle for a short time, stand at bedside before walking/Tailored Fall Risk Interventions/Use of alarms - bed, chair and/or voice tab/Visual Cue: Yellow wristband and red socks/Bed in lowest position, wheels locked, appropriate side rails in place/Call bell, personal items and telephone in reach/Instruct patient to call for assistance before getting out of bed or chair/Non-slip footwear when patient is out of bed/[place] to call system/Physically safe environment - no spills, clutter or unnecessary equipment/Purposeful Proactive Rounding/Room/bathroom lighting operational, light cord in reach

## 2023-06-05 NOTE — ASU PREOP CHECKLIST - SELECT TESTS ORDERED
CBC/CMP/Type and Cross/Type and Screen/EKG/POCT Blood Glucose FS - 104 at 11am/CBC/CMP/Type and Cross/Type and Screen/EKG/POCT Blood Glucose

## 2023-06-06 ENCOUNTER — TRANSCRIPTION ENCOUNTER (OUTPATIENT)
Age: 58
End: 2023-06-06

## 2023-06-06 VITALS
SYSTOLIC BLOOD PRESSURE: 129 MMHG | HEART RATE: 78 BPM | DIASTOLIC BLOOD PRESSURE: 77 MMHG | TEMPERATURE: 98 F | RESPIRATION RATE: 16 BRPM | OXYGEN SATURATION: 97 %

## 2023-06-06 DIAGNOSIS — K21.9 GASTRO-ESOPHAGEAL REFLUX DISEASE WITHOUT ESOPHAGITIS: ICD-10-CM

## 2023-06-06 DIAGNOSIS — Z29.9 ENCOUNTER FOR PROPHYLACTIC MEASURES, UNSPECIFIED: ICD-10-CM

## 2023-06-06 DIAGNOSIS — J45.909 UNSPECIFIED ASTHMA, UNCOMPLICATED: ICD-10-CM

## 2023-06-06 LAB
GLUCOSE BLDC GLUCOMTR-MCNC: 111 MG/DL — HIGH (ref 70–99)
GLUCOSE BLDC GLUCOMTR-MCNC: 123 MG/DL — HIGH (ref 70–99)

## 2023-06-06 PROCEDURE — 99223 1ST HOSP IP/OBS HIGH 75: CPT

## 2023-06-06 RX ORDER — ACETAMINOPHEN 500 MG
3 TABLET ORAL
Qty: 0 | Refills: 0 | DISCHARGE
Start: 2023-06-06

## 2023-06-06 RX ORDER — SODIUM CHLORIDE 9 MG/ML
1000 INJECTION, SOLUTION INTRAVENOUS
Refills: 0 | Status: DISCONTINUED | OUTPATIENT
Start: 2023-06-06 | End: 2023-06-06

## 2023-06-06 RX ORDER — TRAMADOL HYDROCHLORIDE 50 MG/1
1 TABLET ORAL
Refills: 0 | DISCHARGE

## 2023-06-06 RX ADMIN — HEPARIN SODIUM 5000 UNIT(S): 5000 INJECTION INTRAVENOUS; SUBCUTANEOUS at 05:44

## 2023-06-06 RX ADMIN — OXYCODONE HYDROCHLORIDE 10 MILLIGRAM(S): 5 TABLET ORAL at 11:05

## 2023-06-06 RX ADMIN — Medication 975 MILLIGRAM(S): at 11:00

## 2023-06-06 RX ADMIN — Medication 100 MILLIGRAM(S): at 12:19

## 2023-06-06 RX ADMIN — LIDOCAINE 1 APPLICATION(S): 4 CREAM TOPICAL at 07:11

## 2023-06-06 RX ADMIN — Medication 81 MILLIGRAM(S): at 12:19

## 2023-06-06 RX ADMIN — AMLODIPINE BESYLATE 5 MILLIGRAM(S): 2.5 TABLET ORAL at 05:44

## 2023-06-06 RX ADMIN — OXYCODONE HYDROCHLORIDE 10 MILLIGRAM(S): 5 TABLET ORAL at 05:20

## 2023-06-06 RX ADMIN — OXYCODONE HYDROCHLORIDE 10 MILLIGRAM(S): 5 TABLET ORAL at 04:32

## 2023-06-06 RX ADMIN — OXYCODONE HYDROCHLORIDE 10 MILLIGRAM(S): 5 TABLET ORAL at 12:02

## 2023-06-06 RX ADMIN — Medication 975 MILLIGRAM(S): at 10:15

## 2023-06-06 RX ADMIN — LOSARTAN POTASSIUM 100 MILLIGRAM(S): 100 TABLET, FILM COATED ORAL at 05:53

## 2023-06-06 RX ADMIN — Medication 975 MILLIGRAM(S): at 05:20

## 2023-06-06 RX ADMIN — HEPARIN SODIUM 5000 UNIT(S): 5000 INJECTION INTRAVENOUS; SUBCUTANEOUS at 14:47

## 2023-06-06 RX ADMIN — TIOTROPIUM BROMIDE 2 PUFF(S): 18 CAPSULE ORAL; RESPIRATORY (INHALATION) at 11:05

## 2023-06-06 RX ADMIN — Medication 975 MILLIGRAM(S): at 04:23

## 2023-06-06 RX ADMIN — LIDOCAINE 1 APPLICATION(S): 4 CREAM TOPICAL at 10:15

## 2023-06-06 RX ADMIN — BUDESONIDE AND FORMOTEROL FUMARATE DIHYDRATE 2 PUFF(S): 160; 4.5 AEROSOL RESPIRATORY (INHALATION) at 10:15

## 2023-06-06 RX ADMIN — LIDOCAINE 1 APPLICATION(S): 4 CREAM TOPICAL at 04:20

## 2023-06-06 NOTE — DISCHARGE NOTE NURSING/CASE MANAGEMENT/SOCIAL WORK - NSDCPNINST_GEN_ALL_CORE
Notify  Notify Dr Lehman if you experience any increase in pain not relieved with medication, any redness, drainage or swelling around incision, any persistent nausea vomiting, any dark red blood or clots in urine or any fever >10.5.  Drink plenty of fluids.  No heavy lifting or straining.  Masterson care as instructed, use leg bag during the day and large drainage bag at night. Continue to follow consistent carbohydrate diet any follow up with PMD for continued management of your diabetes.

## 2023-06-06 NOTE — DISCHARGE NOTE NURSING/CASE MANAGEMENT/SOCIAL WORK - PATIENT PORTAL LINK FT
You can access the FollowMyHealth Patient Portal offered by [facility] by registering at the following website: [email or web address]. By joining [facility]’s FollowMyHealth portal, you will also be able to view your health information using other applications (apps) compatible with our system.

## 2023-06-06 NOTE — CONSULT NOTE ADULT - PROBLEM SELECTOR PROBLEM 5
Mid-Level Procedure Text (A): After obtaining clear surgical margins the patient was sent to a mid-level provider for surgical repair.  The patient understands they will receive post-surgical care and follow-up from the mid-level provider. HTN (hypertension)

## 2023-06-06 NOTE — PROGRESS NOTE ADULT - SUBJECTIVE AND OBJECTIVE BOX
POD #1  Tmax 100.4  Afeb 135/78 74 95%RA    Pt has no c/o; mild nausea  Abd- soft NT ND; no flatus     wounds C&D  Masterson 1350

## 2023-06-06 NOTE — CONSULT NOTE ADULT - SUBJECTIVE AND OBJECTIVE BOX
CHIEF COMPLAINT: Patient is a 57y old  Male who presents with a chief complaint of "I have prostate cancer" (23 May 2023 14:37)      HPI: HPI:  56 y/o male with CAD s/p coronary stents x 9, HTN, HLD, fatty Liver, MARYAM on CPAP, Asthma, GERD and elevated uric acid presents to PST unit with pre-op diagnosis of Prostate Ca scheduled for robotic assisted laparoscopic radical prostatectomy, pelvic lymph node dissection with Dr. Lehman.  (23 May 2023 14:37)      Pain Symptoms if applicable:             	                           none	    mild         moderate         severe  	                            0	     1-3	      4-6	          7-10  Pain:  Location:	  Modifying factors:	  Associated symptoms:	    Allergies    No Known Allergies    Intolerances        HOME MEDICATIONS: [x] Reviewed    MEDICATIONS  (STANDING):  acetaminophen     Tablet .. 975 milliGRAM(s) Oral every 6 hours  allopurinol 100 milliGRAM(s) Oral daily  amLODIPine   Tablet 5 milliGRAM(s) Oral daily  aspirin enteric coated 81 milliGRAM(s) Oral daily  atorvastatin 20 milliGRAM(s) Oral at bedtime  budesonide 160 MICROgram(s)/formoterol 4.5 MICROgram(s) Inhaler 2 Puff(s) Inhalation two times a day  celecoxib 200 milliGRAM(s) Oral at bedtime  dextrose 5% + sodium chloride 0.45%. 1000 milliLiter(s) (75 mL/Hr) IV Continuous <Continuous>  dextrose 5%. 1000 milliLiter(s) (50 mL/Hr) IV Continuous <Continuous>  dextrose 5%. 1000 milliLiter(s) (100 mL/Hr) IV Continuous <Continuous>  dextrose 50% Injectable 25 Gram(s) IV Push once  dextrose 50% Injectable 25 Gram(s) IV Push once  dextrose 50% Injectable 12.5 Gram(s) IV Push once  glucagon  Injectable 1 milliGRAM(s) IntraMuscular once  heparin   Injectable 5000 Unit(s) SubCutaneous every 8 hours  insulin lispro (ADMELOG) corrective regimen sliding scale   SubCutaneous three times a day before meals  insulin lispro (ADMELOG) corrective regimen sliding scale   SubCutaneous at bedtime  lidocaine 5% Ointment 1 Application(s) Topical every 3 hours  losartan 100 milliGRAM(s) Oral daily  montelukast 10 milliGRAM(s) Oral at bedtime  senna 2 Tablet(s) Oral at bedtime  tiotropium 2.5 MICROgram(s) Inhaler 2 Puff(s) Inhalation daily    MEDICATIONS  (PRN):  dextrose Oral Gel 15 Gram(s) Oral once PRN Blood Glucose LESS THAN 70 milliGRAM(s)/deciliter  metoclopramide Injectable 10 milliGRAM(s) IV Push every 6 hours PRN Nausea and/or Vomiting  oxybutynin 5 milliGRAM(s) Oral three times a day PRN Bladder spasms  oxyCODONE    IR 10 milliGRAM(s) Oral every 6 hours PRN Severe Pain (7 - 10)  oxyCODONE    IR 5 milliGRAM(s) Oral every 4 hours PRN Moderate Pain (4 - 6)      PAST MEDICAL & SURGICAL HISTORY:  GERD (gastroesophageal reflux disease)      Lower back pain      Shoulder pain, bilateral      Bilateral knee pain      Inguinal mass      HLD (hyperlipidemia)      Crohn disease      Pancreatitis  while on 6MP for Crohn's now off of it      CAD (coronary artery disease)  2 stents placed in LAD in 2015, RCA stent x 2 in 2006      Joint pain      Asthma  controlled-never intubated or hospitalized      MARYAM (obstructive sleep apnea)  on CPAP      GIB (gastrointestinal bleeding)      MI (myocardial infarction)      Unilateral recurrent inguinal hernia without obstruction or gangrene  Left      Cluster headaches      Bowel obstruction      HTN (hypertension)      Fatty liver      Other specified disorders of pigmentation      Stented coronary artery      Psoriasis      Migraine headache      Anemia      Atypical melanocytic hyperplasia      Elevated blood uric acid level      Elevated PSA      Tinnitus      History of herniorrhaphy  left inguinal herniorrhaphy      H/O percutaneous transluminal coronary angioplasty  with DE RCA 2006, 2 stents placed 2015      H/O colonoscopy      S/P cardiac catheterization  2017-resulted in [place]-no stents placed      H/O lipoma  removed from back      S/P hernia surgery      S/P small bowel resection      Calcaneus fracture  s/p repair with hardware      [x ] Reviewed     SOCIAL HISTORY:  [x] Substance abuse: denies  [x] Tobacco: denies  [x] Alcohol use: occasional     FAMILY HISTORY:  Family history of Crohn's disease    Family history of premature CAD  Father CABG at age 54    [x] No pertinent family history in first degree relatives     REVIEW OF SYSTEMS:    [x] All other ROS negative  [  ] Unable to obtain due to poor mental status    Vital Signs Last 24 Hrs  T(C): 36.9 (06 Jun 2023 05:40), Max: 38 (05 Jun 2023 22:04)  T(F): 98.4 (06 Jun 2023 05:40), Max: 100.4 (05 Jun 2023 22:04)  HR: 80 (06 Jun 2023 05:40) (65 - 90)  BP: 134/72 (06 Jun 2023 05:40) (116/66 - 152/77)  BP(mean): 80 (05 Jun 2023 19:00) (77 - 100)  RR: 18 (06 Jun 2023 05:40) (12 - 20)  SpO2: 98% (06 Jun 2023 05:40) (93% - 100%)    Parameters below as of 06 Jun 2023 05:40  Patient On (Oxygen Delivery Method): room air        PHYSICAL EXAM:    GENERAL: NAD, well-groomed, well-developed  HEAD:  Atraumatic, Normocephalic  EYES: EOMI, PERRLA, conjunctiva and sclera clear  ENMT: Moist mucous membranes  NECK: Supple, No JVD  RESPIRATORY: Clear to auscultation bilaterally; No rales, rhonchi, wheezing, or rubs  CARDIOVASCULAR: Regular rate and rhythm; No murmurs, rubs, or gallops  GASTROINTESTINAL: Soft, Nontender, Nondistended; Bowel sounds present  GENITOURINARY: Not examined  EXTREMITIES:  2+ Peripheral Pulses, No clubbing, cyanosis, or edema  NERVOUS SYSTEM:  Alert & Oriented X3; Moving all 4 extremities; No gross sensory deficits  HEME/LYMPH: No lymphadenopathy noted  SKIN: No rashes or lesions; Incisions C/D/I    LABS:              CAPILLARY BLOOD GLUCOSE      POCT Blood Glucose.: 111 mg/dL (06 Jun 2023 07:26)      RADIOLOGY & ADDITIONAL STUDIES:    EKG:   Personally Reviewed:  [x] YES     Imaging:   Personally Reviewed:  [x] YES               [ ] Consultant(s) Notes Reviewed  [x] Care Discussed with Consultants/Other Providers: Urology PA - discussed CHIEF COMPLAINT: Patient is a 57y old  Male who presents with a chief complaint of "I have prostate cancer" (23 May 2023 14:37)      HPI: HPI:  58 y/o male with CAD s/p coronary stents x 9, HTN, HLD, fatty Liver, MARYAM on CPAP, Asthma, GERD and elevated uric acid presents to PST unit with pre-op diagnosis of Prostate Ca scheduled for robotic assisted laparoscopic radical prostatectomy, pelvic lymph node dissection with Dr. Lehman.  (23 May 2023 14:37)    Pt seen at bedside on 6/6, sitting in chair, reports groin pain, abdominal pain and L shoulder pain. Was up and ambulating earlier and also tolerated clears and regular breakfast.      Pain Symptoms if applicable: moderate 5/10    Allergies    No Known Allergies    Intolerances        HOME MEDICATIONS: [x] Reviewed    MEDICATIONS  (STANDING):  acetaminophen     Tablet .. 975 milliGRAM(s) Oral every 6 hours  allopurinol 100 milliGRAM(s) Oral daily  amLODIPine   Tablet 5 milliGRAM(s) Oral daily  aspirin enteric coated 81 milliGRAM(s) Oral daily  atorvastatin 20 milliGRAM(s) Oral at bedtime  budesonide 160 MICROgram(s)/formoterol 4.5 MICROgram(s) Inhaler 2 Puff(s) Inhalation two times a day  celecoxib 200 milliGRAM(s) Oral at bedtime  dextrose 5% + sodium chloride 0.45%. 1000 milliLiter(s) (75 mL/Hr) IV Continuous <Continuous>  dextrose 5%. 1000 milliLiter(s) (50 mL/Hr) IV Continuous <Continuous>  dextrose 5%. 1000 milliLiter(s) (100 mL/Hr) IV Continuous <Continuous>  dextrose 50% Injectable 25 Gram(s) IV Push once  dextrose 50% Injectable 25 Gram(s) IV Push once  dextrose 50% Injectable 12.5 Gram(s) IV Push once  glucagon  Injectable 1 milliGRAM(s) IntraMuscular once  heparin   Injectable 5000 Unit(s) SubCutaneous every 8 hours  insulin lispro (ADMELOG) corrective regimen sliding scale   SubCutaneous three times a day before meals  insulin lispro (ADMELOG) corrective regimen sliding scale   SubCutaneous at bedtime  lidocaine 5% Ointment 1 Application(s) Topical every 3 hours  losartan 100 milliGRAM(s) Oral daily  montelukast 10 milliGRAM(s) Oral at bedtime  senna 2 Tablet(s) Oral at bedtime  tiotropium 2.5 MICROgram(s) Inhaler 2 Puff(s) Inhalation daily    MEDICATIONS  (PRN):  dextrose Oral Gel 15 Gram(s) Oral once PRN Blood Glucose LESS THAN 70 milliGRAM(s)/deciliter  metoclopramide Injectable 10 milliGRAM(s) IV Push every 6 hours PRN Nausea and/or Vomiting  oxybutynin 5 milliGRAM(s) Oral three times a day PRN Bladder spasms  oxyCODONE    IR 10 milliGRAM(s) Oral every 6 hours PRN Severe Pain (7 - 10)  oxyCODONE    IR 5 milliGRAM(s) Oral every 4 hours PRN Moderate Pain (4 - 6)      PAST MEDICAL & SURGICAL HISTORY:  GERD (gastroesophageal reflux disease)      Lower back pain      Shoulder pain, bilateral      Bilateral knee pain      Inguinal mass      HLD (hyperlipidemia)      Crohn disease      Pancreatitis  while on 6MP for Crohn's now off of it      CAD (coronary artery disease)  2 stents placed in LAD in 2015, RCA stent x 2 in 2006      Joint pain      Asthma  controlled-never intubated or hospitalized      MARYAM (obstructive sleep apnea)  on CPAP      GIB (gastrointestinal bleeding)      MI (myocardial infarction)      Unilateral recurrent inguinal hernia without obstruction or gangrene  Left      Cluster headaches      Bowel obstruction      HTN (hypertension)      Fatty liver      Other specified disorders of pigmentation      Stented coronary artery      Psoriasis      Migraine headache      Anemia      Atypical melanocytic hyperplasia      Elevated blood uric acid level      Elevated PSA      Tinnitus      History of herniorrhaphy  left inguinal herniorrhaphy      H/O percutaneous transluminal coronary angioplasty  with DE RCA 2006, 2 stents placed 2015      H/O colonoscopy      S/P cardiac catheterization  2017-resulted in [place]-no stents placed      H/O lipoma  removed from back      S/P hernia surgery      S/P small bowel resection      Calcaneus fracture  s/p repair with hardware      [x ] Reviewed     SOCIAL HISTORY:  [x] Substance abuse: denies  [x] Tobacco: denies  [x] Alcohol use: occasional     FAMILY HISTORY:  Family history of Crohn's disease    Family history of premature CAD  Father CABG at age 54    [x] No pertinent family history in first degree relatives     REVIEW OF SYSTEMS:    [x] All other ROS negative  [  ] Unable to obtain due to poor mental status    Vital Signs Last 24 Hrs  T(C): 36.9 (06 Jun 2023 05:40), Max: 38 (05 Jun 2023 22:04)  T(F): 98.4 (06 Jun 2023 05:40), Max: 100.4 (05 Jun 2023 22:04)  HR: 80 (06 Jun 2023 05:40) (65 - 90)  BP: 134/72 (06 Jun 2023 05:40) (116/66 - 152/77)  BP(mean): 80 (05 Jun 2023 19:00) (77 - 100)  RR: 18 (06 Jun 2023 05:40) (12 - 20)  SpO2: 98% (06 Jun 2023 05:40) (93% - 100%)    Parameters below as of 06 Jun 2023 05:40  Patient On (Oxygen Delivery Method): room air        PHYSICAL EXAM:    GENERAL: NAD, well-appearing, sitting in chair   HEAD:  Atraumatic, Normocephalic  EYES: EOMI, conjunctiva and sclera clear  ENMT: Moist mucous membranes  NECK: Supple, No JVD  RESPIRATORY: Clear to auscultation bilaterally; No rales, rhonchi, wheezing, or rubs  CARDIOVASCULAR: Regular rate and rhythm; No murmurs, rubs, or gallops  GASTROINTESTINAL: Soft, mildly tender  GENITOURINARY: hurst red tinged urine   EXTREMITIES: +SCDs   NERVOUS SYSTEM:  Alert & Oriented x3, nonfocal   SKIN: No rashes or lesions; Incisions C/D/I    LABS:              CAPILLARY BLOOD GLUCOSE      POCT Blood Glucose.: 111 mg/dL (06 Jun 2023 07:26)      RADIOLOGY & ADDITIONAL STUDIES:                [ ] Consultant(s) Notes Reviewed  [x] Care Discussed with Consultants/Other Providers: Urology PA - discussed

## 2023-06-06 NOTE — DISCHARGE NOTE PROVIDER - NSDCFUSCHEDAPPT_GEN_ALL_CORE_FT
Leonel Duckworth  [facility]  [facility] [address] [place]  Scheduled Appointment: 08/09/2023

## 2023-06-06 NOTE — DISCHARGE NOTE PROVIDER - NSDCCPCAREPLAN_GEN_ALL_CORE_FT
PRINCIPAL DISCHARGE DIAGNOSIS  Diagnosis: Prostate cancer  Assessment and Plan of Treatment: Drink plenty of fluids.  No heavy lifting (greater than 10 pounds) or straining for 4 to 6 weeks.  You may shower, just pat white strips dry, they will fall off in a few weeks.   Call 's  office  to schedule a follow up appointment.  Call the office if you have fever greater than 101, pain not relieved with pain medication, nausea/vomiting..

## 2023-06-06 NOTE — CONSULT NOTE ADULT - PROBLEM SELECTOR RECOMMENDATION 9
s/p RALP & LPND on 6/5  -postoperative management per primary team   -f/u path outpt  -DC planning per primary

## 2023-06-06 NOTE — DISCHARGE NOTE PROVIDER - NSDCMRMEDTOKEN_GEN_ALL_CORE_FT
acetaminophen 325 mg oral tablet: 3 tab(s) orally every 6 hours  allopurinol 100 mg oral tablet: 1 tab(s) orally once a day  aspirin 81 mg oral tablet: 1 tab(s) orally once a day (at bedtime)  CeleBREX 200 mg oral capsule: 1 cap(s) orally once a day (at bedtime)  Coq10 1 cap daily:   Crestor 5 mg oral tablet: 1 tab(s) orally once a day (at bedtime)  Emgality Prefilled Pen 120 mg/mL subcutaneous solution: 120 milligram(s) subcutaneously once a month  Glucosamine with MSM &amp; Chondrotin 1 tab daily:   Jublia 10% topical solution: Apply topically to affected area once a day  ketoconazole 2% topical cream: Apply topically to affected area once a day  losartan 100 mg oral tablet: 1 tab(s) orally once a day  Metoprolol 25 mg daily:   Multivitamin 1 tab daily:   Norvasc 5 mg oral tablet: 1 tab(s) orally once a day  Ozempic 2 mg/1.5 mL (0.25 mg or 0.5 mg dose) subcutaneous solution: 0.5 subcutaneously once a week  Repatha 140 mg/mL subcutaneous solution: 140 milligram(s) subcutaneously Bi-weekly  Singulair 10 mg oral tablet: 1 tab(s) orally once a day (at bedtime)  Spiriva Respimat 10 ACT 2.5 mcg/inh inhalation aerosol: 2 puff(s) inhaled once a day  Stelara PFS 90 mg/mL subcutaneous solution: 90 milligram(s) subcutaneously once a month  Symbicort 160 mcg-4.5 mcg/inh inhalation aerosol: 2 puff(s) inhaled 2 times a day  Turmeric 1 cap daily:   Tylenol Arthritis 2 cap daily PRN:   Vitamin C 1 tab daily:   Vitamin D2 5000IU daily:   Vitamin K2 1 tab daily:   Voltaren 1% topical gel: Apply topically to affected area once a day

## 2023-06-06 NOTE — PROGRESS NOTE ADULT - ASSESSMENT
POST ANESTHESIA EVALUATION    Postop Day 1        MENTAL STATUS:   [ x ] Awake   [  ] Arousable   [  ] Sedated    AIRWAY PATENCY:   [ x ] Satisfactory   [  ] Other:     NAUSEA / VOMITING:  [ x ] None   [  ] Controlled with current regimen   [  ] Other    PAIN:   [  ] None   [ x ] Controlled with current regimen   [  ] Other    [ x ] No apparent anesthesia complications        Braeden Alcantara M.D.  Department of Anesthesiology  [facility]
stable s/p RALP  POD#1 - doing well; mild nausea; no gas  Plan:  - OOB  - clears
stable s/p RALP  Plan:  - pain meds  - OOB  - clears

## 2023-06-06 NOTE — DISCHARGE NOTE PROVIDER - HOSPITAL COURSE
Pt had RALP yesterday; did well; no TRISTIAN; ambulating; passed gas and rubén reg diet; pain controlled; home today with hurst.

## 2023-06-06 NOTE — CONSULT NOTE ADULT - ASSESSMENT
57M w/MI/CAD s/p stents x9 (2015), HTN, HLD, fatty liver, MARYAM (CPAP), asthma, GERD, chrohns, psoriasis a/f RALP, PLND on 6/6.   Medicine consulted for comanagement.

## 2023-06-06 NOTE — DISCHARGE NOTE PROVIDER - CARE PROVIDER_API CALL
Ba Lehman.  Urology  [address] [place] [address], [address] [address]  Phone: [phone]  Fax: [phone]  Follow Up Time:

## 2023-06-07 ENCOUNTER — EMERGENCY (EMERGENCY)
Facility: HOSPITAL | Age: 58
LOS: 1 days | Discharge: HOME | End: 2023-06-07
Attending: EMERGENCY MEDICINE | Admitting: EMERGENCY MEDICINE
Payer: MEDICARE

## 2023-06-07 ENCOUNTER — NON-APPOINTMENT (OUTPATIENT)
Age: 58
End: 2023-06-07

## 2023-06-07 VITALS
HEART RATE: 81 BPM | SYSTOLIC BLOOD PRESSURE: 146 MMHG | RESPIRATION RATE: 16 BRPM | TEMPERATURE: 98 F | OXYGEN SATURATION: 100 % | HEIGHT: 65 IN | DIASTOLIC BLOOD PRESSURE: 88 MMHG

## 2023-06-07 VITALS
DIASTOLIC BLOOD PRESSURE: 88 MMHG | OXYGEN SATURATION: 98 % | TEMPERATURE: 99 F | SYSTOLIC BLOOD PRESSURE: 144 MMHG | RESPIRATION RATE: 18 BRPM | HEART RATE: 80 BPM

## 2023-06-07 DIAGNOSIS — Z98.89 OTHER SPECIFIED POSTPROCEDURAL STATES: Chronic | ICD-10-CM

## 2023-06-07 DIAGNOSIS — N32.89 OTHER SPECIFIED DISORDERS OF BLADDER: ICD-10-CM

## 2023-06-07 DIAGNOSIS — Z90.49 ACQUIRED ABSENCE OF OTHER SPECIFIED PARTS OF DIGESTIVE TRACT: Chronic | ICD-10-CM

## 2023-06-07 DIAGNOSIS — S92.009A UNSPECIFIED FRACTURE OF UNSPECIFIED CALCANEUS, INITIAL ENCOUNTER FOR CLOSED FRACTURE: Chronic | ICD-10-CM

## 2023-06-07 DIAGNOSIS — R30.1 VESICAL TENESMUS: ICD-10-CM

## 2023-06-07 DIAGNOSIS — Z87.898 PERSONAL HISTORY OF OTHER SPECIFIED CONDITIONS: Chronic | ICD-10-CM

## 2023-06-07 LAB
ALBUMIN SERPL ELPH-MCNC: 4 G/DL — SIGNIFICANT CHANGE UP (ref 3.3–5)
ALP SERPL-CCNC: 60 U/L — SIGNIFICANT CHANGE UP (ref 40–120)
ALT FLD-CCNC: 6 U/L — SIGNIFICANT CHANGE UP (ref 4–41)
ANION GAP SERPL CALC-SCNC: 13 MMOL/L — SIGNIFICANT CHANGE UP (ref 7–14)
AST SERPL-CCNC: 12 U/L — SIGNIFICANT CHANGE UP (ref 4–40)
BASOPHILS # BLD AUTO: 0.01 K/UL — SIGNIFICANT CHANGE UP (ref 0–0.2)
BASOPHILS NFR BLD AUTO: 0.1 % — SIGNIFICANT CHANGE UP (ref 0–2)
BILIRUB SERPL-MCNC: 0.5 MG/DL — SIGNIFICANT CHANGE UP (ref 0.2–1.2)
BUN SERPL-MCNC: 8 MG/DL — SIGNIFICANT CHANGE UP (ref 7–23)
CALCIUM SERPL-MCNC: 8.8 MG/DL — SIGNIFICANT CHANGE UP (ref 8.4–10.5)
CHLORIDE SERPL-SCNC: 105 MMOL/L — SIGNIFICANT CHANGE UP (ref 98–107)
CO2 SERPL-SCNC: 24 MMOL/L — SIGNIFICANT CHANGE UP (ref 22–31)
CREAT SERPL-MCNC: 0.69 MG/DL — SIGNIFICANT CHANGE UP (ref 0.5–1.3)
EGFR: 108 ML/MIN/1.73M2 — SIGNIFICANT CHANGE UP
EOSINOPHIL # BLD AUTO: 0.04 K/UL — SIGNIFICANT CHANGE UP (ref 0–0.5)
EOSINOPHIL NFR BLD AUTO: 0.5 % — SIGNIFICANT CHANGE UP (ref 0–6)
GLUCOSE SERPL-MCNC: 103 MG/DL — HIGH (ref 70–99)
HCT VFR BLD CALC: 32.2 % — LOW (ref 39–50)
HGB BLD-MCNC: 10.5 G/DL — LOW (ref 13–17)
IANC: 6.31 K/UL — SIGNIFICANT CHANGE UP (ref 1.8–7.4)
IMM GRANULOCYTES NFR BLD AUTO: 0.3 % — SIGNIFICANT CHANGE UP (ref 0–0.9)
LYMPHOCYTES # BLD AUTO: 0.76 K/UL — LOW (ref 1–3.3)
LYMPHOCYTES # BLD AUTO: 10.1 % — LOW (ref 13–44)
MCHC RBC-ENTMCNC: 26.3 PG — LOW (ref 27–34)
MCHC RBC-ENTMCNC: 32.6 GM/DL — SIGNIFICANT CHANGE UP (ref 32–36)
MCV RBC AUTO: 80.7 FL — SIGNIFICANT CHANGE UP (ref 80–100)
MONOCYTES # BLD AUTO: 0.37 K/UL — SIGNIFICANT CHANGE UP (ref 0–0.9)
MONOCYTES NFR BLD AUTO: 4.9 % — SIGNIFICANT CHANGE UP (ref 2–14)
NEUTROPHILS # BLD AUTO: 6.31 K/UL — SIGNIFICANT CHANGE UP (ref 1.8–7.4)
NEUTROPHILS NFR BLD AUTO: 84.1 % — HIGH (ref 43–77)
NRBC # BLD: 0 /100 WBCS — SIGNIFICANT CHANGE UP (ref 0–0)
NRBC # FLD: 0 K/UL — SIGNIFICANT CHANGE UP (ref 0–0)
PLATELET # BLD AUTO: 133 K/UL — LOW (ref 150–400)
POTASSIUM SERPL-MCNC: 3.2 MMOL/L — LOW (ref 3.5–5.3)
POTASSIUM SERPL-SCNC: 3.2 MMOL/L — LOW (ref 3.5–5.3)
PROT SERPL-MCNC: 6.4 G/DL — SIGNIFICANT CHANGE UP (ref 6–8.3)
RBC # BLD: 3.99 M/UL — LOW (ref 4.2–5.8)
RBC # FLD: 14.6 % — HIGH (ref 10.3–14.5)
SODIUM SERPL-SCNC: 142 MMOL/L — SIGNIFICANT CHANGE UP (ref 135–145)
SURGICAL PATHOLOGY STUDY: SIGNIFICANT CHANGE UP
TROPONIN T, HIGH SENSITIVITY RESULT: 6 NG/L — SIGNIFICANT CHANGE UP
WBC # BLD: 7.51 K/UL — SIGNIFICANT CHANGE UP (ref 3.8–10.5)
WBC # FLD AUTO: 7.51 K/UL — SIGNIFICANT CHANGE UP (ref 3.8–10.5)

## 2023-06-07 PROCEDURE — 93010 ELECTROCARDIOGRAM REPORT: CPT

## 2023-06-07 PROCEDURE — 71045 X-RAY EXAM CHEST 1 VIEW: CPT | Mod: 26

## 2023-06-07 PROCEDURE — 99285 EMERGENCY DEPT VISIT HI MDM: CPT | Mod: FS

## 2023-06-07 RX ORDER — TRAMADOL HYDROCHLORIDE 50 MG/1
1 TABLET ORAL
Qty: 6 | Refills: 0
Start: 2023-06-07 | End: 2023-06-08

## 2023-06-07 RX ORDER — POTASSIUM CHLORIDE 20 MEQ
40 PACKET (EA) ORAL ONCE
Refills: 0 | Status: COMPLETED | OUTPATIENT
Start: 2023-06-07 | End: 2023-06-07

## 2023-06-07 RX ORDER — HYDROMORPHONE HYDROCHLORIDE 2 MG/ML
1 INJECTION INTRAMUSCULAR; INTRAVENOUS; SUBCUTANEOUS ONCE
Refills: 0 | Status: DISCONTINUED | OUTPATIENT
Start: 2023-06-07 | End: 2023-06-07

## 2023-06-07 RX ORDER — MORPHINE SULFATE 50 MG/1
4 CAPSULE, EXTENDED RELEASE ORAL ONCE
Refills: 0 | Status: DISCONTINUED | OUTPATIENT
Start: 2023-06-07 | End: 2023-06-07

## 2023-06-07 RX ORDER — OXYBUTYNIN CHLORIDE 5 MG
1 TABLET ORAL
Qty: 6 | Refills: 0
Start: 2023-06-07 | End: 2023-06-08

## 2023-06-07 RX ADMIN — HYDROMORPHONE HYDROCHLORIDE 1 MILLIGRAM(S): 2 INJECTION INTRAMUSCULAR; INTRAVENOUS; SUBCUTANEOUS at 22:28

## 2023-06-07 RX ADMIN — Medication 40 MILLIEQUIVALENT(S): at 23:21

## 2023-06-07 RX ADMIN — HYDROMORPHONE HYDROCHLORIDE 1 MILLIGRAM(S): 2 INJECTION INTRAMUSCULAR; INTRAVENOUS; SUBCUTANEOUS at 22:04

## 2023-06-07 NOTE — CONSULT NOTE ADULT - PROBLEM SELECTOR RECOMMENDATION 9
-Continue catheter with plan for f/u Mon 6/12, and likely d/c hurst at that time.  -Please prescribe Ditropan Q8h prn for the next 2 days for bladder spasms.  Patient to stop taking 2 days prior to catheter removal (on Sat 6/10)  -Patient takes Tramadol for chronic pain prn, and can take for severe pain prn.  -Patient will resume Miralax in addition to Colace and Senna.  He will reach out to his gastroenterologist for further recs for persistent constipation with Crohn's.  -Patient instructed to avoid any meds per rectum until cleared by Dr. Lehman.  -Patient and wife are comfortable with the plan.   -Please call with any further questions/concerns: [phone].  -Patient will f/u with Dr. Lehman on 6/12, [phone]

## 2023-06-07 NOTE — ED ADULT NURSE NOTE - NSICDXPASTSURGICALHX_GEN_ALL_CORE_FT
PAST SURGICAL HISTORY:  Calcaneus fracture s/p repair with hardware    H/O colonoscopy     H/O lipoma removed from back    H/O percutaneous transluminal coronary angioplasty with DE RCA 2006, 2 stents placed 2015    History of herniorrhaphy left inguinal herniorrhaphy    S/P cardiac catheterization 2017-resulted in [place]-no stents placed    S/P hernia surgery     S/P small bowel resection

## 2023-06-07 NOTE — ED PROVIDER NOTE - PHYSICAL EXAMINATION
Vital signs reviewed.   CONSTITUTIONAL: Well-appearing; well-nourished; in no apparent distress. Non-toxic appearing.   HEAD: Normocephalic, atraumatic.  EYES: Normal conjunctiva and no sclera injection noted  ENT: normal nose; no rhinorrhea.  CARD: Normal S1, S2  RESP: Normal chest excursion with respiration; breath sounds clear and equal bilaterally.  ABD/GI: soft, non-distended; generalized tenderness. Catheter in place.  EXT/MS: moves all extremities; distal pulses are normal, no pedal edema.  SKIN: Normal for age and race; warm; dry; good turgor; no apparent lesions or exudate noted.  NEURO: Awake, alert, oriented x 3.  PSYCH: Normal mood; appropriate affect.

## 2023-06-07 NOTE — ED ADULT NURSE NOTE - OTHER COMPLAINTS
wife refused FS, urology at bedside requesting blood work, page [phone] urology will flush hurst at bedside

## 2023-06-07 NOTE — CONSULT NOTE ADULT - SUBJECTIVE AND OBJECTIVE BOX
HPI: 58 y/o male with a h/o Crohn's disease, CAD (h/o multiple cardiac stents), Asthma, HTN, Prostate cancer, s/p recent RALP on 6/5 with Dr. Lehman, presents to the ED with complaints of severe abdominal pain, and concern that catheter stopped draining properly.  Patient was discharged to home yesterday on POD 1.  He was doing okay at home until this evening when he developed acute sharp lower abdominal pain, and decreased output from catheter.  Denies fevers.    Also reports constipation, last BM was before the procedure.  Reports at baseline he has 10-15 loose bowel movements per day secondary to Crohn's.    Also c/o left sided chest pain.     Patient evaluated when he first arrived to the ED with a soft abdomen and some surgical site pain, and catheter is draining well with clear yellow urine throughout the tubing.      PAST MEDICAL & SURGICAL HISTORY:  Prostate ca, s/p RALP 6/5     GERD (gastroesophageal reflux disease)    Lower back pain    Shoulder pain, bilateral    Bilateral knee pain    HLD (hyperlipidemia)    Crohn disease    Pancreatitis  while on 6MP for Crohn's now off of it    MI/CAD (coronary artery disease)  2 stents placed in LAD in 2015, RCA stent x 2 in 2006  Total of 9 stents     Asthma  controlled-never intubated or hospitalized    MARYAM (obstructive sleep apnea)  on CPAP    GIB (gastrointestinal bleeding)    L Unilateral recurrent inguinal hernia without obstruction or gangrene, s/p left inguinal herniorrhaphy    Bowel obstruction, S/P small bowel resection    HTN (hypertension)    Fatty liver    Other specified disorders of pigmentation    Psoriasis    Migraine/cluster headache    Anemia    Atypical melanocytic hyperplasia    Elevated blood uric acid level    Tinnitus    H/O colonoscopy    H/O lipoma  removed from back    Calcaneus fracture  s/p repair with hardware    MEDICATIONS:  Home Medications:  acetaminophen 325 mg oral tablet: 3 tab(s) orally every 6 hours (06 Jun 2023 13:47)  allopurinol 100 mg oral tablet: 1 tab(s) orally once a day (05 Jun 2023 11:02)  aspirin 81 mg oral tablet: 1 tab(s) orally once a day (at bedtime) (05 Jun 2023 11:02)  CeleBREX 200 mg oral capsule: 1 cap(s) orally once a day (at bedtime) (05 Jun 2023 11:02)  Coq10 1 cap daily:  (05 Jun 2023 11:02)  Crestor 5 mg oral tablet: 1 tab(s) orally once a day (at bedtime) (05 Jun 2023 11:02)  Emgality Prefilled Pen 120 mg/mL subcutaneous solution: 120 milligram(s) subcutaneously once a month (05 Jun 2023 11:02)  Glucosamine with MSM &amp; Chondrotin 1 tab daily:  (05 Jun 2023 11:02)  Jublia 10% topical solution: Apply topically to affected area once a day (05 Jun 2023 11:02)  ketoconazole 2% topical cream: Apply topically to affected area once a day (05 Jun 2023 11:02)  losartan 100 mg oral tablet: 1 tab(s) orally once a day (05 Jun 2023 11:02)  Metoprolol 25 mg daily:  (05 Jun 2023 11:02)  Multivitamin 1 tab daily:  (05 Jun 2023 11:02)  Norvasc 5 mg oral tablet: 1 tab(s) orally once a day (05 Jun 2023 11:02)  Ozempic 2 mg/1.5 mL (0.25 mg or 0.5 mg dose) subcutaneous solution: 0.5 subcutaneously once a week (05 Jun 2023 11:02)  Repatha 140 mg/mL subcutaneous solution: 140 milligram(s) subcutaneously Bi-weekly (05 Jun 2023 11:02)  Singulair 10 mg oral tablet: 1 tab(s) orally once a day (at bedtime) (05 Jun 2023 11:02)  Spiriva Respimat 10 ACT 2.5 mcg/inh inhalation aerosol: 2 puff(s) inhaled once a day (05 Jun 2023 11:02)  Stelara PFS 90 mg/mL subcutaneous solution: 90 milligram(s) subcutaneously once a month (05 Jun 2023 11:02)  Symbicort 160 mcg-4.5 mcg/inh inhalation aerosol: 2 puff(s) inhaled 2 times a day (05 Jun 2023 11:02)  Turmeric 1 cap daily:  (05 Jun 2023 11:02)  Tylenol Arthritis 2 cap daily PRN:  (05 Jun 2023 11:02)  Vitamin C 1 tab daily:  (05 Jun 2023 11:02)  Vitamin D2 5000IU daily:  (05 Jun 2023 11:02)  Vitamin K2 1 tab daily:  (05 Jun 2023 11:02)  Voltaren 1% topical gel: Apply topically to affected area once a day (05 Jun 2023 11:02)    FAMILY HISTORY:  Family history of Crohn's disease    Family history of premature CAD  Father CABG at age 54    Allergies  No Known Allergies    SOCIAL HISTORY:    REVIEW OF SYSTEMS: Otherwise negative as stated in HPI    PHYSICAL EXAM:  Vital Signs Last 24 Hrs  T(C): 36.8 (07 Jun 2023 19:40), Max: 36.8 (07 Jun 2023 19:40)  T(F): 98.2 (07 Jun 2023 19:40), Max: 98.2 (07 Jun 2023 19:40)  HR: 81 (07 Jun 2023 19:40) (81 - 81)  BP: 146/88 (07 Jun 2023 19:40) (146/88 - 146/88)  BP(mean): --  RR: 16 (07 Jun 2023 19:40) (16 - 16)  SpO2: 100% (07 Jun 2023 19:40) (100% - 100%)    Parameters below as of 07 Jun 2023 19:40  Patient On (Oxygen Delivery Method): room air    General: A+O, in mild discomfort    Respiratory and Thorax: no resp distress   	  Cardiovascular: regular     Gastrointestinal: soft, no distention, tender at surgical site,  steri strips with minimal dry drainage.    Genitourinary: hurst in place, draining well, clear yellow urine.  Gentle manual flush of catheter, catheter easily flushes, with return of clear urine, no clots or debris present.                         	  Extremities:  nontender    Neuro: nonfocal    Pysch: anxious but appropriate   	  LABS:                        10.5   7.51  )-----------( 133      ( 07 Jun 2023 21:45 )             32.2     06-07    142  |  105  |  8   ----------------------------<  103<H>  3.2<L>   |  24  |  0.69    Ca    8.8      07 Jun 2023 21:45    TPro  6.4  /  Alb  4.0  /  TBili  0.5  /  DBili  x   /  AST  12  /  ALT  6   /  AlkPhos  60  06-07

## 2023-06-07 NOTE — ED PROVIDER NOTE - NSICDXPASTSURGICALHX_GEN_ALL_CORE_FT
PAST SURGICAL HISTORY:  Calcaneus fracture s/p repair with hardware    H/O colonoscopy     H/O lipoma removed from back    H/O percutaneous transluminal coronary angioplasty with DE RCA 2006, 2 stents placed 2015    History of herniorrhaphy left inguinal herniorrhaphy    S/P cardiac catheterization 2017-resulted in [place]-no stents placed    S/P hernia surgery     S/P small bowel resection

## 2023-06-07 NOTE — ED PROVIDER NOTE - NS ED ATTENDING STATEMENT MOD
This was a shared visit with the DUGLAS. I reviewed and verified the documentation and independently performed the documented:

## 2023-06-07 NOTE — CONSULT NOTE ADULT - ASSESSMENT
58 y/o male with a h/o Crohn's disease, CAD (h/o multiple cardiac stents), Asthma, HTN, Prostate cancer, s/p recent RALP on 6/5 with Dr. Lehman, presents to the ED with complaints of severe abdominal pain, and concern that catheter stopped draining properly, and upon arrival, pain improving, and catheter spontaneously draining clear yellow urine, and catheter flushes easily without signs of clots or obstruction.  Symptoms likely secondary to bladder spasm.  Pain control is difficult to achieve secondary to Crohn's disease, and risk of causing constipation.  EKG and troponin negative for ACS.

## 2023-06-07 NOTE — ED PROVIDER NOTE - PATIENT PORTAL LINK FT
You can access the FollowMyHealth Patient Portal offered by [facility] by registering at the following website: [email or web address]. By joining [employer]’s FollowMyHealth portal, you will also be able to view your health information using other applications (apps) compatible with our system.

## 2023-06-07 NOTE — ED ADULT TRIAGE NOTE - OTHER COMPLAINTS
wife refused FS wife refused FS, urology at bedside requesting blood work, page [phone] urology will flush hurst at bedside

## 2023-06-07 NOTE — ED PROVIDER NOTE - CLINICAL SUMMARY MEDICAL DECISION MAKING FREE TEXT BOX
56 Y/O M w/ PMH of Crohn's disease, CAD, RALP on 6/5 presents to ER w/ abdominal pain  Seen by urology hurst draining  ACS evaluation, pain management, re-evalaute

## 2023-06-07 NOTE — ED PROVIDER NOTE - OBJECTIVE STATEMENT
56 Y/O M w/ PMH of Crohn's disease, CAD, RALP on 6/5 presents to ER w/ abdominal pain. Admits to suprapubic pain that started this afternoon. States hurst was not draining prompting visit. During ER course urine noted to be draining bedside. Pt continues to endorse symptoms. Experiencing chest pain. Denies fever, chills, nausea/vomiting, dizziness, headache, shortness of breath, palpitations, syncope

## 2023-06-07 NOTE — ED ADULT TRIAGE NOTE - CHIEF COMPLAINT QUOTE
s/p prostatectomy 2 days ago c/o urinary retention and bladder pressure x 1 day, presents with Masterson, noticed less drainage today, hx of DM, HTN

## 2023-06-07 NOTE — ED PROVIDER NOTE - CARE PROVIDER_API CALL
Ba Lehman.  Urology  [address] [place] [address], [address] [address]  Phone: [phone]  Fax: [phone]  Follow Up Time: 1-3 Days

## 2023-06-07 NOTE — ED ADULT NURSE NOTE - NSICDXPASTMEDICALHX_GEN_ALL_CORE_FT
PAST MEDICAL HISTORY:  Anemia     Asthma controlled-never intubated or hospitalized    Atypical melanocytic hyperplasia     Bilateral knee pain     Bowel obstruction     CAD (coronary artery disease) 2 stents placed in LAD in 2015, RCA stent x 2 in 2006    Cluster headaches     Crohn disease     Elevated blood uric acid level     Elevated PSA     Fatty liver     GERD (gastroesophageal reflux disease)     GIB (gastrointestinal bleeding)     HLD (hyperlipidemia)     HTN (hypertension)     Inguinal mass     Joint pain     Lower back pain     MI (myocardial infarction)     Migraine headache     MARYAM (obstructive sleep apnea) on CPAP    Other specified disorders of pigmentation     Pancreatitis while on 6MP for Crohn's now off of it    Psoriasis     Shoulder pain, bilateral     Stented coronary artery     Tinnitus     Unilateral recurrent inguinal hernia without obstruction or gangrene Left     Area L Indication Text: Tumors in this location are included in Area L (trunk and extremities).  Mohs surgery is indicated for larger tumors, 2 cm or larger, in these anatomic locations.

## 2023-06-07 NOTE — ED ADULT NURSE NOTE - NSFALLRISKINTERV_ED_ALL_ED

## 2023-06-07 NOTE — ED PROVIDER NOTE - NS ED ROS FT
Constitutional: (-) fever   Head: Normal cephalic, Atraumatic  Eyes/ENT: (-) sore throat  Cardiovascular: (+) chest pain, (-) wheezing  Respiratory: (-) cough, (-) shortness of breath  Gastrointestinal: (-) vomiting, (-) diarrhea, (+) abdominal pain  : (-) dysuria   Musculoskeletal: (-) back pain  Integumentary: (-) rash, (-) edema  Neurological: (-)loc  Allergic/Immunologic: (-) pruritus

## 2023-06-07 NOTE — ED PROVIDER NOTE - ATTENDING APP SHARED VISIT CONTRIBUTION OF CARE
Attending note:   After face to face evaluation of this patient, I concur with above noted hx, pe, and care plan for this patient.  Mustafa: 57-year-old male with history of coronary disease, asthma and recent laparoscopic prostatectomy.  Patient surgery was 2 days ago and patient was discharged yesterday.  Patient returns the ED today for lack of drainage and Masterson.  Patient states his pain is usually been a 7 out of 10 and he has been passing gas minimally.  When Masterson stopped draining pain went to 10 out of 10.  During ambulance ride over here Masterson appears to start draining again and now pain is back to 7 out of 10.  Patient also notes that today he has been having left-sided chest pain that is very sharp in nature not similar to previous cardiac disease.  Patient denies any shortness of breath, cough, fevers and chills or leg swelling.  On exam patient's vitals are stable he is not tachycardic or hypoxic.  Patient is uncomfortable but nontoxic-appearing.  Lungs are clear and heart is regular.  There is minimal tenderness to the left chest wall.  Abdomen is soft but is mildly diffusely tender.  Sick surgical sites are noted the lateral ones have mild ecchymosis surrounding.  There is no erythema, edema or induration or discharge around them.  Patient was evaluated by  in triage and Masterson appears to be draining.  Basic labs are requested.  We will also add EKG did not chest x-ray and troponin.  Masterson to be flushed afterwards and will reassess.  Patient requesting pain medications which have been ordered.

## 2023-06-07 NOTE — ED ADULT NURSE NOTE - OBJECTIVE STATEMENT
56 y/o male presents to the ED for c/o abdominal pain, urinary retention and chest pain. Indwelling hurst placed on Monday in doctor's office; pt states that he was having urinary retention, suprapubic pain and " felt like bladder was going to burst". Hurst draining clear maday urine; flushed by urology in the ED. A&OX4 and in no acute distress. Specimen obtained and sent to lab and EKG completed. Monitoring ongoing. ALPA Barker RN

## 2023-06-12 ENCOUNTER — APPOINTMENT (OUTPATIENT)
Dept: UROLOGY | Facility: CLINIC | Age: 58
End: 2023-06-12
Payer: MEDICARE

## 2023-06-12 VITALS
OXYGEN SATURATION: 99 % | SYSTOLIC BLOOD PRESSURE: 154 MMHG | DIASTOLIC BLOOD PRESSURE: 88 MMHG | HEART RATE: 80 BPM | RESPIRATION RATE: 17 BRPM | TEMPERATURE: 98.2 F

## 2023-06-12 PROCEDURE — 99024 POSTOP FOLLOW-UP VISIT: CPT

## 2023-06-12 NOTE — HISTORY OF PRESENT ILLNESS
[FreeTextEntry1] : Here for postop visit.\par Status post robotic radical prostatectomy on 6/5/2023.  No immediate postoperative complications.  Had visit to the emergency room last week for abdominal pelvic pain.  Labs stable.  Masterson catheter was draining well upon arrival to the emergency room.\par \par Urine remains clear.  Tolerating regular diet.  No lower extremity swelling, ambulating.

## 2023-06-12 NOTE — PHYSICAL EXAM
[FreeTextEntry1] : Gen:  No apparent distress\par Abdomen: soft, non tender, non distended.  \par Incision: clean and intact.  No surrounding erythema.\par :  normal bladder. Masterson draining clear yellow urine.\par Ext: no edema.  \par Neuro: Normal gait, normal LE strength.

## 2023-06-12 NOTE — DISEASE MANAGEMENT
[1] : T1 [c] : c [0-10] : 0 -10 ng/mL [Biopsy with Fusion] : Patient had a biopsy with fusion on [Biopsy results sent to PCP/Referring Physician] : Biopsy results sent to PCP/Referring Physician [] : Patient had a Prostate MRI [4] : 4 [IIB] : IIB [Radical Prostatectomy] : Radical Prostatectomy [Patient had a radical prostatectomy] : Patient had a radical prostatectomy  [7(3+4)] : Tommy Score 7(3+4) [Negative] : Negative margins [3] : T3 [a] : a [0] : N0 [BiopsyDate] : 4/13/2023 [TotalCores] : 25 [TotalPositiveCores] : 18 [MaxCoreInvolvement] : 80 [FreeTextEntry7] : PSA at diagnosis: 3.9 ng/mL, 3/28/2023 [RadicalProstatectomyDate] : 6/5/2013 [TotalNumberofnodesresected] : 11 [PositiveNodes] : 0

## 2023-06-14 ENCOUNTER — APPOINTMENT (OUTPATIENT)
Dept: PULMONOLOGY | Facility: CLINIC | Age: 58
End: 2023-06-14

## 2023-06-14 ENCOUNTER — NON-APPOINTMENT (OUTPATIENT)
Age: 58
End: 2023-06-14

## 2023-06-26 ENCOUNTER — TRANSCRIPTION ENCOUNTER (OUTPATIENT)
Age: 58
End: 2023-06-26

## 2023-06-28 ENCOUNTER — TRANSCRIPTION ENCOUNTER (OUTPATIENT)
Age: 58
End: 2023-06-28

## 2023-07-11 ENCOUNTER — RX RENEWAL (OUTPATIENT)
Age: 58
End: 2023-07-11

## 2023-07-13 ENCOUNTER — NON-APPOINTMENT (OUTPATIENT)
Age: 58
End: 2023-07-13

## 2023-07-14 NOTE — PACU DISCHARGE NOTE - COMMENTS
Vital signs are stable at patient's baseline with adequate pain control. No apparent anesthetic complication at this time. Care will be continued per primary team.
no

## 2023-07-18 LAB — PSA SERPL-MCNC: 0.04 NG/ML

## 2023-07-27 ENCOUNTER — APPOINTMENT (OUTPATIENT)
Dept: UROLOGY | Facility: CLINIC | Age: 58
End: 2023-07-27
Payer: MEDICARE

## 2023-07-27 VITALS
RESPIRATION RATE: 17 BRPM | WEIGHT: 180 LBS | BODY MASS INDEX: 28.93 KG/M2 | HEIGHT: 66 IN | HEART RATE: 76 BPM | DIASTOLIC BLOOD PRESSURE: 87 MMHG | TEMPERATURE: 99 F | SYSTOLIC BLOOD PRESSURE: 136 MMHG

## 2023-07-27 PROCEDURE — 99024 POSTOP FOLLOW-UP VISIT: CPT

## 2023-08-09 ENCOUNTER — APPOINTMENT (OUTPATIENT)
Dept: PULMONOLOGY | Facility: CLINIC | Age: 58
End: 2023-08-09
Payer: MEDICARE

## 2023-08-09 VITALS
HEIGHT: 66 IN | SYSTOLIC BLOOD PRESSURE: 130 MMHG | BODY MASS INDEX: 27.8 KG/M2 | HEART RATE: 69 BPM | WEIGHT: 173 LBS | RESPIRATION RATE: 16 BRPM | OXYGEN SATURATION: 96 % | DIASTOLIC BLOOD PRESSURE: 80 MMHG

## 2023-08-09 PROCEDURE — 95012 NITRIC OXIDE EXP GAS DETER: CPT

## 2023-08-09 PROCEDURE — 99214 OFFICE O/P EST MOD 30 MIN: CPT | Mod: 25

## 2023-08-09 PROCEDURE — 94010 BREATHING CAPACITY TEST: CPT

## 2023-08-09 NOTE — ADDENDUM
[FreeTextEntry1] : Documented by Hipolito Hightower acting as a scribe for Dr. Leonel Duckworth on 08/09/2023 .  All medical record entries made by the Scribe were at my, Dr. Leonel Duckworth's, direction and personally dictated by me on 08/09/2023 . I have reviewed the chart and agree that the record accurately reflects my personal performance of the history, physical exam, assessment and plan. I have also personally directed, reviewed, and agree with the discharge instructions.

## 2023-08-09 NOTE — HISTORY OF PRESENT ILLNESS
[FreeTextEntry1] : Mr. KEMP is a 58 year old male with a history of severe persistent asthma, allergies, OSAS, GERD who presents for a follow-up pulmonary evaluation. His chief complaint is prostate, here for pre-op   - he notes having prostate surgery - he notes weighing 173 pounds  - he notes being on Ozempic  - he notes his bowels have changed since Ozempic for the better compared to before with his Crohn's - he notes doing physical therapy for his surgery  - he notes no limitations with exercise - he notes having hip pain and left ankle pain - he notes doing yoga for exercise - he notes not taking Xyzal  - he notes getting 7 hours of sleep but is tired towards the end of the day where he needs to put his head down  - he notes his biggest complaints are his physical shape and fatigue  - he notes not taking Omeprazole and Nexium, his reflux has since come back -he denies any headaches, nausea, emesis, fever, chills, sweats, chest pain, chest pressure, coughing, wheezing, palpitations, constipation, diarrhea, vertigo, dysphagia, heartburn, reflux, itchy eyes, itchy ears, leg swelling, leg pain, myalgias, or sour taste in the mouth.

## 2023-08-09 NOTE — PROCEDURE
[FreeTextEntry1] : PFT reveals normal flows, with an FEV1 of 3.37 L, which is 114% of predicted, with a normal flow volume loop. PFTs were performed to evaluate for Asthma  FENO was 30; a normal value being less than 25 Fractional exhaled nitric oxide (FENO) is regarded as a simple, noninvasive method for assessing eosinophilic airway inflammation. Produced by a variety of cells within the lung, nitric oxide (NO) concentrations are generally low in healthy individuals. However, high concentrations of NO appear to be involved in nonspecific host defense mechanisms and chronic inflammatory diseases such as asthma. The American Thoracic Society (ATS) therefore has recommended using FENO to aid in the diagnosis and monitoring of eosinophilic airway inflammation and asthma, and for identifying steroid responsive individuals whose chronic respiratory symptoms may be caused by airway inflammation.

## 2023-08-09 NOTE — ASSESSMENT
[FreeTextEntry1] : Mr. Mota is a 58 year old male doing well from a pulmonary perspective, is s/p bowel obstruction s/p GI surgery, s/p hospitalization with cluster headaches / tinnitus, with a history of severe persistent asthma, allergies, OSAS, GERD. - s/p coronary stents (9-last 1/2022)- stable - now s/p prostate surgery 6/2023 - stable  Problem 1: Severe persistent asthma  - asthmatic bronchitis - stable  -s/p Prednisone; 30 mg for 5 days, then 20 mg for 5 days, then 10 mg for 5 days (1/2021) -Information sheet given to the patient to be reviewed, this medication is never to be used without consulting the prescribing physician. Proper dietary restraint is necessary specifically salt containing foods, if any reaction may occur should be reported.  -continue Albuterol by the nebulizer QID, prn  -continue Symbicort 160 at 2 inhalations BID -continue Spiriva 2 puffs QD -continue Singulair 10 mg QHS -Asthma is believed to be caused by inherited (genetic) and environmental factor, but its exact cause is unknown. Asthma may be triggered by allergens, lung infections, or irritants in the air. Asthma triggers are different for each person -Inhaler technique reviewed as well as oral hygiene techniques reviewed with patient. Avoidance of cold air, extremes of temperature, rescue inhaler should be used before exercise. Order of medication reviewed with patient. Recommended use of a cool mist humidifier in the bedroom.   Problem 2: allergies -continue Xyzal 5 mg QHS -continue Qnasl 1 sniff/nostril BID  -continue Astelin 0.15% 1 sniff BID -Environmental measures for allergies were encouraged including mattress and pillow cover, air purifier, and environmental controls.  Problem 3: GERD (Quiet) -off Nexium 40 mg QAM, before breakfast -OFF Pepcid 40 mg QHS  -recommended Reflux Gourmet -Rule of 2's- Avoid eating too much, too late, too poorly, too spicy, or two hours before bed  -Things to avoid including overeating, spicy foods, tight clothing, eating within three hours of bed, this list is not all inclusive.  -For treatment of reflux, possible options discussed including diet control, H2 blockers, PPIs, as well as coating motility agents discussed as treatment options. Timing of meals and proximity of last meal to sleep were discussed. If symptoms persist, a formal gastrointestinal evaluation is needed.  Problem 4: s/p OSAS -(reinforced)- now resolved  -s/p home sleep study - negative 12/2022, DD in place ([place]) -Good sleep hygiene was encouraged including avoiding watching television an hour before bed, keeping caffeine at a low, avoiding reading in bed, no drinking any liquids three hours before bedtime, and only getting into bed when tired.  - Discussed the risks/associations with coronary artery disease, atrial fibrillation, arrhythmia, memory loss, issues with concentration, hypertension, nocturia, chronic reflux/Olvera's esophagus some but not all inclusive. Treatment options discussed including CPAP/BiPAP machine, oral appliance, ProVent therapy, new technologies, or positional sleep.  Problem 5: Obesity "poor" - in progress  -Recommended Vladislav Willard's 10-day detox diet and book.  -recommended "MUNIQ" OTC supplement  Weight loss, exercise, and diet control were discussed and are highly encouraged. Treatment options were given such as, aqua therapy, and contacting a nutritionist. Recommended to use the elliptical, stationary bike, less use of treadmill. Mindful eating was explained to the patient Obesity is associated with worsening asthma, shortness of breath, and potential for cardiac disease, diabetes, and other underlying medical conditions.    Problem 6: CAD (9 stents 12/2021) -as per Dr. Fernández, likely the downstream effect of uncontrolled total body inflammation -He is not to use Adenosine for his stress test  problem 7: bowel obstruction, s/p surgery (on Ozempic) -encouraged to look at functional medicine consult, referred to Dr. Sheryl Leventhal,  -GI follow up with Dr. Caicedo / Marilu et al.  Problem 8: Tinnitus -Recommended to take No Flush Niacin, Vitamin B and C, and Zinc   Problem 9: psoriasis / winter eczema -recommended to use Borage / Flax Seed Oil   problem 10: Health Maintenance/COVID19 Precautions:--Functional Medicine evaln -recommended book "[employer]" - S/p Covid 19 vaccine (Moderna) x 6 - Clean your hands often. Wash your hands often with soap and water for at least 20 seconds, especially after blowing your nose, coughing, or sneezing, or having been in a public place. - If soap and water are not available, use a hand  that contains at least 60% alcohol. - To the extent possible, avoid touching high-touch surfaces in public places - elevator buttons, door handles, handrails, handshaking with people, etc. Use a tissue or your sleeve to cover your hand or finger if you must touch something. - Wash your hands after touching surfaces in public places. - Avoid touching your face, nose, eyes, etc. - Clean and disinfect your home to remove germs: practice routine cleaning of frequently touched surfaces (for example: tables, doorknobs, light switches, handles, desks, toilets, faucets, sinks & cell phones) - Avoid crowds, especially in poorly ventilated spaces. Your risk of exposure to respiratory viruses like COVID-19 may increase in crowded, closed-in settings with little air circulation if there are people in the crowd who are sick. All patients are recommended to practice social distancing and stay at least 6 feet away from others. - Avoid all non-essential travel including plane trips, and especially avoid embarking on cruise ships. -If COVID-19 is spreading in your community, take extra measures to put distance between yourself and other people to further reduce your risk of being exposed to this new virus. -Stay home as much as possible. - Consider ways of getting food brought to your house through family, social, or commercial networks -Be aware that the virus has been known to live in the air up to 3 hours post exposure, cardboard up to 24 hours post exposure, copper up to 4 hours post exposure, steel and plastic up to 2-3 days post exposure. Risk of transmission from these surfaces are affected by many variables. Immune Support Recommendations: -OTC Vitamin C 500mg BID  -OTC Quercetin 250-500mg BID  -OTC Zinc 75-100mg per day  -OTC Melatonin 1 or 2 mg a night  -OTC Vitamin D 1-4000mg per day  -OTC Tonic Water 8oz per day Asthma and COVID19: You need to make sure your asthma is under control. This often requires the use of inhaled corticosteroids (and sometimes oral corticosteroids). Inhaled corticosteroids do not likely reduce your immune system's ability to fight infections, but oral corticosteroids may. It is important to use the steps above to protect yourself to limit your exposure to any respiratory virus.   problem 12: health maintenance -recommended functional medicine consultation  -s/p flu shot in 2022 -recommended strep pneumonia vaccines: Prevnar-13 vaccine, followed by Pneumo vaccine 23 on year following -recommended early intervention for URIs -recommended osteoporosis evaluations -recommended early dermatological evaluations -recommended after the age of 50 to the age of 70, colonoscopy every 5 years  Follow up in 4 months Encouraged to follow up with questions, concerns, and changes.

## 2023-08-20 NOTE — HISTORY OF PRESENT ILLNESS
[FreeTextEntry1] : Here for 6 week follow up. Urinary function coming along, continues with stress incontinence. Having mild to moderate overactive bladder symptoms, PSA 7/17/2023 0.04 ng/mL

## 2023-08-20 NOTE — DISEASE MANAGEMENT
[1] : T1 [c] : c [Biopsy with Fusion] : Patient had a biopsy with fusion on [0-10] : 0 -10 ng/mL [Biopsy results sent to PCP/Referring Physician] : Biopsy results sent to PCP/Referring Physician [] : Patient had a Prostate MRI [4] : 4 [BiopsyDate] : 4/13/2023 [TotalCores] : 25 [TotalPositiveCores] : 18 [MaxCoreInvolvement] : 80 [FreeTextEntry7] : PSA at diagnosis: 3.9 ng/mL, 3/28/2023 [IIB] : IIB [Radical Prostatectomy] : Radical Prostatectomy [Patient had a radical prostatectomy] : Patient had a radical prostatectomy  [7(3+4)] : Tommy Score 7(3+4) [Negative] : Negative margins [3] : T3 [a] : a [0] : N0 [RadicalProstatectomyDate] : 6/5/2013 [TotalNumberofnodesresected] : 11 [PositiveNodes] : 0

## 2023-09-06 NOTE — ADDENDUM
[FreeTextEntry1] : Documented by Keo Vinson acting as a scribe for Dr. Leonel Duckworth on 09/22/2020.\par \par All medical record entries made by the Scribe were at my, Dr. Leonel Duckworth's, direction and personally dictated by me on 09/22/2020. I have reviewed the chart and agree that the record accurately reflects my personal performance of the history, physical exam, assessment and plan. I have also personally directed, reviewed, and agree with the discharge instructions.  s/p Posterior THR precautions/yes

## 2023-10-10 ENCOUNTER — RESULT REVIEW (OUTPATIENT)
Age: 58
End: 2023-10-10

## 2023-10-10 ENCOUNTER — APPOINTMENT (OUTPATIENT)
Dept: ULTRASOUND IMAGING | Facility: IMAGING CENTER | Age: 58
End: 2023-10-10
Payer: MEDICARE

## 2023-10-10 ENCOUNTER — OUTPATIENT (OUTPATIENT)
Dept: OUTPATIENT SERVICES | Facility: HOSPITAL | Age: 58
LOS: 1 days | End: 2023-10-10
Payer: MEDICARE

## 2023-10-10 DIAGNOSIS — Z00.8 ENCOUNTER FOR OTHER GENERAL EXAMINATION: ICD-10-CM

## 2023-10-10 DIAGNOSIS — Z90.49 ACQUIRED ABSENCE OF OTHER SPECIFIED PARTS OF DIGESTIVE TRACT: Chronic | ICD-10-CM

## 2023-10-10 DIAGNOSIS — S92.009A UNSPECIFIED FRACTURE OF UNSPECIFIED CALCANEUS, INITIAL ENCOUNTER FOR CLOSED FRACTURE: Chronic | ICD-10-CM

## 2023-10-10 DIAGNOSIS — Z98.89 OTHER SPECIFIED POSTPROCEDURAL STATES: Chronic | ICD-10-CM

## 2023-10-10 DIAGNOSIS — Z87.898 PERSONAL HISTORY OF OTHER SPECIFIED CONDITIONS: Chronic | ICD-10-CM

## 2023-10-10 PROCEDURE — 93971 EXTREMITY STUDY: CPT

## 2023-10-10 PROCEDURE — 93971 EXTREMITY STUDY: CPT | Mod: 26,RT

## 2023-10-20 LAB
PSA FREE FLD-MCNC: NORMAL %
PSA FREE SERPL-MCNC: <0.01 NG/ML
PSA SERPL-MCNC: 0.05 NG/ML

## 2023-10-23 ENCOUNTER — APPOINTMENT (OUTPATIENT)
Dept: UROLOGY | Facility: CLINIC | Age: 58
End: 2023-10-23
Payer: MEDICARE

## 2023-10-23 VITALS
HEIGHT: 66 IN | BODY MASS INDEX: 28.45 KG/M2 | DIASTOLIC BLOOD PRESSURE: 81 MMHG | SYSTOLIC BLOOD PRESSURE: 126 MMHG | WEIGHT: 177 LBS | HEART RATE: 76 BPM | RESPIRATION RATE: 16 BRPM

## 2023-10-23 DIAGNOSIS — N39.46 MIXED INCONTINENCE: ICD-10-CM

## 2023-10-23 PROCEDURE — 99214 OFFICE O/P EST MOD 30 MIN: CPT

## 2023-10-23 RX ORDER — OXYBUTYNIN CHLORIDE 5 MG/1
5 TABLET ORAL
Qty: 90 | Refills: 3 | Status: DISCONTINUED | COMMUNITY
Start: 2023-06-14 | End: 2023-10-23

## 2023-10-23 RX ORDER — TADALAFIL 5 MG/1
5 TABLET ORAL
Qty: 90 | Refills: 3 | Status: DISCONTINUED | COMMUNITY
Start: 2023-07-27 | End: 2023-10-23

## 2023-11-22 ENCOUNTER — OUTPATIENT (OUTPATIENT)
Dept: OUTPATIENT SERVICES | Facility: HOSPITAL | Age: 58
LOS: 1 days | End: 2023-11-22
Payer: MEDICARE

## 2023-11-22 ENCOUNTER — APPOINTMENT (OUTPATIENT)
Dept: RADIOLOGY | Facility: IMAGING CENTER | Age: 58
End: 2023-11-22
Payer: MEDICARE

## 2023-11-22 ENCOUNTER — APPOINTMENT (OUTPATIENT)
Dept: MRI IMAGING | Facility: IMAGING CENTER | Age: 58
End: 2023-11-22
Payer: MEDICARE

## 2023-11-22 DIAGNOSIS — S76.312A STRAIN OF MUSCLE, FASCIA AND TENDON OF THE POSTERIOR MUSCLE GROUP AT THIGH LEVEL, LEFT THIGH, INITIAL ENCOUNTER: ICD-10-CM

## 2023-11-22 DIAGNOSIS — Z87.898 PERSONAL HISTORY OF OTHER SPECIFIED CONDITIONS: Chronic | ICD-10-CM

## 2023-11-22 DIAGNOSIS — Z90.49 ACQUIRED ABSENCE OF OTHER SPECIFIED PARTS OF DIGESTIVE TRACT: Chronic | ICD-10-CM

## 2023-11-22 DIAGNOSIS — Z98.89 OTHER SPECIFIED POSTPROCEDURAL STATES: Chronic | ICD-10-CM

## 2023-11-22 DIAGNOSIS — S92.009A UNSPECIFIED FRACTURE OF UNSPECIFIED CALCANEUS, INITIAL ENCOUNTER FOR CLOSED FRACTURE: Chronic | ICD-10-CM

## 2023-11-22 PROCEDURE — 73718 MRI LOWER EXTREMITY W/O DYE: CPT | Mod: 26,LT,MH

## 2023-11-22 PROCEDURE — 73718 MRI LOWER EXTREMITY W/O DYE: CPT | Mod: MH

## 2023-11-30 NOTE — CONSULT NOTE ADULT - SUBJECTIVE AND OBJECTIVE BOX
Wound Surgery Consult Note:    HPI:  54M c hx crohn's disease on stelara s/p partial small bowel resection c/b frequent bowel obstructions, currently on PO budesonide taper, cluster headaches on aimovig, CAD s/p stents on repatha, COPD, asthma, MARYAM on nocturnal CPAP, GERD, recent left comminuted calcaneous fracture, pw 2 days fever, and worsening pain.    Of note, pt is on 3 different biologics. Regarding crohns, pt reports transient bowel obstructions occurring every 2-3 months, that usually self-resolve. Pt recently completed cipro/flagyl course, last dose 1 week ago, and currently on po budesonide taper, of which he has 2 more days. Pt states he had mechanical fall from his roof 6 days ago, for which he was found to have fracture. Pt was splinted, and is supposed to get repair as outpt @ [facility] with Dr. Regalado next tuesday, ?6/23/20. Pt went to see his orthopedic doctor 3 days ago, where his fracture blisters were popped. Pt reports serosanguinous drainage only. Pt reports increasing fevers at home for the past 2-3 days. Pt also reports worsening pain located in his foot and up to his mid-tibia. Pt reports persistent swelling and ecchymoses of the left foot since the fracture, without much skin changes. Other ROS as below.    VS: Tm 100.8, P 128, Bp 139/85, R 20, 95% RA  in the Ed, received vanc, cefazolin, IVF 1.5L, toradol, morphine 2+4 IV, oxycodone (17 Jun 2020 04:09)    Request for wound care consult for Left foot wounds received. Patient was encountered on an alternating air with low air loss surface with his Left foot elevated and offloaded. He is being managed by an orthopedist at [facility] who plans to repair factures surgically in the near future. He was seen with Dr. Reyes.    PAST MEDICAL & SURGICAL HISTORY:  Bowel obstruction  Cluster headaches  Unilateral recurrent inguinal hernia without obstruction or gangrene: Left  MI (myocardial infarction)  GIB (gastrointestinal bleeding)  MARYAM (obstructive sleep apnea)  Asthma: controlled-never intubated or hospitalized  Joint pain  CAD (coronary artery disease): 2 stents placed in LAD in 2015, RCA stent x 2 in 2006  Pancreatitis: while on 6MP for Crohn&#x27;s now off of it  Crohn disease  HLD (hyperlipidemia)  Inguinal mass  Bilateral knee pain  Shoulder pain, bilateral  Lower back pain  GERD (gastroesophageal reflux disease)  H/O: HTN (hypertension)  S/P hernia surgery  H/O lipoma: removed from back  S/P cardiac catheterization: 2017-resulted in [place]-no stents placed  H/O colonoscopy  H/O percutaneous transluminal coronary angioplasty: with DE RCA 2006, 2 stents placed 2015  History of herniorrhaphy: left inguinal herniorrhaphy    REVIEW OF SYSTEMS  General: fevers at home	  Respiratory and Thorax: asthma, MARYAM  Cardiovascular:	h/o MI, angioplasty, CAD  Gastrointestinal:	 Crohn's, pancreatitis, GERD, multiple SBOs, GIB  Genitourinary:	no dysuria  Musculoskeletal:	 Bilateral knee and shoulder pain, back pain, left calcaneous fx from fall from roof  Neurological:	cluster headaches  Vascular: no DVTs  Skin: 	Left foot blisters, edema and eccymosis    MEDICATIONS  (STANDING):  amLODIPine   Tablet 5 milliGRAM(s) Oral daily  aspirin enteric coated 81 milliGRAM(s) Oral daily  buDESOnide    EC Capsule 3 milliGRAM(s) Oral daily  budesonide 160 MICROgram(s)/formoterol 4.5 MICROgram(s) Inhaler 2 Puff(s) Inhalation two times a day  ceFAZolin   IVPB 1000 milliGRAM(s) IV Intermittent every 8 hours  enoxaparin Injectable 40 milliGRAM(s) SubCutaneous daily  lactated ringers. 1000 milliLiter(s) (100 mL/Hr) IV Continuous <Continuous>  loratadine 10 milliGRAM(s) Oral daily  losartan 100 milliGRAM(s) Oral daily  metoprolol succinate ER 25 milliGRAM(s) Oral daily  montelukast 10 milliGRAM(s) Oral at bedtime  pantoprazole    Tablet 40 milliGRAM(s) Oral before breakfast  tiotropium 18 MICROgram(s) Capsule 1 Capsule(s) Inhalation daily    MEDICATIONS  (PRN):  acetaminophen   Tablet .. 650 milliGRAM(s) Oral every 6 hours PRN Mild Pain (1 - 3), Moderate Pain (4 - 6)  morphine  - Injectable 4 milliGRAM(s) IV Push every 4 hours PRN Severe Pain (7 - 10)    Allergies    No Known Allergies    Intolerances    SOCIAL HISTORY:  never smoker, occasional ETOH    FAMILY HISTORY:  Family history of premature CAD: Father CABG at age 54  Family history of Crohn's disease    Vital Signs Last 24 Hrs  T(C): 36.6 (17 Jun 2020 03:38), Max: 38.2 (16 Jun 2020 23:57)  T(F): 97.8 (17 Jun 2020 03:38), Max: 100.8 (16 Jun 2020 23:57)  HR: 74 (17 Jun 2020 05:38) (74 - 128)  BP: 158/95 (17 Jun 2020 03:38) (139/85 - 158/95)  BP(mean): --  RR: 19 (17 Jun 2020 03:38) (16 - 20)  SpO2: 97% (17 Jun 2020 05:38) (95% - 98%)    Physical Exam:  General: A&Ox3  Respiratory: no SOB on room air  Gastrointestinal: soft NT/ND   Neurology: sensation grossly intact  Musculoskeletal: Left foot painful with palpations, movement, limited ROM, can move toes  Vascular: LLE edema to foot, DP/PT pulses palpable, BLE equally warm, no acute ischemia noted  Skin:  Left medial foot with dull eccymosis, edema and series of dark brown/black drained blisters, not unroofed, dry, scant serosanguinous drainage  No odor, erythema, increased warmth, tenderness, induration, fluctuance    LABS:  06-16    139  |  103  |  15  ----------------------------<  122<H>  3.9   |  23  |  0.84    Ca    9.7      16 Jun 2020 23:24    TPro  7.8  /  Alb  4.6  /  TBili  0.7  /  DBili  x   /  AST  23  /  ALT  45  /  AlkPhos  81  06-16                          13.3   10.18 )-----------( 200      ( 16 Jun 2020 23:24 )             40.1           RADIOLOGY & ADDITIONAL STUDIES:    EXAM:  DUPLEX EXT VEINS LOWER LT                        PROCEDURE DATE:  06/16/2020    INTERPRETATION:  CLINICAL INFORMATION: Calcaneus fracture with calf swelling.    COMPARISON: Duplex sonography of the left lower extremity 7/12/2016.    TECHNIQUE: Duplex sonography of the LEFT LOWER extremity veins with color and spectral Doppler, with and without compression.      FINDINGS:    There is normal compressibility of the left common femoral, femoral and popliteal veins.   The contralateral common femoral vein is patent.  Doppler examination shows normal spontaneous and phasic flow.    No calf vein thrombosis is detected.    IMPRESSION:     No evidence of left lower extremity deep venous thrombosis.    EXAM:  ANKLE LEFT (MINIMUM 3 VIEWS)                        EXAM:  FOOT COMPLETE LEFT (MIN 3 VIEWS)                        EXAM:  TIBIA AND FIBULA AP AND LAT LT                        PROCEDURE DATE:  06/16/2020    INTERPRETATION:  CLINICAL INFORMATION: Left leg and ankle pain.    TECHNIQUE: 2 views of the left tibia-fibula. 3 views of the left ankle and foot.    COMPARISON: Left tibia-fibula, ankle and foot radiograph 6/11/2020.    FINDINGS:     Acute comminuted fracture of the left calcaneus with intra-articular extension.     No evidence of dislocation at the tibiotalar joint. Unchanged widening of the lateral tibial talar joint.    Moderate circumferential swelling surrounding the ankle.    IMPRESSION:    Comminuted left calcaneus fracture as seen on recent CT scan. [facility]

## 2023-12-01 ENCOUNTER — APPOINTMENT (OUTPATIENT)
Dept: ULTRASOUND IMAGING | Facility: IMAGING CENTER | Age: 58
End: 2023-12-01
Payer: MEDICARE

## 2023-12-01 ENCOUNTER — OUTPATIENT (OUTPATIENT)
Dept: OUTPATIENT SERVICES | Facility: HOSPITAL | Age: 58
LOS: 1 days | End: 2023-12-01
Payer: MEDICARE

## 2023-12-01 ENCOUNTER — RESULT REVIEW (OUTPATIENT)
Age: 58
End: 2023-12-01

## 2023-12-01 DIAGNOSIS — S92.009A UNSPECIFIED FRACTURE OF UNSPECIFIED CALCANEUS, INITIAL ENCOUNTER FOR CLOSED FRACTURE: Chronic | ICD-10-CM

## 2023-12-01 DIAGNOSIS — Z87.898 PERSONAL HISTORY OF OTHER SPECIFIED CONDITIONS: Chronic | ICD-10-CM

## 2023-12-01 DIAGNOSIS — Z90.49 ACQUIRED ABSENCE OF OTHER SPECIFIED PARTS OF DIGESTIVE TRACT: Chronic | ICD-10-CM

## 2023-12-01 DIAGNOSIS — Z98.89 OTHER SPECIFIED POSTPROCEDURAL STATES: Chronic | ICD-10-CM

## 2023-12-01 DIAGNOSIS — Z00.8 ENCOUNTER FOR OTHER GENERAL EXAMINATION: ICD-10-CM

## 2023-12-01 PROCEDURE — 93971 EXTREMITY STUDY: CPT | Mod: 26,LT

## 2023-12-01 PROCEDURE — 93971 EXTREMITY STUDY: CPT

## 2023-12-04 ENCOUNTER — APPOINTMENT (OUTPATIENT)
Dept: PULMONOLOGY | Facility: CLINIC | Age: 58
End: 2023-12-04

## 2023-12-20 ENCOUNTER — NON-APPOINTMENT (OUTPATIENT)
Age: 58
End: 2023-12-20

## 2024-01-10 ENCOUNTER — RX RENEWAL (OUTPATIENT)
Age: 59
End: 2024-01-10

## 2024-01-10 RX ORDER — MONTELUKAST 10 MG/1
10 TABLET, FILM COATED ORAL
Qty: 90 | Refills: 1 | Status: ACTIVE | COMMUNITY
Start: 2018-04-09 | End: 1900-01-01

## 2024-01-25 ENCOUNTER — TRANSCRIPTION ENCOUNTER (OUTPATIENT)
Age: 59
End: 2024-01-25

## 2024-01-31 LAB — PSA SERPL-MCNC: 0.06 NG/ML

## 2024-02-08 ENCOUNTER — TRANSCRIPTION ENCOUNTER (OUTPATIENT)
Age: 59
End: 2024-02-08

## 2024-02-08 DIAGNOSIS — K62.89 OTHER SPECIFIED DISEASES OF ANUS AND RECTUM: ICD-10-CM

## 2024-02-12 ENCOUNTER — APPOINTMENT (OUTPATIENT)
Dept: PULMONOLOGY | Facility: CLINIC | Age: 59
End: 2024-02-12
Payer: MEDICARE

## 2024-02-12 VITALS
BODY MASS INDEX: 27.16 KG/M2 | TEMPERATURE: 97.6 F | SYSTOLIC BLOOD PRESSURE: 128 MMHG | HEIGHT: 66 IN | WEIGHT: 169 LBS | HEART RATE: 79 BPM | RESPIRATION RATE: 16 BRPM | DIASTOLIC BLOOD PRESSURE: 80 MMHG | OXYGEN SATURATION: 99 %

## 2024-02-12 DIAGNOSIS — K21.9 GASTRO-ESOPHAGEAL REFLUX DISEASE W/OUT ESOPHAGITIS: ICD-10-CM

## 2024-02-12 DIAGNOSIS — G47.33 OBSTRUCTIVE SLEEP APNEA (ADULT) (PEDIATRIC): ICD-10-CM

## 2024-02-12 DIAGNOSIS — J30.9 ALLERGIC RHINITIS, UNSPECIFIED: ICD-10-CM

## 2024-02-12 DIAGNOSIS — J45.50 SEVERE PERSISTENT ASTHMA, UNCOMPLICATED: ICD-10-CM

## 2024-02-12 DIAGNOSIS — G47.30 SLEEP APNEA, UNSPECIFIED: ICD-10-CM

## 2024-02-12 DIAGNOSIS — R06.02 SHORTNESS OF BREATH: ICD-10-CM

## 2024-02-12 DIAGNOSIS — R79.89 OTHER SPECIFIED ABNORMAL FINDINGS OF BLOOD CHEMISTRY: ICD-10-CM

## 2024-02-12 PROCEDURE — 94010 BREATHING CAPACITY TEST: CPT

## 2024-02-12 PROCEDURE — 99214 OFFICE O/P EST MOD 30 MIN: CPT | Mod: 25

## 2024-02-12 PROCEDURE — 95012 NITRIC OXIDE EXP GAS DETER: CPT

## 2024-02-12 NOTE — HISTORY OF PRESENT ILLNESS
[FreeTextEntry1] : Mr. KEMP is a 58 year old male with a history of severe persistent asthma, allergies, OSAS, GERD who presents for a follow-up pulmonary evaluation. His chief complaint is prostate, here for pre-op   - he notes losing some weight - he notes exercising by doing pickle ball, yoga, and swimming - he notes he is planning on seeing an orthopedic surgeon at [facility] for his subtalar joint - he notes feeling generally well - he notes an infrequent sour taste in the mouth - he notes he has been having some heartburn if he eats the wrong foods at night  - he notes having some ankle pain c/w his previous injury - he notes not sleeping well - he notes tossing and turning at night  - he notes having a pinched nerve in his back which makes it hard for him to get in a comfortable sleeping position - he notes nocturia - he notes seeing Dr. Kan for his pinched nerve in his back - he notes eating well - he notes he is hoping to get his Botox injections this week - he notes his ED has been improving with Cialis   - he notes his urine control has been better  - he notes Dr. Caicedo told him to take Nexium as needed   -he denies any headaches, nausea, emesis, fever, chills, sweats, chest pain, chest pressure, coughing, wheezing, palpitations, constipation, diarrhea, vertigo, dysphagia, reflux, itchy eyes, itchy ears, leg swelling, leg pain, arthralgias, myalgias, hoarseness, or sour taste in the mouth.

## 2024-02-12 NOTE — ASSESSMENT
[FreeTextEntry1] : Mr. Mota is a 58 year old male doing well from a pulmonary perspective, is s/p bowel obstruction s/p GI surgery, s/p hospitalization with cluster headaches / tinnitus, with a history of severe persistent asthma, allergies, OSAS, GERD. - s/p coronary stents (9-last 1/2022)- stable - s/p prostate surgery 6/2023 - stable except for orthopedic issues  Problem 1: Severe persistent asthma  - asthmatic bronchitis - stable  -s/p Prednisone; 30 mg for 5 days, then 20 mg for 5 days, then 10 mg for 5 days (1/2021) -Information sheet given to the patient to be reviewed, this medication is never to be used without consulting the prescribing physician. Proper dietary restraint is necessary specifically salt containing foods, if any reaction may occur should be reported.  -continue Albuterol by the nebulizer QID, prn  -continue Symbicort 160 at 2 inhalations BID -continue Spiriva 2 puffs QD -continue Singulair 10 mg QHS -Asthma is believed to be caused by inherited (genetic) and environmental factor, but its exact cause is unknown. Asthma may be triggered by allergens, lung infections, or irritants in the air. Asthma triggers are different for each person -Inhaler technique reviewed as well as oral hygiene techniques reviewed with patient. Avoidance of cold air, extremes of temperature, rescue inhaler should be used before exercise. Order of medication reviewed with patient. Recommended use of a cool mist humidifier in the bedroom.   Problem 2: allergies -continue Xyzal 5 mg QHS -continue Qnasl 1 sniff/nostril BID  -continue Astelin 0.15% 1 sniff BID -Environmental measures for allergies were encouraged including mattress and pillow cover, air purifier, and environmental controls.  Problem 3: GERD (Active) -off Nexium 40 mg QAM, before breakfast -OFF Pepcid 40 mg QHS  -recommended Reflux Gourmet -Rule of 2's- Avoid eating too much, too late, too poorly, too spicy, or two hours before bed  -Things to avoid including overeating, spicy foods, tight clothing, eating within three hours of bed, this list is not all inclusive.  -For treatment of reflux, possible options discussed including diet control, H2 blockers, PPIs, as well as coating motility agents discussed as treatment options. Timing of meals and proximity of last meal to sleep were discussed. If symptoms persist, a formal gastrointestinal evaluation is needed.  Problem 4: s/p OSAS -(reinforced)- now resolved  -s/p home sleep study - negative 12/2022, DD in place ([place]) -Good sleep hygiene was encouraged including avoiding watching television an hour before bed, keeping caffeine at a low, avoiding reading in bed, no drinking any liquids three hours before bedtime, and only getting into bed when tired.  - Discussed the risks/associations with coronary artery disease, atrial fibrillation, arrhythmia, memory loss, issues with concentration, hypertension, nocturia, chronic reflux/Olvera's esophagus some but not all inclusive. Treatment options discussed including CPAP/BiPAP machine, oral appliance, ProVent therapy, new technologies, or positional sleep.  Problem 5: Obesity "poor" - in progress  -Recommended Vladislav Willard's 10-day detox diet and book.  -recommended "MUNIQ" OTC supplement  -recommended Berberine Synergy OTC Weight loss, exercise, and diet control were discussed and are highly encouraged. Treatment options were given such as, aqua therapy, and contacting a nutritionist. Recommended to use the elliptical, stationary bike, less use of treadmill. Mindful eating was explained to the patient Obesity is associated with worsening asthma, shortness of breath, and potential for cardiac disease, diabetes, and other underlying medical conditions.    Problem 6: CAD (9 stents 12/2021) -as per Dr. Fernández, likely the downstream effect of uncontrolled total body inflammation -He is not to use Adenosine for his stress test  problem 7: bowel obstruction, s/p surgery (on Ozempic) -encouraged to look at functional medicine consult, referred to Dr. Sheryl Leventhal,  -GI follow up with Dr. Caicedo / Marilu et al.  Problem 8: Tinnitus -Recommended to take No Flush Niacin, Vitamin B and C, and Zinc   Problem 9: psoriasis / winter eczema -recommended to use Borage / Flax Seed Oil   problem 10: Health Maintenance/COVID19 Precautions:--Functional Medicine evaln -recommended book "[employer]" - S/p Covid 19 vaccine (Moderna) x 6 - Clean your hands often. Wash your hands often with soap and water for at least 20 seconds, especially after blowing your nose, coughing, or sneezing, or having been in a public place. - If soap and water are not available, use a hand  that contains at least 60% alcohol. - To the extent possible, avoid touching high-touch surfaces in public places - elevator buttons, door handles, handrails, handshaking with people, etc. Use a tissue or your sleeve to cover your hand or finger if you must touch something. - Wash your hands after touching surfaces in public places. - Avoid touching your face, nose, eyes, etc. - Clean and disinfect your home to remove germs: practice routine cleaning of frequently touched surfaces (for example: tables, doorknobs, light switches, handles, desks, toilets, faucets, sinks & cell phones) - Avoid crowds, especially in poorly ventilated spaces. Your risk of exposure to respiratory viruses like COVID-19 may increase in crowded, closed-in settings with little air circulation if there are people in the crowd who are sick. All patients are recommended to practice social distancing and stay at least 6 feet away from others. - Avoid all non-essential travel including plane trips, and especially avoid embarking on cruise ships. -If COVID-19 is spreading in your community, take extra measures to put distance between yourself and other people to further reduce your risk of being exposed to this new virus. -Stay home as much as possible. - Consider ways of getting food brought to your house through family, social, or commercial networks -Be aware that the virus has been known to live in the air up to 3 hours post exposure, cardboard up to 24 hours post exposure, copper up to 4 hours post exposure, steel and plastic up to 2-3 days post exposure. Risk of transmission from these surfaces are affected by many variables. Immune Support Recommendations: -OTC Vitamin C 500mg BID  -OTC Quercetin 250-500mg BID  -OTC Zinc 75-100mg per day  -OTC Melatonin 1 or 2 mg a night  -OTC Vitamin D 1-4000mg per day  -OTC Tonic Water 8oz per day Asthma and COVID19: You need to make sure your asthma is under control. This often requires the use of inhaled corticosteroids (and sometimes oral corticosteroids). Inhaled corticosteroids do not likely reduce your immune system's ability to fight infections, but oral corticosteroids may. It is important to use the steps above to protect yourself to limit your exposure to any respiratory virus.   problem 12: health maintenance -Orthopedic - recommended Pemf Mat  -recommended functional medicine consultation  -s/p flu shot in 2023 -recommended strep pneumonia vaccines: Prevnar-13 vaccine, followed by Pneumo vaccine 23 on year following -recommended early intervention for URIs -recommended osteoporosis evaluations -recommended early dermatological evaluations -recommended after the age of 50 to the age of 70, colonoscopy every 5 years  Follow up in 4 months Encouraged to follow up with questions, concerns, and changes.

## 2024-02-12 NOTE — PHYSICAL EXAM
[No Acute Distress] : no acute distress [Normal Oropharynx] : normal oropharynx [III] : Mallampati Class: III [Normal Appearance] : normal appearance [No Neck Mass] : no neck mass [Normal Rate/Rhythm] : normal rate/rhythm [Normal S1, S2] : normal s1, s2 [No Murmurs] : no murmurs [Clear to Auscultation Bilaterally] : clear to auscultation bilaterally [No Resp Distress] : no resp distress [No Abnormalities] : no abnormalities [Benign] : benign [Normal Gait] : normal gait [No Clubbing] : no clubbing [No Edema] : no edema [No Cyanosis] : no cyanosis [FROM] : FROM [Normal Color/ Pigmentation] : normal color/ pigmentation [No Focal Deficits] : no focal deficits [Oriented x3] : oriented x3 [Normal Affect] : normal affect [TextBox_68] : I:E 1:3, clear

## 2024-02-12 NOTE — ADDENDUM
[FreeTextEntry1] : Documented by Hipolito Hightower acting as a scribe for Dr. Leonel Duckworth on 02/12/2024.   All medical record entries made by the Scribe were at my, Dr. Leonel Duckworth's, direction and personally dictated by me on 02/12/2024. I have reviewed the chart and agree that the record accurately reflects my personal performance of the history, physical exam, assessment and plan. I have also personally directed, reviewed, and agree with the discharge instructions.

## 2024-02-14 ENCOUNTER — APPOINTMENT (OUTPATIENT)
Dept: RADIOLOGY | Facility: IMAGING CENTER | Age: 59
End: 2024-02-14
Payer: MEDICARE

## 2024-02-14 ENCOUNTER — OUTPATIENT (OUTPATIENT)
Dept: OUTPATIENT SERVICES | Facility: HOSPITAL | Age: 59
LOS: 1 days | End: 2024-02-14
Payer: MEDICARE

## 2024-02-14 DIAGNOSIS — Z00.8 ENCOUNTER FOR OTHER GENERAL EXAMINATION: ICD-10-CM

## 2024-02-14 DIAGNOSIS — Z98.89 OTHER SPECIFIED POSTPROCEDURAL STATES: Chronic | ICD-10-CM

## 2024-02-14 DIAGNOSIS — Z87.898 PERSONAL HISTORY OF OTHER SPECIFIED CONDITIONS: Chronic | ICD-10-CM

## 2024-02-14 PROCEDURE — 72100 X-RAY EXAM L-S SPINE 2/3 VWS: CPT | Mod: 26

## 2024-02-14 PROCEDURE — 73610 X-RAY EXAM OF ANKLE: CPT | Mod: 26,LT

## 2024-02-14 PROCEDURE — 73610 X-RAY EXAM OF ANKLE: CPT

## 2024-02-14 PROCEDURE — 72100 X-RAY EXAM L-S SPINE 2/3 VWS: CPT

## 2024-02-15 ENCOUNTER — APPOINTMENT (OUTPATIENT)
Dept: SURGERY | Facility: CLINIC | Age: 59
End: 2024-02-15

## 2024-02-15 ENCOUNTER — APPOINTMENT (OUTPATIENT)
Dept: SURGERY | Facility: HOSPITAL | Age: 59
End: 2024-02-15

## 2024-02-27 ENCOUNTER — OUTPATIENT (OUTPATIENT)
Dept: OUTPATIENT SERVICES | Facility: HOSPITAL | Age: 59
LOS: 1 days | End: 2024-02-27
Payer: MEDICARE

## 2024-02-27 ENCOUNTER — APPOINTMENT (OUTPATIENT)
Dept: RADIOLOGY | Facility: IMAGING CENTER | Age: 59
End: 2024-02-27
Payer: MEDICARE

## 2024-02-27 DIAGNOSIS — Z98.89 OTHER SPECIFIED POSTPROCEDURAL STATES: Chronic | ICD-10-CM

## 2024-02-27 DIAGNOSIS — Z90.49 ACQUIRED ABSENCE OF OTHER SPECIFIED PARTS OF DIGESTIVE TRACT: Chronic | ICD-10-CM

## 2024-02-27 DIAGNOSIS — S92.009A UNSPECIFIED FRACTURE OF UNSPECIFIED CALCANEUS, INITIAL ENCOUNTER FOR CLOSED FRACTURE: Chronic | ICD-10-CM

## 2024-02-27 DIAGNOSIS — Z00.8 ENCOUNTER FOR OTHER GENERAL EXAMINATION: ICD-10-CM

## 2024-02-27 PROCEDURE — 73030 X-RAY EXAM OF SHOULDER: CPT

## 2024-02-27 PROCEDURE — 73030 X-RAY EXAM OF SHOULDER: CPT | Mod: 26,50

## 2024-03-01 ENCOUNTER — RESULT REVIEW (OUTPATIENT)
Age: 59
End: 2024-03-01

## 2024-03-01 ENCOUNTER — APPOINTMENT (OUTPATIENT)
Dept: MRI IMAGING | Facility: IMAGING CENTER | Age: 59
End: 2024-03-01
Payer: MEDICARE

## 2024-03-01 ENCOUNTER — OUTPATIENT (OUTPATIENT)
Dept: OUTPATIENT SERVICES | Facility: HOSPITAL | Age: 59
LOS: 1 days | End: 2024-03-01
Payer: MEDICARE

## 2024-03-01 DIAGNOSIS — Z98.89 OTHER SPECIFIED POSTPROCEDURAL STATES: Chronic | ICD-10-CM

## 2024-03-01 DIAGNOSIS — Z90.49 ACQUIRED ABSENCE OF OTHER SPECIFIED PARTS OF DIGESTIVE TRACT: Chronic | ICD-10-CM

## 2024-03-01 DIAGNOSIS — S92.009A UNSPECIFIED FRACTURE OF UNSPECIFIED CALCANEUS, INITIAL ENCOUNTER FOR CLOSED FRACTURE: Chronic | ICD-10-CM

## 2024-03-01 DIAGNOSIS — Z00.8 ENCOUNTER FOR OTHER GENERAL EXAMINATION: ICD-10-CM

## 2024-03-01 DIAGNOSIS — Z87.898 PERSONAL HISTORY OF OTHER SPECIFIED CONDITIONS: Chronic | ICD-10-CM

## 2024-03-01 PROCEDURE — 73221 MRI JOINT UPR EXTREM W/O DYE: CPT

## 2024-03-01 PROCEDURE — 73221 MRI JOINT UPR EXTREM W/O DYE: CPT | Mod: 26,LT,MH

## 2024-03-07 ENCOUNTER — TRANSCRIPTION ENCOUNTER (OUTPATIENT)
Age: 59
End: 2024-03-07

## 2024-03-12 ENCOUNTER — RESULT REVIEW (OUTPATIENT)
Age: 59
End: 2024-03-12

## 2024-03-12 ENCOUNTER — APPOINTMENT (OUTPATIENT)
Dept: MRI IMAGING | Facility: IMAGING CENTER | Age: 59
End: 2024-03-12
Payer: MEDICARE

## 2024-03-12 ENCOUNTER — OUTPATIENT (OUTPATIENT)
Dept: OUTPATIENT SERVICES | Facility: HOSPITAL | Age: 59
LOS: 1 days | End: 2024-03-12
Payer: MEDICARE

## 2024-03-12 DIAGNOSIS — Z00.8 ENCOUNTER FOR OTHER GENERAL EXAMINATION: ICD-10-CM

## 2024-03-12 DIAGNOSIS — Z87.898 PERSONAL HISTORY OF OTHER SPECIFIED CONDITIONS: Chronic | ICD-10-CM

## 2024-03-12 DIAGNOSIS — Z90.49 ACQUIRED ABSENCE OF OTHER SPECIFIED PARTS OF DIGESTIVE TRACT: Chronic | ICD-10-CM

## 2024-03-12 DIAGNOSIS — Z98.89 OTHER SPECIFIED POSTPROCEDURAL STATES: Chronic | ICD-10-CM

## 2024-03-12 DIAGNOSIS — S92.009A UNSPECIFIED FRACTURE OF UNSPECIFIED CALCANEUS, INITIAL ENCOUNTER FOR CLOSED FRACTURE: Chronic | ICD-10-CM

## 2024-03-12 PROCEDURE — 72148 MRI LUMBAR SPINE W/O DYE: CPT | Mod: 26,MH

## 2024-03-12 PROCEDURE — 72148 MRI LUMBAR SPINE W/O DYE: CPT | Mod: MH

## 2024-03-12 PROCEDURE — 72195 MRI PELVIS W/O DYE: CPT | Mod: 26,MH

## 2024-03-12 PROCEDURE — 72195 MRI PELVIS W/O DYE: CPT | Mod: MH

## 2024-03-14 ENCOUNTER — OUTPATIENT (OUTPATIENT)
Dept: OUTPATIENT SERVICES | Facility: HOSPITAL | Age: 59
LOS: 1 days | End: 2024-03-14
Payer: MEDICARE

## 2024-03-14 ENCOUNTER — RESULT REVIEW (OUTPATIENT)
Age: 59
End: 2024-03-14

## 2024-03-14 ENCOUNTER — APPOINTMENT (OUTPATIENT)
Dept: MRI IMAGING | Facility: IMAGING CENTER | Age: 59
End: 2024-03-14
Payer: MEDICARE

## 2024-03-14 DIAGNOSIS — Z87.898 PERSONAL HISTORY OF OTHER SPECIFIED CONDITIONS: Chronic | ICD-10-CM

## 2024-03-14 DIAGNOSIS — Z98.89 OTHER SPECIFIED POSTPROCEDURAL STATES: Chronic | ICD-10-CM

## 2024-03-14 DIAGNOSIS — Z00.8 ENCOUNTER FOR OTHER GENERAL EXAMINATION: ICD-10-CM

## 2024-03-14 DIAGNOSIS — S92.009A UNSPECIFIED FRACTURE OF UNSPECIFIED CALCANEUS, INITIAL ENCOUNTER FOR CLOSED FRACTURE: Chronic | ICD-10-CM

## 2024-03-14 DIAGNOSIS — Z90.49 ACQUIRED ABSENCE OF OTHER SPECIFIED PARTS OF DIGESTIVE TRACT: Chronic | ICD-10-CM

## 2024-03-14 PROCEDURE — 73721 MRI JNT OF LWR EXTRE W/O DYE: CPT | Mod: MH

## 2024-03-14 PROCEDURE — 73721 MRI JNT OF LWR EXTRE W/O DYE: CPT | Mod: 26,LT,MH,76

## 2024-03-15 ENCOUNTER — TRANSCRIPTION ENCOUNTER (OUTPATIENT)
Age: 59
End: 2024-03-15

## 2024-03-19 ENCOUNTER — APPOINTMENT (OUTPATIENT)
Dept: SURGERY | Facility: HOSPITAL | Age: 59
End: 2024-03-19

## 2024-03-19 ENCOUNTER — APPOINTMENT (OUTPATIENT)
Dept: SURGERY | Facility: CLINIC | Age: 59
End: 2024-03-19
Payer: MEDICARE

## 2024-03-19 PROCEDURE — 46505 CHEMODENERVATION ANAL MUSC: CPT

## 2024-03-19 PROCEDURE — 45990 SURG DX EXAM ANORECTAL: CPT

## 2024-03-22 ENCOUNTER — TRANSCRIPTION ENCOUNTER (OUTPATIENT)
Age: 59
End: 2024-03-22

## 2024-03-25 NOTE — ED PROVIDER NOTE - NS ED NOTE AC HIGH RISK COUNTRIES
- on arrival, pt tachycardic with leukocytosis: Resolved   - no localizing infectious complaints; abd pain as above  - no obvious infectious source   - UA neg, RVP neg  - CXR neg  - CT a/p with fibroid uterus as below   - Likely reactive leukocytosis, now resolved    - continue to monitor  - defer abx at present No

## 2024-03-26 ENCOUNTER — APPOINTMENT (OUTPATIENT)
Dept: CT IMAGING | Facility: IMAGING CENTER | Age: 59
End: 2024-03-26
Payer: MEDICARE

## 2024-03-26 ENCOUNTER — RESULT REVIEW (OUTPATIENT)
Age: 59
End: 2024-03-26

## 2024-03-26 ENCOUNTER — APPOINTMENT (OUTPATIENT)
Dept: ULTRASOUND IMAGING | Facility: IMAGING CENTER | Age: 59
End: 2024-03-26
Payer: MEDICARE

## 2024-03-26 ENCOUNTER — OUTPATIENT (OUTPATIENT)
Dept: OUTPATIENT SERVICES | Facility: HOSPITAL | Age: 59
LOS: 1 days | End: 2024-03-26
Payer: MEDICARE

## 2024-03-26 DIAGNOSIS — Z90.49 ACQUIRED ABSENCE OF OTHER SPECIFIED PARTS OF DIGESTIVE TRACT: Chronic | ICD-10-CM

## 2024-03-26 DIAGNOSIS — Z98.89 OTHER SPECIFIED POSTPROCEDURAL STATES: Chronic | ICD-10-CM

## 2024-03-26 DIAGNOSIS — S92.009A UNSPECIFIED FRACTURE OF UNSPECIFIED CALCANEUS, INITIAL ENCOUNTER FOR CLOSED FRACTURE: Chronic | ICD-10-CM

## 2024-03-26 DIAGNOSIS — Z87.898 PERSONAL HISTORY OF OTHER SPECIFIED CONDITIONS: Chronic | ICD-10-CM

## 2024-03-26 DIAGNOSIS — Z00.8 ENCOUNTER FOR OTHER GENERAL EXAMINATION: ICD-10-CM

## 2024-03-26 PROCEDURE — 71260 CT THORAX DX C+: CPT | Mod: 26,MH

## 2024-03-26 PROCEDURE — 74177 CT ABD & PELVIS W/CONTRAST: CPT | Mod: MH

## 2024-03-26 PROCEDURE — 76536 US EXAM OF HEAD AND NECK: CPT

## 2024-03-26 PROCEDURE — 70491 CT SOFT TISSUE NECK W/DYE: CPT | Mod: 26,MH

## 2024-03-26 PROCEDURE — 76536 US EXAM OF HEAD AND NECK: CPT | Mod: 26

## 2024-03-26 PROCEDURE — 74177 CT ABD & PELVIS W/CONTRAST: CPT | Mod: 26,MH

## 2024-03-26 PROCEDURE — 70491 CT SOFT TISSUE NECK W/DYE: CPT | Mod: MH

## 2024-03-26 PROCEDURE — 71260 CT THORAX DX C+: CPT | Mod: MH

## 2024-03-27 ENCOUNTER — TRANSCRIPTION ENCOUNTER (OUTPATIENT)
Age: 59
End: 2024-03-27

## 2024-04-02 ENCOUNTER — RESULT REVIEW (OUTPATIENT)
Age: 59
End: 2024-04-02

## 2024-04-02 ENCOUNTER — APPOINTMENT (OUTPATIENT)
Dept: MRI IMAGING | Facility: IMAGING CENTER | Age: 59
End: 2024-04-02
Payer: MEDICARE

## 2024-04-02 ENCOUNTER — OUTPATIENT (OUTPATIENT)
Dept: OUTPATIENT SERVICES | Facility: HOSPITAL | Age: 59
LOS: 1 days | End: 2024-04-02
Payer: MEDICARE

## 2024-04-02 DIAGNOSIS — Z98.89 OTHER SPECIFIED POSTPROCEDURAL STATES: Chronic | ICD-10-CM

## 2024-04-02 DIAGNOSIS — S92.009A UNSPECIFIED FRACTURE OF UNSPECIFIED CALCANEUS, INITIAL ENCOUNTER FOR CLOSED FRACTURE: Chronic | ICD-10-CM

## 2024-04-02 DIAGNOSIS — Z90.49 ACQUIRED ABSENCE OF OTHER SPECIFIED PARTS OF DIGESTIVE TRACT: Chronic | ICD-10-CM

## 2024-04-02 DIAGNOSIS — Z00.8 ENCOUNTER FOR OTHER GENERAL EXAMINATION: ICD-10-CM

## 2024-04-02 DIAGNOSIS — Z87.898 PERSONAL HISTORY OF OTHER SPECIFIED CONDITIONS: Chronic | ICD-10-CM

## 2024-04-02 PROCEDURE — 74183 MRI ABD W/O CNTR FLWD CNTR: CPT | Mod: 26,MH

## 2024-04-02 PROCEDURE — A9585: CPT

## 2024-04-02 PROCEDURE — 74183 MRI ABD W/O CNTR FLWD CNTR: CPT | Mod: MH

## 2024-04-04 ENCOUNTER — TRANSCRIPTION ENCOUNTER (OUTPATIENT)
Age: 59
End: 2024-04-04

## 2024-04-27 LAB — PSA SERPL-MCNC: 0.09 NG/ML

## 2024-04-29 ENCOUNTER — TRANSCRIPTION ENCOUNTER (OUTPATIENT)
Age: 59
End: 2024-04-29

## 2024-04-29 NOTE — H&P ADULT - NSHPLANGLIMITEDENGLISH_GEN_A_CORE
TOE & METATARSAL FRACTURES  The structure of the foot is complex, consisting of bones, muscles, tendons, and other soft tissues. Of the 26 bones in the foot, 19 are toe bones (phalanges) and metatarsal bones (the long bones in the midfoot). Fractures of the toe and metatarsal bones are common and require evaluation by a specialist. A foot and ankle surgeon should be seen for proper diagnosis and treatment, even if initial treatment has been received in an emergency room.  A fracture is a break in the bone. Fractures can be divided into two categories: traumatic fractures and stress fractures.  TRAUMATIC FRACTURES (also called acute fractures) are caused by a direct blow or impact, such as seriously stubbing your toe. Traumatic fractures can be displaced or non-displaced. If the fracture is displaced, the bone is broken in such a way that it has changed in position (dislocated).  Signs and symptoms of a traumatic fracture include:  You may hear a sound at the time of the break.    Pinpoint pain  (pain at the place of impact) at the time the fracture occurs and perhaps for a few hours later, but often the pain goes away after several hours.   Crooked or abnormal appearance of the toe.   Bruising and swelling the next day.   It is not true that  if you can walk on it, it s not broken.  Evaluation by a foot and ankle surgeon is always recommended.   STRESS FRACTURES are tiny, hairline breaks that are usually caused by repetitive stress. Stress fractures often afflict athletes who, for example, too rapidly increase their running mileage. They can also be caused by an abnormal foot structure, deformities, or osteoporosis. Improper footwear may also lead to stress fractures. Stress fractures should not be ignored. They require proper medical attention to heal correctly.  Symptoms of stress fractures include:  Pain with or after normal activity   Pain that goes away when resting and then returns when standing or during  activity    Pinpoint pain  (pain at the site of the fracture) when touched   Swelling, but no bruising   IMPROPER TREATMENT  Some people say that  the doctor can t do anything for a broken bone in the foot.  This is usually not true. In fact, if a fractured toe or metatarsal bone is not treated correctly, serious complications may develop. For example:  A deformity in the bony architecture which may limit the ability to move the foot or cause difficulty in fitting shoes   Arthritis, which may be caused by a fracture in a joint (the juncture where two bones meet), or may be a result of angular deformities that develop when a displaced fracture is severe or hasn t been properly corrected   Chronic pain and deformity   Non-union, or failure to heal, can lead to subsequent surgery or chronic pain.   PROPER TREATMENT FOR TOES  Fractures of the toe bones are almost always traumatic fractures. Treatment for traumatic fractures depends on the break itself and may include these options:  Rest. Sometimes rest is all that is needed to treat a traumatic fracture of the toe.   Splinting. The toe may be fitted with a splint to keep it in a fixed position.   Rigid or stiff-soled shoe. Wearing a stiff-soled shoe protects the toe and helps keep it properly positioned.    Heraclio taping  the fractured toe to another toe is sometimes appropriate, but in other cases it may be harmful.   Surgery. If the break is badly displaced or if the joint is affected, surgery may be necessary. Surgery often involves the use of fixation devices, such as pins.   PROPER TREATMENT OF METATARSALS  Breaks in the metatarsal bones may be either stress or traumatic fractures. Certain kinds of fractures of the metatarsal bones present unique challenges.  For example, sometimes a fracture of the first metatarsal bone (behind the big toe) can lead to arthritis. Since the big toe is used so frequently and bears more weight than other toes, arthritis in that area  No can make it painful to walk, bend, or even stand.  Another type of break, called a Christopher fracture, occurs at the base of the fifth metatarsal bone (behind the little toe). It is often misdiagnosed as an ankle sprain, and misdiagnosis can have serious consequences since sprains and fractures require different treatments. Your foot and ankle surgeon is an expert in correctly identifying these conditions as well as other problems of the foot.  Treatment of metatarsal fractures depends on the type and extent of the fracture, and may include:  Rest. Sometimes rest is the only treatment needed to promote healing of a stress or traumatic fracture of a metatarsal bone.   Avoid the offending activity. Because stress fractures result from repetitive stress, it is important to avoid the activity that led to the fracture. Crutches or a wheelchair are sometimes required to offload weight from the foot to give it time to heal.   Immobilization, casting, or rigid shoe. A stiff-soled shoe or other form of immobilization may be used to protect the fractured bone while it is healing.   Surgery. Some traumatic fractures of the metatarsal bones require surgery, especially if the break is badly displaced.   Follow-up care. Your foot and ankle surgeon will provide instructions for care following surgical or non-surgical treatment. Physical therapy, exercises and rehabilitation may be included in a schedule for return to normal activities.

## 2024-04-30 ENCOUNTER — APPOINTMENT (OUTPATIENT)
Dept: UROLOGY | Facility: CLINIC | Age: 59
End: 2024-04-30
Payer: MEDICARE

## 2024-04-30 VITALS
DIASTOLIC BLOOD PRESSURE: 70 MMHG | BODY MASS INDEX: 25.23 KG/M2 | RESPIRATION RATE: 16 BRPM | HEART RATE: 64 BPM | HEIGHT: 66 IN | WEIGHT: 157 LBS | SYSTOLIC BLOOD PRESSURE: 122 MMHG

## 2024-04-30 DIAGNOSIS — C61 MALIGNANT NEOPLASM OF PROSTATE: ICD-10-CM

## 2024-04-30 DIAGNOSIS — N52.01 ERECTILE DYSFUNCTION DUE TO ARTERIAL INSUFFICIENCY: ICD-10-CM

## 2024-04-30 PROCEDURE — G2211 COMPLEX E/M VISIT ADD ON: CPT

## 2024-04-30 PROCEDURE — 99214 OFFICE O/P EST MOD 30 MIN: CPT

## 2024-04-30 RX ORDER — OXYBUTYNIN CHLORIDE 10 MG/1
10 TABLET, EXTENDED RELEASE ORAL DAILY
Qty: 90 | Refills: 3 | Status: DISCONTINUED | COMMUNITY
Start: 2023-10-23 | End: 2024-04-30

## 2024-04-30 RX ORDER — NITROGLYCERIN 20 MG/G
2 OINTMENT TOPICAL
Qty: 1 | Refills: 3 | Status: DISCONTINUED | COMMUNITY
Start: 2023-03-22 | End: 2024-04-30

## 2024-04-30 RX ORDER — TAMSULOSIN HYDROCHLORIDE 0.4 MG/1
0.4 CAPSULE ORAL
Refills: 0 | Status: DISCONTINUED | COMMUNITY
End: 2024-04-30

## 2024-04-30 NOTE — PHYSICAL EXAM
[] : no respiratory distress [Normal] : no focal deficits [Oriented To Time, Place, And Person] : oriented to person, place, and time [de-identified] : see HPI

## 2024-04-30 NOTE — HISTORY OF PRESENT ILLNESS
[FreeTextEntry1] : 59 yo male h/o prostate ca s/p prostatectomy 6/2023 is here for 6 month follow up.    Previously on oxybutynin for urinary urgency.  Urinary function: Reports urgency and mild stress incontinence. States has nocturia 2-3x per night. Completed pelvic floor PT and was able to wean off pads/diapers. Denies dysuria or gross hematuria.  Continues to have frequent BM secondary to his Crohn's disease.  PSA 04/26/2024 - 0.09 ng/mL  Currently on tadalafil 5 mg once daily and 20mg as needed for ED recovery. Does notice some improvement with medication but unable to achieve full erection.

## 2024-05-01 ENCOUNTER — TRANSCRIPTION ENCOUNTER (OUTPATIENT)
Age: 59
End: 2024-05-01

## 2024-05-01 ENCOUNTER — RX RENEWAL (OUTPATIENT)
Age: 59
End: 2024-05-01

## 2024-05-01 RX ORDER — TADALAFIL 20 MG/1
20 TABLET ORAL DAILY
Qty: 30 | Refills: 11 | Status: ACTIVE | COMMUNITY
Start: 2024-01-31 | End: 1900-01-01

## 2024-05-01 RX ORDER — TADALAFIL 5 MG/1
5 TABLET ORAL
Qty: 90 | Refills: 3 | Status: ACTIVE | COMMUNITY
Start: 2023-10-23 | End: 1900-01-01

## 2024-05-01 RX ORDER — ONABOTULINUMTOXINA 100 [USP'U]/1
100 INJECTION, POWDER, LYOPHILIZED, FOR SOLUTION INTRADERMAL; INTRAMUSCULAR
Qty: 1 | Refills: 0 | Status: ACTIVE | COMMUNITY
Start: 2023-03-27 | End: 1900-01-01

## 2024-05-01 NOTE — ADDENDUM
[FreeTextEntry1] : Documented by Sushant Lindsey acting as a scribe for Dr. Leonel Duckworth on (07/22/2021).\par \par All medical record entries made by the Scribe were at my, Dr. Leonel Duckworth's, direction and personally dictated by me on (07/22/2021). I have reviewed the chart and agree that the record accurately reflects my personal performance of the history, physical exam, assessment and plan. I have also personally directed, reviewed, and agree with the discharge instructions.\par \par 
[phone]

## 2024-05-08 ENCOUNTER — APPOINTMENT (OUTPATIENT)
Dept: SURGERY | Facility: CLINIC | Age: 59
End: 2024-05-08
Payer: MEDICARE

## 2024-05-08 VITALS
TEMPERATURE: 97.1 F | OXYGEN SATURATION: 98 % | HEART RATE: 65 BPM | DIASTOLIC BLOOD PRESSURE: 74 MMHG | SYSTOLIC BLOOD PRESSURE: 131 MMHG | RESPIRATION RATE: 17 BRPM

## 2024-05-08 DIAGNOSIS — K59.4 ANAL SPASM: ICD-10-CM

## 2024-05-08 DIAGNOSIS — K60.2 ANAL FISSURE, UNSPECIFIED: ICD-10-CM

## 2024-05-08 PROCEDURE — 99213 OFFICE O/P EST LOW 20 MIN: CPT

## 2024-05-08 RX ORDER — SEMAGLUTIDE 0.68 MG/ML
2 INJECTION, SOLUTION SUBCUTANEOUS
Refills: 0 | Status: DISCONTINUED | COMMUNITY
End: 2024-05-08

## 2024-05-08 NOTE — ASSESSMENT
[FreeTextEntry1] : Botox lasted only a couple of months.  Patient on Cialis.  He cannot have topical nitroglycerin.  Topical diltiazem with lidocaine prescribed.  Possible repeat Botox injections in the future.  Sphincterotomy discussed as an option if all else fails.  All questions answered.

## 2024-05-08 NOTE — PHYSICAL EXAM
[FreeTextEntry1] : Perianal inspection demonstrated external tags.  Severe spasm and posterior anal canal tenderness noted on a limited digital exam.  Anoscopy deferred because of pain.

## 2024-05-08 NOTE — HISTORY OF PRESENT ILLNESS
[FreeTextEntry1] : Alec is a 57 y/o male here for a follow up visit, rectal pain.   S/p EUA and Botox on 3/19/24.   S/p EUA with Botox injections on 03/17/20, 10/15/19, 10/16/18, 07/17/18 by Dr. Michel  S/p  Single incision laparoscopic small bowel resection. Appendectomy. Strictureplasty x2, UHR on 3/12/18.  Has Crohns disease - on skyrizi  Has prostate cancer - not on chemo/RT yet.   Today pt reports anal pain (worse after BM and sometimes when sitting). Daily 5 times BMs, loose, no routine use of fiber supplements/laxatives, started feeling anal pain a few weeks ago, slight bleeding on tp daily, no episodes of incontinence, and denies feeling swollen or prolapsed tissue. Denies use of creams/ointments.

## 2024-07-03 ENCOUNTER — RX RENEWAL (OUTPATIENT)
Age: 59
End: 2024-07-03

## 2024-07-05 ENCOUNTER — TRANSCRIPTION ENCOUNTER (OUTPATIENT)
Age: 59
End: 2024-07-05

## 2024-07-10 ENCOUNTER — TRANSCRIPTION ENCOUNTER (OUTPATIENT)
Age: 59
End: 2024-07-10

## 2024-07-16 ENCOUNTER — APPOINTMENT (OUTPATIENT)
Dept: SURGERY | Facility: CLINIC | Age: 59
End: 2024-07-16

## 2024-07-16 ENCOUNTER — APPOINTMENT (OUTPATIENT)
Dept: SURGERY | Facility: CLINIC | Age: 59
End: 2024-07-16
Payer: MEDICARE

## 2024-07-16 PROCEDURE — 46505 CHEMODENERVATION ANAL MUSC: CPT

## 2024-07-16 PROCEDURE — 45990 SURG DX EXAM ANORECTAL: CPT

## 2024-07-20 DIAGNOSIS — R97.21 RISING PSA FOLLOWING TREATMENT FOR MALIGNANT NEOPLASM OF PROSTATE: ICD-10-CM

## 2024-07-22 ENCOUNTER — RESULT REVIEW (OUTPATIENT)
Age: 59
End: 2024-07-22

## 2024-08-01 ENCOUNTER — OUTPATIENT (OUTPATIENT)
Dept: OUTPATIENT SERVICES | Facility: HOSPITAL | Age: 59
LOS: 1 days | End: 2024-08-01
Payer: MEDICARE

## 2024-08-01 ENCOUNTER — APPOINTMENT (OUTPATIENT)
Dept: MRI IMAGING | Facility: IMAGING CENTER | Age: 59
End: 2024-08-01
Payer: MEDICARE

## 2024-08-01 ENCOUNTER — APPOINTMENT (OUTPATIENT)
Dept: NUCLEAR MEDICINE | Facility: IMAGING CENTER | Age: 59
End: 2024-08-01
Payer: MEDICARE

## 2024-08-01 ENCOUNTER — RESULT REVIEW (OUTPATIENT)
Age: 59
End: 2024-08-01

## 2024-08-01 ENCOUNTER — APPOINTMENT (OUTPATIENT)
Dept: NUCLEAR MEDICINE | Facility: IMAGING CENTER | Age: 59
End: 2024-08-01

## 2024-08-01 DIAGNOSIS — S92.009A UNSPECIFIED FRACTURE OF UNSPECIFIED CALCANEUS, INITIAL ENCOUNTER FOR CLOSED FRACTURE: Chronic | ICD-10-CM

## 2024-08-01 DIAGNOSIS — Z98.89 OTHER SPECIFIED POSTPROCEDURAL STATES: Chronic | ICD-10-CM

## 2024-08-01 DIAGNOSIS — R97.21 RISING PSA FOLLOWING TREATMENT FOR MALIGNANT NEOPLASM OF PROSTATE: ICD-10-CM

## 2024-08-01 DIAGNOSIS — Z87.898 PERSONAL HISTORY OF OTHER SPECIFIED CONDITIONS: Chronic | ICD-10-CM

## 2024-08-01 DIAGNOSIS — Z90.49 ACQUIRED ABSENCE OF OTHER SPECIFIED PARTS OF DIGESTIVE TRACT: Chronic | ICD-10-CM

## 2024-08-01 PROCEDURE — 72197 MRI PELVIS W/O & W/DYE: CPT | Mod: 26,MH

## 2024-08-01 PROCEDURE — 76498 UNLISTED MR PROCEDURE: CPT

## 2024-08-01 PROCEDURE — 78816 PET IMAGE W/CT FULL BODY: CPT | Mod: 26,MH

## 2024-08-01 PROCEDURE — A9585: CPT

## 2024-08-01 PROCEDURE — 76498P: CUSTOM | Mod: 26,MH

## 2024-08-01 PROCEDURE — 78816 PET IMAGE W/CT FULL BODY: CPT

## 2024-08-01 PROCEDURE — A9595: CPT

## 2024-08-01 PROCEDURE — A9608: CPT

## 2024-08-01 PROCEDURE — 72197 MRI PELVIS W/O & W/DYE: CPT

## 2024-08-09 ENCOUNTER — NON-APPOINTMENT (OUTPATIENT)
Age: 59
End: 2024-08-09

## 2024-08-12 ENCOUNTER — APPOINTMENT (OUTPATIENT)
Dept: PULMONOLOGY | Facility: CLINIC | Age: 59
End: 2024-08-12
Payer: MEDICARE

## 2024-08-12 VITALS
OXYGEN SATURATION: 97 % | SYSTOLIC BLOOD PRESSURE: 122 MMHG | WEIGHT: 160 LBS | RESPIRATION RATE: 17 BRPM | HEIGHT: 66 IN | HEART RATE: 68 BPM | BODY MASS INDEX: 25.71 KG/M2 | TEMPERATURE: 97 F | DIASTOLIC BLOOD PRESSURE: 76 MMHG

## 2024-08-12 DIAGNOSIS — R06.02 SHORTNESS OF BREATH: ICD-10-CM

## 2024-08-12 DIAGNOSIS — J30.9 ALLERGIC RHINITIS, UNSPECIFIED: ICD-10-CM

## 2024-08-12 DIAGNOSIS — Z78.9 OTHER SPECIFIED HEALTH STATUS: ICD-10-CM

## 2024-08-12 DIAGNOSIS — Z71.89 OTHER SPECIFIED COUNSELING: ICD-10-CM

## 2024-08-12 DIAGNOSIS — R79.89 OTHER SPECIFIED ABNORMAL FINDINGS OF BLOOD CHEMISTRY: ICD-10-CM

## 2024-08-12 DIAGNOSIS — K21.9 GASTRO-ESOPHAGEAL REFLUX DISEASE W/OUT ESOPHAGITIS: ICD-10-CM

## 2024-08-12 DIAGNOSIS — J45.50 SEVERE PERSISTENT ASTHMA, UNCOMPLICATED: ICD-10-CM

## 2024-08-12 PROCEDURE — ZZZZZ: CPT

## 2024-08-12 PROCEDURE — 94729 DIFFUSING CAPACITY: CPT

## 2024-08-12 PROCEDURE — 95012 NITRIC OXIDE EXP GAS DETER: CPT

## 2024-08-12 PROCEDURE — 94727 GAS DIL/WSHOT DETER LNG VOL: CPT

## 2024-08-12 PROCEDURE — 94010 BREATHING CAPACITY TEST: CPT

## 2024-08-12 PROCEDURE — 99214 OFFICE O/P EST MOD 30 MIN: CPT | Mod: 25

## 2024-08-12 RX ORDER — EMPAGLIFLOZIN 25 MG/1
TABLET, FILM COATED ORAL
Refills: 0 | Status: ACTIVE | COMMUNITY

## 2024-08-12 NOTE — PROCEDURE
[FreeTextEntry1] : Full PFT reveals normal flows; FEV1 was 2.93 L which is 101% of predicted; normal lung volumes; normal diffusion at 27.3, which is 122% of predicted; normal flow volume loop.  PFTs were performed to evaluate for SOB  FENO was 33; a normal value being less than 25 Fractional exhaled nitric oxide (FENO) is regarded as a simple, noninvasive method for assessing eosinophilic airway inflammation. Produced by a variety of cells within the lung, nitric oxide (NO) concentrations are generally low in healthy individuals. However, high concentrations of NO appear to be involved in nonspecific host defense mechanisms and chronic inflammatory diseases such as asthma. The American Thoracic Society (ATS) therefore has recommended using FENO to aid in the diagnosis and monitoring of eosinophilic airway inflammation and asthma, and for identifying steroid responsive individuals whose chronic respiratory symptoms may be caused by airway inflammation.

## 2024-08-12 NOTE — ASU PATIENT PROFILE, ADULT - PATIENT'S HEIGHT AND WEIGHT RECORDED IN THE VITAL SIGNS FLOWSHEET
Sent [employer] message to let patient know to call the office to schedule an appointment. Also to let us know a valid phone number he can be reached at.  
eye vision change
yes

## 2024-08-12 NOTE — PHYSICAL EXAM
[No Acute Distress] : no acute distress [Normal Oropharynx] : normal oropharynx [II] : Mallampati Class: II [Normal Appearance] : normal appearance [Normal Rate/Rhythm] : normal rate/rhythm [No Neck Mass] : no neck mass [Murmur ___ / 6] : murmur [unfilled] / 6 [Normal S1, S2] : normal s1, s2 [No Resp Distress] : no resp distress [Clear to Auscultation Bilaterally] : clear to auscultation bilaterally [No Abnormalities] : no abnormalities [Benign] : benign [Normal Gait] : normal gait [No Clubbing] : no clubbing [No Cyanosis] : no cyanosis [No Edema] : no edema [FROM] : FROM [Normal Color/ Pigmentation] : normal color/ pigmentation [No Focal Deficits] : no focal deficits [Oriented x3] : oriented x3 [Normal Affect] : normal affect [TextBox_68] : I:E 1:3, clear

## 2024-08-12 NOTE — HISTORY OF PRESENT ILLNESS
[FreeTextEntry1] : Mr. KEMP is a 59 year old male with a history of severe persistent asthma, allergies, OSAS, GERD who presents for a follow-up pulmonary evaluation. His chief complaint is prostate, here for pre-op   -he notes rising PSA in July is his #1 complaint -he notes exercising -he notes weight is stable -he notes bowels are regular -he notes his blood sugar is stable -he notes lower back pain, which he is getting an injection for -he notes pending ablation for his lower back pain -he notes s/p SI joint injection -he notes sinuses are quiet -he denies any SOB -he notes he is compliant with all Rx -he notes GERD Sx are controlled with Rx -he notes stopping Ozempic, now on Jardiance -he notes normally not sleeping for many hours   -he denies any headaches, nausea, emesis, fever, chills, sweats, chest pain, chest pressure, coughing, wheezing, palpitations, diarrhea, constipation, dysphagia, vertigo, arthralgias, leg swelling, itchy eyes, itchy ears, heartburn, reflux, or sour taste in the mouth.

## 2024-08-12 NOTE — ASSESSMENT
[FreeTextEntry1] : Mr. Mota is a 59 year old male doing well from a pulmonary perspective, is s/p bowel obstruction s/p GI surgery, s/p hospitalization with cluster headaches / tinnitus, with a history of severe persistent asthma, allergies, OSAS, GERD. - s/p coronary stents (9-last 1/2022)- stable - s/p prostate surgery 6/2023 - stable except for orthopedic issues, recurrent prostate CA  Problem 1: Severe persistent asthma  - asthmatic bronchitis - stable  -s/p Prednisone; 30 mg for 5 days, then 20 mg for 5 days, then 10 mg for 5 days (1/2021) -Information sheet given to the patient to be reviewed, this medication is never to be used without consulting the prescribing physician. Proper dietary restraint is necessary specifically salt containing foods, if any reaction may occur should be reported.  -continue Albuterol by the nebulizer QID, prn  -continue Symbicort 160 at 2 inhalations BID -continue Spiriva 2 puffs QD -continue Singulair 10 mg QHS -Asthma is believed to be caused by inherited (genetic) and environmental factor, but its exact cause is unknown. Asthma may be triggered by allergens, lung infections, or irritants in the air. Asthma triggers are different for each person -Inhaler technique reviewed as well as oral hygiene techniques reviewed with patient. Avoidance of cold air, extremes of temperature, rescue inhaler should be used before exercise. Order of medication reviewed with patient. Recommended use of a cool mist humidifier in the bedroom.   Problem 2: allergies -continue Xyzal 5 mg QHS -continue Qnasl 1 sniff/nostril BID  -continue Astelin 0.15% 1 sniff BID -Environmental measures for allergies were encouraged including mattress and pillow cover, air purifier, and environmental controls.  Problem 3: GERD (controlled) -off Nexium 40 mg QAM, before breakfast in place -OFF Pepcid 40 mg QHS  -recommended Reflux Gourmet -Rule of 2's- Avoid eating too much, too late, too poorly, too spicy, or two hours before bed  -Things to avoid including overeating, spicy foods, tight clothing, eating within three hours of bed, this list is not all inclusive.  -For treatment of reflux, possible options discussed including diet control, H2 blockers, PPIs, as well as coating motility agents discussed as treatment options. Timing of meals and proximity of last meal to sleep were discussed. If symptoms persist, a formal gastrointestinal evaluation is needed.  Problem 4: s/p OSAS -(reinforced)- now resolved  -s/p home sleep study - negative 12/2022, DD in place ([place]) -Good sleep hygiene was encouraged including avoiding watching television an hour before bed, keeping caffeine at a low, avoiding reading in bed, no drinking any liquids three hours before bedtime, and only getting into bed when tired.  - Discussed the risks/associations with coronary artery disease, atrial fibrillation, arrhythmia, memory loss, issues with concentration, hypertension, nocturia, chronic reflux/Olvera's esophagus some but not all inclusive. Treatment options discussed including CPAP/BiPAP machine, oral appliance, ProVent therapy, new technologies, or positional sleep.  Problem 5: Obesity "poor" - in progress  -Recommended Vladislav Willard's 10-day detox diet and book.  -recommended "MUNIQ" OTC supplement  -recommended Berberine Synergy OTC Weight loss, exercise, and diet control were discussed and are highly encouraged. Treatment options were given such as, aqua therapy, and contacting a nutritionist. Recommended to use the elliptical, stationary bike, less use of treadmill. Mindful eating was explained to the patient Obesity is associated with worsening asthma, shortness of breath, and potential for cardiac disease, diabetes, and other underlying medical conditions.    Problem 6: CAD (9 stents 12/2021) -as per Dr. Fernández, likely the downstream effect of uncontrolled total body inflammation -He is not to use Adenosine for his stress test  problem 7: overwight/bowel obstruction, s/p surgery (on Jardiance) -encouraged to look at functional medicine consult, referred to Dr. Sheryl Leventhal,  -GI follow up with Dr. Caicedo / Marilu et al., [place]  Problem 8: Tinnitus -Recommended to take No Flush Niacin, Vitamin B and C, and Zinc   Problem 9: psoriasis / winter eczema -recommended to use Borage / Flax Seed Oil, Skyrizi  problem 10: Health Maintenance/COVID19 Precautions:--Functional Medicine evaln -recommended book "[employer]" - S/p Covid 19 vaccine (Moderna) x 6 - Clean your hands often. Wash your hands often with soap and water for at least 20 seconds, especially after blowing your nose, coughing, or sneezing, or having been in a public place. - If soap and water are not available, use a hand  that contains at least 60% alcohol. - To the extent possible, avoid touching high-touch surfaces in public places - elevator buttons, door handles, handrails, handshaking with people, etc. Use a tissue or your sleeve to cover your hand or finger if you must touch something. - Wash your hands after touching surfaces in public places. - Avoid touching your face, nose, eyes, etc. - Clean and disinfect your home to remove germs: practice routine cleaning of frequently touched surfaces (for example: tables, doorknobs, light switches, handles, desks, toilets, faucets, sinks & cell phones) - Avoid crowds, especially in poorly ventilated spaces. Your risk of exposure to respiratory viruses like COVID-19 may increase in crowded, closed-in settings with little air circulation if there are people in the crowd who are sick. All patients are recommended to practice social distancing and stay at least 6 feet away from others. - Avoid all non-essential travel including plane trips, and especially avoid embarking on cruise ships. -If COVID-19 is spreading in your community, take extra measures to put distance between yourself and other people to further reduce your risk of being exposed to this new virus. -Stay home as much as possible. - Consider ways of getting food brought to your house through family, social, or commercial networks -Be aware that the virus has been known to live in the air up to 3 hours post exposure, cardboard up to 24 hours post exposure, copper up to 4 hours post exposure, steel and plastic up to 2-3 days post exposure. Risk of transmission from these surfaces are affected by many variables. Immune Support Recommendations: -OTC Vitamin C 500mg BID  -OTC Quercetin 250-500mg BID  -OTC Zinc 75-100mg per day  -OTC Melatonin 1 or 2 mg a night  -OTC Vitamin D 1-4000mg per day  -OTC Tonic Water 8oz per day Asthma and COVID19: You need to make sure your asthma is under control. This often requires the use of inhaled corticosteroids (and sometimes oral corticosteroids). Inhaled corticosteroids do not likely reduce your immune system's ability to fight infections, but oral corticosteroids may. It is important to use the steps above to protect yourself to limit your exposure to any respiratory virus.   problem 12: health maintenance prostate CA - [place] -Orthopedic - recommended Pemf Mat  -recommended functional medicine consultation  -s/p flu shot in 2023 -recommended strep pneumonia vaccines: Prevnar-13 vaccine, followed by Pneumo vaccine 23 on year following -recommended early intervention for URIs -recommended osteoporosis evaluations -recommended early dermatological evaluations -recommended after the age of 50 to the age of 70, colonoscopy every 5 years  Follow up in 4 months Encouraged to follow up with questions, concerns, and changes.

## 2024-08-14 ENCOUNTER — OUTPATIENT (OUTPATIENT)
Dept: OUTPATIENT SERVICES | Facility: HOSPITAL | Age: 59
LOS: 1 days | Discharge: HOME | End: 2024-08-14
Payer: MEDICARE

## 2024-08-14 ENCOUNTER — APPOINTMENT (OUTPATIENT)
Dept: RADIATION ONCOLOGY | Facility: CLINIC | Age: 59
End: 2024-08-14

## 2024-08-14 VITALS
SYSTOLIC BLOOD PRESSURE: 133 MMHG | HEART RATE: 61 BPM | BODY MASS INDEX: 25.71 KG/M2 | HEIGHT: 66 IN | OXYGEN SATURATION: 100 % | DIASTOLIC BLOOD PRESSURE: 82 MMHG | TEMPERATURE: 96.98 F | WEIGHT: 160 LBS | RESPIRATION RATE: 16 BRPM

## 2024-08-14 DIAGNOSIS — Z87.898 PERSONAL HISTORY OF OTHER SPECIFIED CONDITIONS: Chronic | ICD-10-CM

## 2024-08-14 DIAGNOSIS — S92.009A UNSPECIFIED FRACTURE OF UNSPECIFIED CALCANEUS, INITIAL ENCOUNTER FOR CLOSED FRACTURE: Chronic | ICD-10-CM

## 2024-08-14 DIAGNOSIS — Z98.89 OTHER SPECIFIED POSTPROCEDURAL STATES: Chronic | ICD-10-CM

## 2024-08-14 DIAGNOSIS — Z90.49 ACQUIRED ABSENCE OF OTHER SPECIFIED PARTS OF DIGESTIVE TRACT: Chronic | ICD-10-CM

## 2024-08-14 DIAGNOSIS — C61 MALIGNANT NEOPLASM OF PROSTATE: ICD-10-CM

## 2024-08-14 PROCEDURE — 99215 OFFICE O/P EST HI 40 MIN: CPT | Mod: GC

## 2024-08-14 RX ORDER — LEUPROLIDE ACETATE 22.5 MG
22.5 KIT INTRAMUSCULAR
Qty: 1 | Refills: 1 | Status: ACTIVE | COMMUNITY
Start: 2024-08-14 | End: 1900-01-01

## 2024-08-14 NOTE — PHYSICAL EXAM
[General Appearance - Alert] : alert [General Appearance - In No Acute Distress] : in no acute distress [] : no respiratory distress [Respiration, Rhythm And Depth] : normal respiratory rhythm and effort [Exaggerated Use Of Accessory Muscles For Inspiration] : no accessory muscle use [Heart Rate And Rhythm] : heart rate and rhythm were normal [Arterial Pulses Normal] : the arterial pulses were normal [Abdomen Soft] : soft [Nondistended] : nondistended [Nail Clubbing] : no clubbing  or cyanosis of the fingernails [Skin Color & Pigmentation] : normal skin color and pigmentation [No Focal Deficits] : no focal deficits [Normal] : oriented to person, place and time, the affect was normal, the mood was normal and not anxious

## 2024-08-14 NOTE — VITALS
[Maximal Pain Intensity: 5/10] : 5/10 [Pain Description/Quality: ___] : Pain description/quality: [unfilled] [Opioid] : opioid [90: Able to carry normal activity; minor signs or symptoms of disease.] : 90: Able to carry normal activity; minor signs or symptoms of disease.  [2 - Distress Level] : Distress Level: 2

## 2024-08-18 ENCOUNTER — OUTPATIENT (OUTPATIENT)
Dept: OUTPATIENT SERVICES | Facility: HOSPITAL | Age: 59
LOS: 1 days | Discharge: HOME | End: 2024-08-18

## 2024-08-18 DIAGNOSIS — Z98.89 OTHER SPECIFIED POSTPROCEDURAL STATES: Chronic | ICD-10-CM

## 2024-08-18 DIAGNOSIS — S92.009A UNSPECIFIED FRACTURE OF UNSPECIFIED CALCANEUS, INITIAL ENCOUNTER FOR CLOSED FRACTURE: Chronic | ICD-10-CM

## 2024-08-18 DIAGNOSIS — Z87.898 PERSONAL HISTORY OF OTHER SPECIFIED CONDITIONS: Chronic | ICD-10-CM

## 2024-08-18 DIAGNOSIS — Z90.49 ACQUIRED ABSENCE OF OTHER SPECIFIED PARTS OF DIGESTIVE TRACT: Chronic | ICD-10-CM

## 2024-08-18 DIAGNOSIS — C61 MALIGNANT NEOPLASM OF PROSTATE: ICD-10-CM

## 2024-08-19 ENCOUNTER — NON-APPOINTMENT (OUTPATIENT)
Age: 59
End: 2024-08-19

## 2024-08-20 ENCOUNTER — RESULT REVIEW (OUTPATIENT)
Age: 59
End: 2024-08-20

## 2024-08-20 ENCOUNTER — APPOINTMENT (OUTPATIENT)
Dept: RADIOLOGY | Facility: IMAGING CENTER | Age: 59
End: 2024-08-20

## 2024-08-20 ENCOUNTER — APPOINTMENT (OUTPATIENT)
Dept: HEMATOLOGY ONCOLOGY | Facility: CLINIC | Age: 59
End: 2024-08-20
Payer: MEDICARE

## 2024-08-20 ENCOUNTER — OUTPATIENT (OUTPATIENT)
Dept: OUTPATIENT SERVICES | Facility: HOSPITAL | Age: 59
LOS: 1 days | End: 2024-08-20
Payer: MEDICARE

## 2024-08-20 ENCOUNTER — NON-APPOINTMENT (OUTPATIENT)
Age: 59
End: 2024-08-20

## 2024-08-20 VITALS
BODY MASS INDEX: 25.63 KG/M2 | HEIGHT: 66.34 IN | HEART RATE: 62 BPM | SYSTOLIC BLOOD PRESSURE: 131 MMHG | DIASTOLIC BLOOD PRESSURE: 82 MMHG | WEIGHT: 161.38 LBS | TEMPERATURE: 98 F | OXYGEN SATURATION: 98 % | RESPIRATION RATE: 16 BRPM

## 2024-08-20 DIAGNOSIS — C61 MALIGNANT NEOPLASM OF PROSTATE: ICD-10-CM

## 2024-08-20 DIAGNOSIS — K50.90 CROHN'S DISEASE, UNSPECIFIED, W/OUT COMPLICATIONS: ICD-10-CM

## 2024-08-20 DIAGNOSIS — G47.33 OBSTRUCTIVE SLEEP APNEA (ADULT) (PEDIATRIC): ICD-10-CM

## 2024-08-20 DIAGNOSIS — E11.9 TYPE 2 DIABETES MELLITUS W/OUT COMPLICATIONS: ICD-10-CM

## 2024-08-20 DIAGNOSIS — D64.9 ANEMIA, UNSPECIFIED: ICD-10-CM

## 2024-08-20 DIAGNOSIS — R97.21 RISING PSA FOLLOWING TREATMENT FOR MALIGNANT NEOPLASM OF PROSTATE: ICD-10-CM

## 2024-08-20 DIAGNOSIS — Z80.42 FAMILY HISTORY OF MALIGNANT NEOPLASM OF PROSTATE: ICD-10-CM

## 2024-08-20 DIAGNOSIS — I25.10 ATHEROSCLEROTIC HEART DISEASE OF NATIVE CORONARY ARTERY W/OUT ANGINA PECTORIS: ICD-10-CM

## 2024-08-20 DIAGNOSIS — S92.009A UNSPECIFIED FRACTURE OF UNSPECIFIED CALCANEUS, INITIAL ENCOUNTER FOR CLOSED FRACTURE: Chronic | ICD-10-CM

## 2024-08-20 DIAGNOSIS — Z00.8 ENCOUNTER FOR OTHER GENERAL EXAMINATION: ICD-10-CM

## 2024-08-20 DIAGNOSIS — Z98.89 OTHER SPECIFIED POSTPROCEDURAL STATES: Chronic | ICD-10-CM

## 2024-08-20 DIAGNOSIS — Z83.79 FAMILY HISTORY OF OTHER DISEASES OF THE DIGESTIVE SYSTEM: ICD-10-CM

## 2024-08-20 DIAGNOSIS — Z82.49 FAMILY HISTORY OF ISCHEMIC HEART DISEASE AND OTHER DISEASES OF THE CIRCULATORY SYSTEM: ICD-10-CM

## 2024-08-20 DIAGNOSIS — Z87.898 PERSONAL HISTORY OF OTHER SPECIFIED CONDITIONS: Chronic | ICD-10-CM

## 2024-08-20 LAB
BASOPHILS # BLD AUTO: 0.02 K/UL — SIGNIFICANT CHANGE UP (ref 0–0.2)
BASOPHILS NFR BLD AUTO: 0.2 % — SIGNIFICANT CHANGE UP (ref 0–2)
EOSINOPHIL # BLD AUTO: 0.04 K/UL — SIGNIFICANT CHANGE UP (ref 0–0.5)
EOSINOPHIL NFR BLD AUTO: 0.3 % — SIGNIFICANT CHANGE UP (ref 0–6)
HCT VFR BLD CALC: 38.4 % — LOW (ref 39–50)
HGB BLD-MCNC: 12.5 G/DL — LOW (ref 13–17)
IMM GRANULOCYTES NFR BLD AUTO: 0.5 % — SIGNIFICANT CHANGE UP (ref 0–0.9)
LYMPHOCYTES # BLD AUTO: 1.17 K/UL — SIGNIFICANT CHANGE UP (ref 1–3.3)
LYMPHOCYTES # BLD AUTO: 9.5 % — LOW (ref 13–44)
MCHC RBC-ENTMCNC: 28.1 PG — SIGNIFICANT CHANGE UP (ref 27–34)
MCHC RBC-ENTMCNC: 32.6 G/DL — SIGNIFICANT CHANGE UP (ref 32–36)
MCV RBC AUTO: 86.3 FL — SIGNIFICANT CHANGE UP (ref 80–100)
MONOCYTES # BLD AUTO: 0.56 K/UL — SIGNIFICANT CHANGE UP (ref 0–0.9)
MONOCYTES NFR BLD AUTO: 4.5 % — SIGNIFICANT CHANGE UP (ref 2–14)
NEUTROPHILS # BLD AUTO: 10.53 K/UL — HIGH (ref 1.8–7.4)
NEUTROPHILS NFR BLD AUTO: 85 % — HIGH (ref 43–77)
NRBC # BLD: 0 /100 WBCS — SIGNIFICANT CHANGE UP (ref 0–0)
PLATELET # BLD AUTO: 153 K/UL — SIGNIFICANT CHANGE UP (ref 150–400)
RBC # BLD: 4.45 M/UL — SIGNIFICANT CHANGE UP (ref 4.2–5.8)
RBC # FLD: 14.9 % — HIGH (ref 10.3–14.5)
RETICS #: 85.9 K/UL — SIGNIFICANT CHANGE UP (ref 25–125)
RETICS/RBC NFR: 1.9 % — SIGNIFICANT CHANGE UP (ref 0.5–2.5)
WBC # BLD: 12.38 K/UL — HIGH (ref 3.8–10.5)
WBC # FLD AUTO: 12.38 K/UL — HIGH (ref 3.8–10.5)

## 2024-08-20 PROCEDURE — 77080 DXA BONE DENSITY AXIAL: CPT | Mod: 26

## 2024-08-20 PROCEDURE — 77080 DXA BONE DENSITY AXIAL: CPT

## 2024-08-20 PROCEDURE — 99205 OFFICE O/P NEW HI 60 MIN: CPT

## 2024-08-20 RX ORDER — OMEPRAZOLE 20 MG/1
20 TABLET, DELAYED RELEASE ORAL
Refills: 0 | Status: ACTIVE | COMMUNITY
Start: 2024-08-14

## 2024-08-20 RX ORDER — FOLIC ACID 1 MG/1
1 TABLET ORAL
Refills: 0 | Status: ACTIVE | COMMUNITY

## 2024-08-20 RX ORDER — RISANKIZUMAB-RZAA 150 MG/ML
150 INJECTION SUBCUTANEOUS
Refills: 0 | Status: ACTIVE | COMMUNITY

## 2024-08-20 NOTE — HISTORY OF PRESENT ILLNESS
[FreeTextEntry1] : Mr. Mascorro is a 59-year-old male with prostate cancer, referred for consultation regarding radiation therapy.   He was referred for urologic evaluation due to a rising PSA.  Prostate MRI on 3/23/23 showed a 47 cc prostate volume, central gland hypertrophy, and two lesions. Lesion #1 was in the left anterior fibromuscular stroma, measuring up to 10 mm, PI-RADS 4. Lesion #2 was in the right posterior medial apex peripheral zone measuring up to 9 mm, PI-RADS 4. There was no extracapsular extension. There was no seminal vesicle invasion, and the neurovascular bundles were unremarkable. There was no pelvic adenopathy and no suspicious osseous lesions.  PSA was 3.90 on 3/28/23 with 18% free PSA.   Prostate biopsy on 4/13/24 showed 9 out of 15 cores with adenocarcinoma with [place] score ranging from 3+3=6 to 3+4=7, up to 80% involvement, and perineural invasion.  He underwent radical prostatectomy on 6/5/23. Pathology showed adenocarcinoma with [place] score 3+4=7 with 10% [place] 4 pattern and <5% Tommy 5. Six left pelvic lymph node and five right pelvic lymph nodes were all negative for tumor. Focal extraprostatic extension was present at the right posterolateral/neurovascular bundle. Margins were all negative. There was no seminal vesicle or bladder neck invasion. There was no lymphovascular invasion. Cribriform [place] pattern 4 and intraductal carcinoma of the prostate were present.  Post-operative was 0.04 on 7/17/23 and gradually increased to most recently 0.16 on 7/31/24.   CT CAP on 3/26/24 showed mild thickening of the distal small bowel and prominent ileocolic lymph nodes consistent with Crohn's disease. An indeterminate subcentimeter right renal lesion increased in size compared to a prior study from 2023. MR Abdomen on 4/2/24 showed a 9 mm hemorrhagic cyst in the posterior interpolar right kidney, corresponding to the finding on CT.   PSMA PET on 8/1/24 no evidence of recurrence disease in the prostate bed. Mild radiotracer uptake in the ribs was suggestive of traumatic rib fractures. There was no evidence of metastatic disease.   Prostate MRI on 8/1/24 showed an unremarkable prostate bed. There was no evidence of local recurrence or abnormal lymphadenopathy.  Post-operative, he developed urinary incontinence and erectile dysfunction.  He takes Cialis for ED.  Urinary incontinence is much improved with physical therapy and time. He has mild stress-incontinence and does not require a pad.

## 2024-08-20 NOTE — REVIEW OF SYSTEMS
[Fatigue] : fatigue [Recent Change In Weight] : ~T recent weight change [Diarrhea: Grade 3 - Increase of >=7 stools per day over baseline; incontinence; hospitalization indicated; severe increase in ostomy output compared to baseline; limiting self care ADL] : Diarrhea: Grade 3 - Increase of >=7 stools per day over baseline; incontinence; hospitalization indicated; severe increase in ostomy output compared to baseline; limiting self care ADL [Incontinence] : incontinence [Joint Pain] : joint pain [Skin Rash] : skin rash [Anxiety] : anxiety [Negative] : ENT [Fever] : no fever [Chills] : no chills [Chest Pain] : no chest pain [Palpitations] : no palpitations [Lower Ext Edema] : no lower extremity edema [Wheezing] : no wheezing [Cough] : no cough [SOB on Exertion] : no shortness of breath during exertion [Constipation] : no constipation [Dysuria] : no dysuria [Joint Stiffness] : no joint stiffness [Muscle Pain] : no muscle pain [Dizziness] : no dizziness [Difficulty Walking] : no difficulty walking [Depression] : no depression [Muscle Weakness] : no muscle weakness [Easy Bruising] : no tendency for easy bruising [FreeTextEntry7] : cramps [FreeTextEntry9] : no cramps [de-identified] : No recent HA, no numbness

## 2024-08-20 NOTE — HISTORY OF PRESENT ILLNESS
[FreeTextEntry1] : Mr. Mascorro is a 59-year-old male with prostate cancer, referred for consultation regarding radiation therapy.   He was referred for urologic evaluation due to a rising PSA.  Prostate MRI on 3/23/23 showed a 47 cc prostate volume, central gland hypertrophy, and two lesions. Lesion #1 was in the left anterior fibromuscular stroma, measuring up to 10 mm, PI-RADS 4. Lesion #2 was in the right posterior medial apex peripheral zone measuring up to 9 mm, PI-RADS 4. There was no extracapsular extension. There was no seminal vesicle invasion, and the neurovascular bundles were unremarkable. There was no pelvic adenopathy and no suspicious osseous lesions.  PSA was 3.90 on 3/28/23 with 18% free PSA.   Prostate biopsy on 4/13/24 showed 9 out of 15 cores with adenocarcinoma with [place] score ranging from 3+3=6 to 3+4=7, up to 80% involvement, and perineural invasion.  He underwent radical prostatectomy on 6/5/23. Pathology showed adenocarcinoma with [place] score 3+4=7 with 10% [place] 4 pattern and <5% Tommy 5. Six left pelvic lymph node and five right pelvic lymph nodes were all negative for tumor. Focal extraprostatic extension was present at the right posterolateral/neurovascular bundle. Margins were all negative. There was no seminal vesicle or bladder neck invasion. There was no lymphovascular invasion. Cribriform [place] pattern 4 and intraductal carcinoma of the prostate were present.  Post-operative was 0.04 on 7/17/23 and gradually increased to most recently 0.16 on 7/31/24.   CT CAP on 3/26/24 showed mild thickening of the distal small bowel and prominent ileocolic lymph nodes consistent with Crohn's disease. An indeterminate subcentimeter right renal lesion increased in size compared to a prior study from 2023. MR Abdomen on 4/2/24 showed a 9 mm hemorrhagic cyst in the posterior interpolar right kidney, corresponding to the finding on CT.   PSMA PET on 8/1/24 no evidence of recurrence disease in the prostate bed. Mild radiotracer uptake in the ribs was suggestive of traumatic rib fractures. There was no evidence of metastatic disease.   Prostate MRI on 8/1/24 showed an unremarkable prostate bed. There was no evidence of local recurrence or abnormal lymphadenopathy.  Post-operative, he developed urinary incontinence and erectile dysfunction.  He takes Cialis for ED.  Urinary incontinence is much improved with physical therapy and time. He has mild stress-incontinence and does not require a pad.

## 2024-08-20 NOTE — PROCEDURE
[FreeTextEntry1] : Administration of Intramuscular Lupron Depot 22.5mg on 2024.  # NDC 4233-3403-24.  after 10/10/2026, Lot #:[id number] [FreeTextEntry2] : Prostate Cancer [FreeTextEntry3] : The medication was given by Intramuscular Injection at a 90-degree angle into the gluteal folds area of the left buttocks. The patient tolerated the medication well. No complaints voiced.

## 2024-08-20 NOTE — REVIEW OF SYSTEMS
[Fatigue] : fatigue [Recent Change In Weight] : ~T recent weight change [Diarrhea: Grade 3 - Increase of >=7 stools per day over baseline; incontinence; hospitalization indicated; severe increase in ostomy output compared to baseline; limiting self care ADL] : Diarrhea: Grade 3 - Increase of >=7 stools per day over baseline; incontinence; hospitalization indicated; severe increase in ostomy output compared to baseline; limiting self care ADL [Incontinence] : incontinence [Joint Pain] : joint pain [Skin Rash] : skin rash [Anxiety] : anxiety [Negative] : ENT [Fever] : no fever [Chills] : no chills [Chest Pain] : no chest pain [Palpitations] : no palpitations [Lower Ext Edema] : no lower extremity edema [Wheezing] : no wheezing [Cough] : no cough [SOB on Exertion] : no shortness of breath during exertion [Constipation] : no constipation [Dysuria] : no dysuria [Joint Stiffness] : no joint stiffness [Muscle Pain] : no muscle pain [Dizziness] : no dizziness [Difficulty Walking] : no difficulty walking [Depression] : no depression [Muscle Weakness] : no muscle weakness [Easy Bruising] : no tendency for easy bruising [FreeTextEntry7] : cramps [FreeTextEntry9] : no cramps [de-identified] : No recent HA, no numbness

## 2024-08-20 NOTE — ASSESSMENT
[Palliative Care Plan] : not applicable at this time [FreeTextEntry1] : This 59-year-old man was diagnosed with prostate cancer last year. In April 2023 his PSA was 4.01.  He underwent an MRI and 2 lesions were noted.  A biopsy was performed in 18 out of 25 cores were positive.  This was [place] grade 2.  He underwent a robotic assisted laparoscopic prostatectomy in June 2023, resulting in a pathologic T3a R0 resection.  His postoperative PSA was 0.04 and his PSA has been slowly rising.  A PSMA PET scan showed no evidence of local or distant recurrence.  He also had an MRI of the pelvis as well.  He reports that he feels "great".  He is active and he exercises daily.  He does have some chronic pains in the left ankle after some trauma a few years back.  He has some lower back pains at times which is chronic as well.  He is due to have ablation next week of the nerve root in the right lower back.  He has arthritic complaints in his shoulders and hips.  He has a history of Crohn's disease and he says it is under reasonably good control relative to what it was 10 years ago.  At that time he had several admissions with small bowel obstruction with an NG tube placed on several occasions, and he had a small bowel resection.  He has been on multiple medications.  He does have fecal urgency with occasional fecal incontinence.  The stools are loose and occasionally watery.  He has a normal stool first thing in the morning.  He has up to 10 bowel movements per day on average.  There is occasional bright red blood noted.  He has anal fissures as well.  He is received Botox and other treatments for the fissures.  He does have cramps in the lower abdomen at times.  He is lost some weight recently, which she says is intentional.  There is no cough or shortness of breath.  He denies any chest pain, chest pressure, or palpitations.  He has a history of cluster headaches and is on medication for this.  He says it is well-controlled and he has not had any headaches for some time.  There is no complaint of dizziness.  He does have some fatigue.  He was told that he has anemia and he had received IV iron in the past, but he was told that it is not iron deficiency at this time but as a result of chronic inflammation.  There were no fevers or chills.  Urinary flow is somewhat impaired with dribbling at times.  He does have some incontinence which is improved compared to last year.  He did do pelvic floor therapy.  He has not been using any pads recently.  It may occur if he lifts something heavy.  There is no hematuria or dysuria.  Libido is somewhat reduced.  Erections are "okay" with the aid of Cialis.  A comprehensive history was obtained and a physical examination was performed.  The family history is notable only for prostate cancer in a paternal uncle.  Chart also indicates that his mother and a maternal aunt had breast cancer.  On physical examination, he appears well.  His performance status is 0.  The blood pressure was 131/82 and his weight was 168.5 kg.  The oxygen saturation was 98%.  The physical examination was unremarkable.  There is no spinal column or chest wall tenderness to palpation or percussion.  The abdominal examination is normal.  There is no edema of the extremities.  The results of the PSMA PET scan were personally reviewed by me.  Several reports were reviewed including MRIs, CAT scans, and PET scans.  He has biochemical failure of disease.  He never achieved an undetectable PSA.  Surgery was chosen as a result of his history of Crohn's disease, and it clearly was the right decision.  He has been seen by Whitney Baig in regards to his rising PSA.  She would recommend that he receive radiation therapy only to the prostate bed and not to the lymph node drainage sites within the pelvis given his Crohn's history.  His last colonoscopy was performed in 2023 by Dr. Caicedo.  I called his office to determine whether or not there was active involvement of the rectum at that time, but he has yet to reach back to me.  It is proposed that he undergo 6 months of androgen deprivation therapy and he supposed to receive treatment starting today.  I explained that 6 months of ADT would be the standard treatment.  We discussed prostate cancer in general terms in regards to prevalence and incidence.  We discussed the meaning of the [place] score and the restratification of patients with prostate cancer.  At the time of prostatectomy, he was found to have a minor tertiary pattern 5.  We discussed the optimal treatment of patients with biochemical failure.  It is best to treat prior to the PSA reaching 0.5, and treatment is often delayed until there is maximal recovery of urinary function postoperatively.  We discussed the role of added ADT.  I went over the adverse effects that may occur with ADT in detail.  We discussed bone support in terms of the use of vitamin D and calcium.  He has already been taking vitamin D.  He should have a DEXA scan performed, and the order was placed.  It was performed after the visit.  Multiple questions were asked by him and his wife.  All questions were answered to the best my ability and to their apparent satisfaction.  I agree that he should undergo treatment with 6 months of ADT along with his radiation.  He is at risk for recurrence of disease in the future, and treatment would be different at that time.  He also has an excellent prognosis in terms of survival at this time.

## 2024-08-20 NOTE — HISTORY OF PRESENT ILLNESS
[Disease: _____________________] : Disease: [unfilled] [T: ___] : T[unfilled] [N: ___] : N[unfilled] [M: ___] : M[unfilled] [AJCC Stage: ____] : AJCC Stage: [unfilled] [de-identified] : Alec Mota is seen in consultation on 8/20/24. He is accompanied by his wife, Tiana.  He is referred by Haider Hernandez MD Urologist: Ba Lehman Radiation Oncologist, Whitney Baig MD PCP Randell Barrerau  In April 2023, PSA was 4.01, had an MRI, 2 lesions noted, had a biopsy by Ean Madison. 18/25 cores, GG2. He underwent an RALP by Ba Lehman June 2023. pT3aR0 resection. Post op PSA was o0.04 and PSA has been rising.  PSMA PET show no local or distant recurrence. Had MRI of pelvis as well.  Feels "great", active, exercises daily. Has some chronic pains in left ankle (trauma), lower back pains, chronic as well. Due to have ablation next week of a nerve root. pain in right shoulder and hips, due to degenerative disease. Crohn's disease, state it is under control relative to 10 years ago, when he had several admissions with SBO and NG tubes. Has urgency with occ fecal incontinence. Loose, occ watery. Normal stool in the AM, Has up to 10 BMs per day on average. Occ blood noted, has fissures as well, has had Botox and other treatments. Has cramps in lower abdomen. Multiple treatment for Crohn's, has lost some weight recently, intentional. No cough, No dyspnea. No CP/pressure/palpitations. Has cluster headaches, on meds, well controlled. No dizziness. Fatigue is present. Has anemia as well, has received IV iron. Told it was chronic inflammation, not DANNY. No F/c. Urine flow is sometimes slow with dribbling, has had some incontinence, improved from last year, did pelvic floor therapy. NO pads recently. May occur if he lifts something. NO blood, no dysuria. Libido is reduced, erections are "okay" with Cialis.  [de-identified] : GG2, with intraductal carcinoma [de-identified] : iPSA 3.90 March 2023, post op PSA 0.04 [de-identified] : 18/25 cores positive, April 2023 GG2 RALP pT3a GG@ with intraductal carcinoma

## 2024-08-20 NOTE — HISTORY OF PRESENT ILLNESS
[Disease: _____________________] : Disease: [unfilled] [T: ___] : T[unfilled] [N: ___] : N[unfilled] [M: ___] : M[unfilled] [AJCC Stage: ____] : AJCC Stage: [unfilled] [de-identified] : Alec Mota is seen in consultation on 8/20/24. He is accompanied by his wife, Tiana.  He is referred by Haider Hernandez MD Urologist: Ba Lehman Radiation Oncologist, Whitney Baig MD PCP Randell Barrerau  In April 2023, PSA was 4.01, had an MRI, 2 lesions noted, had a biopsy by Ean Madison. 18/25 cores, GG2. He underwent an RALP by Ba Lehman June 2023. pT3aR0 resection. Post op PSA was o0.04 and PSA has been rising.  PSMA PET show no local or distant recurrence. Had MRI of pelvis as well.  Feels "great", active, exercises daily. Has some chronic pains in left ankle (trauma), lower back pains, chronic as well. Due to have ablation next week of a nerve root. pain in right shoulder and hips, due to degenerative disease. Crohn's disease, state it is under control relative to 10 years ago, when he had several admissions with SBO and NG tubes. Has urgency with occ fecal incontinence. Loose, occ watery. Normal stool in the AM, Has up to 10 BMs per day on average. Occ blood noted, has fissures as well, has had Botox and other treatments. Has cramps in lower abdomen. Multiple treatment for Crohn's, has lost some weight recently, intentional. No cough, No dyspnea. No CP/pressure/palpitations. Has cluster headaches, on meds, well controlled. No dizziness. Fatigue is present. Has anemia as well, has received IV iron. Told it was chronic inflammation, not DANNY. No F/c. Urine flow is sometimes slow with dribbling, has had some incontinence, improved from last year, did pelvic floor therapy. NO pads recently. May occur if he lifts something. NO blood, no dysuria. Libido is reduced, erections are "okay" with Cialis.  [de-identified] : GG2, with intraductal carcinoma [de-identified] : iPSA 3.90 March 2023, post op PSA 0.04 [de-identified] : 18/25 cores positive, April 2023 GG2 RALP pT3a GG@ with intraductal carcinoma

## 2024-08-20 NOTE — LETTER CLOSING
[Consult Closing:] : Thank you for allowing me to participate in the care of this patient.  If you have any questions, please do not hesitate to contact me. [Sincerely yours,] : Sincerely yours, [FreeTextEntry3] : Whitney Baig MD

## 2024-08-20 NOTE — ASSESSMENT
[Palliative Care Plan] : not applicable at this time [FreeTextEntry1] : This 59-year-old man was diagnosed with prostate cancer last year. In April 2023 his PSA was 4.01.  He underwent an MRI and 2 lesions were noted.  A biopsy was performed in 18 out of 25 cores were positive.  This was [place] grade 2.  He underwent a robotic assisted laparoscopic prostatectomy in June 2023, resulting in a pathologic T3a R0 resection.  His postoperative PSA was 0.04 and his PSA has been slowly rising.  A PSMA PET scan showed no evidence of local or distant recurrence.  He also had an MRI of the pelvis as well.  He reports that he feels "great".  He is active and he exercises daily.  He does have some chronic pains in the left ankle after some trauma a few years back.  He has some lower back pains at times which is chronic as well.  He is due to have ablation next week of the nerve root in the right lower back.  He has arthritic complaints in his shoulders and hips.  He has a history of Crohn's disease and he says it is under reasonably good control relative to what it was 10 years ago.  At that time he had several admissions with small bowel obstruction with an NG tube placed on several occasions, and he had a small bowel resection.  He has been on multiple medications.  He does have fecal urgency with occasional fecal incontinence.  The stools are loose and occasionally watery.  He has a normal stool first thing in the morning.  He has up to 10 bowel movements per day on average.  There is occasional bright red blood noted.  He has anal fissures as well.  He is received Botox and other treatments for the fissures.  He does have cramps in the lower abdomen at times.  He is lost some weight recently, which she says is intentional.  There is no cough or shortness of breath.  He denies any chest pain, chest pressure, or palpitations.  He has a history of cluster headaches and is on medication for this.  He says it is well-controlled and he has not had any headaches for some time.  There is no complaint of dizziness.  He does have some fatigue.  He was told that he has anemia and he had received IV iron in the past, but he was told that it is not iron deficiency at this time but as a result of chronic inflammation.  There were no fevers or chills.  Urinary flow is somewhat impaired with dribbling at times.  He does have some incontinence which is improved compared to last year.  He did do pelvic floor therapy.  He has not been using any pads recently.  It may occur if he lifts something heavy.  There is no hematuria or dysuria.  Libido is somewhat reduced.  Erections are "okay" with the aid of Cialis.  A comprehensive history was obtained and a physical examination was performed.  The family history is notable only for prostate cancer in a paternal uncle.  Chart also indicates that his mother and a maternal aunt had breast cancer.  On physical examination, he appears well.  His performance status is 0.  The blood pressure was 131/82 and his weight was 168.5 kg.  The oxygen saturation was 98%.  The physical examination was unremarkable.  There is no spinal column or chest wall tenderness to palpation or percussion.  The abdominal examination is normal.  There is no edema of the extremities.  The results of the PSMA PET scan were personally reviewed by me.  Several reports were reviewed including MRIs, CAT scans, and PET scans.  He has biochemical failure of disease.  He never achieved an undetectable PSA.  Surgery was chosen as a result of his history of Crohn's disease, and it clearly was the right decision.  He has been seen by Whitney Baig in regards to his rising PSA.  She would recommend that he receive radiation therapy only to the prostate bed and not to the lymph node drainage sites within the pelvis given his Crohn's history.  His last colonoscopy was performed in 2023 by Dr. Caicedo.  I called his office to determine whether or not there was active involvement of the rectum at that time, but he has yet to reach back to me.  It is proposed that he undergo 6 months of androgen deprivation therapy and he supposed to receive treatment starting today.  I explained that 6 months of ADT would be the standard treatment.  We discussed prostate cancer in general terms in regards to prevalence and incidence.  We discussed the meaning of the [place] score and the restratification of patients with prostate cancer.  At the time of prostatectomy, he was found to have a minor tertiary pattern 5.  We discussed the optimal treatment of patients with biochemical failure.  It is best to treat prior to the PSA reaching 0.5, and treatment is often delayed until there is maximal recovery of urinary function postoperatively.  We discussed the role of added ADT.  I went over the adverse effects that may occur with ADT in detail.  We discussed bone support in terms of the use of vitamin D and calcium.  He has already been taking vitamin D.  He should have a DEXA scan performed, and the order was placed.  It was performed after the visit.  Multiple questions were asked by him and his wife.  All questions were answered to the best my ability and to their apparent satisfaction.  I agree that he should undergo treatment with 6 months of ADT along with his radiation.  He is at risk for recurrence of disease in the future, and treatment would be different at that time.  He also has an excellent prognosis in terms of survival at this time.

## 2024-08-20 NOTE — HISTORY OF PRESENT ILLNESS
[FreeTextEntry1] : Mr. Mascorro is a 59-year-old male with prostate cancer, referred for consultation regarding radiation therapy.   He was referred for urologic evaluation due to a rising PSA.  Prostate MRI on 3/23/23 showed a 47 cc prostate volume, central gland hypertrophy, and two lesions. Lesion #1 was in the left anterior fibromuscular stroma, measuring up to 10 mm, PI-RADS 4. Lesion #2 was in the right posterior medial apex peripheral zone measuring up to 9 mm, PI-RADS 4. There was no extracapsular extension. There was no seminal vesicle invasion, and the neurovascular bundles were unremarkable. There was no pelvic adenopathy and no suspicious osseous lesions.  PSA was 3.90 on 3/28/23 with 18% free PSA.   Prostate biopsy on 4/13/24 showed 9 out of 15 cores with adenocarcinoma with [place] score ranging from 3+3=6 to 3+4=7, up to 80% involvement, and perineural invasion.  He underwent radical prostatectomy on 6/5/23. Pathology showed adenocarcinoma with [place] score 3+4=7 with 10% [place] 4 pattern and <5% Tommy 5. Six left pelvic lymph node and five right pelvic lymph nodes were all negative for tumor. Focal extraprostatic extension was present at the right posterolateral/neurovascular bundle. Margins were all negative. There was no seminal vesicle or bladder neck invasion. There was no lymphovascular invasion. Cribriform [place] pattern 4 and intraductal carcinoma of the prostate were present.  Post-operative was 0.04 on 7/17/23 and gradually increased to most recently 0.16 on 7/31/24.   CT CAP on 3/26/24 showed mild thickening of the distal small bowel and prominent ileocolic lymph nodes consistent with Crohn's disease. An indeterminate subcentimeter right renal lesion increased in size compared to a prior study from 2023. MR Abdomen on 4/2/24 showed a 9 mm hemorrhagic cyst in the posterior interpolar right kidney, corresponding to the finding on CT.   PSMA PET on 8/1/24 no evidence of recurrence disease in the prostate bed. Mild radiotracer uptake in the ribs was suggestive of traumatic rib fractures. There was no evidence of metastatic disease.   Prostate MRI on 8/1/24 showed an unremarkable prostate bed. There was no evidence of local recurrence or abnormal lymphadenopathy.  Post-operative, he developed urinary incontinence and erectile dysfunction.  He takes Cialis for ED.  Urinary incontinence is much improved with physical therapy and time. He has mild stress-incontinence and does not require a pad.

## 2024-08-20 NOTE — REASON FOR VISIT
[Consideration of Curative Therapy] : consideration of curative therapy for prostate cancer [Spouse] : spouse [Family Member] : family member

## 2024-08-20 NOTE — PHYSICAL EXAM
[Fully active, able to carry on all pre-disease performance without restriction] : Status 0 - Fully active, able to carry on all pre-disease performance without restriction [Normal] : affect appropriate [de-identified] : no edema no edema noted [de-identified] : circumcised, glans normal, testes normal

## 2024-08-20 NOTE — REVIEW OF SYSTEMS
[Diarrhea] : diarrhea [IPSS Score (0-40): ___] : IPSS score: [unfilled] [EPIC-CP Score (0-60): ___] : EPIC-CP score: [unfilled] [Joint Pain] : joint pain [Negative] : Allergic/Immunologic [Shortness Of Breath] : no shortness of breath [FreeTextEntry6] : asthma [FreeTextEntry7] : chron's disease, intermittent diarrhea, gerd

## 2024-08-20 NOTE — HISTORY OF PRESENT ILLNESS
[FreeTextEntry1] : Mr. Mascorro is a 59-year-old male with prostate cancer, referred for consultation regarding radiation therapy.   He was referred for urologic evaluation due to a rising PSA.  Prostate MRI on 3/23/23 showed a 47 cc prostate volume, central gland hypertrophy, and two lesions. Lesion #1 was in the left anterior fibromuscular stroma, measuring up to 10 mm, PI-RADS 4. Lesion #2 was in the right posterior medial apex peripheral zone measuring up to 9 mm, PI-RADS 4. There was no extracapsular extension. There was no seminal vesicle invasion, and the neurovascular bundles were unremarkable. There was no pelvic adenopathy and no suspicious osseous lesions.  PSA was 3.90 on 3/28/23 with 18% free PSA.   Prostate biopsy on 4/13/24 showed 9 out of 15 cores with adenocarcinoma with [place] score ranging from 3+3=6 to 3+4=7, up to 80% involvement, and perineural invasion.  He underwent radical prostatectomy on 6/5/23. Pathology showed adenocarcinoma with [place] score 3+4=7 with 10% [place] 4 pattern and <5% Tommy 5. Six left pelvic lymph node and five right pelvic lymph nodes were all negative for tumor. Focal extraprostatic extension was present at the right posterolateral/neurovascular bundle. Margins were all negative. There was no seminal vesicle or bladder neck invasion. There was no lymphovascular invasion. Cribriform [place] pattern 4 and intraductal carcinoma of the prostate were present.  Post-operative was 0.04 on 7/17/23 and gradually increased to most recently 0.16 on 7/31/24.   CT CAP on 3/26/24 showed mild thickening of the distal small bowel and prominent ileocolic lymph nodes consistent with Crohn's disease. An indeterminate subcentimeter right renal lesion increased in size compared to a prior study from 2023. MR Abdomen on 4/2/24 showed a 9 mm hemorrhagic cyst in the posterior interpolar right kidney, corresponding to the finding on CT.   PSMA PET on 8/1/24 no evidence of recurrence disease in the prostate bed. Mild radiotracer uptake in the ribs was suggestive of traumatic rib fractures. There was no evidence of metastatic disease.   Prostate MRI on 8/1/24 showed an unremarkable prostate bed. There was no evidence of local recurrence or abnormal lymphadenopathy.  Post-operative, he developed urinary incontinence and erectile dysfunction.  He takes Cialis for ED.  Urinary incontinence is much improved with physical therapy and time. He has mild stress-incontinence and does not require a pad.

## 2024-08-20 NOTE — PHYSICAL EXAM
[Fully active, able to carry on all pre-disease performance without restriction] : Status 0 - Fully active, able to carry on all pre-disease performance without restriction [Normal] : affect appropriate [de-identified] : no edema no edema noted [de-identified] : circumcised, glans normal, testes normal

## 2024-08-20 NOTE — RESULTS/DATA
[FreeTextEntry1] : [facility] Accession Number : [id number] Patient:   PRITI KEMP Accession:                             [id number] Collected Date/Time:                   6/5/2023 13:15 EDT Received Date/Time:                    6/5/2023 19:51 EDT  Surgical Pathology Report - Auth (Verified)  Specimen(s) Submitted 1- Left pelvic lymph node 2- Right pelvic lymph node 3- Prostate and seminal vesicles  Final Diagnosis 1.  Lymph nodes, left pelvic, dissection -Six lymph nodes are negative for malignancy (0/6).  2. Lymph nodes, right pelvic, dissection -Five lymph nodes are negative for malignancy (0/5).  3. Prostate and seminal vesicles, radical prostatectomy -Adenocarcinoma of the prostate, Prognostic Grade Group 2 (Tommy score 3+4=7) with minor tertiary pattern 5. Tommy pattern 4 comprises 10% of tumor. Tommy pattern 5 comprises less than 5% of tumor. -See synoptic summary. Verified by: Trinidad Padilla M.D., Chief of Genitourinary Pathology (Electronic Signature) Reported on: 06/07/23 18:15 EDT, [facility], [address] [place] [id number] _________________________________________________________________ Synoptic Summary 3: Prostate - Radical Prostatectomy Specimen Procedure:  Radical prostatectomy Prostate Size Prostate Weight (Grams):   55 g Tumor Histologic Type:   Acinar adenocarcinoma Histologic Grade Grade:  Grade group 2 ([place] Score 3 + 4 = 7) Minor Tertiary Pattern 5 (less than 5%):    Present Percentage of Pattern 4:   6 - 10% Intraductal Carcinoma (IDC):   Present IDC Incorporated into Grade:   No Cribriform Glands:   Present Treatment Effect:   No known presurgical therapy Tumor Quantitation Estimated Percentage of Prostate Involved by Tumor:    31 - 40% Location of Dominant Nodule:   Left, Anterior, Posterior, Posterolateral, [place], Mid; Right, Anterior, Lateral, Posterolateral, Posterior, [place], Mid Extraprostatic Extension (EPE):   Present, focal Location of Extraprostatic Extension:    Right posterolateral (neurovascular bundle) Urinary Bladder Neck Invasion:   Not identified Seminal Vesicle Invasion:   Not identified Lymphovascular Invasion:   Not Identified Margins Margin Status:   All margins negative for invasive carcinoma Regional Lymph Nodes Regional Lymph Node Status:   All regional lymph nodes negative for tumor Number of Lymph Nodes Examined:   11 Pathologic Stage Classification (pTNM, AJCC 8th Edition) Primary Tumor (pT):   pT3a pN Category:   pN0 Additional Findings Additional Findings:   Nodular prostatic hyperplasia ************************************************************** Yanet Accession Number : [id number] Patient:   PRITI THOMAS Accession:                             [id number] Collected Date/Time:                   4/13/2023 13:06 EDT Received Date/Time:                    4/14/2023 03:38 EDT  Surgical Pathology Report - Auth (Verified) Specimen(s) Submitted 1  Left anterior apex 2  Left anterior base 3  Right anterior apex 4  Right anterior base 5  Midline apex 6  Midline base 7  Left posterior apex 8  Left posterior base 9  Right posterior apex 10  Right posterior base 11  Left lateral 12  Right lateral 13  Right base TZP (target) 14  Right apex PZPM (target) 15  Left base AFM (target)  Final Diagnosis 1. Prostate, left anterior apex, biopsy -Benign prostate tissue  2. Prostate, left anterior base, biopsy -Benign prostate tissue  3. Prostate, right anterior apex, biopsy -Benign prostate tissue  4. Prostate, right anterior base, biopsy -Benign prostate tissue  5. Prostate, midline apex, biopsy - Adenocarcinoma of the prostate, Prognostic Grade Group 1 ([place] score 3+3=6) involving less than 5% (<0.5 mm in length) of 1 of 1 core.  6. Prostate, midline base, biopsy - Adenocarcinoma of the prostate, Prognostic Grade Group 1 ([place] score 3+3=6) involving 5% (0.5 mm in length) of 1 of 1 core.  7. Prostate, left posterior apex, biopsy -Benign prostate tissue  8. Prostate, left posterior base, biopsy - Adenocarcinoma of the prostate, Prognostic Grade Group 1 ([place] score 3+3=6) involving less than 5% and 5% (0.3 mm and 0.5 mm in length) of 2 of 2 cores.  9. Prostate, right posterior apex, biopsy - Adenocarcinoma of the prostate, Prognostic Grade Group 1 (Tommy score 3+3=6) involving 20% (2 mm in length) of 1 of 1 core.  10. Prostate, right posterior base, biopsy -Adenocarcinoma of the prostate, Prognostic Grade Group 2 ([place] score 3+4=7) involving 15% and 50% (2 mm and 5 mm in length) of 2 of 2 cores. [place] pattern 4 comprises 10% of tumor. -Perineural invasion is identified.  11. Prostate, left lateral, biopsy -Benign prostate tissue  12. Prostate, right lateral, biopsy -Adenocarcinoma of the prostate, Prognostic Grade Group 2 (Tommy score 3+4=7) involving 5% (0.5 mm in length) of 1 of 1 core. Tommy pattern 4 cannot be evaluated in the small tumor focus.   13. Prostate, right base TZP (target), biopsy - Adenocarcinoma of the prostate, Prognostic Grade Group 2 ([place] score 3+4=7) involving less than 5%, 20% and 70% (0.5 mm, 2.5 mm and 5 mm in length) of 3 of 4 cores. [place] pattern 4 comprises 10% of tumor. -Perineural invasion is identified.  14. Prostate, right apex PZPM(target), biopsy -Adenocarcinoma of the prostate, Prognostic Grade Group 2 (Tommy score 3+4=7) involving 80%, 80% and 70% (5 mm, 8 mm and 9 mm in length) of 3 of 3 cores. [place] pattern 4 comprises 10% of tumor.  15. Prostate, left base AFM(target), biopsy - Adenocarcinoma of the prostate, Prognostic Grade Group 1 (Tommy score 3+3=6) involving 20%, 40%, 50% and 80% (2.5 mm, 5 mm, 4.5 mm and 7.5 mm in length) of 4 of 4 cores, see note  Note:  The diagnosis of carcinoma is supported by the failure of immunohistochemical staining for high molecular weight cytokeratin and p63 to demonstrate basal cells in the atypical glands, and the immunohistochemical stain for alpha-methylacyl-CoA racemase (P504S), a marker preferentially expressed in prostate cancer, is positive.  Verified by: Gypsy Ramos MD (Electronic Signature) Reported on: 04/25/23 ********************************************************** EXAM: [id number] - MR CAD PROSTATE  - ORDERED BY: SANDRA LUU EXAM: [id number] - MR PROSTATE WAW IC  - ORDERED BY: SANDRA LUU PROCEDURE DATE:  03/23/2023 INTERPRETATION:  CLINICAL INFORMATION: Rising PSA. 2.1 on 10/8/2014 and 4.11 on 3/15/2013. PSA: 4.0 ng/mL Prior prostate biopsy: No prior biopsy. COMPARISON: None. CONTRAST/COMPLICATIONS: IV Contrast: Gadavist  8 cc administered   2 cc discarded Oral Contrast: NONE Complications: None reported at time of study completion  PROCEDURE: 3.0T multiparametric MRI of the pelvis was performed as per Prostate Cancer Protocol including small field-of-view, thin section T2-weighted imaging, diffusion-weighted imaging (including ultra high b-values), and dynamic contrast-enhanced imaging. There was a contraindication to Levsin.  Computer aided detection/diagnosis was performed using kinetic enhancement analysis on a dedicated [employer] workstation performed by the interpreting radiologist. FINDINGS: Size: 5.1 x 3.9 x 4.5 [transverse x AP x CC] cm. Volume: 47 cc . PSA density: 0.09 ng/mL/mL PSA density >0.15 ng/mL/mL: No Hemorrhage: None.  Central gland: Enlarged prostate gland with central prostatic hypertrophy. Peripheral zone: There is bilateral mild low T2 signal within the peripheral zone which shows mild postcontrast enhancement. This likely represents inflammation. Lesion as described below.  MRI Targets: LESION: #1 Location: Left, anterior fibromuscular stroma, , zone. Slice#: Series 8, Image [id number]. Size (transverse, AP, CC): 9 x 7 x 10 mm. T2-WI: 4 - Circumscribed, homogenous moderate hypointense focus/mass confined to the prostate; less than 1.5 cm in greatest dimension. DWI: 4 - Focal markedly hypointense on ADC and markedly hyperintense on high b-value DWI; less than 1.5cm in greatest dimension. DCE: Negative - no early enhancement. Extra-prostatic extension: None. Prior Comparison: None. PI-RADS Assessment Category: 4, High  LESION: #2 Location: Right, posterior medial (PZpm), apex, peripheral zone. Slice#: Series 8, Image 25. Size (transverse, AP, CC): 9 x 7 x 9 mm. T2-WI: 4 - Circumscribed, homogenous moderate hypointense focus/mass confined to the prostate; less than 1.5 cm in greatest dimension. DWI: 4 - Focal markedly hypointense on ADC and markedly hyperintense on high b-value DWI; less than 1.5cm in greatest dimension. DCE: Positive - focal, and; earlier than or contemporaneous with enhancement of adjacent normal prostatic tissues, and; corresponds to suspicious finding on T2W and/or DWI. Extra-prostatic extension: None. Prior Comparison: None. PI-RADS Assessment Category: 4, High   Neurovascular bundle: No evidence of neurovascular bundle invasion. Seminal vesicles: No seminal vesicle invasion. Lymph nodes: No pelvic adenopathy. Bones: No suspicious lesions identified. Urinary bladder: Within normal limits. Other:  IMPRESSION: There are 2 lesions one in the right apex peripheral zone and another in the left anterior fibromuscular stroma as described above. Both are PIRADS 4. There is bilateral diffuse mild low signal with enhancement in the peripheral zone suggestive of inflammation. PI-RADS 4 Prostate Volume: 47 mL PSA density: 0.09 ng/mL/mL  --- End of Report --- AGUSTÍN SMITH MD; Attending Radiologist This document has been electronically signed. Mar 23 2023 ************************************************************** EXAM: [id number] - MR CAD PROSTATE  - ORDERED BY: CHUY WHALEY EXAM: [id number] - MR PROSTATE WAW IC  - ORDERED BY: CHUY WHALEY PROCEDURE DATE:  08/01/2024 INTERPRETATION:  CLINICAL INFORMATION: Rising PSA status post prostatectomy PSA: 0.18 ng/mL, previously 0.09 ng/mL Prior prostate biopsy: [place] score 7 (3+4)  COMPARISON: PDS and a scan 8/1/2024. CT chest abdomen and pelvis 3/26/2024. MR pelvis 3/23/2023.  CONTRAST/COMPLICATIONS: IV Contrast: Gadavist  7 cc administered   0.5 cc discarded Oral Contrast: NONE Complications: None reported at time of study completion  PROCEDURE: 3.0T multiparametric MRI of the pelvis was performed as per Prostate Cancer Protocol including small field-of-view, thin section T2-weighted imaging, diffusion-weighted imaging (including ultra high b-values), and dynamic contrast-enhanced imaging. 0.25 mg of sublingual Levsin was administered.  Computer aided detection/diagnosis was performed using kinetic enhancement analysis on a dedicated [employer] workstation performed by the interpreting radiologist. FINDINGS: Patient is status post prostatectomy. The prostatectomy bed is unremarkable. There are no fluid collections. There is no abnormal tissue seen.  Lymph nodes: No pelvic adenopathy. Bones: No suspicious lesions identified. Urinary bladder: Within normal limits. Other: Mild sigmoid diverticulosis.  IMPRESSION: Prostatectomy. Unremarkable postsurgical bed. No evidence for local recurrence. No abnormal lymphadenopathy. --- End of Report --- AGUSTÍN SMITH MD; Attending Radiologist This document has been electronically signed. Aug  2 2024 ***************************************************************** EXAM: [id number] - MR ABDOMEN WAW IC  - ORDERED BY: SANDRA LUU PROCEDURE DATE:  04/02/2024 INTERPRETATION:  CLINICAL INFORMATION: Renal lesion. COMPARISON: 3/26/2024, 5/19/2023 CONTRAST/COMPLICATIONS: IV Contrast: Gadavist  7.5 cc administered   0 cc discarded Oral Contrast: NONE Complications: None reported at time of study completion  PROCEDURE: MRI of the abdomen was performed.  FINDINGS: LOWER CHEST: Within normal limits.  LIVER: Subcentimeter segment 7 hemangioma without change. BILE DUCTS: Normal caliber. GALLBLADDER: Within normal limits. SPLEEN: Within normal limits. PANCREAS: Within normal limits. ADRENALS: Within normal limits. KIDNEYS/URETERS: A 9 mm hemorrhagic cyst in the posterior interpolar right kidney corresponds with an indeterminate lesion at recent CT. An additional subcentimeter right renal cyst. No suspicious renal mass.  VISUALIZED PORTIONS: BOWEL: No bowel obstruction.  Previously noted small bowel inflammation not appreciated. PERITONEUM: No ascites. VESSELS: Within normal limits. RETROPERITONEUM/LYMPH NODES: No lymphadenopathy. ABDOMINAL WALL: Within normal limits. BONES: Within normal limits.  IMPRESSION: A 9 mm hemorrhagic right renal cyst corresponding with an indeterminate lesion on recent CT. No suspicious renal mass. --- End of Report --- ROBINA DIAZ MD; Attending Radiologist This document has been electronically signed. Apr 2 2024 ***************************************************************************** EXAM: [id number] - MR PELVIS BONY ONLY  - ORDERED BY: SANDRA LUU EXAM: [id number] - MR SPINE LUMBAR  - ORDERED BY: SANDRA LUU PROCEDURE DATE:  03/12/2024 INTERPRETATION:  CLINICAL INFORMATION: Back pain  ADDITIONAL CLINICAL INFORMATION: Not Applicable TECHNIQUE: Multiplanar, multisequence MRI was performed of the lumbar spine and pelvis. IV Contrast: PRIOR STUDIES: No priors available for comparison. FINDINGS:  LOCALIZER: No additional findings. BONES: There is no fracture or bone marrow edema. ALIGNMENT: The alignment is normal. SACROILIAC JOINTS/SACRUM: There is no sacral fracture. The SI joints are partially visualized but are intact. CONUS AND CAUDA EQUINA: The distal cord and conus are normal in signal. Conus terminates at L1. VISUALIZED INTRAPELVIC/INTRA-ABDOMINAL SOFT TISSUES: Normal. PARASPINAL SOFT TISSUES: Normal. INDIVIDUAL LEVELS:  LOWER THORACIC SPINE: No spinal canal or neuroforaminal stenosis.  L1-L2: No spinal canal or neuroforaminal stenosis. L2-L3: No spinal canal or neuroforaminal stenosis. L3-L4: Small left intraforaminal protrusion in conjunction with mild bilateral facet arthropathy results in mild left neural foraminal narrowing but no significant right neural foraminal narrowing or central canal narrowing. L4-L5: Moderate bilateral facet arthropathy and minimal posterior disc bulge result in mild to moderate left neural foraminal narrowing, mild right neural foraminal narrowing but no significant central canal narrowing. L5-S1: Moderate bilateral facet arthropathy. No spinal canal or neuroforaminal stenosis.  PELVIS:  There is no evidence of full-thickness cartilage loss.  There is no joint effusion. There is mild-to-moderate bilateral gluteus medius/minimus insertional tendinosis with superimposed low-grade partial tearing of the gluteus minimus insertions. The hamstring origins are maintained.  There is no fracture or osteonecrosis.  There is no evidence of stress reaction.  The sacroiliac joints and pubic symphysis are preserved.  IMPRESSION:  Multilevel discogenic degenerative disease and facet arthropathy of the lumbar spine, most pronounced at L4-5 where there is mild to moderate left neural foraminal narrowing and mild right neural foraminal narrowing as well as at L3-L4 where there is mild left neural foraminal narrowing. No additional areas of significant central canal or neural foraminal narrowing within the lumbar spine.  Mild-to-moderate bilateral gluteus medius/minimus insertional tendinosis with superimposed low-grade partial tearing of the gluteus minimus insertions. --- End of Report --- YEN BONE MBA-TONY EDWARDS; Attending Radiologist This document has been electronically signed. Mar 12 2024 ********************************************************************** EXAM: [id number] - CT ABDOMEN AND PELVIS OC IC  - ORDERED BY: SANDRA LUU EXAM: [id number] - CT CHEST IC  - ORDERED BY: SANDRA LUU PROCEDURE DATE:  03/26/2024 INTERPRETATION:  CLINICAL INFORMATION: Weight loss. History of prostate cancer and Crohn's disease. COMPARISON: Abdominal CT dated 05/19/2023  CONTRAST/COMPLICATIONS: IV Contrast: Omnipaque 350  90 cc administered   10 cc discarded Oral Contrast: Water Complications: None reported at time of study completion  PROCEDURE: CT of the Chest, Abdomen and Pelvis was performed. Sagittal and coronal reformats were performed.  FINDINGS: CHEST: LUNGS AND LARGE AIRWAYS: Patent central airways. No pulmonary nodules. PLEURA: No pleural effusion. VESSELS: Atherosclerotic changes of the aorta and coronary arteries. HEART: Heart size is normal. No pericardial effusion. MEDIASTINUM AND MELISSA: No lymphadenopathy. CHEST WALL AND LOWER NECK: Within normal limits.  ABDOMEN AND PELVIS: LIVER: Within normal limits. BILE DUCTS: Normal caliber. GALLBLADDER: Within normal limits. SPLEEN: Within normal limits. PANCREAS: Within normal limits. ADRENALS: Within normal limits. KIDNEYS/URETERS: The kidneys are normal in size without hydronephrosis or stones. There is 8 mm hyperdense lesion in the midpole of the right kidney laterally which had increasing size as compared with the prior study previously measuring 4 mm. (Series 3 image 94). May represent hyperdense cyst or solid lesion.  BLADDER: Within normal limits. REPRODUCTIVE ORGANS: Prostatectomy. The prostatectomy bed is unremarkable.  BOWEL: No bowel obstruction. Appendix not visualized. Status post small bowel surgery. There is persistent mild focal thickening in the distal small bowel, distal to the area of resection. Evaluation for acute inflammation is limited since arterial phase images were not obtained. PERITONEUM: No ascites. VESSELS: Atherosclerotic changes. RETROPERITONEUM/LYMPH NODES: Again noted are prominent right ileocolic mesenteric lymph nodes measuring up to 1.5 x 1.0 cm, not significantly changed and consistent with reactive lymph nodes. ABDOMINAL WALL: Within normal limits. BONES: Within normal limits.  IMPRESSION: No evidence of pancreatic mass or metastatic adenopathy.  Persistent mild thickening of the distal small bowel with associated prominent ileocolic lymph nodes consistent with patient's known Crohn's disease.  Subcentimeter indeterminate right renal lesion which had increased in size as compared with the prior study. Consider further characterization with contrast enhanced renal MRI. --- End of Report --- SANDRA GATICA MD; Attending Radiologist This document has been electronically signed. Mar 26 2024 ************************************************************ EXAM: [id number] - XR BONE DENSITY AXIAL  - ORDERED BY: SANDRA LUU PROCEDURE DATE:  04/14/2023 INTERPRETATION:  CLINICAL INDICATION: Bone pain.. Thank you for referring your patient for bone densitometry and body composition evaluation on the [employer] machine. The results are expressed as the Z-score, or the standard deviation from age-matched values. A Z-score of -2.0 or lower is defined as below the expected range for age. A Z-score above -2.0 is within the expected range for age.  TECHNIQUE: DEXA total body less head, DEXA lumbar spine, and body composition evaluation. COMPARISON: None. IMPRESSION: Spine: 0.5, within the expected range for age.  Total body: BMD 1.289 g/cm2; Z score 1.0, Within the expected range.  Total body Fat %: Total Body Fat 26.7 %.  Please see the complete report in PACS. --- End of Report --- SANDAR GATICA MD; Attending Radiologist This document has been electronically signed. Apr 18 2023 ***************************************************************** EXAM: [id number] - PETCT WB PSMA SUBS  - ORDERED BY: CHUY WHALEY PROCEDURE DATE:  08/01/2024 INTERPRETATION:  CLINICAL INFORMATION: [place] 7 (3+4) prostate cancer with rising PSA status post radical prostatectomy 6/5/2023. PSA 0.16 ng/mL on 7/31/2024, increased from 0.09 ng/mL on 4/26/2024 and post prostatectomy eron of 0.04 ng/mL on 7/17/2023.  TREATMENT STRATEGY EVALUATION: Subsequent RADIOPHARMACEUTICAL: 9.8 mCi F-18 Flotufolastat (Posluma), I.V. I.V. SITE: antecubital right UPTAKE PERIOD: 64 min SCANNER: [employer] ORAL CONTRAST: NONE  TECHNIQUE:  Following intravenous injection of radiopharmaceutical and above uptake period, PET/CT was obtained from vertex of skull to below the knees. CT was performed during shallow respiration. CT protocol was optimized for PET attenuation correction and anatomic localization and was not designed to produce and cannot replace state-of-the-art diagnostic CT images with specific imaging protocols for different body parts and indications. Images were reconstructed and reviewed in axial, coronal, and sagittal views and three-dimensional MIP.  The standardized uptake values (SUV) are normalized to patient body weight and indicate the highest activity concentration (SUVmax) in a given site. All image numbers refer to axial image number.  PET findings are scored using the Molecular Imaging PSMA Expression Score (Score): Score 0: Uptake < blood pool - No PSMA expression Score 1: Uptake > blood pool AND < liver - Low PSMA expression Score 2: Uptake > liver AND < parotid gland - Intermediate expression Score 3: Uptake > parotid gland - High expression (Calixto et. al. Prostate Cancer Molecular Imaging Standardized Evaluation (PROMISE): Proposed miTNM Classification for the Interpretation of PSMA-Ligand PET/CT. J Nucl Med 2018; 59:469-478). COMPARISON:  PA SMA PET/CT dated 5/9/2023 OTHER STUDIES USED FOR CORRELATION: MRI of abdomen dated 4/2/2024, CT soft tissue neck, chest, abdomen, and pelvis dated 3/26/2024, CT abdomen and pelvis dated 5/19/2023 FINDINGS: HEAD/NECK: Physiologic radiotracer activity in lacrimal and salivary glands. For reference, right parotid gland SUV is 11.5; previously 11.1.  THORAX: Physiologic radiotracer activity. No enlarged or avid lymph node.  LUNGS: No abnormal radiotracer activity. No lung nodule.  PLEURA/PERICARDIUM: No abnormal radiotracer activity. No pleural or pericardial effusion.  HEPATOBILIARY/PANCREAS: Physiologic radiotracer activity. For reference, normal liver demonstrates SUV mean 4.4; previously 5.5.  SPLEEN: Physiologic radiotracer activity. Normal size.  ADRENAL GLANDS: No abnormal radiotracer activity. No nodule.  KIDNEYS/URINARY BLADDER: Physiologic excreted radiotracer activity. A 9 mm hyperdense lesion in posterior interpolar right kidney (image 214), characterized as a hemorrhagic cyst on interval MRI, measures 0.6 cm on prior PET/CT.  REPRODUCTIVE ORGANS: No abnormal radiotracer activity. Status post interval prostatectomy. No increased radiotracer activity in prostate bed.  ABDOMINOPELVIC LYMPH NODES/RETROPERITONEUM: No enlarged or avid lymph node.  BOWEL/PERITONEUM/MESENTERY: Physiologic radiotracer activity. Status post small bowel surgery.  VESSELS: Coronary artery calcifications and/or stents. Atherosclerotic changes.  BONES/SOFT TISSUES: There are foci of mild radiotracer activity in the anterior aspect of the right 3rd rib (SUV 1.8; image 132; previous SUV 1.1), anterior aspect of right 5th rib (SUV 3.0; image 160; previous SUV 1.5), and anterior aspect of left 4th rib (SUV 1.1; image 146; previous SUV 0.8). These foci are better seen than on the prior study, likely related to differences in technique/scanner resolution. The right fifth focus and left fourth focus demonstrate unchanged corresponding linear sclerosis, suggestive of fractures.  IMPRESSION: Compared to PSMA-PET/CT scan dated  5/9/2023:  1. No evidence of recurrent disease in prostate bed status post interval prostatectomy.  2. Findings suggestive of traumatic rib fractures, anterior aspect right 3rd and 5th ribs and left 4th rib, not significantly changed.  3. No evidence of metastatic disease.  --- End of Report --- LEIGHA PUENTES MD; Attending Nuclear Medicine This document has been electronically signed. Aug  1 2024  ******************************************************************* EXAM: [id number] - PETCT WB PSMA INIT  - ORDERED BY: CHUY WHALEY PROCEDURE DATE:  05/09/2023 INTERPRETATION:  CLINICAL INFORMATION: 57-year-old male with recently diagnosed [place] 7 prostate cancer, PSA 3.9 ng/mL on 3/28/2023. TREATMENT STRATEGY EVALUATION: Initial RADIOPHARMACEUTICAL:  9.14 mCi F-18, piflufolastat (Pylarify), I.V. UPTAKE PERIOD: 60 minutes SCANNER: [id number]  TECHNIQUE:  Following intravenous injection of radiopharmaceutical and above uptake period, PET/CT was obtained from vertex of skull to below the knees. CT was performed during shallow respiration. CT protocol was optimized for PET attenuation correction and anatomic localization and was not designed to produce and cannot replace state-of-the-art diagnostic CT images with specific imaging protocols for different body parts and indications. Images were reconstructed and reviewed in axial, coronal, and sagittal views and three-dimensional MIP. The standardized uptake values (SUV) are normalized to patient body weight and indicate the highest activity concentration (SUVmax) in a given site. All image numbers refer to axial image number. PET findings are scored using the Molecular Imaging PSMA Expression Score (Score): Score 0: Uptake < blood pool - No PSMA expression Score 1: Uptake > blood pool AND < liver - Low PSMA expression Score 2: Uptake > liver AND < parotid gland - Intermediate expression Score 3: Uptake > parotid gland - High expression (Eiber, et. al. Prostate Cancer Molecular Imaging Standardized Evaluation (PROMISE): Proposed miTNM Classification for the Interpretation of PSMA-Ligand PET/CT. J Nucl Med 2018; 59:469-478). COMPARISON:  No prior PSMA-PET/CT OTHER STUDIES USED FOR CORRELATION: Prostate MRI dated 3/23/2023, CT enterography dated 5/3/2023, and CT chest, abdomen, and pelvis dated 6/11/2020 FINDINGS:  HEAD/NECK: Physiologic radiotracer activity in lacrimal and salivary glands.  THORAX: Physiologic radiotracer activity. No enlarged or avid lymph node. Coronary artery calcifications and/or stents.  LUNGS: No abnormal radiotracer activity. No lung nodule.  PLEURA/PERICARDIUM: No abnormal radiotracer activity. No pleural or pericardial effusion.  HEPATOBILIARY/PANCREAS: Physiologic radiotracer activity. For reference, normal liver demonstrates SUV mean 5.5.  SPLEEN: Physiologic radiotracer activity. Normal size.  ADRENAL GLANDS: No abnormal radiotracer activity. No nodule.  KIDNEYS/URINARY BLADDER: Physiologic excreted radiotracer activity.  REPRODUCTIVE ORGANS: Prostate gland is enlarged, measuring approximately 5.5 cm in maximum transverse diameter. There are foci of mild radiotracer activity in the bilateral posterior base of the prostate gland, most intense in the right base SUV 2.4; score 1; image 264), corresponding to known prostate cancer.  ABDOMINOPELVIC LYMPH NODES/RETROPERITONEUM: No enlarged or avid lymph node.  BOWEL/PERITONEUM/MESENTERY: Physiologic radiotracer activity. Small bowel anastomosis in the distal ileum, unchanged.  BONES/SOFT TISSUES: No abnormal radiotracer activity. No lytic or sclerotic lesion.  IMPRESSION: Abnormal whole-body PSMA-PET/CT scan.  1. Foci of mild radiotracer activity in bilateral prostate gland, most intense in posterior aspect of right base. Findings are compatible with known malignancy. The intensity of radiotracer activity suggests low PSMA expression.  2. No evidence of metastatic disease. --- End of Report --- LEIGHA PUENTES MD; Attending Nuclear Medicine This document has been electronically signed. May  9 2023 ****************************************************************

## 2024-08-20 NOTE — RESULTS/DATA
[FreeTextEntry1] : [facility] Accession Number : [id number] Patient:   PRITI KEMP Accession:                             [id number] Collected Date/Time:                   6/5/2023 13:15 EDT Received Date/Time:                    6/5/2023 19:51 EDT  Surgical Pathology Report - Auth (Verified)  Specimen(s) Submitted 1- Left pelvic lymph node 2- Right pelvic lymph node 3- Prostate and seminal vesicles  Final Diagnosis 1.  Lymph nodes, left pelvic, dissection -Six lymph nodes are negative for malignancy (0/6).  2. Lymph nodes, right pelvic, dissection -Five lymph nodes are negative for malignancy (0/5).  3. Prostate and seminal vesicles, radical prostatectomy -Adenocarcinoma of the prostate, Prognostic Grade Group 2 (Tommy score 3+4=7) with minor tertiary pattern 5. Tommy pattern 4 comprises 10% of tumor. Tommy pattern 5 comprises less than 5% of tumor. -See synoptic summary. Verified by: Trinidad Padilla M.D., Chief of Genitourinary Pathology (Electronic Signature) Reported on: 06/07/23 18:15 EDT, [facility], [address] [place] [id number] _________________________________________________________________ Synoptic Summary 3: Prostate - Radical Prostatectomy Specimen Procedure:  Radical prostatectomy Prostate Size Prostate Weight (Grams):   55 g Tumor Histologic Type:   Acinar adenocarcinoma Histologic Grade Grade:  Grade group 2 (Buffalo Score 3 + 4 = 7) Minor Tertiary Pattern 5 (less than 5%):    Present Percentage of Pattern 4:   6 - 10% Intraductal Carcinoma (IDC):   Present IDC Incorporated into Grade:   No Cribriform Glands:   Present Treatment Effect:   No known presurgical therapy Tumor Quantitation Estimated Percentage of Prostate Involved by Tumor:    31 - 40% Location of Dominant Nodule:   Left, Anterior, Posterior, Posterolateral, [place], Mid; Right, Anterior, Lateral, Posterolateral, Posterior, [place], Mid Extraprostatic Extension (EPE):   Present, focal Location of Extraprostatic Extension:    Right posterolateral (neurovascular bundle) Urinary Bladder Neck Invasion:   Not identified Seminal Vesicle Invasion:   Not identified Lymphovascular Invasion:   Not Identified Margins Margin Status:   All margins negative for invasive carcinoma Regional Lymph Nodes Regional Lymph Node Status:   All regional lymph nodes negative for tumor Number of Lymph Nodes Examined:   11 Pathologic Stage Classification (pTNM, AJCC 8th Edition) Primary Tumor (pT):   pT3a pN Category:   pN0 Additional Findings Additional Findings:   Nodular prostatic hyperplasia ************************************************************** [facility] Accession Number : [id number] Patient:   PRITI THOMAS Accession:                             [id number] Collected Date/Time:                   4/13/2023 13:06 EDT Received Date/Time:                    4/14/2023 03:38 EDT  Surgical Pathology Report - Auth (Verified) Specimen(s) Submitted 1  Left anterior apex 2  Left anterior base 3  Right anterior apex 4  Right anterior base 5  Midline apex 6  Midline base 7  Left posterior apex 8  Left posterior base 9  Right posterior apex 10  Right posterior base 11  Left lateral 12  Right lateral 13  Right base TZP (target) 14  Right apex PZPM (target) 15  Left base AFM (target)  Final Diagnosis 1. Prostate, left anterior apex, biopsy -Benign prostate tissue  2. Prostate, left anterior base, biopsy -Benign prostate tissue  3. Prostate, right anterior apex, biopsy -Benign prostate tissue  4. Prostate, right anterior base, biopsy -Benign prostate tissue  5. Prostate, midline apex, biopsy - Adenocarcinoma of the prostate, Prognostic Grade Group 1 (Buffalo score 3+3=6) involving less than 5% (<0.5 mm in length) of 1 of 1 core.  6. Prostate, midline base, biopsy - Adenocarcinoma of the prostate, Prognostic Grade Group 1 (Buffalo score 3+3=6) involving 5% (0.5 mm in length) of 1 of 1 core.  7. Prostate, left posterior apex, biopsy -Benign prostate tissue  8. Prostate, left posterior base, biopsy - Adenocarcinoma of the prostate, Prognostic Grade Group 1 (Buffalo score 3+3=6) involving less than 5% and 5% (0.3 mm and 0.5 mm in length) of 2 of 2 cores.  9. Prostate, right posterior apex, biopsy - Adenocarcinoma of the prostate, Prognostic Grade Group 1 (Tommy score 3+3=6) involving 20% (2 mm in length) of 1 of 1 core.  10. Prostate, right posterior base, biopsy -Adenocarcinoma of the prostate, Prognostic Grade Group 2 (Buffalo score 3+4=7) involving 15% and 50% (2 mm and 5 mm in length) of 2 of 2 cores. Buffalo pattern 4 comprises 10% of tumor. -Perineural invasion is identified.  11. Prostate, left lateral, biopsy -Benign prostate tissue  12. Prostate, right lateral, biopsy -Adenocarcinoma of the prostate, Prognostic Grade Group 2 (Tommy score 3+4=7) involving 5% (0.5 mm in length) of 1 of 1 core. Tommy pattern 4 cannot be evaluated in the small tumor focus.   13. Prostate, right base TZP (target), biopsy - Adenocarcinoma of the prostate, Prognostic Grade Group 2 (Buffalo score 3+4=7) involving less than 5%, 20% and 70% (0.5 mm, 2.5 mm and 5 mm in length) of 3 of 4 cores. Buffalo pattern 4 comprises 10% of tumor. -Perineural invasion is identified.  14. Prostate, right apex PZPM(target), biopsy -Adenocarcinoma of the prostate, Prognostic Grade Group 2 (Tommy score 3+4=7) involving 80%, 80% and 70% (5 mm, 8 mm and 9 mm in length) of 3 of 3 cores. Buffalo pattern 4 comprises 10% of tumor.  15. Prostate, left base AFM(target), biopsy - Adenocarcinoma of the prostate, Prognostic Grade Group 1 (Tommy score 3+3=6) involving 20%, 40%, 50% and 80% (2.5 mm, 5 mm, 4.5 mm and 7.5 mm in length) of 4 of 4 cores, see note  Note:  The diagnosis of carcinoma is supported by the failure of immunohistochemical staining for high molecular weight cytokeratin and p63 to demonstrate basal cells in the atypical glands, and the immunohistochemical stain for alpha-methylacyl-CoA racemase (P504S), a marker preferentially expressed in prostate cancer, is positive.  Verified by: Gypsy Ramos MD (Electronic Signature) Reported on: 04/25/23 ********************************************************** EXAM: [id number] - MR CAD PROSTATE  - ORDERED BY: SANDRA LUU EXAM: [id number] - MR PROSTATE WAW IC  - ORDERED BY: SANDRA LUU PROCEDURE DATE:  03/23/2023 INTERPRETATION:  CLINICAL INFORMATION: Rising PSA. 2.1 on 10/8/2014 and 4.11 on 3/15/2013. PSA: 4.0 ng/mL Prior prostate biopsy: No prior biopsy. COMPARISON: None. CONTRAST/COMPLICATIONS: IV Contrast: Gadavist  8 cc administered   2 cc discarded Oral Contrast: NONE Complications: None reported at time of study completion  PROCEDURE: 3.0T multiparametric MRI of the pelvis was performed as per Prostate Cancer Protocol including small field-of-view, thin section T2-weighted imaging, diffusion-weighted imaging (including ultra high b-values), and dynamic contrast-enhanced imaging. There was a contraindication to Levsin.  Computer aided detection/diagnosis was performed using kinetic enhancement analysis on a dedicated [employer] workstation performed by the interpreting radiologist. FINDINGS: Size: 5.1 x 3.9 x 4.5 [transverse x AP x CC] cm. Volume: 47 cc . PSA density: 0.09 ng/mL/mL PSA density >0.15 ng/mL/mL: No Hemorrhage: None.  Central gland: Enlarged prostate gland with central prostatic hypertrophy. Peripheral zone: There is bilateral mild low T2 signal within the peripheral zone which shows mild postcontrast enhancement. This likely represents inflammation. Lesion as described below.  MRI Targets: LESION: #1 Location: Left, anterior fibromuscular stroma, , zone. Slice#: Series 8, Image [id number]. Size (transverse, AP, CC): 9 x 7 x 10 mm. T2-WI: 4 - Circumscribed, homogenous moderate hypointense focus/mass confined to the prostate; less than 1.5 cm in greatest dimension. DWI: 4 - Focal markedly hypointense on ADC and markedly hyperintense on high b-value DWI; less than 1.5cm in greatest dimension. DCE: Negative - no early enhancement. Extra-prostatic extension: None. Prior Comparison: None. PI-RADS Assessment Category: 4, High  LESION: #2 Location: Right, posterior medial (PZpm), apex, peripheral zone. Slice#: Series 8, Image 25. Size (transverse, AP, CC): 9 x 7 x 9 mm. T2-WI: 4 - Circumscribed, homogenous moderate hypointense focus/mass confined to the prostate; less than 1.5 cm in greatest dimension. DWI: 4 - Focal markedly hypointense on ADC and markedly hyperintense on high b-value DWI; less than 1.5cm in greatest dimension. DCE: Positive - focal, and; earlier than or contemporaneous with enhancement of adjacent normal prostatic tissues, and; corresponds to suspicious finding on T2W and/or DWI. Extra-prostatic extension: None. Prior Comparison: None. PI-RADS Assessment Category: 4, High   Neurovascular bundle: No evidence of neurovascular bundle invasion. Seminal vesicles: No seminal vesicle invasion. Lymph nodes: No pelvic adenopathy. Bones: No suspicious lesions identified. Urinary bladder: Within normal limits. Other:  IMPRESSION: There are 2 lesions one in the right apex peripheral zone and another in the left anterior fibromuscular stroma as described above. Both are PIRADS 4. There is bilateral diffuse mild low signal with enhancement in the peripheral zone suggestive of inflammation. PI-RADS 4 Prostate Volume: 47 mL PSA density: 0.09 ng/mL/mL  --- End of Report --- AGUSTÍN SMITH MD; Attending Radiologist This document has been electronically signed. Mar 23 2023 ************************************************************** EXAM: [id number] - MR CAD PROSTATE  - ORDERED BY: CHUY WHALEY EXAM: [id number] - MR PROSTATE WAW IC  - ORDERED BY: CHUY WHALEY PROCEDURE DATE:  08/01/2024 INTERPRETATION:  CLINICAL INFORMATION: Rising PSA status post prostatectomy PSA: 0.18 ng/mL, previously 0.09 ng/mL Prior prostate biopsy: Buffalo score 7 (3+4)  COMPARISON: PDS and a scan 8/1/2024. CT chest abdomen and pelvis 3/26/2024. MR pelvis 3/23/2023.  CONTRAST/COMPLICATIONS: IV Contrast: Gadavist  7 cc administered   0.5 cc discarded Oral Contrast: NONE Complications: None reported at time of study completion  PROCEDURE: 3.0T multiparametric MRI of the pelvis was performed as per Prostate Cancer Protocol including small field-of-view, thin section T2-weighted imaging, diffusion-weighted imaging (including ultra high b-values), and dynamic contrast-enhanced imaging. 0.25 mg of sublingual Levsin was administered.  Computer aided detection/diagnosis was performed using kinetic enhancement analysis on a dedicated [employer] workstation performed by the interpreting radiologist. FINDINGS: Patient is status post prostatectomy. The prostatectomy bed is unremarkable. There are no fluid collections. There is no abnormal tissue seen.  Lymph nodes: No pelvic adenopathy. Bones: No suspicious lesions identified. Urinary bladder: Within normal limits. Other: Mild sigmoid diverticulosis.  IMPRESSION: Prostatectomy. Unremarkable postsurgical bed. No evidence for local recurrence. No abnormal lymphadenopathy. --- End of Report --- AGUSTÍN SMITH MD; Attending Radiologist This document has been electronically signed. Aug  2 2024 ***************************************************************** EXAM: [id number] - MR ABDOMEN WAW IC  - ORDERED BY: SANDRA LUU PROCEDURE DATE:  04/02/2024 INTERPRETATION:  CLINICAL INFORMATION: Renal lesion. COMPARISON: 3/26/2024, 5/19/2023 CONTRAST/COMPLICATIONS: IV Contrast: Gadavist  7.5 cc administered   0 cc discarded Oral Contrast: NONE Complications: None reported at time of study completion  PROCEDURE: MRI of the abdomen was performed.  FINDINGS: LOWER CHEST: Within normal limits.  LIVER: Subcentimeter segment 7 hemangioma without change. BILE DUCTS: Normal caliber. GALLBLADDER: Within normal limits. SPLEEN: Within normal limits. PANCREAS: Within normal limits. ADRENALS: Within normal limits. KIDNEYS/URETERS: A 9 mm hemorrhagic cyst in the posterior interpolar right kidney corresponds with an indeterminate lesion at recent CT. An additional subcentimeter right renal cyst. No suspicious renal mass.  VISUALIZED PORTIONS: BOWEL: No bowel obstruction.  Previously noted small bowel inflammation not appreciated. PERITONEUM: No ascites. VESSELS: Within normal limits. RETROPERITONEUM/LYMPH NODES: No lymphadenopathy. ABDOMINAL WALL: Within normal limits. BONES: Within normal limits.  IMPRESSION: A 9 mm hemorrhagic right renal cyst corresponding with an indeterminate lesion on recent CT. No suspicious renal mass. --- End of Report --- ROBINA DIAZ MD; Attending Radiologist This document has been electronically signed. Apr 2 2024 ***************************************************************************** EXAM: [id number] - MR PELVIS BONY ONLY  - ORDERED BY: SANDRA LUU EXAM: [id number] - MR SPINE LUMBAR  - ORDERED BY: SANDRA LUU PROCEDURE DATE:  03/12/2024 INTERPRETATION:  CLINICAL INFORMATION: Back pain  ADDITIONAL CLINICAL INFORMATION: Not Applicable TECHNIQUE: Multiplanar, multisequence MRI was performed of the lumbar spine and pelvis. IV Contrast: PRIOR STUDIES: No priors available for comparison. FINDINGS:  LOCALIZER: No additional findings. BONES: There is no fracture or bone marrow edema. ALIGNMENT: The alignment is normal. SACROILIAC JOINTS/SACRUM: There is no sacral fracture. The SI joints are partially visualized but are intact. CONUS AND CAUDA EQUINA: The distal cord and conus are normal in signal. Conus terminates at L1. VISUALIZED INTRAPELVIC/INTRA-ABDOMINAL SOFT TISSUES: Normal. PARASPINAL SOFT TISSUES: Normal. INDIVIDUAL LEVELS:  LOWER THORACIC SPINE: No spinal canal or neuroforaminal stenosis.  L1-L2: No spinal canal or neuroforaminal stenosis. L2-L3: No spinal canal or neuroforaminal stenosis. L3-L4: Small left intraforaminal protrusion in conjunction with mild bilateral facet arthropathy results in mild left neural foraminal narrowing but no significant right neural foraminal narrowing or central canal narrowing. L4-L5: Moderate bilateral facet arthropathy and minimal posterior disc bulge result in mild to moderate left neural foraminal narrowing, mild right neural foraminal narrowing but no significant central canal narrowing. L5-S1: Moderate bilateral facet arthropathy. No spinal canal or neuroforaminal stenosis.  PELVIS:  There is no evidence of full-thickness cartilage loss.  There is no joint effusion. There is mild-to-moderate bilateral gluteus medius/minimus insertional tendinosis with superimposed low-grade partial tearing of the gluteus minimus insertions. The hamstring origins are maintained.  There is no fracture or osteonecrosis.  There is no evidence of stress reaction.  The sacroiliac joints and pubic symphysis are preserved.  IMPRESSION:  Multilevel discogenic degenerative disease and facet arthropathy of the lumbar spine, most pronounced at L4-5 where there is mild to moderate left neural foraminal narrowing and mild right neural foraminal narrowing as well as at L3-L4 where there is mild left neural foraminal narrowing. No additional areas of significant central canal or neural foraminal narrowing within the lumbar spine.  Mild-to-moderate bilateral gluteus medius/minimus insertional tendinosis with superimposed low-grade partial tearing of the gluteus minimus insertions. --- End of Report --- YEN BONE MBA-TONY EDWARDS; Attending Radiologist This document has been electronically signed. Mar 12 2024 ********************************************************************** EXAM: [id number] - CT ABDOMEN AND PELVIS OC IC  - ORDERED BY: SANDRA LUU EXAM: [id number] - CT CHEST IC  - ORDERED BY: SANDRA LUU PROCEDURE DATE:  03/26/2024 INTERPRETATION:  CLINICAL INFORMATION: Weight loss. History of prostate cancer and Crohn's disease. COMPARISON: Abdominal CT dated 05/19/2023  CONTRAST/COMPLICATIONS: IV Contrast: Omnipaque 350  90 cc administered   10 cc discarded Oral Contrast: Water Complications: None reported at time of study completion  PROCEDURE: CT of the Chest, Abdomen and Pelvis was performed. Sagittal and coronal reformats were performed.  FINDINGS: CHEST: LUNGS AND LARGE AIRWAYS: Patent central airways. No pulmonary nodules. PLEURA: No pleural effusion. VESSELS: Atherosclerotic changes of the aorta and coronary arteries. HEART: Heart size is normal. No pericardial effusion. MEDIASTINUM AND MELISSA: No lymphadenopathy. CHEST WALL AND LOWER NECK: Within normal limits.  ABDOMEN AND PELVIS: LIVER: Within normal limits. BILE DUCTS: Normal caliber. GALLBLADDER: Within normal limits. SPLEEN: Within normal limits. PANCREAS: Within normal limits. ADRENALS: Within normal limits. KIDNEYS/URETERS: The kidneys are normal in size without hydronephrosis or stones. There is 8 mm hyperdense lesion in the midpole of the right kidney laterally which had increasing size as compared with the prior study previously measuring 4 mm. (Series 3 image 94). May represent hyperdense cyst or solid lesion.  BLADDER: Within normal limits. REPRODUCTIVE ORGANS: Prostatectomy. The prostatectomy bed is unremarkable.  BOWEL: No bowel obstruction. Appendix not visualized. Status post small bowel surgery. There is persistent mild focal thickening in the distal small bowel, distal to the area of resection. Evaluation for acute inflammation is limited since arterial phase images were not obtained. PERITONEUM: No ascites. VESSELS: Atherosclerotic changes. RETROPERITONEUM/LYMPH NODES: Again noted are prominent right ileocolic mesenteric lymph nodes measuring up to 1.5 x 1.0 cm, not significantly changed and consistent with reactive lymph nodes. ABDOMINAL WALL: Within normal limits. BONES: Within normal limits.  IMPRESSION: No evidence of pancreatic mass or metastatic adenopathy.  Persistent mild thickening of the distal small bowel with associated prominent ileocolic lymph nodes consistent with patient's known Crohn's disease.  Subcentimeter indeterminate right renal lesion which had increased in size as compared with the prior study. Consider further characterization with contrast enhanced renal MRI. --- End of Report --- SANDRA GATICA MD; Attending Radiologist This document has been electronically signed. Mar 26 2024 ************************************************************ EXAM: [id number] - XR BONE DENSITY AXIAL  - ORDERED BY: SANDRA LUU PROCEDURE DATE:  04/14/2023 INTERPRETATION:  CLINICAL INDICATION: Bone pain.. Thank you for referring your patient for bone densitometry and body composition evaluation on the [employer] machine. The results are expressed as the Z-score, or the standard deviation from age-matched values. A Z-score of -2.0 or lower is defined as below the expected range for age. A Z-score above -2.0 is within the expected range for age.  TECHNIQUE: DEXA total body less head, DEXA lumbar spine, and body composition evaluation. COMPARISON: None. IMPRESSION: Spine: 0.5, within the expected range for age.  Total body: BMD 1.289 g/cm2; Z score 1.0, Within the expected range.  Total body Fat %: Total Body Fat 26.7 %.  Please see the complete report in PACS. --- End of Report --- SANDRA GATICA MD; Attending Radiologist This document has been electronically signed. Apr 18 2023 ***************************************************************** EXAM: [id number] - PETCT WB PSMA SUBS  - ORDERED BY: CHUY HWALEY PROCEDURE DATE:  08/01/2024 INTERPRETATION:  CLINICAL INFORMATION: Buffalo 7 (3+4) prostate cancer with rising PSA status post radical prostatectomy 6/5/2023. PSA 0.16 ng/mL on 7/31/2024, increased from 0.09 ng/mL on 4/26/2024 and post prostatectomy eron of 0.04 ng/mL on 7/17/2023.  TREATMENT STRATEGY EVALUATION: Subsequent RADIOPHARMACEUTICAL: 9.8 mCi F-18 Flotufolastat (Posluma), I.V. I.V. SITE: antecubital right UPTAKE PERIOD: 64 min SCANNER: [employer] ORAL CONTRAST: NONE  TECHNIQUE:  Following intravenous injection of radiopharmaceutical and above uptake period, PET/CT was obtained from vertex of skull to below the knees. CT was performed during shallow respiration. CT protocol was optimized for PET attenuation correction and anatomic localization and was not designed to produce and cannot replace state-of-the-art diagnostic CT images with specific imaging protocols for different body parts and indications. Images were reconstructed and reviewed in axial, coronal, and sagittal views and three-dimensional MIP.  The standardized uptake values (SUV) are normalized to patient body weight and indicate the highest activity concentration (SUVmax) in a given site. All image numbers refer to axial image number.  PET findings are scored using the Molecular Imaging PSMA Expression Score (Score): Score 0: Uptake < blood pool - No PSMA expression Score 1: Uptake > blood pool AND < liver - Low PSMA expression Score 2: Uptake > liver AND < parotid gland - Intermediate expression Score 3: Uptake > parotid gland - High expression (Calixto et. al. Prostate Cancer Molecular Imaging Standardized Evaluation (PROMISE): Proposed miTNM Classification for the Interpretation of PSMA-Ligand PET/CT. J Nucl Med 2018; 59:469-478). COMPARISON:  PA SMA PET/CT dated 5/9/2023 OTHER STUDIES USED FOR CORRELATION: MRI of abdomen dated 4/2/2024, CT soft tissue neck, chest, abdomen, and pelvis dated 3/26/2024, CT abdomen and pelvis dated 5/19/2023 FINDINGS: HEAD/NECK: Physiologic radiotracer activity in lacrimal and salivary glands. For reference, right parotid gland SUV is 11.5; previously 11.1.  THORAX: Physiologic radiotracer activity. No enlarged or avid lymph node.  LUNGS: No abnormal radiotracer activity. No lung nodule.  PLEURA/PERICARDIUM: No abnormal radiotracer activity. No pleural or pericardial effusion.  HEPATOBILIARY/PANCREAS: Physiologic radiotracer activity. For reference, normal liver demonstrates SUV mean 4.4; previously 5.5.  SPLEEN: Physiologic radiotracer activity. Normal size.  ADRENAL GLANDS: No abnormal radiotracer activity. No nodule.  KIDNEYS/URINARY BLADDER: Physiologic excreted radiotracer activity. A 9 mm hyperdense lesion in posterior interpolar right kidney (image 214), characterized as a hemorrhagic cyst on interval MRI, measures 0.6 cm on prior PET/CT.  REPRODUCTIVE ORGANS: No abnormal radiotracer activity. Status post interval prostatectomy. No increased radiotracer activity in prostate bed.  ABDOMINOPELVIC LYMPH NODES/RETROPERITONEUM: No enlarged or avid lymph node.  BOWEL/PERITONEUM/MESENTERY: Physiologic radiotracer activity. Status post small bowel surgery.  VESSELS: Coronary artery calcifications and/or stents. Atherosclerotic changes.  BONES/SOFT TISSUES: There are foci of mild radiotracer activity in the anterior aspect of the right 3rd rib (SUV 1.8; image 132; previous SUV 1.1), anterior aspect of right 5th rib (SUV 3.0; image 160; previous SUV 1.5), and anterior aspect of left 4th rib (SUV 1.1; image 146; previous SUV 0.8). These foci are better seen than on the prior study, likely related to differences in technique/scanner resolution. The right fifth focus and left fourth focus demonstrate unchanged corresponding linear sclerosis, suggestive of fractures.  IMPRESSION: Compared to PSMA-PET/CT scan dated  5/9/2023:  1. No evidence of recurrent disease in prostate bed status post interval prostatectomy.  2. Findings suggestive of traumatic rib fractures, anterior aspect right 3rd and 5th ribs and left 4th rib, not significantly changed.  3. No evidence of metastatic disease.  --- End of Report --- LEIGHA PUENTES MD; Attending Nuclear Medicine This document has been electronically signed. Aug  1 2024  ******************************************************************* EXAM: [id number] - PETCT WB PSMA INIT  - ORDERED BY: CHUY WHALEY PROCEDURE DATE:  05/09/2023 INTERPRETATION:  CLINICAL INFORMATION: 57-year-old male with recently diagnosed Buffalo 7 prostate cancer, PSA 3.9 ng/mL on 3/28/2023. TREATMENT STRATEGY EVALUATION: Initial RADIOPHARMACEUTICAL:  9.14 mCi F-18, piflufolastat (Pylarify), I.V. UPTAKE PERIOD: 60 minutes SCANNER: [employer] [id number]  TECHNIQUE:  Following intravenous injection of radiopharmaceutical and above uptake period, PET/CT was obtained from vertex of skull to below the knees. CT was performed during shallow respiration. CT protocol was optimized for PET attenuation correction and anatomic localization and was not designed to produce and cannot replace state-of-the-art diagnostic CT images with specific imaging protocols for different body parts and indications. Images were reconstructed and reviewed in axial, coronal, and sagittal views and three-dimensional MIP. The standardized uptake values (SUV) are normalized to patient body weight and indicate the highest activity concentration (SUVmax) in a given site. All image numbers refer to axial image number. PET findings are scored using the Molecular Imaging PSMA Expression Score (Score): Score 0: Uptake < blood pool - No PSMA expression Score 1: Uptake > blood pool AND < liver - Low PSMA expression Score 2: Uptake > liver AND < parotid gland - Intermediate expression Score 3: Uptake > parotid gland - High expression (Eiber, et. al. Prostate Cancer Molecular Imaging Standardized Evaluation (PROMISE): Proposed miTNM Classification for the Interpretation of PSMA-Ligand PET/CT. J Nucl Med 2018; 59:469-478). COMPARISON:  No prior PSMA-PET/CT OTHER STUDIES USED FOR CORRELATION: Prostate MRI dated 3/23/2023, CT enterography dated 5/3/2023, and CT chest, abdomen, and pelvis dated 6/11/2020 FINDINGS:  HEAD/NECK: Physiologic radiotracer activity in lacrimal and salivary glands.  THORAX: Physiologic radiotracer activity. No enlarged or avid lymph node. Coronary artery calcifications and/or stents.  LUNGS: No abnormal radiotracer activity. No lung nodule.  PLEURA/PERICARDIUM: No abnormal radiotracer activity. No pleural or pericardial effusion.  HEPATOBILIARY/PANCREAS: Physiologic radiotracer activity. For reference, normal liver demonstrates SUV mean 5.5.  SPLEEN: Physiologic radiotracer activity. Normal size.  ADRENAL GLANDS: No abnormal radiotracer activity. No nodule.  KIDNEYS/URINARY BLADDER: Physiologic excreted radiotracer activity.  REPRODUCTIVE ORGANS: Prostate gland is enlarged, measuring approximately 5.5 cm in maximum transverse diameter. There are foci of mild radiotracer activity in the bilateral posterior base of the prostate gland, most intense in the right base SUV 2.4; score 1; image 264), corresponding to known prostate cancer.  ABDOMINOPELVIC LYMPH NODES/RETROPERITONEUM: No enlarged or avid lymph node.  BOWEL/PERITONEUM/MESENTERY: Physiologic radiotracer activity. Small bowel anastomosis in the distal ileum, unchanged.  BONES/SOFT TISSUES: No abnormal radiotracer activity. No lytic or sclerotic lesion.  IMPRESSION: Abnormal whole-body PSMA-PET/CT scan.  1. Foci of mild radiotracer activity in bilateral prostate gland, most intense in posterior aspect of right base. Findings are compatible with known malignancy. The intensity of radiotracer activity suggests low PSMA expression.  2. No evidence of metastatic disease. --- End of Report --- LEIGHA PUENTES MD; Attending Nuclear Medicine This document has been electronically signed. May  9 2023 ****************************************************************

## 2024-08-20 NOTE — CONSULT LETTER
[Dear  ___] : Dear  [unfilled], [Consult Letter:] : I had the pleasure of evaluating your patient, [unfilled]. [Please see my note below.] : Please see my note below. [Consult Closing:] : Thank you very much for allowing me to participate in the care of this patient.  If you have any questions, please do not hesitate to contact me. [Sincerely,] : Sincerely, [DrSaba  ___] : Dr. ERICKSON [DrSaba ___] : Dr. ERICKSON [___] : [unfilled]

## 2024-08-20 NOTE — HISTORY OF PRESENT ILLNESS
[FreeTextEntry1] : Mr. Mascorro is a 59-year-old male with prostate cancer, referred for consultation regarding radiation therapy.   He was referred for urologic evaluation due to a rising PSA.  Prostate MRI on 3/23/23 showed a 47 cc prostate volume, central gland hypertrophy, and two lesions. Lesion #1 was in the left anterior fibromuscular stroma, measuring up to 10 mm, PI-RADS 4. Lesion #2 was in the right posterior medial apex peripheral zone measuring up to 9 mm, PI-RADS 4. There was no extracapsular extension. There was no seminal vesicle invasion, and the neurovascular bundles were unremarkable. There was no pelvic adenopathy and no suspicious osseous lesions.  PSA was 3.90 on 3/28/23 with 18% free PSA.   Prostate biopsy on 4/13/24 showed 9 out of 15 cores with adenocarcinoma with Gabbs score ranging from 3+3=6 to 3+4=7, up to 80% involvement, and perineural invasion.  He underwent radical prostatectomy on 6/5/23. Pathology showed adenocarcinoma with Gabbs score 3+4=7 with 10% Gabbs 4 pattern and <5% Tommy 5. Six left pelvic lymph node and five right pelvic lymph nodes were all negative for tumor. Focal extraprostatic extension was present at the right posterolateral/neurovascular bundle. Margins were all negative. There was no seminal vesicle or bladder neck invasion. There was no lymphovascular invasion. Cribriform Gabbs pattern 4 and intraductal carcinoma of the prostate were present.  Post-operative was 0.04 on 7/17/23 and gradually increased to most recently 0.16 on 7/31/24.   CT CAP on 3/26/24 showed mild thickening of the distal small bowel and prominent ileocolic lymph nodes consistent with Crohn's disease. An indeterminate subcentimeter right renal lesion increased in size compared to a prior study from 2023. MR Abdomen on 4/2/24 showed a 9 mm hemorrhagic cyst in the posterior interpolar right kidney, corresponding to the finding on CT.   PSMA PET on 8/1/24 no evidence of recurrence disease in the prostate bed. Mild radiotracer uptake in the ribs was suggestive of traumatic rib fractures. There was no evidence of metastatic disease.   Prostate MRI on 8/1/24 showed an unremarkable prostate bed. There was no evidence of local recurrence or abnormal lymphadenopathy.  Post-operative, he developed urinary incontinence and erectile dysfunction.  He takes Cialis for ED.  Urinary incontinence is much improved with physical therapy and time. He has mild stress-incontinence and does not require a pad.

## 2024-08-21 DIAGNOSIS — C61 MALIGNANT NEOPLASM OF PROSTATE: ICD-10-CM

## 2024-08-21 LAB
ALBUMIN SERPL ELPH-MCNC: 4.7 G/DL
ALP BLD-CCNC: 91 U/L
ALT SERPL-CCNC: 11 U/L
ANION GAP SERPL CALC-SCNC: 13 MMOL/L
AST SERPL-CCNC: 16 U/L
BILIRUB SERPL-MCNC: 0.4 MG/DL
BUN SERPL-MCNC: 21 MG/DL
CALCIUM SERPL-MCNC: 10.4 MG/DL
CHLORIDE SERPL-SCNC: 102 MMOL/L
CO2 SERPL-SCNC: 26 MMOL/L
CREAT SERPL-MCNC: 0.77 MG/DL
EGFR: 103 ML/MIN/1.73M2
FERRITIN SERPL-MCNC: 128 NG/ML
GLUCOSE SERPL-MCNC: 107 MG/DL
IRON SATN MFR SERPL: 16 %
IRON SERPL-MCNC: 55 UG/DL
POTASSIUM SERPL-SCNC: 5.4 MMOL/L
PROT SERPL-MCNC: 6.8 G/DL
PSA SERPL-MCNC: 0.19 NG/ML
SODIUM SERPL-SCNC: 142 MMOL/L
TESTOST SERPL-MCNC: 347 NG/DL
TIBC SERPL-MCNC: 344 UG/DL
UIBC SERPL-MCNC: 289 UG/DL

## 2024-08-26 ENCOUNTER — NON-APPOINTMENT (OUTPATIENT)
Age: 59
End: 2024-08-26

## 2024-08-26 PROCEDURE — 77263 THER RADIOLOGY TX PLNG CPLX: CPT

## 2024-08-26 PROCEDURE — 77332 RADIATION TREATMENT AID(S): CPT | Mod: 26

## 2024-09-03 ENCOUNTER — OUTPATIENT (OUTPATIENT)
Dept: OUTPATIENT SERVICES | Facility: HOSPITAL | Age: 59
LOS: 1 days | End: 2024-09-03
Payer: MEDICARE

## 2024-09-03 ENCOUNTER — RESULT REVIEW (OUTPATIENT)
Age: 59
End: 2024-09-03

## 2024-09-03 ENCOUNTER — APPOINTMENT (OUTPATIENT)
Dept: MRI IMAGING | Facility: IMAGING CENTER | Age: 59
End: 2024-09-03
Payer: MEDICARE

## 2024-09-03 DIAGNOSIS — Z90.49 ACQUIRED ABSENCE OF OTHER SPECIFIED PARTS OF DIGESTIVE TRACT: Chronic | ICD-10-CM

## 2024-09-03 DIAGNOSIS — S92.009A UNSPECIFIED FRACTURE OF UNSPECIFIED CALCANEUS, INITIAL ENCOUNTER FOR CLOSED FRACTURE: Chronic | ICD-10-CM

## 2024-09-03 DIAGNOSIS — Z98.89 OTHER SPECIFIED POSTPROCEDURAL STATES: Chronic | ICD-10-CM

## 2024-09-03 DIAGNOSIS — Z00.8 ENCOUNTER FOR OTHER GENERAL EXAMINATION: ICD-10-CM

## 2024-09-03 PROCEDURE — 72141 MRI NECK SPINE W/O DYE: CPT | Mod: 26,MH

## 2024-09-03 PROCEDURE — 73221 MRI JOINT UPR EXTREM W/O DYE: CPT | Mod: MH

## 2024-09-03 PROCEDURE — 77300 RADIATION THERAPY DOSE PLAN: CPT | Mod: 26

## 2024-09-03 PROCEDURE — 72146 MRI CHEST SPINE W/O DYE: CPT | Mod: 26,MH

## 2024-09-03 PROCEDURE — 72141 MRI NECK SPINE W/O DYE: CPT | Mod: MH

## 2024-09-03 PROCEDURE — 72146 MRI CHEST SPINE W/O DYE: CPT | Mod: MH

## 2024-09-03 PROCEDURE — 77301 RADIOTHERAPY DOSE PLAN IMRT: CPT | Mod: 26

## 2024-09-03 PROCEDURE — 77338 DESIGN MLC DEVICE FOR IMRT: CPT | Mod: 26

## 2024-09-03 PROCEDURE — 73221 MRI JOINT UPR EXTREM W/O DYE: CPT | Mod: 26,LT,MH

## 2024-09-13 PROCEDURE — 77014: CPT | Mod: 26

## 2024-09-13 PROCEDURE — 77427 RADIATION TX MANAGEMENT X5: CPT

## 2024-09-16 PROCEDURE — 77014: CPT | Mod: 26

## 2024-09-17 PROCEDURE — 77014: CPT | Mod: 26

## 2024-09-18 ENCOUNTER — NON-APPOINTMENT (OUTPATIENT)
Age: 59
End: 2024-09-18

## 2024-09-18 PROCEDURE — 77387B: CUSTOM | Mod: 26

## 2024-09-19 PROCEDURE — 77387B: CUSTOM | Mod: 26

## 2024-09-20 PROCEDURE — 77387B: CUSTOM | Mod: 26

## 2024-09-20 PROCEDURE — 77427 RADIATION TX MANAGEMENT X5: CPT

## 2024-09-20 NOTE — HISTORY OF PRESENT ILLNESS
[FreeTextEntry1] : Mr. Mascorro is a 59-year-old male with stage IIIB [id number] adenocarcinoma of the prostate, who underwent prostatectomy and pelvic node dissection on 6/5/23 showing Tommy score 3+4=7 disease, focal extracapsular extension, cribriform pattern and intraductal carcinoma. PSA has been rising mostly recently to 0.16 ng/mL, and extent of disease work up shows no evidence of disease. He is receiving salvage radiation therapy to the prostate bed.   He is feeling generally well. He denies fatigue and pain. He is urinating without difficulty. His bowel movements are regular.

## 2024-09-20 NOTE — HISTORY OF PRESENT ILLNESS
[FreeTextEntry1] : Mr. Mascorro is a 59-year-old male with stage IIIB [id number] adenocarcinoma of the prostate, who underwent prostatectomy and pelvic node dissection on 6/5/23 showing Tommy score 3+4=7 disease, focal extracapsular extension, cribriform pattern and intraductal carcinoma. PSA has been rising mostly recently to 0.16 ng/mL, and extent of disease work up shows no evidence of disease. He is receiving salvage radiation therapy to the prostate bed.   He is feeling generally well. He denies fatigue and pain. He is urinating without difficulty. His bowel movements are regular.

## 2024-09-20 NOTE — DISEASE MANAGEMENT
[IIIB] : IIIB [Pathological] : TNM Stage: p [TTNM] : 3 [NTNM] : 0 [MTNM] : 0 [de-identified] : 1000cGy [de-identified] : [id number] [de-identified] : prostate bed

## 2024-09-20 NOTE — REVIEW OF SYSTEMS
[Constipation: Grade 0] : Constipation: Grade 0 [Diarrhea: Grade 0] : Diarrhea: Grade 0 [Proctitis: Grade 0] : Proctitis: Grade 0 [Rectal Pain: Grade 0] : Rectal Pain: Grade 0 [Hematuria: Grade 0] : Hematuria: Grade 0 [Urinary Incontinence: Grade 0] : Urinary Incontinence: Grade 0  [Urinary Retention: Grade 0] : Urinary Retention: Grade 0 [Urinary Tract Pain: Grade 0] : Urinary Tract Pain: Grade 0 [Urinary Urgency: Grade 0] : Urinary Urgency: Grade 0 [Urinary Frequency: Grade 0] : Urinary Frequency: Grade 0

## 2024-09-20 NOTE — DISEASE MANAGEMENT
[IIIB] : IIIB [Pathological] : TNM Stage: p [TTNM] : 3 [NTNM] : 0 [MTNM] : 0 [de-identified] : 1000cGy [de-identified] : [id number] [de-identified] : prostate bed

## 2024-09-23 PROCEDURE — 77387B: CUSTOM | Mod: 26

## 2024-09-23 RX ORDER — LIDOCAINE 5 G/100G
5 OINTMENT TOPICAL
Qty: 1 | Refills: 1 | Status: ACTIVE | COMMUNITY
Start: 2024-09-23 | End: 1900-01-01

## 2024-09-24 ENCOUNTER — TRANSCRIPTION ENCOUNTER (OUTPATIENT)
Age: 59
End: 2024-09-24

## 2024-09-24 PROCEDURE — 77014: CPT | Mod: 26

## 2024-09-25 ENCOUNTER — NON-APPOINTMENT (OUTPATIENT)
Age: 59
End: 2024-09-25

## 2024-09-25 PROCEDURE — 77387C: CUSTOM

## 2024-09-25 NOTE — VITALS
[Maximal Pain Intensity: 0/10] : 0/10 [90: Able to carry normal activity; minor signs or symptoms of disease.] : 90: Able to carry normal activity; minor signs or symptoms of disease.  [Maximal Pain Intensity: 7/10] : 7/10 [Least Pain Intensity: 0/10] : 0/10 [Pain Location: ___] : Pain Location: [unfilled] [Pain Interferes with ADLs] : Pain interferes with activities of daily living. [80: Normal activity with effort; some signs or symptoms of disease.] : 80: Normal activity with effort; some signs or symptoms of disease.

## 2024-09-26 PROCEDURE — 77387C: CUSTOM

## 2024-09-27 PROCEDURE — 77387C: CUSTOM

## 2024-09-27 NOTE — HISTORY OF PRESENT ILLNESS
[FreeTextEntry1] : Mr. Mascorro is a 59-year-old male with stage IIIB [id number] adenocarcinoma of the prostate, who underwent prostatectomy and pelvic node dissection on 6/5/23 showing Tommy score 3+4=7 disease, focal extracapsular extension, cribriform pattern and intraductal carcinoma. PSA has been rising mostly recently to 0.16 ng/mL, and extent of disease work up showe no evidence of disease. He is receiving salvage radiation therapy to the prostate bed.   He is having rectal pain and bleeding with defecation.  He has a history of rectal bleeding and fissures.  He was started on proctosol suppository on 9/22 with minimal improvement.  He also has increased urinary frequency, urgency. He is also starting lidocaine cream and pain medications for his joint pains which helps his anal pain but only briefly. His gastroenterologist also prescribed diltiazem to apply to the anorectal area, which he will  today. He has multiple bowel movements per day and takes a stool softener.

## 2024-09-27 NOTE — DISEASE MANAGEMENT
[Pathological] : TNM Stage: p [IIIB] : IIIB [TTNM] : 3 [NTNM] : 0 [MTNM] : 0 [de-identified] : 8741cGy [de-identified] : [id number] [de-identified] : prostate bed

## 2024-09-27 NOTE — REVIEW OF SYSTEMS
[Constipation: Grade 0] : Constipation: Grade 0 [Diarrhea: Grade 0] : Diarrhea: Grade 0 [Proctitis: Grade 0] : Proctitis: Grade 0 [Rectal Pain: Grade 0] : Rectal Pain: Grade 0 [Hematuria: Grade 0] : Hematuria: Grade 0 [Urinary Incontinence: Grade 0] : Urinary Incontinence: Grade 0  [Urinary Retention: Grade 0] : Urinary Retention: Grade 0 [Urinary Tract Pain: Grade 0] : Urinary Tract Pain: Grade 0 [Urinary Urgency: Grade 0] : Urinary Urgency: Grade 0 [Urinary Frequency: Grade 0] : Urinary Frequency: Grade 0 [Anal Pain: Grade 2 - Moderate pain; limiting instrumental ADL] : Anal Pain: Grade 2 - Moderate pain; limiting instrumental ADL [Proctitis: Grade 1 - Rectal discomfort, intervention not indicated] : Proctitis: Grade 1 - Rectal discomfort, intervention not indicated [Rectal Pain: Grade 1 - Mild pain] : Rectal Pain: Grade 1 - Mild pain [Urinary Urgency: Grade 1 - Present] : Urinary Urgency: Grade 1 - Present [Urinary Frequency: Grade 1 - Present] : Urinary Frequency: Grade 1 - Present [Rectal Pain: Grade 2 - Moderate pain; limiting instrumental ADL] : Rectal Pain: Grade 2 - Moderate pain; limiting instrumental ADL

## 2024-09-27 NOTE — DISEASE MANAGEMENT
[Pathological] : TNM Stage: p [IIIB] : IIIB [TTNM] : 3 [NTNM] : 0 [MTNM] : 0 [de-identified] : 6975cGy [de-identified] : [id number] [de-identified] : prostate bed

## 2024-09-27 NOTE — HISTORY OF PRESENT ILLNESS
[FreeTextEntry1] : Mr. Mascorro is a 59-year-old male with stage IIIB [id number] adenocarcinoma of the prostate, who underwent prostatectomy and pelvic node dissection on 6/5/23 showing Tommy score 3+4=7 disease, focal extracapsular extension, cribriform pattern and intraductal carcinoma. PSA has been rising mostly recently to 0.16 ng/mL, and extent of disease work up showe no evidence of disease. He is receiving salvage radiation therapy to the prostate bed.   He is having rectal pain and bleeding with defecation.  He has a history of rectal bleeding and fissures.  He was started on proctosol suppository on 9/22 with minimal improvement.  He also has increased urinary frequency, urgency. He is also starting lidocaine cream and pain medications for his joint pains which helps his anal pain but only briefly. His gastroenterologist also prescribed diltiazem to apply to the anorectal area, which he will  today. He has multiple bowel movements per day and takes a stool softener.

## 2024-09-27 NOTE — HISTORY OF PRESENT ILLNESS
[FreeTextEntry1] : Mr. Mascorro is a 59-year-old male with stage IIIB [id number] adenocarcinoma of the prostate, who underwent prostatectomy and pelvic node dissection on 6/5/23 showing Tommy score 3+4=7 disease, focal extracapsular extension, cribriform pattern and intraductal carcinoma. PSA has been rising mostly recently to 0.16 ng/mL, and extent of disease work up showe no evidence of disease. He is receiving salvage radiation therapy to the prostate bed.   He is having rectal pain and bleeding with defecation.  He has a history of rectal bleeding and fissures.  He was started on proctosol suppository on 9/22 with minimal improvement.  He also has increased urinary frequency, urgency. He is also starting lidocaine cream and pain medications for his joint pains which helps his anal pain but only briefly. His gastroenterologist also prescribed diltiazem to apply to the anorectal area, which he will  today. He has multiple bowel movements per day and takes a stool softener.

## 2024-09-27 NOTE — DISEASE MANAGEMENT
[Pathological] : TNM Stage: p [IIIB] : IIIB [TTNM] : 3 [NTNM] : 0 [MTNM] : 0 [de-identified] : 3569cGy [de-identified] : [id number] [de-identified] : prostate bed

## 2024-09-30 PROCEDURE — 77387C: CUSTOM

## 2024-10-01 PROCEDURE — 77427 RADIATION TX MANAGEMENT X5: CPT

## 2024-10-01 PROCEDURE — 77014: CPT | Mod: 26

## 2024-10-02 ENCOUNTER — NON-APPOINTMENT (OUTPATIENT)
Age: 59
End: 2024-10-02

## 2024-10-02 PROCEDURE — 77387C: CUSTOM

## 2024-10-02 NOTE — VITALS
[Maximal Pain Intensity: 7/10] : 7/10 [Least Pain Intensity: 0/10] : 0/10 [Pain Location: ___] : Pain Location: [unfilled] [Pain Interferes with ADLs] : Pain interferes with activities of daily living. [80: Normal activity with effort; some signs or symptoms of disease.] : 80: Normal activity with effort; some signs or symptoms of disease.  [ECOG Performance Status: 1 - Restricted in physically strenuous activity but ambulatory and able to carry out work of a light or sedentary nature] : Performance Status: 1 - Restricted in physically strenuous activity but ambulatory and able to carry out work of a light or sedentary nature, e.g., light house work, office work

## 2024-10-03 DIAGNOSIS — R11.0 NAUSEA: ICD-10-CM

## 2024-10-03 PROCEDURE — 77387C: CUSTOM

## 2024-10-04 ENCOUNTER — TRANSCRIPTION ENCOUNTER (OUTPATIENT)
Age: 59
End: 2024-10-04

## 2024-10-04 PROCEDURE — 77427 RADIATION TX MANAGEMENT X5: CPT

## 2024-10-04 PROCEDURE — 77387C: CUSTOM

## 2024-10-04 RX ORDER — ONDANSETRON 4 MG/1
4 TABLET, ORALLY DISINTEGRATING ORAL 3 TIMES DAILY
Qty: 12 | Refills: 0 | Status: ACTIVE | COMMUNITY
Start: 2024-10-03 | End: 1900-01-01

## 2024-10-04 RX ORDER — HYOSCYAMINE SULFATE 0.12 MG/1
0.12 TABLET, ORALLY DISINTEGRATING ORAL
Qty: 30 | Refills: 0 | Status: ACTIVE | COMMUNITY
Start: 2024-10-04 | End: 1900-01-01

## 2024-10-06 NOTE — REVIEW OF SYSTEMS
[Anal Pain: Grade 2 - Moderate pain; limiting instrumental ADL] : Anal Pain: Grade 2 - Moderate pain; limiting instrumental ADL [Constipation: Grade 0] : Constipation: Grade 0 [Diarrhea: Grade 0] : Diarrhea: Grade 0 [Proctitis: Grade 1 - Rectal discomfort, intervention not indicated] : Proctitis: Grade 1 - Rectal discomfort, intervention not indicated [Rectal Pain: Grade 2 - Moderate pain; limiting instrumental ADL] : Rectal Pain: Grade 2 - Moderate pain; limiting instrumental ADL [Hematuria: Grade 0] : Hematuria: Grade 0 [Urinary Incontinence: Grade 0] : Urinary Incontinence: Grade 0  [Urinary Retention: Grade 0] : Urinary Retention: Grade 0 [Urinary Tract Pain: Grade 0] : Urinary Tract Pain: Grade 0 [Urinary Urgency: Grade 1 - Present] : Urinary Urgency: Grade 1 - Present [Urinary Frequency: Grade 1 - Present] : Urinary Frequency: Grade 1 - Present [Proctitis: Grade 2 - Symptoms (e.g., rectal discomfort, passing blood or mucus); medical intervention indicated; limiting instrumental ADL] : Proctitis: Grade 2 - Symptoms (e.g., rectal discomfort, passing blood or mucus); medical intervention indicated; limiting instrumental ADL

## 2024-10-06 NOTE — HISTORY OF PRESENT ILLNESS
[FreeTextEntry1] : Mr. Mascorro is a 59-year-old male with stage IIIB [id number] adenocarcinoma of the prostate, who underwent prostatectomy and pelvic node dissection on 6/5/23 showing Tommy score 3+4=7 disease, focal extracapsular extension, cribriform pattern and intraductal carcinoma. PSA has been rising mostly recently to 0.16 ng/mL, and extent of disease work up showe no evidence of disease. He is receiving salvage radiation therapy to the prostate bed.   He reports that the rectal pain and bleeding are persistent but stable. He had nausea earlier today with abdominal distention, then it passed, and he continues to have bowel movements. He is able to eat. He feels more tightness at the anal sphincter. He is using hydrocortisone, lidocaine and diltiazem for the rectal side effects.

## 2024-10-06 NOTE — HISTORY OF PRESENT ILLNESS
[FreeTextEntry1] : Mr. Mascorro is a 59-year-old male with stage IIIB [id number] adenocarcinoma of the prostate, who underwent prostatectomy and pelvic node dissection on 6/5/23 showing Tommy score 3+4=7 disease, focal extracapsular extension, cribriform pattern and intraductal carcinoma. PSA has been rising mostly recently to 0.16 ng/mL, and extent of disease work up showe no evidence of disease. He is receiving salvage radiation therapy to the prostate bed.   He reports that the rectal pain and bleeding are persistent but stable. He had nausea earlier today with abdominal distention, then it passed, and he continues to have bowel movements. He is able to eat. He feels more tightness at the anal sphincter. He is using hydrocortisone, lidocaine and diltiazem for the rectal side effects.

## 2024-10-06 NOTE — DISEASE MANAGEMENT
[Pathological] : TNM Stage: p [IIIB] : IIIB [TTNM] : 3 [NTNM] : 0 [MTNM] : 0 [de-identified] : 3500cGy [de-identified] : [id number] [de-identified] : prostate bed

## 2024-10-06 NOTE — DISEASE MANAGEMENT
[Pathological] : TNM Stage: p [IIIB] : IIIB [TTNM] : 3 [NTNM] : 0 [MTNM] : 0 [de-identified] : 3500cGy [de-identified] : [id number] [de-identified] : prostate bed

## 2024-10-06 NOTE — HISTORY OF PRESENT ILLNESS
[FreeTextEntry1] : Mr. Mascorro is a 59-year-old male with stage IIIB [id number] adenocarcinoma of the prostate, who underwent prostatectomy and pelvic node dissection on 6/5/23 showing Tommy score 3+4=7 disease, focal extracapsular extension, cribriform pattern and intraductal carcinoma. PSA has been rising mostly recently to 0.16 ng/mL, and extent of disease work up showe no evidence of disease. He is receiving salvage radiation therapy to the prostate bed.   He reports that the rectal pain and bleeding are persistent but stable. He had nausea earlier today with abdominal distention, then it passed, and he continues to have bowel movements. He is able to eat. He feels more tightness at the anal sphincter. He is using hydrocortisone, lidocaine and diltiazem for the rectal side effects.

## 2024-10-06 NOTE — DISEASE MANAGEMENT
[Pathological] : TNM Stage: p [IIIB] : IIIB [TTNM] : 3 [MTNM] : 0 [NTNM] : 0 [de-identified] : 3500cGy [de-identified] : [id number] [de-identified] : prostate bed

## 2024-10-07 ENCOUNTER — TRANSCRIPTION ENCOUNTER (OUTPATIENT)
Age: 59
End: 2024-10-07

## 2024-10-07 PROCEDURE — 77387C: CUSTOM

## 2024-10-08 PROCEDURE — 77014: CPT | Mod: 26

## 2024-10-09 ENCOUNTER — NON-APPOINTMENT (OUTPATIENT)
Age: 59
End: 2024-10-09

## 2024-10-09 PROCEDURE — 77387B: CUSTOM | Mod: 26

## 2024-10-10 PROCEDURE — 77387B: CUSTOM | Mod: 26

## 2024-10-11 PROCEDURE — 77387B: CUSTOM | Mod: 26

## 2024-10-11 PROCEDURE — 77427 RADIATION TX MANAGEMENT X5: CPT

## 2024-10-14 PROCEDURE — 77387B: CUSTOM | Mod: 26

## 2024-10-15 PROCEDURE — 77014: CPT | Mod: 26

## 2024-10-15 NOTE — ED ADULT NURSE NOTE - CAS TRG GEN SKIN COLOR
OPERATIVE REPORT  PATIENT NAME: Darlin Mckinney    :  1986  MRN: [id number]  Pt Location: [facility] OR ROOM 02    SURGERY DATE: 10/15/2024    Surgeons and Role:     * Paula Ballesteros MD - Primary     * Michelle Rodriguez MD - Assisting    Preop Diagnosis:  Hx of  section  Insulin controlled gestational diabetes mellitus  Placenta succenturiata  Obesity  Gestational hypertension  Depression  Asthma  Polyhydramnios     Procedure(s) (LRB):   SECTION () REPEAT (N/A)    Specimen(s):  ID Type Source Tests Collected by Time Destination   1 : FOR PATHOLOGY Tissue Placenta TISSUE EXAM Paula Ballesteros MD 10/15/2024 09    A :  Tissue (Placenta on Hold) OB Only Placenta  Paula Ballesteros MD 10/15/2024 0907    B : PRN Tissue Fallopian Tube, Right  Paula Ballesteros MD 10/15/2024 09    C : PRN Tissue Fallopian Tube, Left  Paula Ballesteros MD 10/15/2024 09    D :   Cord  Paula Ballesteros MD 10/15/2024 09    E :   Cord  Paula Ballesteros MD 10/15/2024 09        Surgical QBL:  Surgical QBL (mL): 404 mL      Drains:  Urethral Catheter (Active)   Reasons to continue Urinary Catheter  Post-operative urological requirements 10/15/24 1158   Goal for Removal Remove POD#1 10/15/24 1158   Site Assessment Clean;Skin intact 10/15/24 1158   Collection Container Standard drainage bag 10/15/24 1158   Securement Method Other (Comment) 10/15/24 1142   Number of days: 0       Anesthesia Type:   Spinal     Operative Indications:  IUP@37 weeks  GHTN  GDM  Prior CD     Boo Group Classification System:  No Multiple pregnancy, No Transverse or oblique lie, No Breech lie, Gestational age is > or =37 weeks, Multiparous, Previous uterine scar +  is BOO GROUP 5    Brief History  Darlin Mckinney is a 38 y.o.  at 37w2d admitted for RLTCS in the setting of GHTN.     Operative Indications:  Scheduled repeat low transverse  section      Attending Surgeon: Dr. Ballesteros  Resident Surgeon:  Dr. Rodriguez    Operative Findings:  1. Viable male  on 10/15/2024 at 11:43 AM with APGARs of 8  and 9  at 1 and 5 minutes. Fetus weighed 3410 g (7 lb 8.3 oz); 120.28   2. Copious clear amniotic fluid  3. Intact placenta with accessory lobe present and centrally inserted 3VC.  4. Normal uterus, bilateral tubes and ovaries  5. Minimal filmy adhesions present between anterior uterus and omentum.    QBL: 404 cc    Umbilical Cord Venous Blood Gas:  Results from last 7 days   Lab Units 10/15/24  1148   PH COV  7.296   PCO2 COV mm HG 42.2   HCO3 COV mmol/L 20.1   BASE EXC COV mmol/L -6.1*   O2 CT CD VB mL/dL 8.6   O2 HGB, VENOUS CORD % 47.4     Umbilical Cord Arterial Blood Gas:  Results from last 7 days   Lab Units 10/15/24  1148   PH COA  7.235   PCO2 COA  56.2   PO2 COA mm HG 22.9   HCO3 COA mmol/L 23.3   BASE EXC COA mmol/L -4.9*   O2 CONTENT CORD ART ml/dl 8.7   O2 HGB, ARTERIAL CORD % 44.5       Operative Technique  The patient was taken to the operating room. Spinal anesthesia was adequately established and Ancef 2g was given for preoperative prophylaxis. The patient was then placed in the dorsal supine position with a left tilt of the hips. The patient was then prepped with chlorhexidine for vaginal prep and chloraprep for abdominal prep and draped in the usual sterile fashion for a Pfannenstiel skin incision.      A time out was performed to confirm correct patient and correct procedure.     A Pfannenstiel incision was made in the skin with a surgical scalpel and sharp dissection was carried out over subsequent layers of tissue including the fascia, followed by the Bovie electrocautery for hemostasis. The fascia was incised and the incision was extended bilaterally using the curved Rider scissors.      The superior edge of the fascial incision was grasped with Kocher clamps, tented up and the underlying rectus muscles were dissected off bluntly and sharply using the scalpel. The same was done inferiorly. The  rectus muscles were then divided at midline and the peritoneum was identified, tented up at its upper margin taking care to avoid the bladder, and then entered. The peritoneal incision was extended superiorly and inferiorly.     The bladder blade was inserted and a transverse incision was made in the lower uterine segment using a new surgical blade. The uterine incision was extended cephalad and caudal using blunt dissection. The amniotic sac was entered.    The surgeon's hand was placed into the uterine cavity. The fetal head was identified and elevated through the uterine incision with the assistance of fundal pressure. With gentle traction, the shoulder was delivered, followed by the rest of the fetal body. There was no nuchal cord noted. On delivery the cord was doubly clamped and cut after delayed cord clamping. The infant was then passed off the table to the awaiting  staff. The  was noted to cry spontaneously and moved all extremities. Venous and arterial blood gas, cord blood, and portion of cord was obtained for analysis and routine blood testing. The placenta delivery was then sent to storage. Placenta was noted to be intact with a centrally inserted three-vessel cord. The accessory lobe was identified and present.  Oxytocin was administered by IV infusion to enhance uterine contraction. The uterus was exteriorized and cleared of all clots and remaining products of conception.      The uterine incision was re approximated using an 0 Vicryl in a running locked fashion. A second vertical imbricating stitch with the same suture was applied. The uterine incision was examined and noted to be hemostatic. The posterior cul-de-sac was cleared of all clots and products of conception. The uterus was replaced into the abdomen and the pericolic gutters were cleared of all clots.  The uterine incision was once again reexamined and noted to be hemostatic. The fascia was re approximated using an 0 Vicryl  in a running nonlocked fashion. The subcutaneous tissue was irrigated and cleared of all clots and debris. Good hemostasis was noted with Bovie electrocautery. The subcutaneous tissue was reapproximated with plain gut in a running non-locked fashion. The skin incision was closed using 4-0 monocryl with mepiplex dressing and an abdominal binder on top. Given patient's history of wound infection after her last C/S, keflex and flagyl will be given for extended SSI ppx 48 hrs post partum. Good hemostasis was noted. Patient tolerated the procedure well. All needle, sponge, and instrument counts were noted to be correct x 2 at the end of the procedure.  Patient was transferred to the recovery room in stable condition. Dr. Ballesteros was present and participated in the entire surgery.     Complications:   None apparent     Patient Disposition:  PACU         SIGNATURE: Michelle Rodriguez MD  DATE: October 15, 2024  TIME: 1:02 PM                 Normal for race

## 2024-10-16 ENCOUNTER — NON-APPOINTMENT (OUTPATIENT)
Age: 59
End: 2024-10-16

## 2024-10-16 PROCEDURE — 77387B: CUSTOM | Mod: 26

## 2024-10-17 PROCEDURE — 77387B: CUSTOM | Mod: 26

## 2024-10-17 RX ORDER — LIDOCAINE HYDROCHLORIDE 4 G/100G
4 CREAM TOPICAL
Qty: 1 | Refills: 0 | Status: DISCONTINUED | COMMUNITY
Start: 2024-10-17 | End: 2024-10-17

## 2024-10-23 ENCOUNTER — NON-APPOINTMENT (OUTPATIENT)
Age: 59
End: 2024-10-23

## 2024-11-14 LAB — PSA SERPL-MCNC: <0.01 NG/ML

## 2024-11-18 ENCOUNTER — APPOINTMENT (OUTPATIENT)
Dept: RADIATION ONCOLOGY | Facility: CLINIC | Age: 59
End: 2024-11-18
Payer: MEDICARE

## 2024-11-18 VITALS
TEMPERATURE: 96.98 F | DIASTOLIC BLOOD PRESSURE: 68 MMHG | HEIGHT: 66 IN | HEART RATE: 86 BPM | OXYGEN SATURATION: 98 % | SYSTOLIC BLOOD PRESSURE: 112 MMHG

## 2024-11-18 DIAGNOSIS — C61 MALIGNANT NEOPLASM OF PROSTATE: ICD-10-CM

## 2024-11-18 PROCEDURE — 99024 POSTOP FOLLOW-UP VISIT: CPT

## 2024-12-12 ENCOUNTER — NON-APPOINTMENT (OUTPATIENT)
Age: 59
End: 2024-12-12

## 2024-12-12 ENCOUNTER — INPATIENT (INPATIENT)
Facility: HOSPITAL | Age: 59
LOS: 2 days | Discharge: HOME | DRG: 386 | End: 2024-12-15
Admitting: SURGERY
Payer: MEDICARE

## 2024-12-12 VITALS
OXYGEN SATURATION: 98 % | HEIGHT: 66 IN | DIASTOLIC BLOOD PRESSURE: 80 MMHG | RESPIRATION RATE: 16 BRPM | SYSTOLIC BLOOD PRESSURE: 122 MMHG | TEMPERATURE: 100 F | WEIGHT: 175.05 LBS | HEART RATE: 82 BPM

## 2024-12-12 DIAGNOSIS — Z90.49 ACQUIRED ABSENCE OF OTHER SPECIFIED PARTS OF DIGESTIVE TRACT: Chronic | ICD-10-CM

## 2024-12-12 DIAGNOSIS — Z87.898 PERSONAL HISTORY OF OTHER SPECIFIED CONDITIONS: Chronic | ICD-10-CM

## 2024-12-12 DIAGNOSIS — S92.009A UNSPECIFIED FRACTURE OF UNSPECIFIED CALCANEUS, INITIAL ENCOUNTER FOR CLOSED FRACTURE: Chronic | ICD-10-CM

## 2024-12-12 DIAGNOSIS — Z98.89 OTHER SPECIFIED POSTPROCEDURAL STATES: Chronic | ICD-10-CM

## 2024-12-12 LAB
ALBUMIN SERPL ELPH-MCNC: 4.1 G/DL — SIGNIFICANT CHANGE UP (ref 3.3–5)
ALP SERPL-CCNC: 78 U/L — SIGNIFICANT CHANGE UP (ref 40–120)
ALT FLD-CCNC: 18 U/L — SIGNIFICANT CHANGE UP (ref 10–45)
ANION GAP SERPL CALC-SCNC: 15 MMOL/L — SIGNIFICANT CHANGE UP (ref 5–17)
APPEARANCE UR: CLEAR — SIGNIFICANT CHANGE UP
AST SERPL-CCNC: 15 U/L — SIGNIFICANT CHANGE UP (ref 10–40)
BACTERIA # UR AUTO: NEGATIVE /HPF — SIGNIFICANT CHANGE UP
BASOPHILS # BLD AUTO: 0.02 K/UL — SIGNIFICANT CHANGE UP (ref 0–0.2)
BASOPHILS NFR BLD AUTO: 0.2 % — SIGNIFICANT CHANGE UP (ref 0–2)
BILIRUB SERPL-MCNC: 0.3 MG/DL — SIGNIFICANT CHANGE UP (ref 0.2–1.2)
BILIRUB UR-MCNC: NEGATIVE — SIGNIFICANT CHANGE UP
BUN SERPL-MCNC: 33 MG/DL — HIGH (ref 7–23)
CALCIUM SERPL-MCNC: 9.1 MG/DL — SIGNIFICANT CHANGE UP (ref 8.4–10.5)
CAST: 6 /LPF — HIGH (ref 0–4)
CHLORIDE SERPL-SCNC: 106 MMOL/L — SIGNIFICANT CHANGE UP (ref 96–108)
CO2 SERPL-SCNC: 19 MMOL/L — LOW (ref 22–31)
COLOR SPEC: YELLOW — SIGNIFICANT CHANGE UP
CREAT SERPL-MCNC: 0.7 MG/DL — SIGNIFICANT CHANGE UP (ref 0.5–1.3)
DIFF PNL FLD: NEGATIVE — SIGNIFICANT CHANGE UP
EGFR: 106 ML/MIN/1.73M2 — SIGNIFICANT CHANGE UP
EOSINOPHIL # BLD AUTO: 0 K/UL — SIGNIFICANT CHANGE UP (ref 0–0.5)
EOSINOPHIL NFR BLD AUTO: 0 % — SIGNIFICANT CHANGE UP (ref 0–6)
FLUAV AG NPH QL: SIGNIFICANT CHANGE UP
FLUBV AG NPH QL: SIGNIFICANT CHANGE UP
GAS PNL BLDV: SIGNIFICANT CHANGE UP
GAS PNL BLDV: SIGNIFICANT CHANGE UP
GLUCOSE SERPL-MCNC: 132 MG/DL — HIGH (ref 70–99)
GLUCOSE UR QL: >=1000 MG/DL
HCT VFR BLD CALC: 37.2 % — LOW (ref 39–50)
HGB BLD-MCNC: 11.7 G/DL — LOW (ref 13–17)
IMM GRANULOCYTES NFR BLD AUTO: 1 % — HIGH (ref 0–0.9)
KETONES UR-MCNC: NEGATIVE MG/DL — SIGNIFICANT CHANGE UP
LEUKOCYTE ESTERASE UR-ACNC: NEGATIVE — SIGNIFICANT CHANGE UP
LIDOCAIN IGE QN: 34 U/L — SIGNIFICANT CHANGE UP (ref 7–60)
LYMPHOCYTES # BLD AUTO: 0.14 K/UL — LOW (ref 1–3.3)
LYMPHOCYTES # BLD AUTO: 1.2 % — LOW (ref 13–44)
MAGNESIUM SERPL-MCNC: 1.8 MG/DL — SIGNIFICANT CHANGE UP (ref 1.6–2.6)
MCHC RBC-ENTMCNC: 27.7 PG — SIGNIFICANT CHANGE UP (ref 27–34)
MCHC RBC-ENTMCNC: 31.5 G/DL — LOW (ref 32–36)
MCV RBC AUTO: 87.9 FL — SIGNIFICANT CHANGE UP (ref 80–100)
MONOCYTES # BLD AUTO: 0.19 K/UL — SIGNIFICANT CHANGE UP (ref 0–0.9)
MONOCYTES NFR BLD AUTO: 1.6 % — LOW (ref 2–14)
NEUTROPHILS # BLD AUTO: 11.49 K/UL — HIGH (ref 1.8–7.4)
NEUTROPHILS NFR BLD AUTO: 96 % — HIGH (ref 43–77)
NITRITE UR-MCNC: NEGATIVE — SIGNIFICANT CHANGE UP
NRBC # BLD: 0 /100 WBCS — SIGNIFICANT CHANGE UP (ref 0–0)
PH UR: 5 — SIGNIFICANT CHANGE UP (ref 5–8)
PHOSPHATE SERPL-MCNC: 3.5 MG/DL — SIGNIFICANT CHANGE UP (ref 2.5–4.5)
PLATELET # BLD AUTO: 159 K/UL — SIGNIFICANT CHANGE UP (ref 150–400)
POTASSIUM SERPL-MCNC: 4.3 MMOL/L — SIGNIFICANT CHANGE UP (ref 3.5–5.3)
POTASSIUM SERPL-SCNC: 4.3 MMOL/L — SIGNIFICANT CHANGE UP (ref 3.5–5.3)
PROT SERPL-MCNC: 6.8 G/DL — SIGNIFICANT CHANGE UP (ref 6–8.3)
PROT UR-MCNC: NEGATIVE MG/DL — SIGNIFICANT CHANGE UP
RBC # BLD: 4.23 M/UL — SIGNIFICANT CHANGE UP (ref 4.2–5.8)
RBC # FLD: 15 % — HIGH (ref 10.3–14.5)
RBC CASTS # UR COMP ASSIST: 0 /HPF — SIGNIFICANT CHANGE UP (ref 0–4)
REVIEW: SIGNIFICANT CHANGE UP
RSV RNA NPH QL NAA+NON-PROBE: SIGNIFICANT CHANGE UP
SARS-COV-2 RNA SPEC QL NAA+PROBE: SIGNIFICANT CHANGE UP
SODIUM SERPL-SCNC: 140 MMOL/L — SIGNIFICANT CHANGE UP (ref 135–145)
SP GR SPEC: 1.02 — SIGNIFICANT CHANGE UP (ref 1–1.03)
SQUAMOUS # UR AUTO: 0 /HPF — SIGNIFICANT CHANGE UP (ref 0–5)
TROPONIN T, HIGH SENSITIVITY RESULT: <6 NG/L — SIGNIFICANT CHANGE UP (ref 0–51)
UROBILINOGEN FLD QL: 0.2 MG/DL — SIGNIFICANT CHANGE UP (ref 0.2–1)
WBC # BLD: 11.96 K/UL — HIGH (ref 3.8–10.5)
WBC # FLD AUTO: 11.96 K/UL — HIGH (ref 3.8–10.5)
WBC UR QL: 0 /HPF — SIGNIFICANT CHANGE UP (ref 0–5)

## 2024-12-12 PROCEDURE — 71045 X-RAY EXAM CHEST 1 VIEW: CPT | Mod: 26

## 2024-12-12 PROCEDURE — 99223 1ST HOSP IP/OBS HIGH 75: CPT | Mod: FS

## 2024-12-12 PROCEDURE — 74177 CT ABD & PELVIS W/CONTRAST: CPT | Mod: 26,MC

## 2024-12-12 RX ORDER — CELECOXIB 200 MG/1
200 CAPSULE ORAL
Refills: 0 | Status: DISCONTINUED | OUTPATIENT
Start: 2024-12-12 | End: 2024-12-15

## 2024-12-12 RX ORDER — PANTOPRAZOLE SODIUM 40 MG/1
40 TABLET, DELAYED RELEASE ORAL
Refills: 0 | Status: DISCONTINUED | OUTPATIENT
Start: 2024-12-12 | End: 2024-12-15

## 2024-12-12 RX ORDER — TRAMADOL HYDROCHLORIDE 300 MG/1
50 CAPSULE ORAL
Refills: 0 | Status: DISCONTINUED | OUTPATIENT
Start: 2024-12-12 | End: 2024-12-15

## 2024-12-12 RX ORDER — ROSUVASTATIN CALCIUM 5 MG/1
5 TABLET, FILM COATED ORAL AT BEDTIME
Refills: 0 | Status: DISCONTINUED | OUTPATIENT
Start: 2024-12-12 | End: 2024-12-15

## 2024-12-12 RX ORDER — MONTELUKAST SODIUM 10 MG/1
10 TABLET ORAL AT BEDTIME
Refills: 0 | Status: DISCONTINUED | OUTPATIENT
Start: 2024-12-12 | End: 2024-12-15

## 2024-12-12 RX ORDER — SODIUM CHLORIDE 9 MG/ML
500 INJECTION, SOLUTION INTRAMUSCULAR; INTRAVENOUS; SUBCUTANEOUS ONCE
Refills: 0 | Status: COMPLETED | OUTPATIENT
Start: 2024-12-12 | End: 2024-12-12

## 2024-12-12 RX ORDER — AMLODIPINE BESYLATE 10 MG/1
5 TABLET ORAL DAILY
Refills: 0 | Status: DISCONTINUED | OUTPATIENT
Start: 2024-12-12 | End: 2024-12-13

## 2024-12-12 RX ORDER — FLUTICASONE PROPIONATE AND SALMETEROL XINAFOATE 45; 21 UG/1; UG/1
1 AEROSOL, METERED RESPIRATORY (INHALATION)
Refills: 0 | Status: DISCONTINUED | OUTPATIENT
Start: 2024-12-12 | End: 2024-12-15

## 2024-12-12 RX ORDER — ALLOPURINOL 300 MG/1
100 TABLET ORAL DAILY
Refills: 0 | Status: DISCONTINUED | OUTPATIENT
Start: 2024-12-12 | End: 2024-12-15

## 2024-12-12 RX ORDER — ACETAMINOPHEN 500MG 500 MG/1
1000 TABLET, COATED ORAL ONCE
Refills: 0 | Status: DISCONTINUED | OUTPATIENT
Start: 2024-12-12 | End: 2024-12-12

## 2024-12-12 RX ORDER — ACETAMINOPHEN 500MG 500 MG/1
1000 TABLET, COATED ORAL ONCE
Refills: 0 | Status: COMPLETED | OUTPATIENT
Start: 2024-12-12 | End: 2024-12-12

## 2024-12-12 RX ORDER — SODIUM CHLORIDE 0.65 %
1 AEROSOL, SPRAY (ML) NASAL THREE TIMES A DAY
Refills: 0 | Status: DISCONTINUED | OUTPATIENT
Start: 2024-12-12 | End: 2024-12-15

## 2024-12-12 RX ORDER — 0.9 % SODIUM CHLORIDE 0.9 %
1000 INTRAVENOUS SOLUTION INTRAVENOUS ONCE
Refills: 0 | Status: COMPLETED | OUTPATIENT
Start: 2024-12-12 | End: 2024-12-12

## 2024-12-12 RX ORDER — GLUCOSAMINE SULFATE DIPOT CHLR 500 MG
1 CAPSULE ORAL DAILY
Refills: 0 | Status: DISCONTINUED | OUTPATIENT
Start: 2024-12-12 | End: 2024-12-15

## 2024-12-12 RX ORDER — 0.9 % SODIUM CHLORIDE 0.9 %
1000 INTRAVENOUS SOLUTION INTRAVENOUS
Refills: 0 | Status: DISCONTINUED | OUTPATIENT
Start: 2024-12-12 | End: 2024-12-14

## 2024-12-12 RX ORDER — LOSARTAN POTASSIUM 100 MG/1
100 TABLET, FILM COATED ORAL DAILY
Refills: 0 | Status: DISCONTINUED | OUTPATIENT
Start: 2024-12-12 | End: 2024-12-13

## 2024-12-12 RX ORDER — HEPARIN SODIUM,PORCINE 1000/ML
5000 VIAL (ML) INJECTION EVERY 8 HOURS
Refills: 0 | Status: DISCONTINUED | OUTPATIENT
Start: 2024-12-12 | End: 2024-12-15

## 2024-12-12 RX ORDER — METOPROLOL TARTRATE 100 MG/1
25 TABLET, FILM COATED ORAL DAILY
Refills: 0 | Status: DISCONTINUED | OUTPATIENT
Start: 2024-12-12 | End: 2024-12-13

## 2024-12-12 RX ORDER — ONDANSETRON HYDROCHLORIDE 4 MG/1
4 TABLET, FILM COATED ORAL ONCE
Refills: 0 | Status: COMPLETED | OUTPATIENT
Start: 2024-12-12 | End: 2024-12-12

## 2024-12-12 RX ORDER — FAMOTIDINE 20 MG/1
20 TABLET, FILM COATED ORAL ONCE
Refills: 0 | Status: COMPLETED | OUTPATIENT
Start: 2024-12-12 | End: 2024-12-12

## 2024-12-12 RX ADMIN — Medication 1 APPLICATION(S): at 16:12

## 2024-12-12 RX ADMIN — Medication 1 SPRAY(S): at 23:17

## 2024-12-12 RX ADMIN — Medication 125 MILLILITER(S): at 21:34

## 2024-12-12 RX ADMIN — ONDANSETRON HYDROCHLORIDE 4 MILLIGRAM(S): 4 TABLET, FILM COATED ORAL at 14:00

## 2024-12-12 RX ADMIN — TRAMADOL HYDROCHLORIDE 50 MILLIGRAM(S): 300 CAPSULE ORAL at 22:31

## 2024-12-12 RX ADMIN — CELECOXIB 200 MILLIGRAM(S): 200 CAPSULE ORAL at 23:17

## 2024-12-12 RX ADMIN — SODIUM CHLORIDE 500 MILLILITER(S): 9 INJECTION, SOLUTION INTRAMUSCULAR; INTRAVENOUS; SUBCUTANEOUS at 16:12

## 2024-12-12 RX ADMIN — ACETAMINOPHEN 500MG 400 MILLIGRAM(S): 500 TABLET, COATED ORAL at 14:01

## 2024-12-12 RX ADMIN — ROSUVASTATIN CALCIUM 5 MILLIGRAM(S): 5 TABLET, FILM COATED ORAL at 21:36

## 2024-12-12 RX ADMIN — Medication 81 MILLIGRAM(S): at 21:36

## 2024-12-12 RX ADMIN — ACETAMINOPHEN 500MG 400 MILLIGRAM(S): 500 TABLET, COATED ORAL at 22:31

## 2024-12-12 RX ADMIN — Medication 4000 MILLILITER(S): at 14:11

## 2024-12-12 RX ADMIN — Medication 1000 MILLILITER(S): at 23:19

## 2024-12-12 RX ADMIN — MONTELUKAST SODIUM 10 MILLIGRAM(S): 10 TABLET ORAL at 21:37

## 2024-12-12 RX ADMIN — FAMOTIDINE 20 MILLIGRAM(S): 20 TABLET, FILM COATED ORAL at 13:59

## 2024-12-12 RX ADMIN — Medication 100 MILLILITER(S): at 23:19

## 2024-12-12 NOTE — ED CDU PROVIDER DISPOSITION NOTE - CLINICAL COURSE
59-year-old male PMhx CAD s/p 9 stents, Crohn's disease s/p small bowel resection, prostate CA s/p radical prostatectomy, HTN HLD, presents to the ED c/o generalized bodyaches and sudden onset nausea and 3 episodes of NB/NB vomiting starting today. Labs unremarkable.  CT showing wall for lengthening of the ileum concerning for enteritis.  Plan to observe patient in the CDU overnight for antiemetics, pain control and p.o. challenge.   in CDU, 59-year-old male PMhx CAD s/p 9 stents, Crohn's disease s/p small bowel resection, prostate CA s/p radical prostatectomy, HTN HLD, presents to the ED c/o generalized bodyaches and sudden onset nausea and 3 episodes of NB/NB vomiting starting today. Labs unremarkable.  CT showing wall for lengthening of the ileum concerning for enteritis.  Plan to observe patient in the CDU overnight for antiemetics, pain control and p.o. challenge.   in CDU, patient developed fever. GI and colorectal surgery consulted given patient on immunosuppressants. colorectal surgery recommending admission to Dr. Stevens. c/d/w Dr. Chadwick

## 2024-12-12 NOTE — ED PROVIDER NOTE - ATTENDING CONTRIBUTION TO CARE
------------ATTENDING NOTE------------  pt w/ wife c/o < 24 hrs of nausea, small amts nbnb vomiting, diffuse myalgias, decreased PO, no fevers, complicated comorbidities, awaiting labs/imaging and close reassessments -->  - Ricardo Niño MD   -------------------------------------------------------

## 2024-12-12 NOTE — H&P ADULT - TIME BILLING
All 61 minutes used for clinical care, reviewing chart, reviewing data, reviewing films, at bedside, and coordinating care.

## 2024-12-12 NOTE — ED PROVIDER NOTE - OBJECTIVE STATEMENT
59-year-old male PMhx CAD s/p 9 stents, Crohn's disease s/p small bowel resection, prostate CA s/p radical prostatectomy, HTN HLD, presents to the ED c/o generalized bodyaches and sudden onset nausea and 3 episodes of NB/NB vomiting starting today. 59-year-old male PMhx CAD s/p 9 stents, Crohn's disease s/p small bowel resection, prostate CA s/p radical prostatectomy, HTN HLD, presents to the ED c/o generalized bodyaches and sudden onset nausea and 3 episodes of NB/NB vomiting starting today. Today woke up and went to gym, while there started to notice some increasing body aches (does have baseline body aches which he follows with outpatient room for) as well as nausea with 3 episodes of NB/NB vomiting.  Since then patient has been feeling generally unwell with increased fatigue.  Wife at bedside states he seems like he has no energy at all. Went to urgent care prior to arrival where he had strep test performed, reportedly negative. Of note, recently diagnosed with impetigo to the left upper lip which has been applying mupirocin ointment for. Denies chest pain, shortness of breath, recent travel, neck stiffness, abdominal pain, dizziness/lightheadedness, headache, rash, recent sick contacts, throat pain.

## 2024-12-12 NOTE — H&P ADULT - ASSESSMENT
59M with history of Crohn's disease presenting now with abdominal pain, nausea/vomiting and fevers and CT evidence of thickened small bowel concerning for Crohn's flare    Plan:  - Admit to Dr Stevens, transfer to Dr Michel in AM  - CLD  - IVF  - Pain control  - GI consult for management of crohn's flare  - ESR, CRP and stool studies with AM labs    [facility] [id number]

## 2024-12-12 NOTE — ED CDU PROVIDER DISPOSITION NOTE - ATTENDING APP SHARED VISIT CONTRIBUTION OF CARE
LUIS: I , Dr Liss Chadwick have seen and evaluated the patient. Results of labs, tests, imaging have been reviewed. Hx of crohns with NVD and enteritis on CT. Colorectal surg was consulted. Pt admitted to their service for further mgmt.

## 2024-12-12 NOTE — H&P ADULT - ATTENDING COMMENTS
Patient feels better this morning.  No pain and no tenderness.  Clear liquids.  Cipro and Flagyl.  GI consult.

## 2024-12-12 NOTE — ED PROVIDER NOTE - PROGRESS NOTE DETAILS
Mupirocin ointment ordered as patient had prior impetigo and was requesting his dose. Do not feel patient is septic at this time. - Roberto Carlos Hidalgo PA-C

## 2024-12-12 NOTE — ED CDU PROVIDER INITIAL DAY NOTE - PROGRESS NOTE DETAILS
colorectal consulted, recommend admission to their service. no abx/steriods at this point. gi email sent. c/d/w Dr. Chadwick no

## 2024-12-12 NOTE — H&P ADULT - HISTORY OF PRESENT ILLNESS
59M with PMH of CAD with 9 stents, prostate cancer s/p prostatectomy and Crohn's disease s/p SBR, stricturoplasty in 2018 presenting with generalized body aches, abdominal pain, nausea/vomiting and fevers. He reports the pain started 2 days ago and had progressively worsening nausea and vomiting. He reports diarrhea, but that this is chronic. He had  fever and was fatigued prompting a visit to urgent care and then sent to ED.    In the ED, he was hemodynamically normal, noted to have slight leukocytosis and CT scan showing inflammation of ileum near previous resection and stricture site

## 2024-12-12 NOTE — ED CDU PROVIDER INITIAL DAY NOTE - OBJECTIVE STATEMENT
59-year-old male PMhx CAD s/p 9 stents, Crohn's disease s/p small bowel resection, prostate CA s/p radical prostatectomy, HTN HLD, presents to the ED c/o generalized bodyaches and sudden onset nausea and 3 episodes of NB/NB vomiting starting today. Today woke up and went to gym, while there started to notice some increasing body aches (does have baseline body aches which he follows with outpatient room for) as well as nausea with 3 episodes of NB/NB vomiting.  Since then patient has been feeling generally unwell with increased fatigue.  Wife at bedside states he seems like he has no energy at all. Went to urgent care prior to arrival where he had strep test performed, reportedly negative. Of note, recently diagnosed with impetigo to the left upper lip which has been applying mupirocin ointment for. Denies chest pain, shortness of breath, recent travel, neck stiffness, abdominal pain, dizziness/lightheadedness, headache, rash, recent sick contacts, throat pain.    In the ED VSS.  Labs unremarkable.  CT showing wall for lengthening of the ileum concerning for enteritis.  Plan to observe patient in the CDU overnight for antiemetics, pain control and p.o. challenge.

## 2024-12-12 NOTE — H&P ADULT - NSHPLABSRESULTS_GEN_ALL_CORE
11.7   11.96 )-----------( 159      ( 12 Dec 2024 14:16 )             37.2     12-    140  |  106  |  33[H]  ----------------------------<  132[H]  4.3   |  19[L]  |  0.70    Ca    9.1      12 Dec 2024 14:16  Phos  3.5     12-  Mg     1.8     -    TPro  6.8  /  Alb  4.1  /  TBili  0.3  /  DBili  x   /  AST  15  /  ALT  18  /  AlkPhos  78  12-12      Urinalysis Basic - ( 12 Dec 2024 16:59 )    Color: Yellow / Appearance: Clear / S.025 / pH: x  Gluc: x / Ketone: Negative mg/dL  / Bili: Negative / Urobili: 0.2 mg/dL   Blood: x / Protein: Negative mg/dL / Nitrite: Negative   Leuk Esterase: Negative / RBC: 0 /HPF / WBC 0 /HPF   Sq Epi: x / Non Sq Epi: 0 /HPF / Bacteria: Negative /HPF    < from: CT Abdomen and Pelvis w/ IV Cont (24 @ 17:11) >      FINDINGS:  LOWER CHEST: Within normal limits.    LIVER: Enlarged. 21.7 cm in craniocaudal diameter.  BILE DUCTS: Normal caliber.  GALLBLADDER: Within normal limits.  SPLEEN: Mildly enlarged. 13.7 cm in craniocaudal diameter.  PANCREAS: Within normal limits.  ADRENALS: Within normal limits.  KIDNEYS/URETERS: 1.1 cm right interpolar lesion has increased in size,   previously characterized as hemorrhagic/proteinaceous cyst.    BLADDER: Within normal limits.  REPRODUCTIVE ORGANS: Hysterectomy.    BOWEL: No bowel obstruction. Appendectomy. Small bowel anastomosis. Wall   thickening of the ileum distal to the anastomosis, concerning for   enteritis.  PERITONEUM/RETROPERITONEUM: Within normal limits.  VESSELS: Atherosclerotic calcifications.  LYMPH NODES: No lymphadenopathy.  ABDOMINAL WALL: Within normal limits.  BONES: Degenerative changes.    IMPRESSION:  Wall thickening of ileum distal to the small bowel anastomosis,   concerning for enteritis.      < end of copied text >

## 2024-12-12 NOTE — ED CDU PROVIDER DISPOSITION NOTE - NSFOLLOWUPINSTRUCTIONS_ED_ALL_ED_FT
stay hydrated  bland diet  Follow up with your Primary Care Physician within the next 2-3 days  Bring a copy of your test results with you to your appointment  Continue your current medication regimen  Return to the Emergency Room if you experience new or worsening symptoms

## 2024-12-12 NOTE — ED CDU PROVIDER INITIAL DAY NOTE - DETAILS
vital signs q4h, pain control, antiemetics, IVF, PO challenge, frequent re-evaluations  case d/w ED team

## 2024-12-12 NOTE — ED ADULT TRIAGE NOTE - NSWEIGHTCALCTOOLDRUG_GEN_A_CORE
History of Present Illness:   Pateint presents today  6 weeks postop, status post Spontaneous vaginal Delivery  , with complaint of none.      Past medical and surgical history reviewed.   I have reviewed the patient's medical history in detail and updated the computerized patient record.    Physical exam:  Vital Signs: as above, reviewed.  Constitutional: She is oriented to person, place, and time. She appears well-developed and well-nourished. No distress.   HENT:   Head: Normocephalic and atraumatic.   Eyes: Conjunctivae and EOM are normal. No scleral icterus.   Neck: Normal range of motion. Neck supple. No tracheal deviation present.   Cardiovascular: Normal rate.    Pulmonary/Chest: Effort normal. No respiratory distress. She exhibits no tenderness.  Breasts: deferred, breast feeding  Abdominal: Soft. She exhibits no distension and no mass. There is no rebound and no guarding.   Genitourinary:    External rectal exam shows no thrombosed external hemorrhoids.    Pelvic exam was performed with patient supine.   No labial fusion.   There is no rash, lesion or injury on the right labia.   There is no rash, lesion or injury on the left labia.   No bleeding and no signs of injury around the vaginal introitus, healed well, urethra is without lesions and well supported. The cervix is visualized with no discharge, lesions or friability.   No vaginal discharge found.    No significant Cystocele, Enterocele or rectocele, and uterus well supported.   Bimanual exam:   The urethra is normal to palpation and there are no palpable vaginal wall masses.   Uterus is not deviated, not enlarged, not fixed, normal shape and not tender.   Cervix exhibits no motion tenderness.    Right adnexum displays no mass and no tenderness.   Left adnexum displays no mass and no tenderness.  Musculoskeletal: Normal range of motion.   Lymphadenopathy: No inguinal adenopathy present.   Neurological: She is alert and oriented to person, place, and  time. Coordination normal.   Skin: Skin is warm and dry. She is not diaphoretic.   Psychiatric: She has a normal mood and affect.    Assessment:  Normal pp exam  Breast feeding - rec NORqd for Birth control     Plan:  Follow up  1 year  PAP done, Norqd      used

## 2024-12-13 DIAGNOSIS — K50.90 CROHN'S DISEASE, UNSPECIFIED, WITHOUT COMPLICATIONS: ICD-10-CM

## 2024-12-13 LAB
ALBUMIN SERPL ELPH-MCNC: 3.4 G/DL — SIGNIFICANT CHANGE UP (ref 3.3–5)
ALP SERPL-CCNC: 60 U/L — SIGNIFICANT CHANGE UP (ref 40–120)
ALT FLD-CCNC: 14 U/L — SIGNIFICANT CHANGE UP (ref 10–45)
ANION GAP SERPL CALC-SCNC: 13 MMOL/L — SIGNIFICANT CHANGE UP (ref 5–17)
AST SERPL-CCNC: 14 U/L — SIGNIFICANT CHANGE UP (ref 10–40)
BASOPHILS # BLD AUTO: 0 K/UL — SIGNIFICANT CHANGE UP (ref 0–0.2)
BASOPHILS NFR BLD AUTO: 0 % — SIGNIFICANT CHANGE UP (ref 0–2)
BILIRUB SERPL-MCNC: 0.4 MG/DL — SIGNIFICANT CHANGE UP (ref 0.2–1.2)
BUN SERPL-MCNC: 17 MG/DL — SIGNIFICANT CHANGE UP (ref 7–23)
CALCIUM SERPL-MCNC: 8.8 MG/DL — SIGNIFICANT CHANGE UP (ref 8.4–10.5)
CHLORIDE SERPL-SCNC: 105 MMOL/L — SIGNIFICANT CHANGE UP (ref 96–108)
CO2 SERPL-SCNC: 22 MMOL/L — SIGNIFICANT CHANGE UP (ref 22–31)
CREAT SERPL-MCNC: 0.66 MG/DL — SIGNIFICANT CHANGE UP (ref 0.5–1.3)
CRP SERPL-MCNC: 85 MG/L — HIGH (ref 0–4)
EGFR: 108 ML/MIN/1.73M2 — SIGNIFICANT CHANGE UP
EOSINOPHIL # BLD AUTO: 0.01 K/UL — SIGNIFICANT CHANGE UP (ref 0–0.5)
EOSINOPHIL NFR BLD AUTO: 0.2 % — SIGNIFICANT CHANGE UP (ref 0–6)
ERYTHROCYTE [SEDIMENTATION RATE] IN BLOOD: 18 MM/HR — SIGNIFICANT CHANGE UP (ref 0–20)
GI PCR PANEL: DETECTED
GLUCOSE SERPL-MCNC: 105 MG/DL — HIGH (ref 70–99)
HCT VFR BLD CALC: 30.8 % — LOW (ref 39–50)
HGB BLD-MCNC: 9.7 G/DL — LOW (ref 13–17)
IMM GRANULOCYTES NFR BLD AUTO: 1.7 % — HIGH (ref 0–0.9)
LYMPHOCYTES # BLD AUTO: 0.31 K/UL — LOW (ref 1–3.3)
LYMPHOCYTES # BLD AUTO: 7.3 % — LOW (ref 13–44)
MAGNESIUM SERPL-MCNC: 1.8 MG/DL — SIGNIFICANT CHANGE UP (ref 1.6–2.6)
MCHC RBC-ENTMCNC: 27.2 PG — SIGNIFICANT CHANGE UP (ref 27–34)
MCHC RBC-ENTMCNC: 31.5 G/DL — LOW (ref 32–36)
MCV RBC AUTO: 86.5 FL — SIGNIFICANT CHANGE UP (ref 80–100)
MONOCYTES # BLD AUTO: 0.27 K/UL — SIGNIFICANT CHANGE UP (ref 0–0.9)
MONOCYTES NFR BLD AUTO: 6.4 % — SIGNIFICANT CHANGE UP (ref 2–14)
NEUTROPHILS # BLD AUTO: 3.57 K/UL — SIGNIFICANT CHANGE UP (ref 1.8–7.4)
NEUTROPHILS NFR BLD AUTO: 84.4 % — HIGH (ref 43–77)
NOROVIRUS GI+II RNA STL QL NAA+NON-PROBE: DETECTED
NRBC # BLD: 0 /100 WBCS — SIGNIFICANT CHANGE UP (ref 0–0)
PHOSPHATE SERPL-MCNC: 3.4 MG/DL — SIGNIFICANT CHANGE UP (ref 2.5–4.5)
PLATELET # BLD AUTO: 124 K/UL — LOW (ref 150–400)
POTASSIUM SERPL-MCNC: 3.6 MMOL/L — SIGNIFICANT CHANGE UP (ref 3.5–5.3)
POTASSIUM SERPL-SCNC: 3.6 MMOL/L — SIGNIFICANT CHANGE UP (ref 3.5–5.3)
PROT SERPL-MCNC: 5.6 G/DL — LOW (ref 6–8.3)
RBC # BLD: 3.56 M/UL — LOW (ref 4.2–5.8)
RBC # FLD: 14.9 % — HIGH (ref 10.3–14.5)
SODIUM SERPL-SCNC: 140 MMOL/L — SIGNIFICANT CHANGE UP (ref 135–145)
WBC # BLD: 4.23 K/UL — SIGNIFICANT CHANGE UP (ref 3.8–10.5)
WBC # FLD AUTO: 4.23 K/UL — SIGNIFICANT CHANGE UP (ref 3.8–10.5)

## 2024-12-13 PROCEDURE — 99223 1ST HOSP IP/OBS HIGH 75: CPT | Mod: GC

## 2024-12-13 RX ORDER — AMLODIPINE BESYLATE 10 MG/1
5 TABLET ORAL DAILY
Refills: 0 | Status: DISCONTINUED | OUTPATIENT
Start: 2024-12-13 | End: 2024-12-15

## 2024-12-13 RX ORDER — METRONIDAZOLE 500 MG/1
500 TABLET ORAL ONCE
Refills: 0 | Status: COMPLETED | OUTPATIENT
Start: 2024-12-13 | End: 2024-12-13

## 2024-12-13 RX ORDER — ACETAMINOPHEN 500MG 500 MG/1
975 TABLET, COATED ORAL EVERY 6 HOURS
Refills: 0 | Status: DISCONTINUED | OUTPATIENT
Start: 2024-12-13 | End: 2024-12-15

## 2024-12-13 RX ORDER — CIPROFLOXACIN HCL 750 MG
400 TABLET ORAL EVERY 12 HOURS
Refills: 0 | Status: DISCONTINUED | OUTPATIENT
Start: 2024-12-13 | End: 2024-12-14

## 2024-12-13 RX ORDER — METRONIDAZOLE 500 MG/1
TABLET ORAL
Refills: 0 | Status: DISCONTINUED | OUTPATIENT
Start: 2024-12-13 | End: 2024-12-14

## 2024-12-13 RX ORDER — CIPROFLOXACIN HCL 750 MG
TABLET ORAL
Refills: 0 | Status: DISCONTINUED | OUTPATIENT
Start: 2024-12-13 | End: 2024-12-14

## 2024-12-13 RX ORDER — METOPROLOL TARTRATE 100 MG/1
25 TABLET, FILM COATED ORAL DAILY
Refills: 0 | Status: DISCONTINUED | OUTPATIENT
Start: 2024-12-13 | End: 2024-12-15

## 2024-12-13 RX ORDER — METRONIDAZOLE 500 MG/1
500 TABLET ORAL EVERY 8 HOURS
Refills: 0 | Status: DISCONTINUED | OUTPATIENT
Start: 2024-12-13 | End: 2024-12-14

## 2024-12-13 RX ORDER — LOSARTAN POTASSIUM 100 MG/1
100 TABLET, FILM COATED ORAL DAILY
Refills: 0 | Status: DISCONTINUED | OUTPATIENT
Start: 2024-12-13 | End: 2024-12-15

## 2024-12-13 RX ORDER — CETIRIZINE HYDROCHLORIDE 10 MG/1
10 TABLET ORAL DAILY
Refills: 0 | Status: DISCONTINUED | OUTPATIENT
Start: 2024-12-13 | End: 2024-12-15

## 2024-12-13 RX ORDER — CIPROFLOXACIN HCL 750 MG
400 TABLET ORAL ONCE
Refills: 0 | Status: COMPLETED | OUTPATIENT
Start: 2024-12-13 | End: 2024-12-13

## 2024-12-13 RX ADMIN — AMLODIPINE BESYLATE 5 MILLIGRAM(S): 10 TABLET ORAL at 05:32

## 2024-12-13 RX ADMIN — TRAMADOL HYDROCHLORIDE 50 MILLIGRAM(S): 300 CAPSULE ORAL at 17:27

## 2024-12-13 RX ADMIN — LOSARTAN POTASSIUM 100 MILLIGRAM(S): 100 TABLET, FILM COATED ORAL at 05:32

## 2024-12-13 RX ADMIN — FLUTICASONE PROPIONATE AND SALMETEROL XINAFOATE 1 DOSE(S): 45; 21 AEROSOL, METERED RESPIRATORY (INHALATION) at 05:32

## 2024-12-13 RX ADMIN — ACETAMINOPHEN 500MG 975 MILLIGRAM(S): 500 TABLET, COATED ORAL at 19:00

## 2024-12-13 RX ADMIN — Medication 5000 UNIT(S): at 05:33

## 2024-12-13 RX ADMIN — Medication 1 MILLIGRAM(S): at 09:29

## 2024-12-13 RX ADMIN — ACETAMINOPHEN 500MG 975 MILLIGRAM(S): 500 TABLET, COATED ORAL at 18:30

## 2024-12-13 RX ADMIN — METRONIDAZOLE 100 MILLIGRAM(S): 500 TABLET ORAL at 08:31

## 2024-12-13 RX ADMIN — ALLOPURINOL 100 MILLIGRAM(S): 300 TABLET ORAL at 09:30

## 2024-12-13 RX ADMIN — Medication 1 SPRAY(S): at 05:32

## 2024-12-13 RX ADMIN — Medication 81 MILLIGRAM(S): at 21:28

## 2024-12-13 RX ADMIN — TRAMADOL HYDROCHLORIDE 50 MILLIGRAM(S): 300 CAPSULE ORAL at 09:58

## 2024-12-13 RX ADMIN — Medication 200 MILLIGRAM(S): at 17:29

## 2024-12-13 RX ADMIN — Medication 200 MILLIGRAM(S): at 08:31

## 2024-12-13 RX ADMIN — CELECOXIB 200 MILLIGRAM(S): 200 CAPSULE ORAL at 09:30

## 2024-12-13 RX ADMIN — PANTOPRAZOLE SODIUM 40 MILLIGRAM(S): 40 TABLET, DELAYED RELEASE ORAL at 05:33

## 2024-12-13 RX ADMIN — METOPROLOL TARTRATE 25 MILLIGRAM(S): 100 TABLET, FILM COATED ORAL at 05:32

## 2024-12-13 RX ADMIN — METRONIDAZOLE 100 MILLIGRAM(S): 500 TABLET ORAL at 21:13

## 2024-12-13 RX ADMIN — MONTELUKAST SODIUM 10 MILLIGRAM(S): 10 TABLET ORAL at 21:11

## 2024-12-13 RX ADMIN — ACETAMINOPHEN 500MG 975 MILLIGRAM(S): 500 TABLET, COATED ORAL at 23:12

## 2024-12-13 RX ADMIN — CELECOXIB 200 MILLIGRAM(S): 200 CAPSULE ORAL at 17:29

## 2024-12-13 RX ADMIN — ACETAMINOPHEN 500MG 975 MILLIGRAM(S): 500 TABLET, COATED ORAL at 23:42

## 2024-12-13 RX ADMIN — TRAMADOL HYDROCHLORIDE 50 MILLIGRAM(S): 300 CAPSULE ORAL at 09:30

## 2024-12-13 RX ADMIN — ROSUVASTATIN CALCIUM 5 MILLIGRAM(S): 5 TABLET, FILM COATED ORAL at 21:11

## 2024-12-13 RX ADMIN — Medication 25 GRAM(S): at 09:29

## 2024-12-13 RX ADMIN — Medication 5000 UNIT(S): at 14:14

## 2024-12-13 RX ADMIN — Medication 1 SPRAY(S): at 14:11

## 2024-12-13 RX ADMIN — ACETAMINOPHEN 500MG 1000 MILLIGRAM(S): 500 TABLET, COATED ORAL at 00:56

## 2024-12-13 RX ADMIN — METRONIDAZOLE 100 MILLIGRAM(S): 500 TABLET ORAL at 14:12

## 2024-12-13 RX ADMIN — FLUTICASONE PROPIONATE AND SALMETEROL XINAFOATE 1 DOSE(S): 45; 21 AEROSOL, METERED RESPIRATORY (INHALATION) at 17:29

## 2024-12-13 RX ADMIN — CETIRIZINE HYDROCHLORIDE 10 MILLIGRAM(S): 10 TABLET ORAL at 16:49

## 2024-12-13 RX ADMIN — Medication 5000 UNIT(S): at 21:13

## 2024-12-13 RX ADMIN — CELECOXIB 200 MILLIGRAM(S): 200 CAPSULE ORAL at 09:58

## 2024-12-13 RX ADMIN — Medication 1 SPRAY(S): at 21:12

## 2024-12-13 NOTE — CONSULT NOTE ADULT - SUBJECTIVE AND OBJECTIVE BOX
Initial GI Consult      59M with PMH of CAD with 9 stents, prostate cancer s/p prostatectomy and Crohn's disease s/p SBR, stricturoplasty in 2018 presenting with generalized body aches, abdominal pain, nausea/vomiting and fevers. per patient yesterday he started feeling fatigue associated with N/V, non bloody, non bilous. Also had a fever of 101.8 and R sided abdominal pain. reports baseline bm of 5-10 daily, watery but denies worsening. Denies recent travels, exposure, family history of colon cancer, eating out, recent abx or hospitalization skin rash. Endorses joint pains.     Of note, patient was diagnosed with Crohns in 2009 and follows with Dr. Ketan Hinds at [facility] and currently on Skyrizzi for 1 yr with reported last flare 1yr ago. Prior to Skyrizzi, he has tried but failed stelara, Entyvio and Humira. in 2023, he had prostate cancer and has been on Lupron injections every 3 months.     Psx - distal ileum removal march 2018.     Adm labs - wbc 4.2, hb 11.7 >> 9.7, K 3.4    Ct abd - Wall thickening of ileum distal to the small bowel anastomosis,   concerning for enteritis.        PAST MEDICAL & SURGICAL HISTORY:  GERD (gastroesophageal reflux disease)      Lower back pain      Shoulder pain, bilateral      Bilateral knee pain      Inguinal mass      HLD (hyperlipidemia)      Crohn disease      Pancreatitis  while on 6MP for Crohn's now off of it      CAD (coronary artery disease)  2 stents placed in LAD in 2015, RCA stent x 2 in 2006      Joint pain      Asthma  controlled-never intubated or hospitalized      MARYAM (obstructive sleep apnea)  on CPAP      GIB (gastrointestinal bleeding)      MI (myocardial infarction)      Unilateral recurrent inguinal hernia without obstruction or gangrene  Left      Cluster headaches      Bowel obstruction      HTN (hypertension)      Fatty liver      Other specified disorders of pigmentation      Stented coronary artery      Psoriasis      Migraine headache      Anemia      Atypical melanocytic hyperplasia      Elevated blood uric acid level      Elevated PSA      Tinnitus      History of herniorrhaphy  left inguinal herniorrhaphy      H/O percutaneous transluminal coronary angioplasty  with DE RCA 2006, 2 stents placed 2015      H/O colonoscopy      S/P cardiac catheterization  2017-resulted in [place]-no stents placed      H/O lipoma  removed from back      S/P hernia surgery      S/P small bowel resection      Calcaneus fracture  s/p repair with hardware        FAMILY HISTORY:  Family history of Crohn's disease    Family history of premature CAD  Father CABG at age 54        MEDS:  MEDICATIONS  (STANDING):  acetaminophen     Tablet .. 975 milliGRAM(s) Oral every 6 hours  allopurinol 100 milliGRAM(s) Oral daily  amLODIPine   Tablet 5 milliGRAM(s) Oral daily  aspirin enteric coated 81 milliGRAM(s) Oral at bedtime  celecoxib 200 milliGRAM(s) Oral two times a day  ciprofloxacin   IVPB      ciprofloxacin   IVPB 400 milliGRAM(s) IV Intermittent every 12 hours  fluticasone propionate/ salmeterol 250-50 MICROgram(s) Diskus 1 Dose(s) Inhalation two times a day  folic acid 1 milliGRAM(s) Oral daily  heparin   Injectable 5000 Unit(s) SubCutaneous every 8 hours  lactated ringers. 1000 milliLiter(s) (50 mL/Hr) IV Continuous <Continuous>  losartan 100 milliGRAM(s) Oral daily  metoprolol succinate ER 25 milliGRAM(s) Oral daily  metroNIDAZOLE  IVPB 500 milliGRAM(s) IV Intermittent every 8 hours  metroNIDAZOLE  IVPB      montelukast 10 milliGRAM(s) Oral at bedtime  pantoprazole    Tablet 40 milliGRAM(s) Oral before breakfast  rosuvastatin 5 milliGRAM(s) Oral at bedtime  sodium chloride 0.65% Nasal 1 Spray(s) Both Nostrils three times a day  tiotropium 2.5 MICROgram(s) Inhaler 2 Puff(s) Inhalation at bedtime  traMADol 50 milliGRAM(s) Oral two times a day    MEDICATIONS  (PRN):  cetirizine 10 milliGRAM(s) Oral daily PRN nasal congestion    Allergies    No Known Allergies    Intolerances          CONSTITUTIONAL:  No weight loss, fever, chills, weakness or fatigue.  HEENT:  Eyes:  No visual loss, blurred vision, double vision or yellow sclerae.  SKIN:  No rash or itching.  CARDIOVASCULAR:  No chest pain, chest pressure or chest discomfort.  RESPIRATORY:  No shortness of breath, cough or sputum.  GASTROINTESTINAL:  SEE HPI  GENITOURINARY:  No dysuria, hematuria, urinary frequency  NEUROLOGICAL:  No headache, dizziness, syncope, paralysis, ataxia, numbness or tingling in the extremities.  MUSCULOSKELETAL:  No muscle, back pain, joint pain or stiffness.  ENDOCRINOLOGIC:  No reports of sweating, cold or heat intolerance.     ______________________________________________________________________  PHYSICAL EXAM:  T(C): 37.1 (12-13-24 @ 16:13), Max: 38.4 (12-12-24 @ 21:16)  HR: 73 (12-13-24 @ 16:13)  BP: 126/76 (12-13-24 @ 16:13)  RR: 18 (12-13-24 @ 16:13)  SpO2: 98% (12-13-24 @ 16:13)  Wt(kg): --    12-12 - 12-13  --------------------------------------------------------  IN:    Lactated Ringers: 700 mL    Oral Fluid: 240 mL  Total IN: 940 mL    OUT:    Voided (mL): 600 mL  Total OUT: 600 mL    Total NET: 340 mL      12-13 - 12-13  --------------------------------------------------------  IN:    Lactated Ringers: 1200 mL    Oral Fluid: 240 mL  Total IN: 1440 mL    OUT:    Blood Loss (mL): 0 mL    Voided (mL): 1300 mL  Total OUT: 1300 mL    Total NET: 140 mL          GEN: NAD, normocephalic  CVS: S1S2+  CHEST: clear to auscultation  ABD: soft , nontender, nondistended, bowel sounds present  EXTR: no cyanosis, no clubbing, no edema  NEURO: A&OX  SKIN:  warm;  non icteric    ______________________________________________________________________  LABS:                        9.7    4.23  )-----------( 124      ( 13 Dec 2024 06:40 )             30.8     12-13    140  |  105  |  17  ----------------------------<  105[H]  3.6   |  22  |  0.66    Ca    8.8      13 Dec 2024 06:40  Phos  3.4     12-13  Mg     1.8     12-13    TPro  5.6[L]  /  Alb  3.4  /  TBili  0.4  /  DBili  x   /  AST  14  /  ALT  14  /  AlkPhos  60  12-13    LIVER FUNCTIONS - ( 13 Dec 2024 06:40 )  Alb: 3.4 g/dL / Pro: 5.6 g/dL / ALK PHOS: 60 U/L / ALT: 14 U/L / AST: 14 U/L / GGT: x             ____________________________________________         Initial GI Consult      59M with PMH of CAD with 9 stents, prostate cancer s/p prostatectomy and Crohn's disease s/p SBR, stricturoplasty in 2018 presenting with generalized body aches, abdominal pain, nausea/vomiting and fevers. per patient yesterday he started feeling fatigue associated with N/V, non bloody, non bilous. Also had a fever of 101.8 and R sided abdominal pain. reports baseline bm of 5-10 daily, watery but denies worsening. Denies recent travels, exposure, family history of colon cancer, eating out, recent abx or hospitalization skin rash. Endorses joint pains.     Of note, patient was diagnosed with Crohns in 2009 and follows with Dr. Ketan Hinds at [facility] and currently on Skyrizzi for 1 yr with reported last flare 1yr ago. Prior to Skyrizzi, he has tried but failed stelara, Entyvio and Humira. in 2023, he had prostate cancer and has been on Lupron injections every 3 months. His usual Crohn's flare previously manifested as bowel obstruction which is different from this admission. He reports he does not respond to steroids in the past.    Psx - distal ileum removal march 2018.     Adm labs - wbc 4.2, hb 11.7 >> 9.7, K 3.4    Ct abd - Wall thickening of ileum distal to the small bowel anastomosis,   concerning for enteritis.        PAST MEDICAL & SURGICAL HISTORY:  GERD (gastroesophageal reflux disease)      Lower back pain      Shoulder pain, bilateral      Bilateral knee pain      Inguinal mass      HLD (hyperlipidemia)      Crohn disease      Pancreatitis  while on 6MP for Crohn's now off of it      CAD (coronary artery disease)  2 stents placed in LAD in 2015, RCA stent x 2 in 2006      Joint pain      Asthma  controlled-never intubated or hospitalized      MARYAM (obstructive sleep apnea)  on CPAP      GIB (gastrointestinal bleeding)      MI (myocardial infarction)      Unilateral recurrent inguinal hernia without obstruction or gangrene  Left      Cluster headaches      Bowel obstruction      HTN (hypertension)      Fatty liver      Other specified disorders of pigmentation      Stented coronary artery      Psoriasis      Migraine headache      Anemia      Atypical melanocytic hyperplasia      Elevated blood uric acid level      Elevated PSA      Tinnitus      History of herniorrhaphy  left inguinal herniorrhaphy      H/O percutaneous transluminal coronary angioplasty  with DE RCA 2006, 2 stents placed 2015      H/O colonoscopy      S/P cardiac catheterization  2017-resulted in [place]-no stents placed      H/O lipoma  removed from back      S/P hernia surgery      S/P small bowel resection      Calcaneus fracture  s/p repair with hardware        FAMILY HISTORY:  Family history of Crohn's disease    Family history of premature CAD  Father CABG at age 54        MEDS:  MEDICATIONS  (STANDING):  acetaminophen     Tablet .. 975 milliGRAM(s) Oral every 6 hours  allopurinol 100 milliGRAM(s) Oral daily  amLODIPine   Tablet 5 milliGRAM(s) Oral daily  aspirin enteric coated 81 milliGRAM(s) Oral at bedtime  celecoxib 200 milliGRAM(s) Oral two times a day  ciprofloxacin   IVPB      ciprofloxacin   IVPB 400 milliGRAM(s) IV Intermittent every 12 hours  fluticasone propionate/ salmeterol 250-50 MICROgram(s) Diskus 1 Dose(s) Inhalation two times a day  folic acid 1 milliGRAM(s) Oral daily  heparin   Injectable 5000 Unit(s) SubCutaneous every 8 hours  lactated ringers. 1000 milliLiter(s) (50 mL/Hr) IV Continuous <Continuous>  losartan 100 milliGRAM(s) Oral daily  metoprolol succinate ER 25 milliGRAM(s) Oral daily  metroNIDAZOLE  IVPB 500 milliGRAM(s) IV Intermittent every 8 hours  metroNIDAZOLE  IVPB      montelukast 10 milliGRAM(s) Oral at bedtime  pantoprazole    Tablet 40 milliGRAM(s) Oral before breakfast  rosuvastatin 5 milliGRAM(s) Oral at bedtime  sodium chloride 0.65% Nasal 1 Spray(s) Both Nostrils three times a day  tiotropium 2.5 MICROgram(s) Inhaler 2 Puff(s) Inhalation at bedtime  traMADol 50 milliGRAM(s) Oral two times a day    MEDICATIONS  (PRN):  cetirizine 10 milliGRAM(s) Oral daily PRN nasal congestion    Allergies    No Known Allergies    Intolerances          CONSTITUTIONAL:  No weight loss, fever, chills, weakness or fatigue.  HEENT:  Eyes:  No visual loss, blurred vision, double vision or yellow sclerae.  SKIN:  No rash or itching.  CARDIOVASCULAR:  No chest pain, chest pressure or chest discomfort.  RESPIRATORY:  No shortness of breath, cough or sputum.  GASTROINTESTINAL:  SEE HPI  GENITOURINARY:  No dysuria, hematuria, urinary frequency  NEUROLOGICAL:  No headache, dizziness, syncope, paralysis, ataxia, numbness or tingling in the extremities.  MUSCULOSKELETAL:  No muscle, back pain, joint pain or stiffness.  ENDOCRINOLOGIC:  No reports of sweating, cold or heat intolerance.     ______________________________________________________________________  PHYSICAL EXAM:  T(C): 37.1 (12-13-24 @ 16:13), Max: 38.4 (12-12-24 @ 21:16)  HR: 73 (12-13-24 @ 16:13)  BP: 126/76 (12-13-24 @ 16:13)  RR: 18 (12-13-24 @ 16:13)  SpO2: 98% (12-13-24 @ 16:13)  Wt(kg): --    12-12 - 12-13  --------------------------------------------------------  IN:    Lactated Ringers: 700 mL    Oral Fluid: 240 mL  Total IN: 940 mL    OUT:    Voided (mL): 600 mL  Total OUT: 600 mL    Total NET: 340 mL      12-13 - 12-13  --------------------------------------------------------  IN:    Lactated Ringers: 1200 mL    Oral Fluid: 240 mL  Total IN: 1440 mL    OUT:    Blood Loss (mL): 0 mL    Voided (mL): 1300 mL  Total OUT: 1300 mL    Total NET: 140 mL          GEN: NAD, normocephalic  CVS: S1S2+  CHEST: clear to auscultation  ABD: soft , nontender, nondistended, bowel sounds present  EXTR: no cyanosis, no clubbing, no edema  NEURO: A&OX  SKIN:  warm;  non icteric    ______________________________________________________________________  LABS:                        9.7    4.23  )-----------( 124      ( 13 Dec 2024 06:40 )             30.8     12-13    140  |  105  |  17  ----------------------------<  105[H]  3.6   |  22  |  0.66    Ca    8.8      13 Dec 2024 06:40  Phos  3.4     12-13  Mg     1.8     12-13    TPro  5.6[L]  /  Alb  3.4  /  TBili  0.4  /  DBili  x   /  AST  14  /  ALT  14  /  AlkPhos  60  12-13    LIVER FUNCTIONS - ( 13 Dec 2024 06:40 )  Alb: 3.4 g/dL / Pro: 5.6 g/dL / ALK PHOS: 60 U/L / ALT: 14 U/L / AST: 14 U/L / GGT: x             ____________________________________________

## 2024-12-13 NOTE — CONSULT NOTE ADULT - ASSESSMENT
59M with PMH of CAD with 9 stents, prostate cancer s/p prostatectomy and Crohn's disease s/p SBR, stricturoplasty in 2018 presenting with generalized body aches, abdominal pain, nausea/vomiting and fevers.     Ct abd - Wall thickening of ileum distal to the small bowel anastomosis,   concerning for enteritis.    Assessment    N/V, chronic diarrhea in the setting of crohn's dx     differentials - infectious cause (viral vs bacteria vs parasitic)    Plan    -Recommend getting GI pcr, c.diff to r/o infectious cause  -Get a CRP, calprotectin  -Patient reporting a baseline bm of 5-10/day on Skyrizi unfortunately not responding to said therapy and may need to be switched to an alternative regimen like Rinvoq outpatient.  -If GI pcr neg, patient may need to have repeat colonoscopy.  -Trend stool  -Blood cx.  -Monitor and correct electrolytes  -Thank you for the consult.    The above is not finalized until attending' s attestation      Angelique Arceo, PGY4  Gastroenterology and Hepatology  Available on TEAMS    For non urgent consult, please email [email or web address] or [email or web address]  Long range page number [phone]. Short range [phone]           59M with PMH of CAD with 9 stents, prostate cancer s/p prostatectomy and Crohn's disease s/p SBR, stricturoplasty in 2018 presenting with generalized body aches, abdominal pain, nausea/vomiting and fevers.     Ct abd - Wall thickening of ileum distal to the small bowel anastomosis,   concerning for enteritis.    Assessment    N/V, acute on chronic diarrhea in the setting of crohn's dx     differentials - infectious cause (viral vs bacteria vs parasitic), vs worsening Crohn's disease/flare    Plan    -Recommend getting GI pcr, c.diff to r/o infectious cause  -Get a CRP, calprotectin  -Patient reporting a baseline bm of 5-10/day on Skyrizi unfortunately not responding to said therapy and may need to be switched to an alternative regimen like Rinvoq outpatient.  -If GI pcr neg, patient may consider repeat colonoscopy to evaluate current state of Crohn's disease  -Trend stool  -Blood cx.  -Monitor and correct electrolytes  -Thank you for the consult.    The above is not finalized until attending' s attestation      Angelique Arceo, PGY4  Gastroenterology and Hepatology  Available on TEAMS    For non urgent consult, please email [email or web address] [place] or [email or web address]  Long range page number [phone]. Short range [phone]

## 2024-12-13 NOTE — CONSULT NOTE ADULT - ATTENDING COMMENTS
Agree with above. Images from CT personally reviewed. There is some thickening in the distal small bowel. Patient has baseline chronic diarrhea on Skyrizi, now presents with acute worsening of diarrhea, along with fevers and fatigue. Suspect more likely acute infectious gastroenteritis on top of underlying Crohn's disease. Would check GI PCR, C diff. If negative infectious workup and symptoms continue, consider endoscopic evaluation. Patient reports prior steroids failure.

## 2024-12-13 NOTE — ED ADULT NURSE REASSESSMENT NOTE - NS ED NURSE REASSESS COMMENT FT1
Report received from KEITH Mustafa, Received pt from ER purple. Pt A&O X4. Pt in CDU for IV hydration, pain control, and monitoring. Pt denies any chest pain, SOB, dizziness or palpitations. Patient spiked temp 101.1, DEREK Zepeda notified. IV Tylenol given, Blood culture X sets sent. Tolerated all meds. IV fluid infusing at 100ml/hour. IV site patent and free of signs of infiltration. Pt OOB independently. Stool samples to be collected, NO BM as of this time in CDU. Patient admitted to surgery, awaiting bed. Will continue to monitor.

## 2024-12-13 NOTE — PROGRESS NOTE ADULT - SUBJECTIVE AND OBJECTIVE BOX
SURGERY DAILY PROGRESS NOTE    24 Hour/Overnight Events: No acute events overnight    SUBJECTIVE: Patient seen and evaluated on AM rounds. Pain controlled, no /n/v, passing gas and BM. CRP to 84    ------------------------------------------------------------------------------------------------------------  OBJECTIVE:  Vital Signs Last 24 Hrs  T(C): 37 (13 Dec 2024 04:12), Max: 38.4 (12 Dec 2024 21:16)  T(F): 98.6 (13 Dec 2024 04:12), Max: 101.1 (12 Dec 2024 21:16)  HR: 76 (13 Dec 2024 04:12) (73 - 96)  BP: 124/77 (13 Dec 2024 04:12) (114/69 - 124/77)  BP(mean): 81 (12 Dec 2024 17:35) (81 - 81)  RR: 18 (13 Dec 2024 04:12) (16 - 20)  SpO2: 98% (13 Dec 2024 04:12) (96% - 99%)    Parameters below as of 13 Dec 2024 04:12  Patient On (Oxygen Delivery Method): room air      I&O's Detail    12 Dec 2024 07:01  -  13 Dec 2024 07:00  --------------------------------------------------------  IN:    Lactated Ringers: 700 mL    Oral Fluid: 240 mL  Total IN: 940 mL    OUT:    Voided (mL): 600 mL  Total OUT: 600 mL    Total NET: 340 mL      13 Dec 2024 07:01  -  13 Dec 2024 08:48  --------------------------------------------------------  IN:  Total IN: 0 mL    OUT:    Voided (mL): 600 mL  Total OUT: 600 mL    Total NET: -600 mL          LABS:                        9.7    4.23  )-----------( 124      ( 13 Dec 2024 06:40 )             30.8     12-13    140  |  105  |  17  ----------------------------<  105[H]  3.6   |  22  |  0.66    Ca    8.8      13 Dec 2024 06:40  Phos  3.4     12-13  Mg     1.8     12-13    TPro  5.6[L]  /  Alb  3.4  /  TBili  0.4  /  DBili  x   /  AST  14  /  ALT  14  /  AlkPhos  60  12-13    LIVER FUNCTIONS - ( 13 Dec 2024 06:40 )  Alb: 3.4 g/dL / Pro: 5.6 g/dL / ALK PHOS: 60 U/L / ALT: 14 U/L / AST: 14 U/L / GGT: x             Urinalysis Basic - ( 13 Dec 2024 06:40 )    Color: x / Appearance: x / SG: x / pH: x  Gluc: 105 mg/dL / Ketone: x  / Bili: x / Urobili: x   Blood: x / Protein: x / Nitrite: x   Leuk Esterase: x / RBC: x / WBC x   Sq Epi: x / Non Sq Epi: x / Bacteria: x        Physical Exam: General: alert and oriented, NAD  Resp: airway patent, respirations unlabored  CVS: regular rate and rhythm  Abdomen: soft, tender to palpation in RLQ, nondistended  Extremities: no edema  Skin: warm, dry, appropriate color    PLAN:   59M with history of Crohn's disease presenting now with abdominal pain, nausea/vomiting and fevers and CT evidence of thickened small bowel concerning for Crohn's flare    Plan:  - IV cipro/flagyl  - CLD  - IVF  -CRP to 84  - Pain control  - GI consult for management of Crohn's flare  - ESR and stool studies with AM labs SURGERY DAILY PROGRESS NOTE    24 Hour/Overnight Events: No acute events overnight    SUBJECTIVE: Patient seen and evaluated on AM rounds. Pain controlled, no n/v, passing gas and BM. CRP to 84    ------------------------------------------------------------------------------------------------------------  OBJECTIVE:  Vital Signs Last 24 Hrs  T(C): 37 (13 Dec 2024 04:12), Max: 38.4 (12 Dec 2024 21:16)  T(F): 98.6 (13 Dec 2024 04:12), Max: 101.1 (12 Dec 2024 21:16)  HR: 76 (13 Dec 2024 04:12) (73 - 96)  BP: 124/77 (13 Dec 2024 04:12) (114/69 - 124/77)  BP(mean): 81 (12 Dec 2024 17:35) (81 - 81)  RR: 18 (13 Dec 2024 04:12) (16 - 20)  SpO2: 98% (13 Dec 2024 04:12) (96% - 99%)    Parameters below as of 13 Dec 2024 04:12  Patient On (Oxygen Delivery Method): room air      I&O's Detail    12 Dec 2024 07:01  -  13 Dec 2024 07:00  --------------------------------------------------------  IN:    Lactated Ringers: 700 mL    Oral Fluid: 240 mL  Total IN: 940 mL    OUT:    Voided (mL): 600 mL  Total OUT: 600 mL    Total NET: 340 mL      13 Dec 2024 07:01  -  13 Dec 2024 08:48  --------------------------------------------------------  IN:  Total IN: 0 mL    OUT:    Voided (mL): 600 mL  Total OUT: 600 mL    Total NET: -600 mL          LABS:                        9.7    4.23  )-----------( 124      ( 13 Dec 2024 06:40 )             30.8     12-13    140  |  105  |  17  ----------------------------<  105[H]  3.6   |  22  |  0.66    Ca    8.8      13 Dec 2024 06:40  Phos  3.4     12-13  Mg     1.8     12-13    TPro  5.6[L]  /  Alb  3.4  /  TBili  0.4  /  DBili  x   /  AST  14  /  ALT  14  /  AlkPhos  60  12-13    LIVER FUNCTIONS - ( 13 Dec 2024 06:40 )  Alb: 3.4 g/dL / Pro: 5.6 g/dL / ALK PHOS: 60 U/L / ALT: 14 U/L / AST: 14 U/L / GGT: x             Urinalysis Basic - ( 13 Dec 2024 06:40 )    Color: x / Appearance: x / SG: x / pH: x  Gluc: 105 mg/dL / Ketone: x  / Bili: x / Urobili: x   Blood: x / Protein: x / Nitrite: x   Leuk Esterase: x / RBC: x / WBC x   Sq Epi: x / Non Sq Epi: x / Bacteria: x        Physical Exam: General: alert and oriented, NAD  Resp: airway patent, respirations unlabored  CVS: regular rate and rhythm  Abdomen: soft, tender to palpation in RLQ, nondistended  Extremities: no edema  Skin: warm, dry, appropriate color    PLAN:   59M with history of Crohn's disease presenting now with abdominal pain, nausea/vomiting and fevers and CT evidence of thickened small bowel concerning for Crohn's flare    Plan:  - IV cipro/flagyl  - CLD  - IVF  -CRP to 84  - Pain control  - GI consult for management of Crohn's flare  - ESR and stool studies with AM labs

## 2024-12-14 ENCOUNTER — TRANSCRIPTION ENCOUNTER (OUTPATIENT)
Age: 59
End: 2024-12-14

## 2024-12-14 LAB
ANION GAP SERPL CALC-SCNC: 13 MMOL/L — SIGNIFICANT CHANGE UP (ref 5–17)
BUN SERPL-MCNC: 17 MG/DL — SIGNIFICANT CHANGE UP (ref 7–23)
CALCIUM SERPL-MCNC: 8.6 MG/DL — SIGNIFICANT CHANGE UP (ref 8.4–10.5)
CHLORIDE SERPL-SCNC: 105 MMOL/L — SIGNIFICANT CHANGE UP (ref 96–108)
CO2 SERPL-SCNC: 21 MMOL/L — LOW (ref 22–31)
CREAT SERPL-MCNC: 0.64 MG/DL — SIGNIFICANT CHANGE UP (ref 0.5–1.3)
CRP SERPL-MCNC: 43 MG/L — HIGH (ref 0–4)
CULTURE RESULTS: SIGNIFICANT CHANGE UP
EGFR: 109 ML/MIN/1.73M2 — SIGNIFICANT CHANGE UP
GLUCOSE SERPL-MCNC: 117 MG/DL — HIGH (ref 70–99)
HCT VFR BLD CALC: 29 % — LOW (ref 39–50)
HGB BLD-MCNC: 9.3 G/DL — LOW (ref 13–17)
MAGNESIUM SERPL-MCNC: 2.2 MG/DL — SIGNIFICANT CHANGE UP (ref 1.6–2.6)
MCHC RBC-ENTMCNC: 28 PG — SIGNIFICANT CHANGE UP (ref 27–34)
MCHC RBC-ENTMCNC: 32.1 G/DL — SIGNIFICANT CHANGE UP (ref 32–36)
MCV RBC AUTO: 87.3 FL — SIGNIFICANT CHANGE UP (ref 80–100)
NRBC # BLD: 0 /100 WBCS — SIGNIFICANT CHANGE UP (ref 0–0)
PHOSPHATE SERPL-MCNC: 3.3 MG/DL — SIGNIFICANT CHANGE UP (ref 2.5–4.5)
PLATELET # BLD AUTO: 111 K/UL — LOW (ref 150–400)
POTASSIUM SERPL-MCNC: 3.7 MMOL/L — SIGNIFICANT CHANGE UP (ref 3.5–5.3)
POTASSIUM SERPL-SCNC: 3.7 MMOL/L — SIGNIFICANT CHANGE UP (ref 3.5–5.3)
RBC # BLD: 3.32 M/UL — LOW (ref 4.2–5.8)
RBC # FLD: 14.7 % — HIGH (ref 10.3–14.5)
SODIUM SERPL-SCNC: 139 MMOL/L — SIGNIFICANT CHANGE UP (ref 135–145)
SPECIMEN SOURCE: SIGNIFICANT CHANGE UP
WBC # BLD: 3.62 K/UL — LOW (ref 3.8–10.5)
WBC # FLD AUTO: 3.62 K/UL — LOW (ref 3.8–10.5)

## 2024-12-14 RX ORDER — POTASSIUM CHLORIDE 600 MG/1
40 TABLET, EXTENDED RELEASE ORAL ONCE
Refills: 0 | Status: COMPLETED | OUTPATIENT
Start: 2024-12-14 | End: 2024-12-14

## 2024-12-14 RX ADMIN — ACETAMINOPHEN 500MG 975 MILLIGRAM(S): 500 TABLET, COATED ORAL at 23:21

## 2024-12-14 RX ADMIN — Medication 5000 UNIT(S): at 15:45

## 2024-12-14 RX ADMIN — METOPROLOL TARTRATE 25 MILLIGRAM(S): 100 TABLET, FILM COATED ORAL at 05:13

## 2024-12-14 RX ADMIN — AMLODIPINE BESYLATE 5 MILLIGRAM(S): 10 TABLET ORAL at 05:16

## 2024-12-14 RX ADMIN — CELECOXIB 200 MILLIGRAM(S): 200 CAPSULE ORAL at 18:12

## 2024-12-14 RX ADMIN — Medication 5000 UNIT(S): at 05:22

## 2024-12-14 RX ADMIN — FLUTICASONE PROPIONATE AND SALMETEROL XINAFOATE 1 DOSE(S): 45; 21 AEROSOL, METERED RESPIRATORY (INHALATION) at 18:14

## 2024-12-14 RX ADMIN — TRAMADOL HYDROCHLORIDE 50 MILLIGRAM(S): 300 CAPSULE ORAL at 18:12

## 2024-12-14 RX ADMIN — Medication 200 MILLIGRAM(S): at 07:51

## 2024-12-14 RX ADMIN — CETIRIZINE HYDROCHLORIDE 10 MILLIGRAM(S): 10 TABLET ORAL at 11:15

## 2024-12-14 RX ADMIN — ROSUVASTATIN CALCIUM 5 MILLIGRAM(S): 5 TABLET, FILM COATED ORAL at 21:23

## 2024-12-14 RX ADMIN — ACETAMINOPHEN 500MG 975 MILLIGRAM(S): 500 TABLET, COATED ORAL at 05:41

## 2024-12-14 RX ADMIN — Medication 2 PUFF(S): at 21:22

## 2024-12-14 RX ADMIN — ACETAMINOPHEN 500MG 975 MILLIGRAM(S): 500 TABLET, COATED ORAL at 18:13

## 2024-12-14 RX ADMIN — Medication 1 SPRAY(S): at 21:22

## 2024-12-14 RX ADMIN — Medication 1 SPRAY(S): at 15:45

## 2024-12-14 RX ADMIN — CELECOXIB 200 MILLIGRAM(S): 200 CAPSULE ORAL at 05:52

## 2024-12-14 RX ADMIN — ACETAMINOPHEN 500MG 975 MILLIGRAM(S): 500 TABLET, COATED ORAL at 05:11

## 2024-12-14 RX ADMIN — LOSARTAN POTASSIUM 100 MILLIGRAM(S): 100 TABLET, FILM COATED ORAL at 05:12

## 2024-12-14 RX ADMIN — METRONIDAZOLE 100 MILLIGRAM(S): 500 TABLET ORAL at 05:14

## 2024-12-14 RX ADMIN — TRAMADOL HYDROCHLORIDE 50 MILLIGRAM(S): 300 CAPSULE ORAL at 05:13

## 2024-12-14 RX ADMIN — ALLOPURINOL 100 MILLIGRAM(S): 300 TABLET ORAL at 11:13

## 2024-12-14 RX ADMIN — Medication 1 MILLIGRAM(S): at 11:13

## 2024-12-14 RX ADMIN — ACETAMINOPHEN 500MG 975 MILLIGRAM(S): 500 TABLET, COATED ORAL at 23:51

## 2024-12-14 RX ADMIN — PANTOPRAZOLE SODIUM 40 MILLIGRAM(S): 40 TABLET, DELAYED RELEASE ORAL at 05:22

## 2024-12-14 RX ADMIN — Medication 1 SPRAY(S): at 05:13

## 2024-12-14 RX ADMIN — Medication 81 MILLIGRAM(S): at 21:23

## 2024-12-14 RX ADMIN — ACETAMINOPHEN 500MG 975 MILLIGRAM(S): 500 TABLET, COATED ORAL at 11:14

## 2024-12-14 RX ADMIN — CELECOXIB 200 MILLIGRAM(S): 200 CAPSULE ORAL at 05:22

## 2024-12-14 RX ADMIN — ACETAMINOPHEN 500MG 975 MILLIGRAM(S): 500 TABLET, COATED ORAL at 11:44

## 2024-12-14 RX ADMIN — FLUTICASONE PROPIONATE AND SALMETEROL XINAFOATE 1 DOSE(S): 45; 21 AEROSOL, METERED RESPIRATORY (INHALATION) at 05:13

## 2024-12-14 RX ADMIN — POTASSIUM CHLORIDE 40 MILLIEQUIVALENT(S): 600 TABLET, EXTENDED RELEASE ORAL at 11:13

## 2024-12-14 RX ADMIN — MONTELUKAST SODIUM 10 MILLIGRAM(S): 10 TABLET ORAL at 21:23

## 2024-12-14 RX ADMIN — Medication 5000 UNIT(S): at 21:23

## 2024-12-14 RX ADMIN — TRAMADOL HYDROCHLORIDE 50 MILLIGRAM(S): 300 CAPSULE ORAL at 07:12

## 2024-12-14 NOTE — PROGRESS NOTE ADULT - ASSESSMENT
59M with PMH of CAD with 9 stents, prostate cancer s/p prostatectomy and Crohn's disease s/p SBR, stricturoplasty in 2018 presenting with generalized body aches, abdominal pain, nausea/vomiting and fevers.     #Stricturing CD with SBR and stricturoplasty   Previously on Stelara, Entyvio and Humira; now on Skyrizi   Ct abd - Wall thickening of ileum distal to the small bowel anastomosis, concerning for enteritis.    #Nausea/vomiting and diarrhea   + Norovirus     Plan  -Would stop abx given unlikely to provide much benefit   -Will continue to assess BMs throughout the weekend and if his symptoms do not improve (as they should with gradual resolution of norovirus) then would be concerned that his CD is also active and flaring in addition to norovirus and at that time would consider adding steroid.   -Patient reporting a baseline bm of 5-10/day on Skyrizi which is not responding to therapy and may need to be switched to an alternative regimen like Rinvoq outpatient ( we do not have this medication inpatient).  -Monitor and correct electrolytes    The above is not finalized until attending' s attestation      April Morales MD  Gastroenterology/Hepatology Fellow, PGY-5  Please contact via TEAMS    NON-URGENT CONSULTS:  Please email [email or web address]

## 2024-12-14 NOTE — DISCHARGE NOTE PROVIDER - NSDCFUSCHEDAPPT_GEN_ALL_CORE_FT
Leonel Duckworth  [facility] Physician [facility]  PULMMED [address] [facility]  Scheduled Appointment: 02/10/2025    Whitney Baig  [facility]  RADMED [address] [facility]  Scheduled Appointment: 02/24/2025

## 2024-12-14 NOTE — PROGRESS NOTE ADULT - ASSESSMENT
59M with history of Crohn's disease presenting now with abdominal pain, nausea/vomiting and fevers and CT evidence of thickened small bowel concerning for Crohn's flare    Plan:  - IV cipro/flagyl  - CLD  - IVF  -CRP to 84  - Pain control  - GI consult for management of Crohn's flare  - ESR and stool studies with AM labs    [id number] 59M with history of Crohn's disease presenting now with abdominal pain, nausea/vomiting and fevers and CT evidence of thickened small bowel concerning for Crohn's flare    Plan:  - Off abx   - RD  - CRP to 43  - Pain control  - GI consult for management of Crohn's flare, recs appreciated    - may consider dc home if tolerating diet     [facility]  [id number]

## 2024-12-14 NOTE — PROGRESS NOTE ADULT - SUBJECTIVE AND OBJECTIVE BOX
Progress Note   April Hair PGY4- GI/Hep    SUBJECTIVE: Patient seen and examined at bedside.     OBJECTIVE:    VITAL SIGNS:  ICU Vital Signs Last 24 Hrs  T(C): 36.8 (14 Dec 2024 04:16), Max: 37.1 (13 Dec 2024 16:13)  T(F): 98.3 (14 Dec 2024 04:16), Max: 98.8 (13 Dec 2024 16:13)  HR: 75 (14 Dec 2024 04:16) (68 - 79)  BP: 130/78 (14 Dec 2024 04:16) (102/64 - 130/78)  BP(mean): --  ABP: --  ABP(mean): --  RR: 18 (14 Dec 2024 04:16) (18 - 18)  SpO2: 97% (14 Dec 2024 04:16) (96% - 98%)    O2 Parameters below as of 14 Dec 2024 04:16  Patient On (Oxygen Delivery Method): room air              12-12 @ 07:01  -  12-13 @ 07:00  --------------------------------------------------------  IN: 940 mL / OUT: 600 mL / NET: 340 mL    12-13 @ 07:01  -  12-14 @ 06:34  --------------------------------------------------------  IN: 2620 mL / OUT: 1900 mL / NET: 720 mL        PHYSICAL EXAM:    General: NAD  HEENT: NC/AT  Neck: supple  Respiratory: Regular RR, no increase in WOB  Cardiovascular: RRR  Abdomen: soft, NT/ND; +BS x4  Extremities: WWP, 2+ peripheral pulses b/l  Skin: normal color and turgor; no rash  Neurological: A&OX    MEDICATIONS:  MEDICATIONS  (STANDING):  acetaminophen     Tablet .. 975 milliGRAM(s) Oral every 6 hours  allopurinol 100 milliGRAM(s) Oral daily  amLODIPine   Tablet 5 milliGRAM(s) Oral daily  aspirin enteric coated 81 milliGRAM(s) Oral at bedtime  celecoxib 200 milliGRAM(s) Oral two times a day  ciprofloxacin   IVPB      ciprofloxacin   IVPB 400 milliGRAM(s) IV Intermittent every 12 hours  fluticasone propionate/ salmeterol 250-50 MICROgram(s) Diskus 1 Dose(s) Inhalation two times a day  folic acid 1 milliGRAM(s) Oral daily  heparin   Injectable 5000 Unit(s) SubCutaneous every 8 hours  lactated ringers. 1000 milliLiter(s) (50 mL/Hr) IV Continuous <Continuous>  losartan 100 milliGRAM(s) Oral daily  metoprolol succinate ER 25 milliGRAM(s) Oral daily  metroNIDAZOLE  IVPB 500 milliGRAM(s) IV Intermittent every 8 hours  metroNIDAZOLE  IVPB      montelukast 10 milliGRAM(s) Oral at bedtime  pantoprazole    Tablet 40 milliGRAM(s) Oral before breakfast  rosuvastatin 5 milliGRAM(s) Oral at bedtime  sodium chloride 0.65% Nasal 1 Spray(s) Both Nostrils three times a day  tiotropium 2.5 MICROgram(s) Inhaler 2 Puff(s) Inhalation at bedtime  traMADol 50 milliGRAM(s) Oral two times a day    MEDICATIONS  (PRN):  cetirizine 10 milliGRAM(s) Oral daily PRN nasal congestion      ALLERGIES:  Allergies    No Known Allergies    Intolerances        LABS:                        9.7    4.23  )-----------( 124      ( 13 Dec 2024 06:40 )             30.8     12-13    140  |  105  |  17  ----------------------------<  105[H]  3.6   |  22  |  0.66    Ca    8.8      13 Dec 2024 06:40  Phos  3.4     12-13  Mg     1.8     12-13    TPro  5.6[L]  /  Alb  3.4  /  TBili  0.4  /  DBili  x   /  AST  14  /  ALT  14  /  AlkPhos  60  12-13      Urinalysis Basic - ( 13 Dec 2024 06:40 )    Color: x / Appearance: x / SG: x / pH: x  Gluc: 105 mg/dL / Ketone: x  / Bili: x / Urobili: x   Blood: x / Protein: x / Nitrite: x   Leuk Esterase: x / RBC: x / WBC x   Sq Epi: x / Non Sq Epi: x / Bacteria: x         Progress Note   April Hair PGY4- GI/Hep    SUBJECTIVE: Patient seen and examined at bedside.   - doing better today, less BMs (only had one without blood this AM)   - mild abd pain     OBJECTIVE:    VITAL SIGNS:  ICU Vital Signs Last 24 Hrs  T(C): 36.8 (14 Dec 2024 04:16), Max: 37.1 (13 Dec 2024 16:13)  T(F): 98.3 (14 Dec 2024 04:16), Max: 98.8 (13 Dec 2024 16:13)  HR: 75 (14 Dec 2024 04:16) (68 - 79)  BP: 130/78 (14 Dec 2024 04:16) (102/64 - 130/78)  BP(mean): --  ABP: --  ABP(mean): --  RR: 18 (14 Dec 2024 04:16) (18 - 18)  SpO2: 97% (14 Dec 2024 04:16) (96% - 98%)    O2 Parameters below as of 14 Dec 2024 04:16  Patient On (Oxygen Delivery Method): room air          12-12 @ 07:01  -  12-13 @ 07:00  --------------------------------------------------------  IN: 940 mL / OUT: 600 mL / NET: 340 mL    12-13 @ 07:01  -  12-14 @ 06:34  --------------------------------------------------------  IN: 2620 mL / OUT: 1900 mL / NET: 720 mL        PHYSICAL EXAM:    General: NAD  HEENT: NC/AT  Neck: supple  Respiratory: Regular RR, no increase in WOB  Cardiovascular: RRR  Abdomen: soft, NT/ND; +BS x4  Extremities: WWP, 2+ peripheral pulses b/l  Skin: normal color and turgor; no rash  Neurological: A&OX3    MEDICATIONS:  MEDICATIONS  (STANDING):  acetaminophen     Tablet .. 975 milliGRAM(s) Oral every 6 hours  allopurinol 100 milliGRAM(s) Oral daily  amLODIPine   Tablet 5 milliGRAM(s) Oral daily  aspirin enteric coated 81 milliGRAM(s) Oral at bedtime  celecoxib 200 milliGRAM(s) Oral two times a day  ciprofloxacin   IVPB      ciprofloxacin   IVPB 400 milliGRAM(s) IV Intermittent every 12 hours  fluticasone propionate/ salmeterol 250-50 MICROgram(s) Diskus 1 Dose(s) Inhalation two times a day  folic acid 1 milliGRAM(s) Oral daily  heparin   Injectable 5000 Unit(s) SubCutaneous every 8 hours  lactated ringers. 1000 milliLiter(s) (50 mL/Hr) IV Continuous <Continuous>  losartan 100 milliGRAM(s) Oral daily  metoprolol succinate ER 25 milliGRAM(s) Oral daily  metroNIDAZOLE  IVPB 500 milliGRAM(s) IV Intermittent every 8 hours  metroNIDAZOLE  IVPB      montelukast 10 milliGRAM(s) Oral at bedtime  pantoprazole    Tablet 40 milliGRAM(s) Oral before breakfast  rosuvastatin 5 milliGRAM(s) Oral at bedtime  sodium chloride 0.65% Nasal 1 Spray(s) Both Nostrils three times a day  tiotropium 2.5 MICROgram(s) Inhaler 2 Puff(s) Inhalation at bedtime  traMADol 50 milliGRAM(s) Oral two times a day    MEDICATIONS  (PRN):  cetirizine 10 milliGRAM(s) Oral daily PRN nasal congestion      ALLERGIES:  Allergies    No Known Allergies    Intolerances        LABS:                        9.7    4.23  )-----------( 124      ( 13 Dec 2024 06:40 )             30.8     12-13    140  |  105  |  17  ----------------------------<  105[H]  3.6   |  22  |  0.66    Ca    8.8      13 Dec 2024 06:40  Phos  3.4     12-13  Mg     1.8     12-13    TPro  5.6[L]  /  Alb  3.4  /  TBili  0.4  /  DBili  x   /  AST  14  /  ALT  14  /  AlkPhos  60  12-13      Urinalysis Basic - ( 13 Dec 2024 06:40 )    Color: x / Appearance: x / SG: x / pH: x  Gluc: 105 mg/dL / Ketone: x  / Bili: x / Urobili: x   Blood: x / Protein: x / Nitrite: x   Leuk Esterase: x / RBC: x / WBC x   Sq Epi: x / Non Sq Epi: x / Bacteria: x

## 2024-12-14 NOTE — DISCHARGE NOTE PROVIDER - NSDCFUADDAPPT_GEN_ALL_CORE_FT
Please follow up with your primary care provider regarding your recent admission. Patient does not need to follow up with colorectal surgery, should follow up with GI.

## 2024-12-14 NOTE — DISCHARGE NOTE PROVIDER - NSDCCPCAREPLAN_GEN_ALL_CORE_FT
PRINCIPAL DISCHARGE DIAGNOSIS  Diagnosis: Exacerbation of Crohn's disease  Assessment and Plan of Treatment:       SECONDARY DISCHARGE DIAGNOSES  Diagnosis: Moderate dehydration  Assessment and Plan of Treatment:      PRINCIPAL DISCHARGE DIAGNOSIS  Diagnosis: Exacerbation of Crohn's disease  Assessment and Plan of Treatment: +norovirus likely contributing to worsening GI symptoms      SECONDARY DISCHARGE DIAGNOSES  Diagnosis: Moderate dehydration  Assessment and Plan of Treatment:

## 2024-12-14 NOTE — DISCHARGE NOTE PROVIDER - NSDCMRMEDTOKEN_GEN_ALL_CORE_FT
acetaminophen 325 mg oral tablet: 3 tab(s) orally every 6 hours  allopurinol 100 mg oral tablet: 1 tab(s) orally once a day  aspirin 81 mg oral tablet: 1 tab(s) orally once a day (at bedtime)  CeleBREX 200 mg oral capsule: 1 cap(s) orally once a day (at bedtime)  Coq10 1 cap daily:   Crestor 5 mg oral tablet: 1 tab(s) orally once a day (at bedtime)  Emgality Prefilled Pen 120 mg/mL subcutaneous solution: 120 milligram(s) subcutaneously once a month  Glucosamine with MSM &amp; Chondrotin 1 tab daily:   Jublia 10% topical solution: Apply topically to affected area once a day  ketoconazole 2% topical cream: Apply topically to affected area once a day  losartan 100 mg oral tablet: 1 tab(s) orally once a day  Metoprolol 25 mg daily:   Multivitamin 1 tab daily:   Norvasc 5 mg oral tablet: 1 tab(s) orally once a day  oxyBUTYnin 5 mg oral tablet: 1 tab(s) orally every 8 hours  Ozempic 2 mg/1.5 mL (0.25 mg or 0.5 mg dose) subcutaneous solution: 0.5 subcutaneously once a week  Repatha 140 mg/mL subcutaneous solution: 140 milligram(s) subcutaneously Bi-weekly  Singulair 10 mg oral tablet: 1 tab(s) orally once a day (at bedtime)  Spiriva Respimat 10 ACT 2.5 mcg/inh inhalation aerosol: 2 puff(s) inhaled once a day  Stelara PFS 90 mg/mL subcutaneous solution: 90 milligram(s) subcutaneously once a month  Symbicort 160 mcg-4.5 mcg/inh inhalation aerosol: 2 puff(s) inhaled 2 times a day  traMADol 50 mg oral tablet: 1 tab(s) orally every 8 hours MDD: 3  Turmeric 1 cap daily:   Tylenol Arthritis 2 cap daily PRN:   Vitamin C 1 tab daily:   Vitamin D2 5000IU daily:   Vitamin K2 1 tab daily:   Voltaren 1% topical gel: Apply topically to affected area once a day

## 2024-12-14 NOTE — DISCHARGE NOTE PROVIDER - NSFOLLOWUPCLINICS_GEN_ALL_ED_FT
[facility]  Gastroenterology  [address] [place] [address] [address]  [place] [address]  Phone: [phone]  Fax:

## 2024-12-14 NOTE — PROGRESS NOTE ADULT - SUBJECTIVE AND OBJECTIVE BOX
Surgery Progress Note     Overnight events:     Interval/Subjective:  Patient seen and examined.   __________________________________________________________________  Vitals:  T(C): 37 (20:58), Max: 37.3 (00:06)  HR: 73 (20:58) (69 - 79)  BP: 125/75 (20:58) (110/70 - 126/77)  RR: 18 (20:58) (18 - 18)  SpO2: 96% (20:58) (96% - 98%)    I & O's:  IN:    Lactated Ringers: 700 mL    Oral Fluid: 240 mL  Total IN: 940 mL    OUT:    Voided (mL): 600 mL  Total OUT: 600 mL        12-12-24 @ 07:01  -  12-13-24 @ 07:00  --------------------------------------------------------  OUT:    Voided (mL): 0.31 mL/kg/hr  Total OUT: 0.31 mL/kg/hr      12-13-24 @ 07:01  -  12-14-24 @ 00:05  --------------------------------------------------------  OUT:    Voided (mL): 1.11 mL/kg/hr  Total OUT: 1.11 mL/kg/hr      Physical Exam:  General: NAD, resting comfortably in bed  Abdomen: soft, tender to palpation in RLQ, nondistended      __________________________________________________________________ Surgery Progress Note     Overnight events:   - NAEO  - appreciate GI recs  - +Norovirus on GI PCR    Interval/Subjective:  Patient seen and examined. Tolerating diet. Stated normally has 5-10 BM, had x3 BM yesterday. Dc cipro/flagyl in the AM. off   __________________________________________________________________  Vitals:  T(C): 37 (20:58), Max: 37.3 (00:06)  HR: 73 (20:58) (69 - 79)  BP: 125/75 (20:58) (110/70 - 126/77)  RR: 18 (20:58) (18 - 18)  SpO2: 96% (20:58) (96% - 98%)    I & O's:  IN:    Lactated Ringers: 700 mL    Oral Fluid: 240 mL  Total IN: 940 mL    OUT:    Voided (mL): 600 mL  Total OUT: 600 mL        12-12-24 @ 07:01  -  12-13-24 @ 07:00  --------------------------------------------------------  OUT:    Voided (mL): 0.31 mL/kg/hr  Total OUT: 0.31 mL/kg/hr      12-13-24 @ 07:01  -  12-14-24 @ 00:05  --------------------------------------------------------  OUT:    Voided (mL): 1.11 mL/kg/hr  Total OUT: 1.11 mL/kg/hr      Physical Exam:  General: NAD, resting comfortably in bed  Abdomen: soft, tender to palpation in RLQ, nondistended      __________________________________________________________________

## 2024-12-14 NOTE — DISCHARGE NOTE PROVIDER - CARE PROVIDER_API CALL
Mayte Baig Stephanie  Gastroenterology  [address] [place], [place] [address]  Phone: [phone]  Fax: [phone]  Established Patient  Follow Up Time: Routine

## 2024-12-14 NOTE — DISCHARGE NOTE PROVIDER - HOSPITAL COURSE
PRITI KEMP is a 59y Male who was admitted to [facility] for abdominal pain, nausea/vomiting and fevers with CT evidence of thickened small bowel concerning for Crohn's flare. Patient was hemodynamically stable on admission with slight leukocytosis, and was admitted to surgery service. While admitted patient was started on IV antibiotics and tolerated diet advancement. Pt was also started on home medications.     GI was consulted, who recommended GI PCR, CRP, calprotectin, and C.difficile. GI PCR was positive for norovirus; and IV antibiotics were discontinued. Pt bowel movements improved in number during hospital course. Patient can obtain repeat colonoscopy once norovirus resolves.     At time of discharge, pt was tolerating a low fiber diet, voiding/stooling independently, ambulating, and pain was well-controlled. Patient and family felt ready for discharge. Patient should follow up with GI outpatient, does not need surgical follow up. He should also follow up with his primary care provider. PRITI KEMP is a 59y Male who was admitted to [facility] for abdominal pain, nausea/vomiting and fevers with CT evidence of thickened small bowel concerning for Crohn's flare. Patient was hemodynamically stable on admission with slight leukocytosis, and was admitted to surgery service. While admitted patient was started on IV antibiotics and tolerated diet advancement. Pt was also started on home medications.     GI was consulted, who recommended GI PCR, CRP, calprotectin, and C.difficile. GI PCR was positive for norovirus; and IV antibiotics were discontinued. Pt bowel movements improved in number during hospital course. Patient can obtain repeat colonoscopy once norovirus resolves.   GI recommended potentially changing his Crohns medication outpatient. There was no indication for steroids given his clinical improvement.     At time of discharge, pt was tolerating a low fiber diet, voiding/stooling independently, ambulating, and pain was well-controlled. Patient and family felt ready for discharge. Patient should follow up with GI outpatient, does not need surgical follow up. He should also follow up with his primary care provider.

## 2024-12-15 VITALS
HEART RATE: 68 BPM | OXYGEN SATURATION: 97 % | RESPIRATION RATE: 18 BRPM | TEMPERATURE: 98 F | DIASTOLIC BLOOD PRESSURE: 89 MMHG | SYSTOLIC BLOOD PRESSURE: 138 MMHG

## 2024-12-15 LAB
ANION GAP SERPL CALC-SCNC: 12 MMOL/L — SIGNIFICANT CHANGE UP (ref 5–17)
BUN SERPL-MCNC: 15 MG/DL — SIGNIFICANT CHANGE UP (ref 7–23)
CALCIUM SERPL-MCNC: 9.4 MG/DL — SIGNIFICANT CHANGE UP (ref 8.4–10.5)
CHLORIDE SERPL-SCNC: 105 MMOL/L — SIGNIFICANT CHANGE UP (ref 96–108)
CO2 SERPL-SCNC: 24 MMOL/L — SIGNIFICANT CHANGE UP (ref 22–31)
CREAT SERPL-MCNC: 0.66 MG/DL — SIGNIFICANT CHANGE UP (ref 0.5–1.3)
CRP SERPL-MCNC: 30 MG/L — HIGH (ref 0–4)
CULTURE RESULTS: SIGNIFICANT CHANGE UP
EGFR: 108 ML/MIN/1.73M2 — SIGNIFICANT CHANGE UP
GLUCOSE SERPL-MCNC: 119 MG/DL — HIGH (ref 70–99)
HCT VFR BLD CALC: 31.6 % — LOW (ref 39–50)
HGB BLD-MCNC: 10.1 G/DL — LOW (ref 13–17)
MAGNESIUM SERPL-MCNC: 2 MG/DL — SIGNIFICANT CHANGE UP (ref 1.6–2.6)
MCHC RBC-ENTMCNC: 27.6 PG — SIGNIFICANT CHANGE UP (ref 27–34)
MCHC RBC-ENTMCNC: 32 G/DL — SIGNIFICANT CHANGE UP (ref 32–36)
MCV RBC AUTO: 86.3 FL — SIGNIFICANT CHANGE UP (ref 80–100)
NRBC # BLD: 0 /100 WBCS — SIGNIFICANT CHANGE UP (ref 0–0)
PHOSPHATE SERPL-MCNC: 4 MG/DL — SIGNIFICANT CHANGE UP (ref 2.5–4.5)
PLATELET # BLD AUTO: 121 K/UL — LOW (ref 150–400)
POTASSIUM SERPL-MCNC: 4.5 MMOL/L — SIGNIFICANT CHANGE UP (ref 3.5–5.3)
POTASSIUM SERPL-SCNC: 4.5 MMOL/L — SIGNIFICANT CHANGE UP (ref 3.5–5.3)
RBC # BLD: 3.66 M/UL — LOW (ref 4.2–5.8)
RBC # FLD: 14.2 % — SIGNIFICANT CHANGE UP (ref 10.3–14.5)
SODIUM SERPL-SCNC: 141 MMOL/L — SIGNIFICANT CHANGE UP (ref 135–145)
SPECIMEN SOURCE: SIGNIFICANT CHANGE UP
WBC # BLD: 3.68 K/UL — LOW (ref 3.8–10.5)
WBC # FLD AUTO: 3.68 K/UL — LOW (ref 3.8–10.5)

## 2024-12-15 PROCEDURE — 80048 BASIC METABOLIC PNL TOTAL CA: CPT

## 2024-12-15 PROCEDURE — 96374 THER/PROPH/DIAG INJ IV PUSH: CPT

## 2024-12-15 PROCEDURE — 84295 ASSAY OF SERUM SODIUM: CPT

## 2024-12-15 PROCEDURE — 82947 ASSAY GLUCOSE BLOOD QUANT: CPT

## 2024-12-15 PROCEDURE — 82962 GLUCOSE BLOOD TEST: CPT

## 2024-12-15 PROCEDURE — 84132 ASSAY OF SERUM POTASSIUM: CPT

## 2024-12-15 PROCEDURE — 94640 AIRWAY INHALATION TREATMENT: CPT

## 2024-12-15 PROCEDURE — 83690 ASSAY OF LIPASE: CPT

## 2024-12-15 PROCEDURE — 87046 STOOL CULTR AEROBIC BACT EA: CPT

## 2024-12-15 PROCEDURE — 82803 BLOOD GASES ANY COMBINATION: CPT

## 2024-12-15 PROCEDURE — 87040 BLOOD CULTURE FOR BACTERIA: CPT

## 2024-12-15 PROCEDURE — 85652 RBC SED RATE AUTOMATED: CPT

## 2024-12-15 PROCEDURE — 82435 ASSAY OF BLOOD CHLORIDE: CPT

## 2024-12-15 PROCEDURE — 83605 ASSAY OF LACTIC ACID: CPT

## 2024-12-15 PROCEDURE — 83735 ASSAY OF MAGNESIUM: CPT

## 2024-12-15 PROCEDURE — 84484 ASSAY OF TROPONIN QUANT: CPT

## 2024-12-15 PROCEDURE — 85025 COMPLETE CBC W/AUTO DIFF WBC: CPT

## 2024-12-15 PROCEDURE — 87045 FECES CULTURE AEROBIC BACT: CPT

## 2024-12-15 PROCEDURE — 36415 COLL VENOUS BLD VENIPUNCTURE: CPT

## 2024-12-15 PROCEDURE — 85014 HEMATOCRIT: CPT

## 2024-12-15 PROCEDURE — 80053 COMPREHEN METABOLIC PANEL: CPT

## 2024-12-15 PROCEDURE — 87086 URINE CULTURE/COLONY COUNT: CPT

## 2024-12-15 PROCEDURE — 99285 EMERGENCY DEPT VISIT HI MDM: CPT

## 2024-12-15 PROCEDURE — 71045 X-RAY EXAM CHEST 1 VIEW: CPT

## 2024-12-15 PROCEDURE — 81001 URINALYSIS AUTO W/SCOPE: CPT

## 2024-12-15 PROCEDURE — 96375 TX/PRO/DX INJ NEW DRUG ADDON: CPT

## 2024-12-15 PROCEDURE — 87637 SARSCOV2&INF A&B&RSV AMP PRB: CPT

## 2024-12-15 PROCEDURE — 86140 C-REACTIVE PROTEIN: CPT

## 2024-12-15 PROCEDURE — 87507 IADNA-DNA/RNA PROBE TQ 12-25: CPT

## 2024-12-15 PROCEDURE — 74177 CT ABD & PELVIS W/CONTRAST: CPT | Mod: MC

## 2024-12-15 PROCEDURE — 0241U: CPT

## 2024-12-15 PROCEDURE — 85027 COMPLETE CBC AUTOMATED: CPT

## 2024-12-15 PROCEDURE — 87077 CULTURE AEROBIC IDENTIFY: CPT

## 2024-12-15 PROCEDURE — 82330 ASSAY OF CALCIUM: CPT

## 2024-12-15 PROCEDURE — 85018 HEMOGLOBIN: CPT

## 2024-12-15 PROCEDURE — 84100 ASSAY OF PHOSPHORUS: CPT

## 2024-12-15 PROCEDURE — 83993 ASSAY FOR CALPROTECTIN FECAL: CPT

## 2024-12-15 RX ADMIN — CELECOXIB 200 MILLIGRAM(S): 200 CAPSULE ORAL at 17:54

## 2024-12-15 RX ADMIN — ACETAMINOPHEN 500MG 975 MILLIGRAM(S): 500 TABLET, COATED ORAL at 18:30

## 2024-12-15 RX ADMIN — Medication 5000 UNIT(S): at 13:02

## 2024-12-15 RX ADMIN — CELECOXIB 200 MILLIGRAM(S): 200 CAPSULE ORAL at 05:52

## 2024-12-15 RX ADMIN — FLUTICASONE PROPIONATE AND SALMETEROL XINAFOATE 1 DOSE(S): 45; 21 AEROSOL, METERED RESPIRATORY (INHALATION) at 17:55

## 2024-12-15 RX ADMIN — ALLOPURINOL 100 MILLIGRAM(S): 300 TABLET ORAL at 13:03

## 2024-12-15 RX ADMIN — TRAMADOL HYDROCHLORIDE 50 MILLIGRAM(S): 300 CAPSULE ORAL at 17:55

## 2024-12-15 RX ADMIN — Medication 1 SPRAY(S): at 13:04

## 2024-12-15 RX ADMIN — TRAMADOL HYDROCHLORIDE 50 MILLIGRAM(S): 300 CAPSULE ORAL at 18:30

## 2024-12-15 RX ADMIN — ACETAMINOPHEN 500MG 975 MILLIGRAM(S): 500 TABLET, COATED ORAL at 05:52

## 2024-12-15 RX ADMIN — TRAMADOL HYDROCHLORIDE 50 MILLIGRAM(S): 300 CAPSULE ORAL at 05:52

## 2024-12-15 RX ADMIN — PANTOPRAZOLE SODIUM 40 MILLIGRAM(S): 40 TABLET, DELAYED RELEASE ORAL at 05:22

## 2024-12-15 RX ADMIN — Medication 1 SPRAY(S): at 05:25

## 2024-12-15 RX ADMIN — LOSARTAN POTASSIUM 100 MILLIGRAM(S): 100 TABLET, FILM COATED ORAL at 05:22

## 2024-12-15 RX ADMIN — METOPROLOL TARTRATE 25 MILLIGRAM(S): 100 TABLET, FILM COATED ORAL at 05:22

## 2024-12-15 RX ADMIN — ACETAMINOPHEN 500MG 975 MILLIGRAM(S): 500 TABLET, COATED ORAL at 17:54

## 2024-12-15 RX ADMIN — ACETAMINOPHEN 500MG 975 MILLIGRAM(S): 500 TABLET, COATED ORAL at 13:45

## 2024-12-15 RX ADMIN — ACETAMINOPHEN 500MG 975 MILLIGRAM(S): 500 TABLET, COATED ORAL at 13:03

## 2024-12-15 RX ADMIN — CELECOXIB 200 MILLIGRAM(S): 200 CAPSULE ORAL at 05:22

## 2024-12-15 RX ADMIN — TRAMADOL HYDROCHLORIDE 50 MILLIGRAM(S): 300 CAPSULE ORAL at 05:23

## 2024-12-15 RX ADMIN — Medication 1 MILLIGRAM(S): at 13:03

## 2024-12-15 RX ADMIN — FLUTICASONE PROPIONATE AND SALMETEROL XINAFOATE 1 DOSE(S): 45; 21 AEROSOL, METERED RESPIRATORY (INHALATION) at 05:21

## 2024-12-15 RX ADMIN — ACETAMINOPHEN 500MG 975 MILLIGRAM(S): 500 TABLET, COATED ORAL at 05:22

## 2024-12-15 RX ADMIN — AMLODIPINE BESYLATE 5 MILLIGRAM(S): 10 TABLET ORAL at 05:22

## 2024-12-15 RX ADMIN — Medication 5000 UNIT(S): at 05:22
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
## 2024-12-15 NOTE — DISCHARGE NOTE NURSING/CASE MANAGEMENT/SOCIAL WORK - NSDCPEFALRISK_GEN_ALL_CORE
For information on Fall & Injury Prevention, visit: [email or web address] OR  [email or web address] OR  https://www.cdc.gov/steadi/patient.html

## 2024-12-15 NOTE — PROGRESS NOTE ADULT - ATTENDING COMMENTS
Feels better overall but watery diarrhea returned.  Abd soft and non-tender.  GI to follow up.  Possible steroids, possible DC
Patient feels better this morning.  No pain and no tenderness.  Clear liquids.  Cipro and Flagyl.  GI consult.
Discussed and agree with above recommendations.
Patient looks and feels well.  Non-tender.  Bowel function back to baseline.  Tolerating solid diet.  DC home and follow up with his GI at home.  Resume outpatient meds.  No need for abx or colorectal follow up at this time

## 2024-12-15 NOTE — DISCHARGE NOTE NURSING/CASE MANAGEMENT/SOCIAL WORK - FINANCIAL ASSISTANCE
[facility] provides services at a reduced cost to those who are determined to be eligible through [facility]’s financial assistance program. Information regarding [facility]’s financial assistance program can be found by going to [email or web address] or by calling [phone].

## 2024-12-15 NOTE — CHART NOTE - NSCHARTNOTEFT_GEN_A_CORE
No steroids needed prior to discharge given overall improvement with supportive care for his norovirus which is likely the cause of his GI issues.   Patient needs close follow up with his GI provider following discharge     April Morales MD  Gastroenterology/Hepatology Fellow, PGY-5  Please contact via TEAMS    NON-URGENT CONSULTS:  Please email [email or web address]

## 2024-12-15 NOTE — DISCHARGE NOTE NURSING/CASE MANAGEMENT/SOCIAL WORK - PATIENT PORTAL LINK FT
You can access the FollowMyHealth Patient Portal offered by [facility] by registering at the following website: [email or web address]. By joining [employer]’s FollowMyHealth portal, you will also be able to view your health information using other applications (apps) compatible with our system.

## 2024-12-15 NOTE — DISCHARGE NOTE NURSING/CASE MANAGEMENT/SOCIAL WORK - NSTRANSFERBELONGINGSDISPO_GEN_A_NUR
Pt admitted for syncope. A&O x4. OOB ad devon. Lungs clear, on RA. BS active x4, LBM 12/17. C-diff rule out. 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 Drain to abd for PD. Denies pain or nausea at this time. PIV to R AC. Resting comfortably. Bed locked, in lowest position. Call light within reach, will continue to monitor. with patient

## 2024-12-15 NOTE — PROGRESS NOTE ADULT - ASSESSMENT
59M with history of Crohn's disease presenting now with abdominal pain, nausea/vomiting and fevers and CT evidence of thickened small bowel concerning for Crohn's flare, now found to also have norovirus (+) on GI PCR.    Plan:  - LRD  - CRP trending  - Pain control  - GI consult for management of Crohn's flare, recs appreciated    - possible dc home today    [facility] surgery  [id number]   59M with history of Crohn's disease presenting now with abdominal pain, nausea/vomiting and fevers and CT evidence of thickened small bowel concerning for Crohn's flare, now found to also have norovirus (+) on GI PCR.    Plan:  - Tolerating LRD   - CRP trending  - Pain control  - per GI: consider starting steroids and change Crohns regimen outpatient.   - possible d/c today pending improvement in diarrhea symptoms       [facility]  [id number]

## 2024-12-15 NOTE — PROGRESS NOTE ADULT - SUBJECTIVE AND OBJECTIVE BOX
Surgery Daily Progress Note  =====================================================    INTERVAL EVENTS:   - NAEO  - dc'd cipro/flagyl, norovirus (+)  - tolerating LRD     SUBJECTIVE: Patient seen at bedside during AM rounds.      --------------------------------------------------------------------------------------  VITAL SIGNS:  T(C): 36.4 (12-14-24 @ 20:47), Max: 37.4 (12-14-24 @ 17:05)  HR: 61 (12-14-24 @ 20:47) (61 - 75)  BP: 137/80 (12-14-24 @ 20:47) (130/78 - 143/84)  RR: 18 (12-14-24 @ 20:47) (18 - 18)  SpO2: 98% (12-14-24 @ 20:47) (97% - 98%)  --------------------------------------------------------------------------------------  MEDICATIONS:   acetaminophen     Tablet .. 975 milliGRAM(s) Oral every 6 hours  allopurinol 100 milliGRAM(s) Oral daily  amLODIPine   Tablet 5 milliGRAM(s) Oral daily  aspirin enteric coated 81 milliGRAM(s) Oral at bedtime  celecoxib 200 milliGRAM(s) Oral two times a day  cetirizine 10 milliGRAM(s) Oral daily PRN  fluticasone propionate/ salmeterol 250-50 MICROgram(s) Diskus 1 Dose(s) Inhalation two times a day  folic acid 1 milliGRAM(s) Oral daily  heparin   Injectable 5000 Unit(s) SubCutaneous every 8 hours  losartan 100 milliGRAM(s) Oral daily  metoprolol succinate ER 25 milliGRAM(s) Oral daily  montelukast 10 milliGRAM(s) Oral at bedtime  pantoprazole    Tablet 40 milliGRAM(s) Oral before breakfast  rosuvastatin 5 milliGRAM(s) Oral at bedtime  sodium chloride 0.65% Nasal 1 Spray(s) Both Nostrils three times a day  tiotropium 2.5 MICROgram(s) Inhaler 2 Puff(s) Inhalation at bedtime  traMADol 50 milliGRAM(s) Oral two times a day    --------------------------------------------------------------------------------------  INTAKE/OUTPUT:     12-13-24 @ 07:01 - 12-14-24 @ 07:00  --------------------------------------------------------  IN: 2620 mL / OUT: 1900 mL / NET: 720 mL    12-14-24 @ 07:01  -  12-15-24 @ 00:17  --------------------------------------------------------  IN: 720 mL / OUT: 0 mL / NET: 720 mL      --------------------------------------------------------------------------------------  Physical Exam:  General: NAD, resting comfortably in bed  Abdomen: soft, tender to palpation in RLQ, nondistended    --------------------------------------------------------------------------------------

## 2024-12-16 LAB — CALPROTECTIN STL-MCNT: 1640 UG/G — HIGH (ref 0–120)

## 2024-12-16 NOTE — H&P ADULT. - NSSUBSTANCEUSE_GEN_ALL_CORE_SD
- Start taking Losartan for BP in the morning. Check BP once a day and send message with results in 2 weeks       never used

## 2024-12-18 NOTE — H&P PST ADULT - PRO ARRIVE FROM
Concerning your headache:  Return to ED if your symptoms worsens or changes, you develop neck pain, stiffness, fevers, chills, muscle weakness, numbness, trouble walking, or you have any concerns.    Follow-up with primary care as we discussed.    The examination and treatment you have received in the Emergency Department has been given on an emergency basis only.    This limited encounter is not a replacement for the comprehensive services provided by a primary care physician. We recommend follow up for further preventative and ongoing audie screening, especially if your symptoms persists, worsen, or change in quality or character. When calling for a follow up appointment, please remember to inform the office that you were seen in the Emergency Department and that you are requesting a follow up visit.    Again, it is very important that you return immediately if your condition worsens, any new symptoms develop, or you do not recover as expected.    
home

## 2024-12-30 ENCOUNTER — INPATIENT (INPATIENT)
Facility: HOSPITAL | Age: 59
LOS: 0 days | Discharge: HOME | DRG: 392 | End: 2024-12-31
Attending: SURGERY | Admitting: SURGERY
Payer: MEDICARE

## 2024-12-30 VITALS
RESPIRATION RATE: 16 BRPM | SYSTOLIC BLOOD PRESSURE: 142 MMHG | HEIGHT: 66 IN | WEIGHT: 169.98 LBS | HEART RATE: 110 BPM | TEMPERATURE: 102 F | DIASTOLIC BLOOD PRESSURE: 92 MMHG | OXYGEN SATURATION: 97 %

## 2024-12-30 DIAGNOSIS — S92.009A UNSPECIFIED FRACTURE OF UNSPECIFIED CALCANEUS, INITIAL ENCOUNTER FOR CLOSED FRACTURE: Chronic | ICD-10-CM

## 2024-12-30 DIAGNOSIS — Z90.49 ACQUIRED ABSENCE OF OTHER SPECIFIED PARTS OF DIGESTIVE TRACT: Chronic | ICD-10-CM

## 2024-12-30 DIAGNOSIS — Z98.89 OTHER SPECIFIED POSTPROCEDURAL STATES: Chronic | ICD-10-CM

## 2024-12-30 DIAGNOSIS — Z87.898 PERSONAL HISTORY OF OTHER SPECIFIED CONDITIONS: Chronic | ICD-10-CM

## 2024-12-30 LAB
ALBUMIN SERPL ELPH-MCNC: 4.5 G/DL — SIGNIFICANT CHANGE UP (ref 3.3–5)
ALP SERPL-CCNC: 80 U/L — SIGNIFICANT CHANGE UP (ref 40–120)
ALT FLD-CCNC: 18 U/L — SIGNIFICANT CHANGE UP (ref 10–45)
ANION GAP SERPL CALC-SCNC: 15 MMOL/L — SIGNIFICANT CHANGE UP (ref 5–17)
AST SERPL-CCNC: 17 U/L — SIGNIFICANT CHANGE UP (ref 10–40)
BASE EXCESS BLDV CALC-SCNC: 1.7 MMOL/L — SIGNIFICANT CHANGE UP (ref -2–3)
BASOPHILS # BLD AUTO: 0 K/UL — SIGNIFICANT CHANGE UP (ref 0–0.2)
BASOPHILS NFR BLD AUTO: 0 % — SIGNIFICANT CHANGE UP (ref 0–2)
BILIRUB SERPL-MCNC: 0.2 MG/DL — SIGNIFICANT CHANGE UP (ref 0.2–1.2)
BUN SERPL-MCNC: 19 MG/DL — SIGNIFICANT CHANGE UP (ref 7–23)
CALCIUM SERPL-MCNC: 10.2 MG/DL — SIGNIFICANT CHANGE UP (ref 8.4–10.5)
CHLORIDE SERPL-SCNC: 105 MMOL/L — SIGNIFICANT CHANGE UP (ref 96–108)
CO2 BLDV-SCNC: 28 MMOL/L — HIGH (ref 22–26)
CO2 SERPL-SCNC: 20 MMOL/L — LOW (ref 22–31)
CREAT SERPL-MCNC: 0.89 MG/DL — SIGNIFICANT CHANGE UP (ref 0.5–1.3)
EGFR: 99 ML/MIN/1.73M2 — SIGNIFICANT CHANGE UP
EOSINOPHIL # BLD AUTO: 0 K/UL — SIGNIFICANT CHANGE UP (ref 0–0.5)
EOSINOPHIL NFR BLD AUTO: 0 % — SIGNIFICANT CHANGE UP (ref 0–6)
FLUAV AG NPH QL: SIGNIFICANT CHANGE UP
FLUBV AG NPH QL: SIGNIFICANT CHANGE UP
GAS PNL BLDV: SIGNIFICANT CHANGE UP
GIANT PLATELETS BLD QL SMEAR: PRESENT — SIGNIFICANT CHANGE UP
GLUCOSE SERPL-MCNC: 106 MG/DL — HIGH (ref 70–99)
HCO3 BLDV-SCNC: 27 MMOL/L — SIGNIFICANT CHANGE UP (ref 22–29)
HCT VFR BLD CALC: 34.5 % — LOW (ref 39–50)
HGB BLD-MCNC: 11.2 G/DL — LOW (ref 13–17)
LIDOCAIN IGE QN: 40 U/L — SIGNIFICANT CHANGE UP (ref 7–60)
LYMPHOCYTES # BLD AUTO: 0.36 K/UL — LOW (ref 1–3.3)
LYMPHOCYTES # BLD AUTO: 7 % — LOW (ref 13–44)
MANUAL SMEAR VERIFICATION: SIGNIFICANT CHANGE UP
MCHC RBC-ENTMCNC: 27.3 PG — SIGNIFICANT CHANGE UP (ref 27–34)
MCHC RBC-ENTMCNC: 32.5 G/DL — SIGNIFICANT CHANGE UP (ref 32–36)
MCV RBC AUTO: 84.1 FL — SIGNIFICANT CHANGE UP (ref 80–100)
MONOCYTES # BLD AUTO: 0.18 K/UL — SIGNIFICANT CHANGE UP (ref 0–0.9)
MONOCYTES NFR BLD AUTO: 3.5 % — SIGNIFICANT CHANGE UP (ref 2–14)
NEUTROPHILS # BLD AUTO: 4.64 K/UL — SIGNIFICANT CHANGE UP (ref 1.8–7.4)
NEUTROPHILS NFR BLD AUTO: 89.5 % — HIGH (ref 43–77)
PCO2 BLDV: 42 MMHG — SIGNIFICANT CHANGE UP (ref 42–55)
PH BLDV: 7.41 — SIGNIFICANT CHANGE UP (ref 7.32–7.43)
PLAT MORPH BLD: ABNORMAL
PLATELET # BLD AUTO: 151 K/UL — SIGNIFICANT CHANGE UP (ref 150–400)
PO2 BLDV: 26 MMHG — SIGNIFICANT CHANGE UP (ref 25–45)
POLYCHROMASIA BLD QL SMEAR: SLIGHT — SIGNIFICANT CHANGE UP
POTASSIUM SERPL-MCNC: 3.8 MMOL/L — SIGNIFICANT CHANGE UP (ref 3.5–5.3)
POTASSIUM SERPL-SCNC: 3.8 MMOL/L — SIGNIFICANT CHANGE UP (ref 3.5–5.3)
PROT SERPL-MCNC: 7.6 G/DL — SIGNIFICANT CHANGE UP (ref 6–8.3)
RBC # BLD: 4.1 M/UL — LOW (ref 4.2–5.8)
RBC # FLD: 14.7 % — HIGH (ref 10.3–14.5)
RBC BLD AUTO: SIGNIFICANT CHANGE UP
RSV RNA NPH QL NAA+NON-PROBE: SIGNIFICANT CHANGE UP
SAO2 % BLDV: 37.9 % — LOW (ref 67–88)
SARS-COV-2 RNA SPEC QL NAA+PROBE: SIGNIFICANT CHANGE UP
SODIUM SERPL-SCNC: 140 MMOL/L — SIGNIFICANT CHANGE UP (ref 135–145)
TROPONIN T, HIGH SENSITIVITY RESULT: <6 NG/L — SIGNIFICANT CHANGE UP (ref 0–51)
WBC # BLD: 5.18 K/UL — SIGNIFICANT CHANGE UP (ref 3.8–10.5)
WBC # FLD AUTO: 5.18 K/UL — SIGNIFICANT CHANGE UP (ref 3.8–10.5)

## 2024-12-30 PROCEDURE — 99285 EMERGENCY DEPT VISIT HI MDM: CPT | Mod: GC

## 2024-12-30 PROCEDURE — 71045 X-RAY EXAM CHEST 1 VIEW: CPT | Mod: 26

## 2024-12-30 RX ORDER — ACETAMINOPHEN 80 MG/.8ML
975 SOLUTION/ DROPS ORAL ONCE
Refills: 0 | Status: COMPLETED | OUTPATIENT
Start: 2024-12-30 | End: 2024-12-30

## 2024-12-30 RX ORDER — SODIUM CHLORIDE 9 MG/ML
1000 INJECTION, SOLUTION INTRAMUSCULAR; INTRAVENOUS; SUBCUTANEOUS ONCE
Refills: 0 | Status: COMPLETED | OUTPATIENT
Start: 2024-12-30 | End: 2024-12-30

## 2024-12-30 RX ORDER — PIPERACILLIN AND TAZOBACTAM 3; .375 G/15ML; G/15ML
3.38 INJECTION, POWDER, LYOPHILIZED, FOR SOLUTION INTRAVENOUS ONCE
Refills: 0 | Status: COMPLETED | OUTPATIENT
Start: 2024-12-30 | End: 2024-12-31

## 2024-12-30 RX ORDER — PIPERACILLIN AND TAZOBACTAM 3; .375 G/15ML; G/15ML
3.38 INJECTION, POWDER, LYOPHILIZED, FOR SOLUTION INTRAVENOUS ONCE
Refills: 0 | Status: COMPLETED | OUTPATIENT
Start: 2024-12-30 | End: 2024-12-30

## 2024-12-30 RX ADMIN — ACETAMINOPHEN 975 MILLIGRAM(S): 80 SOLUTION/ DROPS ORAL at 21:00

## 2024-12-30 RX ADMIN — PIPERACILLIN AND TAZOBACTAM 200 GRAM(S): 3; .375 INJECTION, POWDER, LYOPHILIZED, FOR SOLUTION INTRAVENOUS at 23:04

## 2024-12-30 RX ADMIN — SODIUM CHLORIDE 1000 MILLILITER(S): 9 INJECTION, SOLUTION INTRAMUSCULAR; INTRAVENOUS; SUBCUTANEOUS at 22:07

## 2024-12-30 RX ADMIN — ACETAMINOPHEN 975 MILLIGRAM(S): 80 SOLUTION/ DROPS ORAL at 19:39

## 2024-12-30 NOTE — ED PROVIDER NOTE - RAPID ASSESSMENT
59 M PMH of Crohn's disease recently admitted and discharged 12/15/2024 for norovirus presents to the ED planing of sudden onset abdominal pain and diarrhea today.  Symptoms were improving at time of patient discharge but reportedly today had acute onset of new GI symptoms.  Went to the urgent care and referred to the ED for further evaluation reportedly had positive Figueroa sign concern for acute cholecystitis.  Brother provides bulk of history, reportedly patient became more lethargic in waiting room. no pain meds/antipyretics PTA.    Pt appears unwell I called mobile charge RN at 19:30 and advised pt be brought back as soon as possible to the main ED.    Patient was seen as a rapid assessment (QPA) patient. This is an incomplete workup and it is intended that the patient will be seen in the main emergency department. The patient will be seen and further worked up in the main emergency department and their care will be completed by the main emergency department team along with a thorough physical exam. Receiving team will follow up on labs, analgesia, any clinical imaging, reassess and disposition as clinically indicated, all decisions regarding the progression of care will be made at their discretion. - Daniele Cardona PA-C

## 2024-12-30 NOTE — ED ADULT TRIAGE NOTE - CHIEF COMPLAINT QUOTE
diarrhea, abd pain, positive for norovirus 2 weeks ago. Fevers 102 at home, body aches and joint pain

## 2024-12-30 NOTE — ED PROVIDER NOTE - PHYSICAL EXAMINATION
Const: Awake, alert, no acute distress.  Well appearing.  Moving comfortably on stretcher.  Cardiac: Regular rate and regular rhythm. S1 S2 present.  Peripheral pulses 2+ and symmetric. No LE edema.  Resp: Speaking in full sentences. No evidence of respiratory distress.  Breath sounds clear to auscultation b/l. Normal chest excursion.   Abd: Ruq ttp w/ positive almeida's. Non-distended, no overlying skin changes.  Soft, no guarding, no rigidity, no rebound tenderness.  No palpable masses.  Normal bowel sounds in all 4 quadrants.  Back: Spine midline and non-tender. No CVAT.  Skin: Normal coloration.  No rashes, abrasions or lacerations.  Neuro: Awake, alert & oriented x 3.  Moves all extremities spontaneously.  No focal deficits.

## 2024-12-30 NOTE — ED ADULT NURSE NOTE - OBJECTIVE STATEMENT
59 yr old male woke up today with body aches and fevers. h/o Crohn's, cardiac stents and prostate CA. pt states this doesn't feel like a flare up. had the norovirus 2 weeks ago. on assessment a and o x 3 lungs clear abd soft tender on palpation no swelling in extremities some nausea no diarrhea. no other complaints or issues.

## 2024-12-30 NOTE — ED PROVIDER NOTE - CLINICAL SUMMARY MEDICAL DECISION MAKING FREE TEXT BOX
59-year-old male with history of Crohn's disease presented to the emergency department with nausea, diarrhea and abdominal pain for the past day.  Patient also endorses productive cough and nasal congestion.  Patient endorses posttussive chest pain. 59-year-old male with history of Crohn's disease presented to the emergency department with nausea, diarrhea and abdominal pain for the past day.  Patient also endorses productive cough and nasal congestion.  Patient endorses posttussive chest pain. Patient febrile and tachycardic.  DDx includes cholecystitis, diverticulitis, viral URI, gastroenteritis.  Dispo pending labs, imaging and reassessment.

## 2024-12-30 NOTE — ED PROVIDER NOTE - ATTENDING CONTRIBUTION TO CARE
Kristina Powell, Attending Physician: 59-year-old male with history of Crohn's disease presented to the emergency department with nausea, diarrhea and abdominal pain for the past day.  Patient also endorses productive cough and nasal congestion with fever at home. Pt with productive cough as well as mild throat pain - constellation of symptoms suggestive of viral illness however patient immunocompromised and thus warrants abx and IVF. Labs, US and CT ordered in triage.  DDx includes cholecystitis, diverticulitis, viral URI, gastroenteritis.  US non-actionable without evidence of cholecystitis though has sludge. Primarily suspect viral sepsis but in setting of abdominal pain will consider CTAP.     PE:  Gen: uncomfortable appearing, non-toxic appearing  Head: NCAT  ENT: MMM, Normal conjunctiva,  Chest: tachycardic, normal perfusion  Lungs: Symmetrical chest rise, lungs CTAB  Abdomen: soft, TTP in RUQ and RLQ, negative [place]  Ext: No gross deformities  Neuro: awake and alert, Moving all extremities equally  Skin: no rashes

## 2024-12-30 NOTE — ED PROVIDER NOTE - PROGRESS NOTE DETAILS
Received signout on this 59-year-old male who has history of IBD presents to the emergency department with abdominal pain and diarrhea.  Patient states that these similar symptoms occur when he has Crohn's flare.  Patient will have full RVP which was negative has elevated CRP which could be concerning for possible Crohn's flare will consult surgery Acosta Rocha MD PGY2: Patient reassessed, stable. Per surgery, admit to their service for possible Crohns flare. Lab and imaging results were discussed with the patient. Shared decision making was held between provider and patient with regards to disposition decision. All questions and concerns were addressed. Patient is agreeable to disposition plan.

## 2024-12-30 NOTE — ED ADULT NURSE NOTE - NSFALLUNIVINTERV_ED_ALL_ED
Bed/Stretcher in lowest position, wheels locked, appropriate side rails in place/Call bell, personal items and telephone in reach/Instruct patient to call for assistance before getting out of bed/chair/stretcher/Non-slip footwear applied when patient is off stretcher/[place] to call system/Physically safe environment - no spills, clutter or unnecessary equipment/Purposeful proactive rounding/Room/bathroom lighting operational, light cord in reach

## 2024-12-31 ENCOUNTER — TRANSCRIPTION ENCOUNTER (OUTPATIENT)
Age: 59
End: 2024-12-31

## 2024-12-31 ENCOUNTER — INPATIENT (INPATIENT)
Facility: HOSPITAL | Age: 59
LOS: 3 days | Discharge: HOME | DRG: 392 | End: 2025-01-04
Admitting: SURGERY
Payer: MEDICARE

## 2024-12-31 VITALS
RESPIRATION RATE: 18 BRPM | SYSTOLIC BLOOD PRESSURE: 122 MMHG | DIASTOLIC BLOOD PRESSURE: 76 MMHG | TEMPERATURE: 99 F | OXYGEN SATURATION: 96 % | HEART RATE: 81 BPM

## 2024-12-31 VITALS
RESPIRATION RATE: 18 BRPM | HEART RATE: 102 BPM | SYSTOLIC BLOOD PRESSURE: 138 MMHG | OXYGEN SATURATION: 96 % | TEMPERATURE: 103 F | WEIGHT: 169.98 LBS | HEIGHT: 66 IN | DIASTOLIC BLOOD PRESSURE: 88 MMHG

## 2024-12-31 DIAGNOSIS — Z87.898 PERSONAL HISTORY OF OTHER SPECIFIED CONDITIONS: Chronic | ICD-10-CM

## 2024-12-31 DIAGNOSIS — S92.009A UNSPECIFIED FRACTURE OF UNSPECIFIED CALCANEUS, INITIAL ENCOUNTER FOR CLOSED FRACTURE: Chronic | ICD-10-CM

## 2024-12-31 DIAGNOSIS — R50.9 FEVER, UNSPECIFIED: ICD-10-CM

## 2024-12-31 DIAGNOSIS — Z98.89 OTHER SPECIFIED POSTPROCEDURAL STATES: Chronic | ICD-10-CM

## 2024-12-31 DIAGNOSIS — Z90.49 ACQUIRED ABSENCE OF OTHER SPECIFIED PARTS OF DIGESTIVE TRACT: Chronic | ICD-10-CM

## 2024-12-31 LAB
ALBUMIN SERPL ELPH-MCNC: 3.7 G/DL — SIGNIFICANT CHANGE UP (ref 3.3–5)
ALP SERPL-CCNC: 67 U/L — SIGNIFICANT CHANGE UP (ref 40–120)
ALT FLD-CCNC: 16 U/L — SIGNIFICANT CHANGE UP (ref 10–45)
ANION GAP SERPL CALC-SCNC: 12 MMOL/L — SIGNIFICANT CHANGE UP (ref 5–17)
ANION GAP SERPL CALC-SCNC: 13 MMOL/L — SIGNIFICANT CHANGE UP (ref 5–17)
APPEARANCE UR: CLEAR — SIGNIFICANT CHANGE UP
AST SERPL-CCNC: 18 U/L — SIGNIFICANT CHANGE UP (ref 10–40)
BASOPHILS # BLD AUTO: 0 K/UL — SIGNIFICANT CHANGE UP (ref 0–0.2)
BASOPHILS NFR BLD AUTO: 0 % — SIGNIFICANT CHANGE UP (ref 0–2)
BILIRUB SERPL-MCNC: 0.2 MG/DL — SIGNIFICANT CHANGE UP (ref 0.2–1.2)
BILIRUB UR-MCNC: NEGATIVE — SIGNIFICANT CHANGE UP
BUN SERPL-MCNC: 12 MG/DL — SIGNIFICANT CHANGE UP (ref 7–23)
BUN SERPL-MCNC: 17 MG/DL — SIGNIFICANT CHANGE UP (ref 7–23)
CALCIUM SERPL-MCNC: 8.8 MG/DL — SIGNIFICANT CHANGE UP (ref 8.4–10.5)
CALCIUM SERPL-MCNC: 8.9 MG/DL — SIGNIFICANT CHANGE UP (ref 8.4–10.5)
CHLORIDE SERPL-SCNC: 105 MMOL/L — SIGNIFICANT CHANGE UP (ref 96–108)
CHLORIDE SERPL-SCNC: 106 MMOL/L — SIGNIFICANT CHANGE UP (ref 96–108)
CO2 SERPL-SCNC: 21 MMOL/L — LOW (ref 22–31)
CO2 SERPL-SCNC: 22 MMOL/L — SIGNIFICANT CHANGE UP (ref 22–31)
COLOR SPEC: YELLOW — SIGNIFICANT CHANGE UP
CREAT SERPL-MCNC: 0.8 MG/DL — SIGNIFICANT CHANGE UP (ref 0.5–1.3)
CREAT SERPL-MCNC: 0.8 MG/DL — SIGNIFICANT CHANGE UP (ref 0.5–1.3)
DIFF PNL FLD: NEGATIVE — SIGNIFICANT CHANGE UP
EGFR: 102 ML/MIN/1.73M2 — SIGNIFICANT CHANGE UP
EOSINOPHIL # BLD AUTO: 0.01 K/UL — SIGNIFICANT CHANGE UP (ref 0–0.5)
EOSINOPHIL NFR BLD AUTO: 0.4 % — SIGNIFICANT CHANGE UP (ref 0–6)
GLUCOSE SERPL-MCNC: 103 MG/DL — HIGH (ref 70–99)
GLUCOSE SERPL-MCNC: 108 MG/DL — HIGH (ref 70–99)
GLUCOSE UR QL: 100 MG/DL
HCT VFR BLD CALC: 28.9 % — LOW (ref 39–50)
HCT VFR BLD CALC: 30.1 % — LOW (ref 39–50)
HGB BLD-MCNC: 9.5 G/DL — LOW (ref 13–17)
HGB BLD-MCNC: 9.7 G/DL — LOW (ref 13–17)
IMM GRANULOCYTES NFR BLD AUTO: 0.7 % — SIGNIFICANT CHANGE UP (ref 0–0.9)
KETONES UR-MCNC: NEGATIVE MG/DL — SIGNIFICANT CHANGE UP
LEUKOCYTE ESTERASE UR-ACNC: NEGATIVE — SIGNIFICANT CHANGE UP
LYMPHOCYTES # BLD AUTO: 0.39 K/UL — LOW (ref 1–3.3)
LYMPHOCYTES # BLD AUTO: 13.7 % — SIGNIFICANT CHANGE UP (ref 13–44)
MAGNESIUM SERPL-MCNC: 1.9 MG/DL — SIGNIFICANT CHANGE UP (ref 1.6–2.6)
MCHC RBC-ENTMCNC: 27.6 PG — SIGNIFICANT CHANGE UP (ref 27–34)
MCHC RBC-ENTMCNC: 27.7 PG — SIGNIFICANT CHANGE UP (ref 27–34)
MCHC RBC-ENTMCNC: 32.2 G/DL — SIGNIFICANT CHANGE UP (ref 32–36)
MCHC RBC-ENTMCNC: 32.9 G/DL — SIGNIFICANT CHANGE UP (ref 32–36)
MCV RBC AUTO: 84 FL — SIGNIFICANT CHANGE UP (ref 80–100)
MCV RBC AUTO: 86 FL — SIGNIFICANT CHANGE UP (ref 80–100)
MONOCYTES # BLD AUTO: 0.36 K/UL — SIGNIFICANT CHANGE UP (ref 0–0.9)
MONOCYTES NFR BLD AUTO: 12.6 % — SIGNIFICANT CHANGE UP (ref 2–14)
NEUTROPHILS # BLD AUTO: 2.07 K/UL — SIGNIFICANT CHANGE UP (ref 1.8–7.4)
NEUTROPHILS NFR BLD AUTO: 72.6 % — SIGNIFICANT CHANGE UP (ref 43–77)
NITRITE UR-MCNC: NEGATIVE — SIGNIFICANT CHANGE UP
NRBC # BLD: 0 /100 WBCS — SIGNIFICANT CHANGE UP (ref 0–0)
NRBC # BLD: 0 /100 WBCS — SIGNIFICANT CHANGE UP (ref 0–0)
NRBC BLD-RTO: 0 /100 WBCS — SIGNIFICANT CHANGE UP (ref 0–0)
PH UR: 5.5 — SIGNIFICANT CHANGE UP (ref 5–8)
PHOSPHATE SERPL-MCNC: 4.1 MG/DL — SIGNIFICANT CHANGE UP (ref 2.5–4.5)
PLATELET # BLD AUTO: 127 K/UL — LOW (ref 150–400)
PLATELET # BLD AUTO: 130 K/UL — LOW (ref 150–400)
POTASSIUM SERPL-MCNC: 3.4 MMOL/L — LOW (ref 3.5–5.3)
POTASSIUM SERPL-MCNC: 3.5 MMOL/L — SIGNIFICANT CHANGE UP (ref 3.5–5.3)
POTASSIUM SERPL-SCNC: 3.4 MMOL/L — LOW (ref 3.5–5.3)
POTASSIUM SERPL-SCNC: 3.5 MMOL/L — SIGNIFICANT CHANGE UP (ref 3.5–5.3)
PROT SERPL-MCNC: 6.1 G/DL — SIGNIFICANT CHANGE UP (ref 6–8.3)
PROT UR-MCNC: NEGATIVE MG/DL — SIGNIFICANT CHANGE UP
RBC # BLD: 3.44 M/UL — LOW (ref 4.2–5.8)
RBC # BLD: 3.5 M/UL — LOW (ref 4.2–5.8)
RBC # FLD: 14.5 % — SIGNIFICANT CHANGE UP (ref 10.3–14.5)
RBC # FLD: 14.7 % — HIGH (ref 10.3–14.5)
SODIUM SERPL-SCNC: 139 MMOL/L — SIGNIFICANT CHANGE UP (ref 135–145)
SODIUM SERPL-SCNC: 140 MMOL/L — SIGNIFICANT CHANGE UP (ref 135–145)
SP GR SPEC: >1.03 — HIGH (ref 1–1.03)
UROBILINOGEN FLD QL: 0.2 MG/DL — SIGNIFICANT CHANGE UP (ref 0.2–1)
WBC # BLD: 2.75 K/UL — LOW (ref 3.8–10.5)
WBC # BLD: 2.85 K/UL — LOW (ref 3.8–10.5)
WBC # FLD AUTO: 2.75 K/UL — LOW (ref 3.8–10.5)
WBC # FLD AUTO: 2.85 K/UL — LOW (ref 3.8–10.5)

## 2024-12-31 PROCEDURE — 82803 BLOOD GASES ANY COMBINATION: CPT

## 2024-12-31 PROCEDURE — 83690 ASSAY OF LIPASE: CPT

## 2024-12-31 PROCEDURE — 81003 URINALYSIS AUTO W/O SCOPE: CPT

## 2024-12-31 PROCEDURE — 76705 ECHO EXAM OF ABDOMEN: CPT

## 2024-12-31 PROCEDURE — 96375 TX/PRO/DX INJ NEW DRUG ADDON: CPT

## 2024-12-31 PROCEDURE — 71045 X-RAY EXAM CHEST 1 VIEW: CPT

## 2024-12-31 PROCEDURE — 85027 COMPLETE CBC AUTOMATED: CPT

## 2024-12-31 PROCEDURE — 94640 AIRWAY INHALATION TREATMENT: CPT

## 2024-12-31 PROCEDURE — 0225U NFCT DS DNA&RNA 21 SARSCOV2: CPT

## 2024-12-31 PROCEDURE — 96374 THER/PROPH/DIAG INJ IV PUSH: CPT

## 2024-12-31 PROCEDURE — 99222 1ST HOSP IP/OBS MODERATE 55: CPT

## 2024-12-31 PROCEDURE — 87040 BLOOD CULTURE FOR BACTERIA: CPT

## 2024-12-31 PROCEDURE — 84484 ASSAY OF TROPONIN QUANT: CPT

## 2024-12-31 PROCEDURE — 85652 RBC SED RATE AUTOMATED: CPT

## 2024-12-31 PROCEDURE — 83735 ASSAY OF MAGNESIUM: CPT

## 2024-12-31 PROCEDURE — 85025 COMPLETE CBC W/AUTO DIFF WBC: CPT

## 2024-12-31 PROCEDURE — 74177 CT ABD & PELVIS W/CONTRAST: CPT | Mod: MC

## 2024-12-31 PROCEDURE — 87637 SARSCOV2&INF A&B&RSV AMP PRB: CPT

## 2024-12-31 PROCEDURE — 96376 TX/PRO/DX INJ SAME DRUG ADON: CPT

## 2024-12-31 PROCEDURE — 99285 EMERGENCY DEPT VISIT HI MDM: CPT

## 2024-12-31 PROCEDURE — 86140 C-REACTIVE PROTEIN: CPT

## 2024-12-31 PROCEDURE — 80048 BASIC METABOLIC PNL TOTAL CA: CPT

## 2024-12-31 PROCEDURE — 80053 COMPREHEN METABOLIC PANEL: CPT

## 2024-12-31 PROCEDURE — 87086 URINE CULTURE/COLONY COUNT: CPT

## 2024-12-31 PROCEDURE — 84100 ASSAY OF PHOSPHORUS: CPT

## 2024-12-31 RX ORDER — HYDROMORPHONE/SOD CHLOR,ISO/PF 2 MG/10 ML
0.2 SYRINGE (ML) INJECTION EVERY 6 HOURS
Refills: 0 | Status: DISCONTINUED | OUTPATIENT
Start: 2024-12-31 | End: 2025-01-04

## 2024-12-31 RX ORDER — METOPROLOL TARTRATE 50 MG
25 TABLET ORAL DAILY
Refills: 0 | Status: DISCONTINUED | OUTPATIENT
Start: 2024-12-31 | End: 2024-12-31

## 2024-12-31 RX ORDER — ENOXAPARIN SODIUM 60 MG/.6ML
40 INJECTION INTRAVENOUS; SUBCUTANEOUS EVERY 24 HOURS
Refills: 0 | Status: DISCONTINUED | OUTPATIENT
Start: 2024-12-31 | End: 2024-12-31

## 2024-12-31 RX ORDER — OXYBUTYNIN CHLORIDE 5 MG/1
5 TABLET ORAL EVERY 8 HOURS
Refills: 0 | Status: DISCONTINUED | OUTPATIENT
Start: 2024-12-31 | End: 2024-12-31

## 2024-12-31 RX ORDER — RISANKIZUMAB-RZAA 150 MG/ML
150 INJECTION SUBCUTANEOUS
Refills: 0 | DISCHARGE

## 2024-12-31 RX ORDER — ASPIRIN 325 MG
81 TABLET ORAL DAILY
Refills: 0 | Status: DISCONTINUED | OUTPATIENT
Start: 2024-12-31 | End: 2025-01-04

## 2024-12-31 RX ORDER — ONDANSETRON HCL/PF 4 MG/2 ML
4 VIAL (ML) INJECTION ONCE
Refills: 0 | Status: COMPLETED | OUTPATIENT
Start: 2024-12-31 | End: 2024-12-31

## 2024-12-31 RX ORDER — MONTELUKAST SODIUM 10 MG/1
10 TABLET ORAL AT BEDTIME
Refills: 0 | Status: DISCONTINUED | OUTPATIENT
Start: 2024-12-31 | End: 2025-01-04

## 2024-12-31 RX ORDER — SODIUM CHLORIDE 9 G/1000ML
1000 INJECTION, SOLUTION INTRAVENOUS
Refills: 0 | Status: DISCONTINUED | OUTPATIENT
Start: 2024-12-31 | End: 2025-01-04

## 2024-12-31 RX ORDER — ACETAMINOPHEN 500 MG/5ML
1000 LIQUID (ML) ORAL EVERY 6 HOURS
Refills: 0 | Status: COMPLETED | OUTPATIENT
Start: 2024-12-31 | End: 2025-01-01

## 2024-12-31 RX ORDER — KETOROLAC TROMETHAMINE 30 MG/ML
15 INJECTION, SOLUTION INTRAMUSCULAR; INTRAVENOUS EVERY 6 HOURS
Refills: 0 | Status: DISCONTINUED | OUTPATIENT
Start: 2024-12-31 | End: 2025-01-01

## 2024-12-31 RX ORDER — MONTELUKAST SODIUM 10 MG/1
10 TABLET, FILM COATED ORAL AT BEDTIME
Refills: 0 | Status: DISCONTINUED | OUTPATIENT
Start: 2024-12-31 | End: 2024-12-31

## 2024-12-31 RX ORDER — ENOXAPARIN SODIUM 100 MG/ML
40 INJECTION SUBCUTANEOUS EVERY 24 HOURS
Refills: 0 | Status: DISCONTINUED | OUTPATIENT
Start: 2025-01-01 | End: 2025-01-04

## 2024-12-31 RX ORDER — ACETAMINOPHEN 80 MG/.8ML
1000 SOLUTION/ DROPS ORAL EVERY 6 HOURS
Refills: 0 | Status: DISCONTINUED | OUTPATIENT
Start: 2024-12-31 | End: 2024-12-31

## 2024-12-31 RX ORDER — TIOTROPIUM BROMIDE MONOHYDRATE 18 UG/1
2 CAPSULE ORAL; RESPIRATORY (INHALATION) DAILY
Refills: 0 | Status: DISCONTINUED | OUTPATIENT
Start: 2024-12-31 | End: 2024-12-31

## 2024-12-31 RX ORDER — INSULIN LISPRO 100 U/ML
INJECTION, SOLUTION INTRAVENOUS; SUBCUTANEOUS AT BEDTIME
Refills: 0 | Status: DISCONTINUED | OUTPATIENT
Start: 2024-12-31 | End: 2025-01-04

## 2024-12-31 RX ORDER — TIOTROPIUM BROMIDE INHALATION SPRAY 3.12 UG/1
2 SPRAY, METERED RESPIRATORY (INHALATION) DAILY
Refills: 0 | Status: DISCONTINUED | OUTPATIENT
Start: 2024-12-31 | End: 2025-01-04

## 2024-12-31 RX ORDER — HYDROMORPHONE/SOD CHLOR,ISO/PF 2 MG/10 ML
0.5 SYRINGE (ML) INJECTION EVERY 6 HOURS
Refills: 0 | Status: DISCONTINUED | OUTPATIENT
Start: 2024-12-31 | End: 2025-01-04

## 2024-12-31 RX ORDER — KETOROLAC TROMETHAMINE 30 MG/ML
15 INJECTION INTRAMUSCULAR; INTRAVENOUS EVERY 6 HOURS
Refills: 0 | Status: DISCONTINUED | OUTPATIENT
Start: 2024-12-31 | End: 2024-12-31

## 2024-12-31 RX ORDER — SODIUM CHLORIDE 9 G/1000ML
1000 INJECTION, SOLUTION INTRAVENOUS
Refills: 0 | Status: DISCONTINUED | OUTPATIENT
Start: 2024-12-31 | End: 2025-01-02

## 2024-12-31 RX ORDER — DEXTROSE 50 % IN WATER 50 %
25 SYRINGE (ML) INTRAVENOUS ONCE
Refills: 0 | Status: DISCONTINUED | OUTPATIENT
Start: 2024-12-31 | End: 2025-01-04

## 2024-12-31 RX ORDER — ALLOPURINOL 100 MG/1
100 TABLET ORAL DAILY
Refills: 0 | Status: DISCONTINUED | OUTPATIENT
Start: 2024-12-31 | End: 2024-12-31

## 2024-12-31 RX ORDER — ASPIRIN 81 MG
81 TABLET, DELAYED RELEASE (ENTERIC COATED) ORAL DAILY
Refills: 0 | Status: DISCONTINUED | OUTPATIENT
Start: 2024-12-31 | End: 2024-12-31

## 2024-12-31 RX ORDER — DEXTROSE 50 % IN WATER 50 %
15 SYRINGE (ML) INTRAVENOUS ONCE
Refills: 0 | Status: DISCONTINUED | OUTPATIENT
Start: 2024-12-31 | End: 2025-01-04

## 2024-12-31 RX ORDER — SODIUM CHLORIDE 9 MG/ML
1000 INJECTION, SOLUTION INTRAMUSCULAR; INTRAVENOUS; SUBCUTANEOUS ONCE
Refills: 0 | Status: COMPLETED | OUTPATIENT
Start: 2024-12-31 | End: 2024-12-31

## 2024-12-31 RX ORDER — DEXTROSE 50 % IN WATER 50 %
12.5 SYRINGE (ML) INTRAVENOUS ONCE
Refills: 0 | Status: DISCONTINUED | OUTPATIENT
Start: 2024-12-31 | End: 2025-01-04

## 2024-12-31 RX ORDER — INSULIN LISPRO 100 U/ML
INJECTION, SOLUTION INTRAVENOUS; SUBCUTANEOUS
Refills: 0 | Status: DISCONTINUED | OUTPATIENT
Start: 2024-12-31 | End: 2025-01-04

## 2024-12-31 RX ORDER — ROSUVASTATIN 40 MG/1
5 TABLET, FILM COATED ORAL AT BEDTIME
Refills: 0 | Status: DISCONTINUED | OUTPATIENT
Start: 2024-12-31 | End: 2024-12-31

## 2024-12-31 RX ORDER — ROSUVASTATIN CALCIUM 20 MG/1
5 TABLET, FILM COATED ORAL AT BEDTIME
Refills: 0 | Status: DISCONTINUED | OUTPATIENT
Start: 2024-12-31 | End: 2025-01-04

## 2024-12-31 RX ORDER — SODIUM CHLORIDE 9 MG/ML
1000 INJECTION, SOLUTION INTRAVENOUS
Refills: 0 | Status: DISCONTINUED | OUTPATIENT
Start: 2024-12-31 | End: 2024-12-31

## 2024-12-31 RX ORDER — ACETAMINOPHEN 80 MG/.8ML
1000 SOLUTION/ DROPS ORAL ONCE
Refills: 0 | Status: COMPLETED | OUTPATIENT
Start: 2024-12-31 | End: 2024-12-31

## 2024-12-31 RX ORDER — TADALAFIL 5 MG/1
1 TABLET, COATED ORAL
Refills: 0 | DISCHARGE

## 2024-12-31 RX ORDER — ACETAMINOPHEN 500 MG/5ML
1000 LIQUID (ML) ORAL ONCE
Refills: 0 | Status: COMPLETED | OUTPATIENT
Start: 2024-12-31 | End: 2024-12-31

## 2024-12-31 RX ORDER — GLUCAGON 3 MG/1
1 POWDER NASAL ONCE
Refills: 0 | Status: DISCONTINUED | OUTPATIENT
Start: 2024-12-31 | End: 2025-01-04

## 2024-12-31 RX ADMIN — SODIUM CHLORIDE 1000 MILLILITER(S): 9 INJECTION, SOLUTION INTRAMUSCULAR; INTRAVENOUS; SUBCUTANEOUS at 06:55

## 2024-12-31 RX ADMIN — ACETAMINOPHEN 400 MILLIGRAM(S): 80 SOLUTION/ DROPS ORAL at 11:29

## 2024-12-31 RX ADMIN — PIPERACILLIN AND TAZOBACTAM 25 GRAM(S): 3; .375 INJECTION, POWDER, LYOPHILIZED, FOR SOLUTION INTRAVENOUS at 03:01

## 2024-12-31 RX ADMIN — SODIUM CHLORIDE 60 MILLILITER(S): 9 INJECTION, SOLUTION INTRAVENOUS at 08:48

## 2024-12-31 RX ADMIN — Medication 2000 MILLILITER(S): at 22:59

## 2024-12-31 RX ADMIN — Medication 81 MILLIGRAM(S): at 11:29

## 2024-12-31 RX ADMIN — ALLOPURINOL 100 MILLIGRAM(S): 100 TABLET ORAL at 11:29

## 2024-12-31 RX ADMIN — Medication 4 MILLIGRAM(S): at 22:53

## 2024-12-31 RX ADMIN — ACETAMINOPHEN 400 MILLIGRAM(S): 80 SOLUTION/ DROPS ORAL at 06:55

## 2024-12-31 RX ADMIN — TIOTROPIUM BROMIDE MONOHYDRATE 2 PUFF(S): 18 CAPSULE ORAL; RESPIRATORY (INHALATION) at 11:29

## 2024-12-31 RX ADMIN — Medication 400 MILLIGRAM(S): at 22:54

## 2024-12-31 RX ADMIN — KETOROLAC TROMETHAMINE 15 MILLIGRAM(S): 30 INJECTION INTRAMUSCULAR; INTRAVENOUS at 11:28

## 2024-12-31 NOTE — H&P ADULT - NSICDXPASTSURGICALHX_GEN_ALL_CORE_FT
PAST SURGICAL HISTORY:  Calcaneus fracture s/p repair with hardware    H/O colonoscopy     H/O lipoma removed from back    H/O percutaneous transluminal coronary angioplasty with DE RCA 2006, 2 stents placed 2015    History of herniorrhaphy left inguinal herniorrhaphy    S/P cardiac catheterization 2017-resulted in [place]-no stents placed    S/P hernia surgery     S/P small bowel resection

## 2024-12-31 NOTE — DISCHARGE NOTE PROVIDER - NSDCCPCAREPLAN_GEN_ALL_CORE_FT
PRINCIPAL DISCHARGE DIAGNOSIS  Diagnosis: Abdominal pain  Assessment and Plan of Treatment:      PRINCIPAL DISCHARGE DIAGNOSIS  Diagnosis: Abdominal pain  Assessment and Plan of Treatment: You were given IV Tylenol and Toradol for pain. your pain improved and you were discharged home after tolerating a regular diet. Please continue regular diet as tolerated at home. You can take Tylenol 1000mg every 6 hours (do not exceed 4g daily) and Ibuprofen 400mg every 6 hours at home as needed for pain. You should follow up with Dr. Michel as instructed or previously scheduled.

## 2024-12-31 NOTE — DISCHARGE NOTE NURSING/CASE MANAGEMENT/SOCIAL WORK - PATIENT PORTAL LINK FT
You can access the FollowMyHealth Patient Portal offered by [facility] by registering at the following website: [email or web address]. By joining [employer]’s FollowMyHealth portal, you will also be able to view your health information using other applications (apps) compatible with our system.

## 2024-12-31 NOTE — DISCHARGE NOTE PROVIDER - NSDCFUSCHEDAPPT_GEN_ALL_CORE_FT
[facility]  [facility]  Scheduled Appointment: 01/14/2025    Leonel Duckworth  [facility]  PULMMED [address] [facility]  Scheduled Appointment: 02/10/2025    Whitney Baig  [facility]  RADMED [address] [facility]  Scheduled Appointment: 02/24/2025

## 2024-12-31 NOTE — PATIENT PROFILE ADULT - FALL HARM RISK - HARM RISK INTERVENTIONS

## 2024-12-31 NOTE — DISCHARGE NOTE PROVIDER - NSDCMRMEDTOKEN_GEN_ALL_CORE_FT
allopurinol 100 mg oral tablet: 1 tab(s) orally once a day  aspirin 81 mg oral tablet: 1 tab(s) orally once a day (at bedtime)  CeleBREX 200 mg oral capsule: 1 cap(s) orally once a day (at bedtime)  Coq10 1 cap daily:   Crestor 5 mg oral tablet: 1 tab(s) orally once a day (at bedtime)  Emgality Prefilled Pen 120 mg/mL subcutaneous solution: 120 milligram(s) subcutaneously once a month  Glucosamine with MSM &amp; Chondrotin 1 tab daily:   Jublia 10% topical solution: Apply topically to affected area once a day  ketoconazole 2% topical cream: Apply topically to affected area once a day  losartan 100 mg oral tablet: 1 tab(s) orally once a day  Metoprolol 25 mg daily:   Multivitamin 1 tab daily:   Norvasc 5 mg oral tablet: 1 tab(s) orally once a day  oxyBUTYnin 5 mg oral tablet: 1 tab(s) orally every 8 hours  Ozempic 2 mg/1.5 mL (0.25 mg or 0.5 mg dose) subcutaneous solution: 0.5 subcutaneously once a week  Repatha 140 mg/mL subcutaneous solution: 140 milligram(s) subcutaneously Bi-weekly  Singulair 10 mg oral tablet: 1 tab(s) orally once a day (at bedtime)  Skyrizi Pen 150 mg/mL subcutaneous solution: 150 milligram(s) subcutaneously every 8 weeks  Spiriva Respimat 10 ACT 2.5 mcg/inh inhalation aerosol: 2 puff(s) inhaled once a day  Symbicort 160 mcg-4.5 mcg/inh inhalation aerosol: 2 puff(s) inhaled 2 times a day  tadalafil 5 mg oral tablet: 1 tab(s) orally once a day  traMADol 50 mg oral tablet: 1 tab(s) orally every 8 hours MDD: 3  Turmeric 1 cap daily:   Tylenol Arthritis 2 cap daily PRN:   Vitamin C 1 tab daily:   Vitamin D2 5000IU daily:   Vitamin K2 1 tab daily:    allopurinol 100 mg oral tablet: 1 tab(s) orally once a day  aspirin 81 mg oral tablet: 1 tab(s) orally once a day (at bedtime)  CeleBREX 200 mg oral capsule: 1 cap(s) orally once a day (at bedtime)  Coq10 1 cap daily:   Crestor 5 mg oral tablet: 1 tab(s) orally once a day (at bedtime)  Emgality Prefilled Pen 120 mg/mL subcutaneous solution: 120 milligram(s) subcutaneously once a month  Glucosamine with MSM &amp; Chondrotin 1 tab daily:   Jublia 10% topical solution: Apply topically to affected area once a day  ketoconazole 2% topical cream: Apply topically to affected area once a day  losartan 100 mg oral tablet: 1 tab(s) orally once a day  Metoprolol 25 mg daily:   Multivitamin 1 tab daily:   Norvasc 5 mg oral tablet: 1 tab(s) orally once a day  Ozempic 2 mg/1.5 mL (0.25 mg or 0.5 mg dose) subcutaneous solution: 0.5 subcutaneously once a week  Repatha 140 mg/mL subcutaneous solution: 140 milligram(s) subcutaneously Bi-weekly  Singulair 10 mg oral tablet: 1 tab(s) orally once a day (at bedtime)  Skyrizi Pen 150 mg/mL subcutaneous solution: 150 milligram(s) subcutaneously every 8 weeks  Spiriva Respimat 10 ACT 2.5 mcg/inh inhalation aerosol: 2 puff(s) inhaled once a day  Symbicort 160 mcg-4.5 mcg/inh inhalation aerosol: 2 puff(s) inhaled 2 times a day  tadalafil 5 mg oral tablet: 1 tab(s) orally once a day  traMADol 50 mg oral tablet: 1 tab(s) orally every 8 hours MDD: 3  Turmeric 1 cap daily:   Tylenol Arthritis 2 cap daily PRN:   Vitamin C 1 tab daily:   Vitamin D2 5000IU daily:   Vitamin K2 1 tab daily:

## 2024-12-31 NOTE — DISCHARGE NOTE NURSING/CASE MANAGEMENT/SOCIAL WORK - FINANCIAL ASSISTANCE
[facility] provides services at a reduced cost to those who are determined to be eligible through [facility]’s financial assistance program. Information regarding [facility]’s financial assistance program can be found by going to [email or web address] or by calling [phone].

## 2024-12-31 NOTE — DISCHARGE NOTE PROVIDER - CARE PROVIDER_API CALL
Gayle Sanchez  Colon/Rectal Surgery  [address]  [place] [address]  Phone: [phone]  Fax: [phone]  Follow Up Time:    Lalit Michel  Surgery  [address] [place] [address], [address]  [place] [address]  Phone: [phone]  Fax: [phone]  Follow Up Time: Routine

## 2024-12-31 NOTE — CONSULT NOTE ADULT - ATTENDING COMMENTS
59 M pmh prostate CA s/p prostatectomy, CAD s/p REBEKAH, CD c/b stricturing including strictuloplasty, recetn admit for norovirus, who presents for cough/fever to 101.6.  Does have diarrhea, but at baseline.  No other worsening GI symptoms.  Appears to have Sepsis 2/2 URI.  Agree with respiratory workup.  No role for steroid management or abx from GI disease at this time.  Much improved already with IVF overnight.  Discharge as per primary team

## 2024-12-31 NOTE — CONSULT NOTE ADULT - ASSESSMENT
59 M PMH of Crohn's disease surgical hx of (stricturoplasty, umbilical hernia repair, appendectomy, small bowel resection '18), inguinal hernia repair, prostate ca. s/p prostatectomy recently admitted and discharged 12/15/2024 for norovirus presents to the ED planing of sudden onset abdominal pain. Colorectal surgery consulted for possible crohn's flare.     Plan:   - No acute surgical intervention   - Low suspicion for Crohn's flare as etiology with decreased CRP, normal WBC and no inflammation on CT   - Surgery team to follow up in AM to assess patient status   - Dispo per ED     Discussed with Dr. Sanchez    Colorectal Surgery, [id number] 
 59 M PMH prostate CA s/p prostatectomy, CAD s/p stents x9, Crohn's disease PSH L IHR (Pedro, 2017), Uniport SBR, appendectomy, stricturoplasty ([place]ilu, 2018), with recent admission to Dr. Michel for norovirus who re-presents with one day of abdominal pain, cough, and fever to 101.6.    #Stricturing CD of the small bowel on Skyrizi   #Recent hx of norovirus, self resolved without steroids   #Prostate Ca on injections   #Immunocompromised   #URI symptoms     The patient likely has a URI given his symptoms of fever, myalgia, SOB, cough. He appears to be at his baseline of GI symptoms and given similar CT findings and normal CRP, this is less likely a CD flare.     Recommendations:  - Supportive care for URI   - If having flare of GI symptoms then can get fecal calprotectin, GI PCR   - Would keep patient on lovenox or heparin for DVT ppx    Note not finalized until signed by attending     April Morales MD  Gastroenterology/Hepatology Fellow, PGY-5  Please contact via TEAMS    NON-URGENT CONSULTS:  Please email [email or web address]

## 2024-12-31 NOTE — H&P ADULT - HISTORY OF PRESENT ILLNESS
59 M PMH prostate CA s/p prostatectomy, CAD s/p stents x9, Crohn's disease PSH L IHR (Pedro, 2017), Uniport SBR, appendectomy, stricturoplasty (Marilu, 2018), with recent admission to Dr. Michel for norovirus who re-presents with  abdominal pain, cough and diarrhea that started 12/30 - pt went to the urgent care and referred to the ED for further evaluation.  Colorectal surgery consulted for possible crohn's flare.     Pt endorses 6-7 loose stools/day, nausea without vomiting, cough, subjective fevers and joint pain. In the ED: Pt initially tachycardic to 110, /92, febrile to 100.6F, RUQ with trace GB sludge, no stones, no wall thickening and no pericholecystic fluid. CT A/P with IV contrast showed scattered colonic diverticula without diverticulitis. Midline small bowel anastomosis intact. Mild wall thickening of the ileum distal to the small bowel anastomosis without surrounding inflammatory changes with stable RLQ mesenteric lymph node enlargement.

## 2024-12-31 NOTE — CONSULT NOTE ADULT - SUBJECTIVE AND OBJECTIVE BOX
Initial GI Consult    Patient is a 59y old  Male who presents with a chief complaint of C/f Crohn's flare (31 Dec 2024 08:24)    HPI:  59 M PMH prostate CA s/p prostatectomy, CAD s/p stents x9, Crohn's disease PSH L IHR (Pedro, 2017), Uniport SBR, appendectomy, stricturoplasty (Marilu, 2018), with recent admission to Dr. Michel for norovirus who re-presents with  abdominal pain, cough and diarrhea that started 12/30 - pt went to the urgent care and referred to the ED for further evaluation.  Colorectal surgery consulted for possible crohn's flare.     Pt endorses 6-7 loose stools/day, nausea without vomiting, cough, subjective fevers and joint pain. In the ED: Pt initially tachycardic to 110, /92, febrile to 100.6F, RUQ with trace GB sludge, no stones, no wall thickening and no pericholecystic fluid. CT A/P with IV contrast showed scattered colonic diverticula without diverticulitis. Midline small bowel anastomosis intact. Mild wall thickening of the ileum distal to the small bowel anastomosis without surrounding inflammatory changes with stable RLQ mesenteric lymph node enlargement.      (31 Dec 2024 08:24)    PAST MEDICAL & SURGICAL HISTORY:  GERD (gastroesophageal reflux disease)      Lower back pain      Shoulder pain, bilateral      Bilateral knee pain      Inguinal mass      HLD (hyperlipidemia)      Crohn disease      Pancreatitis  while on 6MP for Crohn's now off of it      CAD (coronary artery disease)  2 stents placed in LAD in 2015, RCA stent x 2 in 2006      Joint pain      Asthma  controlled-never intubated or hospitalized      MARYAM (obstructive sleep apnea)  on CPAP      GIB (gastrointestinal bleeding)      MI (myocardial infarction)      Unilateral recurrent inguinal hernia without obstruction or gangrene  Left      Cluster headaches      Bowel obstruction      HTN (hypertension)      Fatty liver      Other specified disorders of pigmentation      Stented coronary artery      Psoriasis      Migraine headache      Anemia      Atypical melanocytic hyperplasia      Elevated blood uric acid level      Elevated PSA      Tinnitus      History of herniorrhaphy  left inguinal herniorrhaphy      H/O percutaneous transluminal coronary angioplasty  with DE RCA 2006, 2 stents placed 2015      H/O colonoscopy      S/P cardiac catheterization  2017-resulted in [place]-no stents placed      H/O lipoma  removed from back      S/P hernia surgery      S/P small bowel resection      Calcaneus fracture  s/p repair with hardware        FAMILY HISTORY:  Family history of Crohn's disease    Family history of premature CAD  Father CABG at age 54        MEDS:  MEDICATIONS  (STANDING):  acetaminophen   IVPB .. 1000 milliGRAM(s) IV Intermittent every 6 hours  allopurinol 100 milliGRAM(s) Oral daily  amLODIPine   Tablet 5 milliGRAM(s) Oral daily  aspirin  chewable 81 milliGRAM(s) Oral daily  enoxaparin Injectable 40 milliGRAM(s) SubCutaneous every 24 hours  ketorolac   Injectable 15 milliGRAM(s) IV Push every 6 hours  lactated ringers. 1000 milliLiter(s) (60 mL/Hr) IV Continuous <Continuous>  metoprolol succinate ER 25 milliGRAM(s) Oral daily  montelukast 10 milliGRAM(s) Oral at bedtime  oxybutynin 5 milliGRAM(s) Oral every 8 hours  rosuvastatin 5 milliGRAM(s) Oral at bedtime  tiotropium 2.5 MICROgram(s) Inhaler 2 Puff(s) Inhalation daily    MEDICATIONS  (PRN):    Allergies    No Known Allergies    Intolerances          ROS: As per HPI    ______________________________________________________________________  PHYSICAL EXAM:  T(C): 37.2 (12-31-24 @ 08:50), Max: 38.7 (12-30-24 @ 17:08)  HR: 87 (12-31-24 @ 08:50)  BP: 116/71 (12-31-24 @ 08:50)  RR: 18 (12-31-24 @ 08:50)  SpO2: 100% (12-31-24 @ 08:50)  Wt(kg): --      GEN: NAD, normocephalic  CVS: S1S2+  CHEST: clear to auscultation  ABD: soft , nontender, nondistended, bowel sounds present  EXTR: no cyanosis, no clubbing, no edema  NEURO: A&OX  SKIN:  warm;  non icteric    ______________________________________________________________________  LABS:                        9.5    2.75  )-----------( 130      ( 31 Dec 2024 09:11 )             28.9     12-31    140  |  106  |  12  ----------------------------<  108[H]  3.5   |  22  |  0.80    Ca    8.9      31 Dec 2024 09:11  Phos  4.1     12-31  Mg     1.9     12-31    TPro  7.6  /  Alb  4.5  /  TBili  0.2  /  DBili  x   /  AST  17  /  ALT  18  /  AlkPhos  80  12-30    LIVER FUNCTIONS - ( 30 Dec 2024 19:59 )  Alb: 4.5 g/dL / Pro: 7.6 g/dL / ALK PHOS: 80 U/L / ALT: 18 U/L / AST: 17 U/L / GGT: x             ____________________________________________         Initial GI Consult    Patient is a 59y old  Male who presents with a chief complaint of fever and myalgias    HPI: 59 M PMH prostate CA s/p prostatectomy, CAD s/p stents x9, Crohn's disease PSH L IHR (Pedro, 2017), Uniport SBR, appendectomy, stricturoplasty (Marilu, 2018), with recent admission to Dr. Michel for norovirus who re-presents with one day of abdominal pain, cough, and fever to 101.6. Patient reports symptoms started yesterday. At baseline the patient has diarrhea ranging from 5-10 times a day. Yesterday he had about 5 BMs. Denies blood in stool. His abdominal cramping that started yesterday in his lower abdominal region has subsided. Patient currently feeling fatigued, myalgias. Patient denies nausea, vomit. He endorses a cough with mucus and some SOB.     In the ER, CT A/P with IV contrast showed scattered colonic diverticula without diverticulitis. Midline small bowel anastomosis intact. Mild wall thickening of the ileum distal to the small bowel anastomosis without surrounding inflammatory changes with stable RLQ mesenteric lymph node enlargement. Imaging similar to prior admission.     CD History: Patient was diagnosed with SB stricturing Crohns in 2009 and follows with Dr. Ketan Hinds at [facility]; he is currently on Skyrizzi for 1 yr with reported last flare 1yr ago. Prior to Skyrizi, he has tried but failed stelara, Entyvio and Humira. In 2023, he had prostate cancer and has been on Lupron injections every 3 months. His usual Crohn's flare previously manifested as bowel obstruction. He reports he does not respond to steroids in the past.      PAST MEDICAL & SURGICAL HISTORY:  GERD (gastroesophageal reflux disease)      Lower back pain      Shoulder pain, bilateral      Bilateral knee pain      Inguinal mass      HLD (hyperlipidemia)      Crohn disease      Pancreatitis  while on 6MP for Crohn's now off of it      CAD (coronary artery disease)  2 stents placed in LAD in 2015, RCA stent x 2 in 2006      Joint pain      Asthma  controlled-never intubated or hospitalized      MARYAM (obstructive sleep apnea)  on CPAP      GIB (gastrointestinal bleeding)      MI (myocardial infarction)      Unilateral recurrent inguinal hernia without obstruction or gangrene  Left      Cluster headaches      Bowel obstruction      HTN (hypertension)      Fatty liver      Other specified disorders of pigmentation      Stented coronary artery      Psoriasis      Migraine headache      Anemia      Atypical melanocytic hyperplasia      Elevated blood uric acid level      Elevated PSA      Tinnitus      History of herniorrhaphy  left inguinal herniorrhaphy      H/O percutaneous transluminal coronary angioplasty  with DE RCA 2006, 2 stents placed 2015      H/O colonoscopy      S/P cardiac catheterization  2017-resulted in [place]-no stents placed      H/O lipoma  removed from back      S/P hernia surgery      S/P small bowel resection      Calcaneus fracture  s/p repair with hardware        FAMILY HISTORY:  Family history of Crohn's disease    Family history of premature CAD  Father CABG at age 54        MEDS:  MEDICATIONS  (STANDING):  acetaminophen   IVPB .. 1000 milliGRAM(s) IV Intermittent every 6 hours  allopurinol 100 milliGRAM(s) Oral daily  amLODIPine   Tablet 5 milliGRAM(s) Oral daily  aspirin  chewable 81 milliGRAM(s) Oral daily  enoxaparin Injectable 40 milliGRAM(s) SubCutaneous every 24 hours  ketorolac   Injectable 15 milliGRAM(s) IV Push every 6 hours  lactated ringers. 1000 milliLiter(s) (60 mL/Hr) IV Continuous <Continuous>  metoprolol succinate ER 25 milliGRAM(s) Oral daily  montelukast 10 milliGRAM(s) Oral at bedtime  oxybutynin 5 milliGRAM(s) Oral every 8 hours  rosuvastatin 5 milliGRAM(s) Oral at bedtime  tiotropium 2.5 MICROgram(s) Inhaler 2 Puff(s) Inhalation daily    MEDICATIONS  (PRN):    Allergies    No Known Allergies    Intolerances          ROS: As per HPI    ______________________________________________________________________  PHYSICAL EXAM:  T(C): 37.2 (12-31-24 @ 08:50), Max: 38.7 (12-30-24 @ 17:08)  HR: 87 (12-31-24 @ 08:50)  BP: 116/71 (12-31-24 @ 08:50)  RR: 18 (12-31-24 @ 08:50)  SpO2: 100% (12-31-24 @ 08:50)  Wt(kg): --      GEN: NAD, normocephalic  CVS: S1S2+  CHEST: clear to auscultation  ABD: soft , nontender, nondistended, bowel sounds present  EXTR: no cyanosis, no clubbing, no edema  NEURO: A&OX3  SKIN:  warm;  non icteric    ______________________________________________________________________  LABS:                        9.5    2.75  )-----------( 130      ( 31 Dec 2024 09:11 )             28.9     12-31    140  |  106  |  12  ----------------------------<  108[H]  3.5   |  22  |  0.80    Ca    8.9      31 Dec 2024 09:11  Phos  4.1     12-31  Mg     1.9     12-31    TPro  7.6  /  Alb  4.5  /  TBili  0.2  /  DBili  x   /  AST  17  /  ALT  18  /  AlkPhos  80  12-30    LIVER FUNCTIONS - ( 30 Dec 2024 19:59 )  Alb: 4.5 g/dL / Pro: 7.6 g/dL / ALK PHOS: 80 U/L / ALT: 18 U/L / AST: 17 U/L / GGT: x             ____________________________________________

## 2024-12-31 NOTE — H&P ADULT - NSHPLABSRESULTS_GEN_ALL_CORE
11.2   5.18  )-----------( 151      ( 30 Dec 2024 19:59 )             34.5   12-30    140  |  105  |  19  ----------------------------<  106[H]  3.8   |  20[L]  |  0.89    Ca    10.2      30 Dec 2024 19:59    TPro  7.6  /  Alb  4.5  /  TBili  0.2  /  DBili  x   /  AST  17  /  ALT  18  /  AlkPhos  80  12-30  ACC: [id number] EXAM:  CT ABDOMEN AND PELVIS IC   ORDERED BY: RADHA HANNAH     PROCEDURE DATE:  12/30/2024          INTERPRETATION:  CLINICAL INFORMATION: Abdominal pain, fever, history of   Crohn's and prior abdominal surgery.    COMPARISON: CT of the abdomen and pelvis 12/12/2024 MRI abdomen 4/2/2024    CONTRAST/COMPLICATIONS:  IV Contrast: Omnipaque 350  90 cc administered   10 cc discarded  Oral Contrast: NONE  .    PROCEDURE:  CT of the Abdomen and Pelvis (CT Enterography) was performed with   intravenous contrast in the late arterial and portal venous phases.  Sagittal and coronal reformats were performed.    FINDINGS:  LOWER CHEST: Coronary artery calcifications.    LIVER: Enlarged measuring 20.5 cm in craniocaudad dimension.  BILE DUCTS: Normal caliber.  GALLBLADDER: Within normal limits.  SPLEEN: Mildly enlarged measuring 13.5 cm in craniocaudad dimension.  PANCREAS: Within normal limits.  ADRENALS: Within normal limits.  KIDNEYS/URETERS: 1.1 cm right interpolar lesion is unchanged from   12/12/2024 and previous characterized as hemorrhagic/proteinaceous cyst   on MRI from 4/2/2024. No hydronephrosis. Symmetric renal enhancement. No   CT evidence for pyelonephritis.    BLADDER: Within normal limits.  REPRODUCTIVE ORGANS: Prostatectomy.    BOWEL: No bowel obstruction. Appendix is absent. Scattered small colonic   diverticula without diverticulitis. Midline small bowel anastomosis. Mild   wall thickening of the ileum distal to the small bowel anastomosis is   again seen similar to the previous exam. There is no surrounding   inflammatory changes. There are small adjacent lymph nodes which are also   stable.  PERITONEUM/RETROPERITONEUM: Within normal limits.  VESSELS: Atherosclerotic changes.  LYMPH NODES: Few prominent right lower quadrant mesenteric nodes are   similar to prior and likely reactive.  ABDOMINAL WALL: Within normal limits.  BONES: Degenerative changes.    IMPRESSION:    Midline small bowel anastomosis. Mild wall thickening of the ileum distal   to the small bowel anastomosis is again seen similar to the previous   exam. There is no abnormal enhancement. There are no surrounding   inflammatory changes. There are small adjacent lymph nodes which are also   stable.        --- End of Report ---

## 2024-12-31 NOTE — DISCHARGE NOTE NURSING/CASE MANAGEMENT/SOCIAL WORK - NSTRANSFERBELONGINGSDISPO_GEN_A_NUR
Ultrasound shows fatty infiltration of the liver.  Treatment for that is weight loss.  Recommend weight loss with patient

## 2024-12-31 NOTE — H&P ADULT - NSHPPHYSICALEXAM_GEN_ALL_CORE
PHYSICAL EXAM:  General: NAD, resting comfortably  HEENT: NC/AT  Pulmonary: normal resp effort  Cardiovascular: NSR  Abdominal: soft, ND, tender in lower abdomen bilaterally. No guarding or rebound.  Extremities: WWP  Neuro: A/O x 3, no obvious focal deficits

## 2024-12-31 NOTE — DISCHARGE NOTE PROVIDER - HOSPITAL COURSE
59 M PMH prostate CA s/p prostatectomy, CAD s/p stents x9, Crohn's disease PSH L IHR (Pedro, 2017), Uniport SBR, appendectomy, stricturoplasty (Marilu, 2018), with recent admission to Dr. Michel for norovirus who re-presents with  abdominal pain, cough and diarrhea that started 12/30 - pt went to the urgent care and referred to the ED for further evaluation.  Colorectal surgery consulted for possible crohn's flare.   Pt endorses 6-7 loose stools/day, nausea without vomiting, cough, subjective fevers and joint pain. In the ED: Pt initially tachycardic to 110, /92, febrile to 100.6F, RUQ with trace GB sludge, no stones, no wall thickening and no pericholecystic fluid. CT A/P with IV contrast showed scattered colonic diverticula without diverticulitis. Midline small bowel anastomosis intact. Mild wall thickening of the ileum distal to the small bowel anastomosis without surrounding inflammatory changes with stable RLQ mesenteric lymph node enlargement.   Pt admitted for evaluation and pain control. Pt's pain much improved and pt tolerated diet without any nausea and vomiting. Pt discharged home in stable condition, ambulating, voiding, pain controlled, tolerating diet. 59 M PMH prostate CA s/p prostatectomy, CAD s/p stents x9, Crohn's disease PSH L IHR (Pedro, 2017), Uniport SBR, appendectomy, stricturoplasty (Marilu, 2018), with recent admission to Dr. Michel for norovirus who re-presents with  abdominal pain, cough and diarrhea that started 12/30 - pt went to the urgent care and referred to the ED for further evaluation.  Colorectal surgery consulted for possible crohn's flare.   Pt endorses 6-7 loose stools/day, nausea without vomiting, cough, subjective fevers and joint pain. In the ED: Pt initially tachycardic to 110, /92, febrile to 100.6F, RUQ with trace GB sludge, no stones, no wall thickening and no pericholecystic fluid. CT A/P with IV contrast showed scattered colonic diverticula without diverticulitis. Midline small bowel anastomosis intact. Mild wall thickening of the ileum distal to the small bowel anastomosis without surrounding inflammatory changes with stable RLQ mesenteric lymph node enlargement.   GI evaluated the patient and recommended Supportive care for URI, If having flare of GI symptoms then can get fecal calprotectin, GI PCR, Would keep patient on lovenox or heparin for DVT ppx.   Pt admitted for evaluation and pain control. Pt's pain much improved and pt tolerated diet without any nausea and vomiting. Pt discharged home in stable condition, ambulating, voiding, pain controlled, tolerating diet. 59 M PMH prostate CA s/p prostatectomy, CAD s/p stents x9, Crohn's disease PSH L IHR (Pedro, 2017), Uniport SBR, appendectomy, stricturoplasty (Marilu, 2018), with recent admission to Dr. Michel for norovirus who re-presents with  abdominal pain, cough and diarrhea that started 12/30 - pt went to the urgent care and referred to the ED for further evaluation.  Colorectal surgery consulted for possible crohn's flare.   Pt endorses 6-7 loose stools/day, nausea without vomiting, cough, subjective fevers and joint pain. In the ED: Pt initially tachycardic to 110, /92, febrile to 100.6F, RUQ with trace GB sludge, no stones, no wall thickening and no pericholecystic fluid. CT A/P with IV contrast showed scattered colonic diverticula without diverticulitis. Midline small bowel anastomosis intact. Mild wall thickening of the ileum distal to the small bowel anastomosis without surrounding inflammatory changes with stable RLQ mesenteric lymph node enlargement.   GI evaluated the patient and recommended supportive care with no interventions.   Pt admitted for evaluation and pain control. Pt's pain much improved and pt tolerated diet without any nausea and vomiting. Pt discharged home in stable condition, ambulating, voiding, pain controlled, tolerating diet.   Pt will f/u with Dr. Michel.

## 2024-12-31 NOTE — CONSULT NOTE ADULT - SUBJECTIVE AND OBJECTIVE BOX
Surgery Consult Note  Attending: MD Laura  Service: Colorectal Surgery    HPI: 59 M PMH of Crohn's disease surgical hx of (stricturoplasty, umbilical hernia repair, appendectomy, small bowel resection '18), inguinal hernia repair, prostate ca. s/p prostatectomy recently admitted and discharged 12/15/2024 for norovirus presents to the ED planing of sudden onset abdominal pain, cough and diarrhea today.  Symptoms were improving at time of patient discharge but reportedly today had acute onset of new GI symptoms.  Went to the urgent care and referred to the ED for further evaluation.  Colorectal surgery consulted for possible crohn's flare.     PAST MEDICAL HISTORY:  PAST MEDICAL & SURGICAL HISTORY:  GERD (gastroesophageal reflux disease)      Lower back pain      Shoulder pain, bilateral      Bilateral knee pain      Inguinal mass      HLD (hyperlipidemia)      Crohn disease      Pancreatitis  while on 6MP for Crohn's now off of it      CAD (coronary artery disease)  2 stents placed in LAD in 2015, RCA stent x 2 in 2006      Joint pain      Asthma  controlled-never intubated or hospitalized      MARYAM (obstructive sleep apnea)  on CPAP      GIB (gastrointestinal bleeding)      MI (myocardial infarction)      Unilateral recurrent inguinal hernia without obstruction or gangrene  Left      Cluster headaches      Bowel obstruction      HTN (hypertension)      Fatty liver      Other specified disorders of pigmentation      Stented coronary artery      Psoriasis      Migraine headache      Anemia      Atypical melanocytic hyperplasia      Elevated blood uric acid level      Elevated PSA      Tinnitus      History of herniorrhaphy  left inguinal herniorrhaphy      H/O percutaneous transluminal coronary angioplasty  with DE RCA 2006, 2 stents placed 2015      H/O colonoscopy      S/P cardiac catheterization  2017-resulted in [place]-no stents placed      H/O lipoma  removed from back      S/P hernia surgery      S/P small bowel resection      Calcaneus fracture  s/p repair with hardware          ALLERGIES:  Allergies    No Known Allergies    Intolerances        SOCIAL HISTORY: Negative unless reported above     FAMILY HISTORY:  FAMILY HISTORY:  Family history of Crohn's disease    Family history of premature CAD  Father CABG at age 54        PHYSICAL EXAM:  General: NAD, resting comfortably  HEENT: NC/AT  Pulmonary: normal resp effort  Cardiovascular: NSR  Abdominal: soft, ND, tender in lower abdomen   Extremities: WWP  Neuro: A/O x 3, no obvious focal deficits     VITAL SIGNS:  Vital Signs Last 24 Hrs  T(C): 37.1 (30 Dec 2024 23:10), Max: 38.7 (30 Dec 2024 17:08)  T(F): 98.7 (30 Dec 2024 23:10), Max: 101.7 (30 Dec 2024 18:55)  HR: 94 (30 Dec 2024 23:10) (94 - 114)  BP: 135/85 (30 Dec 2024 23:10) (133/88 - 142/92)  BP(mean): --  RR: 16 (30 Dec 2024 23:10) (16 - 18)  SpO2: 96% (30 Dec 2024 23:10) (96% - 97%)    Parameters below as of 30 Dec 2024 23:10  Patient On (Oxygen Delivery Method): room air        I&O's Summary      LABS:                        11.2   5.18  )-----------( 151      ( 30 Dec 2024 19:59 )             34.5     12-30    140  |  105  |  19  ----------------------------<  106[H]  3.8   |  20[L]  |  0.89    Ca    10.2      30 Dec 2024 19:59    TPro  7.6  /  Alb  4.5  /  TBili  0.2  /  DBili  x   /  AST  17  /  ALT  18  /  AlkPhos  80  12-30      Urinalysis Basic - ( 31 Dec 2024 01:33 )    Color: Yellow / Appearance: Clear / SG: >1.030 / pH: x  Gluc: x / Ketone: Negative mg/dL  / Bili: Negative / Urobili: 0.2 mg/dL   Blood: x / Protein: Negative mg/dL / Nitrite: Negative   Leuk Esterase: Negative / RBC: x / WBC x   Sq Epi: x / Non Sq Epi: x / Bacteria: x      CAPILLARY BLOOD GLUCOSE        LIVER FUNCTIONS - ( 30 Dec 2024 19:59 )  Alb: 4.5 g/dL / Pro: 7.6 g/dL / ALK PHOS: 80 U/L / ALT: 18 U/L / AST: 17 U/L / GGT: x

## 2024-12-31 NOTE — ED ADULT NURSE REASSESSMENT NOTE - NS ED NURSE REASSESS COMMENT FT1
Pt states wanting to go home, feels relief of symptoms. ED RN notified [facility] team, will come see pt. Pt ambulated to bathroom with steady gait.

## 2024-12-31 NOTE — DISCHARGE NOTE PROVIDER - CARE PROVIDERS DIRECT ADDRESSES
[email or web address]

## 2024-12-31 NOTE — H&P ADULT - ASSESSMENT
59 M PMH prostate CA s/p prostatectomy, CAD s/p stents x9, Crohn's disease PSH L IHR (Pedro, 2017), Uniport SBR, appendectomy, stricturoplasty (Marilu, 2018), with recent admission to Dr. Michel for norovirus who re-presents with  concern for possible crohn's flare vs viral enteritis.     Plan:  - Admit to Dr. Sanchez  - CLD  - IVF   - GI consult (emailed)  - IV tylenol and toradol OTC for pain  - No current indication for steroids or abx, pending GI discussion    Plan discussed with Dr. Sanchez  [facility] Team Surgery [phone]

## 2024-12-31 NOTE — H&P ADULT - ATTENDING COMMENTS
pt with diverticulitis with small abscess  denies abd pain  tolerating po  abd soft, NT, ND  stable  collection next to the bladder  recommend IV ABX, no surgical intervention indicated at this time  recommend reimaging in 2 weeks with CT  will see again 1/2  pls call with questions/concerns in the meantime pt well known to our group  feels weak and with poor po intake  abd soft, minimal tenderness, no peritoneal signs  admit for IVFs, clinical monitoring  GI consult

## 2025-01-01 LAB
A1C WITH ESTIMATED AVERAGE GLUCOSE RESULT: 5.2 % — SIGNIFICANT CHANGE UP (ref 4–5.6)
ALBUMIN SERPL ELPH-MCNC: 3.4 G/DL — SIGNIFICANT CHANGE UP (ref 3.3–5)
ALP SERPL-CCNC: 58 U/L — SIGNIFICANT CHANGE UP (ref 40–120)
ALT FLD-CCNC: 16 U/L — SIGNIFICANT CHANGE UP (ref 10–45)
ANION GAP SERPL CALC-SCNC: 11 MMOL/L — SIGNIFICANT CHANGE UP (ref 5–17)
AST SERPL-CCNC: 18 U/L — SIGNIFICANT CHANGE UP (ref 10–40)
BILIRUB SERPL-MCNC: 0.2 MG/DL — SIGNIFICANT CHANGE UP (ref 0.2–1.2)
BUN SERPL-MCNC: 11 MG/DL — SIGNIFICANT CHANGE UP (ref 7–23)
CALCIUM SERPL-MCNC: 8.5 MG/DL — SIGNIFICANT CHANGE UP (ref 8.4–10.5)
CHLORIDE SERPL-SCNC: 107 MMOL/L — SIGNIFICANT CHANGE UP (ref 96–108)
CO2 SERPL-SCNC: 22 MMOL/L — SIGNIFICANT CHANGE UP (ref 22–31)
CREAT SERPL-MCNC: 0.65 MG/DL — SIGNIFICANT CHANGE UP (ref 0.5–1.3)
CULTURE RESULTS: NO GROWTH — SIGNIFICANT CHANGE UP
EGFR: 109 ML/MIN/1.73M2 — SIGNIFICANT CHANGE UP
EGFR: 109 ML/MIN/1.73M2 — SIGNIFICANT CHANGE UP
ESTIMATED AVERAGE GLUCOSE: 103 MG/DL — SIGNIFICANT CHANGE UP (ref 68–114)
FLUAV AG NPH QL: SIGNIFICANT CHANGE UP
FLUBV AG NPH QL: SIGNIFICANT CHANGE UP
GI PCR PANEL: DETECTED
GLUCOSE BLDC GLUCOMTR-MCNC: 101 MG/DL — HIGH (ref 70–99)
GLUCOSE BLDC GLUCOMTR-MCNC: 106 MG/DL — HIGH (ref 70–99)
GLUCOSE BLDC GLUCOMTR-MCNC: 124 MG/DL — HIGH (ref 70–99)
GLUCOSE BLDC GLUCOMTR-MCNC: 95 MG/DL — SIGNIFICANT CHANGE UP (ref 70–99)
GLUCOSE SERPL-MCNC: 104 MG/DL — HIGH (ref 70–99)
HCT VFR BLD CALC: 28.8 % — LOW (ref 39–50)
HGB BLD-MCNC: 9.2 G/DL — LOW (ref 13–17)
MAGNESIUM SERPL-MCNC: 1.8 MG/DL — SIGNIFICANT CHANGE UP (ref 1.6–2.6)
MCHC RBC-ENTMCNC: 27.6 PG — SIGNIFICANT CHANGE UP (ref 27–34)
MCHC RBC-ENTMCNC: 31.9 G/DL — LOW (ref 32–36)
MCV RBC AUTO: 86.5 FL — SIGNIFICANT CHANGE UP (ref 80–100)
NOROVIRUS GI+II RNA STL QL NAA+NON-PROBE: DETECTED
NRBC # BLD: 0 /100 WBCS — SIGNIFICANT CHANGE UP (ref 0–0)
NRBC BLD-RTO: 0 /100 WBCS — SIGNIFICANT CHANGE UP (ref 0–0)
PHOSPHATE SERPL-MCNC: 3.7 MG/DL — SIGNIFICANT CHANGE UP (ref 2.5–4.5)
PLATELET # BLD AUTO: 107 K/UL — LOW (ref 150–400)
POTASSIUM SERPL-MCNC: 3.5 MMOL/L — SIGNIFICANT CHANGE UP (ref 3.5–5.3)
POTASSIUM SERPL-SCNC: 3.5 MMOL/L — SIGNIFICANT CHANGE UP (ref 3.5–5.3)
PROT SERPL-MCNC: 5.7 G/DL — LOW (ref 6–8.3)
RBC # BLD: 3.33 M/UL — LOW (ref 4.2–5.8)
RBC # FLD: 14.6 % — HIGH (ref 10.3–14.5)
RSV RNA NPH QL NAA+NON-PROBE: SIGNIFICANT CHANGE UP
SARS-COV-2 RNA SPEC QL NAA+PROBE: SIGNIFICANT CHANGE UP
SODIUM SERPL-SCNC: 140 MMOL/L — SIGNIFICANT CHANGE UP (ref 135–145)
SPECIMEN SOURCE: SIGNIFICANT CHANGE UP
WBC # BLD: 2.14 K/UL — LOW (ref 3.8–10.5)
WBC # FLD AUTO: 2.14 K/UL — LOW (ref 3.8–10.5)

## 2025-01-01 PROCEDURE — 99222 1ST HOSP IP/OBS MODERATE 55: CPT

## 2025-01-01 PROCEDURE — 71045 X-RAY EXAM CHEST 1 VIEW: CPT | Mod: 26

## 2025-01-01 PROCEDURE — G0545: CPT

## 2025-01-01 PROCEDURE — 99232 SBSQ HOSP IP/OBS MODERATE 35: CPT

## 2025-01-01 PROCEDURE — 99223 1ST HOSP IP/OBS HIGH 75: CPT | Mod: GC

## 2025-01-01 RX ORDER — PIPERACILLIN-TAZO-DEXTROSE,ISO 3.375G/5
3.38 IV SOLUTION, PIGGYBACK PREMIX FROZEN(ML) INTRAVENOUS EVERY 8 HOURS
Refills: 0 | Status: DISCONTINUED | OUTPATIENT
Start: 2025-01-02 | End: 2025-01-02

## 2025-01-01 RX ORDER — PIPERACILLIN-TAZO-DEXTROSE,ISO 3.375G/5
3.38 IV SOLUTION, PIGGYBACK PREMIX FROZEN(ML) INTRAVENOUS ONCE
Refills: 0 | Status: COMPLETED | OUTPATIENT
Start: 2025-01-01 | End: 2025-01-01

## 2025-01-01 RX ORDER — KETOROLAC TROMETHAMINE 30 MG/ML
15 INJECTION, SOLUTION INTRAMUSCULAR; INTRAVENOUS EVERY 6 HOURS
Refills: 0 | Status: DISCONTINUED | OUTPATIENT
Start: 2025-01-01 | End: 2025-01-04

## 2025-01-01 RX ORDER — MAGNESIUM SULFATE 500 MG/ML
2 SYRINGE (ML) INJECTION ONCE
Refills: 0 | Status: COMPLETED | OUTPATIENT
Start: 2025-01-01 | End: 2025-01-01

## 2025-01-01 RX ADMIN — KETOROLAC TROMETHAMINE 15 MILLIGRAM(S): 30 INJECTION, SOLUTION INTRAMUSCULAR; INTRAVENOUS at 14:59

## 2025-01-01 RX ADMIN — Medication 40 MILLIGRAM(S): at 12:24

## 2025-01-01 RX ADMIN — SODIUM CHLORIDE 100 MILLILITER(S): 9 INJECTION, SOLUTION INTRAVENOUS at 02:29

## 2025-01-01 RX ADMIN — Medication 100 MILLIEQUIVALENT(S): at 05:02

## 2025-01-01 RX ADMIN — Medication 40 MILLIEQUIVALENT(S): at 09:11

## 2025-01-01 RX ADMIN — MONTELUKAST SODIUM 10 MILLIGRAM(S): 10 TABLET ORAL at 21:03

## 2025-01-01 RX ADMIN — KETOROLAC TROMETHAMINE 15 MILLIGRAM(S): 30 INJECTION, SOLUTION INTRAMUSCULAR; INTRAVENOUS at 22:06

## 2025-01-01 RX ADMIN — Medication 0.2 MILLIGRAM(S): at 05:10

## 2025-01-01 RX ADMIN — KETOROLAC TROMETHAMINE 15 MILLIGRAM(S): 30 INJECTION, SOLUTION INTRAMUSCULAR; INTRAVENOUS at 15:29

## 2025-01-01 RX ADMIN — Medication 81 MILLIGRAM(S): at 12:23

## 2025-01-01 RX ADMIN — Medication 100 MILLIGRAM(S): at 12:23

## 2025-01-01 RX ADMIN — TIOTROPIUM BROMIDE INHALATION SPRAY 2 PUFF(S): 3.12 SPRAY, METERED RESPIRATORY (INHALATION) at 12:39

## 2025-01-01 RX ADMIN — Medication 400 MILLIGRAM(S): at 12:24

## 2025-01-01 RX ADMIN — Medication 400 MILLIGRAM(S): at 17:15

## 2025-01-01 RX ADMIN — Medication 200 GRAM(S): at 18:13

## 2025-01-01 RX ADMIN — ROSUVASTATIN CALCIUM 5 MILLIGRAM(S): 20 TABLET, FILM COATED ORAL at 21:03

## 2025-01-01 RX ADMIN — Medication 1000 MILLIGRAM(S): at 17:43

## 2025-01-01 RX ADMIN — Medication 400 MILLIGRAM(S): at 05:01

## 2025-01-01 RX ADMIN — Medication 25 GRAM(S): at 09:11

## 2025-01-01 RX ADMIN — KETOROLAC TROMETHAMINE 15 MILLIGRAM(S): 30 INJECTION, SOLUTION INTRAMUSCULAR; INTRAVENOUS at 21:04

## 2025-01-01 RX ADMIN — ENOXAPARIN SODIUM 40 MILLIGRAM(S): 100 INJECTION SUBCUTANEOUS at 12:23

## 2025-01-01 RX ADMIN — Medication 25 GRAM(S): at 21:03

## 2025-01-02 ENCOUNTER — TRANSCRIPTION ENCOUNTER (OUTPATIENT)
Age: 60
End: 2025-01-02

## 2025-01-02 LAB
ANION GAP SERPL CALC-SCNC: 11 MMOL/L — SIGNIFICANT CHANGE UP (ref 5–17)
BUN SERPL-MCNC: 12 MG/DL — SIGNIFICANT CHANGE UP (ref 7–23)
CALCIUM SERPL-MCNC: 9 MG/DL — SIGNIFICANT CHANGE UP (ref 8.4–10.5)
CHLORIDE SERPL-SCNC: 106 MMOL/L — SIGNIFICANT CHANGE UP (ref 96–108)
CO2 SERPL-SCNC: 22 MMOL/L — SIGNIFICANT CHANGE UP (ref 22–31)
CORTIS AM PEAK SERPL-MCNC: 14 UG/DL — SIGNIFICANT CHANGE UP (ref 6–18.4)
CREAT SERPL-MCNC: 0.8 MG/DL — SIGNIFICANT CHANGE UP (ref 0.5–1.3)
EGFR: 102 ML/MIN/1.73M2 — SIGNIFICANT CHANGE UP
EGFR: 102 ML/MIN/1.73M2 — SIGNIFICANT CHANGE UP
GLUCOSE BLDC GLUCOMTR-MCNC: 108 MG/DL — HIGH (ref 70–99)
GLUCOSE BLDC GLUCOMTR-MCNC: 118 MG/DL — HIGH (ref 70–99)
GLUCOSE BLDC GLUCOMTR-MCNC: 151 MG/DL — HIGH (ref 70–99)
GLUCOSE BLDC GLUCOMTR-MCNC: 88 MG/DL — SIGNIFICANT CHANGE UP (ref 70–99)
GLUCOSE SERPL-MCNC: 100 MG/DL — HIGH (ref 70–99)
HCT VFR BLD CALC: 30.6 % — LOW (ref 39–50)
HGB BLD-MCNC: 9.8 G/DL — LOW (ref 13–17)
MAGNESIUM SERPL-MCNC: 1.9 MG/DL — SIGNIFICANT CHANGE UP (ref 1.6–2.6)
MCHC RBC-ENTMCNC: 27.3 PG — SIGNIFICANT CHANGE UP (ref 27–34)
MCHC RBC-ENTMCNC: 32 G/DL — SIGNIFICANT CHANGE UP (ref 32–36)
MCV RBC AUTO: 85.2 FL — SIGNIFICANT CHANGE UP (ref 80–100)
NRBC # BLD: 0 /100 WBCS — SIGNIFICANT CHANGE UP (ref 0–0)
NRBC BLD-RTO: 0 /100 WBCS — SIGNIFICANT CHANGE UP (ref 0–0)
PHOSPHATE SERPL-MCNC: 4.7 MG/DL — HIGH (ref 2.5–4.5)
PLATELET # BLD AUTO: 109 K/UL — LOW (ref 150–400)
POTASSIUM SERPL-MCNC: 3.9 MMOL/L — SIGNIFICANT CHANGE UP (ref 3.5–5.3)
POTASSIUM SERPL-SCNC: 3.9 MMOL/L — SIGNIFICANT CHANGE UP (ref 3.5–5.3)
PROCALCITONIN SERPL-MCNC: 0.08 NG/ML — SIGNIFICANT CHANGE UP (ref 0.02–0.1)
RBC # BLD: 3.59 M/UL — LOW (ref 4.2–5.8)
RBC # FLD: 14.3 % — SIGNIFICANT CHANGE UP (ref 10.3–14.5)
SODIUM SERPL-SCNC: 139 MMOL/L — SIGNIFICANT CHANGE UP (ref 135–145)
WBC # BLD: 2.73 K/UL — LOW (ref 3.8–10.5)
WBC # FLD AUTO: 2.73 K/UL — LOW (ref 3.8–10.5)

## 2025-01-02 PROCEDURE — 99232 SBSQ HOSP IP/OBS MODERATE 35: CPT

## 2025-01-02 PROCEDURE — G0545: CPT

## 2025-01-02 RX ORDER — ACETAMINOPHEN 500 MG/5ML
1000 LIQUID (ML) ORAL EVERY 6 HOURS
Refills: 0 | Status: COMPLETED | OUTPATIENT
Start: 2025-01-02 | End: 2025-01-03

## 2025-01-02 RX ORDER — ACETAMINOPHEN 500 MG/5ML
1000 LIQUID (ML) ORAL ONCE
Refills: 0 | Status: COMPLETED | OUTPATIENT
Start: 2025-01-02 | End: 2025-01-02

## 2025-01-02 RX ORDER — MAGNESIUM SULFATE 500 MG/ML
2 SYRINGE (ML) INJECTION ONCE
Refills: 0 | Status: COMPLETED | OUTPATIENT
Start: 2025-01-02 | End: 2025-01-02

## 2025-01-02 RX ORDER — NITAZOXANIDE 100 MG/5ML
500 POWDER, FOR SUSPENSION ORAL EVERY 12 HOURS
Refills: 0 | Status: DISCONTINUED | OUTPATIENT
Start: 2025-01-02 | End: 2025-01-04

## 2025-01-02 RX ADMIN — KETOROLAC TROMETHAMINE 15 MILLIGRAM(S): 30 INJECTION, SOLUTION INTRAMUSCULAR; INTRAVENOUS at 03:00

## 2025-01-02 RX ADMIN — KETOROLAC TROMETHAMINE 15 MILLIGRAM(S): 30 INJECTION, SOLUTION INTRAMUSCULAR; INTRAVENOUS at 22:10

## 2025-01-02 RX ADMIN — NITAZOXANIDE 500 MILLIGRAM(S): 100 POWDER, FOR SUSPENSION ORAL at 17:25

## 2025-01-02 RX ADMIN — Medication 81 MILLIGRAM(S): at 11:41

## 2025-01-02 RX ADMIN — Medication 400 MILLIGRAM(S): at 19:44

## 2025-01-02 RX ADMIN — MONTELUKAST SODIUM 10 MILLIGRAM(S): 10 TABLET ORAL at 21:40

## 2025-01-02 RX ADMIN — TIOTROPIUM BROMIDE INHALATION SPRAY 2 PUFF(S): 3.12 SPRAY, METERED RESPIRATORY (INHALATION) at 11:40

## 2025-01-02 RX ADMIN — ENOXAPARIN SODIUM 40 MILLIGRAM(S): 100 INJECTION SUBCUTANEOUS at 11:40

## 2025-01-02 RX ADMIN — KETOROLAC TROMETHAMINE 15 MILLIGRAM(S): 30 INJECTION, SOLUTION INTRAMUSCULAR; INTRAVENOUS at 16:16

## 2025-01-02 RX ADMIN — KETOROLAC TROMETHAMINE 15 MILLIGRAM(S): 30 INJECTION, SOLUTION INTRAMUSCULAR; INTRAVENOUS at 02:34

## 2025-01-02 RX ADMIN — Medication 400 MILLIGRAM(S): at 13:09

## 2025-01-02 RX ADMIN — KETOROLAC TROMETHAMINE 15 MILLIGRAM(S): 30 INJECTION, SOLUTION INTRAMUSCULAR; INTRAVENOUS at 11:41

## 2025-01-02 RX ADMIN — Medication 400 MILLIGRAM(S): at 09:33

## 2025-01-02 RX ADMIN — Medication 100 MILLIEQUIVALENT(S): at 09:32

## 2025-01-02 RX ADMIN — Medication 25 GRAM(S): at 05:15

## 2025-01-02 RX ADMIN — Medication 100 MILLIGRAM(S): at 11:41

## 2025-01-02 RX ADMIN — Medication 400 MILLIGRAM(S): at 02:34

## 2025-01-02 RX ADMIN — KETOROLAC TROMETHAMINE 15 MILLIGRAM(S): 30 INJECTION, SOLUTION INTRAMUSCULAR; INTRAVENOUS at 21:40

## 2025-01-02 RX ADMIN — Medication 1000 MILLIGRAM(S): at 03:00

## 2025-01-02 RX ADMIN — ROSUVASTATIN CALCIUM 5 MILLIGRAM(S): 20 TABLET, FILM COATED ORAL at 21:40

## 2025-01-02 RX ADMIN — Medication 1000 MILLIGRAM(S): at 20:14

## 2025-01-02 RX ADMIN — Medication 25 GRAM(S): at 09:33

## 2025-01-02 RX ADMIN — Medication 40 MILLIGRAM(S): at 11:40

## 2025-01-03 LAB
ANION GAP SERPL CALC-SCNC: 12 MMOL/L — SIGNIFICANT CHANGE UP (ref 5–17)
BUN SERPL-MCNC: 11 MG/DL — SIGNIFICANT CHANGE UP (ref 7–23)
CALCIUM SERPL-MCNC: 9.4 MG/DL — SIGNIFICANT CHANGE UP (ref 8.4–10.5)
CHLORIDE SERPL-SCNC: 104 MMOL/L — SIGNIFICANT CHANGE UP (ref 96–108)
CO2 SERPL-SCNC: 24 MMOL/L — SIGNIFICANT CHANGE UP (ref 22–31)
CREAT SERPL-MCNC: 0.68 MG/DL — SIGNIFICANT CHANGE UP (ref 0.5–1.3)
EGFR: 107 ML/MIN/1.73M2 — SIGNIFICANT CHANGE UP
EGFR: 107 ML/MIN/1.73M2 — SIGNIFICANT CHANGE UP
GLUCOSE BLDC GLUCOMTR-MCNC: 107 MG/DL — HIGH (ref 70–99)
GLUCOSE BLDC GLUCOMTR-MCNC: 160 MG/DL — HIGH (ref 70–99)
GLUCOSE BLDC GLUCOMTR-MCNC: 83 MG/DL — SIGNIFICANT CHANGE UP (ref 70–99)
GLUCOSE BLDC GLUCOMTR-MCNC: 96 MG/DL — SIGNIFICANT CHANGE UP (ref 70–99)
GLUCOSE SERPL-MCNC: 96 MG/DL — SIGNIFICANT CHANGE UP (ref 70–99)
HCT VFR BLD CALC: 32.8 % — LOW (ref 39–50)
HGB BLD-MCNC: 10.5 G/DL — LOW (ref 13–17)
MAGNESIUM SERPL-MCNC: 2 MG/DL — SIGNIFICANT CHANGE UP (ref 1.6–2.6)
MCHC RBC-ENTMCNC: 27.2 PG — SIGNIFICANT CHANGE UP (ref 27–34)
MCHC RBC-ENTMCNC: 32 G/DL — SIGNIFICANT CHANGE UP (ref 32–36)
MCV RBC AUTO: 85 FL — SIGNIFICANT CHANGE UP (ref 80–100)
NRBC # BLD: 0 /100 WBCS — SIGNIFICANT CHANGE UP (ref 0–0)
NRBC BLD-RTO: 0 /100 WBCS — SIGNIFICANT CHANGE UP (ref 0–0)
PHOSPHATE SERPL-MCNC: 4.6 MG/DL — HIGH (ref 2.5–4.5)
PLATELET # BLD AUTO: 111 K/UL — LOW (ref 150–400)
POTASSIUM SERPL-MCNC: 4.6 MMOL/L — SIGNIFICANT CHANGE UP (ref 3.5–5.3)
POTASSIUM SERPL-SCNC: 4.6 MMOL/L — SIGNIFICANT CHANGE UP (ref 3.5–5.3)
RBC # BLD: 3.86 M/UL — LOW (ref 4.2–5.8)
RBC # FLD: 14.1 % — SIGNIFICANT CHANGE UP (ref 10.3–14.5)
SODIUM SERPL-SCNC: 140 MMOL/L — SIGNIFICANT CHANGE UP (ref 135–145)
WBC # BLD: 4.03 K/UL — SIGNIFICANT CHANGE UP (ref 3.8–10.5)
WBC # FLD AUTO: 4.03 K/UL — SIGNIFICANT CHANGE UP (ref 3.8–10.5)

## 2025-01-03 PROCEDURE — 99232 SBSQ HOSP IP/OBS MODERATE 35: CPT

## 2025-01-03 PROCEDURE — G0545: CPT

## 2025-01-03 RX ORDER — NITAZOXANIDE 100 MG/5ML
1 POWDER, FOR SUSPENSION ORAL
Qty: 0 | Refills: 0 | DISCHARGE
Start: 2025-01-03

## 2025-01-03 RX ORDER — ACETAMINOPHEN 500 MG/5ML
650 LIQUID (ML) ORAL EVERY 6 HOURS
Refills: 0 | Status: DISCONTINUED | OUTPATIENT
Start: 2025-01-03 | End: 2025-01-04

## 2025-01-03 RX ORDER — NITAZOXANIDE 100 MG/5ML
1 POWDER, FOR SUSPENSION ORAL
Qty: 3 | Refills: 0
Start: 2025-01-03 | End: 2025-01-04

## 2025-01-03 RX ADMIN — KETOROLAC TROMETHAMINE 15 MILLIGRAM(S): 30 INJECTION, SOLUTION INTRAMUSCULAR; INTRAVENOUS at 02:02

## 2025-01-03 RX ADMIN — Medication 1000 MILLIGRAM(S): at 01:41

## 2025-01-03 RX ADMIN — TIOTROPIUM BROMIDE INHALATION SPRAY 2 PUFF(S): 3.12 SPRAY, METERED RESPIRATORY (INHALATION) at 18:10

## 2025-01-03 RX ADMIN — ROSUVASTATIN CALCIUM 5 MILLIGRAM(S): 20 TABLET, FILM COATED ORAL at 21:14

## 2025-01-03 RX ADMIN — Medication 100 MILLIGRAM(S): at 13:19

## 2025-01-03 RX ADMIN — ENOXAPARIN SODIUM 40 MILLIGRAM(S): 100 INJECTION SUBCUTANEOUS at 13:20

## 2025-01-03 RX ADMIN — MONTELUKAST SODIUM 10 MILLIGRAM(S): 10 TABLET ORAL at 21:14

## 2025-01-03 RX ADMIN — KETOROLAC TROMETHAMINE 15 MILLIGRAM(S): 30 INJECTION, SOLUTION INTRAMUSCULAR; INTRAVENOUS at 21:14

## 2025-01-03 RX ADMIN — Medication 400 MILLIGRAM(S): at 01:11

## 2025-01-03 RX ADMIN — KETOROLAC TROMETHAMINE 15 MILLIGRAM(S): 30 INJECTION, SOLUTION INTRAMUSCULAR; INTRAVENOUS at 02:32

## 2025-01-03 RX ADMIN — NITAZOXANIDE 500 MILLIGRAM(S): 100 POWDER, FOR SUSPENSION ORAL at 18:08

## 2025-01-03 RX ADMIN — NITAZOXANIDE 500 MILLIGRAM(S): 100 POWDER, FOR SUSPENSION ORAL at 05:19

## 2025-01-03 RX ADMIN — Medication 40 MILLIGRAM(S): at 13:19

## 2025-01-03 RX ADMIN — Medication 81 MILLIGRAM(S): at 13:19

## 2025-01-03 RX ADMIN — KETOROLAC TROMETHAMINE 15 MILLIGRAM(S): 30 INJECTION, SOLUTION INTRAMUSCULAR; INTRAVENOUS at 21:44

## 2025-01-04 ENCOUNTER — TRANSCRIPTION ENCOUNTER (OUTPATIENT)
Age: 60
End: 2025-01-04

## 2025-01-04 VITALS
HEART RATE: 65 BPM | RESPIRATION RATE: 18 BRPM | TEMPERATURE: 99 F | DIASTOLIC BLOOD PRESSURE: 77 MMHG | OXYGEN SATURATION: 95 % | SYSTOLIC BLOOD PRESSURE: 135 MMHG

## 2025-01-04 LAB
CALPROTECTIN STL-MCNT: 578 UG/G — HIGH (ref 0–120)
GLUCOSE BLDC GLUCOMTR-MCNC: 101 MG/DL — HIGH (ref 70–99)
GLUCOSE BLDC GLUCOMTR-MCNC: 90 MG/DL — SIGNIFICANT CHANGE UP (ref 70–99)

## 2025-01-04 PROCEDURE — 85027 COMPLETE CBC AUTOMATED: CPT

## 2025-01-04 PROCEDURE — 80053 COMPREHEN METABOLIC PANEL: CPT

## 2025-01-04 PROCEDURE — 36415 COLL VENOUS BLD VENIPUNCTURE: CPT

## 2025-01-04 PROCEDURE — 85025 COMPLETE CBC W/AUTO DIFF WBC: CPT

## 2025-01-04 PROCEDURE — 83993 ASSAY FOR CALPROTECTIN FECAL: CPT

## 2025-01-04 PROCEDURE — 84100 ASSAY OF PHOSPHORUS: CPT

## 2025-01-04 PROCEDURE — 94640 AIRWAY INHALATION TREATMENT: CPT

## 2025-01-04 PROCEDURE — 87637 SARSCOV2&INF A&B&RSV AMP PRB: CPT

## 2025-01-04 PROCEDURE — 87507 IADNA-DNA/RNA PROBE TQ 12-25: CPT

## 2025-01-04 PROCEDURE — 82533 TOTAL CORTISOL: CPT

## 2025-01-04 PROCEDURE — 87040 BLOOD CULTURE FOR BACTERIA: CPT

## 2025-01-04 PROCEDURE — 83735 ASSAY OF MAGNESIUM: CPT

## 2025-01-04 PROCEDURE — 84145 PROCALCITONIN (PCT): CPT

## 2025-01-04 PROCEDURE — 83036 HEMOGLOBIN GLYCOSYLATED A1C: CPT

## 2025-01-04 PROCEDURE — 80048 BASIC METABOLIC PNL TOTAL CA: CPT

## 2025-01-04 PROCEDURE — 71045 X-RAY EXAM CHEST 1 VIEW: CPT

## 2025-01-04 PROCEDURE — 82962 GLUCOSE BLOOD TEST: CPT

## 2025-01-04 PROCEDURE — 99285 EMERGENCY DEPT VISIT HI MDM: CPT

## 2025-01-04 PROCEDURE — 0241U: CPT

## 2025-01-04 RX ORDER — NITAZOXANIDE 100 MG/5ML
1 POWDER, FOR SUSPENSION ORAL
Qty: 6 | Refills: 0
Start: 2025-01-04 | End: 2025-01-06

## 2025-01-04 RX ADMIN — ENOXAPARIN SODIUM 40 MILLIGRAM(S): 100 INJECTION SUBCUTANEOUS at 13:24

## 2025-01-04 RX ADMIN — NITAZOXANIDE 500 MILLIGRAM(S): 100 POWDER, FOR SUSPENSION ORAL at 05:03

## 2025-01-04 RX ADMIN — Medication 81 MILLIGRAM(S): at 13:24

## 2025-01-04 RX ADMIN — Medication 100 MILLIGRAM(S): at 13:24

## 2025-01-06 ENCOUNTER — NON-APPOINTMENT (OUTPATIENT)
Age: 60
End: 2025-01-06

## 2025-01-10 ENCOUNTER — OUTPATIENT (OUTPATIENT)
Dept: OUTPATIENT SERVICES | Facility: HOSPITAL | Age: 60
LOS: 1 days | Discharge: HOME | End: 2025-01-10

## 2025-01-10 DIAGNOSIS — Z98.89 OTHER SPECIFIED POSTPROCEDURAL STATES: Chronic | ICD-10-CM

## 2025-01-10 DIAGNOSIS — Z87.898 PERSONAL HISTORY OF OTHER SPECIFIED CONDITIONS: Chronic | ICD-10-CM

## 2025-01-10 DIAGNOSIS — C61 MALIGNANT NEOPLASM OF PROSTATE: ICD-10-CM

## 2025-01-10 DIAGNOSIS — S92.009A UNSPECIFIED FRACTURE OF UNSPECIFIED CALCANEUS, INITIAL ENCOUNTER FOR CLOSED FRACTURE: Chronic | ICD-10-CM

## 2025-01-14 ENCOUNTER — APPOINTMENT (OUTPATIENT)
Dept: HEMATOLOGY ONCOLOGY | Facility: CLINIC | Age: 60
End: 2025-01-14

## 2025-01-23 ENCOUNTER — NON-APPOINTMENT (OUTPATIENT)
Age: 60
End: 2025-01-23

## 2025-02-01 DIAGNOSIS — F41.9 ANXIETY DISORDER, UNSPECIFIED: ICD-10-CM

## 2025-02-01 RX ORDER — ALPRAZOLAM 0.5 MG/1
0.5 TABLET ORAL AT BEDTIME
Qty: 30 | Refills: 0 | Status: ACTIVE | COMMUNITY
Start: 2025-02-01 | End: 1900-01-01

## 2025-02-10 ENCOUNTER — APPOINTMENT (OUTPATIENT)
Dept: PULMONOLOGY | Facility: CLINIC | Age: 60
End: 2025-02-10

## 2025-02-19 ENCOUNTER — NON-APPOINTMENT (OUTPATIENT)
Age: 60
End: 2025-02-19

## 2025-02-22 LAB
TESTOST FREE SERPL-MCNC: 0.1 PG/ML
TESTOST SERPL-MCNC: <2.5 NG/DL

## 2025-02-24 ENCOUNTER — APPOINTMENT (OUTPATIENT)
Dept: RADIATION ONCOLOGY | Facility: CLINIC | Age: 60
End: 2025-02-24
Payer: MEDICARE

## 2025-02-24 ENCOUNTER — NON-APPOINTMENT (OUTPATIENT)
Age: 60
End: 2025-02-24

## 2025-02-24 VITALS
HEART RATE: 78 BPM | RESPIRATION RATE: 18 BRPM | HEIGHT: 66 IN | DIASTOLIC BLOOD PRESSURE: 61 MMHG | TEMPERATURE: 97 F | SYSTOLIC BLOOD PRESSURE: 110 MMHG | BODY MASS INDEX: 27.95 KG/M2 | WEIGHT: 173.94 LBS | OXYGEN SATURATION: 97 %

## 2025-02-24 DIAGNOSIS — K62.89 OTHER SPECIFIED DISEASES OF ANUS AND RECTUM: ICD-10-CM

## 2025-02-24 DIAGNOSIS — C61 MALIGNANT NEOPLASM OF PROSTATE: ICD-10-CM

## 2025-02-24 DIAGNOSIS — R79.89 OTHER SPECIFIED ABNORMAL FINDINGS OF BLOOD CHEMISTRY: ICD-10-CM

## 2025-02-24 PROCEDURE — 99213 OFFICE O/P EST LOW 20 MIN: CPT | Mod: GC

## 2025-02-26 PROBLEM — R79.89 DECREASED TESTOSTERONE LEVEL: Status: ACTIVE | Noted: 2025-02-26

## 2025-02-27 ENCOUNTER — APPOINTMENT (OUTPATIENT)
Dept: RADIOLOGY | Facility: IMAGING CENTER | Age: 60
End: 2025-02-27

## 2025-02-27 ENCOUNTER — OUTPATIENT (OUTPATIENT)
Dept: OUTPATIENT SERVICES | Facility: HOSPITAL | Age: 60
LOS: 1 days | End: 2025-02-27
Payer: MEDICARE

## 2025-02-27 ENCOUNTER — RESULT REVIEW (OUTPATIENT)
Age: 60
End: 2025-02-27

## 2025-02-27 DIAGNOSIS — Z98.89 OTHER SPECIFIED POSTPROCEDURAL STATES: Chronic | ICD-10-CM

## 2025-02-27 DIAGNOSIS — Z90.49 ACQUIRED ABSENCE OF OTHER SPECIFIED PARTS OF DIGESTIVE TRACT: Chronic | ICD-10-CM

## 2025-02-27 DIAGNOSIS — R79.89 OTHER SPECIFIED ABNORMAL FINDINGS OF BLOOD CHEMISTRY: ICD-10-CM

## 2025-02-27 DIAGNOSIS — Z87.898 PERSONAL HISTORY OF OTHER SPECIFIED CONDITIONS: Chronic | ICD-10-CM

## 2025-02-27 DIAGNOSIS — S92.009A UNSPECIFIED FRACTURE OF UNSPECIFIED CALCANEUS, INITIAL ENCOUNTER FOR CLOSED FRACTURE: Chronic | ICD-10-CM

## 2025-02-27 PROCEDURE — 77085 DXA BONE DENSITY AXL VRT FX: CPT

## 2025-02-27 PROCEDURE — 77085 DXA BONE DENSITY AXL VRT FX: CPT | Mod: 26

## 2025-03-03 ENCOUNTER — NON-APPOINTMENT (OUTPATIENT)
Age: 60
End: 2025-03-03

## 2025-03-03 ENCOUNTER — OUTPATIENT (OUTPATIENT)
Dept: OUTPATIENT SERVICES | Facility: HOSPITAL | Age: 60
LOS: 1 days | Discharge: HOME | End: 2025-03-03
Payer: MEDICARE

## 2025-03-03 ENCOUNTER — APPOINTMENT (OUTPATIENT)
Dept: WOUND CARE | Facility: HOSPITAL | Age: 60
End: 2025-03-03
Payer: MEDICARE

## 2025-03-03 VITALS
TEMPERATURE: 98.6 F | HEART RATE: 79 BPM | BODY MASS INDEX: 27.8 KG/M2 | WEIGHT: 173 LBS | HEIGHT: 66 IN | SYSTOLIC BLOOD PRESSURE: 134 MMHG | OXYGEN SATURATION: 99 % | RESPIRATION RATE: 18 BRPM | DIASTOLIC BLOOD PRESSURE: 89 MMHG
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
## 2025-03-03 DIAGNOSIS — K60.2 ANAL FISSURE, UNSPECIFIED: ICD-10-CM

## 2025-03-03 DIAGNOSIS — S92.009A UNSPECIFIED FRACTURE OF UNSPECIFIED CALCANEUS, INITIAL ENCOUNTER FOR CLOSED FRACTURE: Chronic | ICD-10-CM

## 2025-03-03 DIAGNOSIS — Z98.89 OTHER SPECIFIED POSTPROCEDURAL STATES: Chronic | ICD-10-CM

## 2025-03-03 DIAGNOSIS — Z87.898 PERSONAL HISTORY OF OTHER SPECIFIED CONDITIONS: Chronic | ICD-10-CM

## 2025-03-03 DIAGNOSIS — K60.522 ANORECTAL FISTULA, COMPLEX, PERSISTENT: ICD-10-CM

## 2025-03-03 DIAGNOSIS — Z85.46 PERSONAL HISTORY OF MALIGNANT NEOPLASM OF PROSTATE: ICD-10-CM

## 2025-03-03 DIAGNOSIS — Z90.49 ACQUIRED ABSENCE OF OTHER SPECIFIED PARTS OF DIGESTIVE TRACT: Chronic | ICD-10-CM

## 2025-03-03 DIAGNOSIS — L59.8 OTHER SPECIFIED DISORDERS OF THE SKIN AND SUBCUTANEOUS TISSUE RELATED TO RADIATION: ICD-10-CM

## 2025-03-03 LAB
ALBUMIN SERPL ELPH-MCNC: 3.7 G/DL — SIGNIFICANT CHANGE UP (ref 3.3–5)
ALP SERPL-CCNC: 77 U/L — SIGNIFICANT CHANGE UP (ref 40–120)
ALT FLD-CCNC: 25 U/L — SIGNIFICANT CHANGE UP (ref 12–78)
ANION GAP SERPL CALC-SCNC: 6 MMOL/L — SIGNIFICANT CHANGE UP (ref 5–17)
AST SERPL-CCNC: 16 U/L — SIGNIFICANT CHANGE UP (ref 15–37)
BASOPHILS # BLD AUTO: 0.01 K/UL — SIGNIFICANT CHANGE UP (ref 0–0.2)
BASOPHILS NFR BLD AUTO: 0.1 % — SIGNIFICANT CHANGE UP (ref 0–2)
BILIRUB SERPL-MCNC: 0.3 MG/DL — SIGNIFICANT CHANGE UP (ref 0.2–1.2)
BUN SERPL-MCNC: 22 MG/DL — SIGNIFICANT CHANGE UP (ref 7–23)
CALCIUM SERPL-MCNC: 9.6 MG/DL — SIGNIFICANT CHANGE UP (ref 8.5–10.1)
CHLORIDE SERPL-SCNC: 109 MMOL/L — HIGH (ref 96–108)
CO2 SERPL-SCNC: 27 MMOL/L — SIGNIFICANT CHANGE UP (ref 22–31)
CREAT SERPL-MCNC: 0.84 MG/DL — SIGNIFICANT CHANGE UP (ref 0.5–1.3)
EGFR: 100 ML/MIN/1.73M2 — SIGNIFICANT CHANGE UP
EGFR: 100 ML/MIN/1.73M2 — SIGNIFICANT CHANGE UP
EOSINOPHIL # BLD AUTO: 0.05 K/UL — SIGNIFICANT CHANGE UP (ref 0–0.5)
EOSINOPHIL NFR BLD AUTO: 0.7 % — SIGNIFICANT CHANGE UP (ref 0–6)
ERYTHROCYTE [SEDIMENTATION RATE] IN BLOOD: 21 MM/HR — HIGH (ref 0–20)
GLUCOSE SERPL-MCNC: 111 MG/DL — HIGH (ref 70–99)
HCT VFR BLD CALC: 35 % — LOW (ref 39–50)
HGB BLD-MCNC: 11.4 G/DL — LOW (ref 13–17)
IMM GRANULOCYTES NFR BLD AUTO: 0.7 % — SIGNIFICANT CHANGE UP (ref 0–0.9)
LYMPHOCYTES # BLD AUTO: 0.87 K/UL — LOW (ref 1–3.3)
LYMPHOCYTES # BLD AUTO: 11.8 % — LOW (ref 13–44)
MCHC RBC-ENTMCNC: 27.1 PG — SIGNIFICANT CHANGE UP (ref 27–34)
MCHC RBC-ENTMCNC: 32.6 G/DL — SIGNIFICANT CHANGE UP (ref 32–36)
MCV RBC AUTO: 83.1 FL — SIGNIFICANT CHANGE UP (ref 80–100)
MONOCYTES # BLD AUTO: 0.42 K/UL — SIGNIFICANT CHANGE UP (ref 0–0.9)
MONOCYTES NFR BLD AUTO: 5.7 % — SIGNIFICANT CHANGE UP (ref 2–14)
NEUTROPHILS # BLD AUTO: 5.95 K/UL — SIGNIFICANT CHANGE UP (ref 1.8–7.4)
NEUTROPHILS NFR BLD AUTO: 81 % — HIGH (ref 43–77)
NRBC BLD AUTO-RTO: 0 /100 WBCS — SIGNIFICANT CHANGE UP (ref 0–0)
PLATELET # BLD AUTO: 169 K/UL — SIGNIFICANT CHANGE UP (ref 150–400)
POTASSIUM SERPL-MCNC: 4.1 MMOL/L — SIGNIFICANT CHANGE UP (ref 3.5–5.3)
POTASSIUM SERPL-SCNC: 4.1 MMOL/L — SIGNIFICANT CHANGE UP (ref 3.5–5.3)
PROT SERPL-MCNC: 6.9 G/DL — SIGNIFICANT CHANGE UP (ref 6–8.3)
RBC # BLD: 4.21 M/UL — SIGNIFICANT CHANGE UP (ref 4.2–5.8)
RBC # FLD: 15.5 % — HIGH (ref 10.3–14.5)
SODIUM SERPL-SCNC: 142 MMOL/L — SIGNIFICANT CHANGE UP (ref 135–145)
WBC # BLD: 7.35 K/UL — SIGNIFICANT CHANGE UP (ref 3.8–10.5)
WBC # FLD AUTO: 7.35 K/UL — SIGNIFICANT CHANGE UP (ref 3.8–10.5)

## 2025-03-03 PROCEDURE — G0463: CPT

## 2025-03-03 PROCEDURE — 84134 ASSAY OF PREALBUMIN: CPT

## 2025-03-03 PROCEDURE — 85025 COMPLETE CBC W/AUTO DIFF WBC: CPT

## 2025-03-03 PROCEDURE — 36415 COLL VENOUS BLD VENIPUNCTURE: CPT

## 2025-03-03 PROCEDURE — 80053 COMPREHEN METABOLIC PANEL: CPT

## 2025-03-03 PROCEDURE — 99214 OFFICE O/P EST MOD 30 MIN: CPT

## 2025-03-03 PROCEDURE — 86140 C-REACTIVE PROTEIN: CPT

## 2025-03-03 PROCEDURE — 85652 RBC SED RATE AUTOMATED: CPT

## 2025-03-03 PROCEDURE — 71046 X-RAY EXAM CHEST 2 VIEWS: CPT

## 2025-03-03 PROCEDURE — 71046 X-RAY EXAM CHEST 2 VIEWS: CPT | Mod: 26

## 2025-03-03 PROCEDURE — 83036 HEMOGLOBIN GLYCOSYLATED A1C: CPT

## 2025-03-04 ENCOUNTER — NON-APPOINTMENT (OUTPATIENT)
Age: 60
End: 2025-03-04

## 2025-03-04 LAB
A1C WITH ESTIMATED AVERAGE GLUCOSE RESULT: 5.3 % — SIGNIFICANT CHANGE UP (ref 4–5.6)
CRP SERPL-MCNC: <3 MG/L — SIGNIFICANT CHANGE UP
ESTIMATED AVERAGE GLUCOSE: 105 MG/DL — SIGNIFICANT CHANGE UP (ref 68–114)
PREALB SERPL-MCNC: 31 MG/DL — SIGNIFICANT CHANGE UP (ref 20–40)

## 2025-03-05 DIAGNOSIS — J45.50 SEVERE PERSISTENT ASTHMA, UNCOMPLICATED: ICD-10-CM

## 2025-03-05 DIAGNOSIS — I25.10 ATHEROSCLEROTIC HEART DISEASE OF NATIVE CORONARY ARTERY WITHOUT ANGINA PECTORIS: ICD-10-CM

## 2025-03-05 DIAGNOSIS — Z84.0 FAMILY HISTORY OF DISEASES OF THE SKIN AND SUBCUTANEOUS TISSUE: ICD-10-CM

## 2025-03-05 DIAGNOSIS — Z80.3 FAMILY HISTORY OF MALIGNANT NEOPLASM OF BREAST: ICD-10-CM

## 2025-03-05 DIAGNOSIS — Z82.49 FAMILY HISTORY OF ISCHEMIC HEART DISEASE AND OTHER DISEASES OF THE CIRCULATORY SYSTEM: ICD-10-CM

## 2025-03-05 DIAGNOSIS — K60.522 ANORECTAL FISTULA, COMPLEX, PERSISTENT: ICD-10-CM

## 2025-03-05 DIAGNOSIS — L59.8 OTHER SPECIFIED DISORDERS OF THE SKIN AND SUBCUTANEOUS TISSUE RELATED TO RADIATION: ICD-10-CM

## 2025-03-05 DIAGNOSIS — Z79.899 OTHER LONG TERM (CURRENT) DRUG THERAPY: ICD-10-CM
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
## 2025-03-05 DIAGNOSIS — Z80.42 FAMILY HISTORY OF MALIGNANT NEOPLASM OF PROSTATE: ICD-10-CM

## 2025-03-05 DIAGNOSIS — Z79.51 LONG TERM (CURRENT) USE OF INHALED STEROIDS: ICD-10-CM

## 2025-03-05 DIAGNOSIS — E11.9 TYPE 2 DIABETES MELLITUS WITHOUT COMPLICATIONS: ICD-10-CM

## 2025-03-05 DIAGNOSIS — Z98.890 OTHER SPECIFIED POSTPROCEDURAL STATES: ICD-10-CM

## 2025-03-05 DIAGNOSIS — K50.90 CROHN'S DISEASE, UNSPECIFIED, WITHOUT COMPLICATIONS: ICD-10-CM

## 2025-03-05 DIAGNOSIS — I10 ESSENTIAL (PRIMARY) HYPERTENSION: ICD-10-CM

## 2025-03-05 DIAGNOSIS — Z86.19 PERSONAL HISTORY OF OTHER INFECTIOUS AND PARASITIC DISEASES: ICD-10-CM

## 2025-03-05 DIAGNOSIS — K21.9 GASTRO-ESOPHAGEAL REFLUX DISEASE WITHOUT ESOPHAGITIS: ICD-10-CM

## 2025-03-05 DIAGNOSIS — Z83.79 FAMILY HISTORY OF OTHER DISEASES OF THE DIGESTIVE SYSTEM: ICD-10-CM
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
## 2025-03-05 DIAGNOSIS — G47.33 OBSTRUCTIVE SLEEP APNEA (ADULT) (PEDIATRIC): ICD-10-CM

## 2025-03-19 ENCOUNTER — NON-APPOINTMENT (OUTPATIENT)
Age: 60
End: 2025-03-19

## 2025-03-20 ENCOUNTER — OUTPATIENT (OUTPATIENT)
Dept: OUTPATIENT SERVICES | Facility: HOSPITAL | Age: 60
LOS: 1 days | Discharge: HOME | End: 2025-03-20
Payer: MEDICARE

## 2025-03-20 ENCOUNTER — APPOINTMENT (OUTPATIENT)
Dept: HYPERBARIC MEDICINE | Facility: HOSPITAL | Age: 60
End: 2025-03-20
Payer: MEDICARE

## 2025-03-20 VITALS
OXYGEN SATURATION: 98 % | SYSTOLIC BLOOD PRESSURE: 126 MMHG | HEART RATE: 73 BPM | DIASTOLIC BLOOD PRESSURE: 77 MMHG | RESPIRATION RATE: 18 BRPM | TEMPERATURE: 98 F

## 2025-03-20 VITALS
OXYGEN SATURATION: 98 % | DIASTOLIC BLOOD PRESSURE: 80 MMHG | HEART RATE: 67 BPM | RESPIRATION RATE: 18 BRPM | SYSTOLIC BLOOD PRESSURE: 134 MMHG | TEMPERATURE: 99 F

## 2025-03-20 DIAGNOSIS — S92.009A UNSPECIFIED FRACTURE OF UNSPECIFIED CALCANEUS, INITIAL ENCOUNTER FOR CLOSED FRACTURE: Chronic | ICD-10-CM

## 2025-03-20 DIAGNOSIS — Z98.89 OTHER SPECIFIED POSTPROCEDURAL STATES: Chronic | ICD-10-CM

## 2025-03-20 DIAGNOSIS — Z87.898 PERSONAL HISTORY OF OTHER SPECIFIED CONDITIONS: Chronic | ICD-10-CM

## 2025-03-20 DIAGNOSIS — Z90.49 ACQUIRED ABSENCE OF OTHER SPECIFIED PARTS OF DIGESTIVE TRACT: Chronic | ICD-10-CM

## 2025-03-20 DIAGNOSIS — L59.8 OTHER SPECIFIED DISORDERS OF THE SKIN AND SUBCUTANEOUS TISSUE RELATED TO RADIATION: ICD-10-CM

## 2025-03-20 LAB
GLUCOSE BLDC GLUCOMTR-MCNC: 107 MG/DL — HIGH (ref 70–99)
GLUCOSE BLDC GLUCOMTR-MCNC: 120 MG/DL — HIGH (ref 70–99)
GLUCOSE BLDC GLUCOMTR-MCNC: 122 MG/DL — HIGH (ref 70–99)

## 2025-03-20 PROCEDURE — 99183 HYPERBARIC OXYGEN THERAPY: CPT

## 2025-03-20 PROCEDURE — G0277: CPT

## 2025-03-20 PROCEDURE — 82962 GLUCOSE BLOOD TEST: CPT

## 2025-03-21 ENCOUNTER — OUTPATIENT (OUTPATIENT)
Dept: OUTPATIENT SERVICES | Facility: HOSPITAL | Age: 60
LOS: 1 days | Discharge: HOME | End: 2025-03-21
Payer: MEDICARE

## 2025-03-21 ENCOUNTER — APPOINTMENT (OUTPATIENT)
Dept: HYPERBARIC MEDICINE | Facility: HOSPITAL | Age: 60
End: 2025-03-21
Payer: MEDICARE

## 2025-03-21 VITALS
TEMPERATURE: 97.4 F | RESPIRATION RATE: 18 BRPM | OXYGEN SATURATION: 99 % | SYSTOLIC BLOOD PRESSURE: 126 MMHG | DIASTOLIC BLOOD PRESSURE: 70 MMHG | HEART RATE: 71 BPM

## 2025-03-21 VITALS
HEART RATE: 79 BPM | TEMPERATURE: 99 F | RESPIRATION RATE: 18 BRPM | OXYGEN SATURATION: 98 % | DIASTOLIC BLOOD PRESSURE: 64 MMHG | SYSTOLIC BLOOD PRESSURE: 102 MMHG

## 2025-03-21 DIAGNOSIS — Z87.898 PERSONAL HISTORY OF OTHER SPECIFIED CONDITIONS: Chronic | ICD-10-CM

## 2025-03-21 DIAGNOSIS — Z90.49 ACQUIRED ABSENCE OF OTHER SPECIFIED PARTS OF DIGESTIVE TRACT: Chronic | ICD-10-CM

## 2025-03-21 DIAGNOSIS — L59.8 OTHER SPECIFIED DISORDERS OF THE SKIN AND SUBCUTANEOUS TISSUE RELATED TO RADIATION: ICD-10-CM

## 2025-03-21 DIAGNOSIS — S92.009A UNSPECIFIED FRACTURE OF UNSPECIFIED CALCANEUS, INITIAL ENCOUNTER FOR CLOSED FRACTURE: Chronic | ICD-10-CM

## 2025-03-21 DIAGNOSIS — Z98.89 OTHER SPECIFIED POSTPROCEDURAL STATES: Chronic | ICD-10-CM

## 2025-03-21 LAB — GLUCOSE BLDC GLUCOMTR-MCNC: 174 MG/DL — HIGH (ref 70–99)

## 2025-03-21 PROCEDURE — 82962 GLUCOSE BLOOD TEST: CPT

## 2025-03-21 PROCEDURE — 99183 HYPERBARIC OXYGEN THERAPY: CPT

## 2025-03-21 PROCEDURE — G0277: CPT

## 2025-03-22 DIAGNOSIS — L59.8 OTHER SPECIFIED DISORDERS OF THE SKIN AND SUBCUTANEOUS TISSUE RELATED TO RADIATION: ICD-10-CM
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
## 2025-03-22 DIAGNOSIS — E11.59 TYPE 2 DIABETES MELLITUS WITH OTHER CIRCULATORY COMPLICATIONS: ICD-10-CM

## 2025-03-22 DIAGNOSIS — Z85.46 PERSONAL HISTORY OF MALIGNANT NEOPLASM OF PROSTATE: ICD-10-CM
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
## 2025-03-24 ENCOUNTER — OUTPATIENT (OUTPATIENT)
Dept: OUTPATIENT SERVICES | Facility: HOSPITAL | Age: 60
LOS: 1 days | Discharge: HOME | End: 2025-03-24
Payer: MEDICARE

## 2025-03-24 ENCOUNTER — APPOINTMENT (OUTPATIENT)
Dept: HYPERBARIC MEDICINE | Facility: HOSPITAL | Age: 60
End: 2025-03-24
Payer: MEDICARE

## 2025-03-24 VITALS
DIASTOLIC BLOOD PRESSURE: 73 MMHG | SYSTOLIC BLOOD PRESSURE: 120 MMHG | RESPIRATION RATE: 18 BRPM | HEART RATE: 76 BPM | TEMPERATURE: 98.7 F | OXYGEN SATURATION: 95 %

## 2025-03-24 VITALS
HEART RATE: 78 BPM | OXYGEN SATURATION: 98 % | RESPIRATION RATE: 18 BRPM | TEMPERATURE: 98.4 F | SYSTOLIC BLOOD PRESSURE: 129 MMHG | DIASTOLIC BLOOD PRESSURE: 78 MMHG
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
## 2025-03-24 DIAGNOSIS — L59.8 OTHER SPECIFIED DISORDERS OF THE SKIN AND SUBCUTANEOUS TISSUE RELATED TO RADIATION: ICD-10-CM

## 2025-03-24 DIAGNOSIS — Z98.89 OTHER SPECIFIED POSTPROCEDURAL STATES: Chronic | ICD-10-CM

## 2025-03-24 DIAGNOSIS — E11.59 TYPE 2 DIABETES MELLITUS WITH OTHER CIRCULATORY COMPLICATIONS: ICD-10-CM
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
## 2025-03-24 DIAGNOSIS — Z85.46 PERSONAL HISTORY OF MALIGNANT NEOPLASM OF PROSTATE: ICD-10-CM

## 2025-03-24 DIAGNOSIS — Z90.49 ACQUIRED ABSENCE OF OTHER SPECIFIED PARTS OF DIGESTIVE TRACT: Chronic | ICD-10-CM

## 2025-03-24 LAB
GLUCOSE BLDC GLUCOMTR-MCNC: 131 MG/DL — HIGH (ref 70–99)
GLUCOSE BLDC GLUCOMTR-MCNC: 215 MG/DL — HIGH (ref 70–99)

## 2025-03-24 PROCEDURE — G0277: CPT

## 2025-03-24 PROCEDURE — 82962 GLUCOSE BLOOD TEST: CPT

## 2025-03-24 PROCEDURE — 99213 OFFICE O/P EST LOW 20 MIN: CPT

## 2025-03-25 ENCOUNTER — OUTPATIENT (OUTPATIENT)
Dept: OUTPATIENT SERVICES | Facility: HOSPITAL | Age: 60
LOS: 1 days | Discharge: HOME | End: 2025-03-25
Payer: MEDICARE

## 2025-03-25 ENCOUNTER — APPOINTMENT (OUTPATIENT)
Dept: HYPERBARIC MEDICINE | Facility: HOSPITAL | Age: 60
End: 2025-03-25
Payer: MEDICARE

## 2025-03-25 VITALS
HEART RATE: 79 BPM | DIASTOLIC BLOOD PRESSURE: 74 MMHG | OXYGEN SATURATION: 98 % | RESPIRATION RATE: 18 BRPM | SYSTOLIC BLOOD PRESSURE: 126 MMHG | TEMPERATURE: 98.5 F

## 2025-03-25 VITALS
OXYGEN SATURATION: 98 % | SYSTOLIC BLOOD PRESSURE: 118 MMHG | TEMPERATURE: 98.5 F | HEART RATE: 74 BPM | RESPIRATION RATE: 18 BRPM | DIASTOLIC BLOOD PRESSURE: 72 MMHG

## 2025-03-25 DIAGNOSIS — S92.009A UNSPECIFIED FRACTURE OF UNSPECIFIED CALCANEUS, INITIAL ENCOUNTER FOR CLOSED FRACTURE: Chronic | ICD-10-CM

## 2025-03-25 DIAGNOSIS — Z98.89 OTHER SPECIFIED POSTPROCEDURAL STATES: Chronic | ICD-10-CM

## 2025-03-25 DIAGNOSIS — L59.8 OTHER SPECIFIED DISORDERS OF THE SKIN AND SUBCUTANEOUS TISSUE RELATED TO RADIATION: ICD-10-CM

## 2025-03-25 DIAGNOSIS — Z87.898 PERSONAL HISTORY OF OTHER SPECIFIED CONDITIONS: Chronic | ICD-10-CM

## 2025-03-25 DIAGNOSIS — Z90.49 ACQUIRED ABSENCE OF OTHER SPECIFIED PARTS OF DIGESTIVE TRACT: Chronic | ICD-10-CM

## 2025-03-25 LAB
GLUCOSE BLDC GLUCOMTR-MCNC: 126 MG/DL — HIGH (ref 70–99)
GLUCOSE BLDC GLUCOMTR-MCNC: 134 MG/DL — HIGH (ref 70–99)

## 2025-03-25 PROCEDURE — 82962 GLUCOSE BLOOD TEST: CPT

## 2025-03-25 PROCEDURE — G0277: CPT

## 2025-03-25 PROCEDURE — 99183 HYPERBARIC OXYGEN THERAPY: CPT

## 2025-03-26 ENCOUNTER — APPOINTMENT (OUTPATIENT)
Dept: HYPERBARIC MEDICINE | Facility: HOSPITAL | Age: 60
End: 2025-03-26
Payer: MEDICARE

## 2025-03-26 ENCOUNTER — OUTPATIENT (OUTPATIENT)
Dept: OUTPATIENT SERVICES | Facility: HOSPITAL | Age: 60
LOS: 1 days | Discharge: HOME | End: 2025-03-26
Payer: MEDICARE

## 2025-03-26 VITALS
SYSTOLIC BLOOD PRESSURE: 128 MMHG | DIASTOLIC BLOOD PRESSURE: 80 MMHG | HEART RATE: 75 BPM | TEMPERATURE: 98.4 F | RESPIRATION RATE: 18 BRPM | OXYGEN SATURATION: 99 %

## 2025-03-26 VITALS
DIASTOLIC BLOOD PRESSURE: 79 MMHG | OXYGEN SATURATION: 98 % | TEMPERATURE: 98.3 F | RESPIRATION RATE: 18 BRPM | HEART RATE: 76 BPM | SYSTOLIC BLOOD PRESSURE: 132 MMHG

## 2025-03-26 DIAGNOSIS — E11.59 TYPE 2 DIABETES MELLITUS WITH OTHER CIRCULATORY COMPLICATIONS: ICD-10-CM
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
## 2025-03-26 DIAGNOSIS — L59.8 OTHER SPECIFIED DISORDERS OF THE SKIN AND SUBCUTANEOUS TISSUE RELATED TO RADIATION: ICD-10-CM

## 2025-03-26 DIAGNOSIS — Z85.46 PERSONAL HISTORY OF MALIGNANT NEOPLASM OF PROSTATE: ICD-10-CM

## 2025-03-26 DIAGNOSIS — Z98.89 OTHER SPECIFIED POSTPROCEDURAL STATES: Chronic | ICD-10-CM

## 2025-03-26 DIAGNOSIS — Z90.49 ACQUIRED ABSENCE OF OTHER SPECIFIED PARTS OF DIGESTIVE TRACT: Chronic | ICD-10-CM
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
## 2025-03-26 LAB
GLUCOSE BLDC GLUCOMTR-MCNC: 114 MG/DL — HIGH (ref 70–99)
GLUCOSE BLDC GLUCOMTR-MCNC: 130 MG/DL — HIGH (ref 70–99)
GLUCOSE BLDC GLUCOMTR-MCNC: 179 MG/DL — HIGH (ref 70–99)

## 2025-03-26 PROCEDURE — G0277: CPT

## 2025-03-26 PROCEDURE — 99183 HYPERBARIC OXYGEN THERAPY: CPT

## 2025-03-26 PROCEDURE — 82962 GLUCOSE BLOOD TEST: CPT

## 2025-03-27 ENCOUNTER — APPOINTMENT (OUTPATIENT)
Dept: HYPERBARIC MEDICINE | Facility: HOSPITAL | Age: 60
End: 2025-03-27
Payer: MEDICARE

## 2025-03-27 ENCOUNTER — OUTPATIENT (OUTPATIENT)
Dept: OUTPATIENT SERVICES | Facility: HOSPITAL | Age: 60
LOS: 1 days | Discharge: HOME | End: 2025-03-27
Payer: MEDICARE

## 2025-03-27 VITALS
RESPIRATION RATE: 18 BRPM | TEMPERATURE: 98.1 F | HEART RATE: 75 BPM | SYSTOLIC BLOOD PRESSURE: 126 MMHG | DIASTOLIC BLOOD PRESSURE: 21 MMHG | OXYGEN SATURATION: 97 %

## 2025-03-27 VITALS
DIASTOLIC BLOOD PRESSURE: 52 MMHG | HEART RATE: 82 BPM | RESPIRATION RATE: 18 BRPM | OXYGEN SATURATION: 94 % | SYSTOLIC BLOOD PRESSURE: 100 MMHG | TEMPERATURE: 98.1 F

## 2025-03-27 DIAGNOSIS — S92.009A UNSPECIFIED FRACTURE OF UNSPECIFIED CALCANEUS, INITIAL ENCOUNTER FOR CLOSED FRACTURE: Chronic | ICD-10-CM

## 2025-03-27 DIAGNOSIS — Z90.49 ACQUIRED ABSENCE OF OTHER SPECIFIED PARTS OF DIGESTIVE TRACT: Chronic | ICD-10-CM

## 2025-03-27 DIAGNOSIS — L59.8 OTHER SPECIFIED DISORDERS OF THE SKIN AND SUBCUTANEOUS TISSUE RELATED TO RADIATION: ICD-10-CM

## 2025-03-27 DIAGNOSIS — Z98.89 OTHER SPECIFIED POSTPROCEDURAL STATES: Chronic | ICD-10-CM

## 2025-03-27 DIAGNOSIS — Z87.898 PERSONAL HISTORY OF OTHER SPECIFIED CONDITIONS: Chronic | ICD-10-CM

## 2025-03-27 PROBLEM — E11.59 CONTROLLED DIABETES MELLITUS WITH CIRCULATORY COMPLICATION: Status: ACTIVE | Noted: 2025-03-21

## 2025-03-27 PROCEDURE — 82962 GLUCOSE BLOOD TEST: CPT

## 2025-03-27 PROCEDURE — 99183 HYPERBARIC OXYGEN THERAPY: CPT

## 2025-03-27 PROCEDURE — G0277: CPT

## 2025-03-28 ENCOUNTER — OUTPATIENT (OUTPATIENT)
Dept: OUTPATIENT SERVICES | Facility: HOSPITAL | Age: 60
LOS: 1 days | Discharge: HOME | End: 2025-03-28
Payer: MEDICARE

## 2025-03-28 ENCOUNTER — APPOINTMENT (OUTPATIENT)
Dept: HYPERBARIC MEDICINE | Facility: HOSPITAL | Age: 60
End: 2025-03-28
Payer: MEDICARE

## 2025-03-28 VITALS
RESPIRATION RATE: 15 BRPM | SYSTOLIC BLOOD PRESSURE: 136 MMHG | DIASTOLIC BLOOD PRESSURE: 80 MMHG | HEART RATE: 73 BPM | OXYGEN SATURATION: 99 % | TEMPERATURE: 98.4 F

## 2025-03-28 VITALS
OXYGEN SATURATION: 98 % | HEART RATE: 87 BPM | RESPIRATION RATE: 18 BRPM | DIASTOLIC BLOOD PRESSURE: 68 MMHG | TEMPERATURE: 98.2 F | SYSTOLIC BLOOD PRESSURE: 111 MMHG

## 2025-03-28 DIAGNOSIS — Z90.49 ACQUIRED ABSENCE OF OTHER SPECIFIED PARTS OF DIGESTIVE TRACT: Chronic | ICD-10-CM
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
## 2025-03-28 DIAGNOSIS — E11.9 TYPE 2 DIABETES MELLITUS WITHOUT COMPLICATIONS: ICD-10-CM

## 2025-03-28 DIAGNOSIS — K50.90 CROHN'S DISEASE, UNSPECIFIED, WITHOUT COMPLICATIONS: ICD-10-CM

## 2025-03-28 DIAGNOSIS — J45.50 SEVERE PERSISTENT ASTHMA, UNCOMPLICATED: ICD-10-CM

## 2025-03-28 DIAGNOSIS — L59.8 OTHER SPECIFIED DISORDERS OF THE SKIN AND SUBCUTANEOUS TISSUE RELATED TO RADIATION: ICD-10-CM

## 2025-03-28 DIAGNOSIS — Z79.51 LONG TERM (CURRENT) USE OF INHALED STEROIDS: ICD-10-CM

## 2025-03-28 DIAGNOSIS — Z87.898 PERSONAL HISTORY OF OTHER SPECIFIED CONDITIONS: Chronic | ICD-10-CM

## 2025-03-28 DIAGNOSIS — K21.9 GASTRO-ESOPHAGEAL REFLUX DISEASE WITHOUT ESOPHAGITIS: ICD-10-CM

## 2025-03-28 DIAGNOSIS — Z85.46 PERSONAL HISTORY OF MALIGNANT NEOPLASM OF PROSTATE: ICD-10-CM

## 2025-03-28 DIAGNOSIS — Z80.3 FAMILY HISTORY OF MALIGNANT NEOPLASM OF BREAST: ICD-10-CM

## 2025-03-28 DIAGNOSIS — Z84.0 FAMILY HISTORY OF DISEASES OF THE SKIN AND SUBCUTANEOUS TISSUE: ICD-10-CM

## 2025-03-28 DIAGNOSIS — Z98.890 OTHER SPECIFIED POSTPROCEDURAL STATES: ICD-10-CM

## 2025-03-28 DIAGNOSIS — E11.59 TYPE 2 DIABETES MELLITUS WITH OTHER CIRCULATORY COMPLICATIONS: ICD-10-CM

## 2025-03-28 DIAGNOSIS — G47.33 OBSTRUCTIVE SLEEP APNEA (ADULT) (PEDIATRIC): ICD-10-CM

## 2025-03-28 DIAGNOSIS — Z80.42 FAMILY HISTORY OF MALIGNANT NEOPLASM OF PROSTATE: ICD-10-CM

## 2025-03-28 DIAGNOSIS — Z83.79 FAMILY HISTORY OF OTHER DISEASES OF THE DIGESTIVE SYSTEM: ICD-10-CM

## 2025-03-28 DIAGNOSIS — I10 ESSENTIAL (PRIMARY) HYPERTENSION: ICD-10-CM

## 2025-03-28 DIAGNOSIS — Z86.19 PERSONAL HISTORY OF OTHER INFECTIOUS AND PARASITIC DISEASES: ICD-10-CM
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
## 2025-03-28 DIAGNOSIS — Z79.899 OTHER LONG TERM (CURRENT) DRUG THERAPY: ICD-10-CM

## 2025-03-28 DIAGNOSIS — Z98.89 OTHER SPECIFIED POSTPROCEDURAL STATES: Chronic | ICD-10-CM

## 2025-03-28 DIAGNOSIS — S92.009A UNSPECIFIED FRACTURE OF UNSPECIFIED CALCANEUS, INITIAL ENCOUNTER FOR CLOSED FRACTURE: Chronic | ICD-10-CM

## 2025-03-28 DIAGNOSIS — Z82.49 FAMILY HISTORY OF ISCHEMIC HEART DISEASE AND OTHER DISEASES OF THE CIRCULATORY SYSTEM: ICD-10-CM

## 2025-03-28 DIAGNOSIS — Z90.49 ACQUIRED ABSENCE OF OTHER SPECIFIED PARTS OF DIGESTIVE TRACT: ICD-10-CM

## 2025-03-28 DIAGNOSIS — K60.522 ANORECTAL FISTULA, COMPLEX, PERSISTENT: ICD-10-CM

## 2025-03-28 DIAGNOSIS — I25.10 ATHEROSCLEROTIC HEART DISEASE OF NATIVE CORONARY ARTERY WITHOUT ANGINA PECTORIS: ICD-10-CM

## 2025-03-28 PROCEDURE — 82962 GLUCOSE BLOOD TEST: CPT

## 2025-03-28 PROCEDURE — 99212 OFFICE O/P EST SF 10 MIN: CPT | Mod: 25

## 2025-03-28 PROCEDURE — 99183 HYPERBARIC OXYGEN THERAPY: CPT

## 2025-03-28 PROCEDURE — G0277: CPT

## 2025-03-31 ENCOUNTER — APPOINTMENT (OUTPATIENT)
Dept: HYPERBARIC MEDICINE | Facility: HOSPITAL | Age: 60
End: 2025-03-31
Payer: MEDICARE

## 2025-03-31 ENCOUNTER — OUTPATIENT (OUTPATIENT)
Dept: OUTPATIENT SERVICES | Facility: HOSPITAL | Age: 60
LOS: 1 days | Discharge: HOME | End: 2025-03-31
Payer: MEDICARE

## 2025-03-31 VITALS
OXYGEN SATURATION: 98 % | RESPIRATION RATE: 16 BRPM | TEMPERATURE: 98.1 F | HEART RATE: 71 BPM | SYSTOLIC BLOOD PRESSURE: 121 MMHG | DIASTOLIC BLOOD PRESSURE: 73 MMHG

## 2025-03-31 VITALS
HEART RATE: 86 BPM | DIASTOLIC BLOOD PRESSURE: 68 MMHG | TEMPERATURE: 98 F | SYSTOLIC BLOOD PRESSURE: 115 MMHG | OXYGEN SATURATION: 95 % | RESPIRATION RATE: 16 BRPM

## 2025-03-31 DIAGNOSIS — S92.009A UNSPECIFIED FRACTURE OF UNSPECIFIED CALCANEUS, INITIAL ENCOUNTER FOR CLOSED FRACTURE: Chronic | ICD-10-CM
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
## 2025-03-31 DIAGNOSIS — L59.8 OTHER SPECIFIED DISORDERS OF THE SKIN AND SUBCUTANEOUS TISSUE RELATED TO RADIATION: ICD-10-CM

## 2025-03-31 DIAGNOSIS — E11.59 TYPE 2 DIABETES MELLITUS WITH OTHER CIRCULATORY COMPLICATIONS: ICD-10-CM

## 2025-03-31 DIAGNOSIS — Z90.49 ACQUIRED ABSENCE OF OTHER SPECIFIED PARTS OF DIGESTIVE TRACT: Chronic | ICD-10-CM

## 2025-03-31 DIAGNOSIS — Z98.89 OTHER SPECIFIED POSTPROCEDURAL STATES: Chronic | ICD-10-CM

## 2025-03-31 LAB
GLUCOSE BLDC GLUCOMTR-MCNC: 123 MG/DL — HIGH (ref 70–99)
GLUCOSE BLDC GLUCOMTR-MCNC: 169 MG/DL — HIGH (ref 70–99)

## 2025-03-31 PROCEDURE — 99183 HYPERBARIC OXYGEN THERAPY: CPT

## 2025-03-31 PROCEDURE — G0277: CPT

## 2025-03-31 PROCEDURE — 82962 GLUCOSE BLOOD TEST: CPT

## 2025-04-01 ENCOUNTER — OUTPATIENT (OUTPATIENT)
Dept: OUTPATIENT SERVICES | Facility: HOSPITAL | Age: 60
LOS: 1 days | Discharge: HOME | End: 2025-04-01
Payer: MEDICARE

## 2025-04-01 ENCOUNTER — APPOINTMENT (OUTPATIENT)
Dept: HYPERBARIC MEDICINE | Facility: HOSPITAL | Age: 60
End: 2025-04-01
Payer: MEDICARE

## 2025-04-01 VITALS
DIASTOLIC BLOOD PRESSURE: 75 MMHG | HEART RATE: 84 BPM | OXYGEN SATURATION: 97 % | RESPIRATION RATE: 11 BRPM | TEMPERATURE: 98.3 F | SYSTOLIC BLOOD PRESSURE: 123 MMHG

## 2025-04-01 VITALS
HEART RATE: 76 BPM | SYSTOLIC BLOOD PRESSURE: 112 MMHG | DIASTOLIC BLOOD PRESSURE: 66 MMHG | TEMPERATURE: 98.3 F | OXYGEN SATURATION: 99 % | RESPIRATION RATE: 16 BRPM

## 2025-04-01 DIAGNOSIS — Z87.898 PERSONAL HISTORY OF OTHER SPECIFIED CONDITIONS: Chronic | ICD-10-CM
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
## 2025-04-01 DIAGNOSIS — Z90.49 ACQUIRED ABSENCE OF OTHER SPECIFIED PARTS OF DIGESTIVE TRACT: Chronic | ICD-10-CM

## 2025-04-01 DIAGNOSIS — Z85.46 PERSONAL HISTORY OF MALIGNANT NEOPLASM OF PROSTATE: ICD-10-CM

## 2025-04-01 DIAGNOSIS — L59.8 OTHER SPECIFIED DISORDERS OF THE SKIN AND SUBCUTANEOUS TISSUE RELATED TO RADIATION: ICD-10-CM
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
## 2025-04-01 DIAGNOSIS — E11.59 TYPE 2 DIABETES MELLITUS WITH OTHER CIRCULATORY COMPLICATIONS: ICD-10-CM

## 2025-04-01 DIAGNOSIS — S92.009A UNSPECIFIED FRACTURE OF UNSPECIFIED CALCANEUS, INITIAL ENCOUNTER FOR CLOSED FRACTURE: Chronic | ICD-10-CM

## 2025-04-01 DIAGNOSIS — Z98.89 OTHER SPECIFIED POSTPROCEDURAL STATES: Chronic | ICD-10-CM

## 2025-04-01 LAB
GLUCOSE BLDC GLUCOMTR-MCNC: 144 MG/DL — HIGH (ref 70–99)
GLUCOSE BLDC GLUCOMTR-MCNC: 160 MG/DL — HIGH (ref 70–99)

## 2025-04-01 PROCEDURE — G0277: CPT

## 2025-04-01 PROCEDURE — 99183 HYPERBARIC OXYGEN THERAPY: CPT

## 2025-04-01 PROCEDURE — 82962 GLUCOSE BLOOD TEST: CPT

## 2025-04-02 ENCOUNTER — OUTPATIENT (OUTPATIENT)
Dept: OUTPATIENT SERVICES | Facility: HOSPITAL | Age: 60
LOS: 1 days | Discharge: HOME | End: 2025-04-02
Payer: MEDICARE

## 2025-04-02 ENCOUNTER — APPOINTMENT (OUTPATIENT)
Dept: HYPERBARIC MEDICINE | Facility: HOSPITAL | Age: 60
End: 2025-04-02
Payer: MEDICARE

## 2025-04-02 VITALS
SYSTOLIC BLOOD PRESSURE: 119 MMHG | TEMPERATURE: 98.8 F | DIASTOLIC BLOOD PRESSURE: 75 MMHG | RESPIRATION RATE: 16 BRPM | OXYGEN SATURATION: 99 % | HEART RATE: 82 BPM

## 2025-04-02 VITALS
HEART RATE: 74 BPM | RESPIRATION RATE: 16 BRPM | TEMPERATURE: 98.7 F | OXYGEN SATURATION: 98 % | SYSTOLIC BLOOD PRESSURE: 123 MMHG | DIASTOLIC BLOOD PRESSURE: 72 MMHG
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
## 2025-04-02 DIAGNOSIS — E11.59 TYPE 2 DIABETES MELLITUS WITH OTHER CIRCULATORY COMPLICATIONS: ICD-10-CM
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
## 2025-04-02 DIAGNOSIS — L59.8 OTHER SPECIFIED DISORDERS OF THE SKIN AND SUBCUTANEOUS TISSUE RELATED TO RADIATION: ICD-10-CM

## 2025-04-02 DIAGNOSIS — Z87.898 PERSONAL HISTORY OF OTHER SPECIFIED CONDITIONS: Chronic | ICD-10-CM

## 2025-04-02 DIAGNOSIS — Z90.49 ACQUIRED ABSENCE OF OTHER SPECIFIED PARTS OF DIGESTIVE TRACT: Chronic | ICD-10-CM

## 2025-04-02 DIAGNOSIS — Z98.89 OTHER SPECIFIED POSTPROCEDURAL STATES: Chronic | ICD-10-CM

## 2025-04-02 PROCEDURE — 82962 GLUCOSE BLOOD TEST: CPT

## 2025-04-02 PROCEDURE — G0277: CPT

## 2025-04-02 PROCEDURE — 99183 HYPERBARIC OXYGEN THERAPY: CPT

## 2025-04-03 ENCOUNTER — OUTPATIENT (OUTPATIENT)
Dept: OUTPATIENT SERVICES | Facility: HOSPITAL | Age: 60
LOS: 1 days | Discharge: HOME | End: 2025-04-03
Payer: MEDICARE

## 2025-04-03 ENCOUNTER — APPOINTMENT (OUTPATIENT)
Dept: HYPERBARIC MEDICINE | Facility: HOSPITAL | Age: 60
End: 2025-04-03
Payer: MEDICARE

## 2025-04-03 VITALS
HEART RATE: 70 BPM | RESPIRATION RATE: 16 BRPM | DIASTOLIC BLOOD PRESSURE: 80 MMHG | SYSTOLIC BLOOD PRESSURE: 144 MMHG | OXYGEN SATURATION: 99 % | TEMPERATURE: 98.6 F

## 2025-04-03 VITALS
DIASTOLIC BLOOD PRESSURE: 86 MMHG | RESPIRATION RATE: 16 BRPM | TEMPERATURE: 98 F | SYSTOLIC BLOOD PRESSURE: 142 MMHG | HEART RATE: 79 BPM | OXYGEN SATURATION: 97 %

## 2025-04-03 DIAGNOSIS — E11.59 TYPE 2 DIABETES MELLITUS WITH OTHER CIRCULATORY COMPLICATIONS: ICD-10-CM

## 2025-04-03 DIAGNOSIS — L59.8 OTHER SPECIFIED DISORDERS OF THE SKIN AND SUBCUTANEOUS TISSUE RELATED TO RADIATION: ICD-10-CM

## 2025-04-03 DIAGNOSIS — Z98.89 OTHER SPECIFIED POSTPROCEDURAL STATES: Chronic | ICD-10-CM

## 2025-04-03 DIAGNOSIS — S92.009A UNSPECIFIED FRACTURE OF UNSPECIFIED CALCANEUS, INITIAL ENCOUNTER FOR CLOSED FRACTURE: Chronic | ICD-10-CM

## 2025-04-03 DIAGNOSIS — Z90.49 ACQUIRED ABSENCE OF OTHER SPECIFIED PARTS OF DIGESTIVE TRACT: Chronic | ICD-10-CM
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
## 2025-04-03 DIAGNOSIS — Z87.898 PERSONAL HISTORY OF OTHER SPECIFIED CONDITIONS: Chronic | ICD-10-CM
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
## 2025-04-03 LAB
GLUCOSE BLDC GLUCOMTR-MCNC: 142 MG/DL — HIGH (ref 70–99)
GLUCOSE BLDC GLUCOMTR-MCNC: 161 MG/DL — HIGH (ref 70–99)

## 2025-04-03 PROCEDURE — 99183 HYPERBARIC OXYGEN THERAPY: CPT

## 2025-04-03 PROCEDURE — G0277: CPT

## 2025-04-03 PROCEDURE — 82962 GLUCOSE BLOOD TEST: CPT

## 2025-04-04 ENCOUNTER — OUTPATIENT (OUTPATIENT)
Dept: OUTPATIENT SERVICES | Facility: HOSPITAL | Age: 60
LOS: 1 days | End: 2025-04-04
Payer: MEDICARE

## 2025-04-04 ENCOUNTER — APPOINTMENT (OUTPATIENT)
Dept: HYPERBARIC MEDICINE | Facility: HOSPITAL | Age: 60
End: 2025-04-04

## 2025-04-04 VITALS
HEART RATE: 80 BPM | RESPIRATION RATE: 16 BRPM | DIASTOLIC BLOOD PRESSURE: 70 MMHG | OXYGEN SATURATION: 98 % | SYSTOLIC BLOOD PRESSURE: 134 MMHG | TEMPERATURE: 98.3 F

## 2025-04-04 VITALS
DIASTOLIC BLOOD PRESSURE: 80 MMHG | RESPIRATION RATE: 16 BRPM | SYSTOLIC BLOOD PRESSURE: 122 MMHG | HEART RATE: 73 BPM | TEMPERATURE: 98.6 F | OXYGEN SATURATION: 98 %

## 2025-04-04 DIAGNOSIS — Z90.49 ACQUIRED ABSENCE OF OTHER SPECIFIED PARTS OF DIGESTIVE TRACT: Chronic | ICD-10-CM

## 2025-04-04 DIAGNOSIS — Z87.898 PERSONAL HISTORY OF OTHER SPECIFIED CONDITIONS: Chronic | ICD-10-CM

## 2025-04-04 DIAGNOSIS — L59.8 OTHER SPECIFIED DISORDERS OF THE SKIN AND SUBCUTANEOUS TISSUE RELATED TO RADIATION: ICD-10-CM

## 2025-04-04 DIAGNOSIS — Z98.89 OTHER SPECIFIED POSTPROCEDURAL STATES: Chronic | ICD-10-CM

## 2025-04-04 DIAGNOSIS — S92.009A UNSPECIFIED FRACTURE OF UNSPECIFIED CALCANEUS, INITIAL ENCOUNTER FOR CLOSED FRACTURE: Chronic | ICD-10-CM

## 2025-04-04 LAB
GLUCOSE BLDC GLUCOMTR-MCNC: 117 MG/DL — HIGH (ref 70–99)
GLUCOSE BLDC GLUCOMTR-MCNC: 121 MG/DL — HIGH (ref 70–99)
GLUCOSE BLDC GLUCOMTR-MCNC: 155 MG/DL — HIGH (ref 70–99)

## 2025-04-04 PROCEDURE — 99183 HYPERBARIC OXYGEN THERAPY: CPT

## 2025-04-04 PROCEDURE — 82962 GLUCOSE BLOOD TEST: CPT

## 2025-04-04 PROCEDURE — G0277: CPT
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
## 2025-04-05 DIAGNOSIS — E11.59 TYPE 2 DIABETES MELLITUS WITH OTHER CIRCULATORY COMPLICATIONS: ICD-10-CM

## 2025-04-05 DIAGNOSIS — L59.8 OTHER SPECIFIED DISORDERS OF THE SKIN AND SUBCUTANEOUS TISSUE RELATED TO RADIATION: ICD-10-CM
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
## 2025-04-05 DIAGNOSIS — Z85.46 PERSONAL HISTORY OF MALIGNANT NEOPLASM OF PROSTATE: ICD-10-CM

## 2025-04-07 ENCOUNTER — APPOINTMENT (OUTPATIENT)
Dept: HYPERBARIC MEDICINE | Facility: HOSPITAL | Age: 60
End: 2025-04-07
Payer: MEDICARE

## 2025-04-07 ENCOUNTER — OUTPATIENT (OUTPATIENT)
Dept: OUTPATIENT SERVICES | Facility: HOSPITAL | Age: 60
LOS: 1 days | End: 2025-04-07
Payer: MEDICARE

## 2025-04-07 VITALS
TEMPERATURE: 98.4 F | SYSTOLIC BLOOD PRESSURE: 133 MMHG | OXYGEN SATURATION: 99 % | RESPIRATION RATE: 12 BRPM | DIASTOLIC BLOOD PRESSURE: 68 MMHG | HEART RATE: 73 BPM

## 2025-04-07 VITALS
TEMPERATURE: 98.4 F | SYSTOLIC BLOOD PRESSURE: 122 MMHG | OXYGEN SATURATION: 99 % | RESPIRATION RATE: 14 BRPM | DIASTOLIC BLOOD PRESSURE: 76 MMHG | HEART RATE: 70 BPM

## 2025-04-07 DIAGNOSIS — E11.59 TYPE 2 DIABETES MELLITUS WITH OTHER CIRCULATORY COMPLICATIONS: ICD-10-CM

## 2025-04-07 DIAGNOSIS — L59.8 OTHER SPECIFIED DISORDERS OF THE SKIN AND SUBCUTANEOUS TISSUE RELATED TO RADIATION: ICD-10-CM

## 2025-04-07 DIAGNOSIS — Z98.89 OTHER SPECIFIED POSTPROCEDURAL STATES: Chronic | ICD-10-CM
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
## 2025-04-07 DIAGNOSIS — Z90.49 ACQUIRED ABSENCE OF OTHER SPECIFIED PARTS OF DIGESTIVE TRACT: Chronic | ICD-10-CM
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
## 2025-04-07 DIAGNOSIS — Z87.898 PERSONAL HISTORY OF OTHER SPECIFIED CONDITIONS: Chronic | ICD-10-CM

## 2025-04-07 LAB
GLUCOSE BLDC GLUCOMTR-MCNC: 115 MG/DL — HIGH (ref 70–99)
GLUCOSE BLDC GLUCOMTR-MCNC: 146 MG/DL — HIGH (ref 70–99)

## 2025-04-07 PROCEDURE — G0277: CPT

## 2025-04-07 PROCEDURE — 99183 HYPERBARIC OXYGEN THERAPY: CPT

## 2025-04-07 PROCEDURE — 82962 GLUCOSE BLOOD TEST: CPT

## 2025-04-08 ENCOUNTER — APPOINTMENT (OUTPATIENT)
Dept: HYPERBARIC MEDICINE | Facility: HOSPITAL | Age: 60
End: 2025-04-08
Payer: MEDICARE

## 2025-04-08 ENCOUNTER — OUTPATIENT (OUTPATIENT)
Dept: OUTPATIENT SERVICES | Facility: HOSPITAL | Age: 60
LOS: 1 days | End: 2025-04-08
Payer: MEDICARE

## 2025-04-08 VITALS
TEMPERATURE: 98.4 F | HEART RATE: 70 BPM | DIASTOLIC BLOOD PRESSURE: 81 MMHG | RESPIRATION RATE: 16 BRPM | SYSTOLIC BLOOD PRESSURE: 136 MMHG | OXYGEN SATURATION: 98 %

## 2025-04-08 VITALS
OXYGEN SATURATION: 100 % | HEART RATE: 80 BPM | DIASTOLIC BLOOD PRESSURE: 72 MMHG | SYSTOLIC BLOOD PRESSURE: 129 MMHG | RESPIRATION RATE: 16 BRPM | TEMPERATURE: 98.2 F
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
## 2025-04-08 DIAGNOSIS — S92.009A UNSPECIFIED FRACTURE OF UNSPECIFIED CALCANEUS, INITIAL ENCOUNTER FOR CLOSED FRACTURE: Chronic | ICD-10-CM

## 2025-04-08 DIAGNOSIS — E11.59 TYPE 2 DIABETES MELLITUS WITH OTHER CIRCULATORY COMPLICATIONS: ICD-10-CM

## 2025-04-08 DIAGNOSIS — Z98.89 OTHER SPECIFIED POSTPROCEDURAL STATES: Chronic | ICD-10-CM

## 2025-04-08 DIAGNOSIS — Z85.46 PERSONAL HISTORY OF MALIGNANT NEOPLASM OF PROSTATE: ICD-10-CM
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
## 2025-04-08 DIAGNOSIS — Z87.898 PERSONAL HISTORY OF OTHER SPECIFIED CONDITIONS: Chronic | ICD-10-CM

## 2025-04-08 DIAGNOSIS — L59.8 OTHER SPECIFIED DISORDERS OF THE SKIN AND SUBCUTANEOUS TISSUE RELATED TO RADIATION: ICD-10-CM

## 2025-04-08 DIAGNOSIS — Z90.49 ACQUIRED ABSENCE OF OTHER SPECIFIED PARTS OF DIGESTIVE TRACT: Chronic | ICD-10-CM

## 2025-04-08 LAB — GLUCOSE BLDC GLUCOMTR-MCNC: 125 MG/DL — HIGH (ref 70–99)

## 2025-04-08 PROCEDURE — 82962 GLUCOSE BLOOD TEST: CPT

## 2025-04-08 PROCEDURE — 99183 HYPERBARIC OXYGEN THERAPY: CPT

## 2025-04-08 PROCEDURE — G0277: CPT

## 2025-04-09 ENCOUNTER — OUTPATIENT (OUTPATIENT)
Dept: OUTPATIENT SERVICES | Facility: HOSPITAL | Age: 60
LOS: 1 days | End: 2025-04-09
Payer: MEDICARE

## 2025-04-09 ENCOUNTER — APPOINTMENT (OUTPATIENT)
Dept: HYPERBARIC MEDICINE | Facility: HOSPITAL | Age: 60
End: 2025-04-09
Payer: MEDICARE

## 2025-04-09 VITALS
TEMPERATURE: 98.5 F | OXYGEN SATURATION: 99 % | SYSTOLIC BLOOD PRESSURE: 138 MMHG | RESPIRATION RATE: 16 BRPM | HEART RATE: 74 BPM | DIASTOLIC BLOOD PRESSURE: 81 MMHG

## 2025-04-09 VITALS
TEMPERATURE: 98.6 F | DIASTOLIC BLOOD PRESSURE: 87 MMHG | SYSTOLIC BLOOD PRESSURE: 137 MMHG | RESPIRATION RATE: 16 BRPM | HEART RATE: 73 BPM | OXYGEN SATURATION: 100 %

## 2025-04-09 DIAGNOSIS — Z98.89 OTHER SPECIFIED POSTPROCEDURAL STATES: Chronic | ICD-10-CM

## 2025-04-09 DIAGNOSIS — L59.8 OTHER SPECIFIED DISORDERS OF THE SKIN AND SUBCUTANEOUS TISSUE RELATED TO RADIATION: ICD-10-CM

## 2025-04-09 LAB
GLUCOSE BLDC GLUCOMTR-MCNC: 114 MG/DL — HIGH (ref 70–99)
GLUCOSE BLDC GLUCOMTR-MCNC: 122 MG/DL — HIGH (ref 70–99)
GLUCOSE BLDC GLUCOMTR-MCNC: 129 MG/DL — HIGH (ref 70–99)

## 2025-04-09 PROCEDURE — 82962 GLUCOSE BLOOD TEST: CPT

## 2025-04-09 PROCEDURE — G0277: CPT

## 2025-04-09 PROCEDURE — 99183 HYPERBARIC OXYGEN THERAPY: CPT

## 2025-04-10 ENCOUNTER — OUTPATIENT (OUTPATIENT)
Dept: OUTPATIENT SERVICES | Facility: HOSPITAL | Age: 60
LOS: 1 days | End: 2025-04-10
Payer: MEDICARE

## 2025-04-10 ENCOUNTER — APPOINTMENT (OUTPATIENT)
Dept: HYPERBARIC MEDICINE | Facility: HOSPITAL | Age: 60
End: 2025-04-10
Payer: MEDICARE

## 2025-04-10 ENCOUNTER — NON-APPOINTMENT (OUTPATIENT)
Age: 60
End: 2025-04-10

## 2025-04-10 VITALS
SYSTOLIC BLOOD PRESSURE: 135 MMHG | TEMPERATURE: 98.3 F | DIASTOLIC BLOOD PRESSURE: 83 MMHG | OXYGEN SATURATION: 99 % | HEART RATE: 74 BPM | RESPIRATION RATE: 16 BRPM

## 2025-04-10 VITALS
TEMPERATURE: 98.5 F | SYSTOLIC BLOOD PRESSURE: 123 MMHG | OXYGEN SATURATION: 98 % | DIASTOLIC BLOOD PRESSURE: 58 MMHG | HEART RATE: 71 BPM | RESPIRATION RATE: 16 BRPM

## 2025-04-10 DIAGNOSIS — L59.8 OTHER SPECIFIED DISORDERS OF THE SKIN AND SUBCUTANEOUS TISSUE RELATED TO RADIATION: ICD-10-CM

## 2025-04-10 DIAGNOSIS — S92.009A UNSPECIFIED FRACTURE OF UNSPECIFIED CALCANEUS, INITIAL ENCOUNTER FOR CLOSED FRACTURE: Chronic | ICD-10-CM
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
## 2025-04-10 DIAGNOSIS — E11.59 TYPE 2 DIABETES MELLITUS WITH OTHER CIRCULATORY COMPLICATIONS: ICD-10-CM
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
## 2025-04-10 LAB
GLUCOSE BLDC GLUCOMTR-MCNC: 116 MG/DL — HIGH (ref 70–99)
GLUCOSE BLDC GLUCOMTR-MCNC: 140 MG/DL — HIGH (ref 70–99)

## 2025-04-10 PROCEDURE — 99183 HYPERBARIC OXYGEN THERAPY: CPT

## 2025-04-10 PROCEDURE — 82962 GLUCOSE BLOOD TEST: CPT

## 2025-04-10 PROCEDURE — G0277: CPT

## 2025-04-11 ENCOUNTER — APPOINTMENT (OUTPATIENT)
Dept: HYPERBARIC MEDICINE | Facility: HOSPITAL | Age: 60
End: 2025-04-11
Payer: MEDICARE

## 2025-04-11 ENCOUNTER — OUTPATIENT (OUTPATIENT)
Dept: OUTPATIENT SERVICES | Facility: HOSPITAL | Age: 60
LOS: 1 days | End: 2025-04-11
Payer: MEDICARE

## 2025-04-11 VITALS
SYSTOLIC BLOOD PRESSURE: 132 MMHG | DIASTOLIC BLOOD PRESSURE: 74 MMHG | OXYGEN SATURATION: 100 % | RESPIRATION RATE: 20 BRPM | HEART RATE: 70 BPM | TEMPERATURE: 98.2 F

## 2025-04-11 VITALS
SYSTOLIC BLOOD PRESSURE: 130 MMHG | HEART RATE: 67 BPM | TEMPERATURE: 98.1 F | DIASTOLIC BLOOD PRESSURE: 80 MMHG | OXYGEN SATURATION: 100 % | RESPIRATION RATE: 12 BRPM

## 2025-04-11 DIAGNOSIS — Z90.49 ACQUIRED ABSENCE OF OTHER SPECIFIED PARTS OF DIGESTIVE TRACT: Chronic | ICD-10-CM

## 2025-04-11 DIAGNOSIS — Z98.89 OTHER SPECIFIED POSTPROCEDURAL STATES: Chronic | ICD-10-CM

## 2025-04-11 DIAGNOSIS — S92.009A UNSPECIFIED FRACTURE OF UNSPECIFIED CALCANEUS, INITIAL ENCOUNTER FOR CLOSED FRACTURE: Chronic | ICD-10-CM

## 2025-04-11 DIAGNOSIS — L59.8 OTHER SPECIFIED DISORDERS OF THE SKIN AND SUBCUTANEOUS TISSUE RELATED TO RADIATION: ICD-10-CM

## 2025-04-11 PROCEDURE — 82962 GLUCOSE BLOOD TEST: CPT

## 2025-04-11 PROCEDURE — 99183 HYPERBARIC OXYGEN THERAPY: CPT

## 2025-04-11 PROCEDURE — G0277: CPT

## 2025-04-13 DIAGNOSIS — L59.8 OTHER SPECIFIED DISORDERS OF THE SKIN AND SUBCUTANEOUS TISSUE RELATED TO RADIATION: ICD-10-CM
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
## 2025-04-13 DIAGNOSIS — E11.59 TYPE 2 DIABETES MELLITUS WITH OTHER CIRCULATORY COMPLICATIONS: ICD-10-CM
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
## 2025-04-13 DIAGNOSIS — Z85.46 PERSONAL HISTORY OF MALIGNANT NEOPLASM OF PROSTATE: ICD-10-CM

## 2025-04-14 ENCOUNTER — OUTPATIENT (OUTPATIENT)
Dept: OUTPATIENT SERVICES | Facility: HOSPITAL | Age: 60
LOS: 1 days | End: 2025-04-14
Payer: MEDICARE

## 2025-04-14 ENCOUNTER — APPOINTMENT (OUTPATIENT)
Dept: HYPERBARIC MEDICINE | Facility: HOSPITAL | Age: 60
End: 2025-04-14
Payer: MEDICARE

## 2025-04-14 VITALS
HEART RATE: 82 BPM | RESPIRATION RATE: 16 BRPM | TEMPERATURE: 98.6 F | SYSTOLIC BLOOD PRESSURE: 151 MMHG | DIASTOLIC BLOOD PRESSURE: 88 MMHG | OXYGEN SATURATION: 98 %

## 2025-04-14 VITALS
SYSTOLIC BLOOD PRESSURE: 129 MMHG | TEMPERATURE: 98.2 F | DIASTOLIC BLOOD PRESSURE: 74 MMHG | HEART RATE: 76 BPM | RESPIRATION RATE: 16 BRPM | OXYGEN SATURATION: 98 %

## 2025-04-14 DIAGNOSIS — L59.8 OTHER SPECIFIED DISORDERS OF THE SKIN AND SUBCUTANEOUS TISSUE RELATED TO RADIATION: ICD-10-CM

## 2025-04-14 DIAGNOSIS — E11.59 TYPE 2 DIABETES MELLITUS WITH OTHER CIRCULATORY COMPLICATIONS: ICD-10-CM

## 2025-04-14 DIAGNOSIS — Z90.49 ACQUIRED ABSENCE OF OTHER SPECIFIED PARTS OF DIGESTIVE TRACT: Chronic | ICD-10-CM

## 2025-04-14 DIAGNOSIS — Z98.89 OTHER SPECIFIED POSTPROCEDURAL STATES: Chronic | ICD-10-CM

## 2025-04-14 DIAGNOSIS — Z87.898 PERSONAL HISTORY OF OTHER SPECIFIED CONDITIONS: Chronic | ICD-10-CM

## 2025-04-14 DIAGNOSIS — Z85.46 PERSONAL HISTORY OF MALIGNANT NEOPLASM OF PROSTATE: ICD-10-CM
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
## 2025-04-14 DIAGNOSIS — S92.009A UNSPECIFIED FRACTURE OF UNSPECIFIED CALCANEUS, INITIAL ENCOUNTER FOR CLOSED FRACTURE: Chronic | ICD-10-CM

## 2025-04-14 LAB
GLUCOSE BLDC GLUCOMTR-MCNC: 126 MG/DL — HIGH (ref 70–99)
GLUCOSE BLDC GLUCOMTR-MCNC: 158 MG/DL — HIGH (ref 70–99)

## 2025-04-14 PROCEDURE — G0277: CPT

## 2025-04-14 PROCEDURE — 82962 GLUCOSE BLOOD TEST: CPT

## 2025-04-14 PROCEDURE — 99183 HYPERBARIC OXYGEN THERAPY: CPT

## 2025-04-15 ENCOUNTER — OUTPATIENT (OUTPATIENT)
Dept: OUTPATIENT SERVICES | Facility: HOSPITAL | Age: 60
LOS: 1 days | End: 2025-04-15
Payer: MEDICARE

## 2025-04-15 ENCOUNTER — APPOINTMENT (OUTPATIENT)
Dept: HYPERBARIC MEDICINE | Facility: HOSPITAL | Age: 60
End: 2025-04-15
Payer: MEDICARE

## 2025-04-15 VITALS
OXYGEN SATURATION: 95 % | RESPIRATION RATE: 20 BRPM | SYSTOLIC BLOOD PRESSURE: 101 MMHG | TEMPERATURE: 97.9 F | HEART RATE: 71 BPM | DIASTOLIC BLOOD PRESSURE: 56 MMHG

## 2025-04-15 VITALS
DIASTOLIC BLOOD PRESSURE: 88 MMHG | SYSTOLIC BLOOD PRESSURE: 149 MMHG | OXYGEN SATURATION: 99 % | RESPIRATION RATE: 20 BRPM | TEMPERATURE: 98.3 F | HEART RATE: 72 BPM

## 2025-04-15 DIAGNOSIS — I10 ESSENTIAL (PRIMARY) HYPERTENSION: ICD-10-CM

## 2025-04-15 DIAGNOSIS — Z98.890 OTHER SPECIFIED POSTPROCEDURAL STATES: ICD-10-CM

## 2025-04-15 DIAGNOSIS — Z80.3 FAMILY HISTORY OF MALIGNANT NEOPLASM OF BREAST: ICD-10-CM

## 2025-04-15 DIAGNOSIS — Z90.49 ACQUIRED ABSENCE OF OTHER SPECIFIED PARTS OF DIGESTIVE TRACT: Chronic | ICD-10-CM

## 2025-04-15 DIAGNOSIS — Z86.19 PERSONAL HISTORY OF OTHER INFECTIOUS AND PARASITIC DISEASES: ICD-10-CM

## 2025-04-15 DIAGNOSIS — Z82.49 FAMILY HISTORY OF ISCHEMIC HEART DISEASE AND OTHER DISEASES OF THE CIRCULATORY SYSTEM: ICD-10-CM

## 2025-04-15 DIAGNOSIS — Z79.51 LONG TERM (CURRENT) USE OF INHALED STEROIDS: ICD-10-CM

## 2025-04-15 DIAGNOSIS — J45.50 SEVERE PERSISTENT ASTHMA, UNCOMPLICATED: ICD-10-CM

## 2025-04-15 DIAGNOSIS — K21.9 GASTRO-ESOPHAGEAL REFLUX DISEASE WITHOUT ESOPHAGITIS: ICD-10-CM

## 2025-04-15 DIAGNOSIS — L59.8 OTHER SPECIFIED DISORDERS OF THE SKIN AND SUBCUTANEOUS TISSUE RELATED TO RADIATION: ICD-10-CM

## 2025-04-15 DIAGNOSIS — G47.33 OBSTRUCTIVE SLEEP APNEA (ADULT) (PEDIATRIC): ICD-10-CM

## 2025-04-15 DIAGNOSIS — I25.10 ATHEROSCLEROTIC HEART DISEASE OF NATIVE CORONARY ARTERY WITHOUT ANGINA PECTORIS: ICD-10-CM

## 2025-04-15 DIAGNOSIS — Z87.898 PERSONAL HISTORY OF OTHER SPECIFIED CONDITIONS: Chronic | ICD-10-CM

## 2025-04-15 DIAGNOSIS — Z79.899 OTHER LONG TERM (CURRENT) DRUG THERAPY: ICD-10-CM

## 2025-04-15 DIAGNOSIS — Z98.89 OTHER SPECIFIED POSTPROCEDURAL STATES: Chronic | ICD-10-CM

## 2025-04-15 DIAGNOSIS — Z83.79 FAMILY HISTORY OF OTHER DISEASES OF THE DIGESTIVE SYSTEM: ICD-10-CM

## 2025-04-15 DIAGNOSIS — Z84.0 FAMILY HISTORY OF DISEASES OF THE SKIN AND SUBCUTANEOUS TISSUE: ICD-10-CM

## 2025-04-15 DIAGNOSIS — Z80.42 FAMILY HISTORY OF MALIGNANT NEOPLASM OF PROSTATE: ICD-10-CM

## 2025-04-15 LAB
GLUCOSE BLDC GLUCOMTR-MCNC: 110 MG/DL — HIGH (ref 70–99)
GLUCOSE BLDC GLUCOMTR-MCNC: 189 MG/DL — HIGH (ref 70–99)
GLUCOSE BLDC GLUCOMTR-MCNC: 97 MG/DL — SIGNIFICANT CHANGE UP (ref 70–99)

## 2025-04-15 PROCEDURE — 82962 GLUCOSE BLOOD TEST: CPT

## 2025-04-15 PROCEDURE — 99183 HYPERBARIC OXYGEN THERAPY: CPT

## 2025-04-15 PROCEDURE — G0277: CPT

## 2025-04-16 ENCOUNTER — OUTPATIENT (OUTPATIENT)
Dept: OUTPATIENT SERVICES | Facility: HOSPITAL | Age: 60
LOS: 1 days | End: 2025-04-16
Payer: MEDICARE

## 2025-04-16 ENCOUNTER — APPOINTMENT (OUTPATIENT)
Dept: HYPERBARIC MEDICINE | Facility: HOSPITAL | Age: 60
End: 2025-04-16
Payer: MEDICARE

## 2025-04-16 ENCOUNTER — NON-APPOINTMENT (OUTPATIENT)
Age: 60
End: 2025-04-16

## 2025-04-16 VITALS
RESPIRATION RATE: 20 BRPM | TEMPERATURE: 98.5 F | OXYGEN SATURATION: 98 % | SYSTOLIC BLOOD PRESSURE: 133 MMHG | HEART RATE: 76 BPM | DIASTOLIC BLOOD PRESSURE: 79 MMHG

## 2025-04-16 VITALS
RESPIRATION RATE: 20 BRPM | SYSTOLIC BLOOD PRESSURE: 131 MMHG | HEART RATE: 72 BPM | OXYGEN SATURATION: 100 % | DIASTOLIC BLOOD PRESSURE: 77 MMHG | TEMPERATURE: 98.3 F
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
## 2025-04-16 DIAGNOSIS — S92.009A UNSPECIFIED FRACTURE OF UNSPECIFIED CALCANEUS, INITIAL ENCOUNTER FOR CLOSED FRACTURE: Chronic | ICD-10-CM

## 2025-04-16 DIAGNOSIS — E11.59 TYPE 2 DIABETES MELLITUS WITH OTHER CIRCULATORY COMPLICATIONS: ICD-10-CM

## 2025-04-16 DIAGNOSIS — L59.8 OTHER SPECIFIED DISORDERS OF THE SKIN AND SUBCUTANEOUS TISSUE RELATED TO RADIATION: ICD-10-CM

## 2025-04-16 DIAGNOSIS — E11.621 TYPE 2 DIABETES MELLITUS WITH FOOT ULCER: ICD-10-CM

## 2025-04-16 DIAGNOSIS — Z98.89 OTHER SPECIFIED POSTPROCEDURAL STATES: Chronic | ICD-10-CM
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
## 2025-04-16 LAB
GLUCOSE BLDC GLUCOMTR-MCNC: 194 MG/DL — HIGH (ref 70–99)
GLUCOSE BLDC GLUCOMTR-MCNC: 95 MG/DL — SIGNIFICANT CHANGE UP (ref 70–99)

## 2025-04-16 PROCEDURE — 99183 HYPERBARIC OXYGEN THERAPY: CPT

## 2025-04-16 PROCEDURE — 82962 GLUCOSE BLOOD TEST: CPT

## 2025-04-16 PROCEDURE — G0277: CPT

## 2025-04-17 ENCOUNTER — OUTPATIENT (OUTPATIENT)
Dept: OUTPATIENT SERVICES | Facility: HOSPITAL | Age: 60
LOS: 1 days | End: 2025-04-17
Payer: MEDICARE

## 2025-04-17 ENCOUNTER — APPOINTMENT (OUTPATIENT)
Dept: HYPERBARIC MEDICINE | Facility: HOSPITAL | Age: 60
End: 2025-04-17
Payer: MEDICARE

## 2025-04-17 VITALS
SYSTOLIC BLOOD PRESSURE: 113 MMHG | OXYGEN SATURATION: 100 % | HEART RATE: 73 BPM | DIASTOLIC BLOOD PRESSURE: 71 MMHG | RESPIRATION RATE: 18 BRPM | TEMPERATURE: 99.5 F

## 2025-04-17 VITALS
SYSTOLIC BLOOD PRESSURE: 113 MMHG | DIASTOLIC BLOOD PRESSURE: 68 MMHG | OXYGEN SATURATION: 98 % | TEMPERATURE: 99.6 F | RESPIRATION RATE: 18 BRPM | HEART RATE: 78 BPM
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
## 2025-04-17 DIAGNOSIS — E11.59 TYPE 2 DIABETES MELLITUS WITH OTHER CIRCULATORY COMPLICATIONS: ICD-10-CM

## 2025-04-17 DIAGNOSIS — Z98.89 OTHER SPECIFIED POSTPROCEDURAL STATES: Chronic | ICD-10-CM

## 2025-04-17 DIAGNOSIS — L59.8 OTHER SPECIFIED DISORDERS OF THE SKIN AND SUBCUTANEOUS TISSUE RELATED TO RADIATION: ICD-10-CM
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
## 2025-04-17 DIAGNOSIS — Z90.49 ACQUIRED ABSENCE OF OTHER SPECIFIED PARTS OF DIGESTIVE TRACT: Chronic | ICD-10-CM

## 2025-04-17 LAB
GLUCOSE BLDC GLUCOMTR-MCNC: 102 MG/DL — HIGH (ref 70–99)
GLUCOSE BLDC GLUCOMTR-MCNC: 149 MG/DL — HIGH (ref 70–99)
GLUCOSE BLDC GLUCOMTR-MCNC: 151 MG/DL — HIGH (ref 70–99)

## 2025-04-17 PROCEDURE — 99183 HYPERBARIC OXYGEN THERAPY: CPT

## 2025-04-17 PROCEDURE — 82962 GLUCOSE BLOOD TEST: CPT

## 2025-04-17 PROCEDURE — G0277: CPT

## 2025-04-18 ENCOUNTER — APPOINTMENT (OUTPATIENT)
Dept: HYPERBARIC MEDICINE | Facility: HOSPITAL | Age: 60
End: 2025-04-18
Payer: MEDICARE

## 2025-04-18 ENCOUNTER — OUTPATIENT (OUTPATIENT)
Dept: OUTPATIENT SERVICES | Facility: HOSPITAL | Age: 60
LOS: 1 days | End: 2025-04-18
Payer: MEDICARE

## 2025-04-18 VITALS
SYSTOLIC BLOOD PRESSURE: 116 MMHG | DIASTOLIC BLOOD PRESSURE: 69 MMHG | TEMPERATURE: 98.5 F | HEART RATE: 78 BPM | RESPIRATION RATE: 12 BRPM | OXYGEN SATURATION: 98 %

## 2025-04-18 VITALS
HEART RATE: 81 BPM | OXYGEN SATURATION: 100 % | DIASTOLIC BLOOD PRESSURE: 72 MMHG | SYSTOLIC BLOOD PRESSURE: 112 MMHG | RESPIRATION RATE: 16 BRPM | TEMPERATURE: 99.3 F

## 2025-04-18 DIAGNOSIS — S92.009A UNSPECIFIED FRACTURE OF UNSPECIFIED CALCANEUS, INITIAL ENCOUNTER FOR CLOSED FRACTURE: Chronic | ICD-10-CM

## 2025-04-18 DIAGNOSIS — Z98.89 OTHER SPECIFIED POSTPROCEDURAL STATES: Chronic | ICD-10-CM

## 2025-04-18 PROCEDURE — G0277: CPT

## 2025-04-18 PROCEDURE — 99183 HYPERBARIC OXYGEN THERAPY: CPT
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
## 2025-04-19 DIAGNOSIS — Z85.46 PERSONAL HISTORY OF MALIGNANT NEOPLASM OF PROSTATE: ICD-10-CM

## 2025-04-19 DIAGNOSIS — E11.59 TYPE 2 DIABETES MELLITUS WITH OTHER CIRCULATORY COMPLICATIONS: ICD-10-CM

## 2025-04-19 DIAGNOSIS — L59.8 OTHER SPECIFIED DISORDERS OF THE SKIN AND SUBCUTANEOUS TISSUE RELATED TO RADIATION: ICD-10-CM

## 2025-04-21 ENCOUNTER — OUTPATIENT (OUTPATIENT)
Dept: OUTPATIENT SERVICES | Facility: HOSPITAL | Age: 60
LOS: 1 days | End: 2025-04-21
Payer: MEDICARE

## 2025-04-21 ENCOUNTER — APPOINTMENT (OUTPATIENT)
Dept: HYPERBARIC MEDICINE | Facility: HOSPITAL | Age: 60
End: 2025-04-21
Payer: MEDICARE

## 2025-04-21 VITALS
RESPIRATION RATE: 16 BRPM | OXYGEN SATURATION: 98 % | SYSTOLIC BLOOD PRESSURE: 108 MMHG | TEMPERATURE: 99 F | DIASTOLIC BLOOD PRESSURE: 67 MMHG | HEART RATE: 75 BPM

## 2025-04-21 VITALS
OXYGEN SATURATION: 99 % | RESPIRATION RATE: 16 BRPM | SYSTOLIC BLOOD PRESSURE: 117 MMHG | TEMPERATURE: 98.4 F | HEART RATE: 74 BPM | DIASTOLIC BLOOD PRESSURE: 76 MMHG

## 2025-04-21 DIAGNOSIS — S92.009A UNSPECIFIED FRACTURE OF UNSPECIFIED CALCANEUS, INITIAL ENCOUNTER FOR CLOSED FRACTURE: Chronic | ICD-10-CM

## 2025-04-21 DIAGNOSIS — Z98.89 OTHER SPECIFIED POSTPROCEDURAL STATES: Chronic | ICD-10-CM

## 2025-04-21 DIAGNOSIS — L59.8 OTHER SPECIFIED DISORDERS OF THE SKIN AND SUBCUTANEOUS TISSUE RELATED TO RADIATION: ICD-10-CM

## 2025-04-21 LAB
GLUCOSE BLDC GLUCOMTR-MCNC: 142 MG/DL — HIGH (ref 70–99)
GLUCOSE BLDC GLUCOMTR-MCNC: 98 MG/DL — SIGNIFICANT CHANGE UP (ref 70–99)

## 2025-04-21 PROCEDURE — 99183 HYPERBARIC OXYGEN THERAPY: CPT

## 2025-04-21 PROCEDURE — 82962 GLUCOSE BLOOD TEST: CPT

## 2025-04-21 PROCEDURE — G0463: CPT

## 2025-04-22 ENCOUNTER — NON-APPOINTMENT (OUTPATIENT)
Age: 60
End: 2025-04-22

## 2025-04-22 ENCOUNTER — APPOINTMENT (OUTPATIENT)
Dept: HYPERBARIC MEDICINE | Facility: HOSPITAL | Age: 60
End: 2025-04-22
Payer: MEDICARE

## 2025-04-22 ENCOUNTER — OUTPATIENT (OUTPATIENT)
Dept: OUTPATIENT SERVICES | Facility: HOSPITAL | Age: 60
LOS: 1 days | End: 2025-04-22
Payer: MEDICARE

## 2025-04-22 VITALS
HEART RATE: 69 BPM | OXYGEN SATURATION: 100 % | TEMPERATURE: 98.6 F | SYSTOLIC BLOOD PRESSURE: 131 MMHG | DIASTOLIC BLOOD PRESSURE: 79 MMHG | RESPIRATION RATE: 16 BRPM

## 2025-04-22 VITALS
HEART RATE: 79 BPM | RESPIRATION RATE: 16 BRPM | OXYGEN SATURATION: 96 % | DIASTOLIC BLOOD PRESSURE: 64 MMHG | TEMPERATURE: 98.4 F | SYSTOLIC BLOOD PRESSURE: 120 MMHG

## 2025-04-22 DIAGNOSIS — L59.8 OTHER SPECIFIED DISORDERS OF THE SKIN AND SUBCUTANEOUS TISSUE RELATED TO RADIATION: ICD-10-CM
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
## 2025-04-22 DIAGNOSIS — E11.59 TYPE 2 DIABETES MELLITUS WITH OTHER CIRCULATORY COMPLICATIONS: ICD-10-CM
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
## 2025-04-22 DIAGNOSIS — Z98.89 OTHER SPECIFIED POSTPROCEDURAL STATES: Chronic | ICD-10-CM

## 2025-04-22 DIAGNOSIS — Z90.49 ACQUIRED ABSENCE OF OTHER SPECIFIED PARTS OF DIGESTIVE TRACT: Chronic | ICD-10-CM

## 2025-04-22 DIAGNOSIS — Z87.898 PERSONAL HISTORY OF OTHER SPECIFIED CONDITIONS: Chronic | ICD-10-CM

## 2025-04-22 LAB
GLUCOSE BLDC GLUCOMTR-MCNC: 118 MG/DL — HIGH (ref 70–99)
GLUCOSE BLDC GLUCOMTR-MCNC: 172 MG/DL — HIGH (ref 70–99)

## 2025-04-22 PROCEDURE — G0277: CPT

## 2025-04-22 PROCEDURE — 99213 OFFICE O/P EST LOW 20 MIN: CPT

## 2025-04-22 PROCEDURE — 82962 GLUCOSE BLOOD TEST: CPT

## 2025-04-23 ENCOUNTER — NON-APPOINTMENT (OUTPATIENT)
Age: 60
End: 2025-04-23

## 2025-04-23 ENCOUNTER — APPOINTMENT (OUTPATIENT)
Dept: HYPERBARIC MEDICINE | Facility: HOSPITAL | Age: 60
End: 2025-04-23

## 2025-04-24 ENCOUNTER — APPOINTMENT (OUTPATIENT)
Dept: HYPERBARIC MEDICINE | Facility: HOSPITAL | Age: 60
End: 2025-04-24
Payer: MEDICARE

## 2025-04-24 ENCOUNTER — APPOINTMENT (OUTPATIENT)
Dept: ULTRASOUND IMAGING | Facility: IMAGING CENTER | Age: 60
End: 2025-04-24

## 2025-04-24 ENCOUNTER — OUTPATIENT (OUTPATIENT)
Dept: OUTPATIENT SERVICES | Facility: HOSPITAL | Age: 60
LOS: 1 days | End: 2025-04-24
Payer: MEDICARE

## 2025-04-24 VITALS
RESPIRATION RATE: 16 BRPM | DIASTOLIC BLOOD PRESSURE: 75 MMHG | SYSTOLIC BLOOD PRESSURE: 123 MMHG | TEMPERATURE: 98.3 F | OXYGEN SATURATION: 99 % | HEART RATE: 70 BPM

## 2025-04-24 VITALS
SYSTOLIC BLOOD PRESSURE: 129 MMHG | OXYGEN SATURATION: 96 % | HEART RATE: 76 BPM | TEMPERATURE: 98.1 F | RESPIRATION RATE: 16 BRPM | DIASTOLIC BLOOD PRESSURE: 71 MMHG

## 2025-04-24 DIAGNOSIS — L59.8 OTHER SPECIFIED DISORDERS OF THE SKIN AND SUBCUTANEOUS TISSUE RELATED TO RADIATION: ICD-10-CM

## 2025-04-24 DIAGNOSIS — Z87.898 PERSONAL HISTORY OF OTHER SPECIFIED CONDITIONS: Chronic | ICD-10-CM
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
## 2025-04-24 DIAGNOSIS — Z85.46 PERSONAL HISTORY OF MALIGNANT NEOPLASM OF PROSTATE: ICD-10-CM

## 2025-04-24 DIAGNOSIS — Z90.49 ACQUIRED ABSENCE OF OTHER SPECIFIED PARTS OF DIGESTIVE TRACT: Chronic | ICD-10-CM

## 2025-04-24 DIAGNOSIS — E11.59 TYPE 2 DIABETES MELLITUS WITH OTHER CIRCULATORY COMPLICATIONS: ICD-10-CM

## 2025-04-24 DIAGNOSIS — Z98.89 OTHER SPECIFIED POSTPROCEDURAL STATES: Chronic | ICD-10-CM

## 2025-04-24 DIAGNOSIS — R16.2 HEPATOMEGALY WITH SPLENOMEGALY, NOT ELSEWHERE CLASSIFIED: ICD-10-CM
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
## 2025-04-24 LAB
GLUCOSE BLDC GLUCOMTR-MCNC: 113 MG/DL — HIGH (ref 70–99)
GLUCOSE BLDC GLUCOMTR-MCNC: 121 MG/DL — HIGH (ref 70–99)
GLUCOSE BLDC GLUCOMTR-MCNC: 152 MG/DL — HIGH (ref 70–99)

## 2025-04-24 PROCEDURE — 76700 US EXAM ABDOM COMPLETE: CPT | Mod: 26

## 2025-04-24 PROCEDURE — 76700 US EXAM ABDOM COMPLETE: CPT

## 2025-04-24 PROCEDURE — 99183 HYPERBARIC OXYGEN THERAPY: CPT

## 2025-04-24 PROCEDURE — G0277: CPT

## 2025-04-24 PROCEDURE — 82962 GLUCOSE BLOOD TEST: CPT

## 2025-04-25 ENCOUNTER — APPOINTMENT (OUTPATIENT)
Dept: HYPERBARIC MEDICINE | Facility: HOSPITAL | Age: 60
End: 2025-04-25
Payer: MEDICARE

## 2025-04-25 ENCOUNTER — OUTPATIENT (OUTPATIENT)
Dept: OUTPATIENT SERVICES | Facility: HOSPITAL | Age: 60
LOS: 1 days | End: 2025-04-25
Payer: MEDICARE

## 2025-04-25 VITALS
OXYGEN SATURATION: 98 % | RESPIRATION RATE: 16 BRPM | SYSTOLIC BLOOD PRESSURE: 124 MMHG | TEMPERATURE: 98.2 F | HEART RATE: 72 BPM | DIASTOLIC BLOOD PRESSURE: 72 MMHG

## 2025-04-25 VITALS
RESPIRATION RATE: 15 BRPM | TEMPERATURE: 98.3 F | OXYGEN SATURATION: 95 % | DIASTOLIC BLOOD PRESSURE: 76 MMHG | HEART RATE: 70 BPM | SYSTOLIC BLOOD PRESSURE: 104 MMHG

## 2025-04-25 DIAGNOSIS — S92.009A UNSPECIFIED FRACTURE OF UNSPECIFIED CALCANEUS, INITIAL ENCOUNTER FOR CLOSED FRACTURE: Chronic | ICD-10-CM

## 2025-04-25 DIAGNOSIS — L59.8 OTHER SPECIFIED DISORDERS OF THE SKIN AND SUBCUTANEOUS TISSUE RELATED TO RADIATION: ICD-10-CM

## 2025-04-25 DIAGNOSIS — Z98.89 OTHER SPECIFIED POSTPROCEDURAL STATES: Chronic | ICD-10-CM

## 2025-04-25 DIAGNOSIS — Z87.898 PERSONAL HISTORY OF OTHER SPECIFIED CONDITIONS: Chronic | ICD-10-CM

## 2025-04-25 LAB
GLUCOSE BLDC GLUCOMTR-MCNC: 108 MG/DL — HIGH (ref 70–99)
GLUCOSE BLDC GLUCOMTR-MCNC: 112 MG/DL — HIGH (ref 70–99)
GLUCOSE BLDC GLUCOMTR-MCNC: 117 MG/DL — HIGH (ref 70–99)
GLUCOSE BLDC GLUCOMTR-MCNC: 166 MG/DL — HIGH (ref 70–99)

## 2025-04-25 PROCEDURE — 99183 HYPERBARIC OXYGEN THERAPY: CPT

## 2025-04-25 PROCEDURE — G0277: CPT

## 2025-04-25 PROCEDURE — 82962 GLUCOSE BLOOD TEST: CPT

## 2025-04-28 ENCOUNTER — OUTPATIENT (OUTPATIENT)
Dept: OUTPATIENT SERVICES | Facility: HOSPITAL | Age: 60
LOS: 1 days | End: 2025-04-28
Payer: MEDICARE

## 2025-04-28 ENCOUNTER — APPOINTMENT (OUTPATIENT)
Dept: HYPERBARIC MEDICINE | Facility: HOSPITAL | Age: 60
End: 2025-04-28
Payer: MEDICARE

## 2025-04-28 VITALS
DIASTOLIC BLOOD PRESSURE: 70 MMHG | RESPIRATION RATE: 12 BRPM | OXYGEN SATURATION: 94 % | TEMPERATURE: 98 F | SYSTOLIC BLOOD PRESSURE: 128 MMHG | HEART RATE: 78 BPM

## 2025-04-28 VITALS
TEMPERATURE: 96.8 F | OXYGEN SATURATION: 98 % | HEART RATE: 82 BPM | RESPIRATION RATE: 14 BRPM | SYSTOLIC BLOOD PRESSURE: 134 MMHG | DIASTOLIC BLOOD PRESSURE: 70 MMHG

## 2025-04-28 DIAGNOSIS — Z87.898 PERSONAL HISTORY OF OTHER SPECIFIED CONDITIONS: Chronic | ICD-10-CM

## 2025-04-28 DIAGNOSIS — Z98.89 OTHER SPECIFIED POSTPROCEDURAL STATES: Chronic | ICD-10-CM

## 2025-04-28 DIAGNOSIS — L59.8 OTHER SPECIFIED DISORDERS OF THE SKIN AND SUBCUTANEOUS TISSUE RELATED TO RADIATION: ICD-10-CM

## 2025-04-28 DIAGNOSIS — S92.009A UNSPECIFIED FRACTURE OF UNSPECIFIED CALCANEUS, INITIAL ENCOUNTER FOR CLOSED FRACTURE: Chronic | ICD-10-CM

## 2025-04-28 PROCEDURE — 82962 GLUCOSE BLOOD TEST: CPT

## 2025-04-28 PROCEDURE — G0277: CPT

## 2025-04-28 PROCEDURE — 99212 OFFICE O/P EST SF 10 MIN: CPT | Mod: 25

## 2025-04-28 PROCEDURE — 99183 HYPERBARIC OXYGEN THERAPY: CPT

## 2025-04-29 ENCOUNTER — NON-APPOINTMENT (OUTPATIENT)
Age: 60
End: 2025-04-29

## 2025-04-29 ENCOUNTER — APPOINTMENT (OUTPATIENT)
Dept: HYPERBARIC MEDICINE | Facility: HOSPITAL | Age: 60
End: 2025-04-29

## 2025-04-29 DIAGNOSIS — K21.9 GASTRO-ESOPHAGEAL REFLUX DISEASE WITHOUT ESOPHAGITIS: ICD-10-CM

## 2025-04-29 DIAGNOSIS — Z80.3 FAMILY HISTORY OF MALIGNANT NEOPLASM OF BREAST: ICD-10-CM

## 2025-04-29 DIAGNOSIS — Z79.51 LONG TERM (CURRENT) USE OF INHALED STEROIDS: ICD-10-CM

## 2025-04-29 DIAGNOSIS — Z84.0 FAMILY HISTORY OF DISEASES OF THE SKIN AND SUBCUTANEOUS TISSUE: ICD-10-CM

## 2025-04-29 DIAGNOSIS — Z90.49 ACQUIRED ABSENCE OF OTHER SPECIFIED PARTS OF DIGESTIVE TRACT: ICD-10-CM
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
## 2025-04-29 DIAGNOSIS — Z86.19 PERSONAL HISTORY OF OTHER INFECTIOUS AND PARASITIC DISEASES: ICD-10-CM

## 2025-04-29 DIAGNOSIS — Z82.49 FAMILY HISTORY OF ISCHEMIC HEART DISEASE AND OTHER DISEASES OF THE CIRCULATORY SYSTEM: ICD-10-CM
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
## 2025-04-29 DIAGNOSIS — Z79.899 OTHER LONG TERM (CURRENT) DRUG THERAPY: ICD-10-CM

## 2025-04-29 DIAGNOSIS — Z83.79 FAMILY HISTORY OF OTHER DISEASES OF THE DIGESTIVE SYSTEM: ICD-10-CM

## 2025-04-29 DIAGNOSIS — K50.90 CROHN'S DISEASE, UNSPECIFIED, WITHOUT COMPLICATIONS: ICD-10-CM

## 2025-04-29 DIAGNOSIS — Z80.42 FAMILY HISTORY OF MALIGNANT NEOPLASM OF PROSTATE: ICD-10-CM

## 2025-04-29 DIAGNOSIS — E11.59 TYPE 2 DIABETES MELLITUS WITH OTHER CIRCULATORY COMPLICATIONS: ICD-10-CM

## 2025-04-29 DIAGNOSIS — Z98.890 OTHER SPECIFIED POSTPROCEDURAL STATES: ICD-10-CM

## 2025-04-29 DIAGNOSIS — J45.50 SEVERE PERSISTENT ASTHMA, UNCOMPLICATED: ICD-10-CM

## 2025-04-29 DIAGNOSIS — I25.10 ATHEROSCLEROTIC HEART DISEASE OF NATIVE CORONARY ARTERY WITHOUT ANGINA PECTORIS: ICD-10-CM

## 2025-04-29 DIAGNOSIS — G47.33 OBSTRUCTIVE SLEEP APNEA (ADULT) (PEDIATRIC): ICD-10-CM

## 2025-04-29 DIAGNOSIS — I10 ESSENTIAL (PRIMARY) HYPERTENSION: ICD-10-CM

## 2025-04-29 DIAGNOSIS — L59.8 OTHER SPECIFIED DISORDERS OF THE SKIN AND SUBCUTANEOUS TISSUE RELATED TO RADIATION: ICD-10-CM

## 2025-04-30 ENCOUNTER — APPOINTMENT (OUTPATIENT)
Dept: HYPERBARIC MEDICINE | Facility: HOSPITAL | Age: 60
End: 2025-04-30
Payer: MEDICARE

## 2025-04-30 ENCOUNTER — OUTPATIENT (OUTPATIENT)
Dept: OUTPATIENT SERVICES | Facility: HOSPITAL | Age: 60
LOS: 1 days | End: 2025-04-30
Payer: MEDICARE

## 2025-04-30 VITALS
RESPIRATION RATE: 16 BRPM | TEMPERATURE: 98.9 F | HEART RATE: 72 BPM | SYSTOLIC BLOOD PRESSURE: 120 MMHG | OXYGEN SATURATION: 98 % | DIASTOLIC BLOOD PRESSURE: 68 MMHG

## 2025-04-30 VITALS
OXYGEN SATURATION: 96 % | TEMPERATURE: 98.3 F | DIASTOLIC BLOOD PRESSURE: 69 MMHG | HEART RATE: 71 BPM | RESPIRATION RATE: 14 BRPM | SYSTOLIC BLOOD PRESSURE: 117 MMHG

## 2025-04-30 DIAGNOSIS — L59.8 OTHER SPECIFIED DISORDERS OF THE SKIN AND SUBCUTANEOUS TISSUE RELATED TO RADIATION: ICD-10-CM

## 2025-04-30 DIAGNOSIS — E11.59 TYPE 2 DIABETES MELLITUS WITH OTHER CIRCULATORY COMPLICATIONS: ICD-10-CM

## 2025-04-30 DIAGNOSIS — S92.009A UNSPECIFIED FRACTURE OF UNSPECIFIED CALCANEUS, INITIAL ENCOUNTER FOR CLOSED FRACTURE: Chronic | ICD-10-CM

## 2025-04-30 DIAGNOSIS — Z87.898 PERSONAL HISTORY OF OTHER SPECIFIED CONDITIONS: Chronic | ICD-10-CM

## 2025-04-30 DIAGNOSIS — Z98.89 OTHER SPECIFIED POSTPROCEDURAL STATES: Chronic | ICD-10-CM
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
## 2025-04-30 DIAGNOSIS — Z90.49 ACQUIRED ABSENCE OF OTHER SPECIFIED PARTS OF DIGESTIVE TRACT: Chronic | ICD-10-CM
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
## 2025-04-30 PROCEDURE — 82962 GLUCOSE BLOOD TEST: CPT

## 2025-04-30 PROCEDURE — G0277: CPT

## 2025-04-30 PROCEDURE — 99183 HYPERBARIC OXYGEN THERAPY: CPT

## 2025-05-01 ENCOUNTER — OUTPATIENT (OUTPATIENT)
Dept: OUTPATIENT SERVICES | Facility: HOSPITAL | Age: 60
LOS: 1 days | End: 2025-05-01
Payer: MEDICARE

## 2025-05-01 ENCOUNTER — APPOINTMENT (OUTPATIENT)
Dept: HYPERBARIC MEDICINE | Facility: HOSPITAL | Age: 60
End: 2025-05-01

## 2025-05-01 VITALS
RESPIRATION RATE: 18 BRPM | DIASTOLIC BLOOD PRESSURE: 64 MMHG | OXYGEN SATURATION: 96 % | TEMPERATURE: 98.5 F | SYSTOLIC BLOOD PRESSURE: 104 MMHG | HEART RATE: 72 BPM

## 2025-05-01 VITALS
TEMPERATURE: 98.5 F | HEART RATE: 72 BPM | SYSTOLIC BLOOD PRESSURE: 104 MMHG | OXYGEN SATURATION: 96 % | DIASTOLIC BLOOD PRESSURE: 64 MMHG | RESPIRATION RATE: 18 BRPM

## 2025-05-01 DIAGNOSIS — Z98.89 OTHER SPECIFIED POSTPROCEDURAL STATES: Chronic | ICD-10-CM
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
## 2025-05-01 DIAGNOSIS — Z85.46 PERSONAL HISTORY OF MALIGNANT NEOPLASM OF PROSTATE: ICD-10-CM

## 2025-05-01 DIAGNOSIS — Z87.898 PERSONAL HISTORY OF OTHER SPECIFIED CONDITIONS: Chronic | ICD-10-CM
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
## 2025-05-01 DIAGNOSIS — E11.59 TYPE 2 DIABETES MELLITUS WITH OTHER CIRCULATORY COMPLICATIONS: ICD-10-CM

## 2025-05-01 DIAGNOSIS — L59.8 OTHER SPECIFIED DISORDERS OF THE SKIN AND SUBCUTANEOUS TISSUE RELATED TO RADIATION: ICD-10-CM

## 2025-05-01 PROCEDURE — 99183 HYPERBARIC OXYGEN THERAPY: CPT

## 2025-05-01 PROCEDURE — G0277: CPT

## 2025-05-01 PROCEDURE — 82962 GLUCOSE BLOOD TEST: CPT

## 2025-05-02 ENCOUNTER — APPOINTMENT (OUTPATIENT)
Dept: HYPERBARIC MEDICINE | Facility: HOSPITAL | Age: 60
End: 2025-05-02
Payer: MEDICARE

## 2025-05-02 ENCOUNTER — OUTPATIENT (OUTPATIENT)
Dept: OUTPATIENT SERVICES | Facility: HOSPITAL | Age: 60
LOS: 1 days | End: 2025-05-02
Payer: MEDICARE

## 2025-05-02 ENCOUNTER — NON-APPOINTMENT (OUTPATIENT)
Age: 60
End: 2025-05-02

## 2025-05-02 VITALS
RESPIRATION RATE: 18 BRPM | HEART RATE: 74 BPM | OXYGEN SATURATION: 97 % | SYSTOLIC BLOOD PRESSURE: 133 MMHG | DIASTOLIC BLOOD PRESSURE: 74 MMHG | TEMPERATURE: 98.4 F

## 2025-05-02 VITALS
TEMPERATURE: 98.1 F | SYSTOLIC BLOOD PRESSURE: 111 MMHG | HEART RATE: 77 BPM | RESPIRATION RATE: 18 BRPM | OXYGEN SATURATION: 97 % | DIASTOLIC BLOOD PRESSURE: 64 MMHG

## 2025-05-02 DIAGNOSIS — Z98.89 OTHER SPECIFIED POSTPROCEDURAL STATES: Chronic | ICD-10-CM
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
## 2025-05-02 DIAGNOSIS — L59.8 OTHER SPECIFIED DISORDERS OF THE SKIN AND SUBCUTANEOUS TISSUE RELATED TO RADIATION: ICD-10-CM

## 2025-05-02 DIAGNOSIS — E11.59 TYPE 2 DIABETES MELLITUS WITH OTHER CIRCULATORY COMPLICATIONS: ICD-10-CM

## 2025-05-02 DIAGNOSIS — Z85.46 PERSONAL HISTORY OF MALIGNANT NEOPLASM OF PROSTATE: ICD-10-CM

## 2025-05-02 DIAGNOSIS — Z87.898 PERSONAL HISTORY OF OTHER SPECIFIED CONDITIONS: Chronic | ICD-10-CM

## 2025-05-02 DIAGNOSIS — Z90.49 ACQUIRED ABSENCE OF OTHER SPECIFIED PARTS OF DIGESTIVE TRACT: Chronic | ICD-10-CM
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
## 2025-05-02 LAB
GLUCOSE BLDC GLUCOMTR-MCNC: 132 MG/DL — HIGH (ref 70–99)
GLUCOSE BLDC GLUCOMTR-MCNC: 95 MG/DL — SIGNIFICANT CHANGE UP (ref 70–99)

## 2025-05-02 PROCEDURE — 82962 GLUCOSE BLOOD TEST: CPT

## 2025-05-02 PROCEDURE — G0277: CPT

## 2025-05-02 PROCEDURE — 99183 HYPERBARIC OXYGEN THERAPY: CPT

## 2025-05-05 ENCOUNTER — APPOINTMENT (OUTPATIENT)
Dept: HYPERBARIC MEDICINE | Facility: HOSPITAL | Age: 60
End: 2025-05-05
Payer: MEDICARE

## 2025-05-05 ENCOUNTER — OUTPATIENT (OUTPATIENT)
Dept: OUTPATIENT SERVICES | Facility: HOSPITAL | Age: 60
LOS: 1 days | End: 2025-05-05
Payer: MEDICARE

## 2025-05-05 VITALS
DIASTOLIC BLOOD PRESSURE: 63 MMHG | RESPIRATION RATE: 15 BRPM | OXYGEN SATURATION: 93 % | HEART RATE: 81 BPM | TEMPERATURE: 98.7 F | SYSTOLIC BLOOD PRESSURE: 116 MMHG

## 2025-05-05 VITALS
DIASTOLIC BLOOD PRESSURE: 71 MMHG | SYSTOLIC BLOOD PRESSURE: 117 MMHG | TEMPERATURE: 98.7 F | RESPIRATION RATE: 18 BRPM | OXYGEN SATURATION: 98 % | HEART RATE: 73 BPM

## 2025-05-05 DIAGNOSIS — E11.59 TYPE 2 DIABETES MELLITUS WITH OTHER CIRCULATORY COMPLICATIONS: ICD-10-CM

## 2025-05-05 DIAGNOSIS — L59.8 OTHER SPECIFIED DISORDERS OF THE SKIN AND SUBCUTANEOUS TISSUE RELATED TO RADIATION: ICD-10-CM
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
## 2025-05-05 DIAGNOSIS — Z90.49 ACQUIRED ABSENCE OF OTHER SPECIFIED PARTS OF DIGESTIVE TRACT: Chronic | ICD-10-CM

## 2025-05-05 DIAGNOSIS — Z98.89 OTHER SPECIFIED POSTPROCEDURAL STATES: Chronic | ICD-10-CM

## 2025-05-05 DIAGNOSIS — Z85.46 PERSONAL HISTORY OF MALIGNANT NEOPLASM OF PROSTATE: ICD-10-CM
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
## 2025-05-05 LAB
GLUCOSE BLDC GLUCOMTR-MCNC: 109 MG/DL — HIGH (ref 70–99)
GLUCOSE BLDC GLUCOMTR-MCNC: 137 MG/DL — HIGH (ref 70–99)

## 2025-05-05 PROCEDURE — 82962 GLUCOSE BLOOD TEST: CPT

## 2025-05-05 PROCEDURE — G0277: CPT

## 2025-05-05 PROCEDURE — 99183 HYPERBARIC OXYGEN THERAPY: CPT

## 2025-05-06 ENCOUNTER — OUTPATIENT (OUTPATIENT)
Dept: OUTPATIENT SERVICES | Facility: HOSPITAL | Age: 60
LOS: 1 days | End: 2025-05-06
Payer: MEDICARE

## 2025-05-06 ENCOUNTER — NON-APPOINTMENT (OUTPATIENT)
Age: 60
End: 2025-05-06

## 2025-05-06 ENCOUNTER — APPOINTMENT (OUTPATIENT)
Dept: HYPERBARIC MEDICINE | Facility: HOSPITAL | Age: 60
End: 2025-05-06
Payer: MEDICARE

## 2025-05-06 VITALS
DIASTOLIC BLOOD PRESSURE: 85 MMHG | RESPIRATION RATE: 18 BRPM | SYSTOLIC BLOOD PRESSURE: 143 MMHG | HEART RATE: 69 BPM | TEMPERATURE: 98 F | OXYGEN SATURATION: 99 %

## 2025-05-06 VITALS
TEMPERATURE: 98.5 F | OXYGEN SATURATION: 99 % | RESPIRATION RATE: 18 BRPM | SYSTOLIC BLOOD PRESSURE: 130 MMHG | DIASTOLIC BLOOD PRESSURE: 70 MMHG | HEART RATE: 68 BPM

## 2025-05-06 DIAGNOSIS — L59.8 OTHER SPECIFIED DISORDERS OF THE SKIN AND SUBCUTANEOUS TISSUE RELATED TO RADIATION: ICD-10-CM

## 2025-05-06 DIAGNOSIS — Z87.898 PERSONAL HISTORY OF OTHER SPECIFIED CONDITIONS: Chronic | ICD-10-CM

## 2025-05-06 DIAGNOSIS — E11.59 TYPE 2 DIABETES MELLITUS WITH OTHER CIRCULATORY COMPLICATIONS: ICD-10-CM
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
## 2025-05-06 DIAGNOSIS — S92.009A UNSPECIFIED FRACTURE OF UNSPECIFIED CALCANEUS, INITIAL ENCOUNTER FOR CLOSED FRACTURE: Chronic | ICD-10-CM
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
## 2025-05-06 DIAGNOSIS — Z98.89 OTHER SPECIFIED POSTPROCEDURAL STATES: Chronic | ICD-10-CM

## 2025-05-06 LAB
GLUCOSE BLDC GLUCOMTR-MCNC: 105 MG/DL — HIGH (ref 70–99)
GLUCOSE BLDC GLUCOMTR-MCNC: 165 MG/DL — HIGH (ref 70–99)

## 2025-05-06 PROCEDURE — 99183 HYPERBARIC OXYGEN THERAPY: CPT

## 2025-05-06 PROCEDURE — 82962 GLUCOSE BLOOD TEST: CPT

## 2025-05-06 PROCEDURE — G0277: CPT

## 2025-05-07 ENCOUNTER — APPOINTMENT (OUTPATIENT)
Dept: HYPERBARIC MEDICINE | Facility: HOSPITAL | Age: 60
End: 2025-05-07
Payer: MEDICARE

## 2025-05-07 ENCOUNTER — OUTPATIENT (OUTPATIENT)
Dept: OUTPATIENT SERVICES | Facility: HOSPITAL | Age: 60
LOS: 1 days | End: 2025-05-07
Payer: MEDICARE

## 2025-05-07 VITALS
TEMPERATURE: 98.3 F | HEART RATE: 73 BPM | DIASTOLIC BLOOD PRESSURE: 80 MMHG | OXYGEN SATURATION: 99 % | SYSTOLIC BLOOD PRESSURE: 131 MMHG | RESPIRATION RATE: 18 BRPM

## 2025-05-07 VITALS
SYSTOLIC BLOOD PRESSURE: 126 MMHG | RESPIRATION RATE: 18 BRPM | TEMPERATURE: 98 F | OXYGEN SATURATION: 98 % | HEART RATE: 79 BPM | DIASTOLIC BLOOD PRESSURE: 70 MMHG

## 2025-05-07 DIAGNOSIS — E11.59 TYPE 2 DIABETES MELLITUS WITH OTHER CIRCULATORY COMPLICATIONS: ICD-10-CM

## 2025-05-07 DIAGNOSIS — L59.8 OTHER SPECIFIED DISORDERS OF THE SKIN AND SUBCUTANEOUS TISSUE RELATED TO RADIATION: ICD-10-CM

## 2025-05-07 DIAGNOSIS — Z98.89 OTHER SPECIFIED POSTPROCEDURAL STATES: Chronic | ICD-10-CM
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
## 2025-05-07 DIAGNOSIS — Z90.49 ACQUIRED ABSENCE OF OTHER SPECIFIED PARTS OF DIGESTIVE TRACT: Chronic | ICD-10-CM
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
## 2025-05-07 LAB
GLUCOSE BLDC GLUCOMTR-MCNC: 113 MG/DL — HIGH (ref 70–99)
GLUCOSE BLDC GLUCOMTR-MCNC: 117 MG/DL — HIGH (ref 70–99)
GLUCOSE BLDC GLUCOMTR-MCNC: 142 MG/DL — HIGH (ref 70–99)

## 2025-05-07 PROCEDURE — G0277: CPT

## 2025-05-07 PROCEDURE — 82962 GLUCOSE BLOOD TEST: CPT

## 2025-05-07 PROCEDURE — 99183 HYPERBARIC OXYGEN THERAPY: CPT

## 2025-05-08 ENCOUNTER — NON-APPOINTMENT (OUTPATIENT)
Age: 60
End: 2025-05-08

## 2025-05-08 ENCOUNTER — OUTPATIENT (OUTPATIENT)
Dept: OUTPATIENT SERVICES | Facility: HOSPITAL | Age: 60
LOS: 1 days | End: 2025-05-08
Payer: MEDICARE

## 2025-05-08 ENCOUNTER — APPOINTMENT (OUTPATIENT)
Dept: HYPERBARIC MEDICINE | Facility: HOSPITAL | Age: 60
End: 2025-05-08
Payer: MEDICARE

## 2025-05-08 VITALS
OXYGEN SATURATION: 99 % | TEMPERATURE: 98.5 F | SYSTOLIC BLOOD PRESSURE: 135 MMHG | RESPIRATION RATE: 16 BRPM | HEART RATE: 74 BPM | DIASTOLIC BLOOD PRESSURE: 75 MMHG

## 2025-05-08 VITALS
TEMPERATURE: 98.7 F | SYSTOLIC BLOOD PRESSURE: 114 MMHG | RESPIRATION RATE: 18 BRPM | DIASTOLIC BLOOD PRESSURE: 65 MMHG | OXYGEN SATURATION: 95 % | HEART RATE: 80 BPM

## 2025-05-08 DIAGNOSIS — S92.009A UNSPECIFIED FRACTURE OF UNSPECIFIED CALCANEUS, INITIAL ENCOUNTER FOR CLOSED FRACTURE: Chronic | ICD-10-CM

## 2025-05-08 DIAGNOSIS — Z90.49 ACQUIRED ABSENCE OF OTHER SPECIFIED PARTS OF DIGESTIVE TRACT: Chronic | ICD-10-CM

## 2025-05-08 DIAGNOSIS — L59.8 OTHER SPECIFIED DISORDERS OF THE SKIN AND SUBCUTANEOUS TISSUE RELATED TO RADIATION: ICD-10-CM

## 2025-05-08 DIAGNOSIS — Z98.89 OTHER SPECIFIED POSTPROCEDURAL STATES: Chronic | ICD-10-CM

## 2025-05-08 DIAGNOSIS — Z87.898 PERSONAL HISTORY OF OTHER SPECIFIED CONDITIONS: Chronic | ICD-10-CM

## 2025-05-08 LAB
GLUCOSE BLDC GLUCOMTR-MCNC: 140 MG/DL — HIGH (ref 70–99)
GLUCOSE BLDC GLUCOMTR-MCNC: 168 MG/DL — HIGH (ref 70–99)

## 2025-05-08 PROCEDURE — 99183 HYPERBARIC OXYGEN THERAPY: CPT

## 2025-05-08 PROCEDURE — 82962 GLUCOSE BLOOD TEST: CPT

## 2025-05-08 PROCEDURE — G0277: CPT

## 2025-05-09 ENCOUNTER — APPOINTMENT (OUTPATIENT)
Dept: HYPERBARIC MEDICINE | Facility: HOSPITAL | Age: 60
End: 2025-05-09
Payer: MEDICARE

## 2025-05-09 ENCOUNTER — NON-APPOINTMENT (OUTPATIENT)
Age: 60
End: 2025-05-09

## 2025-05-09 ENCOUNTER — OUTPATIENT (OUTPATIENT)
Dept: OUTPATIENT SERVICES | Facility: HOSPITAL | Age: 60
LOS: 1 days | End: 2025-05-09
Payer: MEDICARE

## 2025-05-09 VITALS
DIASTOLIC BLOOD PRESSURE: 74 MMHG | RESPIRATION RATE: 15 BRPM | SYSTOLIC BLOOD PRESSURE: 117 MMHG | HEART RATE: 76 BPM | TEMPERATURE: 98.6 F | OXYGEN SATURATION: 97 %

## 2025-05-09 VITALS
RESPIRATION RATE: 15 BRPM | DIASTOLIC BLOOD PRESSURE: 71 MMHG | TEMPERATURE: 98.3 F | HEART RATE: 69 BPM | SYSTOLIC BLOOD PRESSURE: 123 MMHG | OXYGEN SATURATION: 99 %
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
## 2025-05-09 DIAGNOSIS — Z85.46 PERSONAL HISTORY OF MALIGNANT NEOPLASM OF PROSTATE: ICD-10-CM

## 2025-05-09 DIAGNOSIS — L59.8 OTHER SPECIFIED DISORDERS OF THE SKIN AND SUBCUTANEOUS TISSUE RELATED TO RADIATION: ICD-10-CM

## 2025-05-09 DIAGNOSIS — Z98.89 OTHER SPECIFIED POSTPROCEDURAL STATES: Chronic | ICD-10-CM

## 2025-05-09 DIAGNOSIS — S92.009A UNSPECIFIED FRACTURE OF UNSPECIFIED CALCANEUS, INITIAL ENCOUNTER FOR CLOSED FRACTURE: Chronic | ICD-10-CM
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
## 2025-05-09 DIAGNOSIS — E11.59 TYPE 2 DIABETES MELLITUS WITH OTHER CIRCULATORY COMPLICATIONS: ICD-10-CM

## 2025-05-09 PROCEDURE — 82962 GLUCOSE BLOOD TEST: CPT

## 2025-05-09 PROCEDURE — 99183 HYPERBARIC OXYGEN THERAPY: CPT

## 2025-05-09 PROCEDURE — G0277: CPT

## 2025-05-12 ENCOUNTER — EMERGENCY (EMERGENCY)
Facility: HOSPITAL | Age: 60
LOS: 1 days | End: 2025-05-12
Admitting: EMERGENCY MEDICINE
Payer: MEDICARE

## 2025-05-12 ENCOUNTER — APPOINTMENT (OUTPATIENT)
Dept: HYPERBARIC MEDICINE | Facility: HOSPITAL | Age: 60
End: 2025-05-12
Payer: MEDICARE

## 2025-05-12 ENCOUNTER — OUTPATIENT (OUTPATIENT)
Dept: OUTPATIENT SERVICES | Facility: HOSPITAL | Age: 60
LOS: 1 days | End: 2025-05-12
Payer: MEDICARE

## 2025-05-12 VITALS
HEIGHT: 66 IN | RESPIRATION RATE: 20 BRPM | WEIGHT: 175.93 LBS | TEMPERATURE: 99 F | SYSTOLIC BLOOD PRESSURE: 173 MMHG | DIASTOLIC BLOOD PRESSURE: 83 MMHG | OXYGEN SATURATION: 99 % | HEART RATE: 86 BPM

## 2025-05-12 VITALS
DIASTOLIC BLOOD PRESSURE: 79 MMHG | TEMPERATURE: 98.6 F | RESPIRATION RATE: 16 BRPM | OXYGEN SATURATION: 100 % | SYSTOLIC BLOOD PRESSURE: 128 MMHG | HEART RATE: 69 BPM

## 2025-05-12 VITALS
RESPIRATION RATE: 16 BRPM | SYSTOLIC BLOOD PRESSURE: 144 MMHG | DIASTOLIC BLOOD PRESSURE: 84 MMHG | HEART RATE: 67 BPM | TEMPERATURE: 98.5 F | OXYGEN SATURATION: 99 %

## 2025-05-12 DIAGNOSIS — E11.59 TYPE 2 DIABETES MELLITUS WITH OTHER CIRCULATORY COMPLICATIONS: ICD-10-CM
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
## 2025-05-12 DIAGNOSIS — L59.8 OTHER SPECIFIED DISORDERS OF THE SKIN AND SUBCUTANEOUS TISSUE RELATED TO RADIATION: ICD-10-CM

## 2025-05-12 DIAGNOSIS — Z87.898 PERSONAL HISTORY OF OTHER SPECIFIED CONDITIONS: Chronic | ICD-10-CM

## 2025-05-12 DIAGNOSIS — Z98.89 OTHER SPECIFIED POSTPROCEDURAL STATES: Chronic | ICD-10-CM

## 2025-05-12 DIAGNOSIS — S92.009A UNSPECIFIED FRACTURE OF UNSPECIFIED CALCANEUS, INITIAL ENCOUNTER FOR CLOSED FRACTURE: Chronic | ICD-10-CM

## 2025-05-12 DIAGNOSIS — Z90.49 ACQUIRED ABSENCE OF OTHER SPECIFIED PARTS OF DIGESTIVE TRACT: Chronic | ICD-10-CM

## 2025-05-12 LAB
GLUCOSE BLDC GLUCOMTR-MCNC: 111 MG/DL — HIGH (ref 70–99)
GLUCOSE BLDC GLUCOMTR-MCNC: 157 MG/DL — HIGH (ref 70–99)

## 2025-05-12 PROCEDURE — G0277: CPT

## 2025-05-12 PROCEDURE — 82962 GLUCOSE BLOOD TEST: CPT

## 2025-05-12 PROCEDURE — 99183 HYPERBARIC OXYGEN THERAPY: CPT

## 2025-05-12 PROCEDURE — L9991: CPT

## 2025-05-12 NOTE — ED ADULT TRIAGE NOTE - CHIEF COMPLAINT QUOTE
"I don't feel well"; feeling restless, lethargic and tingling bilateral hands x 2-3 hours; c/o joint pain, mild abd pain; denies fever; pmh cancer prostate, finished radiation  and hormone therapy; using hyperbaric therapy; pmh Crohn's disease; 9 cardiac stents; anemia due to Crohn's. "I don't feel well"; feeling restless, lethargic and tingling bilateral hands x 2-3 hours; c/o joint pain, mild abd pain; denies fever; pmh cancer prostate, finished radiation  and hormone therapy; using hyperbaric therapy; pmh Crohn's disease; 9 cardiac stents; anemia due to Crohn's; took tylenol PM with no relief.

## 2025-05-13 ENCOUNTER — OUTPATIENT (OUTPATIENT)
Dept: OUTPATIENT SERVICES | Facility: HOSPITAL | Age: 60
LOS: 1 days | End: 2025-05-13
Payer: MEDICARE

## 2025-05-13 ENCOUNTER — APPOINTMENT (OUTPATIENT)
Dept: HYPERBARIC MEDICINE | Facility: HOSPITAL | Age: 60
End: 2025-05-13
Payer: MEDICARE

## 2025-05-13 VITALS
OXYGEN SATURATION: 99 % | DIASTOLIC BLOOD PRESSURE: 93 MMHG | RESPIRATION RATE: 18 BRPM | TEMPERATURE: 98.7 F | SYSTOLIC BLOOD PRESSURE: 174 MMHG | HEART RATE: 82 BPM

## 2025-05-13 VITALS
TEMPERATURE: 97.9 F | RESPIRATION RATE: 16 BRPM | SYSTOLIC BLOOD PRESSURE: 137 MMHG | DIASTOLIC BLOOD PRESSURE: 80 MMHG | HEART RATE: 70 BPM | OXYGEN SATURATION: 100 %

## 2025-05-13 DIAGNOSIS — Z87.898 PERSONAL HISTORY OF OTHER SPECIFIED CONDITIONS: Chronic | ICD-10-CM

## 2025-05-13 DIAGNOSIS — S92.009A UNSPECIFIED FRACTURE OF UNSPECIFIED CALCANEUS, INITIAL ENCOUNTER FOR CLOSED FRACTURE: Chronic | ICD-10-CM

## 2025-05-13 DIAGNOSIS — Z90.49 ACQUIRED ABSENCE OF OTHER SPECIFIED PARTS OF DIGESTIVE TRACT: Chronic | ICD-10-CM

## 2025-05-13 DIAGNOSIS — L59.8 OTHER SPECIFIED DISORDERS OF THE SKIN AND SUBCUTANEOUS TISSUE RELATED TO RADIATION: ICD-10-CM

## 2025-05-13 LAB
GLUCOSE BLDC GLUCOMTR-MCNC: 107 MG/DL — HIGH (ref 70–99)
GLUCOSE BLDC GLUCOMTR-MCNC: 131 MG/DL — HIGH (ref 70–99)
GLUCOSE BLDC GLUCOMTR-MCNC: 140 MG/DL — HIGH (ref 70–99)

## 2025-05-13 PROCEDURE — 82962 GLUCOSE BLOOD TEST: CPT

## 2025-05-13 PROCEDURE — G0277: CPT

## 2025-05-13 PROCEDURE — 99183 HYPERBARIC OXYGEN THERAPY: CPT

## 2025-05-14 ENCOUNTER — APPOINTMENT (OUTPATIENT)
Dept: HYPERBARIC MEDICINE | Facility: HOSPITAL | Age: 60
End: 2025-05-14
Payer: MEDICARE

## 2025-05-14 ENCOUNTER — OUTPATIENT (OUTPATIENT)
Dept: OUTPATIENT SERVICES | Facility: HOSPITAL | Age: 60
LOS: 1 days | End: 2025-05-14
Payer: MEDICARE

## 2025-05-14 VITALS
HEART RATE: 75 BPM | OXYGEN SATURATION: 98 % | RESPIRATION RATE: 18 BRPM | SYSTOLIC BLOOD PRESSURE: 143 MMHG | DIASTOLIC BLOOD PRESSURE: 81 MMHG | TEMPERATURE: 98.2 F

## 2025-05-14 VITALS
HEART RATE: 65 BPM | TEMPERATURE: 98.5 F | OXYGEN SATURATION: 99 % | RESPIRATION RATE: 15 BRPM | SYSTOLIC BLOOD PRESSURE: 155 MMHG | DIASTOLIC BLOOD PRESSURE: 93 MMHG

## 2025-05-14 DIAGNOSIS — L59.8 OTHER SPECIFIED DISORDERS OF THE SKIN AND SUBCUTANEOUS TISSUE RELATED TO RADIATION: ICD-10-CM
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
## 2025-05-14 DIAGNOSIS — Z98.89 OTHER SPECIFIED POSTPROCEDURAL STATES: Chronic | ICD-10-CM

## 2025-05-14 DIAGNOSIS — Z83.79 FAMILY HISTORY OF OTHER DISEASES OF THE DIGESTIVE SYSTEM: ICD-10-CM

## 2025-05-14 DIAGNOSIS — Z79.51 LONG TERM (CURRENT) USE OF INHALED STEROIDS: ICD-10-CM

## 2025-05-14 DIAGNOSIS — K21.9 GASTRO-ESOPHAGEAL REFLUX DISEASE WITHOUT ESOPHAGITIS: ICD-10-CM

## 2025-05-14 DIAGNOSIS — Z86.19 PERSONAL HISTORY OF OTHER INFECTIOUS AND PARASITIC DISEASES: ICD-10-CM

## 2025-05-14 DIAGNOSIS — Z90.49 ACQUIRED ABSENCE OF OTHER SPECIFIED PARTS OF DIGESTIVE TRACT: Chronic | ICD-10-CM

## 2025-05-14 DIAGNOSIS — Z85.46 PERSONAL HISTORY OF MALIGNANT NEOPLASM OF PROSTATE: ICD-10-CM

## 2025-05-14 DIAGNOSIS — J45.50 SEVERE PERSISTENT ASTHMA, UNCOMPLICATED: ICD-10-CM

## 2025-05-14 DIAGNOSIS — Z84.0 FAMILY HISTORY OF DISEASES OF THE SKIN AND SUBCUTANEOUS TISSUE: ICD-10-CM

## 2025-05-14 DIAGNOSIS — E11.59 TYPE 2 DIABETES MELLITUS WITH OTHER CIRCULATORY COMPLICATIONS: ICD-10-CM

## 2025-05-14 DIAGNOSIS — Z80.3 FAMILY HISTORY OF MALIGNANT NEOPLASM OF BREAST: ICD-10-CM

## 2025-05-14 DIAGNOSIS — Z87.898 PERSONAL HISTORY OF OTHER SPECIFIED CONDITIONS: Chronic | ICD-10-CM
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
## 2025-05-14 DIAGNOSIS — Z82.49 FAMILY HISTORY OF ISCHEMIC HEART DISEASE AND OTHER DISEASES OF THE CIRCULATORY SYSTEM: ICD-10-CM

## 2025-05-14 DIAGNOSIS — K50.90 CROHN'S DISEASE, UNSPECIFIED, WITHOUT COMPLICATIONS: ICD-10-CM

## 2025-05-14 DIAGNOSIS — S92.009A UNSPECIFIED FRACTURE OF UNSPECIFIED CALCANEUS, INITIAL ENCOUNTER FOR CLOSED FRACTURE: Chronic | ICD-10-CM

## 2025-05-14 DIAGNOSIS — G47.33 OBSTRUCTIVE SLEEP APNEA (ADULT) (PEDIATRIC): ICD-10-CM

## 2025-05-14 DIAGNOSIS — Z80.42 FAMILY HISTORY OF MALIGNANT NEOPLASM OF PROSTATE: ICD-10-CM

## 2025-05-14 DIAGNOSIS — I25.10 ATHEROSCLEROTIC HEART DISEASE OF NATIVE CORONARY ARTERY WITHOUT ANGINA PECTORIS: ICD-10-CM

## 2025-05-14 DIAGNOSIS — Z79.899 OTHER LONG TERM (CURRENT) DRUG THERAPY: ICD-10-CM

## 2025-05-14 PROCEDURE — G0277: CPT

## 2025-05-14 PROCEDURE — 99183 HYPERBARIC OXYGEN THERAPY: CPT

## 2025-05-14 PROCEDURE — 82962 GLUCOSE BLOOD TEST: CPT

## 2025-05-15 ENCOUNTER — APPOINTMENT (OUTPATIENT)
Dept: HYPERBARIC MEDICINE | Facility: HOSPITAL | Age: 60
End: 2025-05-15
Payer: MEDICARE

## 2025-05-15 ENCOUNTER — TRANSCRIPTION ENCOUNTER (OUTPATIENT)
Age: 60
End: 2025-05-15

## 2025-05-15 ENCOUNTER — OUTPATIENT (OUTPATIENT)
Dept: OUTPATIENT SERVICES | Facility: HOSPITAL | Age: 60
LOS: 1 days | End: 2025-05-15
Payer: MEDICARE

## 2025-05-15 VITALS
DIASTOLIC BLOOD PRESSURE: 88 MMHG | TEMPERATURE: 98.6 F | RESPIRATION RATE: 18 BRPM | SYSTOLIC BLOOD PRESSURE: 151 MMHG | OXYGEN SATURATION: 98 % | HEART RATE: 74 BPM

## 2025-05-15 VITALS
RESPIRATION RATE: 18 BRPM | TEMPERATURE: 98.5 F | SYSTOLIC BLOOD PRESSURE: 153 MMHG | DIASTOLIC BLOOD PRESSURE: 91 MMHG | OXYGEN SATURATION: 98 % | HEART RATE: 71 BPM

## 2025-05-15 DIAGNOSIS — Z85.46 PERSONAL HISTORY OF MALIGNANT NEOPLASM OF PROSTATE: ICD-10-CM

## 2025-05-15 DIAGNOSIS — Z90.49 ACQUIRED ABSENCE OF OTHER SPECIFIED PARTS OF DIGESTIVE TRACT: Chronic | ICD-10-CM

## 2025-05-15 DIAGNOSIS — E11.59 TYPE 2 DIABETES MELLITUS WITH OTHER CIRCULATORY COMPLICATIONS: ICD-10-CM
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
## 2025-05-15 DIAGNOSIS — L59.8 OTHER SPECIFIED DISORDERS OF THE SKIN AND SUBCUTANEOUS TISSUE RELATED TO RADIATION: ICD-10-CM

## 2025-05-15 DIAGNOSIS — Z98.89 OTHER SPECIFIED POSTPROCEDURAL STATES: Chronic | ICD-10-CM

## 2025-05-15 DIAGNOSIS — S92.009A UNSPECIFIED FRACTURE OF UNSPECIFIED CALCANEUS, INITIAL ENCOUNTER FOR CLOSED FRACTURE: Chronic | ICD-10-CM

## 2025-05-15 LAB — GLUCOSE BLDC GLUCOMTR-MCNC: 105 MG/DL — HIGH (ref 70–99)

## 2025-05-15 PROCEDURE — G0277: CPT

## 2025-05-15 PROCEDURE — 82962 GLUCOSE BLOOD TEST: CPT

## 2025-05-15 PROCEDURE — 99183 HYPERBARIC OXYGEN THERAPY: CPT

## 2025-05-16 ENCOUNTER — NON-APPOINTMENT (OUTPATIENT)
Age: 60
End: 2025-05-16

## 2025-05-16 ENCOUNTER — APPOINTMENT (OUTPATIENT)
Dept: HYPERBARIC MEDICINE | Facility: HOSPITAL | Age: 60
End: 2025-05-16
Payer: MEDICARE

## 2025-05-16 ENCOUNTER — OUTPATIENT (OUTPATIENT)
Dept: OUTPATIENT SERVICES | Facility: HOSPITAL | Age: 60
LOS: 1 days | End: 2025-05-16
Payer: MEDICARE

## 2025-05-16 VITALS
DIASTOLIC BLOOD PRESSURE: 64 MMHG | TEMPERATURE: 98.1 F | SYSTOLIC BLOOD PRESSURE: 108 MMHG | OXYGEN SATURATION: 100 % | HEART RATE: 73 BPM | RESPIRATION RATE: 15 BRPM

## 2025-05-16 VITALS
TEMPERATURE: 98.2 F | HEART RATE: 69 BPM | DIASTOLIC BLOOD PRESSURE: 76 MMHG | OXYGEN SATURATION: 99 % | SYSTOLIC BLOOD PRESSURE: 126 MMHG | RESPIRATION RATE: 16 BRPM

## 2025-05-16 DIAGNOSIS — L59.8 OTHER SPECIFIED DISORDERS OF THE SKIN AND SUBCUTANEOUS TISSUE RELATED TO RADIATION: ICD-10-CM

## 2025-05-16 DIAGNOSIS — Z98.89 OTHER SPECIFIED POSTPROCEDURAL STATES: Chronic | ICD-10-CM

## 2025-05-16 DIAGNOSIS — Z90.49 ACQUIRED ABSENCE OF OTHER SPECIFIED PARTS OF DIGESTIVE TRACT: Chronic | ICD-10-CM

## 2025-05-16 DIAGNOSIS — Z87.898 PERSONAL HISTORY OF OTHER SPECIFIED CONDITIONS: Chronic | ICD-10-CM

## 2025-05-16 LAB
GLUCOSE BLDC GLUCOMTR-MCNC: 139 MG/DL — HIGH (ref 70–99)
GLUCOSE BLDC GLUCOMTR-MCNC: 97 MG/DL — SIGNIFICANT CHANGE UP (ref 70–99)

## 2025-05-16 PROCEDURE — 99183 HYPERBARIC OXYGEN THERAPY: CPT

## 2025-05-16 PROCEDURE — G0277: CPT

## 2025-05-16 PROCEDURE — 82962 GLUCOSE BLOOD TEST: CPT

## 2025-05-16 RX ORDER — ONABOTULINUMTOXINA 100 [USP'U]/1
100 INJECTION, POWDER, LYOPHILIZED, FOR SOLUTION INTRADERMAL; INTRAMUSCULAR
Qty: 1 | Refills: 0 | Status: ACTIVE | COMMUNITY
Start: 2025-05-16 | End: 1900-01-01

## 2025-05-16 RX ORDER — ONABOTULINUMTOXINA 100 [USP'U]/1
100 INJECTION, POWDER, LYOPHILIZED, FOR SOLUTION INTRADERMAL; INTRAMUSCULAR
Qty: 1 | Refills: 4 | Status: ACTIVE | COMMUNITY
Start: 2025-05-14 | End: 1900-01-01

## 2025-05-17 DIAGNOSIS — L59.8 OTHER SPECIFIED DISORDERS OF THE SKIN AND SUBCUTANEOUS TISSUE RELATED TO RADIATION: ICD-10-CM

## 2025-05-17 DIAGNOSIS — E11.59 TYPE 2 DIABETES MELLITUS WITH OTHER CIRCULATORY COMPLICATIONS: ICD-10-CM
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
## 2025-05-19 ENCOUNTER — NON-APPOINTMENT (OUTPATIENT)
Age: 60
End: 2025-05-19

## 2025-05-19 ENCOUNTER — APPOINTMENT (OUTPATIENT)
Dept: HYPERBARIC MEDICINE | Facility: HOSPITAL | Age: 60
End: 2025-05-19

## 2025-05-19 ENCOUNTER — APPOINTMENT (OUTPATIENT)
Dept: RADIATION ONCOLOGY | Facility: CLINIC | Age: 60
End: 2025-05-19
Payer: MEDICARE

## 2025-05-19 DIAGNOSIS — C61 MALIGNANT NEOPLASM OF PROSTATE: ICD-10-CM

## 2025-05-19 PROCEDURE — 99212 OFFICE O/P EST SF 10 MIN: CPT

## 2025-05-20 ENCOUNTER — APPOINTMENT (OUTPATIENT)
Dept: HYPERBARIC MEDICINE | Facility: HOSPITAL | Age: 60
End: 2025-05-20
Payer: MEDICARE

## 2025-05-20 ENCOUNTER — OUTPATIENT (OUTPATIENT)
Dept: OUTPATIENT SERVICES | Facility: HOSPITAL | Age: 60
LOS: 1 days | End: 2025-05-20
Payer: MEDICARE

## 2025-05-20 ENCOUNTER — NON-APPOINTMENT (OUTPATIENT)
Age: 60
End: 2025-05-20

## 2025-05-20 VITALS
HEART RATE: 77 BPM | TEMPERATURE: 98.2 F | DIASTOLIC BLOOD PRESSURE: 63 MMHG | OXYGEN SATURATION: 100 % | SYSTOLIC BLOOD PRESSURE: 114 MMHG | RESPIRATION RATE: 16 BRPM

## 2025-05-20 VITALS
TEMPERATURE: 99.2 F | SYSTOLIC BLOOD PRESSURE: 138 MMHG | RESPIRATION RATE: 18 BRPM | OXYGEN SATURATION: 100 % | HEART RATE: 72 BPM | DIASTOLIC BLOOD PRESSURE: 79 MMHG

## 2025-05-20 DIAGNOSIS — L59.8 OTHER SPECIFIED DISORDERS OF THE SKIN AND SUBCUTANEOUS TISSUE RELATED TO RADIATION: ICD-10-CM

## 2025-05-20 DIAGNOSIS — Z98.89 OTHER SPECIFIED POSTPROCEDURAL STATES: Chronic | ICD-10-CM

## 2025-05-20 DIAGNOSIS — Z87.898 PERSONAL HISTORY OF OTHER SPECIFIED CONDITIONS: Chronic | ICD-10-CM

## 2025-05-20 LAB
GLUCOSE BLDC GLUCOMTR-MCNC: 132 MG/DL — HIGH (ref 70–99)
GLUCOSE BLDC GLUCOMTR-MCNC: 138 MG/DL — HIGH (ref 70–99)

## 2025-05-20 PROCEDURE — 82962 GLUCOSE BLOOD TEST: CPT

## 2025-05-20 PROCEDURE — 99183 HYPERBARIC OXYGEN THERAPY: CPT

## 2025-05-20 PROCEDURE — G0277: CPT

## 2025-05-21 ENCOUNTER — OUTPATIENT (OUTPATIENT)
Dept: OUTPATIENT SERVICES | Facility: HOSPITAL | Age: 60
LOS: 1 days | End: 2025-05-21
Payer: MEDICARE

## 2025-05-21 ENCOUNTER — APPOINTMENT (OUTPATIENT)
Dept: HYPERBARIC MEDICINE | Facility: HOSPITAL | Age: 60
End: 2025-05-21
Payer: MEDICARE

## 2025-05-21 VITALS
OXYGEN SATURATION: 98 % | TEMPERATURE: 98.9 F | SYSTOLIC BLOOD PRESSURE: 128 MMHG | HEART RATE: 88 BPM | DIASTOLIC BLOOD PRESSURE: 72 MMHG | RESPIRATION RATE: 16 BRPM

## 2025-05-21 VITALS
TEMPERATURE: 98.9 F | HEART RATE: 72 BPM | SYSTOLIC BLOOD PRESSURE: 147 MMHG | DIASTOLIC BLOOD PRESSURE: 81 MMHG | RESPIRATION RATE: 16 BRPM | OXYGEN SATURATION: 99 %

## 2025-05-21 DIAGNOSIS — Z98.89 OTHER SPECIFIED POSTPROCEDURAL STATES: Chronic | ICD-10-CM

## 2025-05-21 DIAGNOSIS — L59.8 OTHER SPECIFIED DISORDERS OF THE SKIN AND SUBCUTANEOUS TISSUE RELATED TO RADIATION: ICD-10-CM
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
## 2025-05-21 DIAGNOSIS — Z85.46 PERSONAL HISTORY OF MALIGNANT NEOPLASM OF PROSTATE: ICD-10-CM
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
## 2025-05-21 DIAGNOSIS — Z90.49 ACQUIRED ABSENCE OF OTHER SPECIFIED PARTS OF DIGESTIVE TRACT: Chronic | ICD-10-CM

## 2025-05-21 DIAGNOSIS — E11.59 TYPE 2 DIABETES MELLITUS WITH OTHER CIRCULATORY COMPLICATIONS: ICD-10-CM

## 2025-05-21 DIAGNOSIS — S92.009A UNSPECIFIED FRACTURE OF UNSPECIFIED CALCANEUS, INITIAL ENCOUNTER FOR CLOSED FRACTURE: Chronic | ICD-10-CM

## 2025-05-21 LAB
GLUCOSE BLDC GLUCOMTR-MCNC: 105 MG/DL — HIGH (ref 70–99)
GLUCOSE BLDC GLUCOMTR-MCNC: 150 MG/DL — HIGH (ref 70–99)

## 2025-05-21 PROCEDURE — 99183 HYPERBARIC OXYGEN THERAPY: CPT

## 2025-05-21 PROCEDURE — G0277: CPT

## 2025-05-21 PROCEDURE — 82962 GLUCOSE BLOOD TEST: CPT

## 2025-05-22 ENCOUNTER — APPOINTMENT (OUTPATIENT)
Dept: HYPERBARIC MEDICINE | Facility: HOSPITAL | Age: 60
End: 2025-05-22

## 2025-05-23 ENCOUNTER — APPOINTMENT (OUTPATIENT)
Dept: HYPERBARIC MEDICINE | Facility: HOSPITAL | Age: 60
End: 2025-05-23

## 2025-05-27 ENCOUNTER — TRANSCRIPTION ENCOUNTER (OUTPATIENT)
Age: 60
End: 2025-05-27

## 2025-05-27 ENCOUNTER — APPOINTMENT (OUTPATIENT)
Dept: HYPERBARIC MEDICINE | Facility: HOSPITAL | Age: 60
End: 2025-05-27

## 2025-05-27 RX ORDER — LIDOCAINE 30 MG/G
3 CREAM TOPICAL
Qty: 1 | Refills: 0 | Status: ACTIVE | COMMUNITY
Start: 2025-05-27 | End: 1900-01-01

## 2025-05-28 ENCOUNTER — APPOINTMENT (OUTPATIENT)
Dept: HYPERBARIC MEDICINE | Facility: HOSPITAL | Age: 60
End: 2025-05-28

## 2025-05-29 ENCOUNTER — APPOINTMENT (OUTPATIENT)
Dept: HYPERBARIC MEDICINE | Facility: HOSPITAL | Age: 60
End: 2025-05-29

## 2025-05-30 ENCOUNTER — APPOINTMENT (OUTPATIENT)
Dept: HYPERBARIC MEDICINE | Facility: HOSPITAL | Age: 60
End: 2025-05-30

## 2025-06-02 ENCOUNTER — APPOINTMENT (OUTPATIENT)
Dept: HYPERBARIC MEDICINE | Facility: HOSPITAL | Age: 60
End: 2025-06-02

## 2025-06-02 NOTE — ED PROVIDER NOTE - MDM ORDERS SUBMITTED SELECTION
Administrations This Visit       methylPREDNISolone acetate (DEPO-MEDROL) injection 40 mg       Admin Date  06/02/2025  11:50 Action  Given Dose  40 mg Route  IntraMUSCular Site  Ventrogluteal Right Documented By  Guerline Weems MA    NDC: [address]-3    Lot#: [id number]    : [employer]    Patient Supplied?: No              methylPREDNISolone acetate (DEPO-MEDROL) injection 80 mg       Admin Date  06/02/2025  11:51 Action  Given Dose  80 mg Route  IntraMUSCular Site  Ventrogluteal Right Documented By  Guerline Weems MA    NDC: [address]-3    Lot#: [id number]    : [employer]    Patient Supplied?: No                    
rhythm.      Heart sounds: No murmur heard.  Pulmonary:      Effort: Pulmonary effort is normal.      Breath sounds: Normal breath sounds. No wheezing.   Abdominal:      General: Bowel sounds are normal. There is no distension.      Palpations: Abdomen is soft.      Tenderness: There is no abdominal tenderness.   Musculoskeletal:         General: No tenderness. Normal range of motion.      Cervical back: Normal range of motion and neck supple.   Skin:     General: Skin is warm and dry.      Findings: No rash.           ASSESSMENT/PLAN:   Diagnosis Orders   1. Allergic conjunctivitis of both eyes  methylPREDNISolone acetate (DEPO-MEDROL) injection 80 mg    methylPREDNISolone acetate (DEPO-MEDROL) injection 40 mg            MDM:  DEPO 120 IM  Continue with drops  RTO PRN    An electronic signature was used to authenticate this note.    --Eleazar Shea, APRJUDE - CNP   
Medications

## 2025-06-03 ENCOUNTER — APPOINTMENT (OUTPATIENT)
Dept: HYPERBARIC MEDICINE | Facility: HOSPITAL | Age: 60
End: 2025-06-03

## 2025-06-03 ENCOUNTER — NON-APPOINTMENT (OUTPATIENT)
Age: 60
End: 2025-06-03

## 2025-06-04 ENCOUNTER — APPOINTMENT (OUTPATIENT)
Dept: HYPERBARIC MEDICINE | Facility: HOSPITAL | Age: 60
End: 2025-06-04

## 2025-06-05 ENCOUNTER — TRANSCRIPTION ENCOUNTER (OUTPATIENT)
Age: 60
End: 2025-06-05

## 2025-06-05 ENCOUNTER — APPOINTMENT (OUTPATIENT)
Dept: HYPERBARIC MEDICINE | Facility: HOSPITAL | Age: 60
End: 2025-06-05

## 2025-06-06 ENCOUNTER — APPOINTMENT (OUTPATIENT)
Dept: HYPERBARIC MEDICINE | Facility: HOSPITAL | Age: 60
End: 2025-06-06

## 2025-06-06 NOTE — ED PROVIDER NOTE - CONSTITUTIONAL MOOD
[Time Spent: ___ minutes] : I have spent [unfilled] minutes of time on the encounter which excludes teaching and separately reported services. appropriate

## 2025-06-09 ENCOUNTER — APPOINTMENT (OUTPATIENT)
Dept: HYPERBARIC MEDICINE | Facility: HOSPITAL | Age: 60
End: 2025-06-09

## 2025-06-10 ENCOUNTER — APPOINTMENT (OUTPATIENT)
Dept: HYPERBARIC MEDICINE | Facility: HOSPITAL | Age: 60
End: 2025-06-10

## 2025-06-10 ENCOUNTER — APPOINTMENT (OUTPATIENT)
Dept: GASTROENTEROLOGY | Facility: CLINIC | Age: 60
End: 2025-06-10

## 2025-06-11 ENCOUNTER — APPOINTMENT (OUTPATIENT)
Dept: HYPERBARIC MEDICINE | Facility: HOSPITAL | Age: 60
End: 2025-06-11

## 2025-06-12 ENCOUNTER — APPOINTMENT (OUTPATIENT)
Dept: HYPERBARIC MEDICINE | Facility: HOSPITAL | Age: 60
End: 2025-06-12

## 2025-06-13 ENCOUNTER — APPOINTMENT (OUTPATIENT)
Dept: HYPERBARIC MEDICINE | Facility: HOSPITAL | Age: 60
End: 2025-06-13

## 2025-06-17 ENCOUNTER — NON-APPOINTMENT (OUTPATIENT)
Age: 60
End: 2025-06-17

## 2025-06-17 ENCOUNTER — APPOINTMENT (OUTPATIENT)
Dept: SURGERY | Facility: CLINIC | Age: 60
End: 2025-06-17
Payer: MEDICARE

## 2025-06-17 PROCEDURE — 46505 CHEMODENERVATION ANAL MUSC: CPT | Mod: 50

## 2025-06-17 PROCEDURE — 45990 SURG DX EXAM ANORECTAL: CPT

## 2025-06-18 ENCOUNTER — APPOINTMENT (OUTPATIENT)
Dept: HYPERBARIC MEDICINE | Facility: HOSPITAL | Age: 60
End: 2025-06-18
Payer: MEDICARE

## 2025-06-18 ENCOUNTER — NON-APPOINTMENT (OUTPATIENT)
Age: 60
End: 2025-06-18

## 2025-06-18 ENCOUNTER — OUTPATIENT (OUTPATIENT)
Dept: OUTPATIENT SERVICES | Facility: HOSPITAL | Age: 60
LOS: 1 days | End: 2025-06-18
Payer: MEDICARE

## 2025-06-18 VITALS
HEART RATE: 76 BPM | DIASTOLIC BLOOD PRESSURE: 84 MMHG | OXYGEN SATURATION: 99 % | RESPIRATION RATE: 16 BRPM | SYSTOLIC BLOOD PRESSURE: 133 MMHG | TEMPERATURE: 98.9 F

## 2025-06-18 VITALS
OXYGEN SATURATION: 99 % | TEMPERATURE: 98.1 F | SYSTOLIC BLOOD PRESSURE: 162 MMHG | HEART RATE: 74 BPM | DIASTOLIC BLOOD PRESSURE: 99 MMHG | RESPIRATION RATE: 16 BRPM

## 2025-06-18 DIAGNOSIS — Z85.46 PERSONAL HISTORY OF MALIGNANT NEOPLASM OF PROSTATE: ICD-10-CM

## 2025-06-18 DIAGNOSIS — Z90.49 ACQUIRED ABSENCE OF OTHER SPECIFIED PARTS OF DIGESTIVE TRACT: Chronic | ICD-10-CM
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
## 2025-06-18 DIAGNOSIS — L59.8 OTHER SPECIFIED DISORDERS OF THE SKIN AND SUBCUTANEOUS TISSUE RELATED TO RADIATION: ICD-10-CM

## 2025-06-18 DIAGNOSIS — Z98.89 OTHER SPECIFIED POSTPROCEDURAL STATES: Chronic | ICD-10-CM

## 2025-06-18 DIAGNOSIS — E11.59 TYPE 2 DIABETES MELLITUS WITH OTHER CIRCULATORY COMPLICATIONS: ICD-10-CM
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
## 2025-06-18 DIAGNOSIS — Z87.898 PERSONAL HISTORY OF OTHER SPECIFIED CONDITIONS: Chronic | ICD-10-CM

## 2025-06-18 DIAGNOSIS — S92.009A UNSPECIFIED FRACTURE OF UNSPECIFIED CALCANEUS, INITIAL ENCOUNTER FOR CLOSED FRACTURE: Chronic | ICD-10-CM

## 2025-06-18 LAB
GLUCOSE BLDC GLUCOMTR-MCNC: 125 MG/DL — HIGH (ref 70–99)
GLUCOSE BLDC GLUCOMTR-MCNC: 139 MG/DL — HIGH (ref 70–99)

## 2025-06-18 PROCEDURE — 82962 GLUCOSE BLOOD TEST: CPT

## 2025-06-18 PROCEDURE — 99183 HYPERBARIC OXYGEN THERAPY: CPT

## 2025-06-18 PROCEDURE — G0277: CPT

## 2025-06-19 ENCOUNTER — APPOINTMENT (OUTPATIENT)
Dept: HYPERBARIC MEDICINE | Facility: HOSPITAL | Age: 60
End: 2025-06-19

## 2025-06-19 ENCOUNTER — OUTPATIENT (OUTPATIENT)
Dept: OUTPATIENT SERVICES | Facility: HOSPITAL | Age: 60
LOS: 1 days | End: 2025-06-19
Payer: MEDICARE

## 2025-06-19 VITALS
RESPIRATION RATE: 16 BRPM | SYSTOLIC BLOOD PRESSURE: 147 MMHG | DIASTOLIC BLOOD PRESSURE: 78 MMHG | HEART RATE: 77 BPM | TEMPERATURE: 97.7 F | OXYGEN SATURATION: 98 %

## 2025-06-19 VITALS
RESPIRATION RATE: 16 BRPM | SYSTOLIC BLOOD PRESSURE: 120 MMHG | DIASTOLIC BLOOD PRESSURE: 73 MMHG | OXYGEN SATURATION: 100 % | TEMPERATURE: 97.9 F | HEART RATE: 79 BPM

## 2025-06-19 DIAGNOSIS — Z90.49 ACQUIRED ABSENCE OF OTHER SPECIFIED PARTS OF DIGESTIVE TRACT: Chronic | ICD-10-CM

## 2025-06-19 DIAGNOSIS — S92.009A UNSPECIFIED FRACTURE OF UNSPECIFIED CALCANEUS, INITIAL ENCOUNTER FOR CLOSED FRACTURE: Chronic | ICD-10-CM

## 2025-06-19 DIAGNOSIS — E11.59 TYPE 2 DIABETES MELLITUS WITH OTHER CIRCULATORY COMPLICATIONS: ICD-10-CM

## 2025-06-19 DIAGNOSIS — L59.8 OTHER SPECIFIED DISORDERS OF THE SKIN AND SUBCUTANEOUS TISSUE RELATED TO RADIATION: ICD-10-CM

## 2025-06-19 DIAGNOSIS — Z87.898 PERSONAL HISTORY OF OTHER SPECIFIED CONDITIONS: Chronic | ICD-10-CM

## 2025-06-19 DIAGNOSIS — Z98.89 OTHER SPECIFIED POSTPROCEDURAL STATES: Chronic | ICD-10-CM
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
## 2025-06-19 DIAGNOSIS — Z85.46 PERSONAL HISTORY OF MALIGNANT NEOPLASM OF PROSTATE: ICD-10-CM
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
## 2025-06-19 LAB
GLUCOSE BLDC GLUCOMTR-MCNC: 107 MG/DL — HIGH (ref 70–99)
GLUCOSE BLDC GLUCOMTR-MCNC: 146 MG/DL — HIGH (ref 70–99)

## 2025-06-19 PROCEDURE — 82962 GLUCOSE BLOOD TEST: CPT

## 2025-06-19 PROCEDURE — 99183 HYPERBARIC OXYGEN THERAPY: CPT

## 2025-06-19 PROCEDURE — G0277: CPT

## 2025-06-20 ENCOUNTER — APPOINTMENT (OUTPATIENT)
Dept: HYPERBARIC MEDICINE | Facility: HOSPITAL | Age: 60
End: 2025-06-20

## 2025-06-23 ENCOUNTER — APPOINTMENT (OUTPATIENT)
Dept: HYPERBARIC MEDICINE | Facility: HOSPITAL | Age: 60
End: 2025-06-23

## 2025-06-23 ENCOUNTER — NON-APPOINTMENT (OUTPATIENT)
Age: 60
End: 2025-06-23

## 2025-06-24 ENCOUNTER — APPOINTMENT (OUTPATIENT)
Dept: HYPERBARIC MEDICINE | Facility: HOSPITAL | Age: 60
End: 2025-06-24

## 2025-06-24 ENCOUNTER — NON-APPOINTMENT (OUTPATIENT)
Age: 60
End: 2025-06-24

## 2025-06-25 ENCOUNTER — OUTPATIENT (OUTPATIENT)
Dept: OUTPATIENT SERVICES | Facility: HOSPITAL | Age: 60
LOS: 1 days | End: 2025-06-25
Payer: MEDICARE

## 2025-06-25 ENCOUNTER — APPOINTMENT (OUTPATIENT)
Dept: HYPERBARIC MEDICINE | Facility: HOSPITAL | Age: 60
End: 2025-06-25
Payer: MEDICARE

## 2025-06-25 VITALS
HEART RATE: 73 BPM | DIASTOLIC BLOOD PRESSURE: 80 MMHG | SYSTOLIC BLOOD PRESSURE: 143 MMHG | TEMPERATURE: 97.9 F | RESPIRATION RATE: 16 BRPM | OXYGEN SATURATION: 97 %

## 2025-06-25 VITALS
DIASTOLIC BLOOD PRESSURE: 89 MMHG | HEART RATE: 67 BPM | TEMPERATURE: 99 F | RESPIRATION RATE: 12 BRPM | SYSTOLIC BLOOD PRESSURE: 140 MMHG | OXYGEN SATURATION: 99 %

## 2025-06-25 DIAGNOSIS — S92.009A UNSPECIFIED FRACTURE OF UNSPECIFIED CALCANEUS, INITIAL ENCOUNTER FOR CLOSED FRACTURE: Chronic | ICD-10-CM

## 2025-06-25 DIAGNOSIS — L59.8 OTHER SPECIFIED DISORDERS OF THE SKIN AND SUBCUTANEOUS TISSUE RELATED TO RADIATION: ICD-10-CM

## 2025-06-25 DIAGNOSIS — Z90.49 ACQUIRED ABSENCE OF OTHER SPECIFIED PARTS OF DIGESTIVE TRACT: Chronic | ICD-10-CM

## 2025-06-25 DIAGNOSIS — Z98.89 OTHER SPECIFIED POSTPROCEDURAL STATES: Chronic | ICD-10-CM

## 2025-06-25 PROCEDURE — G0277: CPT

## 2025-06-25 PROCEDURE — 99183 HYPERBARIC OXYGEN THERAPY: CPT

## 2025-06-25 PROCEDURE — 82962 GLUCOSE BLOOD TEST: CPT

## 2025-06-26 ENCOUNTER — APPOINTMENT (OUTPATIENT)
Dept: HYPERBARIC MEDICINE | Facility: HOSPITAL | Age: 60
End: 2025-06-26
Payer: MEDICARE

## 2025-06-26 ENCOUNTER — OUTPATIENT (OUTPATIENT)
Dept: OUTPATIENT SERVICES | Facility: HOSPITAL | Age: 60
LOS: 1 days | End: 2025-06-26
Payer: MEDICARE

## 2025-06-26 VITALS
HEART RATE: 78 BPM | OXYGEN SATURATION: 98 % | TEMPERATURE: 99.1 F | SYSTOLIC BLOOD PRESSURE: 125 MMHG | DIASTOLIC BLOOD PRESSURE: 61 MMHG | RESPIRATION RATE: 16 BRPM

## 2025-06-26 VITALS
OXYGEN SATURATION: 100 % | TEMPERATURE: 98.3 F | HEART RATE: 70 BPM | RESPIRATION RATE: 15 BRPM | DIASTOLIC BLOOD PRESSURE: 87 MMHG | SYSTOLIC BLOOD PRESSURE: 156 MMHG

## 2025-06-26 DIAGNOSIS — S92.009A UNSPECIFIED FRACTURE OF UNSPECIFIED CALCANEUS, INITIAL ENCOUNTER FOR CLOSED FRACTURE: Chronic | ICD-10-CM

## 2025-06-26 DIAGNOSIS — Z98.89 OTHER SPECIFIED POSTPROCEDURAL STATES: Chronic | ICD-10-CM

## 2025-06-26 DIAGNOSIS — Z85.46 PERSONAL HISTORY OF MALIGNANT NEOPLASM OF PROSTATE: ICD-10-CM

## 2025-06-26 DIAGNOSIS — Z87.898 PERSONAL HISTORY OF OTHER SPECIFIED CONDITIONS: Chronic | ICD-10-CM
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
## 2025-06-26 DIAGNOSIS — E11.59 TYPE 2 DIABETES MELLITUS WITH OTHER CIRCULATORY COMPLICATIONS: ICD-10-CM

## 2025-06-26 DIAGNOSIS — L59.8 OTHER SPECIFIED DISORDERS OF THE SKIN AND SUBCUTANEOUS TISSUE RELATED TO RADIATION: ICD-10-CM

## 2025-06-26 DIAGNOSIS — Z90.49 ACQUIRED ABSENCE OF OTHER SPECIFIED PARTS OF DIGESTIVE TRACT: Chronic | ICD-10-CM

## 2025-06-26 PROCEDURE — 99183 HYPERBARIC OXYGEN THERAPY: CPT

## 2025-06-26 PROCEDURE — G0277: CPT

## 2025-06-26 PROCEDURE — 82962 GLUCOSE BLOOD TEST: CPT

## 2025-06-27 ENCOUNTER — APPOINTMENT (OUTPATIENT)
Dept: HYPERBARIC MEDICINE | Facility: HOSPITAL | Age: 60
End: 2025-06-27
Payer: MEDICARE

## 2025-06-27 ENCOUNTER — NON-APPOINTMENT (OUTPATIENT)
Age: 60
End: 2025-06-27

## 2025-06-27 ENCOUNTER — OUTPATIENT (OUTPATIENT)
Dept: OUTPATIENT SERVICES | Facility: HOSPITAL | Age: 60
LOS: 1 days | End: 2025-06-27
Payer: MEDICARE

## 2025-06-27 VITALS
OXYGEN SATURATION: 100 % | SYSTOLIC BLOOD PRESSURE: 146 MMHG | TEMPERATURE: 97.6 F | HEART RATE: 58 BPM | DIASTOLIC BLOOD PRESSURE: 80 MMHG | RESPIRATION RATE: 16 BRPM

## 2025-06-27 VITALS
TEMPERATURE: 98.2 F | HEART RATE: 67 BPM | SYSTOLIC BLOOD PRESSURE: 130 MMHG | OXYGEN SATURATION: 99 % | DIASTOLIC BLOOD PRESSURE: 70 MMHG | RESPIRATION RATE: 15 BRPM

## 2025-06-27 DIAGNOSIS — Z80.42 FAMILY HISTORY OF MALIGNANT NEOPLASM OF PROSTATE: ICD-10-CM

## 2025-06-27 DIAGNOSIS — Z80.3 FAMILY HISTORY OF MALIGNANT NEOPLASM OF BREAST: ICD-10-CM

## 2025-06-27 DIAGNOSIS — I10 ESSENTIAL (PRIMARY) HYPERTENSION: ICD-10-CM

## 2025-06-27 DIAGNOSIS — Z82.49 FAMILY HISTORY OF ISCHEMIC HEART DISEASE AND OTHER DISEASES OF THE CIRCULATORY SYSTEM: ICD-10-CM

## 2025-06-27 DIAGNOSIS — K50.90 CROHN'S DISEASE, UNSPECIFIED, WITHOUT COMPLICATIONS: ICD-10-CM
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
## 2025-06-27 DIAGNOSIS — Z86.19 PERSONAL HISTORY OF OTHER INFECTIOUS AND PARASITIC DISEASES: ICD-10-CM

## 2025-06-27 DIAGNOSIS — Z90.49 ACQUIRED ABSENCE OF OTHER SPECIFIED PARTS OF DIGESTIVE TRACT: Chronic | ICD-10-CM

## 2025-06-27 DIAGNOSIS — Z84.0 FAMILY HISTORY OF DISEASES OF THE SKIN AND SUBCUTANEOUS TISSUE: ICD-10-CM

## 2025-06-27 DIAGNOSIS — Z98.89 OTHER SPECIFIED POSTPROCEDURAL STATES: Chronic | ICD-10-CM

## 2025-06-27 DIAGNOSIS — Z79.51 LONG TERM (CURRENT) USE OF INHALED STEROIDS: ICD-10-CM

## 2025-06-27 DIAGNOSIS — I25.10 ATHEROSCLEROTIC HEART DISEASE OF NATIVE CORONARY ARTERY WITHOUT ANGINA PECTORIS: ICD-10-CM
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
## 2025-06-27 DIAGNOSIS — Z85.46 PERSONAL HISTORY OF MALIGNANT NEOPLASM OF PROSTATE: ICD-10-CM

## 2025-06-27 DIAGNOSIS — Z83.79 FAMILY HISTORY OF OTHER DISEASES OF THE DIGESTIVE SYSTEM: ICD-10-CM

## 2025-06-27 DIAGNOSIS — Z87.898 PERSONAL HISTORY OF OTHER SPECIFIED CONDITIONS: Chronic | ICD-10-CM

## 2025-06-27 DIAGNOSIS — L59.8 OTHER SPECIFIED DISORDERS OF THE SKIN AND SUBCUTANEOUS TISSUE RELATED TO RADIATION: ICD-10-CM

## 2025-06-27 DIAGNOSIS — K21.9 GASTRO-ESOPHAGEAL REFLUX DISEASE WITHOUT ESOPHAGITIS: ICD-10-CM

## 2025-06-27 DIAGNOSIS — Z79.899 OTHER LONG TERM (CURRENT) DRUG THERAPY: ICD-10-CM

## 2025-06-27 DIAGNOSIS — Z98.890 OTHER SPECIFIED POSTPROCEDURAL STATES: ICD-10-CM

## 2025-06-27 DIAGNOSIS — E11.59 TYPE 2 DIABETES MELLITUS WITH OTHER CIRCULATORY COMPLICATIONS: ICD-10-CM

## 2025-06-27 DIAGNOSIS — S92.009A UNSPECIFIED FRACTURE OF UNSPECIFIED CALCANEUS, INITIAL ENCOUNTER FOR CLOSED FRACTURE: Chronic | ICD-10-CM

## 2025-06-27 LAB
GLUCOSE BLDC GLUCOMTR-MCNC: 122 MG/DL — HIGH (ref 70–99)
GLUCOSE BLDC GLUCOMTR-MCNC: 182 MG/DL — HIGH (ref 70–99)

## 2025-06-27 PROCEDURE — 99183 HYPERBARIC OXYGEN THERAPY: CPT

## 2025-06-27 PROCEDURE — G0277: CPT

## 2025-06-27 PROCEDURE — 82962 GLUCOSE BLOOD TEST: CPT

## 2025-06-30 ENCOUNTER — NON-APPOINTMENT (OUTPATIENT)
Age: 60
End: 2025-06-30

## 2025-06-30 ENCOUNTER — APPOINTMENT (OUTPATIENT)
Dept: HYPERBARIC MEDICINE | Facility: HOSPITAL | Age: 60
End: 2025-06-30
Payer: MEDICARE

## 2025-06-30 ENCOUNTER — OUTPATIENT (OUTPATIENT)
Dept: OUTPATIENT SERVICES | Facility: HOSPITAL | Age: 60
LOS: 1 days | End: 2025-06-30
Payer: MEDICARE

## 2025-06-30 VITALS
HEART RATE: 77 BPM | SYSTOLIC BLOOD PRESSURE: 122 MMHG | OXYGEN SATURATION: 98 % | DIASTOLIC BLOOD PRESSURE: 70 MMHG | RESPIRATION RATE: 16 BRPM | TEMPERATURE: 98.9 F

## 2025-06-30 VITALS
HEART RATE: 74 BPM | SYSTOLIC BLOOD PRESSURE: 138 MMHG | DIASTOLIC BLOOD PRESSURE: 76 MMHG | RESPIRATION RATE: 18 BRPM | OXYGEN SATURATION: 99 % | TEMPERATURE: 98.9 F
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
## 2025-06-30 DIAGNOSIS — L59.8 OTHER SPECIFIED DISORDERS OF THE SKIN AND SUBCUTANEOUS TISSUE RELATED TO RADIATION: ICD-10-CM
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
## 2025-06-30 DIAGNOSIS — S92.009A UNSPECIFIED FRACTURE OF UNSPECIFIED CALCANEUS, INITIAL ENCOUNTER FOR CLOSED FRACTURE: Chronic | ICD-10-CM

## 2025-06-30 DIAGNOSIS — Z85.46 PERSONAL HISTORY OF MALIGNANT NEOPLASM OF PROSTATE: ICD-10-CM

## 2025-06-30 DIAGNOSIS — Z87.898 PERSONAL HISTORY OF OTHER SPECIFIED CONDITIONS: Chronic | ICD-10-CM

## 2025-06-30 DIAGNOSIS — E11.59 TYPE 2 DIABETES MELLITUS WITH OTHER CIRCULATORY COMPLICATIONS: ICD-10-CM

## 2025-06-30 DIAGNOSIS — Z90.49 ACQUIRED ABSENCE OF OTHER SPECIFIED PARTS OF DIGESTIVE TRACT: Chronic | ICD-10-CM

## 2025-06-30 PROCEDURE — 82962 GLUCOSE BLOOD TEST: CPT

## 2025-06-30 PROCEDURE — 99183 HYPERBARIC OXYGEN THERAPY: CPT

## 2025-06-30 PROCEDURE — G0277: CPT

## 2025-07-01 ENCOUNTER — OUTPATIENT (OUTPATIENT)
Dept: OUTPATIENT SERVICES | Facility: HOSPITAL | Age: 60
LOS: 1 days | End: 2025-07-01
Payer: MEDICARE

## 2025-07-01 ENCOUNTER — NON-APPOINTMENT (OUTPATIENT)
Age: 60
End: 2025-07-01

## 2025-07-01 ENCOUNTER — APPOINTMENT (OUTPATIENT)
Dept: HYPERBARIC MEDICINE | Facility: HOSPITAL | Age: 60
End: 2025-07-01
Payer: MEDICARE

## 2025-07-01 VITALS
OXYGEN SATURATION: 98 % | SYSTOLIC BLOOD PRESSURE: 163 MMHG | HEART RATE: 75 BPM | TEMPERATURE: 98 F | RESPIRATION RATE: 16 BRPM | DIASTOLIC BLOOD PRESSURE: 88 MMHG

## 2025-07-01 VITALS
SYSTOLIC BLOOD PRESSURE: 134 MMHG | RESPIRATION RATE: 16 BRPM | TEMPERATURE: 97.9 F | DIASTOLIC BLOOD PRESSURE: 82 MMHG | HEART RATE: 70 BPM | OXYGEN SATURATION: 98 %
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
## 2025-07-01 DIAGNOSIS — L59.8 OTHER SPECIFIED DISORDERS OF THE SKIN AND SUBCUTANEOUS TISSUE RELATED TO RADIATION: ICD-10-CM

## 2025-07-01 DIAGNOSIS — Z85.46 PERSONAL HISTORY OF MALIGNANT NEOPLASM OF PROSTATE: ICD-10-CM

## 2025-07-01 DIAGNOSIS — E11.9 TYPE 2 DIABETES MELLITUS WITHOUT COMPLICATIONS: ICD-10-CM

## 2025-07-01 DIAGNOSIS — Z87.898 PERSONAL HISTORY OF OTHER SPECIFIED CONDITIONS: Chronic | ICD-10-CM

## 2025-07-01 DIAGNOSIS — Z98.89 OTHER SPECIFIED POSTPROCEDURAL STATES: Chronic | ICD-10-CM

## 2025-07-01 LAB
GLUCOSE BLDC GLUCOMTR-MCNC: 150 MG/DL — HIGH (ref 70–99)
GLUCOSE BLDC GLUCOMTR-MCNC: 170 MG/DL — HIGH (ref 70–99)

## 2025-07-01 PROCEDURE — G0277: CPT

## 2025-07-01 PROCEDURE — 82962 GLUCOSE BLOOD TEST: CPT

## 2025-07-01 PROCEDURE — 99213 OFFICE O/P EST LOW 20 MIN: CPT

## 2025-07-02 ENCOUNTER — OUTPATIENT (OUTPATIENT)
Dept: OUTPATIENT SERVICES | Facility: HOSPITAL | Age: 60
LOS: 1 days | End: 2025-07-02
Payer: MEDICARE

## 2025-07-02 ENCOUNTER — NON-APPOINTMENT (OUTPATIENT)
Age: 60
End: 2025-07-02

## 2025-07-02 ENCOUNTER — APPOINTMENT (OUTPATIENT)
Dept: HYPERBARIC MEDICINE | Facility: HOSPITAL | Age: 60
End: 2025-07-02
Payer: MEDICARE

## 2025-07-02 VITALS
TEMPERATURE: 98.1 F | HEART RATE: 75 BPM | RESPIRATION RATE: 16 BRPM | DIASTOLIC BLOOD PRESSURE: 73 MMHG | OXYGEN SATURATION: 98 % | SYSTOLIC BLOOD PRESSURE: 127 MMHG

## 2025-07-02 VITALS
DIASTOLIC BLOOD PRESSURE: 79 MMHG | RESPIRATION RATE: 16 BRPM | OXYGEN SATURATION: 99 % | TEMPERATURE: 98.2 F | HEART RATE: 73 BPM | SYSTOLIC BLOOD PRESSURE: 133 MMHG
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
## 2025-07-02 DIAGNOSIS — Z87.898 PERSONAL HISTORY OF OTHER SPECIFIED CONDITIONS: Chronic | ICD-10-CM

## 2025-07-02 DIAGNOSIS — E11.59 TYPE 2 DIABETES MELLITUS WITH OTHER CIRCULATORY COMPLICATIONS: ICD-10-CM
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
## 2025-07-02 DIAGNOSIS — L59.8 OTHER SPECIFIED DISORDERS OF THE SKIN AND SUBCUTANEOUS TISSUE RELATED TO RADIATION: ICD-10-CM

## 2025-07-02 DIAGNOSIS — Z85.46 PERSONAL HISTORY OF MALIGNANT NEOPLASM OF PROSTATE: ICD-10-CM

## 2025-07-02 LAB
GLUCOSE BLDC GLUCOMTR-MCNC: 169 MG/DL — HIGH (ref 70–99)
GLUCOSE BLDC GLUCOMTR-MCNC: 192 MG/DL — HIGH (ref 70–99)

## 2025-07-02 PROCEDURE — 99183 HYPERBARIC OXYGEN THERAPY: CPT

## 2025-07-02 PROCEDURE — 82962 GLUCOSE BLOOD TEST: CPT

## 2025-07-02 PROCEDURE — G0277: CPT

## 2025-07-03 ENCOUNTER — APPOINTMENT (OUTPATIENT)
Dept: HYPERBARIC MEDICINE | Facility: HOSPITAL | Age: 60
End: 2025-07-03

## 2025-07-03 ENCOUNTER — APPOINTMENT (OUTPATIENT)
Dept: GASTROENTEROLOGY | Facility: CLINIC | Age: 60
End: 2025-07-03

## 2025-07-03 ENCOUNTER — OUTPATIENT (OUTPATIENT)
Dept: OUTPATIENT SERVICES | Facility: HOSPITAL | Age: 60
LOS: 1 days | End: 2025-07-03
Payer: MEDICARE

## 2025-07-03 VITALS
OXYGEN SATURATION: 100 % | SYSTOLIC BLOOD PRESSURE: 121 MMHG | HEART RATE: 83 BPM | DIASTOLIC BLOOD PRESSURE: 65 MMHG | TEMPERATURE: 98.4 F | RESPIRATION RATE: 16 BRPM

## 2025-07-03 VITALS
BODY MASS INDEX: 27.97 KG/M2 | RESPIRATION RATE: 16 BRPM | DIASTOLIC BLOOD PRESSURE: 70 MMHG | HEIGHT: 66 IN | SYSTOLIC BLOOD PRESSURE: 121 MMHG | WEIGHT: 174 LBS | HEART RATE: 73 BPM | OXYGEN SATURATION: 97 % | TEMPERATURE: 98.4 F

## 2025-07-03 VITALS
SYSTOLIC BLOOD PRESSURE: 132 MMHG | RESPIRATION RATE: 16 BRPM | HEART RATE: 68 BPM | DIASTOLIC BLOOD PRESSURE: 78 MMHG | TEMPERATURE: 98.4 F | OXYGEN SATURATION: 99 %

## 2025-07-03 DIAGNOSIS — Z98.89 OTHER SPECIFIED POSTPROCEDURAL STATES: Chronic | ICD-10-CM

## 2025-07-03 DIAGNOSIS — Z90.49 ACQUIRED ABSENCE OF OTHER SPECIFIED PARTS OF DIGESTIVE TRACT: Chronic | ICD-10-CM

## 2025-07-03 DIAGNOSIS — L59.8 OTHER SPECIFIED DISORDERS OF THE SKIN AND SUBCUTANEOUS TISSUE RELATED TO RADIATION: ICD-10-CM

## 2025-07-03 LAB
GLUCOSE BLDC GLUCOMTR-MCNC: 153 MG/DL — HIGH (ref 70–99)
GLUCOSE BLDC GLUCOMTR-MCNC: 158 MG/DL — HIGH (ref 70–99)

## 2025-07-03 PROCEDURE — 82962 GLUCOSE BLOOD TEST: CPT

## 2025-07-03 PROCEDURE — 99214 OFFICE O/P EST MOD 30 MIN: CPT

## 2025-07-03 PROCEDURE — G0277: CPT

## 2025-07-03 PROCEDURE — 99183 HYPERBARIC OXYGEN THERAPY: CPT

## 2025-07-03 RX ORDER — SODIUM SULFATE, POTASSIUM SULFATE AND MAGNESIUM SULFATE 1.6; 3.13; 17.5 G/177ML; G/177ML; G/177ML
17.5-3.13-1.6 SOLUTION ORAL
Qty: 1 | Refills: 0 | Status: ACTIVE | COMMUNITY
Start: 2025-07-03 | End: 1900-01-01

## 2025-07-04 DIAGNOSIS — L59.8 OTHER SPECIFIED DISORDERS OF THE SKIN AND SUBCUTANEOUS TISSUE RELATED TO RADIATION: ICD-10-CM

## 2025-07-04 DIAGNOSIS — E11.59 TYPE 2 DIABETES MELLITUS WITH OTHER CIRCULATORY COMPLICATIONS: ICD-10-CM
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
## 2025-07-04 DIAGNOSIS — Z85.46 PERSONAL HISTORY OF MALIGNANT NEOPLASM OF PROSTATE: ICD-10-CM

## 2025-07-04 LAB
25(OH)D3 SERPL-MCNC: 32.4 NG/ML
ALBUMIN SERPL ELPH-MCNC: 4.3 G/DL
ALP BLD-CCNC: 104 U/L
ALT SERPL-CCNC: 21 U/L
ANION GAP SERPL CALC-SCNC: 13 MMOL/L
AST SERPL-CCNC: 20 U/L
BILIRUB SERPL-MCNC: <0.2 MG/DL
BUN SERPL-MCNC: 20 MG/DL
CALCIUM SERPL-MCNC: 9.3 MG/DL
CHLORIDE SERPL-SCNC: 107 MMOL/L
CO2 SERPL-SCNC: 23 MMOL/L
CREAT SERPL-MCNC: 0.9 MG/DL
CRP SERPL-MCNC: 19 MG/L
EGFRCR SERPLBLD CKD-EPI 2021: 98 ML/MIN/1.73M2
FERRITIN SERPL-MCNC: 508 NG/ML
HBV CORE IGG+IGM SER QL: NONREACTIVE
HBV SURFACE AB SER QL: REACTIVE
HBV SURFACE AG SER QL: NONREACTIVE
HCT VFR BLD CALC: 36.9 %
HGB BLD-MCNC: 11.3 G/DL
IRON SATN MFR SERPL: 14 %
IRON SERPL-MCNC: 45 UG/DL
MCHC RBC-ENTMCNC: 26.7 PG
MCHC RBC-ENTMCNC: 30.6 G/DL
MCV RBC AUTO: 87.2 FL
PLATELET # BLD AUTO: 161 K/UL
POTASSIUM SERPL-SCNC: 4.7 MMOL/L
PROT SERPL-MCNC: 6.8 G/DL
RBC # BLD: 4.23 M/UL
RBC # FLD: 18.7 %
SODIUM SERPL-SCNC: 143 MMOL/L
TIBC SERPL-MCNC: 318 UG/DL
UIBC SERPL-MCNC: 273 UG/DL
VIT B12 SERPL-MCNC: 902 PG/ML
WBC # FLD AUTO: 5.12 K/UL

## 2025-07-07 ENCOUNTER — APPOINTMENT (OUTPATIENT)
Dept: HYPERBARIC MEDICINE | Facility: HOSPITAL | Age: 60
End: 2025-07-07
Payer: MEDICARE

## 2025-07-07 ENCOUNTER — OUTPATIENT (OUTPATIENT)
Dept: OUTPATIENT SERVICES | Facility: HOSPITAL | Age: 60
LOS: 1 days | End: 2025-07-07
Payer: MEDICARE

## 2025-07-07 VITALS
OXYGEN SATURATION: 99 % | TEMPERATURE: 99 F | SYSTOLIC BLOOD PRESSURE: 144 MMHG | RESPIRATION RATE: 15 BRPM | HEART RATE: 68 BPM | DIASTOLIC BLOOD PRESSURE: 80 MMHG

## 2025-07-07 VITALS
SYSTOLIC BLOOD PRESSURE: 125 MMHG | DIASTOLIC BLOOD PRESSURE: 75 MMHG | RESPIRATION RATE: 16 BRPM | TEMPERATURE: 98.8 F | OXYGEN SATURATION: 98 % | HEART RATE: 85 BPM

## 2025-07-07 DIAGNOSIS — Z87.898 PERSONAL HISTORY OF OTHER SPECIFIED CONDITIONS: Chronic | ICD-10-CM
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
## 2025-07-07 DIAGNOSIS — L59.8 OTHER SPECIFIED DISORDERS OF THE SKIN AND SUBCUTANEOUS TISSUE RELATED TO RADIATION: ICD-10-CM

## 2025-07-07 DIAGNOSIS — E11.59 TYPE 2 DIABETES MELLITUS WITH OTHER CIRCULATORY COMPLICATIONS: ICD-10-CM

## 2025-07-07 DIAGNOSIS — Z85.46 PERSONAL HISTORY OF MALIGNANT NEOPLASM OF PROSTATE: ICD-10-CM

## 2025-07-07 DIAGNOSIS — S92.009A UNSPECIFIED FRACTURE OF UNSPECIFIED CALCANEUS, INITIAL ENCOUNTER FOR CLOSED FRACTURE: Chronic | ICD-10-CM
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
## 2025-07-07 LAB
M TB IFN-G BLD-IMP: NEGATIVE
QUANTIFERON TB PLUS MITOGEN MINUS NIL: 8.67 IU/ML
QUANTIFERON TB PLUS NIL: 0.02 IU/ML
QUANTIFERON TB PLUS TB1 MINUS NIL: 0 IU/ML
QUANTIFERON TB PLUS TB2 MINUS NIL: 0 IU/ML

## 2025-07-07 PROCEDURE — 99183 HYPERBARIC OXYGEN THERAPY: CPT

## 2025-07-07 PROCEDURE — G0277: CPT

## 2025-07-07 PROCEDURE — 82962 GLUCOSE BLOOD TEST: CPT

## 2025-07-08 ENCOUNTER — APPOINTMENT (OUTPATIENT)
Dept: HYPERBARIC MEDICINE | Facility: HOSPITAL | Age: 60
End: 2025-07-08

## 2025-07-09 ENCOUNTER — APPOINTMENT (OUTPATIENT)
Dept: HYPERBARIC MEDICINE | Facility: HOSPITAL | Age: 60
End: 2025-07-09

## 2025-07-09 ENCOUNTER — NON-APPOINTMENT (OUTPATIENT)
Age: 60
End: 2025-07-09

## 2025-07-09 ENCOUNTER — OUTPATIENT (OUTPATIENT)
Dept: OUTPATIENT SERVICES | Facility: HOSPITAL | Age: 60
LOS: 1 days | End: 2025-07-09
Payer: MEDICARE

## 2025-07-09 VITALS
DIASTOLIC BLOOD PRESSURE: 86 MMHG | TEMPERATURE: 98 F | SYSTOLIC BLOOD PRESSURE: 134 MMHG | HEART RATE: 66 BPM | RESPIRATION RATE: 12 BRPM | OXYGEN SATURATION: 100 %

## 2025-07-09 VITALS
DIASTOLIC BLOOD PRESSURE: 72 MMHG | OXYGEN SATURATION: 99 % | SYSTOLIC BLOOD PRESSURE: 113 MMHG | HEART RATE: 77 BPM | RESPIRATION RATE: 15 BRPM | TEMPERATURE: 98.4 F

## 2025-07-09 DIAGNOSIS — Z90.49 ACQUIRED ABSENCE OF OTHER SPECIFIED PARTS OF DIGESTIVE TRACT: Chronic | ICD-10-CM

## 2025-07-09 DIAGNOSIS — S92.009A UNSPECIFIED FRACTURE OF UNSPECIFIED CALCANEUS, INITIAL ENCOUNTER FOR CLOSED FRACTURE: Chronic | ICD-10-CM

## 2025-07-09 DIAGNOSIS — Z87.898 PERSONAL HISTORY OF OTHER SPECIFIED CONDITIONS: Chronic | ICD-10-CM

## 2025-07-09 DIAGNOSIS — L59.8 OTHER SPECIFIED DISORDERS OF THE SKIN AND SUBCUTANEOUS TISSUE RELATED TO RADIATION: ICD-10-CM

## 2025-07-09 DIAGNOSIS — Z98.89 OTHER SPECIFIED POSTPROCEDURAL STATES: Chronic | ICD-10-CM

## 2025-07-09 PROCEDURE — 82962 GLUCOSE BLOOD TEST: CPT

## 2025-07-09 PROCEDURE — 99183 HYPERBARIC OXYGEN THERAPY: CPT

## 2025-07-09 PROCEDURE — G0277: CPT

## 2025-07-10 ENCOUNTER — APPOINTMENT (OUTPATIENT)
Dept: HYPERBARIC MEDICINE | Facility: HOSPITAL | Age: 60
End: 2025-07-10
Payer: MEDICARE

## 2025-07-10 ENCOUNTER — NON-APPOINTMENT (OUTPATIENT)
Age: 60
End: 2025-07-10

## 2025-07-10 ENCOUNTER — OUTPATIENT (OUTPATIENT)
Dept: OUTPATIENT SERVICES | Facility: HOSPITAL | Age: 60
LOS: 1 days | End: 2025-07-10
Payer: MEDICARE

## 2025-07-10 VITALS
TEMPERATURE: 98.1 F | HEART RATE: 67 BPM | RESPIRATION RATE: 15 BRPM | DIASTOLIC BLOOD PRESSURE: 73 MMHG | SYSTOLIC BLOOD PRESSURE: 125 MMHG | OXYGEN SATURATION: 100 %

## 2025-07-10 VITALS
RESPIRATION RATE: 16 BRPM | TEMPERATURE: 97.8 F | DIASTOLIC BLOOD PRESSURE: 70 MMHG | HEART RATE: 74 BPM | SYSTOLIC BLOOD PRESSURE: 128 MMHG | OXYGEN SATURATION: 99 %
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
## 2025-07-10 DIAGNOSIS — Z98.89 OTHER SPECIFIED POSTPROCEDURAL STATES: Chronic | ICD-10-CM

## 2025-07-10 DIAGNOSIS — E11.59 TYPE 2 DIABETES MELLITUS WITH OTHER CIRCULATORY COMPLICATIONS: ICD-10-CM
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
## 2025-07-10 DIAGNOSIS — L59.8 OTHER SPECIFIED DISORDERS OF THE SKIN AND SUBCUTANEOUS TISSUE RELATED TO RADIATION: ICD-10-CM

## 2025-07-10 DIAGNOSIS — Z85.46 PERSONAL HISTORY OF MALIGNANT NEOPLASM OF PROSTATE: ICD-10-CM

## 2025-07-10 DIAGNOSIS — S92.009A UNSPECIFIED FRACTURE OF UNSPECIFIED CALCANEUS, INITIAL ENCOUNTER FOR CLOSED FRACTURE: Chronic | ICD-10-CM

## 2025-07-10 DIAGNOSIS — Z87.898 PERSONAL HISTORY OF OTHER SPECIFIED CONDITIONS: Chronic | ICD-10-CM

## 2025-07-10 DIAGNOSIS — Z90.49 ACQUIRED ABSENCE OF OTHER SPECIFIED PARTS OF DIGESTIVE TRACT: Chronic | ICD-10-CM

## 2025-07-10 PROCEDURE — 99183 HYPERBARIC OXYGEN THERAPY: CPT

## 2025-07-10 PROCEDURE — 82962 GLUCOSE BLOOD TEST: CPT

## 2025-07-10 PROCEDURE — G0277: CPT

## 2025-07-11 ENCOUNTER — OUTPATIENT (OUTPATIENT)
Dept: OUTPATIENT SERVICES | Facility: HOSPITAL | Age: 60
LOS: 1 days | End: 2025-07-11
Payer: MEDICARE

## 2025-07-11 ENCOUNTER — APPOINTMENT (OUTPATIENT)
Dept: HYPERBARIC MEDICINE | Facility: HOSPITAL | Age: 60
End: 2025-07-11

## 2025-07-11 ENCOUNTER — NON-APPOINTMENT (OUTPATIENT)
Age: 60
End: 2025-07-11

## 2025-07-11 VITALS
RESPIRATION RATE: 16 BRPM | SYSTOLIC BLOOD PRESSURE: 138 MMHG | TEMPERATURE: 98.3 F | OXYGEN SATURATION: 99 % | HEART RATE: 77 BPM | DIASTOLIC BLOOD PRESSURE: 79 MMHG

## 2025-07-11 VITALS
OXYGEN SATURATION: 96 % | HEART RATE: 84 BPM | RESPIRATION RATE: 15 BRPM | SYSTOLIC BLOOD PRESSURE: 119 MMHG | DIASTOLIC BLOOD PRESSURE: 76 MMHG | TEMPERATURE: 98.6 F

## 2025-07-11 DIAGNOSIS — Z87.898 PERSONAL HISTORY OF OTHER SPECIFIED CONDITIONS: Chronic | ICD-10-CM

## 2025-07-11 DIAGNOSIS — Z98.89 OTHER SPECIFIED POSTPROCEDURAL STATES: Chronic | ICD-10-CM

## 2025-07-11 DIAGNOSIS — S92.009A UNSPECIFIED FRACTURE OF UNSPECIFIED CALCANEUS, INITIAL ENCOUNTER FOR CLOSED FRACTURE: Chronic | ICD-10-CM

## 2025-07-11 DIAGNOSIS — L59.8 OTHER SPECIFIED DISORDERS OF THE SKIN AND SUBCUTANEOUS TISSUE RELATED TO RADIATION: ICD-10-CM

## 2025-07-11 LAB
GLUCOSE BLDC GLUCOMTR-MCNC: 111 MG/DL — HIGH (ref 70–99)
GLUCOSE BLDC GLUCOMTR-MCNC: 138 MG/DL — HIGH (ref 70–99)

## 2025-07-11 PROCEDURE — 99183 HYPERBARIC OXYGEN THERAPY: CPT

## 2025-07-11 PROCEDURE — G0277: CPT

## 2025-07-11 PROCEDURE — 82962 GLUCOSE BLOOD TEST: CPT
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
## 2025-07-13 DIAGNOSIS — Z85.46 PERSONAL HISTORY OF MALIGNANT NEOPLASM OF PROSTATE: ICD-10-CM

## 2025-07-13 DIAGNOSIS — E11.59 TYPE 2 DIABETES MELLITUS WITH OTHER CIRCULATORY COMPLICATIONS: ICD-10-CM
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
## 2025-07-13 DIAGNOSIS — L59.8 OTHER SPECIFIED DISORDERS OF THE SKIN AND SUBCUTANEOUS TISSUE RELATED TO RADIATION: ICD-10-CM

## 2025-07-14 ENCOUNTER — OUTPATIENT (OUTPATIENT)
Dept: OUTPATIENT SERVICES | Facility: HOSPITAL | Age: 60
LOS: 1 days | End: 2025-07-14
Payer: MEDICARE

## 2025-07-14 ENCOUNTER — APPOINTMENT (OUTPATIENT)
Dept: HYPERBARIC MEDICINE | Facility: HOSPITAL | Age: 60
End: 2025-07-14
Payer: MEDICARE

## 2025-07-14 VITALS
RESPIRATION RATE: 16 BRPM | TEMPERATURE: 98.1 F | OXYGEN SATURATION: 99 % | HEART RATE: 70 BPM | DIASTOLIC BLOOD PRESSURE: 77 MMHG | SYSTOLIC BLOOD PRESSURE: 124 MMHG

## 2025-07-14 VITALS
OXYGEN SATURATION: 100 % | HEART RATE: 74 BPM | SYSTOLIC BLOOD PRESSURE: 131 MMHG | RESPIRATION RATE: 15 BRPM | TEMPERATURE: 98 F | DIASTOLIC BLOOD PRESSURE: 63 MMHG

## 2025-07-14 DIAGNOSIS — L59.8 OTHER SPECIFIED DISORDERS OF THE SKIN AND SUBCUTANEOUS TISSUE RELATED TO RADIATION: ICD-10-CM

## 2025-07-14 DIAGNOSIS — E11.59 TYPE 2 DIABETES MELLITUS WITH OTHER CIRCULATORY COMPLICATIONS: ICD-10-CM

## 2025-07-14 DIAGNOSIS — S92.009A UNSPECIFIED FRACTURE OF UNSPECIFIED CALCANEUS, INITIAL ENCOUNTER FOR CLOSED FRACTURE: Chronic | ICD-10-CM

## 2025-07-14 DIAGNOSIS — Z85.46 PERSONAL HISTORY OF MALIGNANT NEOPLASM OF PROSTATE: ICD-10-CM

## 2025-07-14 DIAGNOSIS — Z98.89 OTHER SPECIFIED POSTPROCEDURAL STATES: Chronic | ICD-10-CM
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
## 2025-07-14 PROCEDURE — G0277: CPT

## 2025-07-14 PROCEDURE — 82962 GLUCOSE BLOOD TEST: CPT

## 2025-07-14 PROCEDURE — 99183 HYPERBARIC OXYGEN THERAPY: CPT

## 2025-07-15 ENCOUNTER — OUTPATIENT (OUTPATIENT)
Dept: OUTPATIENT SERVICES | Facility: HOSPITAL | Age: 60
LOS: 1 days | End: 2025-07-15
Payer: MEDICARE

## 2025-07-15 ENCOUNTER — APPOINTMENT (OUTPATIENT)
Dept: HYPERBARIC MEDICINE | Facility: HOSPITAL | Age: 60
End: 2025-07-15
Payer: MEDICARE

## 2025-07-15 ENCOUNTER — NON-APPOINTMENT (OUTPATIENT)
Age: 60
End: 2025-07-15

## 2025-07-15 VITALS
RESPIRATION RATE: 16 BRPM | OXYGEN SATURATION: 99 % | TEMPERATURE: 98.6 F | SYSTOLIC BLOOD PRESSURE: 132 MMHG | HEART RATE: 70 BPM | DIASTOLIC BLOOD PRESSURE: 80 MMHG

## 2025-07-15 VITALS
TEMPERATURE: 98.1 F | RESPIRATION RATE: 16 BRPM | SYSTOLIC BLOOD PRESSURE: 124 MMHG | DIASTOLIC BLOOD PRESSURE: 76 MMHG | HEART RATE: 70 BPM | OXYGEN SATURATION: 98 %

## 2025-07-15 DIAGNOSIS — E11.59 TYPE 2 DIABETES MELLITUS WITH OTHER CIRCULATORY COMPLICATIONS: ICD-10-CM

## 2025-07-15 DIAGNOSIS — Z85.46 PERSONAL HISTORY OF MALIGNANT NEOPLASM OF PROSTATE: ICD-10-CM

## 2025-07-15 DIAGNOSIS — S92.009A UNSPECIFIED FRACTURE OF UNSPECIFIED CALCANEUS, INITIAL ENCOUNTER FOR CLOSED FRACTURE: Chronic | ICD-10-CM

## 2025-07-15 DIAGNOSIS — L59.8 OTHER SPECIFIED DISORDERS OF THE SKIN AND SUBCUTANEOUS TISSUE RELATED TO RADIATION: ICD-10-CM

## 2025-07-15 DIAGNOSIS — Z98.89 OTHER SPECIFIED POSTPROCEDURAL STATES: Chronic | ICD-10-CM
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
## 2025-07-15 DIAGNOSIS — Z90.49 ACQUIRED ABSENCE OF OTHER SPECIFIED PARTS OF DIGESTIVE TRACT: Chronic | ICD-10-CM

## 2025-07-15 DIAGNOSIS — Z87.898 PERSONAL HISTORY OF OTHER SPECIFIED CONDITIONS: Chronic | ICD-10-CM

## 2025-07-15 LAB
GLUCOSE BLDC GLUCOMTR-MCNC: 132 MG/DL — HIGH (ref 70–99)
GLUCOSE BLDC GLUCOMTR-MCNC: 170 MG/DL — HIGH (ref 70–99)

## 2025-07-15 PROCEDURE — 82962 GLUCOSE BLOOD TEST: CPT

## 2025-07-15 PROCEDURE — G0277: CPT

## 2025-07-15 PROCEDURE — 99183 HYPERBARIC OXYGEN THERAPY: CPT

## 2025-07-16 ENCOUNTER — OUTPATIENT (OUTPATIENT)
Dept: OUTPATIENT SERVICES | Facility: HOSPITAL | Age: 60
LOS: 1 days | End: 2025-07-16
Payer: MEDICARE

## 2025-07-16 ENCOUNTER — APPOINTMENT (OUTPATIENT)
Dept: HYPERBARIC MEDICINE | Facility: HOSPITAL | Age: 60
End: 2025-07-16
Payer: MEDICARE

## 2025-07-16 VITALS
SYSTOLIC BLOOD PRESSURE: 118 MMHG | HEART RATE: 74 BPM | DIASTOLIC BLOOD PRESSURE: 70 MMHG | TEMPERATURE: 98.1 F | OXYGEN SATURATION: 98 % | RESPIRATION RATE: 16 BRPM

## 2025-07-16 VITALS
OXYGEN SATURATION: 98 % | HEART RATE: 86 BPM | SYSTOLIC BLOOD PRESSURE: 105 MMHG | DIASTOLIC BLOOD PRESSURE: 66 MMHG | RESPIRATION RATE: 15 BRPM | TEMPERATURE: 98.2 F

## 2025-07-16 DIAGNOSIS — Z85.46 PERSONAL HISTORY OF MALIGNANT NEOPLASM OF PROSTATE: ICD-10-CM

## 2025-07-16 DIAGNOSIS — L59.8 OTHER SPECIFIED DISORDERS OF THE SKIN AND SUBCUTANEOUS TISSUE RELATED TO RADIATION: ICD-10-CM
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
## 2025-07-16 DIAGNOSIS — E11.59 TYPE 2 DIABETES MELLITUS WITH OTHER CIRCULATORY COMPLICATIONS: ICD-10-CM
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
## 2025-07-16 PROCEDURE — 99183 HYPERBARIC OXYGEN THERAPY: CPT

## 2025-07-16 PROCEDURE — 82962 GLUCOSE BLOOD TEST: CPT

## 2025-07-16 PROCEDURE — G0277: CPT

## 2025-07-17 ENCOUNTER — APPOINTMENT (OUTPATIENT)
Dept: MRI IMAGING | Facility: IMAGING CENTER | Age: 60
End: 2025-07-17
Payer: MEDICARE

## 2025-07-17 ENCOUNTER — OUTPATIENT (OUTPATIENT)
Dept: OUTPATIENT SERVICES | Facility: HOSPITAL | Age: 60
LOS: 1 days | End: 2025-07-17
Payer: MEDICARE

## 2025-07-17 ENCOUNTER — APPOINTMENT (OUTPATIENT)
Dept: HYPERBARIC MEDICINE | Facility: HOSPITAL | Age: 60
End: 2025-07-17

## 2025-07-17 ENCOUNTER — RESULT REVIEW (OUTPATIENT)
Age: 60
End: 2025-07-17

## 2025-07-17 VITALS
OXYGEN SATURATION: 100 % | DIASTOLIC BLOOD PRESSURE: 80 MMHG | RESPIRATION RATE: 16 BRPM | TEMPERATURE: 98 F | HEART RATE: 72 BPM | SYSTOLIC BLOOD PRESSURE: 131 MMHG

## 2025-07-17 VITALS
RESPIRATION RATE: 18 BRPM | HEART RATE: 71 BPM | SYSTOLIC BLOOD PRESSURE: 132 MMHG | DIASTOLIC BLOOD PRESSURE: 82 MMHG | TEMPERATURE: 98.1 F | OXYGEN SATURATION: 98 %

## 2025-07-17 DIAGNOSIS — Z98.89 OTHER SPECIFIED POSTPROCEDURAL STATES: Chronic | ICD-10-CM

## 2025-07-17 DIAGNOSIS — K50.90 CROHN'S DISEASE, UNSPECIFIED, WITHOUT COMPLICATIONS: ICD-10-CM

## 2025-07-17 DIAGNOSIS — Z90.49 ACQUIRED ABSENCE OF OTHER SPECIFIED PARTS OF DIGESTIVE TRACT: Chronic | ICD-10-CM

## 2025-07-17 DIAGNOSIS — Z87.898 PERSONAL HISTORY OF OTHER SPECIFIED CONDITIONS: Chronic | ICD-10-CM

## 2025-07-17 DIAGNOSIS — S92.009A UNSPECIFIED FRACTURE OF UNSPECIFIED CALCANEUS, INITIAL ENCOUNTER FOR CLOSED FRACTURE: Chronic | ICD-10-CM

## 2025-07-17 DIAGNOSIS — L59.8 OTHER SPECIFIED DISORDERS OF THE SKIN AND SUBCUTANEOUS TISSUE RELATED TO RADIATION: ICD-10-CM

## 2025-07-17 PROCEDURE — 74183 MRI ABD W/O CNTR FLWD CNTR: CPT | Mod: MH

## 2025-07-17 PROCEDURE — 99183 HYPERBARIC OXYGEN THERAPY: CPT

## 2025-07-17 PROCEDURE — A9585: CPT

## 2025-07-17 PROCEDURE — G0277: CPT

## 2025-07-17 PROCEDURE — 72197 MRI PELVIS W/O & W/DYE: CPT | Mod: 26

## 2025-07-17 PROCEDURE — 82962 GLUCOSE BLOOD TEST: CPT

## 2025-07-17 PROCEDURE — 74183 MRI ABD W/O CNTR FLWD CNTR: CPT | Mod: 26

## 2025-07-17 PROCEDURE — 72197 MRI PELVIS W/O & W/DYE: CPT | Mod: MH

## 2025-07-18 ENCOUNTER — APPOINTMENT (OUTPATIENT)
Dept: HYPERBARIC MEDICINE | Facility: HOSPITAL | Age: 60
End: 2025-07-18

## 2025-07-19 DIAGNOSIS — E11.59 TYPE 2 DIABETES MELLITUS WITH OTHER CIRCULATORY COMPLICATIONS: ICD-10-CM
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
## 2025-07-19 DIAGNOSIS — Z85.46 PERSONAL HISTORY OF MALIGNANT NEOPLASM OF PROSTATE: ICD-10-CM
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
## 2025-07-19 DIAGNOSIS — L59.8 OTHER SPECIFIED DISORDERS OF THE SKIN AND SUBCUTANEOUS TISSUE RELATED TO RADIATION: ICD-10-CM

## 2025-07-21 ENCOUNTER — NON-APPOINTMENT (OUTPATIENT)
Age: 60
End: 2025-07-21

## 2025-07-21 ENCOUNTER — APPOINTMENT (OUTPATIENT)
Dept: HYPERBARIC MEDICINE | Facility: HOSPITAL | Age: 60
End: 2025-07-21

## 2025-07-21 NOTE — ED ADULT NURSE NOTE - IS THE PATIENT ABLE TO BE SCREENED?
Problem: Adult Inpatient Plan of Care  Goal: Plan of Care Review  Outcome: Progressing  Goal: Patient-Specific Goal (Individualized)  Outcome: Progressing  Goal: Absence of Hospital-Acquired Illness or Injury  Outcome: Progressing  Goal: Optimal Comfort and Wellbeing  Outcome: Progressing  Goal: Readiness for Transition of Care  Outcome: Progressing      Yes

## 2025-07-22 ENCOUNTER — OUTPATIENT (OUTPATIENT)
Dept: OUTPATIENT SERVICES | Facility: HOSPITAL | Age: 60
LOS: 1 days | End: 2025-07-22
Payer: MEDICARE

## 2025-07-22 ENCOUNTER — APPOINTMENT (OUTPATIENT)
Dept: HYPERBARIC MEDICINE | Facility: HOSPITAL | Age: 60
End: 2025-07-22
Payer: MEDICARE

## 2025-07-22 VITALS
HEART RATE: 70 BPM | SYSTOLIC BLOOD PRESSURE: 151 MMHG | OXYGEN SATURATION: 99 % | DIASTOLIC BLOOD PRESSURE: 93 MMHG | TEMPERATURE: 98.5 F | RESPIRATION RATE: 18 BRPM

## 2025-07-22 VITALS
HEART RATE: 67 BPM | DIASTOLIC BLOOD PRESSURE: 81 MMHG | RESPIRATION RATE: 18 BRPM | OXYGEN SATURATION: 100 % | SYSTOLIC BLOOD PRESSURE: 145 MMHG | TEMPERATURE: 98.4 F

## 2025-07-22 DIAGNOSIS — Z98.89 OTHER SPECIFIED POSTPROCEDURAL STATES: Chronic | ICD-10-CM

## 2025-07-22 DIAGNOSIS — L59.8 OTHER SPECIFIED DISORDERS OF THE SKIN AND SUBCUTANEOUS TISSUE RELATED TO RADIATION: ICD-10-CM
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
## 2025-07-22 DIAGNOSIS — Z90.49 ACQUIRED ABSENCE OF OTHER SPECIFIED PARTS OF DIGESTIVE TRACT: Chronic | ICD-10-CM

## 2025-07-22 DIAGNOSIS — Z85.46 PERSONAL HISTORY OF MALIGNANT NEOPLASM OF PROSTATE: ICD-10-CM

## 2025-07-22 DIAGNOSIS — S92.009A UNSPECIFIED FRACTURE OF UNSPECIFIED CALCANEUS, INITIAL ENCOUNTER FOR CLOSED FRACTURE: Chronic | ICD-10-CM

## 2025-07-22 DIAGNOSIS — E11.59 TYPE 2 DIABETES MELLITUS WITH OTHER CIRCULATORY COMPLICATIONS: ICD-10-CM

## 2025-07-22 LAB
GLUCOSE BLDC GLUCOMTR-MCNC: 102 MG/DL — HIGH (ref 70–99)
GLUCOSE BLDC GLUCOMTR-MCNC: 133 MG/DL — HIGH (ref 70–99)

## 2025-07-22 PROCEDURE — G0277: CPT

## 2025-07-22 PROCEDURE — 99183 HYPERBARIC OXYGEN THERAPY: CPT

## 2025-07-22 PROCEDURE — 82962 GLUCOSE BLOOD TEST: CPT

## 2025-07-23 ENCOUNTER — APPOINTMENT (OUTPATIENT)
Dept: COLORECTAL SURGERY | Facility: CLINIC | Age: 60
End: 2025-07-23
Payer: MEDICARE

## 2025-07-23 ENCOUNTER — MED ADMIN CHARGE (OUTPATIENT)
Age: 60
End: 2025-07-23

## 2025-07-23 ENCOUNTER — TRANSCRIPTION ENCOUNTER (OUTPATIENT)
Age: 60
End: 2025-07-23

## 2025-07-23 ENCOUNTER — APPOINTMENT (OUTPATIENT)
Dept: HYPERBARIC MEDICINE | Facility: HOSPITAL | Age: 60
End: 2025-07-23

## 2025-07-23 VITALS
TEMPERATURE: 98.42 F | DIASTOLIC BLOOD PRESSURE: 77 MMHG | HEART RATE: 65 BPM | RESPIRATION RATE: 16 BRPM | SYSTOLIC BLOOD PRESSURE: 157 MMHG | OXYGEN SATURATION: 99 %

## 2025-07-23 VITALS
DIASTOLIC BLOOD PRESSURE: 77 MMHG | SYSTOLIC BLOOD PRESSURE: 132 MMHG | BODY MASS INDEX: 27.76 KG/M2 | TEMPERATURE: 98 F | HEART RATE: 72 BPM | RESPIRATION RATE: 17 BRPM | WEIGHT: 172 LBS | OXYGEN SATURATION: 98 %

## 2025-07-23 DIAGNOSIS — K21.9 GASTRO-ESOPHAGEAL REFLUX DISEASE WITHOUT ESOPHAGITIS: ICD-10-CM
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
## 2025-07-23 DIAGNOSIS — Z84.0 FAMILY HISTORY OF DISEASES OF THE SKIN AND SUBCUTANEOUS TISSUE: ICD-10-CM

## 2025-07-23 DIAGNOSIS — Z80.3 FAMILY HISTORY OF MALIGNANT NEOPLASM OF BREAST: ICD-10-CM

## 2025-07-23 DIAGNOSIS — J45.50 SEVERE PERSISTENT ASTHMA, UNCOMPLICATED: ICD-10-CM

## 2025-07-23 DIAGNOSIS — I25.10 ATHEROSCLEROTIC HEART DISEASE OF NATIVE CORONARY ARTERY WITHOUT ANGINA PECTORIS: ICD-10-CM

## 2025-07-23 DIAGNOSIS — E11.59 TYPE 2 DIABETES MELLITUS WITH OTHER CIRCULATORY COMPLICATIONS: ICD-10-CM

## 2025-07-23 DIAGNOSIS — Z82.49 FAMILY HISTORY OF ISCHEMIC HEART DISEASE AND OTHER DISEASES OF THE CIRCULATORY SYSTEM: ICD-10-CM

## 2025-07-23 DIAGNOSIS — Z83.79 FAMILY HISTORY OF OTHER DISEASES OF THE DIGESTIVE SYSTEM: ICD-10-CM

## 2025-07-23 DIAGNOSIS — Z86.19 PERSONAL HISTORY OF OTHER INFECTIOUS AND PARASITIC DISEASES: ICD-10-CM

## 2025-07-23 DIAGNOSIS — Z85.46 PERSONAL HISTORY OF MALIGNANT NEOPLASM OF PROSTATE: ICD-10-CM

## 2025-07-23 DIAGNOSIS — Z98.890 OTHER SPECIFIED POSTPROCEDURAL STATES: ICD-10-CM

## 2025-07-23 DIAGNOSIS — K50.90 CROHN'S DISEASE, UNSPECIFIED, WITHOUT COMPLICATIONS: ICD-10-CM

## 2025-07-23 DIAGNOSIS — Z79.899 OTHER LONG TERM (CURRENT) DRUG THERAPY: ICD-10-CM

## 2025-07-23 DIAGNOSIS — Z80.42 FAMILY HISTORY OF MALIGNANT NEOPLASM OF PROSTATE: ICD-10-CM
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
## 2025-07-23 DIAGNOSIS — L59.8 OTHER SPECIFIED DISORDERS OF THE SKIN AND SUBCUTANEOUS TISSUE RELATED TO RADIATION: ICD-10-CM

## 2025-07-23 DIAGNOSIS — I10 ESSENTIAL (PRIMARY) HYPERTENSION: ICD-10-CM

## 2025-07-23 PROCEDURE — 96366 THER/PROPH/DIAG IV INF ADDON: CPT

## 2025-07-23 PROCEDURE — 96365 THER/PROPH/DIAG IV INF INIT: CPT

## 2025-07-23 RX ORDER — CETIRIZINE HYDROCHLORIDE 10 MG/1
10 TABLET, FILM COATED ORAL
Qty: 0 | Refills: 0 | Status: COMPLETED
Start: 2025-07-20

## 2025-07-23 RX ORDER — ACETAMINOPHEN 325 MG/1
325 TABLET ORAL
Qty: 0 | Refills: 0 | Status: COMPLETED
Start: 2025-07-20

## 2025-07-23 RX ORDER — SEMAGLUTIDE 0.68 MG/ML
2 INJECTION, SOLUTION SUBCUTANEOUS
Refills: 0 | Status: ACTIVE | COMMUNITY
Start: 2025-07-23

## 2025-07-23 RX ORDER — INFLIXIMAB 100 MG/10ML
100 INJECTION, POWDER, LYOPHILIZED, FOR SOLUTION INTRAVENOUS
Qty: 1 | Refills: 0 | Status: COMPLETED
Start: 2025-07-20

## 2025-07-24 ENCOUNTER — APPOINTMENT (OUTPATIENT)
Dept: HYPERBARIC MEDICINE | Facility: HOSPITAL | Age: 60
End: 2025-07-24

## 2025-07-25 ENCOUNTER — APPOINTMENT (OUTPATIENT)
Dept: HYPERBARIC MEDICINE | Facility: HOSPITAL | Age: 60
End: 2025-07-25

## 2025-07-28 ENCOUNTER — APPOINTMENT (OUTPATIENT)
Dept: HYPERBARIC MEDICINE | Facility: HOSPITAL | Age: 60
End: 2025-07-28

## 2025-07-29 ENCOUNTER — APPOINTMENT (OUTPATIENT)
Dept: HYPERBARIC MEDICINE | Facility: HOSPITAL | Age: 60
End: 2025-07-29

## 2025-07-30 ENCOUNTER — APPOINTMENT (OUTPATIENT)
Dept: HYPERBARIC MEDICINE | Facility: HOSPITAL | Age: 60
End: 2025-07-30

## 2025-07-31 ENCOUNTER — APPOINTMENT (OUTPATIENT)
Dept: HYPERBARIC MEDICINE | Facility: HOSPITAL | Age: 60
End: 2025-07-31

## 2025-08-01 ENCOUNTER — APPOINTMENT (OUTPATIENT)
Dept: HYPERBARIC MEDICINE | Facility: HOSPITAL | Age: 60
End: 2025-08-01

## 2025-08-01 ENCOUNTER — NON-APPOINTMENT (OUTPATIENT)
Age: 60
End: 2025-08-01

## 2025-08-04 ENCOUNTER — APPOINTMENT (OUTPATIENT)
Dept: HYPERBARIC MEDICINE | Facility: HOSPITAL | Age: 60
End: 2025-08-04

## 2025-08-05 ENCOUNTER — APPOINTMENT (OUTPATIENT)
Dept: HYPERBARIC MEDICINE | Facility: HOSPITAL | Age: 60
End: 2025-08-05

## 2025-08-06 ENCOUNTER — APPOINTMENT (OUTPATIENT)
Dept: COLORECTAL SURGERY | Facility: CLINIC | Age: 60
End: 2025-08-06
Payer: MEDICARE

## 2025-08-06 ENCOUNTER — APPOINTMENT (OUTPATIENT)
Dept: HYPERBARIC MEDICINE | Facility: HOSPITAL | Age: 60
End: 2025-08-06

## 2025-08-06 ENCOUNTER — APPOINTMENT (OUTPATIENT)
Dept: COLORECTAL SURGERY | Facility: CLINIC | Age: 60
End: 2025-08-06

## 2025-08-06 ENCOUNTER — MED ADMIN CHARGE (OUTPATIENT)
Age: 60
End: 2025-08-06

## 2025-08-06 VITALS
DIASTOLIC BLOOD PRESSURE: 74 MMHG | OXYGEN SATURATION: 99 % | TEMPERATURE: 98.3 F | BODY MASS INDEX: 28.41 KG/M2 | HEART RATE: 75 BPM | SYSTOLIC BLOOD PRESSURE: 123 MMHG | RESPIRATION RATE: 17 BRPM | WEIGHT: 176 LBS

## 2025-08-06 VITALS
TEMPERATURE: 98.06 F | SYSTOLIC BLOOD PRESSURE: 147 MMHG | RESPIRATION RATE: 16 BRPM | OXYGEN SATURATION: 99 % | HEART RATE: 71 BPM | DIASTOLIC BLOOD PRESSURE: 75 MMHG

## 2025-08-06 DIAGNOSIS — K50.90 CROHN'S DISEASE, UNSPECIFIED, W/OUT COMPLICATIONS: ICD-10-CM

## 2025-08-06 DIAGNOSIS — Z71.89 OTHER SPECIFIED COUNSELING: ICD-10-CM

## 2025-08-06 PROCEDURE — 96365 THER/PROPH/DIAG IV INF INIT: CPT

## 2025-08-06 PROCEDURE — 99213 OFFICE O/P EST LOW 20 MIN: CPT | Mod: 25

## 2025-08-06 RX ORDER — ACETAMINOPHEN 325 MG/1
325 TABLET ORAL
Qty: 0 | Refills: 0 | Status: COMPLETED
Start: 2025-07-20

## 2025-08-06 RX ORDER — INFLIXIMAB 100 MG/10ML
100 INJECTION, POWDER, LYOPHILIZED, FOR SOLUTION INTRAVENOUS
Qty: 1 | Refills: 0 | Status: COMPLETED
Start: 2025-07-20

## 2025-08-06 RX ORDER — CETIRIZINE HYDROCHLORIDE 10 MG/1
10 TABLET, FILM COATED ORAL
Qty: 0 | Refills: 0 | Status: COMPLETED
Start: 2025-07-20

## 2025-08-07 ENCOUNTER — APPOINTMENT (OUTPATIENT)
Dept: HYPERBARIC MEDICINE | Facility: HOSPITAL | Age: 60
End: 2025-08-07

## 2025-08-08 ENCOUNTER — APPOINTMENT (OUTPATIENT)
Dept: HYPERBARIC MEDICINE | Facility: HOSPITAL | Age: 60
End: 2025-08-08

## 2025-08-11 ENCOUNTER — NON-APPOINTMENT (OUTPATIENT)
Age: 60
End: 2025-08-11

## 2025-08-11 ENCOUNTER — APPOINTMENT (OUTPATIENT)
Dept: RADIATION ONCOLOGY | Facility: CLINIC | Age: 60
End: 2025-08-11
Payer: MEDICARE

## 2025-08-11 VITALS
OXYGEN SATURATION: 99 % | TEMPERATURE: 97.4 F | SYSTOLIC BLOOD PRESSURE: 123 MMHG | HEART RATE: 77 BPM | WEIGHT: 173 LBS | HEIGHT: 66 IN | DIASTOLIC BLOOD PRESSURE: 67 MMHG | BODY MASS INDEX: 27.8 KG/M2 | RESPIRATION RATE: 18 BRPM

## 2025-08-11 DIAGNOSIS — C61 MALIGNANT NEOPLASM OF PROSTATE: ICD-10-CM

## 2025-08-11 PROCEDURE — 99212 OFFICE O/P EST SF 10 MIN: CPT

## 2025-08-18 ENCOUNTER — NON-APPOINTMENT (OUTPATIENT)
Age: 60
End: 2025-08-18

## 2025-08-25 ENCOUNTER — APPOINTMENT (OUTPATIENT)
Dept: ULTRASOUND IMAGING | Facility: IMAGING CENTER | Age: 60
End: 2025-08-25
Payer: MEDICARE

## 2025-08-25 ENCOUNTER — RESULT REVIEW (OUTPATIENT)
Age: 60
End: 2025-08-25

## 2025-08-25 ENCOUNTER — OUTPATIENT (OUTPATIENT)
Dept: OUTPATIENT SERVICES | Facility: HOSPITAL | Age: 60
LOS: 1 days | End: 2025-08-25
Payer: MEDICARE

## 2025-08-25 DIAGNOSIS — Z00.8 ENCOUNTER FOR OTHER GENERAL EXAMINATION: ICD-10-CM

## 2025-08-25 DIAGNOSIS — Z90.49 ACQUIRED ABSENCE OF OTHER SPECIFIED PARTS OF DIGESTIVE TRACT: Chronic | ICD-10-CM

## 2025-08-25 DIAGNOSIS — Z87.898 PERSONAL HISTORY OF OTHER SPECIFIED CONDITIONS: Chronic | ICD-10-CM

## 2025-08-25 DIAGNOSIS — S92.009A UNSPECIFIED FRACTURE OF UNSPECIFIED CALCANEUS, INITIAL ENCOUNTER FOR CLOSED FRACTURE: Chronic | ICD-10-CM

## 2025-08-25 DIAGNOSIS — Z98.89 OTHER SPECIFIED POSTPROCEDURAL STATES: Chronic | ICD-10-CM

## 2025-08-25 PROCEDURE — 93971 EXTREMITY STUDY: CPT | Mod: 26,RT

## 2025-08-25 PROCEDURE — 93971 EXTREMITY STUDY: CPT

## 2025-09-03 ENCOUNTER — MED ADMIN CHARGE (OUTPATIENT)
Age: 60
End: 2025-09-03

## 2025-09-03 ENCOUNTER — APPOINTMENT (OUTPATIENT)
Dept: COLORECTAL SURGERY | Facility: CLINIC | Age: 60
End: 2025-09-03
Payer: MEDICARE

## 2025-09-03 VITALS
RESPIRATION RATE: 16 BRPM | OXYGEN SATURATION: 98 % | TEMPERATURE: 97.34 F | SYSTOLIC BLOOD PRESSURE: 106 MMHG | HEART RATE: 71 BPM | DIASTOLIC BLOOD PRESSURE: 59 MMHG

## 2025-09-03 VITALS
DIASTOLIC BLOOD PRESSURE: 63 MMHG | RESPIRATION RATE: 16 BRPM | WEIGHT: 174 LBS | BODY MASS INDEX: 28.08 KG/M2 | HEART RATE: 77 BPM | OXYGEN SATURATION: 97 % | TEMPERATURE: 98.06 F | SYSTOLIC BLOOD PRESSURE: 98 MMHG

## 2025-09-03 PROCEDURE — 96366 THER/PROPH/DIAG IV INF ADDON: CPT

## 2025-09-03 PROCEDURE — 96365 THER/PROPH/DIAG IV INF INIT: CPT

## 2025-09-03 RX ORDER — ACETAMINOPHEN 325 MG/1
325 TABLET ORAL
Qty: 0 | Refills: 0 | Status: COMPLETED
Start: 2025-07-20

## 2025-09-03 RX ORDER — CETIRIZINE HYDROCHLORIDE 10 MG/1
10 TABLET, FILM COATED ORAL
Qty: 0 | Refills: 0 | Status: COMPLETED
Start: 2025-07-20

## 2025-09-03 RX ORDER — INFLIXIMAB 100 MG/10ML
100 INJECTION, POWDER, LYOPHILIZED, FOR SOLUTION INTRAVENOUS
Qty: 1 | Refills: 0 | Status: COMPLETED
Start: 2025-07-20

## 2025-09-15 ENCOUNTER — TRANSCRIPTION ENCOUNTER (OUTPATIENT)
Age: 60
End: 2025-09-15

## 2025-09-15 ENCOUNTER — APPOINTMENT (OUTPATIENT)
Dept: GASTROENTEROLOGY | Facility: HOSPITAL | Age: 60
End: 2025-09-15

## 2025-09-15 ENCOUNTER — RESULT REVIEW (OUTPATIENT)
Age: 60
End: 2025-09-15

## 2025-09-15 DIAGNOSIS — D64.9 ANEMIA, UNSPECIFIED: ICD-10-CM

## 2025-09-15 DIAGNOSIS — K50.90 CROHN'S DISEASE, UNSPECIFIED, W/OUT COMPLICATIONS: ICD-10-CM

## 2025-09-26 PROBLEM — C61 MALIGNANT NEOPLASM OF PROSTATE: Chronic | Status: ACTIVE | Noted: 2025-09-15

## (undated) DEVICE — XI SYNCHROSEAL VESSEL SEALER 8MM

## (undated) DEVICE — XI ARM NEEDLE DRIVER LARGE

## (undated) DEVICE — XI DRAPE COLUMN

## (undated) DEVICE — BAG URINE W METER 2L

## (undated) DEVICE — SUT VLOC 90 3-0 6" CV-23 VIOLET

## (undated) DEVICE — FOLEY CATH 2-WAY 20F 5CC LATEX LUBRICATH

## (undated) DEVICE — PACK PERI GYN

## (undated) DEVICE — SUT CAPROSYN 4-0 P-12 UNDYED

## (undated) DEVICE — SOL IRR BAG H2O 3000ML

## (undated) DEVICE — DRSG TELFA 3 X 8

## (undated) DEVICE — TIP METZENBAUM SCISSOR MONOPOLAR ENDOCUT (ORANGE)

## (undated) DEVICE — TUBING AIRSEAL TRI-LUMEN FILTERED

## (undated) DEVICE — WARMING BLANKET UPPER ADULT

## (undated) DEVICE — PREP CHLORAPREP HI-LITE ORANGE 26ML

## (undated) DEVICE — ELCTR GROUNDING PAD ADULT COVIDIEN

## (undated) DEVICE — NDL HYPO REGULAR BEVEL 25G X 1.5" (BLUE)

## (undated) DEVICE — TUBING STRYKEFLOW II SUCTION / IRRIGATOR

## (undated) DEVICE — GLV 8 PROTEXIS (CREAM) MICRO

## (undated) DEVICE — LUBRICATING JELLY ONESHOT 1.25OZ

## (undated) DEVICE — XI ARM FORCEP PROGRASP 8MM

## (undated) DEVICE — VENODYNE/SCD SLEEVE CALF MEDIUM

## (undated) DEVICE — SYR CATH TIP 2 OZ

## (undated) DEVICE — TROCAR SURGIQUEST AIRSEAL 12MMX100MM

## (undated) DEVICE — FOLEY CATH 2-WAY 20FR 5CC SILASTIC

## (undated) DEVICE — D HELP - CLEARVIEW CLEARIFY SYSTEM

## (undated) DEVICE — BALLOON ENDOCAVITY 2X14CM

## (undated) DEVICE — PACK ROBOTIC LIJ

## (undated) DEVICE — SUT VLOC 90 3-0 6" CV-23 UNDYED

## (undated) DEVICE — GLV 7.5 PROTEXIS (CREAM) MICRO

## (undated) DEVICE — XI OBTURATOR OPTICAL BLADELESS 8MM

## (undated) DEVICE — GRID BRACHYTHERAPY EZ 18G

## (undated) DEVICE — DRAPE TOWEL BLUE 17" X 24"

## (undated) DEVICE — PLASTIC SOLUTION BOWL 160Z

## (undated) DEVICE — SUT VICRYL 2-0 27" SH UNDYED

## (undated) DEVICE — XI SEAL UNIV 5- 8 MM

## (undated) DEVICE — DRAPE IOBAN 23" X 23"

## (undated) DEVICE — DRAIN RESERVOIR FOR JACKSON PRATT 100CC CARDINAL

## (undated) DEVICE — GLV 8 PROTEXIS (WHITE)

## (undated) DEVICE — SYR LUER LOK 10CC

## (undated) DEVICE — XI ARM FORCEP MARYLAND BIPOLAR

## (undated) DEVICE — SUT POLYSORB 0 60" TIES UNDYED

## (undated) DEVICE — SYR LUER LOK 50CC

## (undated) DEVICE — SOL IRR BAG NS 0.9% 3000ML

## (undated) DEVICE — TROCAR COVIDIEN VERSAONE BLADELESS FIXATION 5MM STANDARD

## (undated) DEVICE — FOLEY CATH 2-WAY 16FR 5CC SILICONE

## (undated) DEVICE — SUT VICRYL 3-0 27" RB-1 UNDYED

## (undated) DEVICE — PREP BETADINE SPONGE STICKS

## (undated) DEVICE — GOWN XL

## (undated) DEVICE — NDL MAX CORE 18G X 25CM

## (undated) DEVICE — XI ARM CLIP APPLIER LARGE

## (undated) DEVICE — POSITIONER PURPLE ARM ONE STEP (LARGE)

## (undated) DEVICE — GOWN XXXL

## (undated) DEVICE — XI ARM SCISSOR MONO CURVED

## (undated) DEVICE — POSITIONER FOAM HEAD CRADLE (PINK)

## (undated) DEVICE — BLADE SCALPEL SAFETYLOCK #15

## (undated) DEVICE — DRAPE MEDIUM SHEET 44" X 70"

## (undated) DEVICE — INSUFFLATION NDL COVIDIEN SURGINEEDLE VERESS 120MM

## (undated) DEVICE — SUT VLOC 180 0 6" GS-21 GREEN

## (undated) DEVICE — TROCAR COVIDIEN VERSAPORT BLADELESS OPTICAL 5MM STANDARD

## (undated) DEVICE — XI DRAPE ARM

## (undated) DEVICE — SUT VICRYL 0 27" UR-6

## (undated) DEVICE — DRSG TEGADERM 2.5X3"

## (undated) DEVICE — POSITIONER STRAP ARMBOARD VELCRO TS-30

## (undated) DEVICE — Device

## (undated) DEVICE — POSITIONER PINK PAD PIGAZZI SYSTEM

## (undated) DEVICE — NDL BIOPSY CHIBA 22G X 20CM

## (undated) DEVICE — XI TIP COVER